# Patient Record
Sex: FEMALE | Race: WHITE | Employment: OTHER | URBAN - METROPOLITAN AREA
[De-identification: names, ages, dates, MRNs, and addresses within clinical notes are randomized per-mention and may not be internally consistent; named-entity substitution may affect disease eponyms.]

---

## 2017-01-03 RX ORDER — MELOXICAM 7.5 MG/1
7.5 TABLET ORAL DAILY
Qty: 30 TABLET | Refills: 1 | Status: SHIPPED | OUTPATIENT
Start: 2017-01-03 | End: 2017-03-07

## 2017-01-03 NOTE — TELEPHONE ENCOUNTER
To Dr. Dexter Bell - see below - could not find that we ordered this in Epic - pt states she is leaving town and would like refill. Pt uses for muscle spasm in back - only takes when she really needs it.   Using acupuncture, massage therapy and Voltaren gel as

## 2017-01-03 NOTE — TELEPHONE ENCOUNTER
Pt needs a refill Moloxicam 7.5 mg, send to [facility].    Tasked to rx

## 2017-01-09 RX ORDER — FLUTICASONE PROPIONATE 50 MCG
SPRAY, SUSPENSION (ML) NASAL
Qty: 1 INHALER | Refills: 11 | Status: SHIPPED | OUTPATIENT
Start: 2017-01-09 | End: 2017-03-07

## 2017-01-13 ENCOUNTER — TELEPHONE (OUTPATIENT)
Dept: INTERNAL MEDICINE CLINIC | Facility: CLINIC | Age: 62
End: 2017-01-13

## 2017-01-13 RX ORDER — ESTRADIOL 0.1 MG/G
0.5 CREAM VAGINAL
Qty: 1 TUBE | Refills: 0 | Status: SHIPPED | OUTPATIENT
Start: 2017-01-13 | End: 2017-02-09

## 2017-01-13 NOTE — TELEPHONE ENCOUNTER
PT needs a refill Estradiol Vaginal Cream, pt will be leaving town tomorrow, please send to [facility].    Tasked to rx low

## 2017-01-13 NOTE — TELEPHONE ENCOUNTER
Dr. Rangel File - please advise for Dr. Camelia Arreaga - Rx has not been filled since October 2015 as pt only uses it PRN. Pt has been seen by Dr. Camelia Arreaga for routine physical in April 2016.

## 2017-02-09 RX ORDER — ESTRADIOL 0.1 MG/G
0.5 CREAM VAGINAL
Qty: 1 TUBE | Refills: 6 | Status: SHIPPED | OUTPATIENT
Start: 2017-02-10 | End: 2019-02-04

## 2017-02-09 NOTE — TELEPHONE ENCOUNTER
Please call pt, she rec'd her refill for 1 tube but was requesting that addt'l refills be available as with last years prescription which included 6 refills  Pt may not need all but would like it to be available rather than calling us each time  Please umer

## 2017-02-14 ENCOUNTER — TELEPHONE (OUTPATIENT)
Dept: GASTROENTEROLOGY | Facility: CLINIC | Age: 62
End: 2017-02-14

## 2017-02-14 DIAGNOSIS — R10.13 EPIGASTRIC PAIN: Primary | ICD-10-CM

## 2017-02-14 NOTE — TELEPHONE ENCOUNTER
Dr. Anat Ashton- MD on call for Dr. Opal Casiano- please see below communication from pt as well as her 2 emails on 2/12/17 and advise.

## 2017-02-14 NOTE — TELEPHONE ENCOUNTER
The pt should continue on bland diet and current medications, agree with advice as per RN. Please forward communication to Dr. Opal Casiano to follow up on these issues when back in the office.

## 2017-02-14 NOTE — TELEPHONE ENCOUNTER
Pt is contacted and she states the following (in addition to recent email messages): she fasted yesterday and was doing well this morning so she ate a banana; burning in stomach came back after belching x3 and with each bite of banana; it has now resolved,

## 2017-02-15 RX ORDER — RANITIDINE 150 MG/1
150 TABLET ORAL 2 TIMES DAILY
Qty: 60 TABLET | Refills: 11 | Status: SHIPPED | OUTPATIENT
Start: 2017-02-15 | End: 2019-07-17

## 2017-02-15 NOTE — TELEPHONE ENCOUNTER
Dr. Alexandru Louis- please see below communication from pt re: not taking Carafate and whether belching is damaging her esophagus; please advise; thanks!

## 2017-02-15 NOTE — TELEPHONE ENCOUNTER
Pt is contacted and made aware of below recommendations from Sugey Magaña and Flora Saucedo; she states the following: she ate light lunch yesterday and same for dinner and did well; at HS (2130) she had some rice cereal with almond milk and developed stomach pain/bu

## 2017-02-15 NOTE — TELEPHONE ENCOUNTER
Please also make sure that the patient is taking pantoprazole 40mg q am BEFORE BREAKFAST as prescribed. I have sent Erx for ranititdine 150mg bid as needed for breakthrough symptoms.

## 2017-02-16 NOTE — TELEPHONE ENCOUNTER
Pt is contacted re: below communication from Dr. Danielle Rudolph and she verbalized understanding of this/agreement with plan; pt is asking if Dr. Danielle Rudolph responded to her email from this morning re: lab orders; this will be sent to her as message also; pt states she ha

## 2017-02-16 NOTE — TELEPHONE ENCOUNTER
Belching will not damage her esophagus. Please instruct her to try otc simethicone as per lable instructions. Thanks.

## 2017-02-23 ENCOUNTER — PATIENT MESSAGE (OUTPATIENT)
Dept: GASTROENTEROLOGY | Facility: CLINIC | Age: 62
End: 2017-02-23

## 2017-02-23 NOTE — TELEPHONE ENCOUNTER
From: Matt Lugoys Day  To: Reynaldo Null MD  Sent: 2/23/2017 11:29 AM CST  Subject: Non-Urgent Medical Question    Good morning. Just wondering if I could take that apple cider vinegar mixture that a lot of people take for different things.

## 2017-02-24 NOTE — TELEPHONE ENCOUNTER
Yes you can have some apple cider vinegar. Do not have too much as it is acidic and could cause worsening acid reflux.

## 2017-03-07 ENCOUNTER — OFFICE VISIT (OUTPATIENT)
Dept: INTERNAL MEDICINE CLINIC | Facility: CLINIC | Age: 62
End: 2017-03-07

## 2017-03-07 VITALS
HEIGHT: 68 IN | TEMPERATURE: 98 F | WEIGHT: 137 LBS | DIASTOLIC BLOOD PRESSURE: 72 MMHG | SYSTOLIC BLOOD PRESSURE: 118 MMHG | HEART RATE: 72 BPM | BODY MASS INDEX: 20.76 KG/M2

## 2017-03-07 DIAGNOSIS — M85.80 OSTEOPENIA, UNSPECIFIED LOCATION: ICD-10-CM

## 2017-03-07 DIAGNOSIS — I10 ESSENTIAL HYPERTENSION: ICD-10-CM

## 2017-03-07 DIAGNOSIS — Z80.0 FAMILY HISTORY OF COLON CANCER: ICD-10-CM

## 2017-03-07 DIAGNOSIS — R53.83 OTHER FATIGUE: ICD-10-CM

## 2017-03-07 DIAGNOSIS — M54.9 UPPER BACK PAIN ON LEFT SIDE: ICD-10-CM

## 2017-03-07 DIAGNOSIS — Z12.31 ENCOUNTER FOR SCREENING MAMMOGRAM FOR BREAST CANCER: ICD-10-CM

## 2017-03-07 DIAGNOSIS — Z00.00 ROUTINE HEALTH MAINTENANCE: Primary | ICD-10-CM

## 2017-03-07 DIAGNOSIS — K21.9 GASTROESOPHAGEAL REFLUX DISEASE WITHOUT ESOPHAGITIS: ICD-10-CM

## 2017-03-07 PROCEDURE — 99213 OFFICE O/P EST LOW 20 MIN: CPT | Performed by: INTERNAL MEDICINE

## 2017-03-07 PROCEDURE — 99396 PREV VISIT EST AGE 40-64: CPT | Performed by: INTERNAL MEDICINE

## 2017-03-07 RX ORDER — GABAPENTIN 300 MG/1
CAPSULE ORAL
Qty: 90 CAPSULE | Refills: 5 | Status: SHIPPED | OUTPATIENT
Start: 2017-03-07 | End: 2017-03-30

## 2017-03-07 NOTE — PROGRESS NOTES
Lauryn Stoner is a 57year old female. Patient presents with:  Physical: also severe back pain since december      HPI:   Lauryn Stoner is a 57year old female who presents for a complete physical exam.   Had thoracic MRI from Dr. Lenore Rosales on 12/2/16 due to mouth nightly. Disp: 30 tablet Rfl: 12   Pantoprazole Sodium 40 MG Oral Tab EC Take 1 tablet (40 mg total) by mouth every morning before breakfast. Disp: 90 tablet Rfl: 4   AmLODIPine Besylate 10 MG Oral Tab Take 1 tablet (10 mg total) by mouth daily.  Disp gallbladder disease[other] [OTHER] Sister    • Crohn's Disease Other    • Heart Disease Other       Social History:     Smoking Status: Former Smoker                   Packs/Day: 0.00  Years:           Quit date: 11/02/1970    Smokeless Status: Former User sx started in 12/16. Trial of gabapentin 300mg TID. Cont physical therapy. If sx persist, f/u with Dr. Clarisse Cota. May need repeat MRI c-spine (vs EMG/NCV).      7. Family history of colonic cancer (sister)  Last colonoscopy 2015 -- no polyps.  Next colono

## 2017-03-23 ENCOUNTER — PATIENT MESSAGE (OUTPATIENT)
Dept: INTERNAL MEDICINE CLINIC | Facility: CLINIC | Age: 62
End: 2017-03-23

## 2017-03-24 NOTE — TELEPHONE ENCOUNTER
From: Muufri Day  To: Alexandria Kinsey MD  Sent: 3/23/2017 3:32 PM CDT  Subject: Visit Follow-up Question    Hi dr Mckayla Salazar. Still not feeling any differ on the Gabapentin. Still feelings down my left arm.    Went to see someone about dry needling at

## 2017-03-28 ENCOUNTER — TELEPHONE (OUTPATIENT)
Dept: INTERNAL MEDICINE CLINIC | Facility: CLINIC | Age: 62
End: 2017-03-28

## 2017-03-28 NOTE — TELEPHONE ENCOUNTER
Pt is calling she would like to get the order for the ENG for nerve pain today, she is still experiencing the pain in left arm to her fingertips.   please call pt today [phone]    Tasked to nursing

## 2017-03-28 NOTE — TELEPHONE ENCOUNTER
Pt states Dr. Camilla Chirinos told her to follow up with Dr. Edman Lundborg but he is out of town this week also     Patient is asking for a emg - pt has a lot of nerve pain and is hoping to take care of sooner than waiting for dr. sterling to return     and she wants sometelman

## 2017-03-28 NOTE — TELEPHONE ENCOUNTER
To Dr. Aiden Jo - pt asking for EMG asap - please see pt 3/28/17 emails - last EMG was 11/2015 - Dr. Edman Lundborg out of town for week. Pt seeing Dr. Nakul Kay April 19.

## 2017-03-29 NOTE — TELEPHONE ENCOUNTER
I know Dr. Erika Rollins is out of town, but he would be the best to determine what steps would be best to take next. You can try to increase the gabapentin to 600mg in the morning, and 300mg in the afternoon and evening to see if that would help.

## 2017-03-29 NOTE — TELEPHONE ENCOUNTER
Spoke with patient and relayed Dr. Larsen New messages. She verbalized an understanding and will contact Dr. Jerzy Miller office on Monday. Patient will try increasing Gabapentin, or call office/go to ER if symptoms worsen in the meantime. Med Module updated.

## 2017-03-30 ENCOUNTER — OFFICE VISIT (OUTPATIENT)
Dept: NEUROLOGY | Facility: CLINIC | Age: 62
End: 2017-03-30

## 2017-03-30 VITALS
DIASTOLIC BLOOD PRESSURE: 68 MMHG | BODY MASS INDEX: 21.22 KG/M2 | SYSTOLIC BLOOD PRESSURE: 108 MMHG | HEART RATE: 73 BPM | WEIGHT: 140 LBS | RESPIRATION RATE: 15 BRPM | HEIGHT: 68 IN

## 2017-03-30 DIAGNOSIS — M54.2 NECK PAIN ON LEFT SIDE: ICD-10-CM

## 2017-03-30 DIAGNOSIS — M54.12 CERVICAL RADICULOPATHY: Primary | ICD-10-CM

## 2017-03-30 PROBLEM — Z98.890 HISTORY OF LUMBAR LAMINECTOMY: Status: ACTIVE | Noted: 2017-03-30

## 2017-03-30 PROCEDURE — 99204 OFFICE O/P NEW MOD 45 MIN: CPT | Performed by: PHYSICAL MEDICINE & REHABILITATION

## 2017-03-30 NOTE — PROGRESS NOTES
Cervical Pain H & P    Chief Complaint:  Patient presents with:  Back Pain: new patient here with 3 month hx of tightness, spasms and pain in the upper back area. symptoms are occasionally accompanied with pain, numbness and tingling in the left arm.  pt ha base of the skull. · The pain radiates to occiput area of the head, left scapula, left upper posterior arm and left posterior forearm. · The pain at its best is 2/10. The pain at its worst is 9/10. The pain is currently  3/10.   The pain is described as Cancer Maternal Grandfather    • Polyps Sister      colon polyps   • gallbladder disease[other] [OTHER] Sister    • Crohn's Disease Other    • Heart Disease Other        Social History     Social History   Marital Status:   Spouse Name: N/A    Years equal, round, and reactive to light. No redness or discharge bilaterally. Skin:  There are no rashes or lesions. Lymph Nodes: The patient has no palpable submandibular, supraclavicular, and cervical lymph nodes. .    Vitals:   03/30/17  1400   BP: 10 and Left plexus irritation   Lhermitte's sign     Assessment  1. left C7-8 radiculopathy    2. Neck pain on left side      Plan  She will get a MRI of the cervical spine due to her newer onset of the pain and weakness and numbness and tingling.     She will

## 2017-03-30 NOTE — PATIENT INSTRUCTIONS
As of October 6th 2014, the Drug Enforcement Agency [facility]) is reclassifying all hydrocodone combination medications from Schedule III to Schedule II. This includes medications such as Norco, Vicodin, Lortab, Zohydro, and Vicoprofen.     What this means for y chart.      Plan  She will get a MRI of the cervical spine due to her newer onset of the pain and weakness and numbness and tingling.     She will call me once she has had the imaging studies and I will review them and my office will get back in touch with

## 2017-03-31 ENCOUNTER — MED REC SCAN ONLY (OUTPATIENT)
Dept: NEUROLOGY | Facility: CLINIC | Age: 62
End: 2017-03-31

## 2017-03-31 ENCOUNTER — TELEPHONE (OUTPATIENT)
Dept: NEUROLOGY | Facility: CLINIC | Age: 62
End: 2017-03-31

## 2017-03-31 NOTE — TELEPHONE ENCOUNTER
Called [phone] for Authorization of Approval for MRI C-spine, Approval was given with Ref #[id number] valid until 04/29/2017, will call patient to inform the Approval, patient states that she is schedule for Sunday afternoon

## 2017-04-02 ENCOUNTER — HOSPITAL ENCOUNTER (OUTPATIENT)
Dept: MRI IMAGING | Age: 62
Discharge: HOME | End: 2017-04-02
Attending: PHYSICAL MEDICINE & REHABILITATION
Payer: COMMERCIAL

## 2017-04-02 DIAGNOSIS — M54.12 CERVICAL RADICULOPATHY: ICD-10-CM

## 2017-04-02 PROCEDURE — 72141 MRI NECK SPINE W/O DYE: CPT

## 2017-04-03 ENCOUNTER — LAB ENCOUNTER (OUTPATIENT)
Dept: LAB | Facility: HOSPITAL | Age: 62
End: 2017-04-03
Attending: INTERNAL MEDICINE
Payer: COMMERCIAL

## 2017-04-03 ENCOUNTER — TELEPHONE (OUTPATIENT)
Dept: NEUROLOGY | Facility: CLINIC | Age: 62
End: 2017-04-03

## 2017-04-03 DIAGNOSIS — I10 ESSENTIAL HYPERTENSION: ICD-10-CM

## 2017-04-03 DIAGNOSIS — M85.80 OSTEOPENIA, UNSPECIFIED LOCATION: ICD-10-CM

## 2017-04-03 DIAGNOSIS — M48.02 CERVICAL SPINAL STENOSIS: ICD-10-CM

## 2017-04-03 DIAGNOSIS — M48.02 FORAMINAL STENOSIS OF CERVICAL REGION: ICD-10-CM

## 2017-04-03 DIAGNOSIS — M50.90 CERVICAL DISC DISEASE: ICD-10-CM

## 2017-04-03 DIAGNOSIS — M54.12 CERVICAL RADICULOPATHY: Primary | ICD-10-CM

## 2017-04-03 DIAGNOSIS — R53.83 OTHER FATIGUE: ICD-10-CM

## 2017-04-03 DIAGNOSIS — M54.2 NECK PAIN ON LEFT SIDE: ICD-10-CM

## 2017-04-03 PROCEDURE — 80061 LIPID PANEL: CPT

## 2017-04-03 PROCEDURE — 80048 BASIC METABOLIC PNL TOTAL CA: CPT

## 2017-04-03 PROCEDURE — 84450 TRANSFERASE (AST) (SGOT): CPT

## 2017-04-03 PROCEDURE — 84443 ASSAY THYROID STIM HORMONE: CPT

## 2017-04-03 PROCEDURE — 85025 COMPLETE CBC W/AUTO DIFF WBC: CPT

## 2017-04-03 PROCEDURE — 82306 VITAMIN D 25 HYDROXY: CPT

## 2017-04-03 PROCEDURE — 84460 ALANINE AMINO (ALT) (SGPT): CPT

## 2017-04-03 PROCEDURE — 36415 COLL VENOUS BLD VENIPUNCTURE: CPT

## 2017-04-03 RX ORDER — AMLODIPINE BESYLATE 10 MG/1
TABLET ORAL
Qty: 90 TABLET | Refills: 1 | Status: SHIPPED | OUTPATIENT
Start: 2017-04-03 | End: 2017-10-12
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
## 2017-04-03 NOTE — TELEPHONE ENCOUNTER
Pt called told that Dr Carol Degroot will be reviewing and will be be getting back to her in near future with recommendations and interpretation.

## 2017-04-10 ENCOUNTER — PATIENT MESSAGE (OUTPATIENT)
Dept: INTERNAL MEDICINE CLINIC | Facility: CLINIC | Age: 62
End: 2017-04-10

## 2017-04-12 ENCOUNTER — TELEPHONE (OUTPATIENT)
Dept: NEUROLOGY | Facility: CLINIC | Age: 62
End: 2017-04-12

## 2017-04-12 PROBLEM — M50.90 CERVICAL DISC DISEASE: Status: ACTIVE | Noted: 2017-04-12

## 2017-04-12 PROBLEM — M48.02 FORAMINAL STENOSIS OF CERVICAL REGION: Status: ACTIVE | Noted: 2017-04-12

## 2017-04-12 PROBLEM — M48.02 CERVICAL SPINAL STENOSIS: Status: ACTIVE | Noted: 2017-04-12

## 2017-04-12 NOTE — TELEPHONE ENCOUNTER
Pt stopped into office. Pt very anxious about MRI results. Started therapy. Told will have Dr Isabel Becerril reviewed. Spent 15 min with pt.

## 2017-04-12 NOTE — TELEPHONE ENCOUNTER
Spoke to patient and informed her of the below results and recommendations per Dr. Juan Francisco Guzman. Patient expressed understanding and would like to proceed with the left C7 TFESI under MAC sedation since she passed out during a previous injection.     Patient has b

## 2017-04-12 NOTE — TELEPHONE ENCOUNTER
I have reviewed her MRI scan and she has multiple bulging discs with narrowing where the nerves exit the left side of her neck which is probably the reason for her symptoms.   I am recommending a left C7 TFESI if the pain is significant, but she will need t

## 2017-04-12 NOTE — TELEPHONE ENCOUNTER
Pt. informed insurance was verified and Physical therapy is a covered benefit and does not require authorization for initial evaluation. Can proceed with scheduling appt. States she had PT for one month with no improvement. Requested PT notes from [facility].   Sh

## 2017-04-12 NOTE — TELEPHONE ENCOUNTER
Spoke to patient and informed her again of Dr. Devendra Blackwell below. Patient mentioned she did 4 weeks of physical therapy and there was no change in symptoms so she feels it would not be beneficial. No further action required at this time.       Dr. Chen Gutierrez

## 2017-04-12 NOTE — TELEPHONE ENCOUNTER
Called [facility] for authorization of approval of left C7 TFESI cpt codes Y1111304. Talked to Edilson Gaytan. who states no authorization is required. Reference # [id number]. Pt. Is scheduled for procedure on 04/21/17.

## 2017-04-17 ENCOUNTER — PATIENT MESSAGE (OUTPATIENT)
Dept: GASTROENTEROLOGY | Facility: CLINIC | Age: 62
End: 2017-04-17

## 2017-04-17 ENCOUNTER — TELEPHONE (OUTPATIENT)
Dept: NEUROLOGY | Facility: CLINIC | Age: 62
End: 2017-04-17

## 2017-04-17 NOTE — TELEPHONE ENCOUNTER
Pt left message on cell phone that it is OK to take muscle relaxant and can use diclofenac topical up to 48hr before. Reviewed with pt not to take NSAID, ASA or fish oil 1 week before. Told to call if any further questions.

## 2017-04-19 NOTE — TELEPHONE ENCOUNTER
From: [facility] Expose Day  To: Magdi Bennett MD  Sent: 4/17/2017 4:37 PM CDT  Subject: Non-Urgent Medical Question    Hi dr Kami Dubon. Just asking. I know I'm Not suppose to have any alcohol, and I haven't since 2010 I believe it is, and no herbs.  But

## 2017-04-21 ENCOUNTER — TELEPHONE (OUTPATIENT)
Dept: NEUROLOGY | Facility: CLINIC | Age: 62
End: 2017-04-21

## 2017-04-21 ENCOUNTER — OFFICE VISIT (OUTPATIENT)
Dept: SURGERY | Facility: CLINIC | Age: 62
End: 2017-04-21

## 2017-04-21 DIAGNOSIS — M48.02 FORAMINAL STENOSIS OF CERVICAL REGION: ICD-10-CM

## 2017-04-21 DIAGNOSIS — M54.12 CERVICAL RADICULOPATHY: Primary | ICD-10-CM

## 2017-04-21 DIAGNOSIS — M19.049: ICD-10-CM

## 2017-04-21 DIAGNOSIS — S63.639A: ICD-10-CM

## 2017-04-21 DIAGNOSIS — M50.90 CERVICAL DISC DISEASE: ICD-10-CM

## 2017-04-21 PROCEDURE — 64479 NJX AA&/STRD TFRM EPI C/T 1: CPT | Performed by: PHYSICAL MEDICINE & REHABILITATION

## 2017-04-21 NOTE — TELEPHONE ENCOUNTER
Pt requested of Dr uYmiko Marshall refill on Volteren gel. Pt is [facility] this AM. Medication escript to [employer].

## 2017-04-21 NOTE — PROCEDURES
Armin Grimes.    CERVICAL TRANSFORAMINAL  NAME:  Negro Stoner    MR #:    [id number] :  10/5/1955     PHYSICIAN:  Cedric Cleaning            Operative Report    DATE OF PROCEDURE: 2017   PREOPERATIVE DIAGNOSES: 1. left C7-8 contrast was used to obtain a good epidurogram indicating correct needle placement. No blood, fluid, or air was aspirated. Then, the patient was injected with a 0.5 cc of 2% PF lidocaine without epinephrine. No adverse reaction was seen.   Then, aspirati

## 2017-04-24 ENCOUNTER — OFFICE VISIT (OUTPATIENT)
Dept: PHYSICAL THERAPY | Facility: HOSPITAL | Age: 62
End: 2017-04-24
Attending: PHYSICAL MEDICINE & REHABILITATION
Payer: COMMERCIAL

## 2017-04-24 ENCOUNTER — TELEPHONE (OUTPATIENT)
Dept: INTERNAL MEDICINE CLINIC | Facility: CLINIC | Age: 62
End: 2017-04-24

## 2017-04-24 DIAGNOSIS — M48.02 FORAMINAL STENOSIS OF CERVICAL REGION: ICD-10-CM

## 2017-04-24 DIAGNOSIS — M54.2 NECK PAIN ON LEFT SIDE: ICD-10-CM

## 2017-04-24 DIAGNOSIS — M48.02 CERVICAL SPINAL STENOSIS: ICD-10-CM

## 2017-04-24 DIAGNOSIS — M50.90 CERVICAL DISC DISEASE: ICD-10-CM

## 2017-04-24 DIAGNOSIS — M54.12 CERVICAL RADICULOPATHY: Primary | ICD-10-CM

## 2017-04-24 PROCEDURE — 97110 THERAPEUTIC EXERCISES: CPT

## 2017-04-24 PROCEDURE — 97162 PT EVAL MOD COMPLEX 30 MIN: CPT

## 2017-04-24 NOTE — TELEPHONE ENCOUNTER
Can wait till Dr. Jan Mccormick returns. Patient had been on Fosamax for her bone issues. Was at Dr. Gaby Arroyo today & they mentioned they were concerned about her bones being brittle if they manipulate her.     Dr. Sudeep Loja had taken her off Fosamax as she had ga

## 2017-04-25 ENCOUNTER — TELEPHONE (OUTPATIENT)
Dept: NEUROLOGY | Facility: CLINIC | Age: 62
End: 2017-04-25

## 2017-04-26 ENCOUNTER — OFFICE VISIT (OUTPATIENT)
Dept: PHYSICAL THERAPY | Facility: HOSPITAL | Age: 62
End: 2017-04-26
Attending: PHYSICAL MEDICINE & REHABILITATION
Payer: COMMERCIAL

## 2017-04-26 DIAGNOSIS — M54.12 CERVICAL RADICULOPATHY: Primary | ICD-10-CM

## 2017-04-26 DIAGNOSIS — M48.02 CERVICAL SPINAL STENOSIS: ICD-10-CM

## 2017-04-26 DIAGNOSIS — M54.2 NECK PAIN ON LEFT SIDE: ICD-10-CM

## 2017-04-26 DIAGNOSIS — M48.02 FORAMINAL STENOSIS OF CERVICAL REGION: ICD-10-CM

## 2017-04-26 DIAGNOSIS — M50.90 CERVICAL DISC DISEASE: ICD-10-CM

## 2017-04-26 PROCEDURE — 97140 MANUAL THERAPY 1/> REGIONS: CPT

## 2017-04-26 NOTE — PROGRESS NOTES
Goals     • Therapy Goals         Long Term Goals (Time Frame 12 visits) by 07/30/17   1. Pt to report reduction of L shoulder blade pain with looking down to read a book to no more than 3/10 pain for functional pain management   2.  Pt to demonstrate impro history includes L1-L2 fusion in 2008 and osteoporosis according to pt.  The patient would benefit from the skilled intervention of a physical therapist for the impairments and functional limitations noted above.     Rehab Potential: fair  Limiting factors: prone position to L shoulder blade region*. Educated patient on possible bruising and tenderness as a side effect to the technique, use of cryotherapy as needed, pt verbalized understanding and gave consent.  Pt reports feeling better after treatment with

## 2017-04-26 NOTE — TELEPHONE ENCOUNTER
Pt called stated that she had left C7 TFESI and although neck is improving has resolved muscle spam  \"knot\" per pt in \"left wing muscle\".  Stated that Dr Mary Jane Sanchez was aware and thought that she may get some relief from muscle spasm post injection but none

## 2017-05-02 ENCOUNTER — TELEPHONE (OUTPATIENT)
Dept: GASTROENTEROLOGY | Facility: CLINIC | Age: 62
End: 2017-05-02

## 2017-05-02 NOTE — TELEPHONE ENCOUNTER
Pt is contacted and made aware that last rx for Fosamax was written on 7/22/15; since her EGD was done on 6/9/16 and pt has 2 doses left of Fosamax; she most likely stopped taking it right after that procedure; pt verbalized understanding of this and will

## 2017-05-02 NOTE — TELEPHONE ENCOUNTER
Called patient and relayed message from Dr. Yinka Dubon. She verbalized understanding and scheduled appt for next week with Dr. Yinka Dubon.

## 2017-05-02 NOTE — TELEPHONE ENCOUNTER
Pt wants to know when did Dr Maria Dolores Conrad requested for the pt. To stop taking the Fosamax medication?

## 2017-05-02 NOTE — TELEPHONE ENCOUNTER
Looks like she had been on fosamax since 5/2014 -- not sure when she stopped. All of the meds in the fosamax category can be hard on her stomach. It would probably be helpful for her to make an appt to discuss other options for treatment.

## 2017-05-09 ENCOUNTER — OFFICE VISIT (OUTPATIENT)
Dept: PHYSICAL THERAPY | Facility: HOSPITAL | Age: 62
End: 2017-05-09
Attending: PHYSICAL MEDICINE & REHABILITATION
Payer: COMMERCIAL

## 2017-05-09 DIAGNOSIS — M48.02 FORAMINAL STENOSIS OF CERVICAL REGION: ICD-10-CM

## 2017-05-09 DIAGNOSIS — M50.90 CERVICAL DISC DISEASE: ICD-10-CM

## 2017-05-09 DIAGNOSIS — M54.2 NECK PAIN ON LEFT SIDE: ICD-10-CM

## 2017-05-09 DIAGNOSIS — M54.12 CERVICAL RADICULOPATHY: Primary | ICD-10-CM

## 2017-05-09 DIAGNOSIS — M48.02 CERVICAL SPINAL STENOSIS: ICD-10-CM

## 2017-05-09 PROCEDURE — 97140 MANUAL THERAPY 1/> REGIONS: CPT

## 2017-05-09 NOTE — PROGRESS NOTES
Goals     • Therapy Goals         Long Term Goals (Time Frame 12 visits) by 07/30/17   1. Pt to report reduction of L shoulder blade pain with looking down to read a book to no more than 3/10 pain for functional pain management   2.  Pt to demonstrate impro history includes L1-L2 fusion in 2008 and osteoporosis according to pt.  The patient would benefit from the skilled intervention of a physical therapist for the impairments and functional limitations noted above.     Rehab Potential: fair  Limiting factors: including effleurage and pétrissage, myofascial release, and connective tissue skin rolling in centripedal direction with aims to loosen adhesions, reduce pain, increase lymphatic flow, increase circulation and increase waste removal of inflammation.   Intr much better after last treatment session.   Focused session on addressing moderate to severe trigger points and hypertonicity throughout the cervical and thoracic spine extensors and GH retractors and external rotators as found on initial evaluation with so

## 2017-05-10 ENCOUNTER — OFFICE VISIT (OUTPATIENT)
Dept: INTERNAL MEDICINE CLINIC | Facility: CLINIC | Age: 62
End: 2017-05-10

## 2017-05-10 VITALS
SYSTOLIC BLOOD PRESSURE: 134 MMHG | HEART RATE: 76 BPM | BODY MASS INDEX: 21.37 KG/M2 | WEIGHT: 141 LBS | DIASTOLIC BLOOD PRESSURE: 82 MMHG | HEIGHT: 68 IN | TEMPERATURE: 98 F

## 2017-05-10 DIAGNOSIS — M85.80 OSTEOPENIA, UNSPECIFIED LOCATION: Primary | ICD-10-CM

## 2017-05-10 PROCEDURE — 99212 OFFICE O/P EST SF 10 MIN: CPT | Performed by: INTERNAL MEDICINE

## 2017-05-10 PROCEDURE — 99213 OFFICE O/P EST LOW 20 MIN: CPT | Performed by: INTERNAL MEDICINE

## 2017-05-10 RX ORDER — METHOCARBAMOL 750 MG/1
1 TABLET, FILM COATED ORAL AS NEEDED
Refills: 1 | COMMUNITY
Start: 2017-04-25 | End: 2017-08-08 | Stop reason: ALTCHOICE

## 2017-05-10 RX ORDER — CYCLOBENZAPRINE HCL 10 MG
1 TABLET ORAL 2 TIMES DAILY
Refills: 0 | COMMUNITY
Start: 2017-05-04 | End: 2017-07-17

## 2017-05-10 NOTE — PROGRESS NOTES
Lauryn Stoner is a 57year old female. Patient presents with:  Medication Problem: Patient is here today to discuss Fosamax. She was advised by Dr. Nader Dalal to stop this medication d/t issues with gastritis (patient thinks in June 2016).  Patient has been doing Rfl:    Fluticasone Propionate (FLONASE) 50 MCG/ACT Nasal Suspension by Nasal route daily as needed. spray 2 spray by intranasal route  every day in each nostril  Disp:  Rfl:    Multiple Vitamins Oral Tab Take 1 tablet by mouth daily.  Multiple Vitamins tab miacalcin, Evista. Pt opts for Reclast 5mg IV q12 months. Will ask Zhang Thompson to arrange. Repeat in DEXA in 2 years. Cont exercise, calcium, vitamin D. The patient indicates understanding of these issues and agrees to the plan.

## 2017-05-11 ENCOUNTER — OFFICE VISIT (OUTPATIENT)
Dept: PHYSICAL THERAPY | Facility: HOSPITAL | Age: 62
End: 2017-05-11
Attending: PHYSICAL MEDICINE & REHABILITATION
Payer: COMMERCIAL

## 2017-05-11 ENCOUNTER — TELEPHONE (OUTPATIENT)
Dept: INTERNAL MEDICINE CLINIC | Facility: CLINIC | Age: 62
End: 2017-05-11

## 2017-05-11 ENCOUNTER — HOSPITAL ENCOUNTER (OUTPATIENT)
Dept: MAMMOGRAPHY | Age: 62
Discharge: HOME | End: 2017-05-11
Attending: INTERNAL MEDICINE
Payer: COMMERCIAL

## 2017-05-11 DIAGNOSIS — M48.02 FORAMINAL STENOSIS OF CERVICAL REGION: ICD-10-CM

## 2017-05-11 DIAGNOSIS — R92.2 DENSE BREASTS: Primary | ICD-10-CM

## 2017-05-11 DIAGNOSIS — Z12.31 ENCOUNTER FOR SCREENING MAMMOGRAM FOR BREAST CANCER: ICD-10-CM

## 2017-05-11 DIAGNOSIS — M50.90 CERVICAL DISC DISEASE: ICD-10-CM

## 2017-05-11 DIAGNOSIS — M54.2 NECK PAIN ON LEFT SIDE: ICD-10-CM

## 2017-05-11 DIAGNOSIS — M54.12 CERVICAL RADICULOPATHY: Primary | ICD-10-CM

## 2017-05-11 DIAGNOSIS — M48.02 CERVICAL SPINAL STENOSIS: ICD-10-CM

## 2017-05-11 PROCEDURE — 77067 SCR MAMMO BI INCL CAD: CPT | Performed by: INTERNAL MEDICINE

## 2017-05-11 PROCEDURE — 97140 MANUAL THERAPY 1/> REGIONS: CPT

## 2017-05-11 NOTE — PROGRESS NOTES
Goals     • Therapy Goals         Long Term Goals (Time Frame 12 visits) by 07/30/17   1. Pt to report reduction of L shoulder blade pain with looking down to read a book to no more than 3/10 pain for functional pain management   2.  Pt to demonstrate impro history includes L1-L2 fusion in 2008 and osteoporosis according to pt.  The patient would benefit from the skilled intervention of a physical therapist for the impairments and functional limitations noted above.     Rehab Potential: fair  Limiting factors: session on addressing moderate to severe trigger points and hypertonicity throughout the cervical and thoracic spine extensors and GH retractors and external rotators as found on initial evaluation with soft tissue mobilization including effleurage and pét and plan to introduce postural strengthening as able.     Therapist: Lord Rodrigues, PT, DPT, OCS  5/3/2017 4:04 PM    5/9/2017: Pt returns to clinic with 5-6/10 L shoulder blade pain currently and reports the symptoms are much better after last treatment increase tissue flexibility to add to HEP. Pt reports feeling better after treatment with now 2-3/10 pain. Issued cryotherapy to apply to L rhomboid to reduce inflammatory response post-tx.   Plan to continue addressing trigger points with soft tissue mob

## 2017-05-11 NOTE — TELEPHONE ENCOUNTER
Please tell Mary Reynolds that it would be okay for her to have free nonalcoholic wine. Just for her information if she puts alcohol into a meal for cooking or boiling,, most of it is boiled off by heat.

## 2017-05-12 ENCOUNTER — TELEPHONE (OUTPATIENT)
Dept: NEUROLOGY | Facility: CLINIC | Age: 62
End: 2017-05-12

## 2017-05-12 NOTE — TELEPHONE ENCOUNTER
Patient called back and was relayed 's message. Patient states she will think about the whole breast US and let us know if she is interested.

## 2017-05-12 NOTE — TELEPHONE ENCOUNTER
Please let pt know that her mammo was normal.  Because her breasts are so dense, she is a candidate for whole breast u/s which would just give additonal information. If she is interested, let me know and I can order.

## 2017-05-12 NOTE — TELEPHONE ENCOUNTER
Spoke to patient who states Aliya Mitchell (physical therapist) feels patient may benefit from acupuncture or cupping therapy at [facility]. Patient is requesting an order from Dr. Tommy Guillaume for this treatment and would like to go to outside facility for such treatment.

## 2017-05-15 ENCOUNTER — OFFICE VISIT (OUTPATIENT)
Dept: PHYSICAL THERAPY | Facility: HOSPITAL | Age: 62
End: 2017-05-15
Attending: PHYSICAL MEDICINE & REHABILITATION
Payer: COMMERCIAL

## 2017-05-15 ENCOUNTER — TELEPHONE (OUTPATIENT)
Dept: INTERNAL MEDICINE CLINIC | Facility: CLINIC | Age: 62
End: 2017-05-15

## 2017-05-15 DIAGNOSIS — M48.02 FORAMINAL STENOSIS OF CERVICAL REGION: ICD-10-CM

## 2017-05-15 DIAGNOSIS — M50.90 CERVICAL DISC DISEASE: ICD-10-CM

## 2017-05-15 DIAGNOSIS — M48.02 CERVICAL SPINAL STENOSIS: ICD-10-CM

## 2017-05-15 DIAGNOSIS — M54.12 CERVICAL RADICULOPATHY: Primary | ICD-10-CM

## 2017-05-15 DIAGNOSIS — M54.2 NECK PAIN ON LEFT SIDE: ICD-10-CM

## 2017-05-15 PROCEDURE — 97140 MANUAL THERAPY 1/> REGIONS: CPT

## 2017-05-15 NOTE — TELEPHONE ENCOUNTER
See telephone encounter 5/11/17.  Theodore Barriga faxed Reclast application to reimbursement dept    To Theodore Barriga, please advise

## 2017-05-15 NOTE — TELEPHONE ENCOUNTER
Pt still waiting on answer for bone density shot was this approved by her insurance she stated  told her she will have her nurse look in to this and call her back.

## 2017-05-15 NOTE — TELEPHONE ENCOUNTER
Yonny Paulson for her to do this. She does not need a prescription since insurance does not usually cover this. If her insurance is one of the very few that does, then I will give her a prescription.

## 2017-05-15 NOTE — PROGRESS NOTES
Goals     • Therapy Goals         Long Term Goals (Time Frame 12 visits) by 07/30/17   1. Pt to report reduction of L shoulder blade pain with looking down to read a book to no more than 3/10 pain for functional pain management   2.  Pt to demonstrate impro history includes L1-L2 fusion in 2008 and osteoporosis according to pt.  The patient would benefit from the skilled intervention of a physical therapist for the impairments and functional limitations noted above.     Rehab Potential: fair  Limiting factors: have helped slightly although feels very sore. No major changes since starting HEP yet.  Focused session on addressing moderate to severe trigger points and hypertonicity throughout the cervical and thoracic spine extensors and GH retractors and external r 2-3/10 pain. Plan to continue addressing trigger points with soft tissue mobilization techniques and plan to introduce postural strengthening as able.     Therapist: Maria Esther Upton PT, DPT, OCS  5/3/2017 4:04 PM    5/9/2017: Pt returns to clinic with 5-6/ [address] stabilization activities for extension and abduction to improve circulation, strength and increase tissue flexibility to add to HEP. Pt reports feeling better after treatment with now 2-3/10 pain.   Issued cryotherapy to apply to L rhomboid to reduce in

## 2017-05-15 NOTE — TELEPHONE ENCOUNTER
Has seen chiropractor for treatment and acupuncture that was billed to insurance. Will check to see if needs referral or will cover cupping.

## 2017-05-16 ENCOUNTER — TELEPHONE (OUTPATIENT)
Dept: INTERNAL MEDICINE CLINIC | Facility: CLINIC | Age: 62
End: 2017-05-16

## 2017-05-16 NOTE — TELEPHONE ENCOUNTER
Message relayed to patient who verbalized understanding. Pt would like to know if we would be able to tell her what her out-of-pocket cost will be. Informed her that this will be forwarded to managed care. Message forwarded to [facility] for review.

## 2017-05-16 NOTE — TELEPHONE ENCOUNTER
Patient received call regarding Mammogram results. Needs order for Ultrasound of Breasts. Please call when order is entered, may leave message.

## 2017-05-16 NOTE — TELEPHONE ENCOUNTER
Done. Pls see 5/11/17 telephone encounter. Pt would like to know what her out-of-pocket cost will be for the ultrasound. Message routed to [place] for review.

## 2017-05-16 NOTE — TELEPHONE ENCOUNTER
Patient called to check on status of reclast authorization. Explained that application has been sent, takes at least 7 business days. If she hasn't heard from us or insurance by May 30th to contact us, we will follow up.

## 2017-05-17 ENCOUNTER — APPOINTMENT (OUTPATIENT)
Dept: PHYSICAL THERAPY | Facility: HOSPITAL | Age: 62
End: 2017-05-17
Attending: PHYSICAL MEDICINE & REHABILITATION
Payer: COMMERCIAL

## 2017-05-22 ENCOUNTER — OFFICE VISIT (OUTPATIENT)
Dept: PHYSICAL THERAPY | Facility: HOSPITAL | Age: 62
End: 2017-05-22
Attending: PHYSICAL MEDICINE & REHABILITATION
Payer: COMMERCIAL

## 2017-05-22 DIAGNOSIS — M54.2 NECK PAIN ON LEFT SIDE: ICD-10-CM

## 2017-05-22 DIAGNOSIS — M48.02 CERVICAL SPINAL STENOSIS: ICD-10-CM

## 2017-05-22 DIAGNOSIS — M48.02 FORAMINAL STENOSIS OF CERVICAL REGION: ICD-10-CM

## 2017-05-22 DIAGNOSIS — M54.12 CERVICAL RADICULOPATHY: Primary | ICD-10-CM

## 2017-05-22 DIAGNOSIS — M50.90 CERVICAL DISC DISEASE: ICD-10-CM

## 2017-05-22 PROCEDURE — 97140 MANUAL THERAPY 1/> REGIONS: CPT

## 2017-05-22 PROCEDURE — 97110 THERAPEUTIC EXERCISES: CPT

## 2017-05-22 NOTE — PROGRESS NOTES
Goals     • Therapy Goals         Long Term Goals (Time Frame 12 visits) by 07/30/17   1. Pt to report reduction of L shoulder blade pain with looking down to read a book to no more than 3/10 pain for functional pain management   2.  Pt to demonstrate impro history includes L1-L2 fusion in 2008 and osteoporosis according to pt.  The patient would benefit from the skilled intervention of a physical therapist for the impairments and functional limitations noted above.     Rehab Potential: fair  Limiting factors: release  4.  [facility]   Internal          Neuro-Re-Education          Self-Care          Other            4/26/2017: Pt returns to clinic with 7/10 L shoulder blade pain currently and reports the massage she received yesterday seems to have helped slightly alt reduce pain, increase lymphatic flow, increase circulation and increase waste removal of inflammation. Followed with doorway stretch to increase tissue flexibility to add to HEP. Pt reports feeling better after treatment with now 2-3/10 pain.  Plan to con release, IASTM and connective tissue skin rolling in centripedal direction with aims to loosen adhesions, reduce pain, increase lymphatic flow, increase circulation and increase waste removal of inflammation.   Followed with isometric GH stabilization activ the thoracic spine has gotten so painful again.    Focused session on addressing moderate to severe trigger points and hypertonicity throughout the cervical and thoracic spine extensors and GH retractors and external rotators as found on initial evaluation

## 2017-05-22 NOTE — TELEPHONE ENCOUNTER
There has been several encounters regarding the Reclast shot & patient's out of pocket. Patient now has decided it doesn't matter the cost, she wants an order for the shot. Initially she wanted to know her cost just to be able to budget for it.     Please

## 2017-05-23 ENCOUNTER — OFFICE VISIT (OUTPATIENT)
Dept: NEUROLOGY | Facility: CLINIC | Age: 62
End: 2017-05-23

## 2017-05-23 VITALS
BODY MASS INDEX: 21.22 KG/M2 | WEIGHT: 140 LBS | OXYGEN SATURATION: 96 % | SYSTOLIC BLOOD PRESSURE: 132 MMHG | HEART RATE: 72 BPM | HEIGHT: 68 IN | RESPIRATION RATE: 16 BRPM | DIASTOLIC BLOOD PRESSURE: 78 MMHG

## 2017-05-23 DIAGNOSIS — M79.18 MYOFASCIAL PAIN: Primary | ICD-10-CM

## 2017-05-23 DIAGNOSIS — M54.2 NECK PAIN ON LEFT SIDE: ICD-10-CM

## 2017-05-23 PROCEDURE — 20552 NJX 1/MLT TRIGGER POINT 1/2: CPT | Performed by: PHYSICAL MEDICINE & REHABILITATION

## 2017-05-23 RX ORDER — LIDOCAINE HYDROCHLORIDE 10 MG/ML
10 INJECTION, SOLUTION INFILTRATION; PERINEURAL ONCE
Status: COMPLETED | OUTPATIENT
Start: 2017-05-23 | End: 2017-05-23

## 2017-05-23 NOTE — PROGRESS NOTES
Pt became vagal during procedure. Monitored. Juice given. Slightly diaphoretic, pale 110/70-60hr. Rebounded after 15 min of observation 132/76- 68hr. Pt dry no dizziness after 1/2hr discharged ambulatory.

## 2017-05-23 NOTE — PROCEDURES
I did left rhomboid major and minor trigger point injections in the office today. The points of maximal tenderness were identified and a marks were placed on the patient's skin. The skin was cleaned with alcohol swabs x 3.   A 27 gauge needle was inserted

## 2017-05-23 NOTE — PATIENT INSTRUCTIONS
Refill policies:    • Allow 2 business days for refills; controlled substances may take longer.   • Contact your pharmacy at least 5 days prior to running out of medication and have them send an electronic request or submit request through the “request re your physician has recommended that you have a procedure or additional testing performed. [facility] will contact your insurance carrier to obtain pre-certification or prior authorization.     Unfortunately, LUCAS has seen an increas

## 2017-05-24 ENCOUNTER — TELEPHONE (OUTPATIENT)
Dept: NEUROLOGY | Facility: CLINIC | Age: 62
End: 2017-05-24

## 2017-05-24 ENCOUNTER — OFFICE VISIT (OUTPATIENT)
Dept: PHYSICAL THERAPY | Facility: HOSPITAL | Age: 62
End: 2017-05-24
Attending: PHYSICAL MEDICINE & REHABILITATION
Payer: COMMERCIAL

## 2017-05-24 DIAGNOSIS — M50.90 CERVICAL DISC DISEASE: ICD-10-CM

## 2017-05-24 DIAGNOSIS — M54.12 CERVICAL RADICULOPATHY: Primary | ICD-10-CM

## 2017-05-24 DIAGNOSIS — M48.02 CERVICAL SPINAL STENOSIS: ICD-10-CM

## 2017-05-24 DIAGNOSIS — M48.02 FORAMINAL STENOSIS OF CERVICAL REGION: ICD-10-CM

## 2017-05-24 DIAGNOSIS — M54.2 NECK PAIN ON LEFT SIDE: ICD-10-CM

## 2017-05-24 PROCEDURE — 97140 MANUAL THERAPY 1/> REGIONS: CPT

## 2017-05-24 NOTE — TELEPHONE ENCOUNTER
Patient states she has mid back pain when taking deep breaths which started about two week ago.  Patient admits she had these symptoms in December 2016 and she went to the ER and was notified they were spasms and says it subsided after completing methylpred

## 2017-05-24 NOTE — TELEPHONE ENCOUNTER
Patient forgot to ask a question yesterday, when she takes a deep breath, she has pain around her \"bra line\". She wants to know if he has any idea what could cause this?  She will also ask her therapist.

## 2017-05-24 NOTE — PROGRESS NOTES
Goals     • Therapy Goals         Long Term Goals (Time Frame 12 visits) by 07/30/17   1. Pt to report reduction of L shoulder blade pain with looking down to read a book to no more than 3/10 pain for functional pain management   2.  Pt to demonstrate impro history includes L1-L2 fusion in 2008 and osteoporosis according to pt.  The patient would benefit from the skilled intervention of a physical therapist for the impairments and functional limitations noted above.     Rehab Potential: fair  Limiting factors: evaluation with soft tissue mobilization including effleurage and pétrissage, myofascial release, and connective tissue skin rolling in centripedal direction with aims to loosen adhesions, reduce pain, increase lymphatic flow, increase circulation and incr currently and reports the symptoms are much better after last treatment session.   Focused session on addressing moderate to severe trigger points and hypertonicity throughout the cervical and thoracic spine extensors and GH retractors and external rotators post-tx. Plan to continue addressing trigger points with soft tissue mobilization techniques and plan to introduce postural strengthening as able.     Therapist: Naun Dooley, PT, DPT, OCS  5/11/2017 3:14 PM    5/15/2017: Pt returns to clinic with 2-3/1 increase waste removal of inflammation. Followed with pain free active range of motion [address] stabilization activities for extension and abduction to improve circulation, strength and increase tissue flexibility to add to HEP.   Pt reports feeling better after

## 2017-05-24 NOTE — TELEPHONE ENCOUNTER
[phone]  Pt wants order for bone density injection called Reclast? Pt wants to do injection asap regardless of cost. Please call pt when order is in system so she can go to infusion center and have the injection  To clinical

## 2017-05-25 ENCOUNTER — OFFICE VISIT (OUTPATIENT)
Dept: NEUROLOGY | Facility: CLINIC | Age: 62
End: 2017-05-25

## 2017-05-25 VITALS
WEIGHT: 140 LBS | RESPIRATION RATE: 22 BRPM | HEART RATE: 90 BPM | HEIGHT: 68 IN | SYSTOLIC BLOOD PRESSURE: 110 MMHG | DIASTOLIC BLOOD PRESSURE: 64 MMHG | OXYGEN SATURATION: 97 % | BODY MASS INDEX: 21.22 KG/M2

## 2017-05-25 DIAGNOSIS — M48.02 FORAMINAL STENOSIS OF CERVICAL REGION: ICD-10-CM

## 2017-05-25 DIAGNOSIS — M48.02 CERVICAL SPINAL STENOSIS: ICD-10-CM

## 2017-05-25 DIAGNOSIS — M54.12 CERVICAL RADICULOPATHY: Primary | ICD-10-CM

## 2017-05-25 DIAGNOSIS — M79.18 MYOFASCIAL PAIN: ICD-10-CM

## 2017-05-25 DIAGNOSIS — M54.2 NECK PAIN ON LEFT SIDE: ICD-10-CM

## 2017-05-25 DIAGNOSIS — M54.6 ACUTE BILATERAL THORACIC BACK PAIN: ICD-10-CM

## 2017-05-25 DIAGNOSIS — M41.25 OTHER IDIOPATHIC SCOLIOSIS, THORACOLUMBAR REGION: ICD-10-CM

## 2017-05-25 DIAGNOSIS — M50.90 CERVICAL DISC DISEASE: ICD-10-CM

## 2017-05-25 PROBLEM — M41.9 SCOLIOSIS: Status: ACTIVE | Noted: 2017-05-25

## 2017-05-25 PROCEDURE — 99214 OFFICE O/P EST MOD 30 MIN: CPT | Performed by: PHYSICAL MEDICINE & REHABILITATION

## 2017-05-25 NOTE — TELEPHONE ENCOUNTER
Spoke to patient and informed her of the below. Patient is unavailable tomorrow and must leave on time because she has a graduation at 7:00p today.  Patient scheduled at 4:00p today

## 2017-05-25 NOTE — TELEPHONE ENCOUNTER
Pt called to follow up on order for bone density injection   - re iterates she is no longer worried about the cost   Would like call back today [phone] re:order

## 2017-05-25 NOTE — PROGRESS NOTES
Cervical Pain H & P    Chief Complaint:  Patient presents with:  Pain: pt c/o pain when taking deep breathe through her back along her bra line area, states when she is stationary there is no pain only when she moves, says she started a new exercise last w Surgical History       Past Surgical History          Comment x 3    BACK SURGERY      Comment lumbar fusion L1-2    OTHER SURGICAL HISTORY Right     Comment rotator cuff repair    OTHER SURGICAL HISTORY Left     Comment ORIF wrist frac nodes..    Vitals:   05/25/17  1554   BP: 110/64   Pulse: 90   Resp: 22       Cervical Spine:    Posture: normal chin forward superiorly rotated protracted shoulder posture.    Shoulders: Level   Head: In neutral   Spinous Processes Palpations: Non-tender f Isabel Becerril MD  5/25/2017

## 2017-05-26 ENCOUNTER — HOSPITAL ENCOUNTER (OUTPATIENT)
Dept: ULTRASOUND IMAGING | Facility: HOSPITAL | Age: 62
Discharge: HOME | End: 2017-05-26
Attending: INTERNAL MEDICINE
Payer: COMMERCIAL

## 2017-05-26 DIAGNOSIS — R92.2 DENSE BREASTS: ICD-10-CM

## 2017-05-26 PROCEDURE — 76641 ULTRASOUND BREAST COMPLETE: CPT | Performed by: INTERNAL MEDICINE

## 2017-05-26 RX ORDER — ZOLEDRONIC ACID 5 MG/100ML
5 INJECTION, SOLUTION INTRAVENOUS ONCE
Qty: 100 ML | Refills: 0 | Status: SHIPPED
Start: 2017-05-26 | End: 2017-05-26

## 2017-05-26 NOTE — TELEPHONE ENCOUNTER
Spoke to pt and faxed orders to [facility] infusion

## 2017-05-30 PROBLEM — L40.3 SAPHO SYNDROME: Status: ACTIVE | Noted: 2017-05-30

## 2017-05-30 PROBLEM — L70.9 SAPHO SYNDROME  (HCC): Status: ACTIVE | Noted: 2017-05-30

## 2017-05-30 PROBLEM — L70.9 SAPHO SYNDROME (HCC): Status: ACTIVE | Noted: 2017-05-30

## 2017-05-30 PROBLEM — M65.9 SAPHO SYNDROME: Status: ACTIVE | Noted: 2017-05-30

## 2017-05-30 PROBLEM — M86.9 SAPHO SYNDROME: Status: ACTIVE | Noted: 2017-05-30

## 2017-05-30 PROBLEM — M85.80 SAPHO SYNDROME (HCC): Status: ACTIVE | Noted: 2017-05-30

## 2017-05-30 PROBLEM — M85.80 SAPHO SYNDROME  (HCC): Status: ACTIVE | Noted: 2017-05-30

## 2017-05-30 PROBLEM — L70.9 SAPHO SYNDROME: Status: ACTIVE | Noted: 2017-05-30

## 2017-05-30 PROBLEM — M85.80 SAPHO SYNDROME: Status: ACTIVE | Noted: 2017-05-30

## 2017-05-30 PROBLEM — M86.9 SAPHO SYNDROME (HCC): Status: ACTIVE | Noted: 2017-05-30

## 2017-05-30 PROBLEM — L40.3 SAPHO SYNDROME  (HCC): Status: ACTIVE | Noted: 2017-05-30

## 2017-05-30 PROBLEM — M86.9 SAPHO SYNDROME  (HCC): Status: ACTIVE | Noted: 2017-05-30

## 2017-05-30 PROBLEM — M65.9 SAPHO SYNDROME  (HCC): Status: ACTIVE | Noted: 2017-05-30

## 2017-05-30 PROBLEM — L40.3 SAPHO SYNDROME (HCC): Status: ACTIVE | Noted: 2017-05-30

## 2017-05-30 PROBLEM — M65.9 SAPHO SYNDROME (HCC): Status: ACTIVE | Noted: 2017-05-30

## 2017-05-30 PROBLEM — M65.90 SAPHO SYNDROME (HCC): Status: ACTIVE | Noted: 2017-05-30

## 2017-05-31 ENCOUNTER — OFFICE VISIT (OUTPATIENT)
Dept: PHYSICAL THERAPY | Facility: HOSPITAL | Age: 62
End: 2017-05-31
Attending: PHYSICAL MEDICINE & REHABILITATION
Payer: COMMERCIAL

## 2017-05-31 PROCEDURE — 97110 THERAPEUTIC EXERCISES: CPT

## 2017-05-31 PROCEDURE — 97140 MANUAL THERAPY 1/> REGIONS: CPT

## 2017-05-31 NOTE — PROGRESS NOTES
Goals     • Therapy Goals         Long Term Goals (Time Frame 12 visits) by 07/30/17   1. Pt to report reduction of L shoulder blade pain with looking down to read a book to no more than 3/10 pain for functional pain management   2.  Pt to demonstrate impro history includes L1-L2 fusion in 2008 and osteoporosis according to pt.  The patient would benefit from the skilled intervention of a physical therapist for the impairments and functional limitations noted above.     Rehab Potential: fair  Limiting factors: release, and connective tissue skin rolling in centripedal direction with aims to loosen adhesions, reduce pain, increase lymphatic flow, increase circulation and increase waste removal of inflammation.   Introduced instrument assisted soft tissue mobilizat session on addressing moderate to severe trigger points and hypertonicity throughout the cervical and thoracic spine extensors and GH retractors and external rotators as found on initial evaluation with soft tissue mobilization including effleurage and pét and plan to introduce postural strengthening as able.     Therapist: Rohini Ashton, PT, DPT, OCS  5/11/2017 3:14 PM    5/15/2017: Pt returns to clinic with 2-3/10 L shoulder blade pain currently and reports improving symptoms most days with regular perfor stabilization activities for extension and abduction to improve circulation, strength and increase tissue flexibility to add to HEP. Pt reports feeling better after treatment with now 1-2/10 pain.   Plan to continue addressing trigger points with soft tiss discussed was most likely muscle soreness from new HEP activities which should improve with time, which it did.   Focused on addressing soft tissue restrictions that are improving but continue to be present followed by progression of isometrics to concentri

## 2017-06-02 ENCOUNTER — APPOINTMENT (OUTPATIENT)
Dept: PHYSICAL THERAPY | Facility: HOSPITAL | Age: 62
End: 2017-06-02
Attending: PHYSICAL MEDICINE & REHABILITATION
Payer: COMMERCIAL

## 2017-06-06 ENCOUNTER — APPOINTMENT (OUTPATIENT)
Dept: PHYSICAL THERAPY | Facility: HOSPITAL | Age: 62
End: 2017-06-06
Attending: PHYSICAL MEDICINE & REHABILITATION
Payer: COMMERCIAL

## 2017-06-08 ENCOUNTER — OFFICE VISIT (OUTPATIENT)
Dept: PHYSICAL THERAPY | Facility: HOSPITAL | Age: 62
End: 2017-06-08
Attending: PHYSICAL MEDICINE & REHABILITATION
Payer: COMMERCIAL

## 2017-06-08 ENCOUNTER — OFFICE VISIT (OUTPATIENT)
Dept: HEMATOLOGY/ONCOLOGY | Facility: HOSPITAL | Age: 62
End: 2017-06-08
Attending: INTERNAL MEDICINE
Payer: COMMERCIAL

## 2017-06-08 VITALS
SYSTOLIC BLOOD PRESSURE: 128 MMHG | RESPIRATION RATE: 16 BRPM | DIASTOLIC BLOOD PRESSURE: 78 MMHG | HEART RATE: 89 BPM | TEMPERATURE: 98 F

## 2017-06-08 DIAGNOSIS — M86.9 SAPHO SYNDROME (HCC): Primary | ICD-10-CM

## 2017-06-08 DIAGNOSIS — L70.9 SAPHO SYNDROME (HCC): Primary | ICD-10-CM

## 2017-06-08 DIAGNOSIS — M85.80 SAPHO SYNDROME (HCC): Primary | ICD-10-CM

## 2017-06-08 DIAGNOSIS — M85.80 OSTEOPENIA, UNSPECIFIED LOCATION: ICD-10-CM

## 2017-06-08 DIAGNOSIS — M65.9 SAPHO SYNDROME (HCC): Primary | ICD-10-CM

## 2017-06-08 DIAGNOSIS — L40.3 SAPHO SYNDROME (HCC): Primary | ICD-10-CM

## 2017-06-08 PROCEDURE — 96365 THER/PROPH/DIAG IV INF INIT: CPT

## 2017-06-08 PROCEDURE — 97110 THERAPEUTIC EXERCISES: CPT

## 2017-06-08 PROCEDURE — 97140 MANUAL THERAPY 1/> REGIONS: CPT

## 2017-06-08 RX ORDER — SODIUM CHLORIDE 9 MG/ML
INJECTION, SOLUTION INTRAVENOUS
Status: DISCONTINUED
Start: 2017-06-08 | End: 2017-06-08

## 2017-06-08 RX ORDER — ZOLEDRONIC ACID 5 MG/100ML
5 INJECTION, SOLUTION INTRAVENOUS ONCE
Status: COMPLETED | OUTPATIENT
Start: 2017-06-08 | End: 2017-06-08

## 2017-06-08 RX ORDER — ZOLEDRONIC ACID 5 MG/100ML
INJECTION, SOLUTION INTRAVENOUS
Status: COMPLETED
Start: 2017-06-08 | End: 2017-06-08

## 2017-06-08 RX ADMIN — ZOLEDRONIC ACID 5 MG: 5 INJECTION, SOLUTION INTRAVENOUS at 13:35:00

## 2017-06-08 NOTE — PROGRESS NOTES
Patient here for reclast infusion for  tx of osteioporosis. pt is little anxious stated she is allergic to so many things and passed out in the past with injection.  Pt educated about reclast infusion ,s/s of side effects,hydration, taking calcium and vit d

## 2017-06-09 ENCOUNTER — TELEPHONE (OUTPATIENT)
Dept: INTERNAL MEDICINE CLINIC | Facility: CLINIC | Age: 62
End: 2017-06-09

## 2017-06-09 NOTE — TELEPHONE ENCOUNTER
Spoke to pt - insert is bringing attention to pts that are on steroids >20 days with possible immunosuppression;    That does not apply here so pt is ok for injection

## 2017-06-09 NOTE — TELEPHONE ENCOUNTER
Pt has Zoledronic Acid infusion (Reclast)  yesterday    Pt was reading details about infusion/list mentioned items to discuss with provider  Pt is scheduled for steroid injection on Monday, 6/12/17  Is it ok for pt to receive following the infusion?   Yumiko

## 2017-06-09 NOTE — TELEPHONE ENCOUNTER
[employer], maybe you could help with this, I see no reasons for problem with the steroid injection a few days after reclast but I do not order this medication very much, he normally lived to the specialist. Maybe she should call the doctor is ordering these,

## 2017-06-13 ENCOUNTER — APPOINTMENT (OUTPATIENT)
Dept: PHYSICAL THERAPY | Facility: HOSPITAL | Age: 62
End: 2017-06-13
Attending: PHYSICAL MEDICINE & REHABILITATION
Payer: COMMERCIAL

## 2017-06-15 ENCOUNTER — OFFICE VISIT (OUTPATIENT)
Dept: PHYSICAL THERAPY | Facility: HOSPITAL | Age: 62
End: 2017-06-15
Attending: PHYSICAL MEDICINE & REHABILITATION
Payer: COMMERCIAL

## 2017-06-15 DIAGNOSIS — R51.9 ACUTE INTRACTABLE HEADACHE, UNSPECIFIED HEADACHE TYPE: Primary | ICD-10-CM

## 2017-06-15 PROCEDURE — 97110 THERAPEUTIC EXERCISES: CPT

## 2017-06-15 PROCEDURE — 97140 MANUAL THERAPY 1/> REGIONS: CPT

## 2017-06-15 NOTE — TELEPHONE ENCOUNTER
Pending Prescriptions Disp Refills    AMITRIPTYLINE HCL 10 MG Oral Tab [Pharmacy Med Name: AMITRIPTYLINE 10MG TABLETS] 30 tablet 0     Sig: TAKE 1 TABLET(10 MG) BY MOUTH EVERY EVENING         Last seen colonoscopy 4/10/14  LR 6/10/16    em

## 2017-06-15 NOTE — PROGRESS NOTES
Goals     • Therapy Goals         Long Term Goals (Time Frame 12 visits) by 07/30/17   1. Pt to report reduction of L shoulder blade pain with looking down to read a book to no more than 3/10 pain for functional pain management   2.  Pt to demonstrate impro history includes L1-L2 fusion in 2008 and osteoporosis according to pt.  The patient would benefit from the skilled intervention of a physical therapist for the impairments and functional limitations noted above.     Rehab Potential: fair  Limiting factors: spinae x35 min   1. MFR  2. STM  3. CT release  4. [facility]   Internal          Neuro-Re-Education          Self-Care   1. x5 min     1.  Education on regular performance of exercise for compliance and general health but to avoid over-use   Other            4/ including effleurage and pétrissage, myofascial release, IASTM and connective tissue skin rolling in centripedal direction with aims to loosen adhesions, reduce pain, increase lymphatic flow, increase circulation and increase waste removal of inflammation. extensors and GH retractors and external rotators as found on initial evaluation with soft tissue mobilization including effleurage and pétrissage, myofascial release, IASTM and connective tissue skin rolling in centripedal direction with aims to loosen ad Therapist: Swetha Zhu, PT, DPT, OCS  5/15/2017 3:02 PM      5/22/2017: Pt returns to clinic with 6/10 L shoulder blade pain currently and reports being unsure why the thoracic spine has gotten so painful again.    Focused session on addressing modera extension stretches to maintain tissue extensibility. Issued for HEP update. Instructed pt to attend gym once before next visit to perform 1 exercise class of choice for improved cardio-vascular health and stress management.    Plan to continue addressing while the body is still recovering from the severe muscle weakness and spasms she has experienced in the past she needs to really avoid lifting >5-10# over waist height until the muscles are stronger with PT program.  Also educated pt on \"good\" pain that techniques and plan to progress postural strengthening as able.       Therapist: Kemar Duarte PT, ANA CRISTINAT, [facility]  6/15/2017 11:29 AM

## 2017-06-15 NOTE — TELEPHONE ENCOUNTER
Pt requesting 90 days refills for Protonix. Pls call. Thank you.       Current Outpatient Prescriptions:  Pantoprazole Sodium 40 MG Oral Tab EC Take 1 tablet (40 mg total) by mouth every morning before breakfast. Disp: 90 tablet Rfl: 4

## 2017-06-15 NOTE — TELEPHONE ENCOUNTER
Last refilled 6/10/2016 with 90 day 1 year supply, last seen for EGD 6/9/2016.  Please advise on refill above

## 2017-06-16 ENCOUNTER — TELEPHONE (OUTPATIENT)
Dept: GASTROENTEROLOGY | Facility: CLINIC | Age: 62
End: 2017-06-16

## 2017-06-16 NOTE — TELEPHONE ENCOUNTER
I called and left a voicemail message for pt reminding her that she has labs that are still pending  I mailed out an overdue labs letter to pt     em

## 2017-06-18 RX ORDER — AMITRIPTYLINE HYDROCHLORIDE 10 MG/1
TABLET, FILM COATED ORAL
Qty: 30 TABLET | Refills: 0 | OUTPATIENT
Start: 2017-06-18

## 2017-06-18 RX ORDER — PANTOPRAZOLE SODIUM 40 MG/1
40 TABLET, DELAYED RELEASE ORAL
Qty: 90 TABLET | Refills: 4 | Status: SHIPPED | OUTPATIENT
Start: 2017-06-18 | End: 2018-07-12

## 2017-06-21 NOTE — TELEPHONE ENCOUNTER
Pt returned my call regarding the overdue labs. Pt stated she didn't know or had forgotten about the labs. Pt wanted to if it was a lab in case she had a flare up. I told it was not, it's a lipase.  Pt then stated she will have it done    em

## 2017-06-22 ENCOUNTER — APPOINTMENT (OUTPATIENT)
Dept: LAB | Facility: HOSPITAL | Age: 62
End: 2017-06-22
Attending: INTERNAL MEDICINE
Payer: COMMERCIAL

## 2017-06-22 ENCOUNTER — OFFICE VISIT (OUTPATIENT)
Dept: PHYSICAL THERAPY | Facility: HOSPITAL | Age: 62
End: 2017-06-22
Attending: PHYSICAL MEDICINE & REHABILITATION
Payer: COMMERCIAL

## 2017-06-22 DIAGNOSIS — R10.13 EPIGASTRIC PAIN: ICD-10-CM

## 2017-06-22 PROCEDURE — 83690 ASSAY OF LIPASE: CPT

## 2017-06-22 PROCEDURE — 97110 THERAPEUTIC EXERCISES: CPT

## 2017-06-22 PROCEDURE — 97140 MANUAL THERAPY 1/> REGIONS: CPT

## 2017-06-22 PROCEDURE — 36415 COLL VENOUS BLD VENIPUNCTURE: CPT

## 2017-06-22 NOTE — PROGRESS NOTES
Goals     • Therapy Goals         Long Term Goals (Time Frame 12 visits) by 07/30/17   1. Pt to report reduction of L shoulder blade pain with looking down to read a book to no more than 3/10 pain for functional pain management   2.  Pt to demonstrate impro history includes L1-L2 fusion in 2008 and osteoporosis according to pt.  The patient would benefit from the skilled intervention of a physical therapist for the impairments and functional limitations noted above.     Rehab Potential: fair  Limiting factors: thoracic erector spinae x45 min 1-4. L>R upper trap, levator scap, rhomboids, thoracic erector spinae x35 min 1-4. L>R upper trap, levator scap, rhomboids, thoracic erector spinae x35 min  1. MFR  2. STM  3. CT release  4.  IASTM   Internal          Neuro-R and hypertonicity throughout the cervical and thoracic spine extensors and GH retractors and external rotators as found on initial evaluation with soft tissue mobilization including effleurage and pétrissage, myofascial release, IASTM and connective tissue trying to wear new glasses for distance.   Focused session on addressing moderate to severe trigger points and hypertonicity throughout the cervical and thoracic spine extensors and GH retractors and external rotators as found on initial evaluation with sof now 1-2/10 pain. Plan to continue addressing trigger points with soft tissue mobilization techniques and plan to introduce postural strengthening as able.     Therapist: Orquidea Shanks PT, DPT, OCS  5/15/2017 3:02 PM      5/22/2017: Pt returns to clinic adhesions, reduce pain, increase lymphatic flow, increase circulation and increase waste removal of inflammation. Followed with thoracic flexion and extension stretches to maintain tissue extensibility. Issued for HEP update.  Instructed pt to attend gym and pain returned yesterday without known cause.   Time spent educating pt on over-doing-it and how the day to day routine may \"seem like nothing\" but while the body is still recovering from the severe muscle weakness and spasms she has experienced in the or pain. Tolerated well and issued new exercises for HEP progression. Plan to continue addressing trigger points with soft tissue mobilization techniques and plan to progress postural strengthening as able.       Therapist: Maria Esther Upton, PT, DPT, OCS  6

## 2017-06-26 ENCOUNTER — TELEPHONE (OUTPATIENT)
Dept: GASTROENTEROLOGY | Facility: CLINIC | Age: 62
End: 2017-06-26

## 2017-06-26 NOTE — TELEPHONE ENCOUNTER
Message   Received: 3 days ago   Message Contents   Ivon Rebollar MD  [facility]             Please send letter. Results letter mailed to patient per [facility].

## 2017-06-27 RX ORDER — AMITRIPTYLINE HYDROCHLORIDE 10 MG/1
TABLET, FILM COATED ORAL
Qty: 90 TABLET | Refills: 0 | OUTPATIENT
Start: 2017-06-27

## 2017-06-28 NOTE — TELEPHONE ENCOUNTER
Dr. Halina Latif - Pt states that she actually does not need refills at this time. She stated you had filled in the past (last refill 6.10.16) for GI reasoning. In the future, she is requesting you continue to refill. Again, no need for refill at this point.  Pt w

## 2017-06-29 ENCOUNTER — APPOINTMENT (OUTPATIENT)
Dept: PHYSICAL THERAPY | Facility: HOSPITAL | Age: 62
End: 2017-06-29
Attending: PHYSICAL MEDICINE & REHABILITATION
Payer: COMMERCIAL

## 2017-06-30 NOTE — TELEPHONE ENCOUNTER
Pt called back today stating now she does indeed need refills on amitriptyline. Copied original message where pt was started on this medication. Please advise on refills or pt is to continue on this drug. Last refilled 6/10/16 x 12 refills.     Kash Robles

## 2017-07-05 RX ORDER — AMITRIPTYLINE HYDROCHLORIDE 10 MG/1
10 TABLET, FILM COATED ORAL NIGHTLY
Qty: 30 TABLET | Refills: 12 | Status: SHIPPED | OUTPATIENT
Start: 2017-07-05 | End: 2019-03-13

## 2017-07-06 ENCOUNTER — OFFICE VISIT (OUTPATIENT)
Dept: PHYSICAL THERAPY | Facility: HOSPITAL | Age: 62
End: 2017-07-06
Attending: PHYSICAL MEDICINE & REHABILITATION
Payer: COMMERCIAL

## 2017-07-06 ENCOUNTER — HOSPITAL ENCOUNTER (OUTPATIENT)
Dept: GENERAL RADIOLOGY | Facility: HOSPITAL | Age: 62
Discharge: HOME | End: 2017-07-06
Attending: SPECIALIST
Payer: COMMERCIAL

## 2017-07-06 DIAGNOSIS — R13.10 DYSPHAGIA: ICD-10-CM

## 2017-07-06 PROCEDURE — 92611 MOTION FLUOROSCOPY/SWALLOW: CPT

## 2017-07-06 PROCEDURE — 74230 X-RAY XM SWLNG FUNCJ C+: CPT | Performed by: SPECIALIST

## 2017-07-06 PROCEDURE — 97140 MANUAL THERAPY 1/> REGIONS: CPT

## 2017-07-06 PROCEDURE — 97110 THERAPEUTIC EXERCISES: CPT

## 2017-07-06 NOTE — PROGRESS NOTES
Goals     • Therapy Goals               Long Term Goals (Time Frame 12 visits) by 07/30/17   1. Pt to report reduction of L shoulder blade pain with looking down to read a book to no more than 3/10 pain for functional pain management   2.  Pt to demonstrate history includes L1-L2 fusion in 2008 and osteoporosis according to pt.  The patient would benefit from the skilled intervention of a physical therapist for the impairments and functional limitations noted above.     Rehab Potential: fair  Limiting factors: x40 min 1-3. L>R upper trap, levator scap, rhomboids, thoracic erector spinae x30 min 1-4. L>R upper trap, levator scap, rhomboids, thoracic erector spinae x45 min 1-4. L>R upper trap, levator scap, rhomboids, thoracic erector spinae x35 min 1-4.  L>R upper PM      5/3/2017: Pt returns to clinic with 3-4/10 L shoulder blade pain currently and reports the symptoms are much better after last treatment session.   Focused session on addressing moderate to severe trigger points and hypertonicity throughout the cerv strengthening as able.     Therapist: Sola Karimi, PT, DPT, OCS  5/9/2017 3:59 PM    5/11/2017: Pt returns to clinic with 4-5/10 L shoulder blade/neck pain currently and reports having a headache yesterday and today while trying to wear new glasses for of inflammation. Followed with isometric GH stabilization activities for extension and abduction to improve circulation, strength and increase tissue flexibility to add to HEP. Pt reports feeling better after treatment with now 1-2/10 pain.   Plan to cont and external rotators as found on initial evaluation with soft tissue mobilization including effleurage and pétrissage, myofascial release, IASTM and connective tissue skin rolling in centripedal direction with aims to loosen adhesions, reduce pain, increa movements. Pt reports feeling great after the last appointment without any pain noted and had no pain over the weekend and was able to play with grandchildren and lift the 3year old without difficulty.   Increased muscle spasms and pain returned yesterday direction with aims to loosen adhesions, reduce pain, increase lymphatic flow, increase circulation and increase waste removal of inflammation. Followed with progression of thong-scapular strengthening routine without severe spasms or pain.   Tolerated well contributed to the muscles over-doing-it without enough recovery time.   Objective tests continue to demonstrate the source of pain is muscular spasm of the L cervico-thoracic paraspinals which continue to be minimally to moderately hypertonic with 2 trigge

## 2017-07-06 NOTE — PROGRESS NOTES
ADULT VIDEOFLUOROSCOPIC SWALLOWING STUDY    Referring Physician: Dr. Radha Pate    1.  Dysphagia R13.10 XR VIDEO SWALLOW (CPT=74230)     XR VIDEO SWALLOW (AML=[id number])      Date of Service: 7/6/2017   Radiologist: Dr. Gary Palmer    Dear Dr. Lora Young,    This phase of swallow with no anterior spillage noted. Adequate bolus formation, manipulation and control noted. Adequate oral transit time also noted with consisencies tested. Piecemeal deglutition was observed with pureed and cracker.      Pharyngeal phase:  P time    Further Follow-up:  No further follow-up warranted at this time. EDUCATION/INSTRUCTION  Reviewed results and recommendations with patient/family/caregiver.   Agreement/Understanding verbalized and all questions answered to their apparent satis

## 2017-07-17 ENCOUNTER — OFFICE VISIT (OUTPATIENT)
Dept: NEUROLOGY | Facility: CLINIC | Age: 62
End: 2017-07-17

## 2017-07-17 VITALS
DIASTOLIC BLOOD PRESSURE: 74 MMHG | HEIGHT: 68 IN | RESPIRATION RATE: 16 BRPM | SYSTOLIC BLOOD PRESSURE: 106 MMHG | BODY MASS INDEX: 21.22 KG/M2 | WEIGHT: 140 LBS | HEART RATE: 76 BPM

## 2017-07-17 DIAGNOSIS — M54.12 CERVICAL RADICULOPATHY: ICD-10-CM

## 2017-07-17 DIAGNOSIS — M54.2 NECK PAIN ON LEFT SIDE: ICD-10-CM

## 2017-07-17 DIAGNOSIS — M48.02 FORAMINAL STENOSIS OF CERVICAL REGION: ICD-10-CM

## 2017-07-17 DIAGNOSIS — M50.90 CERVICAL DISC DISEASE: Primary | ICD-10-CM

## 2017-07-17 DIAGNOSIS — G56.21 ULNAR NEUROPATHY AT ELBOW OF RIGHT UPPER EXTREMITY: ICD-10-CM

## 2017-07-17 DIAGNOSIS — M48.02 CERVICAL SPINAL STENOSIS: ICD-10-CM

## 2017-07-17 PROCEDURE — 99214 OFFICE O/P EST MOD 30 MIN: CPT | Performed by: PHYSICAL MEDICINE & REHABILITATION

## 2017-07-17 NOTE — PATIENT INSTRUCTIONS
As of October 6th 2014, the Drug Enforcement Agency [facility]) is reclassifying all hydrocodone combination medications from Schedule III to Schedule II. This includes medications such as Norco, Vicodin, Lortab, Zohydro, and Vicoprofen.     What this means for y chart.      Plan  She will await the recommendations of the 2 spine surgeons and will decide about surgery after that. She will think about having the left C7 TFESI again.     The patient will continue with her home exercise program.    She will get a ri

## 2017-07-17 NOTE — PROGRESS NOTES
Cervical Pain H & P    Chief Complaint:  Patient presents with:  Neck Pain: Patient last seen on 5/25/17. F/U on left sided neck and upper left side pain.  Patient states that she continues to have on left side of neck and spasms in left shoulder blade area Vertigo        Past Surgical History   Past Surgical History:  No date: BACK SURGERY      Comment: lumbar fusion L1-2  No date:       Comment: x 3  No date: CATARACT  No date: CHOLECYSTECTOMY  : COLONOSCOPY  No date: LASIK  : OTHER SURG palpable submandibular, supraclavicular, and cervical lymph nodes. .    Vitals:   07/17/17  0829   BP: 106/74   Pulse: 76   Resp: 16       Cervical Spine:    Posture: mild chin forward superiorly rotated protracted shoulder posture.    Shoulders: Level   Hea

## 2017-07-20 ENCOUNTER — APPOINTMENT (OUTPATIENT)
Dept: PHYSICAL THERAPY | Facility: HOSPITAL | Age: 62
End: 2017-07-20
Attending: PHYSICAL MEDICINE & REHABILITATION
Payer: COMMERCIAL

## 2017-08-03 ENCOUNTER — HOSPITAL ENCOUNTER (OUTPATIENT)
Dept: ELECTROPHYSIOLOGY | Facility: HOSPITAL | Age: 62
Discharge: HOME | End: 2017-08-03
Attending: ORTHOPAEDIC SURGERY
Payer: COMMERCIAL

## 2017-08-03 PROCEDURE — 95909 NRV CNDJ TST 5-6 STUDIES: CPT

## 2017-08-08 ENCOUNTER — OFFICE VISIT (OUTPATIENT)
Dept: INTERNAL MEDICINE CLINIC | Facility: CLINIC | Age: 62
End: 2017-08-08

## 2017-08-08 VITALS
TEMPERATURE: 99 F | DIASTOLIC BLOOD PRESSURE: 84 MMHG | SYSTOLIC BLOOD PRESSURE: 148 MMHG | WEIGHT: 138 LBS | HEART RATE: 78 BPM | OXYGEN SATURATION: 98 % | BODY MASS INDEX: 21 KG/M2

## 2017-08-08 DIAGNOSIS — B02.9 HERPES ZOSTER WITHOUT COMPLICATION: Primary | ICD-10-CM

## 2017-08-08 PROCEDURE — 99212 OFFICE O/P EST SF 10 MIN: CPT | Performed by: INTERNAL MEDICINE

## 2017-08-08 PROCEDURE — 99213 OFFICE O/P EST LOW 20 MIN: CPT | Performed by: INTERNAL MEDICINE

## 2017-08-08 RX ORDER — VALACYCLOVIR HYDROCHLORIDE 1 G/1
1 TABLET, FILM COATED ORAL 3 TIMES DAILY
Qty: 21 TABLET | Refills: 0 | Status: SHIPPED | OUTPATIENT
Start: 2017-08-08 | End: 2017-08-15

## 2017-08-08 NOTE — PROGRESS NOTES
Armin Stoner is a 57year old female. Patient presents with:  Checkup: rash and itching chest and shoulders     HPI:     Was in the [place] until today. 3 days ago, she developed mild itching of R chest and shoulder.   Since then she developed a ra Esophageal reflux    • Gastritis    • Idiopathic acute pancreatitis    • Insomnia    • Intestinal disorder    • Kidney stone 2008   • Left wrist fracture 2011 and 2013   • Pancreatitis 2002   • Renal disease    • Rotator cuff tear, right    • Trauma

## 2017-08-10 ENCOUNTER — TELEPHONE (OUTPATIENT)
Dept: INTERNAL MEDICINE CLINIC | Facility: CLINIC | Age: 62
End: 2017-08-10

## 2017-08-10 NOTE — TELEPHONE ENCOUNTER
Spoke with patient. She reports \"screaming\" HA to front of head yesterday and today. States she was up at 1 am this morning sitting in the recliner with an ice pack or cold rag on her head.  Shingles outbreak and itching to back, neck, shoulder, collarbon

## 2017-08-10 NOTE — TELEPHONE ENCOUNTER
Pt. is calling to inform Dr. Laura Mancini that she has been having headaches, she wants to know if the headache is a side effect of the medicine or if it's from having shingles ph.  # [phone]

## 2017-08-10 NOTE — TELEPHONE ENCOUNTER
Spoke with patient and relayed Dr. Brenna Wyatt' message. Patient notified in message (per Dr. Brenna Wyatt verbally) that Valtrex should not cause a severe HA. Patient states that pain does get better with cool rags; otherwise she will try Tylenol.  If pain worse

## 2017-08-11 NOTE — TELEPHONE ENCOUNTER
Patient called back - has a twinge of headache and she has the shingles and is wondering if that come from shingles please advise - to DR. RICO ( patient sees DR. RANGEL)

## 2017-08-15 ENCOUNTER — TELEPHONE (OUTPATIENT)
Dept: NEUROLOGY | Facility: CLINIC | Age: 62
End: 2017-08-15

## 2017-08-15 DIAGNOSIS — G56.21 ULNAR NEUROPATHY AT ELBOW OF RIGHT UPPER EXTREMITY: Primary | ICD-10-CM

## 2017-08-17 NOTE — TELEPHONE ENCOUNTER
Pt called cause she is still having right elbow pain unresolved from visit on 07/17/17 and not helped by physical therapy. Pt planning neck surgery with DR Eli Montalvo ans Dr Mary Miller. Pt would like to diagnose issue with right elbow before surgery.   Updated Dr [facility]

## 2017-08-31 ENCOUNTER — TELEPHONE (OUTPATIENT)
Dept: NEUROLOGY | Facility: CLINIC | Age: 62
End: 2017-08-31

## 2017-08-31 NOTE — TELEPHONE ENCOUNTER
Spoke to patient who is requesting sooner appointment. Patient informed per the last telephone encounter on 8/15/17 she was suppose to schedule right upper extremity emg.  Patient states she was seen by Dr. August Khan and he gave her a medrol dosepak and the rig

## 2017-09-05 ENCOUNTER — OFFICE VISIT (OUTPATIENT)
Dept: NEUROLOGY | Facility: CLINIC | Age: 62
End: 2017-09-05

## 2017-09-05 VITALS
HEIGHT: 68 IN | BODY MASS INDEX: 21.22 KG/M2 | HEART RATE: 78 BPM | RESPIRATION RATE: 16 BRPM | SYSTOLIC BLOOD PRESSURE: 124 MMHG | OXYGEN SATURATION: 96 % | DIASTOLIC BLOOD PRESSURE: 62 MMHG | WEIGHT: 140 LBS

## 2017-09-05 DIAGNOSIS — Z98.1 HISTORY OF LUMBAR FUSION: ICD-10-CM

## 2017-09-05 DIAGNOSIS — M54.16 LUMBAR RADICULOPATHY: ICD-10-CM

## 2017-09-05 DIAGNOSIS — Z98.890 HISTORY OF LUMBAR LAMINECTOMY: ICD-10-CM

## 2017-09-05 DIAGNOSIS — M41.25 OTHER IDIOPATHIC SCOLIOSIS, THORACOLUMBAR REGION: ICD-10-CM

## 2017-09-05 DIAGNOSIS — M54.41 ACUTE BILATERAL LOW BACK PAIN WITH BILATERAL SCIATICA: Primary | ICD-10-CM

## 2017-09-05 DIAGNOSIS — M54.42 ACUTE BILATERAL LOW BACK PAIN WITH BILATERAL SCIATICA: Primary | ICD-10-CM

## 2017-09-05 PROCEDURE — 99214 OFFICE O/P EST MOD 30 MIN: CPT | Performed by: PHYSICAL MEDICINE & REHABILITATION

## 2017-09-05 NOTE — PROGRESS NOTES
Low Back Pain H & P    Chief Complaint: Patient presents with:  Low Back Pain: LOV 07/17/17. Pt last seen for cervical pain. Pt was given steroid ariana for right elbow which helped with neck pain. Pain in neck is almost resolved.  Pt today is having  left patrice Pancreatitis    • Renal disease    • Rotator cuff tear, right    • Traumatic arthritis    • Vertigo        Past Surgical History   Past Surgical History:  No date: BACK SURGERY      Comment: lumbar fusion L1-2  No date:       Comment: x 3  No discharge bilaterally. Skin:  There are no rashes or lesions. Surgical scars are healed.     Vitals:   09/05/17  1648   BP: 124/62   Pulse: 78   Resp: 16       Gait:    Gait: Normal gait   Sit to Stand: mild difficulty    RIGHT Walking on Toes: no diffic 4. Other idiopathic scoliosis, thoracolumbar region    5. History of lumbar fusion: L1-2      Plan  She will get a new MRI of the lumbar spine. She will get lumbar spine flexion and extension x-rays.     She will call me once she has had the imaging st

## 2017-09-05 NOTE — PATIENT INSTRUCTIONS
Plan  She will get a new MRI of the lumbar spine. She will get lumbar spine flexion and extension x-rays.     She will call me once She has had the imaging studies and I will review them and my office will get back in touch with the patient with any ch

## 2017-09-06 ENCOUNTER — TELEPHONE (OUTPATIENT)
Dept: NEUROLOGY | Facility: CLINIC | Age: 62
End: 2017-09-06

## 2017-09-06 ENCOUNTER — APPOINTMENT (OUTPATIENT)
Dept: GENERAL RADIOLOGY | Age: 62
End: 2017-09-06
Attending: FAMILY MEDICINE
Payer: COMMERCIAL

## 2017-09-06 ENCOUNTER — HOSPITAL ENCOUNTER (OUTPATIENT)
Age: 62
Discharge: HOME | End: 2017-09-06
Attending: FAMILY MEDICINE
Payer: COMMERCIAL

## 2017-09-06 ENCOUNTER — TELEPHONE (OUTPATIENT)
Dept: ORTHOPEDICS CLINIC | Facility: CLINIC | Age: 62
End: 2017-09-06

## 2017-09-06 VITALS
SYSTOLIC BLOOD PRESSURE: 140 MMHG | HEIGHT: 67 IN | TEMPERATURE: 98 F | OXYGEN SATURATION: 100 % | RESPIRATION RATE: 19 BRPM | DIASTOLIC BLOOD PRESSURE: 82 MMHG | WEIGHT: 140 LBS | BODY MASS INDEX: 21.97 KG/M2 | HEART RATE: 80 BPM

## 2017-09-06 DIAGNOSIS — S63.652A SPRAIN OF METACARPOPHALANGEAL (MCP) JOINT OF RIGHT MIDDLE FINGER, INITIAL ENCOUNTER: Primary | ICD-10-CM

## 2017-09-06 PROCEDURE — 99203 OFFICE O/P NEW LOW 30 MIN: CPT

## 2017-09-06 PROCEDURE — 99213 OFFICE O/P EST LOW 20 MIN: CPT

## 2017-09-06 PROCEDURE — 29130 APPL FINGER SPLINT STATIC: CPT

## 2017-09-06 PROCEDURE — 73130 X-RAY EXAM OF HAND: CPT | Performed by: FAMILY MEDICINE

## 2017-09-06 NOTE — ED INITIAL ASSESSMENT (HPI)
Pt here to IC with c/o right hand injury s/p using cutters to cut weeds, cutters \"buckled\" and felt a pop in her right hand. Right 3rd finger/knuckle area is swollen with pain. Resp easy and regular. CMS intact.

## 2017-09-06 NOTE — TELEPHONE ENCOUNTER
Patient  went to [address] [place] [address] today and was diagnosed with a torn ligament on her right hand. Has appt for 09/26  Already but states was told to follow up in a couple days. States if possible can even be seen at different location. Please advise.  Than

## 2017-09-06 NOTE — TELEPHONE ENCOUNTER
S/w pt and she states she was clipping weeds from the ground today and the clippers bucked and she felt a pop in RMF. Pt had pain 7/10, swelling, and purple/blue discoloration at PIP joint, but she could fully move the finger.  She went to [address] [place] [address] and

## 2017-09-06 NOTE — TELEPHONE ENCOUNTER
[employer] for authorization of approval for MRI L-spine w/wo. Approval was given with Authorization # [id number] effective 09/06/17 to 10/05/17. Will call Pt. to inform. Pt. Informed of approval. States she scheduled MRI on 09/09/12/17.

## 2017-09-06 NOTE — ED PROVIDER NOTES
Patient Seen in: [address]    History   No chief complaint on file.     Stated Complaint: right hand swelling    HPI    Per patient earlier today, patient was pruning small sharper with a clipper when she tried to clip b 0.00         Quit date: 11/2/1970  Smokeless tobacco: Never Used                      Alcohol use: No                Review of Systems    Positive for stated complaint: right hand swelling  Other systems are as noted in HPI.   Constitutional and vital signs

## 2017-09-09 ENCOUNTER — HOSPITAL ENCOUNTER (OUTPATIENT)
Dept: GENERAL RADIOLOGY | Facility: HOSPITAL | Age: 62
Discharge: HOME | End: 2017-09-09
Attending: ORTHOPAEDIC SURGERY
Payer: COMMERCIAL

## 2017-09-09 ENCOUNTER — LAB ENCOUNTER (OUTPATIENT)
Dept: LAB | Facility: HOSPITAL | Age: 62
End: 2017-09-09
Attending: ORTHOPAEDIC SURGERY
Payer: COMMERCIAL

## 2017-09-09 ENCOUNTER — HOSPITAL ENCOUNTER (OUTPATIENT)
Dept: GENERAL RADIOLOGY | Facility: HOSPITAL | Age: 62
Discharge: HOME | End: 2017-09-09
Attending: PHYSICAL MEDICINE & REHABILITATION
Payer: COMMERCIAL

## 2017-09-09 DIAGNOSIS — M54.12 BRACHIAL NEURITIS: ICD-10-CM

## 2017-09-09 DIAGNOSIS — M54.2 CERVICALGIA: ICD-10-CM

## 2017-09-09 DIAGNOSIS — M54.42 ACUTE BILATERAL LOW BACK PAIN WITH BILATERAL SCIATICA: ICD-10-CM

## 2017-09-09 DIAGNOSIS — M54.2 CERVICALGIA: Primary | ICD-10-CM

## 2017-09-09 DIAGNOSIS — M54.41 ACUTE BILATERAL LOW BACK PAIN WITH BILATERAL SCIATICA: ICD-10-CM

## 2017-09-09 LAB
ALBUMIN SERPL BCP-MCNC: 4.3 G/DL (ref 3.5–4.8)
ALBUMIN/GLOB SERPL: 1.5 {RATIO} (ref 1–2)
ALP SERPL-CCNC: 48 U/L (ref 32–100)
ALT SERPL-CCNC: 33 U/L (ref 14–54)
ANION GAP SERPL CALC-SCNC: 7 MMOL/L (ref 0–18)
ANTIBODY SCREEN: NEGATIVE
APTT PPP: 32.7 SECONDS (ref 23.2–35.3)
AST SERPL-CCNC: 27 U/L (ref 15–41)
BACTERIA UR QL AUTO: NEGATIVE /HPF
BASOPHILS # BLD: 0.1 K/UL (ref 0–0.2)
BASOPHILS NFR BLD: 1 %
BILIRUB SERPL-MCNC: 0.8 MG/DL (ref 0.3–1.2)
BILIRUB UR QL: NEGATIVE
BUN SERPL-MCNC: 11 MG/DL (ref 8–20)
BUN/CREAT SERPL: 16.2 (ref 10–20)
CALCIUM SERPL-MCNC: 9.4 MG/DL (ref 8.5–10.5)
CHLORIDE SERPL-SCNC: 105 MMOL/L (ref 95–110)
CLARITY UR: CLEAR
CO2 SERPL-SCNC: 28 MMOL/L (ref 22–32)
COLOR UR: YELLOW
CREAT SERPL-MCNC: 0.68 MG/DL (ref 0.5–1.5)
EOSINOPHIL # BLD: 0.2 K/UL (ref 0–0.7)
EOSINOPHIL NFR BLD: 4 %
ERYTHROCYTE [DISTWIDTH] IN BLOOD BY AUTOMATED COUNT: 13.9 % (ref 11–15)
GLOBULIN PLAS-MCNC: 2.9 G/DL (ref 2.5–3.7)
GLUCOSE SERPL-MCNC: 91 MG/DL (ref 70–99)
GLUCOSE UR-MCNC: NEGATIVE MG/DL
HCT VFR BLD AUTO: 44.8 % (ref 35–48)
HGB BLD-MCNC: 14.9 G/DL (ref 12–16)
INR BLD: 1 (ref 0.9–1.2)
KETONES UR-MCNC: NEGATIVE MG/DL
LEUKOCYTE ESTERASE UR QL STRIP.AUTO: NEGATIVE
LYMPHOCYTES # BLD: 1.1 K/UL (ref 1–4)
LYMPHOCYTES NFR BLD: 24 %
MCH RBC QN AUTO: 32 PG (ref 27–32)
MCHC RBC AUTO-ENTMCNC: 33.2 G/DL (ref 32–37)
MCV RBC AUTO: 96.4 FL (ref 80–100)
MONOCYTES # BLD: 0.5 K/UL (ref 0–1)
MONOCYTES NFR BLD: 11 %
NEUTROPHILS # BLD AUTO: 2.8 K/UL (ref 1.8–7.7)
NEUTROPHILS NFR BLD: 60 %
NITRITE UR QL STRIP.AUTO: NEGATIVE
OSMOLALITY UR CALC.SUM OF ELEC: 289 MOSM/KG (ref 275–295)
PH UR: 7 [PH] (ref 5–8)
PLATELET # BLD AUTO: 195 K/UL (ref 140–400)
PMV BLD AUTO: 9.5 FL (ref 7.4–10.3)
POTASSIUM SERPL-SCNC: 3.7 MMOL/L (ref 3.3–5.1)
PROT SERPL-MCNC: 7.2 G/DL (ref 5.9–8.4)
PROT UR-MCNC: NEGATIVE MG/DL
PROTHROMBIN TIME: 13.2 SECONDS (ref 11.8–14.5)
RBC # BLD AUTO: 4.65 M/UL (ref 3.7–5.4)
RBC #/AREA URNS AUTO: 0 /HPF
RH BLOOD TYPE: POSITIVE
SODIUM SERPL-SCNC: 140 MMOL/L (ref 136–144)
SP GR UR STRIP: 1 (ref 1–1.03)
UROBILINOGEN UR STRIP-ACNC: <2
VIT C UR-MCNC: NEGATIVE MG/DL
WBC # BLD AUTO: 4.6 K/UL (ref 4–11)
WBC #/AREA URNS AUTO: <1 /HPF

## 2017-09-09 PROCEDURE — 93010 ELECTROCARDIOGRAM REPORT: CPT | Performed by: ORTHOPAEDIC SURGERY

## 2017-09-09 PROCEDURE — 86850 RBC ANTIBODY SCREEN: CPT

## 2017-09-09 PROCEDURE — 81001 URINALYSIS AUTO W/SCOPE: CPT

## 2017-09-09 PROCEDURE — 71020 XR CHEST PA + LAT CHEST (CPT=71020): CPT | Performed by: ORTHOPAEDIC SURGERY

## 2017-09-09 PROCEDURE — 86900 BLOOD TYPING SEROLOGIC ABO: CPT

## 2017-09-09 PROCEDURE — 36415 COLL VENOUS BLD VENIPUNCTURE: CPT

## 2017-09-09 PROCEDURE — 87081 CULTURE SCREEN ONLY: CPT

## 2017-09-09 PROCEDURE — 72120 X-RAY BEND ONLY L-S SPINE: CPT | Performed by: PHYSICAL MEDICINE & REHABILITATION

## 2017-09-09 PROCEDURE — 80053 COMPREHEN METABOLIC PANEL: CPT

## 2017-09-09 PROCEDURE — 93005 ELECTROCARDIOGRAM TRACING: CPT

## 2017-09-09 PROCEDURE — 85730 THROMBOPLASTIN TIME PARTIAL: CPT

## 2017-09-09 PROCEDURE — 86901 BLOOD TYPING SEROLOGIC RH(D): CPT

## 2017-09-09 PROCEDURE — 85025 COMPLETE CBC W/AUTO DIFF WBC: CPT

## 2017-09-09 PROCEDURE — 85610 PROTHROMBIN TIME: CPT

## 2017-09-10 ENCOUNTER — HOSPITAL ENCOUNTER (OUTPATIENT)
Dept: MRI IMAGING | Age: 62
Discharge: HOME | End: 2017-09-10
Attending: PHYSICAL MEDICINE & REHABILITATION
Payer: COMMERCIAL

## 2017-09-10 DIAGNOSIS — M54.16 LUMBAR RADICULOPATHY: ICD-10-CM

## 2017-09-10 PROCEDURE — 72158 MRI LUMBAR SPINE W/O & W/DYE: CPT | Performed by: PHYSICAL MEDICINE & REHABILITATION

## 2017-09-10 PROCEDURE — A9575 INJ GADOTERATE MEGLUMI 0.1ML: HCPCS | Performed by: PHYSICAL MEDICINE & REHABILITATION

## 2017-09-11 ENCOUNTER — TELEPHONE (OUTPATIENT)
Dept: GASTROENTEROLOGY | Facility: CLINIC | Age: 62
End: 2017-09-11

## 2017-09-11 RX ORDER — SUCRALFATE 1 G/1
1 TABLET ORAL
Qty: 120 TABLET | Refills: 0 | Status: SHIPPED | OUTPATIENT
Start: 2017-09-11 | End: 2018-03-15

## 2017-09-11 NOTE — TELEPHONE ENCOUNTER
Sucralfate prescribed. Please inform her.  Sucralfate is a good choice for this situation, let her know that it can be constipating, and she may need otc miralax post surgery, especially if she needs narcotic pain meds

## 2017-09-11 NOTE — TELEPHONE ENCOUNTER
Pt states that she is having neck fusion surgery in 2 wks. Pt can't take most pain medications. Is there something that she can take to coat her stomach so that she can handle pain medication? Please call.

## 2017-09-11 NOTE — TELEPHONE ENCOUNTER
Pt called again. She states that she previously took sucralfate. Would this medication help? Please call.

## 2017-09-11 NOTE — TELEPHONE ENCOUNTER
Dr Kailee Latif,    I spoke to the pt. She has been on Sucralfate in the past and wondering if you can prescribe this medication for post surgery. She has been told she will be in a lot of pain and is not so sure her stomach will tolerate the pain medication.  Sh

## 2017-09-12 ENCOUNTER — TELEPHONE (OUTPATIENT)
Dept: ORTHOPEDICS CLINIC | Facility: CLINIC | Age: 62
End: 2017-09-12

## 2017-09-12 ENCOUNTER — OFFICE VISIT (OUTPATIENT)
Dept: ORTHOPEDICS CLINIC | Facility: CLINIC | Age: 62
End: 2017-09-12

## 2017-09-12 ENCOUNTER — OFFICE VISIT (OUTPATIENT)
Dept: OCCUPATIONAL MEDICINE | Facility: HOSPITAL | Age: 62
End: 2017-09-12
Attending: ORTHOPAEDIC SURGERY
Payer: COMMERCIAL

## 2017-09-12 DIAGNOSIS — S63.659A SAGITTAL BAND RUPTURE AT METACARPOPHALANGEAL JOINT, INITIAL ENCOUNTER: Primary | ICD-10-CM

## 2017-09-12 DIAGNOSIS — S63.659A SAGITTAL BAND RUPTURE AT METACARPOPHALANGEAL JOINT, INITIAL ENCOUNTER: ICD-10-CM

## 2017-09-12 PROCEDURE — 99212 OFFICE O/P EST SF 10 MIN: CPT | Performed by: ORTHOPAEDIC SURGERY

## 2017-09-12 PROCEDURE — 99213 OFFICE O/P EST LOW 20 MIN: CPT | Performed by: ORTHOPAEDIC SURGERY

## 2017-09-12 PROCEDURE — 97760 ORTHOTIC MGMT&TRAING 1ST ENC: CPT | Performed by: OCCUPATIONAL THERAPIST

## 2017-09-12 RX ORDER — SUCRALFATE 1 G/1
TABLET ORAL
Qty: 360 TABLET | Refills: 0 | OUTPATIENT
Start: 2017-09-12

## 2017-09-12 NOTE — PROGRESS NOTES
9/12/2017  Drena Darin Day  85/1955  57year old   female  Nimco Peña MD    HPI:   Patient presents with:  Hand Injury: Injury to right hand. Last Wed. Went to [facility] urgent care. no noted fractures. . Partial tear. Was given a brace to wear.  Bran breakfast. Disp: 90 tablet Rfl: 4   AMLODIPINE BESYLATE 10 MG Oral Tab TAKE 1 TABLET(10 MG) BY MOUTH DAILY Disp: 90 tablet Rfl: 1   RaNITidine HCl 150 MG Oral Tab Take 1 tablet (150 mg total) by mouth 2 (two) times daily.  As needed for breakthrough symptom Adenoidectomy  No date: UPPER GI ENDOSCOPY,EXAM      Comment: EGD 2007   Family History   Problem Relation Age of Onset   • Pulmonary Disease Father    • Heart Disease Mother    • Colon Cancer Maternal Grandfather    • Polyps Sister      colon polyps   • g mallet fracture.       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

## 2017-09-12 NOTE — TELEPHONE ENCOUNTER
Spoke to pt and she states she is scheduled for surgery with Dr. Shirley Garner on 09/26/17.   -- she has appt with OT for splint on 09/25/17 at 6pm.   -- she will be out of town from 09/19/17 - 09/21/17  -- OT told her that she will need to be seen by Elsy Mcgovern for f/

## 2017-09-12 NOTE — PROGRESS NOTES
SPLINT EVALUATION:   Referring Physician: Dr. Ancelmo Leach  Date of Onset:09/06/17 Date of Service: 9/12/2017   Diagnosis: sagittal band rupture at MCP joint,initial encounter  PATIENT SUMMARY   Dariana Stoner is a 57year old y/o female who presents to therap extension) splint and adjustments as needed    Education or treatment limitation: None  Rehab Potential:good    Patient  was advised of these findings, precautions, and treatment options and has agreed to actively participate in planning and for this cours

## 2017-09-12 NOTE — TELEPHONE ENCOUNTER
Spoke to pt and informed her of JEL message. Instructed pt to call [facility] to schedule. Pt verbalized understanding.

## 2017-09-12 NOTE — TELEPHONE ENCOUNTER
Rx request for Sucralfate 1 g, filled on 9/11/17 #120 with 0 refills by Dr. Marquis Harvey. No further action required at this time.

## 2017-09-14 ENCOUNTER — OFFICE VISIT (OUTPATIENT)
Dept: INTERNAL MEDICINE CLINIC | Facility: CLINIC | Age: 62
End: 2017-09-14

## 2017-09-14 VITALS
DIASTOLIC BLOOD PRESSURE: 84 MMHG | TEMPERATURE: 98 F | HEIGHT: 68 IN | OXYGEN SATURATION: 98 % | WEIGHT: 137 LBS | HEART RATE: 90 BPM | RESPIRATION RATE: 16 BRPM | BODY MASS INDEX: 20.76 KG/M2 | SYSTOLIC BLOOD PRESSURE: 132 MMHG

## 2017-09-14 DIAGNOSIS — Z01.818 PREOP EXAMINATION: Primary | ICD-10-CM

## 2017-09-14 PROCEDURE — 99244 OFF/OP CNSLTJ NEW/EST MOD 40: CPT | Performed by: INTERNAL MEDICINE

## 2017-09-14 PROCEDURE — 99212 OFFICE O/P EST SF 10 MIN: CPT | Performed by: INTERNAL MEDICINE

## 2017-09-14 RX ORDER — CYCLOBENZAPRINE HCL 10 MG
10 TABLET ORAL 3 TIMES DAILY PRN
Qty: 60 TABLET | Refills: 1 | Status: SHIPPED | OUTPATIENT
Start: 2017-09-14 | End: 2017-09-27

## 2017-09-14 NOTE — PROGRESS NOTES
Merlin Southward Day is a 57year old female. Patient presents with:  Pre-Op Exam: 9/26 C4-C5, C5-C6 possible C6-C7 anterior cervical discectomy, decompression and fusion, instrumentation, allograft, autograft  Hand Pain: sagittal band rupture at the metacarpophal (ESTRACE) 0.1 MG/GM Vaginal Cream Place 0.5 g vaginally 3 (three) times a week. PRN (Patient taking differently: Place 0.5 g vaginally twice a week.  PRN ) Disp: 1 Tube Rfl: 6   Meclizine HCl 25 MG Oral Tab Take 1 tablet (25 mg total) by mouth 3 (three) mojgan kg)    Body mass index is 20.83 kg/m².       EXAM:   /84 (BP Location: Left arm, Patient Position: Sitting, Cuff Size: adult)   Pulse 90   Temp 98.2 °F (36.8 °C) (Oral)   Resp 16   Ht 5' 8\" (1.727 m)   Wt 137 lb (62.1 kg)   SpO2 98%   BMI 20.83 kg/m²

## 2017-09-15 ENCOUNTER — PATIENT MESSAGE (OUTPATIENT)
Dept: NEUROLOGY | Facility: CLINIC | Age: 62
End: 2017-09-15

## 2017-09-15 NOTE — TELEPHONE ENCOUNTER
From: Himanshu Stoner  To: Katt Miles MD  Sent: 9/15/2017 4:04 PM CDT  Subject: Test Results Question    Hi Dr Luigi Bell  I did have the lumbar MRI you wanted me to have and I was wondering if you had looked at it yet and what your thoughts regarding a pain

## 2017-09-18 PROBLEM — M43.16 SPONDYLOLISTHESIS OF LUMBAR REGION: Status: ACTIVE | Noted: 2017-09-18

## 2017-09-18 PROBLEM — M43.10 RETROLISTHESIS: Status: ACTIVE | Noted: 2017-09-18

## 2017-09-18 PROBLEM — M41.25 OTHER IDIOPATHIC SCOLIOSIS, THORACOLUMBAR REGION: Status: ACTIVE | Noted: 2017-05-25

## 2017-09-18 PROBLEM — M51.9 LUMBAR DISC DISEASE: Status: ACTIVE | Noted: 2017-09-18

## 2017-09-18 PROBLEM — M48.061 LUMBAR FORAMINAL STENOSIS: Status: ACTIVE | Noted: 2017-09-18

## 2017-09-22 ENCOUNTER — OFFICE VISIT (OUTPATIENT)
Dept: PAIN CLINIC | Facility: HOSPITAL | Age: 62
End: 2017-09-22
Attending: ANESTHESIOLOGY
Payer: COMMERCIAL

## 2017-09-22 VITALS — WEIGHT: 137 LBS | HEIGHT: 68 IN | BODY MASS INDEX: 20.76 KG/M2

## 2017-09-22 DIAGNOSIS — M43.10 RETROLISTHESIS: ICD-10-CM

## 2017-09-22 DIAGNOSIS — M43.16 SPONDYLOLISTHESIS OF LUMBAR REGION: ICD-10-CM

## 2017-09-22 DIAGNOSIS — M54.50 ACUTE BILATERAL LOW BACK PAIN WITHOUT SCIATICA: Primary | ICD-10-CM

## 2017-09-22 DIAGNOSIS — M48.061 LUMBAR FORAMINAL STENOSIS: ICD-10-CM

## 2017-09-22 DIAGNOSIS — M51.9 LUMBAR DISC DISEASE: ICD-10-CM

## 2017-09-22 PROCEDURE — 99201 HC OUTPT EVAL AND MGNT NEW PT LEVEL 1: CPT

## 2017-09-22 RX ORDER — TIZANIDINE 4 MG/1
4 TABLET ORAL NIGHTLY
Qty: 30 TABLET | Refills: 2 | Status: SHIPPED | OUTPATIENT
Start: 2017-09-22 | End: 2017-10-03

## 2017-09-22 RX ORDER — BUPIVACAINE HYDROCHLORIDE 2.5 MG/ML
INJECTION, SOLUTION EPIDURAL; INFILTRATION; INTRACAUDAL ONCE
Status: CANCELLED | OUTPATIENT
Start: 2017-09-22 | End: 2017-09-22

## 2017-09-22 RX ORDER — METHYLPREDNISOLONE ACETATE 40 MG/ML
10 INJECTION, SUSPENSION INTRA-ARTICULAR; INTRALESIONAL; INTRAMUSCULAR; SOFT TISSUE ONCE
Status: CANCELLED | OUTPATIENT
Start: 2017-09-22 | End: 2017-09-22

## 2017-09-22 NOTE — CHRONIC PAIN
[facility]   New Consult       Patient name: Colton Stoner 57year old female  : 10/5/1955  MRN: [id number]  Referring MD: No ref.  provider found    COMPLAINT:  Patient presents with:  New Patient: back pain  Complaining of TONSILLECTOMY      Comment: with Adenoidectomy  No date: UPPER GI ENDOSCOPY,EXAM      Comment: EGD 2007  FAMILY HISTORY:  Family History   Problem Relation Age of Onset   • Pulmonary Disease Father    • Heart Disease Mother    • Colon Cancer Maternal [place] a week. PRN (Patient taking differently: Place 0.5 g vaginally twice a week. PRN ), Disp: 1 Tube, Rfl: 6  •  Meclizine HCl 25 MG Oral Tab, Take 1 tablet (25 mg total) by mouth 3 (three) times daily as needed. , Disp: 30 tablet, Rfl: 1  •  Calcium Carbonate- spine as detailed. Notable levels as follows:  3. L4-L5: Combination of disc and facet degeneration resulting in severe left neural foraminal/mild right neural foraminal and mild to moderate left lateral recess stenosis.   4. L3-L4: Moderate to severe left

## 2017-09-22 NOTE — PROGRESS NOTES
09/22/17  PRESENTS AMBULATORY TO CPM;  PT STATES SHE WAS TX HERE SEVERAL YRS AGO;  NEW CONSULT C/O LBP INTO THE BILATERAL BUTTOCKS;  PT OF DR. Mauro Orozco GAVE A MATT IN April;  PT WILL SEE DR. Sanaz Farfan FOR CERVICAL NECK SURGERY TU 9/26/17;  PT STATES HER L

## 2017-09-22 NOTE — H&P
[facility]    PATIENT'S NAME: Debby Angeles    ATTENDING PHYSICIAN: Dianna Brennan MD   PATIENT ACCOUNT#:   [id number]    LOCATION:  [facility]  MEDICAL RECORD #:   [id number]       YOB: 1955  ADMISSION DATE:       09/26/2017 radiculopathy family history of colon cancer in her sister (the patient's last colonoscopy was in 2015, at which time she did not have any polyps), history of cholecystectomy, history of tonsillectomy and adenoidectomy, history of lumbar fusion of L1 and L cardiopulmonary standpoint. Her EKG was normal.  Her preoperative labs were normal.  The patient is considered low risk for surgery. Discussed pain management with the patient.   She may do best with oxycodone, as that seems to have the least number of si

## 2017-09-25 ENCOUNTER — APPOINTMENT (OUTPATIENT)
Dept: OCCUPATIONAL MEDICINE | Facility: HOSPITAL | Age: 62
End: 2017-09-25
Attending: ORTHOPAEDIC SURGERY
Payer: COMMERCIAL

## 2017-09-25 PROBLEM — M79.641 RIGHT HAND PAIN: Status: ACTIVE | Noted: 2017-09-25

## 2017-09-26 ENCOUNTER — ANESTHESIA EVENT (OUTPATIENT)
Dept: SURGERY | Facility: HOSPITAL | Age: 62
DRG: 473 | End: 2017-09-26
Payer: COMMERCIAL

## 2017-09-26 ENCOUNTER — APPOINTMENT (OUTPATIENT)
Dept: GENERAL RADIOLOGY | Facility: HOSPITAL | Age: 62
DRG: 473 | End: 2017-09-26
Attending: ORTHOPAEDIC SURGERY
Payer: COMMERCIAL

## 2017-09-26 ENCOUNTER — ANESTHESIA (OUTPATIENT)
Dept: SURGERY | Facility: HOSPITAL | Age: 62
DRG: 473 | End: 2017-09-26
Payer: COMMERCIAL

## 2017-09-26 ENCOUNTER — SURGERY (OUTPATIENT)
Age: 62
End: 2017-09-26

## 2017-09-26 ENCOUNTER — HOSPITAL ENCOUNTER (OUTPATIENT)
Facility: HOSPITAL | Age: 62
Setting detail: OBSERVATION
Discharge: HOME | DRG: 473 | End: 2017-09-27
Attending: ORTHOPAEDIC SURGERY | Admitting: ORTHOPAEDIC SURGERY
Payer: COMMERCIAL

## 2017-09-26 PROBLEM — M54.12 CERVICAL RADICULITIS: Status: ACTIVE | Noted: 2017-09-26

## 2017-09-26 PROCEDURE — 4A11X4G MONITORING OF PERIPHERAL NERVOUS ELECTRICAL ACTIVITY, INTRAOPERATIVE, EXTERNAL APPROACH: ICD-10-PCS | Performed by: ORTHOPAEDIC SURGERY

## 2017-09-26 PROCEDURE — 0RG20A0 FUSION OF 2 OR MORE CERVICAL VERTEBRAL JOINTS WITH INTERBODY FUSION DEVICE, ANTERIOR APPROACH, ANTERIOR COLUMN, OPEN APPROACH: ICD-10-PCS | Performed by: ORTHOPAEDIC SURGERY

## 2017-09-26 PROCEDURE — 72040 X-RAY EXAM NECK SPINE 2-3 VW: CPT | Performed by: ORTHOPAEDIC SURGERY

## 2017-09-26 PROCEDURE — 76001 XR C-ARM FLUORO >1 HOUR  (CPT=76001): CPT | Performed by: ORTHOPAEDIC SURGERY

## 2017-09-26 PROCEDURE — 0RT30ZZ RESECTION OF CERVICAL VERTEBRAL DISC, OPEN APPROACH: ICD-10-PCS | Performed by: NEUROLOGICAL SURGERY

## 2017-09-26 RX ORDER — SENNOSIDES 8.6 MG
17.2 TABLET ORAL NIGHTLY
Status: DISCONTINUED | OUTPATIENT
Start: 2017-09-26 | End: 2017-09-27

## 2017-09-26 RX ORDER — NALBUPHINE HCL 10 MG/ML
2.5 AMPUL (ML) INJECTION EVERY 4 HOURS PRN
Status: DISCONTINUED | OUTPATIENT
Start: 2017-09-26 | End: 2017-09-27

## 2017-09-26 RX ORDER — DIPHENHYDRAMINE HCL 25 MG
25 CAPSULE ORAL EVERY 4 HOURS PRN
Status: DISCONTINUED | OUTPATIENT
Start: 2017-09-26 | End: 2017-09-27

## 2017-09-26 RX ORDER — HYDROCODONE BITARTRATE AND ACETAMINOPHEN 10; 325 MG/1; MG/1
1 TABLET ORAL EVERY 4 HOURS PRN
Status: DISCONTINUED | OUTPATIENT
Start: 2017-09-26 | End: 2017-09-27

## 2017-09-26 RX ORDER — ZOLPIDEM TARTRATE 5 MG/1
5 TABLET ORAL NIGHTLY PRN
Status: DISCONTINUED | OUTPATIENT
Start: 2017-09-26 | End: 2017-09-27

## 2017-09-26 RX ORDER — BISACODYL 10 MG
10 SUPPOSITORY, RECTAL RECTAL
Status: DISCONTINUED | OUTPATIENT
Start: 2017-09-26 | End: 2017-09-27

## 2017-09-26 RX ORDER — ACETAMINOPHEN 325 MG/1
650 TABLET ORAL ONCE
Status: DISCONTINUED | OUTPATIENT
Start: 2017-09-26 | End: 2017-09-26 | Stop reason: HOSPADM

## 2017-09-26 RX ORDER — 0.9 % SODIUM CHLORIDE 0.9 %
VIAL (ML) INJECTION
Status: COMPLETED
Start: 2017-09-26 | End: 2017-09-26

## 2017-09-26 RX ORDER — ACETAMINOPHEN 500 MG
1000 TABLET ORAL ONCE
Status: COMPLETED | OUTPATIENT
Start: 2017-09-26 | End: 2017-09-26

## 2017-09-26 RX ORDER — HYDROMORPHONE HYDROCHLORIDE 1 MG/ML
0.8 INJECTION, SOLUTION INTRAMUSCULAR; INTRAVENOUS; SUBCUTANEOUS EVERY 2 HOUR PRN
Status: DISCONTINUED | OUTPATIENT
Start: 2017-09-26 | End: 2017-09-27

## 2017-09-26 RX ORDER — DOCUSATE SODIUM 100 MG/1
100 CAPSULE, LIQUID FILLED ORAL 2 TIMES DAILY
Status: DISCONTINUED | OUTPATIENT
Start: 2017-09-26 | End: 2017-09-27

## 2017-09-26 RX ORDER — SODIUM CHLORIDE, SODIUM LACTATE, POTASSIUM CHLORIDE, CALCIUM CHLORIDE 600; 310; 30; 20 MG/100ML; MG/100ML; MG/100ML; MG/100ML
INJECTION, SOLUTION INTRAVENOUS CONTINUOUS
Status: DISCONTINUED | OUTPATIENT
Start: 2017-09-26 | End: 2017-09-27

## 2017-09-26 RX ORDER — ACETAMINOPHEN 325 MG/1
650 TABLET ORAL EVERY 4 HOURS PRN
Status: DISCONTINUED | OUTPATIENT
Start: 2017-09-26 | End: 2017-09-27

## 2017-09-26 RX ORDER — ONDANSETRON 2 MG/ML
4 INJECTION INTRAMUSCULAR; INTRAVENOUS EVERY 4 HOURS PRN
Status: DISCONTINUED | OUTPATIENT
Start: 2017-09-26 | End: 2017-09-27

## 2017-09-26 RX ORDER — MORPHINE SULFATE 4 MG/ML
4 INJECTION, SOLUTION INTRAMUSCULAR; INTRAVENOUS EVERY 10 MIN PRN
Status: DISCONTINUED | OUTPATIENT
Start: 2017-09-26 | End: 2017-09-26 | Stop reason: HOSPADM

## 2017-09-26 RX ORDER — HYDROMORPHONE HYDROCHLORIDE 1 MG/ML
0.4 INJECTION, SOLUTION INTRAMUSCULAR; INTRAVENOUS; SUBCUTANEOUS EVERY 30 MIN PRN
Status: DISCONTINUED | OUTPATIENT
Start: 2017-09-26 | End: 2017-09-27

## 2017-09-26 RX ORDER — HYDROMORPHONE HYDROCHLORIDE 1 MG/ML
0.6 INJECTION, SOLUTION INTRAMUSCULAR; INTRAVENOUS; SUBCUTANEOUS EVERY 5 MIN PRN
Status: DISCONTINUED | OUTPATIENT
Start: 2017-09-26 | End: 2017-09-26 | Stop reason: HOSPADM

## 2017-09-26 RX ORDER — DIPHENHYDRAMINE HYDROCHLORIDE 50 MG/ML
12.5 INJECTION INTRAMUSCULAR; INTRAVENOUS EVERY 4 HOURS PRN
Status: DISCONTINUED | OUTPATIENT
Start: 2017-09-26 | End: 2017-09-27

## 2017-09-26 RX ORDER — LIDOCAINE HYDROCHLORIDE 10 MG/ML
INJECTION, SOLUTION EPIDURAL; INFILTRATION; INTRACAUDAL; PERINEURAL AS NEEDED
Status: DISCONTINUED | OUTPATIENT
Start: 2017-09-26 | End: 2017-09-26 | Stop reason: SURG

## 2017-09-26 RX ORDER — DEXAMETHASONE SODIUM PHOSPHATE 4 MG/ML
VIAL (ML) INJECTION AS NEEDED
Status: DISCONTINUED | OUTPATIENT
Start: 2017-09-26 | End: 2017-09-26 | Stop reason: SURG

## 2017-09-26 RX ORDER — ONDANSETRON 2 MG/ML
4 INJECTION INTRAMUSCULAR; INTRAVENOUS EVERY 6 HOURS PRN
Status: DISCONTINUED | OUTPATIENT
Start: 2017-09-26 | End: 2017-09-27

## 2017-09-26 RX ORDER — HYDROMORPHONE HYDROCHLORIDE 1 MG/ML
0.4 INJECTION, SOLUTION INTRAMUSCULAR; INTRAVENOUS; SUBCUTANEOUS EVERY 2 HOUR PRN
Status: DISCONTINUED | OUTPATIENT
Start: 2017-09-26 | End: 2017-09-27

## 2017-09-26 RX ORDER — HYDROCODONE BITARTRATE AND ACETAMINOPHEN 5; 325 MG/1; MG/1
2 TABLET ORAL AS NEEDED
Status: DISCONTINUED | OUTPATIENT
Start: 2017-09-26 | End: 2017-09-26 | Stop reason: HOSPADM

## 2017-09-26 RX ORDER — MORPHINE SULFATE 10 MG/ML
6 INJECTION, SOLUTION INTRAMUSCULAR; INTRAVENOUS EVERY 10 MIN PRN
Status: DISCONTINUED | OUTPATIENT
Start: 2017-09-26 | End: 2017-09-26 | Stop reason: HOSPADM

## 2017-09-26 RX ORDER — ONDANSETRON 2 MG/ML
4 INJECTION INTRAMUSCULAR; INTRAVENOUS ONCE AS NEEDED
Status: COMPLETED | OUTPATIENT
Start: 2017-09-26 | End: 2017-09-26

## 2017-09-26 RX ORDER — FAMOTIDINE 20 MG/1
20 TABLET ORAL ONCE
Status: DISCONTINUED | OUTPATIENT
Start: 2017-09-26 | End: 2017-09-26 | Stop reason: HOSPADM

## 2017-09-26 RX ORDER — METOCLOPRAMIDE HYDROCHLORIDE 5 MG/ML
INJECTION INTRAMUSCULAR; INTRAVENOUS AS NEEDED
Status: DISCONTINUED | OUTPATIENT
Start: 2017-09-26 | End: 2017-09-26 | Stop reason: SURG

## 2017-09-26 RX ORDER — SODIUM CHLORIDE 9 MG/ML
INJECTION, SOLUTION INTRAVENOUS CONTINUOUS PRN
Status: DISCONTINUED | OUTPATIENT
Start: 2017-09-26 | End: 2017-09-26 | Stop reason: SURG

## 2017-09-26 RX ORDER — SODIUM PHOSPHATE, DIBASIC AND SODIUM PHOSPHATE, MONOBASIC 7; 19 G/133ML; G/133ML
1 ENEMA RECTAL ONCE AS NEEDED
Status: DISCONTINUED | OUTPATIENT
Start: 2017-09-26 | End: 2017-09-27

## 2017-09-26 RX ORDER — HYDROMORPHONE HYDROCHLORIDE 1 MG/ML
0.2 INJECTION, SOLUTION INTRAMUSCULAR; INTRAVENOUS; SUBCUTANEOUS EVERY 2 HOUR PRN
Status: DISCONTINUED | OUTPATIENT
Start: 2017-09-26 | End: 2017-09-27

## 2017-09-26 RX ORDER — CYCLOBENZAPRINE HCL 10 MG
10 TABLET ORAL EVERY 8 HOURS PRN
Status: DISCONTINUED | OUTPATIENT
Start: 2017-09-26 | End: 2017-09-27

## 2017-09-26 RX ORDER — KETAMINE HCL IN 0.9 % NACL 100MG/10ML
SYRINGE (ML) INTRAVENOUS AS NEEDED
Status: DISCONTINUED | OUTPATIENT
Start: 2017-09-26 | End: 2017-09-26 | Stop reason: SURG

## 2017-09-26 RX ORDER — HYDROMORPHONE HYDROCHLORIDE 1 MG/ML
0.4 INJECTION, SOLUTION INTRAMUSCULAR; INTRAVENOUS; SUBCUTANEOUS EVERY 5 MIN PRN
Status: DISCONTINUED | OUTPATIENT
Start: 2017-09-26 | End: 2017-09-26 | Stop reason: HOSPADM

## 2017-09-26 RX ORDER — HYDROCODONE BITARTRATE AND ACETAMINOPHEN 5; 325 MG/1; MG/1
1 TABLET ORAL AS NEEDED
Status: DISCONTINUED | OUTPATIENT
Start: 2017-09-26 | End: 2017-09-26 | Stop reason: HOSPADM

## 2017-09-26 RX ORDER — NALOXONE HYDROCHLORIDE 0.4 MG/ML
80 INJECTION, SOLUTION INTRAMUSCULAR; INTRAVENOUS; SUBCUTANEOUS AS NEEDED
Status: DISCONTINUED | OUTPATIENT
Start: 2017-09-26 | End: 2017-09-26 | Stop reason: HOSPADM

## 2017-09-26 RX ORDER — METOCLOPRAMIDE HYDROCHLORIDE 5 MG/ML
10 INJECTION INTRAMUSCULAR; INTRAVENOUS EVERY 6 HOURS PRN
Status: DISCONTINUED | OUTPATIENT
Start: 2017-09-26 | End: 2017-09-27

## 2017-09-26 RX ORDER — DIPHENHYDRAMINE HYDROCHLORIDE 50 MG/ML
25 INJECTION INTRAMUSCULAR; INTRAVENOUS EVERY 4 HOURS PRN
Status: DISCONTINUED | OUTPATIENT
Start: 2017-09-26 | End: 2017-09-27

## 2017-09-26 RX ORDER — MIDAZOLAM HYDROCHLORIDE 1 MG/ML
INJECTION INTRAMUSCULAR; INTRAVENOUS AS NEEDED
Status: DISCONTINUED | OUTPATIENT
Start: 2017-09-26 | End: 2017-09-26 | Stop reason: SURG

## 2017-09-26 RX ORDER — GLYCOPYRROLATE 0.2 MG/ML
INJECTION INTRAMUSCULAR; INTRAVENOUS AS NEEDED
Status: DISCONTINUED | OUTPATIENT
Start: 2017-09-26 | End: 2017-09-26 | Stop reason: SURG

## 2017-09-26 RX ORDER — HYDROCODONE BITARTRATE AND ACETAMINOPHEN 10; 325 MG/1; MG/1
2 TABLET ORAL EVERY 4 HOURS PRN
Status: DISCONTINUED | OUTPATIENT
Start: 2017-09-26 | End: 2017-09-27

## 2017-09-26 RX ORDER — NALOXONE HYDROCHLORIDE 0.4 MG/ML
0.08 INJECTION, SOLUTION INTRAMUSCULAR; INTRAVENOUS; SUBCUTANEOUS
Status: DISCONTINUED | OUTPATIENT
Start: 2017-09-26 | End: 2017-09-27

## 2017-09-26 RX ORDER — POLYETHYLENE GLYCOL 3350 17 G/17G
17 POWDER, FOR SOLUTION ORAL DAILY PRN
Status: DISCONTINUED | OUTPATIENT
Start: 2017-09-26 | End: 2017-09-27

## 2017-09-26 RX ORDER — MORPHINE SULFATE 2 MG/ML
2 INJECTION, SOLUTION INTRAMUSCULAR; INTRAVENOUS EVERY 10 MIN PRN
Status: DISCONTINUED | OUTPATIENT
Start: 2017-09-26 | End: 2017-09-26 | Stop reason: HOSPADM

## 2017-09-26 RX ORDER — PHENYLEPHRINE HCL 10 MG/ML
VIAL (ML) INJECTION AS NEEDED
Status: DISCONTINUED | OUTPATIENT
Start: 2017-09-26 | End: 2017-09-26 | Stop reason: SURG

## 2017-09-26 RX ORDER — HYDROMORPHONE HYDROCHLORIDE 1 MG/ML
0.2 INJECTION, SOLUTION INTRAMUSCULAR; INTRAVENOUS; SUBCUTANEOUS EVERY 5 MIN PRN
Status: DISCONTINUED | OUTPATIENT
Start: 2017-09-26 | End: 2017-09-26 | Stop reason: HOSPADM

## 2017-09-26 RX ORDER — HYDROCODONE BITARTRATE AND ACETAMINOPHEN 5; 325 MG/1; MG/1
2 TABLET ORAL EVERY 4 HOURS PRN
Status: DISCONTINUED | OUTPATIENT
Start: 2017-09-26 | End: 2017-09-27

## 2017-09-26 RX ORDER — METOCLOPRAMIDE 10 MG/1
10 TABLET ORAL ONCE
Status: DISCONTINUED | OUTPATIENT
Start: 2017-09-26 | End: 2017-09-26 | Stop reason: HOSPADM

## 2017-09-26 RX ORDER — HYDROCODONE BITARTRATE AND ACETAMINOPHEN 5; 325 MG/1; MG/1
1 TABLET ORAL EVERY 4 HOURS PRN
Status: DISCONTINUED | OUTPATIENT
Start: 2017-09-26 | End: 2017-09-27

## 2017-09-26 RX ORDER — LIDOCAINE HYDROCHLORIDE 40 MG/ML
SOLUTION TOPICAL AS NEEDED
Status: DISCONTINUED | OUTPATIENT
Start: 2017-09-26 | End: 2017-09-26 | Stop reason: SURG

## 2017-09-26 RX ADMIN — LIDOCAINE HYDROCHLORIDE 50 MG: 10 INJECTION, SOLUTION EPIDURAL; INFILTRATION; INTRACAUDAL; PERINEURAL at 07:28:00

## 2017-09-26 RX ADMIN — DEXAMETHASONE SODIUM PHOSPHATE 4 MG: 4 MG/ML VIAL (ML) INJECTION at 07:18:00

## 2017-09-26 RX ADMIN — PHENYLEPHRINE HCL 50 MCG: 10 MG/ML VIAL (ML) INJECTION at 09:40:00

## 2017-09-26 RX ADMIN — METOCLOPRAMIDE HYDROCHLORIDE 10 MG: 5 INJECTION INTRAMUSCULAR; INTRAVENOUS at 07:18:00

## 2017-09-26 RX ADMIN — SODIUM CHLORIDE, SODIUM LACTATE, POTASSIUM CHLORIDE, CALCIUM CHLORIDE: 600; 310; 30; 20 INJECTION, SOLUTION INTRAVENOUS at 10:21:00

## 2017-09-26 RX ADMIN — GLYCOPYRROLATE 0.2 MG: 0.2 INJECTION INTRAMUSCULAR; INTRAVENOUS at 09:12:00

## 2017-09-26 RX ADMIN — PHENYLEPHRINE HCL 50 MCG: 10 MG/ML VIAL (ML) INJECTION at 09:30:00

## 2017-09-26 RX ADMIN — SODIUM CHLORIDE: 9 INJECTION, SOLUTION INTRAVENOUS at 10:21:00

## 2017-09-26 RX ADMIN — SODIUM CHLORIDE: 9 INJECTION, SOLUTION INTRAVENOUS at 07:17:00

## 2017-09-26 RX ADMIN — LIDOCAINE HYDROCHLORIDE 4 ML: 40 SOLUTION TOPICAL at 07:28:00

## 2017-09-26 RX ADMIN — MIDAZOLAM HYDROCHLORIDE 5 MG: 1 INJECTION INTRAMUSCULAR; INTRAVENOUS at 07:18:00

## 2017-09-26 RX ADMIN — KETAMINE HCL IN 0.9 % NACL 20 MG: 100MG/10ML SYRINGE (ML) INTRAVENOUS at 08:10:00

## 2017-09-26 RX ADMIN — SODIUM CHLORIDE, SODIUM LACTATE, POTASSIUM CHLORIDE, CALCIUM CHLORIDE: 600; 310; 30; 20 INJECTION, SOLUTION INTRAVENOUS at 07:10:00

## 2017-09-26 NOTE — BRIEF OP NOTE
Pre-Operative Diagnosis: cervical spine stenosis, cervical disc disease, cervical radiculitis, cervical spine instability     Post-Operative Diagnosis: CERVICAL SPIN STENOSIS,CERVICAL DISC DISESAE     Procedure Performed:   Procedure(s):  Cervical 4 - C

## 2017-09-26 NOTE — ANESTHESIA PREPROCEDURE EVALUATION
Anesthesia PreOp Note    HPI:     Negro Stoner is a 57year old female who presents for preoperative consultation requested by: Angela Matos MD    Date of Surgery: 9/26/2017    Procedure(s):  ANTERIOR CERVICAL FUSION BG & INST 1 LEVEL  Indication: cerv Date Noted: 04/12/2017      C4-5 mild-mod, C5-6 mild-mod, C6-7 mild central stenosis         Date Noted: 04/12/2017      History of lumbar laminectomy: L1-2         Date Noted: 03/30/2017      left C7-8 radiculopathy         Date Noted: 03/30/2017      Nec TONSILLECTOMY      Comment: with Adenoidectomy  No date: UPPER GI ENDOSCOPY,EXAM      Comment: EGD 2007      Prescriptions Prior to Admission:  Cyclobenzaprine HCl 10 MG Oral Tab Take 1 tablet (10 mg total) by mouth 3 (three) times daily as needed for Musc at 0700   TiZANidine HCl 4 MG Oral Tab Take 1 tablet (4 mg total) by mouth nightly.  Disp: 30 tablet Rfl: 2 Taking       Current Facility-Administered Medications Ordered in Epic:  lactated ringers infusion  Intravenous Continuous Myles Cavazos MD Last Ra Available pre-op labs reviewed.     Lab Results  Component Value Date   WBC 4.6 09/09/2017   RBC 4.65 09/09/2017   HGB 14.9 09/09/2017   HCT 44.8 09/09/2017   MCV 96.4 09/09/2017   MCH 32.0 09/09/2017   MCHC 33.2 09/09/2017   RDW 13.9 09/09/2017   PLT planned.   Sarah Goldman  9/26/2017 7:10 AM

## 2017-09-26 NOTE — ANESTHESIA POSTPROCEDURE EVALUATION
Patient: Jaqueline Wasserman Day    Procedure Summary     Date:  09/26/17 Room / Location:  [facility] OR 09 / [facility] OR    Anesthesia Start:  8127 Anesthesia Stop:      Procedure:  ANTERIOR CERVICAL FUSION BG & INST 1 LEVEL (N/A Neck) Diagnosis:  (CERVICAL SPIN MICAH

## 2017-09-26 NOTE — INTERVAL H&P NOTE
Pre-op Diagnosis: cervical spine stenosis, cervical disc disease, cervical radiculitis, cervical spine instability    The above referenced H&P was reviewed by Celso Mercado MD on 9/26/2017, the patient was examined and no significant changes have occurred

## 2017-09-27 VITALS
RESPIRATION RATE: 17 BRPM | TEMPERATURE: 98 F | HEART RATE: 99 BPM | BODY MASS INDEX: 20.46 KG/M2 | DIASTOLIC BLOOD PRESSURE: 100 MMHG | HEIGHT: 68 IN | SYSTOLIC BLOOD PRESSURE: 153 MMHG | WEIGHT: 135 LBS | OXYGEN SATURATION: 100 %

## 2017-09-27 LAB
ANION GAP SERPL CALC-SCNC: 9 MMOL/L (ref 0–18)
BUN SERPL-MCNC: 6 MG/DL (ref 8–20)
BUN/CREAT SERPL: 10 (ref 10–20)
CALCIUM SERPL-MCNC: 8.7 MG/DL (ref 8.5–10.5)
CHLORIDE SERPL-SCNC: 101 MMOL/L (ref 95–110)
CO2 SERPL-SCNC: 26 MMOL/L (ref 22–32)
CREAT SERPL-MCNC: 0.6 MG/DL (ref 0.5–1.5)
ERYTHROCYTE [DISTWIDTH] IN BLOOD BY AUTOMATED COUNT: 13.4 % (ref 11–15)
GLUCOSE SERPL-MCNC: 97 MG/DL (ref 70–99)
HCT VFR BLD AUTO: 40.6 % (ref 35–48)
HGB BLD-MCNC: 13.9 G/DL (ref 12–16)
MCH RBC QN AUTO: 32.3 PG (ref 27–32)
MCHC RBC AUTO-ENTMCNC: 34.1 G/DL (ref 32–37)
MCV RBC AUTO: 94.7 FL (ref 80–100)
OSMOLALITY UR CALC.SUM OF ELEC: 280 MOSM/KG (ref 275–295)
PLATELET # BLD AUTO: 203 K/UL (ref 140–400)
PMV BLD AUTO: 8.7 FL (ref 7.4–10.3)
POTASSIUM SERPL-SCNC: 3.4 MMOL/L (ref 3.3–5.1)
RBC # BLD AUTO: 4.29 M/UL (ref 3.7–5.4)
SODIUM SERPL-SCNC: 136 MMOL/L (ref 136–144)
WBC # BLD AUTO: 7.9 K/UL (ref 4–11)

## 2017-09-27 PROCEDURE — 99232 SBSQ HOSP IP/OBS MODERATE 35: CPT | Performed by: INTERNAL MEDICINE

## 2017-09-27 RX ORDER — OXYCODONE AND ACETAMINOPHEN 10; 325 MG/1; MG/1
1 TABLET ORAL EVERY 4 HOURS PRN
Status: DISCONTINUED | OUTPATIENT
Start: 2017-09-27 | End: 2017-09-27

## 2017-09-27 RX ORDER — FAMOTIDINE 20 MG/1
20 TABLET ORAL 2 TIMES DAILY PRN
Status: DISCONTINUED | OUTPATIENT
Start: 2017-09-27 | End: 2017-09-27

## 2017-09-27 RX ORDER — ONDANSETRON 4 MG/1
4 TABLET, FILM COATED ORAL 4 TIMES DAILY PRN
Status: DISCONTINUED | OUTPATIENT
Start: 2017-09-27 | End: 2017-09-27

## 2017-09-27 RX ORDER — PANTOPRAZOLE SODIUM 40 MG/1
40 TABLET, DELAYED RELEASE ORAL
Status: DISCONTINUED | OUTPATIENT
Start: 2017-09-27 | End: 2017-09-27

## 2017-09-27 RX ORDER — ARIPIPRAZOLE 15 MG/1
40 TABLET ORAL EVERY 4 HOURS
Status: DISCONTINUED | OUTPATIENT
Start: 2017-09-27 | End: 2017-09-27

## 2017-09-27 RX ORDER — OXYCODONE HYDROCHLORIDE AND ACETAMINOPHEN 5; 325 MG/1; MG/1
1 TABLET ORAL EVERY 4 HOURS PRN
Status: DISCONTINUED | OUTPATIENT
Start: 2017-09-27 | End: 2017-09-27

## 2017-09-27 RX ORDER — ONDANSETRON 4 MG/1
4 TABLET, FILM COATED ORAL 4 TIMES DAILY PRN
Qty: 60 TABLET | Refills: 0 | Status: SHIPPED | OUTPATIENT
Start: 2017-09-27 | End: 2017-10-03

## 2017-09-27 RX ORDER — SUCRALFATE 1 G/1
1 TABLET ORAL
Status: DISCONTINUED | OUTPATIENT
Start: 2017-09-27 | End: 2017-09-27

## 2017-09-27 RX ORDER — OXYCODONE AND ACETAMINOPHEN 10; 325 MG/1; MG/1
1 TABLET ORAL EVERY 4 HOURS PRN
Qty: 60 TABLET | Refills: 0 | Status: SHIPPED | OUTPATIENT
Start: 2017-09-27 | End: 2017-09-28

## 2017-09-27 RX ORDER — FLUTICASONE PROPIONATE 50 MCG
2 SPRAY, SUSPENSION (ML) NASAL DAILY PRN
Status: DISCONTINUED | OUTPATIENT
Start: 2017-09-27 | End: 2017-09-27

## 2017-09-27 RX ORDER — CYCLOBENZAPRINE HCL 10 MG
10 TABLET ORAL 3 TIMES DAILY PRN
Status: DISCONTINUED | OUTPATIENT
Start: 2017-09-27 | End: 2017-09-27

## 2017-09-27 RX ORDER — CYCLOBENZAPRINE HCL 10 MG
10 TABLET ORAL EVERY 8 HOURS PRN
Qty: 60 TABLET | Refills: 0 | Status: SHIPPED | OUTPATIENT
Start: 2017-09-27 | End: 2020-10-02

## 2017-09-27 RX ORDER — AMLODIPINE BESYLATE 10 MG/1
10 TABLET ORAL DAILY
Status: DISCONTINUED | OUTPATIENT
Start: 2017-09-27 | End: 2017-09-27

## 2017-09-27 RX ORDER — ACETAMINOPHEN 325 MG/1
650 TABLET ORAL EVERY 6 HOURS PRN
Status: DISCONTINUED | OUTPATIENT
Start: 2017-09-27 | End: 2017-09-27

## 2017-09-27 RX ORDER — AMITRIPTYLINE HYDROCHLORIDE 10 MG/1
10 TABLET, FILM COATED ORAL NIGHTLY
Status: DISCONTINUED | OUTPATIENT
Start: 2017-09-27 | End: 2017-09-27

## 2017-09-27 NOTE — OPERATIVE REPORT
NEUROSURGERY OPERATIVE NOTE    Surgery Date: 9/26/2017  Hospital: [facility]  Patient Name: Dolores Crowder  Patient MR#: [id number]  Surgeon: Dr. Yusuf Toledo  Co-Surgeon: Dr. Sheyla Michaud  Assistant: MISSY Tellez    Anesthesia:  GETA  Length: taken to pad all pressure points including shoulders, elbows, wrists, and hips. Pillows were placed behind the knees and heel of the feet. The head was placed in a neutral position on a foam ring.   General endotracheal anesthesia was performed by the an palpated and care was taken to continue the deep dissection medial to the carotid sheath and lateral to the trachea and esophagus.   Handheld Cloward-type retractors were used to hold the soft-tissue out of the surgical field and the pre-vertebral space was curettes and rongeours were used to resect this ligament and expose the underlying dura. Bilateral foramenotomies were performed until a small nerve hook could be freely passed out of each foramen.   The nerve hook was also used to gently feel cranial and endplates of the C5 and C6 were arthrodesed using the drill and curettes until the cartilagenous endplate was removed and bleeding bone was encountered. Next, trial sizers were used to size an intervertebral implant at C56.   The appropriate sized cage

## 2017-09-27 NOTE — PROGRESS NOTES
[facility]    Progress Note    Kathi Hummingbird Day Patient Status:  Inpatient    10/5/1955 MRN [id number]   Location [facility] 2W/SW Attending Jaleesa Davies MD   Hosp Day # 1 PCP Dinah Cunningham MD       SUBJECTIVE:  Pt c/o head Medications:  potassium chloride (K-SOL) 40 meq/30 ml (10%) oral solution 40 mEq 40 mEq Oral Q4H   Amitriptyline HCl (ELAVIL) tab 10 mg 10 mg Oral Nightly   AmLODIPine Besylate (NORVASC) tab 10 mg 10 mg Oral Daily   Fluticasone Propionate (FLONASE) 50 MCG/ HYDROmorphone HCl PF (DILAUDID) 1 MG/ML injection 0.4 mg 0.4 mg Intravenous Q30 Min PRN   HYDROmorphone 0.2mg/mL in NS 30 mL (DILAUDID) PCA syringe  Intravenous Continuous   ondansetron HCl (ZOFRAN) injection 4 mg 4 mg Intravenous Q6H PRN   Naloxone HCl

## 2017-09-27 NOTE — PHYSICAL THERAPY NOTE
PHYSICAL THERAPY EVALUATION - INPATIENT     Room Number: 202/202-A  Evaluation Date: 9/27/2017  Type of Evaluation: Initial  Physician Order: PT Eval and Treat    Presenting Problem: C4-C6 Ant cervical diskectomy, decompression  Reason for Therapy:  Mob diskectomy, decompression and fusion, instrumentation, allograft, autograft  : cervical spine stenosis, cervical disc disease, cervical radiculitis, cervical spine instability      Problem List  Active Problems:    Cervical radiculitis      Past Medical Hi fall risk    WEIGHT BEARING RESTRICTION  Weight Bearing Restriction: None     PAIN ASSESSMENT  Rating: 3  Location: headache       COGNITION  · Overall Cognitive Status:  WFL - within functional limits    RANGE OF MOTION AND STRENGTH ASSESSMENT  Upper extr self-stated goal is: home   Goal #1 Patient is able to demonstrate supine - sit EOB @ level: modified independent     Goal #1   Current Status    Goal #2 Patient is able to demonstrate transfers Sit to/from Stand at assistance level: modified independent w

## 2017-09-27 NOTE — PLAN OF CARE
Pt is alert & or x 3, pain issues, see mar and meds given, dr Espinosa Ace changed to percocet, tylenol prn, zofran, flexeril. Given, enc pt to chew food well. Aspen collar in place, up to bathrm with assist. Saline lock iv site.  Unable to tolerate dilaudid pc

## 2017-09-27 NOTE — OPERATIVE REPORT
[facility]    PATIENT'S NAME: Latrell Boyle TIKA   ATTENDING PHYSICIAN: Isak Henry.  Aly Leblanc MD   OPERATING PHYSICIAN: Orion Ramsay MD   PATIENT ACCOUNT#:   [id number]    LOCATION:  [address] [place] [address]. RECORD #:   [id number]       DATE OF BIRTH: discussed included bleeding, infection, scarring, injury to nerves and blood vessels, CSF leakage, pseudoarthrosis, failure of instrumentation, continued and/or worsening pain in the cervical spine and/or extremities, continued and/or worsening numbness/we then placed in the wound. The cuff was deflated and reinflated. The superficial temporal pulse was palpable. It should be noted that Dr. Carrillo Gauthier and I acted as co-surgeons throughout the entirety of decompression and stabilization with instrumentation.   Puneet Dias impacted in the C4-5 interspace. There were no SSEP or EMG changes noted during impaction of cage. The second cage was then gently impacted in the C5-6 interspace. There were no SSEP or EMG changes noted during impaction of cage.   The distraction from t subcutaneous and then a subcuticular layer. Benzoin, Steri-Strips, and 4x4s were then applied to the wounds. The drapes were then removed, and the patient was then placed in a hard cervical Aspen orthosis.   She was then transferred to the cart, extubated

## 2017-09-27 NOTE — PROGRESS NOTES
Pharmacy note: Duplicate Proton Pump Inhibitor with Histamine 2 blocker. Merlin Southward Day is a 57year old female who has been prescribed both Famotidine and Protonix.   Pepcid was discontinued per P&T approved protocol for duplicate therapy in adult patien

## 2017-09-27 NOTE — PROGRESS NOTES
Verline Flattery A Day    Wt Readings from Last 6 Encounters:  09/26/17 : 135 lb (61.2 kg)  09/22/17 : 137 lb (62.1 kg)  09/14/17 : 137 lb (62.1 kg)  09/06/17 : 140 lb (63.5 kg)  09/05/17 : 140 lb (63.5 kg)  08/08/17 : 138 lb (62.6 kg)    57year old  Surgical/Proc HCl 10 MG Oral Tab Take 1 tablet (10 mg total) by mouth nightly.  Disp: 30 tablet Rfl: 12   Pantoprazole Sodium 40 MG Oral Tab EC Take 1 tablet (40 mg total) by mouth every morning before breakfast. Disp: 90 tablet Rfl: 4   AMLODIPINE BESYLATE 10 MG Oral Ta No    Comment: Coffee 2 cups daily    Exercise No     Social History Narrative    The patient does not use an assistive device. .      The patient does live in a home with stairs.                PHYSICAL EXAM     09/27/17  0500 09/27/17  0700 09/27/17  0800 Spine (2 Views) (cpt=72040)    Result Date: 9/26/2017  CONCLUSION:  1. Status post anterior cervical fusion C4-C6         Xr C-arm Fluoro >1 Hour  (cpt=76001)    Result Date: 9/26/2017  CONCLUSION:  1.  Intraoperative fluoroscopic guidance during anterior c

## 2017-09-28 ENCOUNTER — PATIENT MESSAGE (OUTPATIENT)
Dept: INTERNAL MEDICINE CLINIC | Facility: CLINIC | Age: 62
End: 2017-09-28

## 2017-09-28 ENCOUNTER — TELEPHONE (OUTPATIENT)
Dept: INTERNAL MEDICINE CLINIC | Facility: CLINIC | Age: 62
End: 2017-09-28

## 2017-09-28 NOTE — TELEPHONE ENCOUNTER
Pt at home following surgery, saw Dr Kahlil Blank at hospital yesterday  Feels that she is having allergic reaction to Oxycodone that was prescribed  Can pt take Benadryl along with Oxycodone, with each dose? Should something else be prescribed?   Please call

## 2017-09-28 NOTE — PAYOR COMM NOTE
--------------  ADMISSION REVIEW     Payor: JOSEFA PPJUDI  Subscriber #:  [id number]  Authorization Number: [id number]    Admit date: 9/26/17  Admit time: 46       Admitting Physician: Raquel Quintanilla MD  Attending Physician:  No att. providers found  [facility] preparation for the pain medication.   The patient denies any chest pain or pressure, shortness of breath, or dizziness.     PAST MEDICAL HISTORY:  Gastroesophageal reflux disease and gastritis for which she takes Protonix and for which she is followed by D auscultation bilaterally. HEART:  Regular rate and rhythm. Normal S1, S2. No gallops or murmurs. ABDOMEN:  Soft, nontender, nondistended with normal bowel sounds. No hepatosplenomegaly.   EXTREMITIES:  Without edema.     LABORATORY DATA:  Lytes, BUN, c lb (62.1 kg)        EXAM:  GENERAL: well developed, well nourished, in no apparent distress  LUNGS: clear to auscultation  CARDIO: RRR, normal S1S2, without murmur or gallop  GI: soft, NT, ND, NABS, no HSM  EXTREMITIES: no cyanosis, clubbing or edema     D (AMBIEN) tab 5 mg 5 mg Oral Nightly PRN   Cyclobenzaprine HCl (cyclobenzaprine) tab 10 mg 10 mg Oral Q8H PRN   Senna (SENOKOT) tab TABS 17.2 mg 17.2 mg Oral Nightly   docusate sodium (COLACE) cap 100 mg 100 mg Oral BID   PEG 3350 (MIRALAX) powder packet 17 hydrocodone due to severe nausea/vomiting in the past.  Will change to oxycodone (percocet). Further recs per Dr. Joanne Thomas. 2.       Hypertension --BP elevated this am; Norvasc restarted.   3.       Gastroesophageal reflux disease.  Continue sucralfate thong

## 2017-09-28 NOTE — TELEPHONE ENCOUNTER
Pt could try the benadryl/percocet combo, but if not helping or if the benadryl is too sedating, recommend Fentanyl patch 12mcg. She would need a written Rx for that however.   If Dr. Barbara Francois is going to be managing her pain, it might be helpful for him to

## 2017-09-28 NOTE — TELEPHONE ENCOUNTER
Relayed message to patient. She verbalized understanding. States she did try the combo and it seems to be helping. States she has only had very minor itching. She will continue to monitor and will call with additional questions/needs.

## 2017-09-28 NOTE — OCCUPATIONAL THERAPY NOTE
OCCUPATIONAL THERAPY EVALUATION - INPATIENT      Room Number: 202/202-A  Evaluation Date: 9/28/2017  Type of Evaluation: Initial  Presenting Problem:  (C4-6 ACDF)    Physician Order: IP Consult to Occupational Therapy  Reason for Therapy: ADL/IADL Dysfunct disorder    • Rotator cuff tear, right    • Traumatic arthritis    • Vertigo        Past Surgical History  Past Surgical History:  2012: ARTHROSCOPY OF JOINT UNLISTED Right      Comment: rotator cuff tear  No date:       Comment: x 3  No date: clothing?: A Lot  -   Taking care of personal grooming such as brushing teeth?: None  -   Eating meals?: None    AM-PAC Score:  Score: 22  Approx Degree of Impairment: 25.8%  Standardized Score (AM-PAC Scale): 47.1  CMS Modifier (G-Code): CJ    FUNCTIONAL

## 2017-09-28 NOTE — TELEPHONE ENCOUNTER
Spoke to pt - reports all over itching after second dose of Percocet at home;  Currently she feels decreased itching b/c she has not taken Percocet since yesterday;   She is requesting Benadryl instructions (given 50 mg Q6HPRN) and is questioning if she can

## 2017-10-03 ENCOUNTER — OFFICE VISIT (OUTPATIENT)
Dept: INTERNAL MEDICINE CLINIC | Facility: CLINIC | Age: 62
End: 2017-10-03

## 2017-10-03 VITALS
DIASTOLIC BLOOD PRESSURE: 80 MMHG | WEIGHT: 134 LBS | TEMPERATURE: 99 F | BODY MASS INDEX: 20 KG/M2 | HEART RATE: 68 BPM | SYSTOLIC BLOOD PRESSURE: 120 MMHG

## 2017-10-03 DIAGNOSIS — Z98.890 S/P CERVICAL DISCECTOMY: Primary | ICD-10-CM

## 2017-10-03 PROCEDURE — 99214 OFFICE O/P EST MOD 30 MIN: CPT | Performed by: INTERNAL MEDICINE

## 2017-10-03 NOTE — PROGRESS NOTES
HPI:    Lauryn Stoner is a 57year old female here today for hospital follow up.    She was discharged from Inpatient hospital, [facility]  to [address] [place] [address] Date: 9/26/17  Discharge Date: 9/27/17  Hospital Discharge Diagnosis:    S/p cervical diskec on File Prior to Visit:  Cyclobenzaprine HCl 10 MG Oral Tab Take 1 tablet (10 mg total) by mouth every 8 (eight) hours as needed for Muscle spasms. sucralfate 1 g Oral Tab Take 1 tablet (1 g total) by mouth 4 (four) times daily before meals and nightly. tonsillectomy; cataract; LASIK; upper gi endoscopy,exam; spine surgery procedure unlisted (2008); colonoscopy (2009); arthroscopy of joint unlisted (Right, 06/2012); Fracture surgery (Left, 2010); and Fracture surgery (Left, 2013).     She family history in discectomy  Doing great post-op. Pain controlled off narcotics. F/u with Dr. Jamie Mcdonnell next week. Cont with neck brace    No orders of the defined types were placed in this encounter.       Meds & Refills for this Visit:  No prescriptions requested or orde

## 2017-10-10 ENCOUNTER — OFFICE VISIT (OUTPATIENT)
Dept: ORTHOPEDICS CLINIC | Facility: CLINIC | Age: 62
End: 2017-10-10

## 2017-10-10 DIAGNOSIS — S63.659D SAGITTAL BAND RUPTURE AT METACARPOPHALANGEAL JOINT, SUBSEQUENT ENCOUNTER: Primary | ICD-10-CM

## 2017-10-10 PROCEDURE — 99212 OFFICE O/P EST SF 10 MIN: CPT | Performed by: ORTHOPAEDIC SURGERY

## 2017-10-10 PROCEDURE — 99213 OFFICE O/P EST LOW 20 MIN: CPT | Performed by: ORTHOPAEDIC SURGERY

## 2017-10-10 NOTE — PROGRESS NOTES
10/10/2017  Sherron Muñoz Day  85/1955  60year old   female  Jareth Nam MD    HPI:   Patient presents with: Follow - Up: Right hand injury      The patient complains of pain located right hand middle finger. The pain is decreased.   The patient denie

## 2017-10-11 ENCOUNTER — TELEPHONE (OUTPATIENT)
Dept: NEUROLOGY | Facility: CLINIC | Age: 62
End: 2017-10-11

## 2017-10-11 NOTE — TELEPHONE ENCOUNTER
Pt cancelled office appointment stated that she will see Dr Aramis Simpson first and get clearance for lumbar injection and once she get clearance will notify Dr Moody Ko so an order can be written for lumbar injection. Appointment cancelled.

## 2017-10-11 NOTE — TELEPHONE ENCOUNTER
Call to discuss whether she has to be seen tomorrow, or, if the MRI she had taken and discussion about low back pain would be enough to be able to schedule an injection instead of coming in.    She recently had cervical surgery and due to wearing a brace on

## 2017-10-12 RX ORDER — AMLODIPINE BESYLATE 10 MG/1
TABLET ORAL
Qty: 90 TABLET | Refills: 3 | Status: SHIPPED | OUTPATIENT
Start: 2017-10-12 | End: 2018-10-27

## 2017-10-13 ENCOUNTER — PATIENT MESSAGE (OUTPATIENT)
Dept: GASTROENTEROLOGY | Facility: CLINIC | Age: 62
End: 2017-10-13

## 2017-10-13 NOTE — TELEPHONE ENCOUNTER
From: Tio Constance Day  To: Adam Fuentes MD  Sent: 10/13/2017 9:24 AM CDT  Subject: Non-Urgent Medical Question    Hi dr Casandra Montgomery. Quick question. I’m having a BIG problem regarding not sleeping. I’m exhausted.  [facility] asked Dr Chayo Chopra for a “low”

## 2017-10-16 NOTE — TELEPHONE ENCOUNTER
Melatonin works for a lot of people and I agree that you should try first. If this doesn't work, then you might  ask her if diphenhydramine 25mg ( otc \"benadryl\") would be a good choice

## 2017-10-17 NOTE — TELEPHONE ENCOUNTER
Dr Aggie De La Cruz,    Please advise is melatonin will have any effect on the pt's pancreas. This is her main concern.

## 2017-10-24 ENCOUNTER — OFFICE VISIT (OUTPATIENT)
Dept: ORTHOPEDICS CLINIC | Facility: CLINIC | Age: 62
End: 2017-10-24

## 2017-10-24 DIAGNOSIS — Z47.89 AFTERCARE FOLLOWING SURGERY OF THE MUSCULOSKELETAL SYSTEM: Primary | ICD-10-CM

## 2017-10-24 PROCEDURE — 99213 OFFICE O/P EST LOW 20 MIN: CPT | Performed by: ORTHOPAEDIC SURGERY

## 2017-10-24 PROCEDURE — 99212 OFFICE O/P EST SF 10 MIN: CPT | Performed by: ORTHOPAEDIC SURGERY

## 2017-10-24 NOTE — PAYOR COMM NOTE
--------------  CONTINUED STAY REVIEW    Payor: JOSEFA PPO  Subscriber #:  [id number]  Authorization Number: [id number]    Admit date: 9/26/17  Admit time: 46    Admitting Physician: Julianna Sultana MD  Attending Physician:  No att. providers found  Prim Cage  2. C5-C6 Intervertebral Cage  3.  C4-C5-C6 Anterior Cervical Plate     Incidents:          None     Disposition:      Recovery Room     Co-Morbidities:         DESCRIPTION OF OPERATION     Preoperative preparation:            The patient was brought i electrocautery. Toothed forceps were used to elevate first the cranial and then caudal edges of the platysma which were undermined again with Metzenbaums. The retractor was repositioned to hold back the edges of the platysma.    The underlying medial edge microscope was then brought in for illumination and magnification. A Midas Dalton drill with a matchstick jolie was used to removed the remainder of the deeper disc material and expand the discectomy space laterally to the uncinate joints.   Periodically the S posterior longitudinal ligament. A sharp micronerve hook was used to ramirez the posterior longitudinal ligament and then various curettes and rongeours were used to resect this ligament and expose the underlying dura.   Bilateral foramenotomies were perfor dressing was applied.

## 2017-10-24 NOTE — PROGRESS NOTES
10/24/2017  Africa Gails Day  85/1955  60year old   female  Randy Kyes MD    HPI:   Patient presents with: Follow - Up: Right Hand       The patient complains of pain located right middle finger MCP. The pain is mild.   The patient denies numbness a

## 2017-10-25 ENCOUNTER — TELEPHONE (OUTPATIENT)
Dept: NEUROLOGY | Facility: CLINIC | Age: 62
End: 2017-10-25

## 2017-10-25 NOTE — TELEPHONE ENCOUNTER
Pt had surgery and was cleared for injection, would like to know if she needs appt with [facility] or can she schedule an injection.  Please advise

## 2017-10-25 NOTE — TELEPHONE ENCOUNTER
Spoke to patient and admised to make NOV with Dr Moris Butt. Per Dr Moris Butt note 9/18/17 to discuss MRI results at [facility].   9/22/17 seen by pain center for low back pain. Patient prefers to follow with Dr Moris Butt and not pain center.    9/26/17 cervical spine surgery

## 2017-11-08 ENCOUNTER — OFFICE VISIT (OUTPATIENT)
Dept: NEUROLOGY | Facility: CLINIC | Age: 62
End: 2017-11-08

## 2017-11-08 ENCOUNTER — TELEPHONE (OUTPATIENT)
Dept: NEUROLOGY | Facility: CLINIC | Age: 62
End: 2017-11-08

## 2017-11-08 VITALS
RESPIRATION RATE: 16 BRPM | WEIGHT: 140 LBS | HEART RATE: 78 BPM | HEIGHT: 68 IN | BODY MASS INDEX: 21.22 KG/M2 | OXYGEN SATURATION: 98 %

## 2017-11-08 DIAGNOSIS — M48.061 LUMBAR FORAMINAL STENOSIS: ICD-10-CM

## 2017-11-08 DIAGNOSIS — M54.16 LUMBAR RADICULOPATHY: Primary | ICD-10-CM

## 2017-11-08 DIAGNOSIS — M41.25 OTHER IDIOPATHIC SCOLIOSIS, THORACOLUMBAR REGION: ICD-10-CM

## 2017-11-08 DIAGNOSIS — M43.10 RETROLISTHESIS: ICD-10-CM

## 2017-11-08 DIAGNOSIS — M54.41 ACUTE BILATERAL LOW BACK PAIN WITH BILATERAL SCIATICA: ICD-10-CM

## 2017-11-08 DIAGNOSIS — M54.42 ACUTE BILATERAL LOW BACK PAIN WITH BILATERAL SCIATICA: ICD-10-CM

## 2017-11-08 DIAGNOSIS — M43.16 SPONDYLOLISTHESIS OF LUMBAR REGION: ICD-10-CM

## 2017-11-08 DIAGNOSIS — Z98.1 HISTORY OF LUMBAR FUSION: ICD-10-CM

## 2017-11-08 DIAGNOSIS — M51.9 LUMBAR DISC DISEASE: ICD-10-CM

## 2017-11-08 DIAGNOSIS — Z98.890 HISTORY OF LUMBAR LAMINECTOMY: ICD-10-CM

## 2017-11-08 PROCEDURE — 99214 OFFICE O/P EST MOD 30 MIN: CPT | Performed by: PHYSICAL MEDICINE & REHABILITATION

## 2017-11-08 NOTE — TELEPHONE ENCOUNTER
Called [facility] [phone] for Authorization of Approval for Bilateral L5-S1 with CPT code 49185-04 / 79355 t/t Cat Quintana, stated No Auth needed with Ref #[id number]  Please call patient to inform of the Approval and to schedule at [facility]

## 2017-11-08 NOTE — PATIENT INSTRUCTIONS
Refill policies:    • Allow 2 business days for refills; controlled substances may take longer.   • Contact your pharmacy at least 5 days prior to running out of medication and have them send an electronic request or submit request through the “request re your physician has recommended that you have a procedure or additional testing performed. [facility]) will contact your insurance carrier to obtain pre-certification or prior authorization.     Unfortunately, LUCAS has seen an increas

## 2017-11-08 NOTE — PROGRESS NOTES
Low Back Pain H & P    Chief Complaint: Patient presents with:  Low Back Pain: LOV 09/05/17. Pain across back. Pt pain intermtt. describes as soreness. Pt is currently doing physical therapy for back and cervical issues.  Pt using tylenol, ice and volteren Left      Comment: WRIST FRACTURE ORIF  2013: FRACTURE SURGERY Left      Comment: ORIF wrist fracture revision with plates and                screws- Ortho Dr Rommel Parra  No date: LASIK  2008: SPINE SURGERY PROCEDURE UNLISTED      Comment: L1-L2 FUSION  No d Lumbar Spine Palpation:    Spinous Processes: Non-tender for all Spinous Processes   Z-joints: Non-tender for all Z-joints   SIJ: Non-tender for bilateral SIJ   Piriformis Muscle: Non-tender bilateral Piriformis muscles   Greater Trochanteric Bursa: No

## 2017-11-09 NOTE — TELEPHONE ENCOUNTER
Patient has been scheduled for a bilateral L5 TFESIs under MAC  on 12/5/17 at [facility]. Medications and allergies reviewed. Patient informed to hold aspirins, nsaids, blood thinners, vitamins and fish oils 3-7 days prior to procedure.  Patient informed cary conroy

## 2017-11-30 NOTE — TELEPHONE ENCOUNTER
Spoke to patient who wanted to review injection instructions again. Instructions reviewed with patient. Patient without further questions.

## 2017-11-30 NOTE — TELEPHONE ENCOUNTER
Patient is calling regarding the medication that she is not to take before her injection procedure next week, please c/b to advise

## 2017-12-05 ENCOUNTER — OFFICE VISIT (OUTPATIENT)
Dept: SURGERY | Facility: CLINIC | Age: 62
End: 2017-12-05

## 2017-12-05 DIAGNOSIS — M51.9 LUMBAR DISC DISEASE: ICD-10-CM

## 2017-12-05 DIAGNOSIS — M54.16 LUMBAR RADICULOPATHY: Primary | ICD-10-CM

## 2017-12-05 PROCEDURE — 64483 NJX AA&/STRD TFRM EPI L/S 1: CPT | Performed by: PHYSICAL MEDICINE & REHABILITATION

## 2017-12-05 NOTE — PROCEDURES
[facility]    BILATERAL LUMBAR TRANSFORAMINAL   NAME:  Kylie Stoner    MR #:    [id number] :  10/5/1955     PHYSICIAN:  Jasbir Cleaning        Operative Report    DATE OF PROCEDURE: 2017   PREOPERATIVE DIAGNOSES: 1. left > r with a 3 cc solution of 1.5 cc of 40 mg/cc of Kenalog and 1.5 cc of 1% PF lidocaine without epinephrine. After this, the needle was removed. Then  fluoroscopy was right anterior obliqued opening up the left L5-S1 intervertebral foramen.   At this point in

## 2018-01-10 ENCOUNTER — OFFICE VISIT (OUTPATIENT)
Dept: INTERNAL MEDICINE CLINIC | Facility: CLINIC | Age: 63
End: 2018-01-10

## 2018-01-10 VITALS
SYSTOLIC BLOOD PRESSURE: 128 MMHG | DIASTOLIC BLOOD PRESSURE: 86 MMHG | HEIGHT: 68 IN | OXYGEN SATURATION: 98 % | TEMPERATURE: 99 F | WEIGHT: 133 LBS | HEART RATE: 111 BPM | BODY MASS INDEX: 20.16 KG/M2

## 2018-01-10 DIAGNOSIS — J06.9 UPPER RESPIRATORY TRACT INFECTION, UNSPECIFIED TYPE: Primary | ICD-10-CM

## 2018-01-10 PROCEDURE — 99213 OFFICE O/P EST LOW 20 MIN: CPT | Performed by: INTERNAL MEDICINE

## 2018-01-10 PROCEDURE — 99212 OFFICE O/P EST SF 10 MIN: CPT | Performed by: INTERNAL MEDICINE

## 2018-01-10 NOTE — PROGRESS NOTES
Dariana Stoner is a 60year old female. Patient presents with:  Headache: Complaints of Chest congestion, stuffy sinuses, headache, dizziness with head movement, dry cough. Patient saw ENT MD and gave her Z-pack - not benefiting.      HPI:     Pt watched her Carbonate-Vitamin D (CALCIUM 500/D) 500-200 MG-UNIT Oral Tab Take 1 tablet by mouth 2 (two) times daily. Disp:  Rfl:    Fluticasone Propionate (FLONASE) 50 MCG/ACT Nasal Suspension by Nasal route daily as needed.  spray 2 spray by intranasal route  every auscultation  CARDIO: RRR, normal S1S2, without murmur or gallop      ASSESSMENT AND PLAN:     1. Upper respiratory tract infection, unspecified type  No response to Zpak. Has Doxy Rx at pharmacy from ENT -- pt will fill.   May still be viral.  Cont flonas

## 2018-01-26 ENCOUNTER — PATIENT MESSAGE (OUTPATIENT)
Dept: GASTROENTEROLOGY | Facility: CLINIC | Age: 63
End: 2018-01-26

## 2018-01-26 DIAGNOSIS — R19.7 ACUTE DIARRHEA: Primary | ICD-10-CM

## 2018-01-30 NOTE — TELEPHONE ENCOUNTER
From: Armin Gayla Day  To: Gracia Winn MD  Sent: 1/26/2018 8:56 AM CST  Subject: Non-Urgent Medical Question    Good morning Dr Rosalind Norton  Not sure if I should be addressing You or Dr Brownlee Shall. Went on vacation Jan. 12-24, I was fine.  Tuesday, t

## 2018-01-30 NOTE — TELEPHONE ENCOUNTER
Please call patient, she should be on a BRAT diet,. If she re-develops severe diarrhea then I have ordered a stool for GI panel to rule out infectious cause. She should go to ER if severe dehydration, severe abdominal pain other alarm symptoms develop.

## 2018-01-31 NOTE — TELEPHONE ENCOUNTER
Spoke to pt. Relayed Dr. Luisa Watkins message as shown below. Pt states is feeling much better and no longer has any diarrhea. Pt verbalized understanding of whole message and had no further questions or requests at this time.

## 2018-02-07 ENCOUNTER — TELEPHONE (OUTPATIENT)
Dept: GASTROENTEROLOGY | Facility: CLINIC | Age: 63
End: 2018-02-07

## 2018-02-07 ENCOUNTER — PATIENT MESSAGE (OUTPATIENT)
Dept: GASTROENTEROLOGY | Facility: CLINIC | Age: 63
End: 2018-02-07

## 2018-02-07 DIAGNOSIS — R10.13 EPIGASTRIC PAIN: Primary | ICD-10-CM

## 2018-02-07 NOTE — TELEPHONE ENCOUNTER
I have ordered amylase and lipase. Please remind patient that she needs to avoid nSAIDs, alcohol and caffeine.  Her most likely diagnosis is Non- ulcer dyspepsia

## 2018-02-07 NOTE — TELEPHONE ENCOUNTER
Conversation: Non-Urgent Medical Question           2/7/18 7:39 AM   Dr Nader Dalal   I haven’t asked for this in awhile, but would you please ok an Amylase, Lypase blood test for my pancreas please. I keep having on and off again stomach pains.  It could just b

## 2018-02-07 NOTE — TELEPHONE ENCOUNTER
Dr Marissa Khan,     See below \"my chart\" message    Please advise if you can order these labs for the pt. I do not see an OV for this pt. I spoke to Emelina Hicks over the phone. I made an appt for her.

## 2018-02-07 NOTE — TELEPHONE ENCOUNTER
Pt notified the lab tests were ordered and she was given Dr Mario Nunes recommendations.  She verbalizes understanding

## 2018-02-08 NOTE — TELEPHONE ENCOUNTER
From: Kaylen White Day  To: Jessica Garland MD  Sent: 2/7/2018 7:39 AM CST  Subject: Non-Urgent Medical Question    Dr Prince Kumari asked for this in awhile, but would you please ok an Amylase, Lypase blood test for my pancreas please.    I keep

## 2018-02-11 ENCOUNTER — APPOINTMENT (OUTPATIENT)
Dept: LAB | Facility: HOSPITAL | Age: 63
End: 2018-02-11
Attending: INTERNAL MEDICINE
Payer: COMMERCIAL

## 2018-02-11 DIAGNOSIS — R10.13 EPIGASTRIC PAIN: ICD-10-CM

## 2018-02-11 LAB
AMYLASE SERPL-CCNC: 58 U/L (ref 24–108)
LIPASE SERPL-CCNC: 24 U/L (ref 22–51)

## 2018-02-11 PROCEDURE — 36415 COLL VENOUS BLD VENIPUNCTURE: CPT

## 2018-02-11 PROCEDURE — 82150 ASSAY OF AMYLASE: CPT

## 2018-02-11 PROCEDURE — 83690 ASSAY OF LIPASE: CPT

## 2018-02-15 ENCOUNTER — TELEPHONE (OUTPATIENT)
Dept: GASTROENTEROLOGY | Facility: CLINIC | Age: 63
End: 2018-02-15

## 2018-02-26 ENCOUNTER — APPOINTMENT (OUTPATIENT)
Dept: CT IMAGING | Facility: HOSPITAL | Age: 63
End: 2018-02-26
Attending: EMERGENCY MEDICINE
Payer: COMMERCIAL

## 2018-02-26 ENCOUNTER — TELEPHONE (OUTPATIENT)
Dept: INTERNAL MEDICINE CLINIC | Facility: CLINIC | Age: 63
End: 2018-02-26

## 2018-02-26 ENCOUNTER — HOSPITAL ENCOUNTER (OUTPATIENT)
Facility: HOSPITAL | Age: 63
Setting detail: OBSERVATION
Discharge: HOME | End: 2018-02-28
Attending: EMERGENCY MEDICINE | Admitting: INTERNAL MEDICINE
Payer: COMMERCIAL

## 2018-02-26 DIAGNOSIS — N20.1 URETEROLITHIASIS: Primary | ICD-10-CM

## 2018-02-26 DIAGNOSIS — M54.9 INTRACTABLE BACK PAIN: ICD-10-CM

## 2018-02-26 LAB
ANION GAP SERPL CALC-SCNC: 12 MMOL/L (ref 0–18)
BACTERIA UR QL AUTO: NEGATIVE /HPF
BASOPHILS # BLD: 0.1 K/UL (ref 0–0.2)
BASOPHILS NFR BLD: 1 %
BILIRUB UR QL: NEGATIVE
BUN SERPL-MCNC: 15 MG/DL (ref 8–20)
BUN/CREAT SERPL: 18.1 (ref 10–20)
CALCIUM SERPL-MCNC: 9.5 MG/DL (ref 8.5–10.5)
CHLORIDE SERPL-SCNC: 105 MMOL/L (ref 95–110)
CLARITY UR: CLEAR
CO2 SERPL-SCNC: 24 MMOL/L (ref 22–32)
COLOR UR: YELLOW
CREAT SERPL-MCNC: 0.83 MG/DL (ref 0.5–1.5)
EOSINOPHIL # BLD: 0.1 K/UL (ref 0–0.7)
EOSINOPHIL NFR BLD: 2 %
ERYTHROCYTE [DISTWIDTH] IN BLOOD BY AUTOMATED COUNT: 13.5 % (ref 11–15)
GLUCOSE SERPL-MCNC: 95 MG/DL (ref 70–99)
GLUCOSE UR-MCNC: NEGATIVE MG/DL
HCT VFR BLD AUTO: 42.8 % (ref 35–48)
HGB BLD-MCNC: 14.3 G/DL (ref 12–16)
HGB UR QL STRIP.AUTO: NEGATIVE
KETONES UR-MCNC: NEGATIVE MG/DL
LEUKOCYTE ESTERASE UR QL STRIP.AUTO: NEGATIVE
LYMPHOCYTES # BLD: 2.2 K/UL (ref 1–4)
LYMPHOCYTES NFR BLD: 34 %
MCH RBC QN AUTO: 31.8 PG (ref 27–32)
MCHC RBC AUTO-ENTMCNC: 33.5 G/DL (ref 32–37)
MCV RBC AUTO: 95.1 FL (ref 80–100)
MONOCYTES # BLD: 0.6 K/UL (ref 0–1)
MONOCYTES NFR BLD: 9 %
NEUTROPHILS # BLD AUTO: 3.6 K/UL (ref 1.8–7.7)
NEUTROPHILS NFR BLD: 55 %
NITRITE UR QL STRIP.AUTO: NEGATIVE
OSMOLALITY UR CALC.SUM OF ELEC: 293 MOSM/KG (ref 275–295)
PH UR: 8 [PH] (ref 5–8)
PLATELET # BLD AUTO: 245 K/UL (ref 140–400)
PMV BLD AUTO: 8.1 FL (ref 7.4–10.3)
POTASSIUM SERPL-SCNC: 3.4 MMOL/L (ref 3.3–5.1)
PROT UR-MCNC: NEGATIVE MG/DL
RBC # BLD AUTO: 4.5 M/UL (ref 3.7–5.4)
RBC #/AREA URNS AUTO: 1 /HPF
SODIUM SERPL-SCNC: 141 MMOL/L (ref 136–144)
SP GR UR STRIP: 1.01 (ref 1–1.03)
UROBILINOGEN UR STRIP-ACNC: <2
VIT C UR-MCNC: NEGATIVE MG/DL
WBC # BLD AUTO: 6.5 K/UL (ref 4–11)
WBC #/AREA URNS AUTO: 1 /HPF

## 2018-02-26 PROCEDURE — 74176 CT ABD & PELVIS W/O CONTRAST: CPT | Performed by: EMERGENCY MEDICINE

## 2018-02-26 RX ORDER — PANTOPRAZOLE SODIUM 40 MG/1
40 TABLET, DELAYED RELEASE ORAL
Status: DISCONTINUED | OUTPATIENT
Start: 2018-02-27 | End: 2018-02-26

## 2018-02-26 RX ORDER — FLUTICASONE PROPIONATE 50 MCG
2 SPRAY, SUSPENSION (ML) NASAL DAILY PRN
Status: DISCONTINUED | OUTPATIENT
Start: 2018-02-26 | End: 2018-02-28

## 2018-02-26 RX ORDER — KETOROLAC TROMETHAMINE 30 MG/ML
INJECTION, SOLUTION INTRAMUSCULAR; INTRAVENOUS
Status: COMPLETED
Start: 2018-02-26 | End: 2018-02-26

## 2018-02-26 RX ORDER — KETOROLAC TROMETHAMINE 15 MG/ML
15 INJECTION, SOLUTION INTRAMUSCULAR; INTRAVENOUS EVERY 6 HOURS PRN
Status: DISCONTINUED | OUTPATIENT
Start: 2018-02-26 | End: 2018-02-28

## 2018-02-26 RX ORDER — MAGNESIUM HYDROXIDE/ALUMINUM HYDROXICE/SIMETHICONE 120; 1200; 1200 MG/30ML; MG/30ML; MG/30ML
30 SUSPENSION ORAL ONCE
Status: COMPLETED | OUTPATIENT
Start: 2018-02-26 | End: 2018-02-26

## 2018-02-26 RX ORDER — CYCLOBENZAPRINE HCL 10 MG
10 TABLET ORAL EVERY 8 HOURS PRN
Status: DISCONTINUED | OUTPATIENT
Start: 2018-02-26 | End: 2018-02-28

## 2018-02-26 RX ORDER — MORPHINE SULFATE 4 MG/ML
4 INJECTION, SOLUTION INTRAMUSCULAR; INTRAVENOUS ONCE
Status: COMPLETED | OUTPATIENT
Start: 2018-02-26 | End: 2018-02-26

## 2018-02-26 RX ORDER — KETOROLAC TROMETHAMINE 30 MG/ML
30 INJECTION, SOLUTION INTRAMUSCULAR; INTRAVENOUS ONCE
Status: COMPLETED | OUTPATIENT
Start: 2018-02-26 | End: 2018-02-26

## 2018-02-26 RX ORDER — DOXEPIN HYDROCHLORIDE 50 MG/1
1 CAPSULE ORAL DAILY
Status: DISCONTINUED | OUTPATIENT
Start: 2018-02-26 | End: 2018-02-28

## 2018-02-26 RX ORDER — SODIUM CHLORIDE 9 MG/ML
INJECTION, SOLUTION INTRAVENOUS CONTINUOUS
Status: DISCONTINUED | OUTPATIENT
Start: 2018-02-26 | End: 2018-02-28

## 2018-02-26 RX ORDER — MORPHINE SULFATE 2 MG/ML
2 INJECTION, SOLUTION INTRAMUSCULAR; INTRAVENOUS
Status: DISCONTINUED | OUTPATIENT
Start: 2018-02-26 | End: 2018-02-28

## 2018-02-26 RX ORDER — ONDANSETRON 2 MG/ML
4 INJECTION INTRAMUSCULAR; INTRAVENOUS EVERY 6 HOURS PRN
Status: DISCONTINUED | OUTPATIENT
Start: 2018-02-26 | End: 2018-02-28

## 2018-02-26 RX ORDER — AMITRIPTYLINE HYDROCHLORIDE 10 MG/1
10 TABLET, FILM COATED ORAL NIGHTLY
Status: DISCONTINUED | OUTPATIENT
Start: 2018-02-26 | End: 2018-02-28

## 2018-02-26 RX ORDER — SUCRALFATE 1 G/1
1 TABLET ORAL
Status: DISCONTINUED | OUTPATIENT
Start: 2018-02-26 | End: 2018-02-28

## 2018-02-26 RX ORDER — AMLODIPINE BESYLATE 10 MG/1
10 TABLET ORAL DAILY
Status: DISCONTINUED | OUTPATIENT
Start: 2018-02-27 | End: 2018-02-28

## 2018-02-26 NOTE — TELEPHONE ENCOUNTER
Pt thinks she might be getting UTI. She is having frequency and burning when she urinates. She noticed it today. She has a dentist appt at 2pm today.  She will not be available between 2 pm and 3 pm.   Tasked high to Nursing

## 2018-02-26 NOTE — TELEPHONE ENCOUNTER
Pt believes she has UTI, would like to have order to go to [facility] this afternoon after being seen at dentist  Please call pt when order in place, hoping to go around 3:00  Pt can be reached  at [phone]  Tasked to nursing

## 2018-02-26 NOTE — TELEPHONE ENCOUNTER
To nursing, advise she be seen. If no appointment time here that she can make, then ask her to please go to immediate care. Thanks.

## 2018-02-26 NOTE — ED INITIAL ASSESSMENT (HPI)
Reports left flank pain and difficulty urinating xthis afternoon, denies n/v. Reports hx of kidney stones.

## 2018-02-26 NOTE — TELEPHONE ENCOUNTER
Called patient and relayed DR. SARMIENTO message - transferred to [facility] to schedule patient with  at 4pm today 2/26

## 2018-02-26 NOTE — ED PROVIDER NOTES
Patient Seen in: [facility] AND [facility] Emergency Department    History   Patient presents with:  Abdomen/Flank Pain (GI/)    Stated Complaint:     HPI    65-year-old female with history of anxiety, nephrolithiasis, GERD, hypertension, pancreatitis Flank pa Erika Rollins  2008: SPINE SURGERY PROCEDURE UNLISTED      Comment: L1-L2 FUSION  No date: TONSILLECTOMY      Comment: with Adenoidectomy  No date: UPPER GI ENDOSCOPY,EXAM      Comment: EGD 2007        Smoking status: Never Smoker murmur heard. 2+ radial and DP pulses b/l, no leg swelling   Pulmonary/Chest: Effort normal and breath sounds normal. No stridor. No respiratory distress. She has no wheezes. She has no rales. Abdominal: Soft. She exhibits no distension.  There is no ten 0 - 18 mmol/L   Calculated Osmolality 293 275 - 295 mOsm/kg   GFR, Non-African American >60 >=60   GFR, -American >60 >=60   -CBC W/ DIFFERENTIAL   Collection Time: 02/26/18  3:46 PM   Result Value Ref Range   WBC 6.5 4.0 - 11.0 K/UL   RBC 4.50 3.70 interbody fusion with decompressive laminectomy at L1-2. 6. Scoliosis and degenerative changes in the spine. EMERGENCY DEPARTMENT COURSE AND TREATMENT:  Patient's condition was stable during Emergency Department evaluation.      62yoF with L fla mild-mod, C6-7 mild central stenosis; Myofascial pain of left scapula; Bilateral thoracic back pain at T7-8; Other idiopathic scoliosis, thoracolumbar region; SAPHO syndrome ([facility] [address]  Ulnar neuropathy at elbow of right upper extremity; left > right L5 radicul

## 2018-02-26 NOTE — TELEPHONE ENCOUNTER
To DR LAU for DR RANGEL  Pt started this am with burning / frequency  No temp no hematuria   Would like to do a urine test this afternoon at [facility] if poss- has dentist appt and would like to give specimen after that

## 2018-02-26 NOTE — TELEPHONE ENCOUNTER
Pt notified that order for ua cs not entered as yet     Pt states she is having severe flank pain going to ER

## 2018-02-27 ENCOUNTER — APPOINTMENT (OUTPATIENT)
Dept: GENERAL RADIOLOGY | Facility: HOSPITAL | Age: 63
End: 2018-02-27
Attending: UROLOGY
Payer: COMMERCIAL

## 2018-02-27 ENCOUNTER — TELEPHONE (OUTPATIENT)
Dept: GASTROENTEROLOGY | Facility: CLINIC | Age: 63
End: 2018-02-27

## 2018-02-27 LAB
ANION GAP SERPL CALC-SCNC: 7 MMOL/L (ref 0–18)
BASOPHILS # BLD: 0 K/UL (ref 0–0.2)
BASOPHILS NFR BLD: 1 %
BUN SERPL-MCNC: 8 MG/DL (ref 8–20)
BUN/CREAT SERPL: 12.1 (ref 10–20)
CALCIUM SERPL-MCNC: 8.4 MG/DL (ref 8.5–10.5)
CHLORIDE SERPL-SCNC: 108 MMOL/L (ref 95–110)
CO2 SERPL-SCNC: 24 MMOL/L (ref 22–32)
CREAT SERPL-MCNC: 0.66 MG/DL (ref 0.5–1.5)
EOSINOPHIL # BLD: 0.1 K/UL (ref 0–0.7)
EOSINOPHIL NFR BLD: 2 %
ERYTHROCYTE [DISTWIDTH] IN BLOOD BY AUTOMATED COUNT: 13.9 % (ref 11–15)
GLUCOSE SERPL-MCNC: 80 MG/DL (ref 70–99)
HCT VFR BLD AUTO: 39.8 % (ref 35–48)
HGB BLD-MCNC: 13.5 G/DL (ref 12–16)
LYMPHOCYTES # BLD: 1.3 K/UL (ref 1–4)
LYMPHOCYTES NFR BLD: 25 %
MCH RBC QN AUTO: 32.2 PG (ref 27–32)
MCHC RBC AUTO-ENTMCNC: 33.8 G/DL (ref 32–37)
MCV RBC AUTO: 95.2 FL (ref 80–100)
MONOCYTES # BLD: 0.6 K/UL (ref 0–1)
MONOCYTES NFR BLD: 11 %
NEUTROPHILS # BLD AUTO: 3.2 K/UL (ref 1.8–7.7)
NEUTROPHILS NFR BLD: 61 %
OSMOLALITY UR CALC.SUM OF ELEC: 285 MOSM/KG (ref 275–295)
PLATELET # BLD AUTO: 212 K/UL (ref 140–400)
PMV BLD AUTO: 8.4 FL (ref 7.4–10.3)
POTASSIUM SERPL-SCNC: 4.4 MMOL/L (ref 3.3–5.1)
RBC # BLD AUTO: 4.18 M/UL (ref 3.7–5.4)
SODIUM SERPL-SCNC: 139 MMOL/L (ref 136–144)
WBC # BLD AUTO: 5.2 K/UL (ref 4–11)

## 2018-02-27 PROCEDURE — 99203 OFFICE O/P NEW LOW 30 MIN: CPT | Performed by: UROLOGY

## 2018-02-27 PROCEDURE — 99219 INITIAL OBSERVATION CARE,LEVL II: CPT | Performed by: INTERNAL MEDICINE

## 2018-02-27 PROCEDURE — 74018 RADEX ABDOMEN 1 VIEW: CPT | Performed by: UROLOGY

## 2018-02-27 RX ORDER — DOCUSATE SODIUM 100 MG/1
100 CAPSULE, LIQUID FILLED ORAL 2 TIMES DAILY
Status: DISCONTINUED | OUTPATIENT
Start: 2018-02-27 | End: 2018-02-28

## 2018-02-27 RX ORDER — ALFUZOSIN HYDROCHLORIDE 10 MG/1
10 TABLET, EXTENDED RELEASE ORAL
Status: DISCONTINUED | OUTPATIENT
Start: 2018-02-27 | End: 2018-02-28

## 2018-02-27 RX ORDER — KETOROLAC TROMETHAMINE 30 MG/ML
30 INJECTION, SOLUTION INTRAMUSCULAR; INTRAVENOUS EVERY 6 HOURS PRN
Status: DISCONTINUED | OUTPATIENT
Start: 2018-02-27 | End: 2018-02-28

## 2018-02-27 RX ORDER — FAMOTIDINE 20 MG/1
20 TABLET ORAL 2 TIMES DAILY PRN
Status: DISCONTINUED | OUTPATIENT
Start: 2018-02-27 | End: 2018-02-28

## 2018-02-27 RX ORDER — ACETAMINOPHEN 325 MG/1
650 TABLET ORAL EVERY 6 HOURS PRN
Status: DISCONTINUED | OUTPATIENT
Start: 2018-02-27 | End: 2018-02-28

## 2018-02-27 RX ORDER — 0.9 % SODIUM CHLORIDE 0.9 %
VIAL (ML) INJECTION
Status: COMPLETED
Start: 2018-02-27 | End: 2018-02-27

## 2018-02-27 NOTE — TELEPHONE ENCOUNTER
I spoke to the pt and rescheduled her appt.       3/13/2018 11:45 AM [facility]

## 2018-02-27 NOTE — CONSULTS
[facility] AND [facility]  Female Inpatient Consult    Merlin Southward Day Patient Status:  Observation    10/5/1955 MRN [id number]   Location [address] [facility] Attending Thien Chen.  [address] PCP Fabricio Santana MD     DATE OF ADMISSION: 2018    UROL C-sections, no miscarriages or abortions. Her uterus, tubes and ovaries are all intact and not subjected to surgery. She denies bladder neck suspension, bladder pull-up procedures, cystocele or rectocele repair.   No history of interstitial cystitis, endo medically controlled. 10. No history of endocrine disorders in the form of diabetes or thyroid disease. 11. No history of cancer of any type, bleeding disorders, HIV exposure or AIDS.     MEDICATIONS:      Current Facility-Administered Medications:  Braydon Suspension by Nasal route daily as needed. spray 2 spray by intranasal route  every day in each nostril    Multiple Vitamins Oral Tab Take 1 tablet by mouth daily.  Multiple Vitamins tablet    sucralfate 1 g Oral Tab Take 1 tablet (1 g total) by mouth 4 (fo tenderness. Anterior aspect of the vagina including base of bladder, urethra are normal without masses or tenderness. Stripping the anterior aspect of the vagina causes no urethral discharge.  Urethra meatus itself is pink and moist and normal with no bree Presently asymptomatic    PLAN:  I did have long discussion with patient first and foremost looking at her labs she is forming kidney stones insufficient fluid intake she should be on a high fluid low calcium diet she is on calcium supplementation this may

## 2018-02-27 NOTE — PLAN OF CARE
Problem: Patient/Family Goals  Goal: Patient/Family Long Term Goal  Patient's Long Term Goal: return home     Interventions:  - follow doctors recommendations     - See additional Care Plan goals for specific interventions    Outcome: Progressing    Goal:

## 2018-02-27 NOTE — H&P
[facility]    PATIENT'S NAME: Edilia Waters TIKA   ATTENDING PHYSICIAN: Harry Barrios.  Dexter Bell MD   PATIENT ACCOUNT#:   [id number]    LOCATION:  [address]I [address] RECORD #:   [id number]       YOB: 1955  ADMISSION DATE:       02/26/2018 and gastritis. The patient takes Protonix and is followed by Dr. Opal Casiano from Gastroenterology. Hypertension. Family history of colon cancer. Patient's last colonoscopy was in 2015. She did not have any polyps at that time. Next colonoscopy due in 2020. Electrolytes:  BUN, creatinine, and glucose were normal.  Urinalysis was negative for white cells or blood. There was only 1 RBC. Negative nitrite, negative leukocytes. IMPRESSION AND PLAN:    1.    Left ureteral stone with renal colic, mild hydrourete

## 2018-02-27 NOTE — TELEPHONE ENCOUNTER
Pt states she had to cancel todays appointment with dr Nicky Sands because she is a inpatient at [address] [place] [address] in room 516 due to a kidney stone. States she needs to see dr Nicky Sands but due to dr lincoln schedule it has always been hard for her.  Please call cell phone number [phone]

## 2018-02-28 ENCOUNTER — TELEPHONE (OUTPATIENT)
Dept: INTERNAL MEDICINE CLINIC | Facility: CLINIC | Age: 63
End: 2018-02-28

## 2018-02-28 VITALS
HEART RATE: 82 BPM | RESPIRATION RATE: 18 BRPM | SYSTOLIC BLOOD PRESSURE: 138 MMHG | OXYGEN SATURATION: 99 % | WEIGHT: 140 LBS | HEIGHT: 68 IN | TEMPERATURE: 98 F | BODY MASS INDEX: 21.22 KG/M2 | DIASTOLIC BLOOD PRESSURE: 87 MMHG

## 2018-02-28 PROCEDURE — 99232 SBSQ HOSP IP/OBS MODERATE 35: CPT | Performed by: UROLOGY

## 2018-02-28 PROCEDURE — 99217 OBSERVATION CARE DISCHARGE: CPT | Performed by: INTERNAL MEDICINE

## 2018-02-28 RX ORDER — FLUCONAZOLE 150 MG/1
150 TABLET ORAL ONCE
Qty: 1 TABLET | Refills: 0 | Status: SHIPPED | OUTPATIENT
Start: 2018-02-28 | End: 2018-02-28

## 2018-02-28 NOTE — TELEPHONE ENCOUNTER
Pt. Forgot to tell you that she was on Doxycycline awhile ago. She has a yeast infection. She ran to pharmacy and bought a monostat. Is it ok to use that until she see's you next Tuesday? She can be reached at [phone].

## 2018-02-28 NOTE — DISCHARGE SUMMARY
[facility]    ADMISSION DATE:       02/26/2018  DISCHARGE DATE:       02/28/2018    DISCHARGE DIAGNOSIS: Ureterolithiasis, renal colic     HISTORY OF PRESENT ILLNESS:  The patient is a 18-year-old female with a past medical history of nep cancer. Patient's last colonoscopy was in . She did not have any polyps at that time. Next colonoscopy due in . History of tonsillectomy and adenoidectomy. History of  x3. History of cholecystectomy.      History of acute pancreatitis Negative nitrite, negative leukocytes.         OBJECTIVE:  Vital signs in last 24 hours:  /87 (BP Location: Right arm)   Pulse 82   Temp 97.9 °F (36.6 °C) (Oral)   Resp 18   Ht 5' 8\" (1.727 m)   Wt 140 lb (63.5 kg)   SpO2 99%   BMI 21.29 kg/m²     In (ELAVIL) tab 10 mg 10 mg Oral Nightly   AmLODIPine Besylate (NORVASC) tab 10 mg 10 mg Oral Daily   Cyclobenzaprine HCl (cyclobenzaprine) tab 10 mg 10 mg Oral Q8H PRN   Fluticasone Propionate (FLONASE) 50 MCG/ACT nasal spray 2 spray 2 spray Nasal Daily PRN

## 2018-02-28 NOTE — TELEPHONE ENCOUNTER
Pt recently d/c from [facility] for ureterolithiasis, renal colic. She states she finished doxycycline about week ago for a URI. Vaginal +itching, white patches, irritated. Pt asking if she should use Monostat or can she get Rx?     To Dr. Andi Coughlin, please advise

## 2018-02-28 NOTE — PLAN OF CARE
Problem: Patient/Family Goals  Goal: Patient/Family Long Term Goal  Patient's Long Term Goal: return home     Interventions:  - follow doctors recommendations     - See additional Care Plan goals for specific interventions    Outcome: Progressing  Patient

## 2018-02-28 NOTE — PROGRESS NOTES
Scooter Powell 122 A Day Patient Status:  Observation    10/5/1955 MRN [id number]   Location [facility] Attending Jessica Johnson.  [facility] Day # 0 PCP MD Sherron Wilson Day is a 60year old female patient.     HPI: It ap PRN       ALLERGIES:    Augmentin, [Amoxici*    Other (See Comments)    Comment:Abdominal pain  Bactrim [Sulfametho*    Pain  Nsaids                  Nausea and vomiting, Pain    Comment:GASTRITIS  Tramadol Hcl            Itching    VITALS:  Blood pressure pain.  Left flank pain rating to the left lower quadrant associated with nausea no vomiting although urinalysis and CBC and basic metabolic all normal CAT scan showed a 5 x 2 mm left UVJ stone patient has had admission with minimal symptoms while inpatient

## 2018-03-01 ENCOUNTER — TELEPHONE (OUTPATIENT)
Dept: SURGERY | Facility: CLINIC | Age: 63
End: 2018-03-01

## 2018-03-01 ENCOUNTER — TELEPHONE (OUTPATIENT)
Dept: INTERNAL MEDICINE UNIT | Facility: HOSPITAL | Age: 63
End: 2018-03-01

## 2018-03-01 ENCOUNTER — PATIENT OUTREACH (OUTPATIENT)
Dept: CASE MANAGEMENT | Age: 63
End: 2018-03-01

## 2018-03-01 RX ORDER — HYDROCODONE BITARTRATE AND ACETAMINOPHEN 5; 325 MG/1; MG/1
1 TABLET ORAL EVERY 6 HOURS PRN
Qty: 30 TABLET | Refills: 0 | Status: SHIPPED | OUTPATIENT
Start: 2018-03-01 | End: 2018-03-06

## 2018-03-01 RX ORDER — TAMSULOSIN HYDROCHLORIDE 0.4 MG/1
0.4 CAPSULE ORAL DAILY
Qty: 30 CAPSULE | Status: SHIPPED | OUTPATIENT
Start: 2018-03-01 | End: 2018-03-31

## 2018-03-01 NOTE — TELEPHONE ENCOUNTER
Please contact patient that I think she should continue to take the Flomax for about a week and if symptoms subside she can stop that the Norco I would recommend that she probably just take extra strength Tylenol and only take Norco if she has recurrence o

## 2018-03-01 NOTE — TELEPHONE ENCOUNTER
Noted. Verbally spoke with , to confirm message below. He states he did an overide on the allergies and feels she will be fine with the meds as ordered. Phoned pt and spoke with her.  Informed her of this and that tamsulosin sent to her pharmacy, and

## 2018-03-01 NOTE — TELEPHONE ENCOUNTER
Pt called stating pt was admitted to [facility] 2-26-18. Released 2-28-18. Seen by dr Darlean Dance. Pt advised to set up appt  within 2 weeks. No appts available. Can pt be fit in.   Pt also stated pt talked to the nurse this morning but her question regarding the

## 2018-03-01 NOTE — TELEPHONE ENCOUNTER
Thank you, . Please see other t.e., whereby pt is asking for a explanation of what is causing the pain. Is it from stones present, or irritation from passing stone.  She states she will not accept an assumption from nurse, she wants explanation from

## 2018-03-01 NOTE — TELEPHONE ENCOUNTER
Pt has ques re meds she was given - should she take it if they think she already passed the stone - or should she wait until she has sx again - pls advise - she still has pain on lower L side - does it mean she will be passing another stone soon

## 2018-03-01 NOTE — TELEPHONE ENCOUNTER
Thank you . Phoned pt back and spoke with her. Read to her 's replies as outlined below. She verbalized understanding, agrees to plan, and is thankful.

## 2018-03-01 NOTE — TELEPHONE ENCOUNTER
Please contact patient and patient may be having some lingering pain after passage of stone however it is not 100% sure that what we clot was actually a stone we will have to wait on stone analysis again even if it is stone has not passed it is small and s

## 2018-03-01 NOTE — TELEPHONE ENCOUNTER
Pt. States that she saw Dr Simona White at the [facility] and was told that he was going to send 2 Rx's to her [facility]. One med is to help Relax the Donald Cordova so that she can pass the stone, and the other med is for the pain.  Pt. States that she was released yest

## 2018-03-01 NOTE — PROGRESS NOTES
TCM OUTREACH    My chart message sent to patient requesting call back to review discharge instructions and medications.

## 2018-03-01 NOTE — TELEPHONE ENCOUNTER
Please contact patient and we should give patient prescription for Norco 5/325 mg 1 p.o. every 6 hours as needed pain #30 no refills patient also should have a prescription for Flomax 0.4 mg 1 p.o. 30 minutes after same meal each day #30 as needed refills.

## 2018-03-01 NOTE — TELEPHONE ENCOUNTER
Returned pt's call and spoke with her. She states  had seen her in the hospital yesterday for kidney stone. States she was told he was going to prescribe meds, one for pain, and one to help \"pass the stone. \" states she left the hospital without a

## 2018-03-02 RX ORDER — FLUTICASONE PROPIONATE 50 MCG
SPRAY, SUSPENSION (ML) NASAL
Refills: 0 | OUTPATIENT
Start: 2018-03-02

## 2018-03-03 LAB
CALCULI MASS: 3 MG
CALCULI NUMBER: 1

## 2018-03-05 ENCOUNTER — TELEPHONE (OUTPATIENT)
Dept: SURGERY | Facility: CLINIC | Age: 63
End: 2018-03-05

## 2018-03-05 NOTE — TELEPHONE ENCOUNTER
Patient seen [facility] in the hospital. States she believes she passed a kidney stone there and it was sent to the lab for pathology. Would like to have results. Please advise. Thank you.

## 2018-03-06 ENCOUNTER — OFFICE VISIT (OUTPATIENT)
Dept: INTERNAL MEDICINE CLINIC | Facility: CLINIC | Age: 63
End: 2018-03-06

## 2018-03-06 VITALS
WEIGHT: 136.63 LBS | TEMPERATURE: 98 F | DIASTOLIC BLOOD PRESSURE: 78 MMHG | OXYGEN SATURATION: 98 % | HEART RATE: 93 BPM | BODY MASS INDEX: 20.71 KG/M2 | HEIGHT: 68 IN | SYSTOLIC BLOOD PRESSURE: 128 MMHG

## 2018-03-06 DIAGNOSIS — N20.1 URETEROLITHIASIS: ICD-10-CM

## 2018-03-06 DIAGNOSIS — Z11.59 NEED FOR HEPATITIS C SCREENING TEST: ICD-10-CM

## 2018-03-06 DIAGNOSIS — Z12.4 SCREENING FOR CERVICAL CANCER: ICD-10-CM

## 2018-03-06 DIAGNOSIS — Z80.0 FAMILY HISTORY OF COLON CANCER: ICD-10-CM

## 2018-03-06 DIAGNOSIS — M85.80 OSTEOPENIA, UNSPECIFIED LOCATION: ICD-10-CM

## 2018-03-06 DIAGNOSIS — Z00.00 ROUTINE HEALTH MAINTENANCE: Primary | ICD-10-CM

## 2018-03-06 DIAGNOSIS — Z12.31 ENCOUNTER FOR SCREENING MAMMOGRAM FOR BREAST CANCER: ICD-10-CM

## 2018-03-06 DIAGNOSIS — I10 ESSENTIAL HYPERTENSION: ICD-10-CM

## 2018-03-06 DIAGNOSIS — R92.2 DENSE BREASTS: ICD-10-CM

## 2018-03-06 PROCEDURE — 99396 PREV VISIT EST AGE 40-64: CPT | Performed by: INTERNAL MEDICINE

## 2018-03-07 NOTE — PROGRESS NOTES
Several attempts made to reach patient with no return phone call. Patient completed HFU on 3/6/18. Closing encounter.

## 2018-03-07 NOTE — TELEPHONE ENCOUNTER
Spoke to patient. Patient states she saw her PCP yesterday, was given results to her kidney stone pathology and is asking if she should f/u as planned. Advised patient to f/u as planned. Patient verbalized understanding. All questions answered.

## 2018-03-07 NOTE — PROGRESS NOTES
Jerzy All Day is a 60year old female. Patient presents with:  TCM (Transition Of Care Management)  Physical      HPI:   Jerzy All Georgiana is a 60year old female who presents for a complete physical exam.   Feels well.   Was in [address] [place] [address] last week for kidney stone, w HCl 25 MG Oral Tab Take 1 tablet (25 mg total) by mouth 3 (three) times daily as needed. Disp: 30 tablet Rfl: 1   Fluticasone Propionate (FLONASE) 50 MCG/ACT Nasal Suspension by Nasal route daily as needed.  spray 2 spray by intranasal route  every day in e Disease Other       Social History:   Smoking status: Never Smoker                                                              Smokeless tobacco: Never Used                      Alcohol use:  No                   REVIEW OF SYSTEMS:   GENERAL: feels well ot appt     3. Hypertension  Stable on norvasc 10mg/day.      4. Osteopenia  DEXA 5/2016. Pt intolerant to Fosamax (gastritis). Started on Reclast IV in 5/2017 --next infusion in 5/2018. Pt will call us prior to scheduling with infusion center.   Cont exerc

## 2018-03-08 ENCOUNTER — TELEPHONE (OUTPATIENT)
Dept: NEUROLOGY | Facility: CLINIC | Age: 63
End: 2018-03-08

## 2018-03-08 ENCOUNTER — OFFICE VISIT (OUTPATIENT)
Dept: NEUROLOGY | Facility: CLINIC | Age: 63
End: 2018-03-08

## 2018-03-08 ENCOUNTER — PATIENT MESSAGE (OUTPATIENT)
Dept: INTERNAL MEDICINE CLINIC | Facility: CLINIC | Age: 63
End: 2018-03-08

## 2018-03-08 VITALS
BODY MASS INDEX: 21.22 KG/M2 | WEIGHT: 140 LBS | HEART RATE: 80 BPM | DIASTOLIC BLOOD PRESSURE: 80 MMHG | SYSTOLIC BLOOD PRESSURE: 118 MMHG | RESPIRATION RATE: 16 BRPM | HEIGHT: 68 IN

## 2018-03-08 DIAGNOSIS — M51.9 LUMBAR DISC DISEASE: Primary | ICD-10-CM

## 2018-03-08 DIAGNOSIS — Z98.1 HISTORY OF LUMBAR FUSION: ICD-10-CM

## 2018-03-08 DIAGNOSIS — M43.10 RETROLISTHESIS: ICD-10-CM

## 2018-03-08 DIAGNOSIS — M54.16 LUMBAR RADICULOPATHY: ICD-10-CM

## 2018-03-08 DIAGNOSIS — M43.16 SPONDYLOLISTHESIS OF LUMBAR REGION: ICD-10-CM

## 2018-03-08 DIAGNOSIS — M48.061 LUMBAR FORAMINAL STENOSIS: ICD-10-CM

## 2018-03-08 PROCEDURE — 99214 OFFICE O/P EST MOD 30 MIN: CPT | Performed by: PHYSICAL MEDICINE & REHABILITATION

## 2018-03-08 RX ORDER — MECLIZINE HYDROCHLORIDE 25 MG/1
25 TABLET ORAL 3 TIMES DAILY PRN
Qty: 30 TABLET | Refills: 1 | Status: CANCELLED | OUTPATIENT
Start: 2018-03-08

## 2018-03-08 RX ORDER — MECLIZINE HYDROCHLORIDE 25 MG/1
25 TABLET ORAL 3 TIMES DAILY PRN
Qty: 30 TABLET | Refills: 1 | Status: CANCELLED
Start: 2018-03-08

## 2018-03-08 NOTE — TELEPHONE ENCOUNTER
Patient has been scheduled for a bilateral L5 TFESIs under MAC  on 3/20/18 at the [facility]. Medications and allergies reviewed. Patient informed to hold aspirins, nsaids, blood thinners, vitamins and fish oils 3-7 days prior to procedure.  Patient informed cary conroy

## 2018-03-08 NOTE — TELEPHONE ENCOUNTER
From: Darrick Lunch Day  To: Vince Rockwell MD  Sent: 3/8/2018 11:21 AM CST  Subject: Prescription Question    Hi  Had a vertigo attack last night. Luckily I didn’t vomit.    I think i should have a new script for Meclizine just to have with me  When I’m out

## 2018-03-08 NOTE — PATIENT INSTRUCTIONS
As of October 6th 2014, the Drug Enforcement Agency [facility]) is reclassifying all hydrocodone combination medications from Schedule III to Schedule II. This includes medications such as Norco, Vicodin, Lortab, Zohydro, and Vicoprofen.     What this means for y will perform bilateral L5 TFESI(s) under MAC. The patient will continue with her home exercise program.    The patient does not need any pain medications at this time.     The patient will follow up in 3 months, but the patient will call me 2 weeks after

## 2018-03-08 NOTE — PROGRESS NOTES
Low Back Pain H & P    Chief Complaint: Patient presents with:  Low Back Pain: Patient presents for follow up on lumbar TSFI injection on 12/5/18.  States the injection took away lots of the pain, but recently noticed low back pain radiating to buttox and t • Calculus of kidney 2008   • Depression    • Esophageal reflux    • Essential hypertension    • Gastritis    • High blood pressure    • Idiopathic acute pancreatitis 2002, 2010   • Insomnia    • Intestinal disorder    • Left wrist fracture 2011 and 2013 patient does appear in her stated age in no distress. The patient is well groomed. Psychiatric:  The patient is alert and oriented x 3. The patient has a normal affect and mood. Respiratory:  No acute respiratory distress.  Patient does not have a radiculopathies    6. History of lumbar fusion: L1-2         Plan  I will perform bilateral L5 TFESI(s) under MAC. The patient will continue with her home exercise program.    The patient does not need any pain medications at this time.     The patient w

## 2018-03-08 NOTE — TELEPHONE ENCOUNTER
Called [facility] [phone] for Authorization of Approval for injection procedure Bilateral L5-S1 TFESI CPT code 92934-19 / 62390, t/t  Abbie JOSE stated No Auth needed with Ref # [id number]  Please call patient to inform of the Approval and to schedule @ [phone]

## 2018-03-09 LAB — HPV I/H RISK 1 DNA SPEC QL NAA+PROBE: NEGATIVE

## 2018-03-09 RX ORDER — MECLIZINE HYDROCHLORIDE 25 MG/1
25 TABLET ORAL 3 TIMES DAILY PRN
Qty: 30 TABLET | Refills: 0 | Status: SHIPPED | OUTPATIENT
Start: 2018-03-09 | End: 2018-03-15

## 2018-03-09 NOTE — TELEPHONE ENCOUNTER
From: Clarisse Cornell Day  Sent: 3/8/2018 11:18 AM CST  Subject: Medication Renewal Request    Clarisse Cornell.  Day would like a refill of the following medications:     Meclizine HCl 25 MG Oral Tab Miles Baumann MD]    Preferred pharmacy: [id number] [facility]

## 2018-03-13 ENCOUNTER — OFFICE VISIT (OUTPATIENT)
Dept: GASTROENTEROLOGY | Facility: CLINIC | Age: 63
End: 2018-03-13

## 2018-03-13 VITALS
SYSTOLIC BLOOD PRESSURE: 132 MMHG | HEIGHT: 68 IN | HEART RATE: 85 BPM | WEIGHT: 137.38 LBS | DIASTOLIC BLOOD PRESSURE: 86 MMHG | BODY MASS INDEX: 20.82 KG/M2

## 2018-03-13 DIAGNOSIS — Z87.19 H/O ACUTE PANCREATITIS: ICD-10-CM

## 2018-03-13 DIAGNOSIS — R10.13 DYSPEPSIA: Primary | ICD-10-CM

## 2018-03-13 DIAGNOSIS — Z12.11 ENCOUNTER FOR COLORECTAL CANCER SCREENING: ICD-10-CM

## 2018-03-13 DIAGNOSIS — Z87.19 HX OF GASTROESOPHAGEAL REFLUX (GERD): ICD-10-CM

## 2018-03-13 DIAGNOSIS — Z12.12 ENCOUNTER FOR COLORECTAL CANCER SCREENING: ICD-10-CM

## 2018-03-13 PROCEDURE — 99243 OFF/OP CNSLTJ NEW/EST LOW 30: CPT | Performed by: INTERNAL MEDICINE

## 2018-03-13 NOTE — PATIENT INSTRUCTIONS
1. Continue pantoprazole and ranitidine as needed    2. Continue carafate as needed for breakthrough symptoms    3. Call in 1 year to schedule colonoscopy and EGD    4.   Avoid all alcohol, caffeine, and oral nsaids

## 2018-03-13 NOTE — PROGRESS NOTES
HPI:    Patient ID: Antoinette Stoner is a 60year old female. HPI    Review of Systems   Constitutional: Negative for activity change, appetite change, chills, diaphoresis, fatigue, fever and unexpected weight change.    HENT: Negative for congestion, ear p spasms. Disp: 60 tablet Rfl: 0   sucralfate 1 g Oral Tab Take 1 tablet (1 g total) by mouth 4 (four) times daily before meals and nightly. Disp: 120 tablet Rfl: 0   Diclofenac Sodium 1 % Transdermal Gel Apply 4 g topically 4 (four) times daily.  Disp: 1 Tub Neck supple. No JVD present. No tracheal deviation present. No thyromegaly present. Cardiovascular: Normal rate, regular rhythm and normal heart sounds. No murmur heard. Pulmonary/Chest: Effort normal and breath sounds normal. No stridor.  No respirat

## 2018-03-13 NOTE — H&P
History of present illness: This is a 67-year-old female patient of Dr. Brenna Wyatt who is well-known to me over many years. I last saw her in 2014. The patient has a prior history of acute pancreatitis.   She has had intermittent abdominal pain, usually n esophagus, esophagitis, H. pylori or other pathology. . I have therefore recommended the followin. Continue pantoprazole and ranitidine as needed    2. Continue carafate as needed for breakthrough symptoms    3.  Call in 1 year to schedule colonoscopy

## 2018-03-14 ENCOUNTER — OFFICE VISIT (OUTPATIENT)
Dept: SURGERY | Facility: CLINIC | Age: 63
End: 2018-03-14

## 2018-03-14 ENCOUNTER — TELEPHONE (OUTPATIENT)
Dept: NEUROLOGY | Facility: CLINIC | Age: 63
End: 2018-03-14

## 2018-03-14 VITALS
SYSTOLIC BLOOD PRESSURE: 118 MMHG | HEIGHT: 68 IN | TEMPERATURE: 98 F | BODY MASS INDEX: 20.76 KG/M2 | HEART RATE: 80 BPM | WEIGHT: 137 LBS | DIASTOLIC BLOOD PRESSURE: 76 MMHG

## 2018-03-14 DIAGNOSIS — N20.0 KIDNEY STONES: Primary | ICD-10-CM

## 2018-03-14 PROCEDURE — 99214 OFFICE O/P EST MOD 30 MIN: CPT | Performed by: UROLOGY

## 2018-03-14 PROCEDURE — 99212 OFFICE O/P EST SF 10 MIN: CPT | Performed by: UROLOGY

## 2018-03-14 NOTE — TELEPHONE ENCOUNTER
spoke to patient who is calling to reschedule her bilateral L5 TFESIs under MAC from 3/20/18 to 3/23/18.   Injection re-scheduled, form re-faxed to [facility]  Patient is aware she will need to hold aspirins, fish oil, multivitamins, vitamin e and nsaid 5-7 days

## 2018-03-14 NOTE — PROGRESS NOTES
Scooter Powell  A Day Patient Status:  Outpatient    10/5/1955 MRN [id number]   Location [address] [place] [address] Attending Thien Chen.   [address] Day # 0 PCP Adaline All, MD Merlin Southward Day is a 60 year TAKE 1 TABLET(10 MG) BY MOUTH DAILY Disp: 90 tablet Rfl: 3   Cyclobenzaprine HCl 10 MG Oral Tab Take 1 tablet (10 mg total) by mouth every 8 (eight) hours as needed for Muscle spasms.  Disp: 60 tablet Rfl: 0   sucralfate 1 g Oral Tab Take 1 tablet (1 g tota times daily before meals and nightly. Disp: 120 tablet Rfl: 0   Diclofenac Sodium 1 % Transdermal Gel Apply 4 g topically 4 (four) times daily. Disp: 1 Tube Rfl: 3   Amitriptyline HCl 10 MG Oral Tab Take 1 tablet (10 mg total) by mouth nightly.  Disp: 30 ta normal showing reason for kidney stones insufficient fluid intake and she is up to approximately 8 -8 ounce glasses of water daily      ASSESSMENT AND PLAN:  Case summary: Patient is a 58-year-old white female previous history kidney stones one episode taqueria

## 2018-03-15 ENCOUNTER — TELEPHONE (OUTPATIENT)
Dept: SURGERY | Facility: CLINIC | Age: 63
End: 2018-03-15

## 2018-03-15 DIAGNOSIS — R31.29 MICROSCOPIC HEMATURIA: Primary | ICD-10-CM

## 2018-03-15 RX ORDER — SUCRALFATE 1 G/1
1 TABLET ORAL
Qty: 120 TABLET | Refills: 2 | Status: SHIPPED | OUTPATIENT
Start: 2018-03-15 | End: 2018-06-01

## 2018-03-15 RX ORDER — MECLIZINE HYDROCHLORIDE 25 MG/1
25 TABLET ORAL 3 TIMES DAILY PRN
Qty: 30 TABLET | Refills: 1 | Status: SHIPPED | OUTPATIENT
Start: 2018-03-15 | End: 2020-02-11

## 2018-03-15 NOTE — TELEPHONE ENCOUNTER
, please see pt's note below, as well as portion of your lov below. Please order IVP. Thank you.    spinal stenosis and lumbar and cervical spinal fusions.   Nonetheless IVP ordered we will check results for any other remaining stones visible that c

## 2018-03-15 NOTE — TELEPHONE ENCOUNTER
Pt calling for IVP order to be put into computer for her to schedule. Pt called central scheduling and was told nothing has been ordered. Please advise. Thank you.

## 2018-03-16 ENCOUNTER — TELEPHONE (OUTPATIENT)
Dept: SURGERY | Facility: CLINIC | Age: 63
End: 2018-03-16

## 2018-03-16 NOTE — TELEPHONE ENCOUNTER
Spoke to patient. Patient states she scheduled IVP in a few weeks. Asking about IVP test. Patient denies allergies to contrast dye, shellfish. All questions answered.

## 2018-03-16 NOTE — TELEPHONE ENCOUNTER
pt called. Central scheduling has the IVP orders and pt made the appt for 4/4/18. She would like to speak with a RN today she states she has questions. She states she doesn't want to worry about this over the weekend. Thank you.

## 2018-03-23 ENCOUNTER — OFFICE VISIT (OUTPATIENT)
Dept: SURGERY | Facility: CLINIC | Age: 63
End: 2018-03-23

## 2018-03-23 DIAGNOSIS — M51.9 LUMBAR DISC DISEASE: ICD-10-CM

## 2018-03-23 DIAGNOSIS — M54.16 LUMBAR RADICULOPATHY: Primary | ICD-10-CM

## 2018-03-23 PROCEDURE — 64483 NJX AA&/STRD TFRM EPI L/S 1: CPT | Performed by: PHYSICAL MEDICINE & REHABILITATION

## 2018-03-23 NOTE — PROCEDURES
[facility] [facility]    BILATERAL LUMBAR TRANSFORAMINAL   NAME:  Samantha Stoner    MR #:    [id number] :  10/5/1955     PHYSICIAN:  Durwin Collet A. Couri        Operative Report    DATE OF PROCEDURE: 3/23/2018   PREOPERATIVE DIAGNOSES: 1. left > r with a 3 cc solution of 1.5 cc of 40 mg/cc of Kenalog and 1.5 cc of 1% PF lidocaine without epinephrine. After this, the needle was removed. Then  fluoroscopy was right anterior obliqued opening up the left L5-S1 intervertebral foramen.   At this point in

## 2018-03-28 ENCOUNTER — HOSPITAL ENCOUNTER (OUTPATIENT)
Dept: GENERAL RADIOLOGY | Facility: HOSPITAL | Age: 63
Discharge: HOME | End: 2018-03-28
Attending: UROLOGY
Payer: COMMERCIAL

## 2018-03-28 DIAGNOSIS — R31.29 MICROSCOPIC HEMATURIA: ICD-10-CM

## 2018-03-28 PROCEDURE — 82565 ASSAY OF CREATININE: CPT

## 2018-03-28 PROCEDURE — 74415 UROGRAPHY NFS DRIP&/BLS W/NF: CPT | Performed by: UROLOGY

## 2018-03-29 ENCOUNTER — TELEPHONE (OUTPATIENT)
Dept: SURGERY | Facility: CLINIC | Age: 63
End: 2018-03-29

## 2018-03-29 ENCOUNTER — TELEPHONE (OUTPATIENT)
Dept: INTERNAL MEDICINE CLINIC | Facility: CLINIC | Age: 63
End: 2018-03-29

## 2018-03-29 NOTE — TELEPHONE ENCOUNTER
Would recommend discussing this with Dr. Villarreal Board she does have an appt next week. It is small, and at this time would not worry, but does need to be discussed with Dr. Trip Feliciano in terms of further plan.

## 2018-03-29 NOTE — TELEPHONE ENCOUNTER
Patient contacted. Patient is aware of her test results and verbalized understanding. Offered patient f/u appointment on Tuesday 4/3/18 @ 8:30 am. Patient agreed, appointment made.

## 2018-03-29 NOTE — TELEPHONE ENCOUNTER
----- Message from Adam Munoz MD sent at 3/29/2018  7:48 AM CDT -----  Please contact patient with IVP result that shows a kidney stone has passed however however possible bladder mass is seen.   If patient has upcoming cystoscopy this would be appropri

## 2018-03-29 NOTE — TELEPHONE ENCOUNTER
Called patient and relayed message from Dr. Elizabeth Ojeda. Patient verbalized understanding and expressed gratitude for the call. To Dr. La Nena Grace.

## 2018-03-29 NOTE — TELEPHONE ENCOUNTER
Pt. Would like Dr. Patti Denise to review her IVT results she was told she had a mass in her bladder she is concerned and doesn't want to worry the whole weekend she has an appt.  With Dr. Curly Arredondo 04/03   Please advise  Ph. # [phone]    Routed to clinical

## 2018-03-30 ENCOUNTER — TELEPHONE (OUTPATIENT)
Dept: SURGERY | Facility: CLINIC | Age: 63
End: 2018-03-30

## 2018-03-30 NOTE — TELEPHONE ENCOUNTER
Please see yesterday's telephone call; Results. Patient contacted. Patient asking if she could be seen Monday for discussion of her IVP results, instead of Tuesday?  Informed patient that there are no available appointments on Monday, but will notify her la

## 2018-03-30 NOTE — TELEPHONE ENCOUNTER
Patient states she has been experiencing left side pain. Thinks she has another kidney stone. Was unable to sleep yesterday due to the pain. Has appt on 04/03 but would like to see if she can be seen instead on 04/02. Please call. Thank you.

## 2018-04-02 ENCOUNTER — TELEPHONE (OUTPATIENT)
Dept: SURGERY | Facility: CLINIC | Age: 63
End: 2018-04-02

## 2018-04-02 ENCOUNTER — OFFICE VISIT (OUTPATIENT)
Dept: SURGERY | Facility: CLINIC | Age: 63
End: 2018-04-02

## 2018-04-02 VITALS
BODY MASS INDEX: 20.76 KG/M2 | SYSTOLIC BLOOD PRESSURE: 138 MMHG | WEIGHT: 137 LBS | DIASTOLIC BLOOD PRESSURE: 72 MMHG | HEIGHT: 68 IN

## 2018-04-02 DIAGNOSIS — Z01.818 PREOP EXAMINATION: ICD-10-CM

## 2018-04-02 DIAGNOSIS — Z51.81 MONITORING FOR ANTICOAGULANT USE: ICD-10-CM

## 2018-04-02 DIAGNOSIS — Z79.01 MONITORING FOR ANTICOAGULANT USE: ICD-10-CM

## 2018-04-02 DIAGNOSIS — N32.89 BLADDER MASS: Primary | ICD-10-CM

## 2018-04-02 PROCEDURE — 99214 OFFICE O/P EST MOD 30 MIN: CPT | Performed by: UROLOGY

## 2018-04-02 PROCEDURE — 99212 OFFICE O/P EST SF 10 MIN: CPT | Performed by: UROLOGY

## 2018-04-02 NOTE — TELEPHONE ENCOUNTER
Patient seen in office, scheduled for CYSTOSCOPY, [facility], POSSIBLE BLADDER BIOPSY WITH FULGURATION, Friday 04/20/18 @ 11:15, at [facility] outpatient, went over labs/pre-op with patient verbalized understanding.

## 2018-04-02 NOTE — TELEPHONE ENCOUNTER
Spoke with Yary the LIZ and she had pt on the line asking for an appt today instead of tomorrow. I placed her on hold and called [facility] at his desk and he gave auth to offer her one of the open o/v's for today.

## 2018-04-02 NOTE — PROGRESS NOTES
Scooter Powell 122 A Day Patient Status:  Outpatient    10/5/1955 MRN [id number]   Location [address] [facility] Attending Skip Caballero.   [address] Day # 0 PCP Kirsten Alexander MD       L[place] Day is a 60 year (10 mg total) by mouth every 8 (eight) hours as needed for Muscle spasms. Disp: 60 tablet Rfl: 0   Diclofenac Sodium 1 % Transdermal Gel Apply 4 g topically 4 (four) times daily.  Disp: 1 Tube Rfl: 3   Amitriptyline HCl 10 MG Oral Tab Take 1 tablet (10 mg t collecting system and then there is a questionable 1.2 x 2.1 cm filling defect at the bladder base cannot exclude mucosal based tumor and cystoscopy is recommended by radiological report.       ASSESSMENT AND PLAN:  Case summary: Patient is a 28-year-old wh

## 2018-04-04 PROBLEM — M25.832 IMPINGEMENT SYNDROME OF LEFT WRIST: Status: ACTIVE | Noted: 2018-04-04

## 2018-04-09 ENCOUNTER — TELEPHONE (OUTPATIENT)
Dept: INTERNAL MEDICINE CLINIC | Facility: CLINIC | Age: 63
End: 2018-04-09

## 2018-04-09 NOTE — TELEPHONE ENCOUNTER
Per pt: Flexeril taken 1 hour ago for neck and back issues, ordered q. 8 hour prn. Pt is OK after Epley but feeling some dizziness - pt is laying down,staying still. Per Mitzi Crum, do not advise flexeril and meclizine together.   Pt states she doesn't always

## 2018-04-09 NOTE — TELEPHONE ENCOUNTER
This morning she did Epley maneuver for crystals in ears. Patient is asking if she can take her Meclizine with the Flexural for the dizziness.    Jessica Mckenzie [phone]  Routed high to clinical

## 2018-04-13 ENCOUNTER — HOSPITAL ENCOUNTER (OUTPATIENT)
Dept: GENERAL RADIOLOGY | Facility: HOSPITAL | Age: 63
Discharge: HOME | End: 2018-04-13
Attending: UROLOGY
Payer: COMMERCIAL

## 2018-04-13 ENCOUNTER — LAB ENCOUNTER (OUTPATIENT)
Dept: LAB | Facility: HOSPITAL | Age: 63
End: 2018-04-13
Attending: INTERNAL MEDICINE
Payer: COMMERCIAL

## 2018-04-13 DIAGNOSIS — Z01.818 PREOP EXAMINATION: ICD-10-CM

## 2018-04-13 DIAGNOSIS — Z79.01 MONITORING FOR ANTICOAGULANT USE: ICD-10-CM

## 2018-04-13 DIAGNOSIS — Z11.59 NEED FOR HEPATITIS C SCREENING TEST: ICD-10-CM

## 2018-04-13 DIAGNOSIS — M85.80 OSTEOPENIA, UNSPECIFIED LOCATION: ICD-10-CM

## 2018-04-13 DIAGNOSIS — Z51.81 MONITORING FOR ANTICOAGULANT USE: ICD-10-CM

## 2018-04-13 PROCEDURE — 93010 ELECTROCARDIOGRAM REPORT: CPT | Performed by: UROLOGY

## 2018-04-13 PROCEDURE — 93005 ELECTROCARDIOGRAM TRACING: CPT

## 2018-04-13 PROCEDURE — 85610 PROTHROMBIN TIME: CPT

## 2018-04-13 PROCEDURE — 82306 VITAMIN D 25 HYDROXY: CPT

## 2018-04-13 PROCEDURE — 85025 COMPLETE CBC W/AUTO DIFF WBC: CPT

## 2018-04-13 PROCEDURE — 86803 HEPATITIS C AB TEST: CPT

## 2018-04-13 PROCEDURE — 71046 X-RAY EXAM CHEST 2 VIEWS: CPT | Performed by: UROLOGY

## 2018-04-13 PROCEDURE — 84443 ASSAY THYROID STIM HORMONE: CPT | Performed by: INTERNAL MEDICINE

## 2018-04-13 PROCEDURE — 80048 BASIC METABOLIC PNL TOTAL CA: CPT

## 2018-04-13 PROCEDURE — 36415 COLL VENOUS BLD VENIPUNCTURE: CPT

## 2018-04-13 PROCEDURE — 85730 THROMBOPLASTIN TIME PARTIAL: CPT

## 2018-04-18 ENCOUNTER — TELEPHONE (OUTPATIENT)
Dept: SURGERY | Facility: CLINIC | Age: 63
End: 2018-04-18

## 2018-04-19 ENCOUNTER — TELEPHONE (OUTPATIENT)
Dept: SURGERY | Facility: CLINIC | Age: 63
End: 2018-04-19

## 2018-04-19 NOTE — H&P
[facility]    PATIENT'S NAME: Jessica VELEZ   ATTENDING PHYSICIAN: Brody Zaidi MD   PATIENT ACCOUNT#:   [id number]    [facility]  MEDICAL RECORD #:   [id number]       YOB: 1955  ADMISSION DATE:       04/20/2018    H Normal without adenopathy or thyromegaly. LUNGS:  Clear to auscultation and percussion. HEART:  Regular rate and rhythm. PERIPHERAL VASCULAR:  Pulses 2+ bilaterally, symmetrical without bruits. FLANK:  Without masses or tenderness. ABDOMEN:  Benign.

## 2018-04-19 NOTE — TELEPHONE ENCOUNTER
Patient called requesting results of both xray and EKG. Informed patient that results were reviewed by [facility] and that both are within normal limits. But informed patient that we are not physicians and that we cannot officially interpret those results.  Pa

## 2018-04-20 ENCOUNTER — ANESTHESIA (OUTPATIENT)
Dept: SURGERY | Facility: HOSPITAL | Age: 63
End: 2018-04-20
Payer: COMMERCIAL

## 2018-04-20 ENCOUNTER — HOSPITAL ENCOUNTER (OUTPATIENT)
Facility: HOSPITAL | Age: 63
Setting detail: HOSPITAL OUTPATIENT SURGERY
Discharge: HOME | End: 2018-04-20
Attending: UROLOGY | Admitting: UROLOGY
Payer: COMMERCIAL

## 2018-04-20 ENCOUNTER — ANESTHESIA EVENT (OUTPATIENT)
Dept: SURGERY | Facility: HOSPITAL | Age: 63
End: 2018-04-20
Payer: COMMERCIAL

## 2018-04-20 ENCOUNTER — SURGERY (OUTPATIENT)
Age: 63
End: 2018-04-20

## 2018-04-20 VITALS
DIASTOLIC BLOOD PRESSURE: 86 MMHG | HEART RATE: 71 BPM | HEIGHT: 68 IN | SYSTOLIC BLOOD PRESSURE: 130 MMHG | OXYGEN SATURATION: 100 % | RESPIRATION RATE: 20 BRPM | BODY MASS INDEX: 20.53 KG/M2 | TEMPERATURE: 97 F | WEIGHT: 135.44 LBS

## 2018-04-20 DIAGNOSIS — N32.89 BLADDER MASS: ICD-10-CM

## 2018-04-20 PROCEDURE — 0TJB8ZZ INSPECTION OF BLADDER, VIA NATURAL OR ARTIFICIAL OPENING ENDOSCOPIC: ICD-10-PCS | Performed by: UROLOGY

## 2018-04-20 PROCEDURE — 52000 CYSTOURETHROSCOPY: CPT | Performed by: UROLOGY

## 2018-04-20 RX ORDER — ACETAMINOPHEN 500 MG
1000 TABLET ORAL ONCE
Status: DISCONTINUED | OUTPATIENT
Start: 2018-04-20 | End: 2018-04-20 | Stop reason: HOSPADM

## 2018-04-20 RX ORDER — HYDROCODONE BITARTRATE AND ACETAMINOPHEN 5; 325 MG/1; MG/1
1 TABLET ORAL AS NEEDED
Status: DISCONTINUED | OUTPATIENT
Start: 2018-04-20 | End: 2018-04-20

## 2018-04-20 RX ORDER — HYDROMORPHONE HYDROCHLORIDE 1 MG/ML
0.4 INJECTION, SOLUTION INTRAMUSCULAR; INTRAVENOUS; SUBCUTANEOUS EVERY 5 MIN PRN
Status: DISCONTINUED | OUTPATIENT
Start: 2018-04-20 | End: 2018-04-20

## 2018-04-20 RX ORDER — HYDROMORPHONE HYDROCHLORIDE 1 MG/ML
0.2 INJECTION, SOLUTION INTRAMUSCULAR; INTRAVENOUS; SUBCUTANEOUS EVERY 5 MIN PRN
Status: DISCONTINUED | OUTPATIENT
Start: 2018-04-20 | End: 2018-04-20

## 2018-04-20 RX ORDER — FAMOTIDINE 20 MG/1
20 TABLET ORAL ONCE
Status: DISCONTINUED | OUTPATIENT
Start: 2018-04-20 | End: 2018-04-20 | Stop reason: HOSPADM

## 2018-04-20 RX ORDER — HYDROMORPHONE HYDROCHLORIDE 1 MG/ML
0.6 INJECTION, SOLUTION INTRAMUSCULAR; INTRAVENOUS; SUBCUTANEOUS EVERY 5 MIN PRN
Status: DISCONTINUED | OUTPATIENT
Start: 2018-04-20 | End: 2018-04-20

## 2018-04-20 RX ORDER — HYDROCODONE BITARTRATE AND ACETAMINOPHEN 5; 325 MG/1; MG/1
2 TABLET ORAL AS NEEDED
Status: DISCONTINUED | OUTPATIENT
Start: 2018-04-20 | End: 2018-04-20

## 2018-04-20 RX ORDER — ONDANSETRON 2 MG/ML
INJECTION INTRAMUSCULAR; INTRAVENOUS AS NEEDED
Status: DISCONTINUED | OUTPATIENT
Start: 2018-04-20 | End: 2018-04-20 | Stop reason: SURG

## 2018-04-20 RX ORDER — LIDOCAINE HYDROCHLORIDE 10 MG/ML
INJECTION, SOLUTION EPIDURAL; INFILTRATION; INTRACAUDAL; PERINEURAL AS NEEDED
Status: DISCONTINUED | OUTPATIENT
Start: 2018-04-20 | End: 2018-04-20 | Stop reason: SURG

## 2018-04-20 RX ORDER — HALOPERIDOL 5 MG/ML
0.25 INJECTION INTRAMUSCULAR ONCE AS NEEDED
Status: DISCONTINUED | OUTPATIENT
Start: 2018-04-20 | End: 2018-04-20

## 2018-04-20 RX ORDER — MIDAZOLAM HYDROCHLORIDE 1 MG/ML
INJECTION INTRAMUSCULAR; INTRAVENOUS AS NEEDED
Status: DISCONTINUED | OUTPATIENT
Start: 2018-04-20 | End: 2018-04-20 | Stop reason: SURG

## 2018-04-20 RX ORDER — SODIUM CHLORIDE, SODIUM LACTATE, POTASSIUM CHLORIDE, CALCIUM CHLORIDE 600; 310; 30; 20 MG/100ML; MG/100ML; MG/100ML; MG/100ML
INJECTION, SOLUTION INTRAVENOUS CONTINUOUS
Status: DISCONTINUED | OUTPATIENT
Start: 2018-04-20 | End: 2018-04-20

## 2018-04-20 RX ORDER — METOCLOPRAMIDE 10 MG/1
10 TABLET ORAL ONCE
Status: DISCONTINUED | OUTPATIENT
Start: 2018-04-20 | End: 2018-04-20 | Stop reason: HOSPADM

## 2018-04-20 RX ORDER — ONDANSETRON 2 MG/ML
4 INJECTION INTRAMUSCULAR; INTRAVENOUS ONCE AS NEEDED
Status: DISCONTINUED | OUTPATIENT
Start: 2018-04-20 | End: 2018-04-20

## 2018-04-20 RX ORDER — DIPHENHYDRAMINE HYDROCHLORIDE 50 MG/ML
25 INJECTION INTRAMUSCULAR; INTRAVENOUS ONCE
Status: DISCONTINUED | OUTPATIENT
Start: 2018-04-20 | End: 2018-04-20 | Stop reason: HOSPADM

## 2018-04-20 RX ORDER — LEVOFLOXACIN 5 MG/ML
500 INJECTION, SOLUTION INTRAVENOUS ONCE
Status: COMPLETED | OUTPATIENT
Start: 2018-04-20 | End: 2018-04-20

## 2018-04-20 RX ORDER — LIDOCAINE HYDROCHLORIDE 20 MG/ML
JELLY TOPICAL AS NEEDED
Status: DISCONTINUED | OUTPATIENT
Start: 2018-04-20 | End: 2018-04-20 | Stop reason: HOSPADM

## 2018-04-20 RX ORDER — NALOXONE HYDROCHLORIDE 0.4 MG/ML
80 INJECTION, SOLUTION INTRAMUSCULAR; INTRAVENOUS; SUBCUTANEOUS AS NEEDED
Status: DISCONTINUED | OUTPATIENT
Start: 2018-04-20 | End: 2018-04-20

## 2018-04-20 RX ORDER — DEXAMETHASONE SODIUM PHOSPHATE 4 MG/ML
4 VIAL (ML) INJECTION ONCE
Status: DISCONTINUED | OUTPATIENT
Start: 2018-04-20 | End: 2018-04-20 | Stop reason: HOSPADM

## 2018-04-20 RX ADMIN — ONDANSETRON 4 MG: 2 INJECTION INTRAMUSCULAR; INTRAVENOUS at 08:45:00

## 2018-04-20 RX ADMIN — SODIUM CHLORIDE, SODIUM LACTATE, POTASSIUM CHLORIDE, CALCIUM CHLORIDE: 600; 310; 30; 20 INJECTION, SOLUTION INTRAVENOUS at 08:29:00

## 2018-04-20 RX ADMIN — MIDAZOLAM HYDROCHLORIDE 2 MG: 1 INJECTION INTRAMUSCULAR; INTRAVENOUS at 08:31:00

## 2018-04-20 RX ADMIN — SODIUM CHLORIDE, SODIUM LACTATE, POTASSIUM CHLORIDE, CALCIUM CHLORIDE: 600; 310; 30; 20 INJECTION, SOLUTION INTRAVENOUS at 09:05:00

## 2018-04-20 RX ADMIN — LIDOCAINE HYDROCHLORIDE 50 MG: 10 INJECTION, SOLUTION EPIDURAL; INFILTRATION; INTRACAUDAL; PERINEURAL at 08:35:00

## 2018-04-20 NOTE — CONSULTS
Patient c/c of dizziness ,'' spots in vision during IV Levaquin ifusion,noted redness along veins . BP 65/49 ,HR 67  NC O2 given,infusion stopped   T nicola position,IV fluid  decadron IV 4 mg,benadryl 25 mg  BP up to 121/79 ,feeling better ,starting c/c of

## 2018-04-20 NOTE — INTERVAL H&P NOTE
Pre-op Diagnosis: Bladder mass [N32.89]    The above referenced H&P was reviewed by Gretchen Cabrera. Alessandro Chavarria MD on 4/20/2018, the patient was examined and no significant changes have occurred in the patient's condition since the H&P was performed.   I discussed with

## 2018-04-20 NOTE — ANESTHESIA POSTPROCEDURE EVALUATION
Patient: Rock Stoner    Procedure Summary     Date:  04/20/18 Room / Location:  [facility] OR  / [facility] OR    Anesthesia Start:  0807 Anesthesia Stop:  3946    Procedure:  CYSTOSCOPY (N/A ) Diagnosis:       Bladder mass      (Bladder mass [N32.89])

## 2018-04-20 NOTE — ANESTHESIA PREPROCEDURE EVALUATION
Anesthesia PreOp Note    HPI:     Jerzy All Day is a 60year old female who presents for preoperative consultation requested by: Kavin Silva MD    Date of Surgery: 4/20/2018    Procedure(s):  CYSTOSCOPY  Indication: Bladder mass [N32.89]      History R mild diffuse, C3-4 left mild-mod foraminal & mild diffuse, C4-5 mild-mod central & left rachael, C5-6 left mod rachael & mild-mod central, C6-7 right mild-mod/left mod rachael & right mild bulging disc         Date Noted: 04/12/2017      C3-4 left mild, C4-5 lef date:  CATARACT  No date: CHOLECYSTECTOMY  2009: COLONOSCOPY  2010: FRACTURE SURGERY Left      Comment: WRIST FRACTURE ORIF  2013: FRACTURE SURGERY Left      Comment: ORIF wrist fracture revision with plates and                screws- Ortho Dr Funes  No Past Month at Unknown time   Fluticasone Propionate (FLONASE) 50 MCG/ACT Nasal Suspension by Nasal route daily as needed.  spray 2 spray by intranasal route  every day in each nostril  Disp:  Rfl:  4/20/2018 at Unknown time   Multiple Vitamins Oral Tab Take Smokeless tobacco: Never Used    Alcohol use No    Comment: quit 4 - 5 yrs ago due to pancreatitis    Drug use: No    Sexual activity: Not on file     Other Topics Concern    Caffeine Concern No    Comment: Coffee 2 cups daily    Exercise No     Social GI/Hepatic/Renal    (+) GERD well controlled,   Chronic renal disease: kidney stones ,passed     Comments: Pancreatitis ,pain,ER,,meds only     Endo/Other    (-) diabetes mellitus, hypothyroidism  Abdominal     Abdomen: soft.   Bowel sounds: normal.

## 2018-04-20 NOTE — OPERATIVE REPORT
[facility] OPERATING ROOM  Operative Note     Fredia Even Day Location: OR   CSN [id number] MRN [id number]   Admission Date 4/20/2018 Operation Date 4/20/2018   Attending Physician Ray Heart MD Operating Physician Emi Rios.  MD Reanna Boyce Grate cystoscopy. Lower sequential compression device were placed patient was prepped and draped in the usual sterile fashion.   Surgery begun by using the 21 [place] sheath and 30° lens inspection of the urethra was normal into entirety known to pseudopolyposis

## 2018-05-08 ENCOUNTER — TELEPHONE (OUTPATIENT)
Dept: INTERNAL MEDICINE CLINIC | Facility: CLINIC | Age: 63
End: 2018-05-08

## 2018-05-08 NOTE — TELEPHONE ENCOUNTER
Asking if Dr. Laurance Goodpasture would order CXR, with out having to see her. Had ants in her house, put ant killing powder down over the weekend Saturday. Has been having to clear her throat since putting powder down. Thinks she may have inhaled some powder.   Flavio Alejandre

## 2018-05-08 NOTE — TELEPHONE ENCOUNTER
Called patient and relayed Dr Nathaniel Badillo message to her. She verbalized understanding. She states \"my  thinks I'm fine\"  Patient states she has already been taking the claritin and will continue to do so.   She will let us know if she does not impr

## 2018-05-08 NOTE — TELEPHONE ENCOUNTER
I'm not convinced she even needs a chest xray -- may just have an allergy to the ant killer. How about trying some claritin.   I can see her in the office if she wants

## 2018-05-08 NOTE — TELEPHONE ENCOUNTER
To Dr. Laurance Goodpasture - see below - pt feels mostly in throat, not deep in lungs. Pt states this could be unrelated to ant powder. Blowing her nose some but was doing that before the ant powder incident.  Pt asking if she could have chest xray first and then see

## 2018-05-17 ENCOUNTER — TELEPHONE (OUTPATIENT)
Dept: ORTHOPEDICS CLINIC | Facility: CLINIC | Age: 63
End: 2018-05-17

## 2018-05-17 ENCOUNTER — HOSPITAL ENCOUNTER (OUTPATIENT)
Dept: MAMMOGRAPHY | Age: 63
Discharge: HOME | End: 2018-05-17
Attending: INTERNAL MEDICINE
Payer: COMMERCIAL

## 2018-05-17 DIAGNOSIS — Z12.31 ENCOUNTER FOR SCREENING MAMMOGRAM FOR BREAST CANCER: ICD-10-CM

## 2018-05-17 DIAGNOSIS — R92.2 DENSE BREASTS: ICD-10-CM

## 2018-05-17 PROCEDURE — 77067 SCR MAMMO BI INCL CAD: CPT | Performed by: INTERNAL MEDICINE

## 2018-05-17 PROCEDURE — 77063 BREAST TOMOSYNTHESIS BI: CPT | Performed by: INTERNAL MEDICINE

## 2018-05-17 NOTE — TELEPHONE ENCOUNTER
Pt states when she had a finger injury she was given a plastic thing in office to keep her finger from bending, pt states she lost this, asking if she could stop by tomorrow for one, states it does not matter if she gets charged. Pls advise thank you.

## 2018-05-17 NOTE — TELEPHONE ENCOUNTER
s/w pt and she states JL gave her a plastic finger splint for her right index finger over 1 yr ago to help prevent her from bending her finger and she lost it. She states it really helped her. Pt wants to know if she can come in to get another?  She is not

## 2018-05-28 ENCOUNTER — OFFICE VISIT (OUTPATIENT)
Dept: FAMILY MEDICINE CLINIC | Facility: CLINIC | Age: 63
End: 2018-05-28

## 2018-05-28 VITALS
OXYGEN SATURATION: 98 % | WEIGHT: 140 LBS | TEMPERATURE: 98 F | SYSTOLIC BLOOD PRESSURE: 110 MMHG | HEIGHT: 68 IN | DIASTOLIC BLOOD PRESSURE: 70 MMHG | HEART RATE: 96 BPM | BODY MASS INDEX: 21.22 KG/M2 | RESPIRATION RATE: 20 BRPM

## 2018-05-28 DIAGNOSIS — J02.9 PHARYNGITIS, UNSPECIFIED ETIOLOGY: Primary | ICD-10-CM

## 2018-05-28 PROCEDURE — 87081 CULTURE SCREEN ONLY: CPT | Performed by: NURSE PRACTITIONER

## 2018-05-28 PROCEDURE — 87880 STREP A ASSAY W/OPTIC: CPT | Performed by: NURSE PRACTITIONER

## 2018-05-28 PROCEDURE — 99213 OFFICE O/P EST LOW 20 MIN: CPT | Performed by: NURSE PRACTITIONER

## 2018-05-28 NOTE — PROGRESS NOTES
CHIEF COMPLAINT:   Patient presents with:  Sore Throat        HPI:   Af[place] Stoner is a 60year old female presents to clinic with complaint of sore throat. Patient has had 2 days. Symptoms have been worsened since onset.   Patient reports following assoc Oral Tab Take 1 tablet by mouth daily.  Multiple Vitamins tablet  Disp:  Rfl:       Past Medical History:   Diagnosis Date   • Anemia    • Anxiety state, unspecified    • Back problem     CERVICAL RADICULOPATHY, CERVICAL SPINAL STENOSIS   • Broken foot 03-1 Breathing is non labored. CARDIO: RRR without murmur  GI: good BS's,no masses, hepatosplenomegaly, or tenderness on direct palpation  EXTREMITIES: no cyanosis, clubbing or edema  LYMPH: No anterior cervical. No submandibular lymphadenopathy.   No posterior

## 2018-05-30 ENCOUNTER — OFFICE VISIT (OUTPATIENT)
Dept: OTOLARYNGOLOGY | Facility: CLINIC | Age: 63
End: 2018-05-30

## 2018-05-30 VITALS
TEMPERATURE: 98 F | SYSTOLIC BLOOD PRESSURE: 138 MMHG | WEIGHT: 131 LBS | DIASTOLIC BLOOD PRESSURE: 91 MMHG | BODY MASS INDEX: 19.85 KG/M2 | HEIGHT: 68 IN

## 2018-05-30 DIAGNOSIS — J01.91 ACUTE RECURRENT SINUSITIS, UNSPECIFIED LOCATION: Primary | ICD-10-CM

## 2018-05-30 PROCEDURE — 99212 OFFICE O/P EST SF 10 MIN: CPT | Performed by: OTOLARYNGOLOGY

## 2018-05-30 PROCEDURE — 99214 OFFICE O/P EST MOD 30 MIN: CPT | Performed by: OTOLARYNGOLOGY

## 2018-05-30 RX ORDER — AMOXICILLIN 500 MG/1
500 TABLET, FILM COATED ORAL 3 TIMES DAILY
Qty: 21 TABLET | Refills: 0 | Status: SHIPPED | OUTPATIENT
Start: 2018-05-30 | End: 2018-06-06

## 2018-05-30 NOTE — PROGRESS NOTES
Remi Stoner is a 60year old female.   Patient presents with:  Sore Throat: pt reports woke friday with a soar throat, sterp test was negative,   Nose Problem: pt reports nasal drainage, sinus pressure, severe headache      HISTORY OF PRESENT ILLNESS  5/3 • Depression    • Esophageal reflux    • Essential hypertension    • Gastritis    • High blood pressure    • Idiopathic acute pancreatitis 2002, 2010   • Insomnia    • Intestinal disorder    • Left wrist fracture 2011 and 2013   • Renal disorder    • Rot Normal Orientation - Oriented to time, place, person & situation. Appropriate mood and affect. Neck Exam Normal Inspection - Normal. Palpation - Normal without lymphadenopathy.  Parotid gland - Normal. Thyroid gland - Normal.   Eyes Normal Conjunctiva - R tablet (40 mg total) by mouth every morning before breakfast., Disp: 90 tablet, Rfl: 4  •  RaNITidine HCl 150 MG Oral Tab, Take 1 tablet (150 mg total) by mouth 2 (two) times daily.  As needed for breakthrough symptoms, Disp: 60 tablet, Rfl: 11  •  Estradio

## 2018-05-31 ENCOUNTER — TELEPHONE (OUTPATIENT)
Dept: GASTROENTEROLOGY | Facility: CLINIC | Age: 63
End: 2018-05-31

## 2018-05-31 ENCOUNTER — HOSPITAL ENCOUNTER (OUTPATIENT)
Dept: GENERAL RADIOLOGY | Facility: HOSPITAL | Age: 63
Discharge: HOME | End: 2018-05-31
Attending: ORTHOPAEDIC SURGERY
Payer: COMMERCIAL

## 2018-05-31 ENCOUNTER — OFFICE VISIT (OUTPATIENT)
Dept: ORTHOPEDICS CLINIC | Facility: CLINIC | Age: 63
End: 2018-05-31

## 2018-05-31 DIAGNOSIS — M67.441 GANGLION OF FLEXOR TENDON SHEATH OF RIGHT INDEX FINGER: Primary | ICD-10-CM

## 2018-05-31 DIAGNOSIS — M67.441 GANGLION OF FLEXOR TENDON SHEATH OF RIGHT INDEX FINGER: ICD-10-CM

## 2018-05-31 PROCEDURE — 99214 OFFICE O/P EST MOD 30 MIN: CPT | Performed by: ORTHOPAEDIC SURGERY

## 2018-05-31 PROCEDURE — 99212 OFFICE O/P EST SF 10 MIN: CPT | Performed by: ORTHOPAEDIC SURGERY

## 2018-05-31 PROCEDURE — 73140 X-RAY EXAM OF FINGER(S): CPT | Performed by: ORTHOPAEDIC SURGERY

## 2018-05-31 RX ORDER — SUCRALFATE 1 G/1
1 TABLET ORAL
Qty: 360 TABLET | Refills: 3 | Status: SHIPPED | OUTPATIENT
Start: 2018-05-31 | End: 2019-03-19

## 2018-05-31 NOTE — TELEPHONE ENCOUNTER
Current Outpatient Prescriptions:        sucralfate 1 g Oral Tab Take 1 tablet (1 g total) by mouth 4 (four) times daily before meals and nightly.  Disp: 120 tablet Rfl: 2                                                                       Pt asking

## 2018-05-31 NOTE — TELEPHONE ENCOUNTER
Pt contacted and notified her of Dr. David Hebert message below, she will f/u with pharmacy for  time and cost.

## 2018-06-01 ENCOUNTER — TELEPHONE (OUTPATIENT)
Dept: INTERNAL MEDICINE CLINIC | Facility: CLINIC | Age: 63
End: 2018-06-01

## 2018-06-01 RX ORDER — ZOLEDRONIC ACID 5 MG/100ML
5 INJECTION, SOLUTION INTRAVENOUS ONCE
Qty: 1 EACH | Refills: 0 | Status: SHIPPED | OUTPATIENT
Start: 2018-06-01 | End: 2018-06-01

## 2018-06-01 RX ORDER — ZOLEDRONIC ACID 5 MG/100ML
5 INJECTION, SOLUTION INTRAVENOUS ONCE
Qty: 1 EACH | Refills: 0 | Status: SHIPPED
Start: 2018-06-01 | End: 2018-06-01

## 2018-06-01 NOTE — TELEPHONE ENCOUNTER
Pt is calling to order & then schedule Reclast injection   Per Dr Aly Leblanc she was to call in May to schedule this    Contact pt at [phone] with instructions     Pt will be in physical therapy today from 11am - 12 N velvte - ok to leave  if she is unab

## 2018-06-01 NOTE — TELEPHONE ENCOUNTER
Jay Jay Goldman from the Infusion center called. She needs the order for Reclast faxed to them. It seems like a prescription for the medication was sent to a pharmacy.   T: [phone]; F: [phone]    To nursing

## 2018-06-01 NOTE — PROGRESS NOTES
5/31/2018  Matt Heys Day  85/1955  60year old   female  Sol Garcia MD    HPI:   Patient presents with:  Cyst: R index finger - she had a brace on the finger but she lost it and she bought one which dose not work for her - has pain rated as 5/10 at hours as needed for Muscle spasms. Disp: 60 tablet Rfl: 0   Diclofenac Sodium 1 % Transdermal Gel Apply 4 g topically 4 (four) times daily. Disp: 1 Tube Rfl: 3   Amitriptyline HCl 10 MG Oral Tab Take 1 tablet (10 mg total) by mouth nightly.  Disp: 30 tablet Left      Comment: ORIF wrist fracture revision with plates and                screws- Ortho Dr James Huang  No date: LASIK  09/2017: OTHER SURGICAL HISTORY      Comment: Fussion L4-L5 - Dr. Stuart Bill  2008: SPINE SURGERY PROCEDURE UNLISTED      Comment: L1-L2 stable to radial and ulnar deviation. It is stable to hyperextension. IMAGING AND TESTING:  Xrays of the patient's right index finger with 3 views taken today and reviewed by me reveal normal alignment.       ASSESSMENT AND PLAN:   Ganglion of flexor

## 2018-06-01 NOTE — TELEPHONE ENCOUNTER
[facility] order for Tu Villanueva can call infusion center to schedule appointment.

## 2018-06-01 NOTE — TELEPHONE ENCOUNTER
Refill request is for a maintenance medication and has met the criteria specified in the Ambulatory Medication Refill Standing Order for eligibility, visits, laboratory, alerts and was sent to the requested pharmacy.     To Tomas Sue to call pt

## 2018-06-05 ENCOUNTER — TELEPHONE (OUTPATIENT)
Dept: ORTHOPEDICS CLINIC | Facility: CLINIC | Age: 63
End: 2018-06-05

## 2018-06-05 NOTE — TELEPHONE ENCOUNTER
Spoke to pt and she is asking if she is just given a local instead of twilight anesthesia for excision of cyst from right index finger, would this be done at the surgery center too for local anesthesia?  Pt states she does have a hx of passing out, hypotens

## 2018-06-05 NOTE — TELEPHONE ENCOUNTER
Call back to [facility]. Notified response from Dr. Shaina Ornelas. Verbalies understanding. Wants sedation.

## 2018-06-05 NOTE — TELEPHONE ENCOUNTER
Pt has a cyst on the finger. scheduled for surgery 6-22-18. Pt has questions on having it removed the other way.   Local.    Call before 11:30am or after 12:30pm.

## 2018-06-06 ENCOUNTER — TELEPHONE (OUTPATIENT)
Dept: HEMATOLOGY/ONCOLOGY | Facility: HOSPITAL | Age: 63
End: 2018-06-06

## 2018-06-08 ENCOUNTER — TELEPHONE (OUTPATIENT)
Dept: INTERNAL MEDICINE CLINIC | Facility: CLINIC | Age: 63
End: 2018-06-08

## 2018-06-08 DIAGNOSIS — D64.9 ANEMIA: Primary | ICD-10-CM

## 2018-06-08 NOTE — TELEPHONE ENCOUNTER
Mauri faxed a Third Party Reject form for Zoledronic Acid  5 mg/100ml Inj.         Please call [phone]. ID # is [id number]. Placed in red folder.

## 2018-06-08 NOTE — TELEPHONE ENCOUNTER
Justin Jones: is a PA needed? Per 6/1/18 encounter Patient will be receiving reclast at the infusion center.

## 2018-06-11 ENCOUNTER — OFFICE VISIT (OUTPATIENT)
Dept: HEMATOLOGY/ONCOLOGY | Facility: HOSPITAL | Age: 63
End: 2018-06-11
Attending: INTERNAL MEDICINE
Payer: COMMERCIAL

## 2018-06-11 VITALS
DIASTOLIC BLOOD PRESSURE: 86 MMHG | SYSTOLIC BLOOD PRESSURE: 120 MMHG | RESPIRATION RATE: 16 BRPM | HEART RATE: 77 BPM | TEMPERATURE: 98 F

## 2018-06-11 DIAGNOSIS — M85.80 OSTEOPENIA: Primary | ICD-10-CM

## 2018-06-11 PROCEDURE — 96365 THER/PROPH/DIAG IV INF INIT: CPT

## 2018-06-11 PROCEDURE — A4216 STERILE WATER/SALINE, 10 ML: HCPCS

## 2018-06-11 RX ORDER — SODIUM CHLORIDE 9 MG/ML
INJECTION, SOLUTION INTRAVENOUS
Status: DISCONTINUED
Start: 2018-06-11 | End: 2018-06-11

## 2018-06-11 RX ORDER — 0.9 % SODIUM CHLORIDE 0.9 %
VIAL (ML) INJECTION
Status: DISCONTINUED
Start: 2018-06-11 | End: 2018-06-11

## 2018-06-11 RX ORDER — ZOLEDRONIC ACID 5 MG/100ML
INJECTION, SOLUTION INTRAVENOUS
Status: COMPLETED
Start: 2018-06-11 | End: 2018-06-11

## 2018-06-11 RX ORDER — ZOLEDRONIC ACID 5 MG/100ML
5 INJECTION, SOLUTION INTRAVENOUS ONCE
Status: COMPLETED | OUTPATIENT
Start: 2018-06-11 | End: 2018-06-11

## 2018-06-11 RX ORDER — ZOLEDRONIC ACID 5 MG/100ML
5 INJECTION, SOLUTION INTRAVENOUS ONCE
Status: CANCELLED | OUTPATIENT
Start: 2018-06-11

## 2018-06-11 RX ADMIN — ZOLEDRONIC ACID 5 MG: 5 INJECTION, SOLUTION INTRAVENOUS at 15:06:00

## 2018-06-11 NOTE — PROGRESS NOTES
Patient here for reclast infusion for Osteoporosis. Ambulated from waiting room with steady gait,  Patient states she has had Reclast previously about 1 year ago and has not had any issues.     Pt given [employer] handout for Reclast and reviewed with patient

## 2018-06-16 ENCOUNTER — PATIENT MESSAGE (OUTPATIENT)
Dept: OTOLARYNGOLOGY | Facility: CLINIC | Age: 63
End: 2018-06-16

## 2018-06-18 NOTE — TELEPHONE ENCOUNTER
From: Dariana Stoner  To: Dionna Vazquez MD  Sent: 6/16/2018 12:18 PM CDT  Subject: Prescription Question    Hi Dr Josh Rosales  This is Mary Reynolds Day. I saw you about a week and half ago for a sinus infection and you also said my throat looked bad, stuff draining.

## 2018-06-22 ENCOUNTER — ANESTHESIA (OUTPATIENT)
Dept: SURGERY | Facility: HOSPITAL | Age: 63
End: 2018-06-22
Payer: COMMERCIAL

## 2018-06-22 ENCOUNTER — SURGERY (OUTPATIENT)
Age: 63
End: 2018-06-22

## 2018-06-22 ENCOUNTER — HOSPITAL ENCOUNTER (OUTPATIENT)
Facility: HOSPITAL | Age: 63
Setting detail: HOSPITAL OUTPATIENT SURGERY
Discharge: HOME | End: 2018-06-22
Attending: ORTHOPAEDIC SURGERY | Admitting: ORTHOPAEDIC SURGERY
Payer: COMMERCIAL

## 2018-06-22 ENCOUNTER — ANESTHESIA EVENT (OUTPATIENT)
Dept: SURGERY | Facility: HOSPITAL | Age: 63
End: 2018-06-22
Payer: COMMERCIAL

## 2018-06-22 VITALS
OXYGEN SATURATION: 99 % | RESPIRATION RATE: 16 BRPM | WEIGHT: 134.81 LBS | SYSTOLIC BLOOD PRESSURE: 114 MMHG | DIASTOLIC BLOOD PRESSURE: 83 MMHG | TEMPERATURE: 97 F | HEART RATE: 71 BPM | HEIGHT: 68 IN | BODY MASS INDEX: 20.43 KG/M2

## 2018-06-22 DIAGNOSIS — M67.441 GANGLION OF RIGHT HAND: ICD-10-CM

## 2018-06-22 PROCEDURE — 0LB70ZZ EXCISION OF RIGHT HAND TENDON, OPEN APPROACH: ICD-10-PCS | Performed by: ORTHOPAEDIC SURGERY

## 2018-06-22 RX ORDER — METOCLOPRAMIDE 10 MG/1
10 TABLET ORAL ONCE
Status: DISCONTINUED | OUTPATIENT
Start: 2018-06-22 | End: 2018-06-22 | Stop reason: HOSPADM

## 2018-06-22 RX ORDER — LIDOCAINE HYDROCHLORIDE 10 MG/ML
INJECTION, SOLUTION EPIDURAL; INFILTRATION; INTRACAUDAL; PERINEURAL AS NEEDED
Status: DISCONTINUED | OUTPATIENT
Start: 2018-06-22 | End: 2018-06-22 | Stop reason: SURG

## 2018-06-22 RX ORDER — ONDANSETRON 2 MG/ML
4 INJECTION INTRAMUSCULAR; INTRAVENOUS EVERY 6 HOURS PRN
Status: DISCONTINUED | OUTPATIENT
Start: 2018-06-22 | End: 2018-06-22

## 2018-06-22 RX ORDER — DIPHENHYDRAMINE HYDROCHLORIDE 50 MG/ML
25 INJECTION INTRAMUSCULAR; INTRAVENOUS ONCE
Status: COMPLETED | OUTPATIENT
Start: 2018-06-22 | End: 2018-06-22

## 2018-06-22 RX ORDER — ACETAMINOPHEN 500 MG
1000 TABLET ORAL ONCE
Status: DISCONTINUED | OUTPATIENT
Start: 2018-06-22 | End: 2018-06-22 | Stop reason: HOSPADM

## 2018-06-22 RX ORDER — MIDAZOLAM HYDROCHLORIDE 1 MG/ML
INJECTION INTRAMUSCULAR; INTRAVENOUS AS NEEDED
Status: DISCONTINUED | OUTPATIENT
Start: 2018-06-22 | End: 2018-06-22 | Stop reason: SURG

## 2018-06-22 RX ORDER — CLINDAMYCIN PHOSPHATE 900 MG/50ML
900 INJECTION INTRAVENOUS ONCE
Status: DISCONTINUED | OUTPATIENT
Start: 2018-06-22 | End: 2018-06-22 | Stop reason: HOSPADM

## 2018-06-22 RX ORDER — CLINDAMYCIN PHOSPHATE 150 MG/ML
INJECTION, SOLUTION INTRAVENOUS AS NEEDED
Status: DISCONTINUED | OUTPATIENT
Start: 2018-06-22 | End: 2018-06-22 | Stop reason: SURG

## 2018-06-22 RX ORDER — FAMOTIDINE 20 MG/1
20 TABLET ORAL ONCE
Status: DISCONTINUED | OUTPATIENT
Start: 2018-06-22 | End: 2018-06-22 | Stop reason: HOSPADM

## 2018-06-22 RX ORDER — SODIUM CHLORIDE, SODIUM LACTATE, POTASSIUM CHLORIDE, CALCIUM CHLORIDE 600; 310; 30; 20 MG/100ML; MG/100ML; MG/100ML; MG/100ML
INJECTION, SOLUTION INTRAVENOUS CONTINUOUS
Status: DISCONTINUED | OUTPATIENT
Start: 2018-06-22 | End: 2018-06-22

## 2018-06-22 RX ADMIN — MIDAZOLAM HYDROCHLORIDE 2 MG: 1 INJECTION INTRAMUSCULAR; INTRAVENOUS at 15:57:00

## 2018-06-22 RX ADMIN — SODIUM CHLORIDE, SODIUM LACTATE, POTASSIUM CHLORIDE, CALCIUM CHLORIDE: 600; 310; 30; 20 INJECTION, SOLUTION INTRAVENOUS at 15:58:00

## 2018-06-22 RX ADMIN — LIDOCAINE HYDROCHLORIDE 50 MG: 10 INJECTION, SOLUTION EPIDURAL; INFILTRATION; INTRACAUDAL; PERINEURAL at 16:00:00

## 2018-06-22 RX ADMIN — CLINDAMYCIN PHOSPHATE 900 MG: 150 INJECTION, SOLUTION INTRAVENOUS at 16:07:00

## 2018-06-22 RX ADMIN — SODIUM CHLORIDE, SODIUM LACTATE, POTASSIUM CHLORIDE, CALCIUM CHLORIDE: 600; 310; 30; 20 INJECTION, SOLUTION INTRAVENOUS at 16:23:00

## 2018-06-22 NOTE — BRIEF OP NOTE
Pre-Operative Diagnosis: Ganglion of right hand [M67.441]     Post-Operative Diagnosis: Ganglion of right hand [M67.441]      Procedure Performed:   Procedure(s):  RIGHT INDEX FINGER MUCOUS CYST EXCISION    Surgeon(s) and Role:     Uday Sandoval MD -

## 2018-06-22 NOTE — ANESTHESIA POSTPROCEDURE EVALUATION
Patient: Clarisse Cornell Day    Procedure Summary     Date:  06/22/18 Room / Location:  [address] [place] [address] MAIN OR 07 / [address] [facility] MAIN OR    Anesthesia Start:  5026 Anesthesia Stop:  9669    Procedure:  HAND GANGLION EXCISION (Right ) Diagnosis:       Ganglion of right hand      (Ga

## 2018-06-22 NOTE — H&P
Lambert Moder Day  85/1955  60year old   female  Luis Fernando Bhatia MD     HPI:   Patient presents with:  Cyst: R index finger - she had a brace on the finger but she lost it and she bought one which dose not work for her - has pain rated as 5/10 at all the t needed for Muscle spasms. Disp: 60 tablet Rfl: 0   Diclofenac Sodium 1 % Transdermal Gel Apply 4 g topically 4 (four) times daily. Disp: 1 Tube Rfl: 3   Amitriptyline HCl 10 MG Oral Tab Take 1 tablet (10 mg total) by mouth nightly.  Disp: 30 tablet Rfl: 12 FRACTURE SURGERY Left      Comment: ORIF wrist fracture revision with plates and                screws- Ortho Dr Joy Barriga  No date: LASIK  09/2017: OTHER SURGICAL HISTORY      Comment: Fussion L4-L5 - Dr. Galindo Wolfe  2008: Luisa Stone composite motion. The patient's DIP joint is stable to radial and ulnar deviation. It is stable to hyperextension.       IMAGING AND TESTING:  Xrays of the patient's right index finger with 3 views taken today and reviewed by me reveal normal alignment.

## 2018-06-22 NOTE — ANESTHESIA PREPROCEDURE EVALUATION
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 Disp: 90 tablet Rfl: 4 Taking   RaNITidine HCl 150 MG Oral Tab Take 1 tablet (150 mg total) by mouth 2 (two) times daily.  As needed for breakthrough symptoms Disp: 60 tablet Rfl: 11 Taking   E Other        Social History  Social History   Marital status:   Spouse name: N/A    Years of education: N/A  Number of children: N/A     Occupational History  None on file     Social History Main Topics   Smoking status: Never Smoker    Smokeless to exam  (+) hypertension,     Neuro/Psych - negative ROS     GI/Hepatic/Renal    (+) GERD,     Endo/Other    (+) arthritis  Abdominal  - normal exam             Anesthesia Plan:   ASA:  2  Plan:   MAC  Informed Consent Plan and Risks Discussed With:  Patient

## 2018-06-23 NOTE — OPERATIVE REPORT
[facility]    PATIENT'S NAME: Leeann Cricket VILLELA   ATTENDING PHYSICIAN: Yasmine Ch MD   OPERATING PHYSICIAN: Yasmine Ch MD   PATIENT ACCOUNT#:   [id number]    LOCATION:  [place] [address]  MEDICAL RECORD #:   [id number]       POLLY conditions, 1% lidocaine and 0.5% Marcaine in a 1:1 mixture was given at the planned site of incision and in the form of a digital nerve block to her right index finger. The patient received Ancef as antibiotic prophylaxis within 1 hour of incision time.

## 2018-07-03 ENCOUNTER — OFFICE VISIT (OUTPATIENT)
Dept: NEUROLOGY | Facility: CLINIC | Age: 63
End: 2018-07-03

## 2018-07-03 ENCOUNTER — TELEPHONE (OUTPATIENT)
Dept: NEUROLOGY | Facility: CLINIC | Age: 63
End: 2018-07-03

## 2018-07-03 VITALS — HEART RATE: 110 BPM | DIASTOLIC BLOOD PRESSURE: 62 MMHG | SYSTOLIC BLOOD PRESSURE: 110 MMHG | RESPIRATION RATE: 16 BRPM

## 2018-07-03 DIAGNOSIS — M54.42 ACUTE MIDLINE LOW BACK PAIN WITH LEFT-SIDED SCIATICA: ICD-10-CM

## 2018-07-03 DIAGNOSIS — M48.061 LUMBAR FORAMINAL STENOSIS: ICD-10-CM

## 2018-07-03 DIAGNOSIS — M54.16 LUMBAR RADICULOPATHY: ICD-10-CM

## 2018-07-03 DIAGNOSIS — M51.9 LUMBAR DISC DISEASE: Primary | ICD-10-CM

## 2018-07-03 DIAGNOSIS — M43.16 SPONDYLOLISTHESIS OF LUMBAR REGION: ICD-10-CM

## 2018-07-03 DIAGNOSIS — M43.10 RETROLISTHESIS: ICD-10-CM

## 2018-07-03 DIAGNOSIS — Z98.1 HISTORY OF LUMBAR FUSION: ICD-10-CM

## 2018-07-03 PROCEDURE — 99214 OFFICE O/P EST MOD 30 MIN: CPT | Performed by: PHYSICAL MEDICINE & REHABILITATION

## 2018-07-03 NOTE — PATIENT INSTRUCTIONS
As of October 6th 2014, the Drug Enforcement Agency [facility]) is reclassifying all hydrocodone combination medications from Schedule III to Schedule II. This includes medications such as Norco, Vicodin, Lortab, Zohydro, and Vicoprofen.     What this means for y will perform bilateral L5 TFESI(s) under MAC at the end of July of the middle of August.    She will start PT at [facility]. She will continue with the Flexeril at night. She will follow up in 3 months or sooner if needed.

## 2018-07-03 NOTE — TELEPHONE ENCOUNTER
Called [facility], for authorization of Lumbar TFESI . CPT code/s O341447, [id number]. T/t Bisi [facility]LEY who states no authorization is required. Reference # [id number]  Will inform nursing.

## 2018-07-03 NOTE — TELEPHONE ENCOUNTER
Contacted patient to schedule bilateral L5 TFESIs under MAC sedation. Patient is at a food fest and will call back when ready to schedule.

## 2018-07-03 NOTE — PROGRESS NOTES
Low Back Pain H & P    Chief Complaint: Patient presents with:  Low Back Pain: pt here for follow up after Bilateral L5 transforaminal epidural steroid injections 3/23/18 with 85% relief for 2 months.  pt c/o severe pain in the lower left side back that cristi Idiopathic acute pancreatitis 2002, 2010   • Insomnia    • Intestinal disorder    • Left wrist fracture 2011 and 2013   • Muscle weakness    • Renal disorder    • Rotator cuff tear, right    • Traumatic arthritis    • Vertigo        Past Surgical History Review of Systems      PE: The patient does appear in her stated age in no distress. The patient is well groomed. Psychiatric:  The patient is alert and oriented x 3. The patient has a normal affect and mood.       Respiratory:  No acute respira lateral, L2-3 right mild/left mod foraminal bulging discs    2. L4-5 left mod-severe, L3-4 left mod-severe/right mod, L2-3 right mod foraminal stenosis    3. L5-S1 grade 1 unstable, L4-5 grade 1 unstable spondylolisthesis    4.  L2-3 grade 1 stable retrolis

## 2018-07-10 ENCOUNTER — OFFICE VISIT (OUTPATIENT)
Dept: ORTHOPEDICS CLINIC | Facility: CLINIC | Age: 63
End: 2018-07-10

## 2018-07-10 DIAGNOSIS — Z47.89 AFTERCARE FOLLOWING SURGERY OF THE MUSCULOSKELETAL SYSTEM: Primary | ICD-10-CM

## 2018-07-10 PROCEDURE — 99212 OFFICE O/P EST SF 10 MIN: CPT | Performed by: ORTHOPAEDIC SURGERY

## 2018-07-10 PROCEDURE — 99024 POSTOP FOLLOW-UP VISIT: CPT | Performed by: ORTHOPAEDIC SURGERY

## 2018-07-10 NOTE — PROGRESS NOTES
7/10/2018  Lambert Moder Day  85/1955  60year old   female  Luis Fernando Bhatia MD    HPI:   Patient presents with:  Post-Op: s/p right index finger- pt states she was having some redness and swelling over the weekend.  She states since then it has improved and encounter.

## 2018-07-11 ENCOUNTER — OFFICE VISIT (OUTPATIENT)
Dept: PHYSICAL THERAPY | Facility: HOSPITAL | Age: 63
End: 2018-07-11
Attending: PHYSICAL MEDICINE & REHABILITATION
Payer: COMMERCIAL

## 2018-07-11 DIAGNOSIS — Z98.1 HISTORY OF LUMBAR FUSION: ICD-10-CM

## 2018-07-11 DIAGNOSIS — M51.9 LUMBAR DISC DISEASE: ICD-10-CM

## 2018-07-11 DIAGNOSIS — M54.42 ACUTE MIDLINE LOW BACK PAIN WITH LEFT-SIDED SCIATICA: ICD-10-CM

## 2018-07-11 DIAGNOSIS — M43.10 RETROLISTHESIS: ICD-10-CM

## 2018-07-11 DIAGNOSIS — M43.16 SPONDYLOLISTHESIS OF LUMBAR REGION: ICD-10-CM

## 2018-07-11 DIAGNOSIS — M48.061 LUMBAR FORAMINAL STENOSIS: ICD-10-CM

## 2018-07-11 DIAGNOSIS — M54.16 LUMBAR RADICULOPATHY: ICD-10-CM

## 2018-07-11 PROCEDURE — 97110 THERAPEUTIC EXERCISES: CPT

## 2018-07-11 PROCEDURE — 97162 PT EVAL MOD COMPLEX 30 MIN: CPT

## 2018-07-11 NOTE — PROGRESS NOTES
LUMBAR SPINE EVALUATION:   Referring Physician: Dr. Mary Jane Sanchez  Date of Onset: Dec 2017 Date of Service: 7/11/2018   Diagnosis: L5-S1 left mild-mod far lateral, L4-5 left mod-large/right mod far lateral, L3-4 left mod/right mild far lateral, L2-3 right mild/ to 30 minutes, standing limited to one hour  Rich Sweet describes prior level of function prior to early 2018 no limitations. Pt goals include decrease pain, increase tolerance for sitting and walking. Past medical history was reviewed with Rich Sweet.  Signific Observation/Posture: increased waist angle R, R PSIS inf, R ASIS inf, thoracic spine R convex; lumbar L convex  Gait: WNL  ROM:     Trunk         Pain (+/-)   Flexion signif restriction                     +   Extension Decreased segmental mobility    R activity tolerance                  Frequency / Duration: Patient will be seen for 2x/week or a total of 18 visits over a 90 day period.   Treatment will include: Manual Therapy, Neuromuscular Re-education, Self-Care Home Management, Therapeutic Activities,

## 2018-07-12 DIAGNOSIS — R51.9 ACUTE INTRACTABLE HEADACHE, UNSPECIFIED HEADACHE TYPE: ICD-10-CM

## 2018-07-12 NOTE — TELEPHONE ENCOUNTER
Dr Santiago Chou the pt did have refills available however they are now null and void because it's after the the written date of 6/18/17.    See below note

## 2018-07-12 NOTE — TELEPHONE ENCOUNTER
Pending Prescriptions Disp Refills    PANTOPRAZOLE SODIUM 40 MG Oral Tab EC [Pharmacy Med Name: PANTOPRAZOLE 40MG TABLETS] 90 tablet 0     Sig: TAKE 1 TABLET BY MOUTH EVERY MORNING BEFORE BREAKFAST         LOV 3/13/18  LR 6/18/17    em

## 2018-07-13 ENCOUNTER — TELEPHONE (OUTPATIENT)
Dept: NEUROLOGY | Facility: CLINIC | Age: 63
End: 2018-07-13

## 2018-07-13 RX ORDER — PANTOPRAZOLE SODIUM 40 MG/1
40 TABLET, DELAYED RELEASE ORAL
Qty: 90 TABLET | Refills: 3 | Status: SHIPPED | OUTPATIENT
Start: 2018-07-13 | End: 2019-04-22

## 2018-07-13 NOTE — TELEPHONE ENCOUNTER
Called patient to advise insurance was verified and PT is a covered benefit and does not require authorization for initial evaluation, patient states that she will call to schedule

## 2018-07-13 NOTE — TELEPHONE ENCOUNTER
Patient has been scheduled for a bilateral L5 TFESIs  on 8/10/18 at the [facility]. Medications and allergies reviewed. Patient informed to hold aspirins, nsaids, blood thinners, vitamins and fish oils 3-7 days prior to procedure.  Patient informed we will need v

## 2018-07-13 NOTE — TELEPHONE ENCOUNTER
Called patient to advise insurance was verified and PT is a covered benefit and does not require authorization for initial evaluation  Will call patient to inform of the Approval

## 2018-07-16 ENCOUNTER — MED REC SCAN ONLY (OUTPATIENT)
Dept: NEUROLOGY | Facility: CLINIC | Age: 63
End: 2018-07-16

## 2018-07-16 NOTE — TELEPHONE ENCOUNTER
Spoke to patient. Due to her ride, she requested to reschedule injection from 8/10/18 to 8/17/18. Rescheduled patient. She was very thankful for the return call. Scheduling form faxed to [facility].

## 2018-07-17 ENCOUNTER — OFFICE VISIT (OUTPATIENT)
Dept: PHYSICAL THERAPY | Facility: HOSPITAL | Age: 63
End: 2018-07-17
Attending: PHYSICAL MEDICINE & REHABILITATION
Payer: COMMERCIAL

## 2018-07-17 PROCEDURE — 97110 THERAPEUTIC EXERCISES: CPT

## 2018-07-17 PROCEDURE — 97140 MANUAL THERAPY 1/> REGIONS: CPT

## 2018-07-17 PROCEDURE — 97530 THERAPEUTIC ACTIVITIES: CPT

## 2018-07-17 NOTE — PROGRESS NOTES
Diagnosis:   L5-S1 left mild-mod far lateral, L4-5 left mod-large/right mod far lateral, L3-4 left mod/right mild far lateral, L2-3 right mild/left mod foraminal bulging discs  (primary encounter diagnosis)  L4-5 left mod-severe, L3-4 left mod-severe/right

## 2018-07-19 ENCOUNTER — OFFICE VISIT (OUTPATIENT)
Dept: PHYSICAL THERAPY | Facility: HOSPITAL | Age: 63
End: 2018-07-19
Attending: PHYSICAL MEDICINE & REHABILITATION
Payer: COMMERCIAL

## 2018-07-19 PROCEDURE — 97530 THERAPEUTIC ACTIVITIES: CPT

## 2018-07-19 PROCEDURE — 97110 THERAPEUTIC EXERCISES: CPT

## 2018-07-19 PROCEDURE — 97140 MANUAL THERAPY 1/> REGIONS: CPT

## 2018-07-19 NOTE — PROGRESS NOTES
Diagnosis:   L5-S1 left mild-mod far lateral, L4-5 left mod-large/right mod far lateral, L3-4 left mod/right mild far lateral, L2-3 right mild/left mod foraminal bulging discs  (primary encounter diagnosis)  L4-5 left mod-severe, L3-4 left mod-severe/right 90/90 with hand on knee, 4 point LE reach, GRAY/REIL as trial    Assessment: Still inconsistent results with GRAY. Pt unable to stabilize for 90/90 although now able to recruit TA comfortably. Plan: Continue to monitor response to extension ex's.  Prog

## 2018-07-23 ENCOUNTER — APPOINTMENT (OUTPATIENT)
Dept: PHYSICAL THERAPY | Facility: HOSPITAL | Age: 63
End: 2018-07-23
Attending: PHYSICAL MEDICINE & REHABILITATION
Payer: COMMERCIAL

## 2018-07-24 ENCOUNTER — OFFICE VISIT (OUTPATIENT)
Dept: PHYSICAL THERAPY | Facility: HOSPITAL | Age: 63
End: 2018-07-24
Attending: PHYSICAL MEDICINE & REHABILITATION
Payer: COMMERCIAL

## 2018-07-24 PROCEDURE — 97110 THERAPEUTIC EXERCISES: CPT

## 2018-07-24 PROCEDURE — 97140 MANUAL THERAPY 1/> REGIONS: CPT

## 2018-07-24 NOTE — PROGRESS NOTES
Diagnosis:   L5-S1 left mild-mod far lateral, L4-5 left mod-large/right mod far lateral, L3-4 left mod/right mild far lateral, L2-3 right mild/left mod foraminal bulging discs  (primary encounter diagnosis)  L4-5 left mod-severe, L3-4 left mod-severe/right STM/MFR (R) ITB  X    Neuro ReEducation       Therapeutic Activity Inst in back protection for sit to stand, sup to sit and sit to sup, siting, bending  review of golfers and quarterback lifts with modifications specific for her ADL's     Modalities   Inst

## 2018-07-26 ENCOUNTER — OFFICE VISIT (OUTPATIENT)
Dept: PHYSICAL THERAPY | Facility: HOSPITAL | Age: 63
End: 2018-07-26
Attending: PHYSICAL MEDICINE & REHABILITATION
Payer: COMMERCIAL

## 2018-07-26 PROCEDURE — 97110 THERAPEUTIC EXERCISES: CPT

## 2018-07-26 PROCEDURE — 97530 THERAPEUTIC ACTIVITIES: CPT

## 2018-07-26 PROCEDURE — 97140 MANUAL THERAPY 1/> REGIONS: CPT

## 2018-07-26 NOTE — PROGRESS NOTES
Diagnosis:   L5-S1 left mild-mod far lateral, L4-5 left mod-large/right mod far lateral, L3-4 left mod/right mild far lateral, L2-3 right mild/left mod foraminal bulging discs  (primary encounter diagnosis)  L4-5 left mod-severe, L3-4 left mod-severe/right STM/MFR (R) more than (L) lumbar paraspinals and QL STM/MFR (R) ITB  X  STM/MFR (R) LS   Neuro ReEducation       Therapeutic Activity Inst in back protection for sit to stand, sup to sit and sit to sup, siting, bending  review of golfers and quarterback li

## 2018-07-30 ENCOUNTER — OFFICE VISIT (OUTPATIENT)
Dept: PHYSICAL THERAPY | Facility: HOSPITAL | Age: 63
End: 2018-07-30
Attending: PHYSICAL MEDICINE & REHABILITATION
Payer: COMMERCIAL

## 2018-07-30 PROCEDURE — 97140 MANUAL THERAPY 1/> REGIONS: CPT

## 2018-07-30 NOTE — PROGRESS NOTES
Diagnosis:   L5-S1 left mild-mod far lateral, L4-5 left mod-large/right mod far lateral, L3-4 left mod/right mild far lateral, L2-3 right mild/left mod foraminal bulging discs  (primary encounter diagnosis)  L4-5 left mod-severe, L3-4 left mod-severe/right sidelying x 10  Review of [place] to pressups as trial for (R) lat thigh pain  Bridge x 3, bridge with heel lift x 4  bent knee fall out x 5  90/90 with hand hold x 5  REIL with exhalation    Manual  STM/MFR (R) more than (L) lumbar paraspinals and QL STM/MFR

## 2018-07-31 NOTE — TELEPHONE ENCOUNTER
Returned patient's call. Patient is in severe pain and would like to reschedule for a sooner appointment. Patient rescheduled to 8/10/2018    [facility] called and updated schedule form faxed to [facility].

## 2018-08-01 ENCOUNTER — TELEPHONE (OUTPATIENT)
Dept: GASTROENTEROLOGY | Facility: CLINIC | Age: 63
End: 2018-08-01

## 2018-08-01 ENCOUNTER — TELEPHONE (OUTPATIENT)
Dept: NEUROLOGY | Facility: CLINIC | Age: 63
End: 2018-08-01

## 2018-08-01 ENCOUNTER — OFFICE VISIT (OUTPATIENT)
Dept: PHYSICAL THERAPY | Facility: HOSPITAL | Age: 63
End: 2018-08-01
Attending: PHYSICAL MEDICINE & REHABILITATION
Payer: COMMERCIAL

## 2018-08-01 PROCEDURE — 97110 THERAPEUTIC EXERCISES: CPT

## 2018-08-01 RX ORDER — AMITRIPTYLINE HYDROCHLORIDE 10 MG/1
10 TABLET, FILM COATED ORAL NIGHTLY
Qty: 30 TABLET | Refills: 12 | Status: SHIPPED | OUTPATIENT
Start: 2018-08-01 | End: 2018-11-06

## 2018-08-01 NOTE — PROGRESS NOTES
Diagnosis:   L5-S1 left mild-mod far lateral, L4-5 left mod-large/right mod far lateral, L3-4 left mod/right mild far lateral, L2-3 right mild/left mod foraminal bulging discs  (primary encounter diagnosis)  L4-5 left mod-severe, L3-4 left mod-severe/right pain is present only sitting   Bent knee fall out and heel slide x 2  90/90 with hand hold x 10  Clam (R) x 10  abd (R) in sidelying x 10  Review of [place] to pressups as trial for (R) lat thigh pain  Bridge x 3, bridge with heel lift x 4  bent knee fall out

## 2018-08-01 NOTE — TELEPHONE ENCOUNTER
Pt called for refill (current RX had refills but it was ):     Current Outpatient Prescriptions:     •  Amitriptyline HCl 10 MG Oral Tab, Take 1 tablet (10 mg total) by mouth nightly.  (Patient taking differently: Take 10 mg by mouth nightly as neede

## 2018-08-01 NOTE — TELEPHONE ENCOUNTER
Patient states for the last few days she was experiencing right side low back spasms. Currently in Physical Therapy with Linton Koyanagi who massaged area and symptoms subsided.  Patient wondered if new MRI is warranted prior to Bilateral L5 TFESIs under MAC s

## 2018-08-02 ENCOUNTER — APPOINTMENT (OUTPATIENT)
Dept: PHYSICAL THERAPY | Facility: HOSPITAL | Age: 63
End: 2018-08-02
Attending: PHYSICAL MEDICINE & REHABILITATION
Payer: COMMERCIAL

## 2018-08-03 ENCOUNTER — TELEPHONE (OUTPATIENT)
Dept: INTERNAL MEDICINE CLINIC | Facility: CLINIC | Age: 63
End: 2018-08-03

## 2018-08-03 NOTE — TELEPHONE ENCOUNTER
Calling to request new RX for \"jumpy legs\"  Pt states her legs feel like she is running a race at night  - was given an RX from Dr Charli Valdes some time ago but she never tried it & doesn't remember name of med  - also asks if another Dr would  prescribe fo

## 2018-08-05 NOTE — TELEPHONE ENCOUNTER
That was in 7/2015. The medication was Requip. Unfortunately she is currently on amitriptyline and cyclobenzaprine which can interact with the requip, so I would not recommend it.

## 2018-08-06 ENCOUNTER — APPOINTMENT (OUTPATIENT)
Dept: PHYSICAL THERAPY | Facility: HOSPITAL | Age: 63
End: 2018-08-06
Attending: PHYSICAL MEDICINE & REHABILITATION
Payer: COMMERCIAL

## 2018-08-06 NOTE — TELEPHONE ENCOUNTER
To Dr. Diego Favors - your message relayed to pt who verbalized understanding. Is there anything else you can recommend for \"jumpy legs\"? Does not happen every night. Pt does take iron pill which seems to help sometimes.

## 2018-08-07 ENCOUNTER — OFFICE VISIT (OUTPATIENT)
Dept: PHYSICAL THERAPY | Facility: HOSPITAL | Age: 63
End: 2018-08-07
Attending: PHYSICAL MEDICINE & REHABILITATION
Payer: COMMERCIAL

## 2018-08-07 ENCOUNTER — LAB ENCOUNTER (OUTPATIENT)
Dept: LAB | Facility: HOSPITAL | Age: 63
End: 2018-08-07
Attending: ORTHOPAEDIC SURGERY
Payer: COMMERCIAL

## 2018-08-07 DIAGNOSIS — M54.50 LUMBAGO: Primary | ICD-10-CM

## 2018-08-07 DIAGNOSIS — Z98.1 ARTHRODESIS STATUS: ICD-10-CM

## 2018-08-07 LAB
ANION GAP SERPL CALC-SCNC: 7 MMOL/L (ref 0–18)
BUN SERPL-MCNC: 14 MG/DL (ref 8–20)
BUN/CREAT SERPL: 20.3 (ref 10–20)
CALCIUM SERPL-MCNC: 8.8 MG/DL (ref 8.5–10.5)
CHLORIDE SERPL-SCNC: 107 MMOL/L (ref 95–110)
CO2 SERPL-SCNC: 26 MMOL/L (ref 22–32)
CREAT SERPL-MCNC: 0.69 MG/DL (ref 0.5–1.5)
GLUCOSE SERPL-MCNC: 90 MG/DL (ref 70–99)
OSMOLALITY UR CALC.SUM OF ELEC: 290 MOSM/KG (ref 275–295)
POTASSIUM SERPL-SCNC: 3.6 MMOL/L (ref 3.3–5.1)
SODIUM SERPL-SCNC: 140 MMOL/L (ref 136–144)

## 2018-08-07 PROCEDURE — 97140 MANUAL THERAPY 1/> REGIONS: CPT

## 2018-08-07 PROCEDURE — 36415 COLL VENOUS BLD VENIPUNCTURE: CPT

## 2018-08-07 PROCEDURE — 80048 BASIC METABOLIC PNL TOTAL CA: CPT

## 2018-08-07 PROCEDURE — 97110 THERAPEUTIC EXERCISES: CPT

## 2018-08-07 PROCEDURE — 97530 THERAPEUTIC ACTIVITIES: CPT

## 2018-08-07 NOTE — TELEPHONE ENCOUNTER
Both meds that I use can interact with her current meds.   Recommend that she see neurology -- Dr. Jeremie Hooker

## 2018-08-07 NOTE — PROGRESS NOTES
Diagnosis:   L5-S1 left mild-mod far lateral, L4-5 left mod-large/right mod far lateral, L3-4 left mod/right mild far lateral, L2-3 right mild/left mod foraminal bulging discs  (primary encounter diagnosis)  L4-5 left mod-severe, L3-4 left mod-severe/right indicated  inst in seated lumbar ext if LE pain is present only sitting   Bent knee fall out and heel slide x 2  90/90 with hand hold x 10  Clam (R) x 10  abd (R) in sidelying x 10  Review of [place] to pressups as trial for (R) lat thigh pain  Bridge x 3, cortney Charges:   There Ex  1, Man There 1, There Activ 1 Total Timed Treatment: 45 min  Total Treatment Time: 45 min

## 2018-08-08 ENCOUNTER — OFFICE VISIT (OUTPATIENT)
Dept: PHYSICAL THERAPY | Facility: HOSPITAL | Age: 63
End: 2018-08-08
Attending: PHYSICAL MEDICINE & REHABILITATION
Payer: COMMERCIAL

## 2018-08-08 PROCEDURE — 97140 MANUAL THERAPY 1/> REGIONS: CPT

## 2018-08-08 PROCEDURE — 97110 THERAPEUTIC EXERCISES: CPT

## 2018-08-08 NOTE — PROGRESS NOTES
Diagnosis:   L5-S1 left mild-mod far lateral, L4-5 left mod-large/right mod far lateral, L3-4 left mod/right mild far lateral, L2-3 right mild/left mod foraminal bulging discs  (primary encounter diagnosis)  L4-5 left mod-severe, L3-4 left mod-severe/right but not ready due to R glut max weakness causing excessive L lumbar and lower thoracic recruitment with phase 2   Therapeutic Exercises  GRAY and [place] - no consistent changes  4 point LE reach x 1  TA x 3  90/90 with hand on knee - pt unable to stabilize  b glut max and TA remain weak. TTP L QL. Plan:  Progress abominal stab as able to piston. Planks as josé. Cont STM to (R) ITB and QL. Charges:   There Ex  2, Man There 1            Total Timed Treatment: 45 min  Total Treatment Time: 45 min

## 2018-08-09 ENCOUNTER — APPOINTMENT (OUTPATIENT)
Dept: PHYSICAL THERAPY | Facility: HOSPITAL | Age: 63
End: 2018-08-09
Attending: PHYSICAL MEDICINE & REHABILITATION
Payer: COMMERCIAL

## 2018-08-10 ENCOUNTER — OFFICE VISIT (OUTPATIENT)
Dept: SURGERY | Facility: CLINIC | Age: 63
End: 2018-08-10
Payer: COMMERCIAL

## 2018-08-10 DIAGNOSIS — M54.16 LUMBAR RADICULOPATHY: Primary | ICD-10-CM

## 2018-08-10 DIAGNOSIS — M51.9 LUMBAR DISC DISEASE: ICD-10-CM

## 2018-08-10 PROCEDURE — 64483 NJX AA&/STRD TFRM EPI L/S 1: CPT | Performed by: PHYSICAL MEDICINE & REHABILITATION

## 2018-08-10 NOTE — PROCEDURES
[facility]    BILATERAL LUMBAR TRANSFORAMINAL   NAME:  Delfino Stoner    MR #:    [id number] :  10/5/1955     PHYSICIAN:  Daisy Cleaning        Operative Report    DATE OF PROCEDURE: 8/10/2018   PREOPERATIVE DIAGNOSES: 1. left > r with a 3 cc solution of 1.5 cc of 40 mg/cc of Kenalog and 1.5 cc of 1% PF lidocaine without epinephrine. After this, the needle was removed. Then  fluoroscopy was right anterior obliqued opening up the left L5-S1 intervertebral foramen.   At this point in

## 2018-08-13 ENCOUNTER — OFFICE VISIT (OUTPATIENT)
Dept: PHYSICAL THERAPY | Facility: HOSPITAL | Age: 63
End: 2018-08-13
Attending: PHYSICAL MEDICINE & REHABILITATION
Payer: COMMERCIAL

## 2018-08-13 PROCEDURE — 97140 MANUAL THERAPY 1/> REGIONS: CPT

## 2018-08-13 PROCEDURE — 97530 THERAPEUTIC ACTIVITIES: CPT

## 2018-08-13 NOTE — PROGRESS NOTES
Diagnosis:   L5-S1 left mild-mod far lateral, L4-5 left mod-large/right mod far lateral, L3-4 left mod/right mild far lateral, L2-3 right mild/left mod foraminal bulging discs  (primary encounter diagnosis)  L4-5 left mod-severe, L3-4 left mod-severe/right between days   Therapeutic Exercises Bent knee fall out and heel slide x 2  90/90 with hand hold x 10  Clam (R) x 10  abd (R) in sidelying x 10  Review of [place] to pressups as trial for (R) lat thigh pain  Bridge x 3, bridge with heel lift x 4  bent knee fal

## 2018-08-14 ENCOUNTER — OFFICE VISIT (OUTPATIENT)
Dept: PHYSICAL THERAPY | Facility: HOSPITAL | Age: 63
End: 2018-08-14
Attending: PHYSICAL MEDICINE & REHABILITATION
Payer: COMMERCIAL

## 2018-08-14 ENCOUNTER — TELEPHONE (OUTPATIENT)
Dept: PHYSICAL THERAPY | Facility: HOSPITAL | Age: 63
End: 2018-08-14

## 2018-08-14 PROCEDURE — 97140 MANUAL THERAPY 1/> REGIONS: CPT

## 2018-08-14 PROCEDURE — 97110 THERAPEUTIC EXERCISES: CPT

## 2018-08-14 NOTE — PROGRESS NOTES
Diagnosis:   L5-S1 left mild-mod far lateral, L4-5 left mod-large/right mod far lateral, L3-4 left mod/right mild far lateral, L2-3 right mild/left mod foraminal bulging discs  (primary encounter diagnosis)  L4-5 left mod-severe, L3-4 left mod-severe/right so on hold   Manual  X and L QL and paraspinals   R QL  L QL and paraspinals  X + R ITB  L mid-thoracic to lumbar   Neuro ReEducation         Therapeutic Activity    Review of back protection techniques for pts fitness classes which she states she would li

## 2018-08-15 ENCOUNTER — HOSPITAL ENCOUNTER (OUTPATIENT)
Dept: MRI IMAGING | Age: 63
Discharge: HOME | End: 2018-08-15
Attending: ORTHOPAEDIC SURGERY
Payer: COMMERCIAL

## 2018-08-15 DIAGNOSIS — M54.16 LUMBAR RADICULOPATHY: ICD-10-CM

## 2018-08-15 PROCEDURE — 72158 MRI LUMBAR SPINE W/O & W/DYE: CPT | Performed by: ORTHOPAEDIC SURGERY

## 2018-08-15 PROCEDURE — A9576 INJ PROHANCE MULTIPACK: HCPCS | Performed by: ORTHOPAEDIC SURGERY

## 2018-08-16 ENCOUNTER — APPOINTMENT (OUTPATIENT)
Dept: PHYSICAL THERAPY | Facility: HOSPITAL | Age: 63
End: 2018-08-16
Attending: PHYSICAL MEDICINE & REHABILITATION
Payer: COMMERCIAL

## 2018-08-20 ENCOUNTER — TELEPHONE (OUTPATIENT)
Dept: GASTROENTEROLOGY | Facility: CLINIC | Age: 63
End: 2018-08-20

## 2018-08-20 NOTE — TELEPHONE ENCOUNTER
Pt indicates she is out of rx:Fucralfate and requesting refill asap, pt aware that Dr not in today, asking if another dr can refill in her absence, pls call at:[phone],thanks.   *Cam/22nd HALEY Mendoza      Current Outpatient Prescriptions:   l•

## 2018-08-20 NOTE — TELEPHONE ENCOUNTER
Pt. states to cancel her previous message for a refill, as she found her med. Pt. Does not need a refill.

## 2018-08-21 ENCOUNTER — OFFICE VISIT (OUTPATIENT)
Dept: PHYSICAL THERAPY | Facility: HOSPITAL | Age: 63
End: 2018-08-21
Attending: PHYSICAL MEDICINE & REHABILITATION
Payer: COMMERCIAL

## 2018-08-21 ENCOUNTER — TELEPHONE (OUTPATIENT)
Dept: NEUROLOGY | Facility: CLINIC | Age: 63
End: 2018-08-21

## 2018-08-21 PROCEDURE — 97110 THERAPEUTIC EXERCISES: CPT

## 2018-08-21 PROCEDURE — 97530 THERAPEUTIC ACTIVITIES: CPT

## 2018-08-21 PROCEDURE — 97140 MANUAL THERAPY 1/> REGIONS: CPT

## 2018-08-21 NOTE — PROGRESS NOTES
Diagnosis:   L5-S1 left mild-mod far lateral, L4-5 left mod-large/right mod far lateral, L3-4 left mod/right mild far lateral, L2-3 right mild/left mod foraminal bulging discs  (primary encounter diagnosis)  L4-5 left mod-severe, L3-4 left mod-severe/right progression in prone sequenced gluts, but not ready due to R glut max weakness causing excessive L lumbar and lower thoracic recruitment with phase 2  advised to resume HEP gradually with decreased reps and dividing program between days  rectus stretch sta

## 2018-08-21 NOTE — TELEPHONE ENCOUNTER
Patient calling with condition update after bilateral L5 TFESI 8/10/18 at [facility].  Patient states she has experienced 80%  Relief from most recent injection, but not 100% relief like she felt after injection in 12/17 which lasted longer than her injection in

## 2018-08-23 ENCOUNTER — OFFICE VISIT (OUTPATIENT)
Dept: PHYSICAL THERAPY | Facility: HOSPITAL | Age: 63
End: 2018-08-23
Attending: PHYSICAL MEDICINE & REHABILITATION
Payer: COMMERCIAL

## 2018-08-23 PROCEDURE — 97140 MANUAL THERAPY 1/> REGIONS: CPT

## 2018-08-23 PROCEDURE — 97110 THERAPEUTIC EXERCISES: CPT

## 2018-08-23 NOTE — PROGRESS NOTES
Diagnosis:   L5-S1 left mild-mod far lateral, L4-5 left mod-large/right mod far lateral, L3-4 left mod/right mild far lateral, L2-3 right mild/left mod foraminal bulging discs  (primary encounter diagnosis)  L4-5 left mod-severe, L3-4 left mod-severe/right lumbar and lower thoracic recruitment with phase 2  advised to resume HEP gradually with decreased reps and dividing program between days  rectus stretch standing  kindra Discussed gradual return to water walking and gentle swimming; recommended she not st

## 2018-08-24 ENCOUNTER — PATIENT MESSAGE (OUTPATIENT)
Dept: GASTROENTEROLOGY | Facility: CLINIC | Age: 63
End: 2018-08-24

## 2018-08-24 ENCOUNTER — TELEPHONE (OUTPATIENT)
Dept: GASTROENTEROLOGY | Facility: CLINIC | Age: 63
End: 2018-08-24

## 2018-08-24 DIAGNOSIS — R10.13 EPIGASTRIC PAIN: Primary | ICD-10-CM

## 2018-08-24 NOTE — TELEPHONE ENCOUNTER
Pt calling about pain she is having - from something she ate recently - going out of country soon -asking to have blood test ordered - see olamidet message from today

## 2018-08-28 ENCOUNTER — OFFICE VISIT (OUTPATIENT)
Dept: PHYSICAL THERAPY | Facility: HOSPITAL | Age: 63
End: 2018-08-28
Attending: PHYSICAL MEDICINE & REHABILITATION
Payer: COMMERCIAL

## 2018-08-28 PROCEDURE — 97110 THERAPEUTIC EXERCISES: CPT

## 2018-08-28 PROCEDURE — 97140 MANUAL THERAPY 1/> REGIONS: CPT

## 2018-08-28 NOTE — PROGRESS NOTES
Diagnosis:   L5-S1 left mild-mod far lateral, L4-5 left mod-large/right mod far lateral, L3-4 left mod/right mild far lateral, L2-3 right mild/left mod foraminal bulging discs  (primary encounter diagnosis)  L4-5 left mod-severe, L3-4 left mod-severe/right Discussed gradual return to water walking and gentle swimming; recommended she not start with a class. Educated re the potential benefits of regular general conditioning ex's for LBP  Reinforced more frequent REIL/GRAY to be done even when not in pain.   To

## 2018-08-30 ENCOUNTER — APPOINTMENT (OUTPATIENT)
Dept: LAB | Facility: HOSPITAL | Age: 63
End: 2018-08-30
Attending: INTERNAL MEDICINE
Payer: COMMERCIAL

## 2018-08-30 ENCOUNTER — OFFICE VISIT (OUTPATIENT)
Dept: PHYSICAL THERAPY | Facility: HOSPITAL | Age: 63
End: 2018-08-30
Attending: PHYSICAL MEDICINE & REHABILITATION
Payer: COMMERCIAL

## 2018-08-30 DIAGNOSIS — R10.13 EPIGASTRIC PAIN: ICD-10-CM

## 2018-08-30 LAB — LIPASE SERPL-CCNC: 36 U/L (ref 22–51)

## 2018-08-30 PROCEDURE — 97110 THERAPEUTIC EXERCISES: CPT

## 2018-08-30 PROCEDURE — 97140 MANUAL THERAPY 1/> REGIONS: CPT

## 2018-08-30 PROCEDURE — 36415 COLL VENOUS BLD VENIPUNCTURE: CPT

## 2018-08-30 PROCEDURE — 83690 ASSAY OF LIPASE: CPT

## 2018-08-30 NOTE — PROGRESS NOTES
Diagnosis:   L5-S1 left mild-mod far lateral, L4-5 left mod-large/right mod far lateral, L3-4 left mod/right mild far lateral, L2-3 right mild/left mod foraminal bulging discs  (primary encounter diagnosis)  L4-5 left mod-severe, L3-4 left mod-severe/right she not start with a class. Educated re the potential benefits of regular general conditioning ex's for LBP  Reinforced more frequent REIL/GRAY to be done even when not in pain.   To do only phase 1 prone sequenced gluts  Reinforced not stretching past star

## 2018-09-04 ENCOUNTER — OFFICE VISIT (OUTPATIENT)
Dept: PHYSICAL THERAPY | Facility: HOSPITAL | Age: 63
End: 2018-09-04
Attending: PHYSICAL MEDICINE & REHABILITATION
Payer: COMMERCIAL

## 2018-09-04 ENCOUNTER — TELEPHONE (OUTPATIENT)
Dept: GASTROENTEROLOGY | Facility: CLINIC | Age: 63
End: 2018-09-04

## 2018-09-04 PROCEDURE — 97140 MANUAL THERAPY 1/> REGIONS: CPT

## 2018-09-04 PROCEDURE — 97110 THERAPEUTIC EXERCISES: CPT

## 2018-09-04 RX ORDER — RANITIDINE 150 MG/1
150 TABLET ORAL DAILY PRN
Qty: 60 TABLET | Refills: 0 | Status: SHIPPED | OUTPATIENT
Start: 2018-09-04 | End: 2018-09-07

## 2018-09-04 NOTE — TELEPHONE ENCOUNTER
Last OV 03/13/18  Last filled 02/15/17    Routed to [address] since pt needs this medication ASAP due to going out of town

## 2018-09-04 NOTE — PROGRESS NOTES
Diagnosis:   L5-S1 left mild-mod far lateral, L4-5 left mod-large/right mod far lateral, L3-4 left mod/right mild far lateral, L2-3 right mild/left mod foraminal bulging discs  (primary encounter diagnosis)  L4-5 left mod-severe, L3-4 left mod-severe/right of abd sidelying    SB L for R QL stretch - gentle  Advised to try seated posture modifications and REIL when buttock pain is present and monitor for any change  review of prone sequenced gluts x 6  Review of L abd sidelying   review of piston and inst in

## 2018-09-04 NOTE — TELEPHONE ENCOUNTER
Pt called to request refill (10 refills if possible). Pt needs RX sent to [facility]. Pt is leaving on Sunday out of town so she is requesting RX as soon as possible.         Current Outpatient Prescriptions:     •  RaNITidine HCl 150 MG Oral Tab,

## 2018-09-04 NOTE — TELEPHONE ENCOUNTER
Take Ranitidine 150 mg each day PRN. I see she has a bi-directional planned for next year (2019) with Dr. Lida Hinton. When Dr. Lida Hinton returns, she can order for more refills if the patient requests and it is appropriate. I have ordered 60#.

## 2018-09-05 ENCOUNTER — APPOINTMENT (OUTPATIENT)
Dept: PHYSICAL THERAPY | Facility: HOSPITAL | Age: 63
End: 2018-09-05
Attending: PHYSICAL MEDICINE & REHABILITATION
Payer: COMMERCIAL

## 2018-09-05 ENCOUNTER — TELEPHONE (OUTPATIENT)
Dept: PHYSICAL THERAPY | Facility: HOSPITAL | Age: 63
End: 2018-09-05

## 2018-09-06 ENCOUNTER — APPOINTMENT (OUTPATIENT)
Dept: PHYSICAL THERAPY | Facility: HOSPITAL | Age: 63
End: 2018-09-06
Attending: PHYSICAL MEDICINE & REHABILITATION
Payer: COMMERCIAL

## 2018-09-07 ENCOUNTER — OFFICE VISIT (OUTPATIENT)
Dept: OTOLARYNGOLOGY | Facility: CLINIC | Age: 63
End: 2018-09-07
Payer: COMMERCIAL

## 2018-09-07 DIAGNOSIS — J06.9 VIRAL UPPER RESPIRATORY TRACT INFECTION: Primary | ICD-10-CM

## 2018-09-07 PROCEDURE — 99212 OFFICE O/P EST SF 10 MIN: CPT | Performed by: OTOLARYNGOLOGY

## 2018-09-07 PROCEDURE — 99213 OFFICE O/P EST LOW 20 MIN: CPT | Performed by: OTOLARYNGOLOGY

## 2018-09-07 NOTE — PROGRESS NOTES
Darrick Stoner is a 60year old female. No chief complaint on file. HPI:   She has been experiencing nasal congestion with facial pressure and hoarseness of her voice for the last 4-5 days.   In a few days she is traveling out of the country for the next by Nasal route daily as needed. spray 2 spray by intranasal route  every day in each nostril  Disp:  Rfl:    Multiple Vitamins Oral Tab Take 1 tablet by mouth daily.  Multiple Vitamins tablet  Disp:  Rfl:       Past Medical History:   Diagnosis Date   • Ane Normal Inspection - Normal. Palpation - Normal. Parotid gland - Normal. Thyroid gland - Normal.   Psychiatric Normal Orientation - Oriented to time, place, person & situation. Appropriate mood and affect. Lymph Detail Normal Submental. Submandibular.  Ant

## 2018-09-16 ENCOUNTER — TELEPHONE (OUTPATIENT)
Dept: GASTROENTEROLOGY | Facility: CLINIC | Age: 63
End: 2018-09-16

## 2018-09-24 ENCOUNTER — TELEPHONE (OUTPATIENT)
Dept: PHYSICAL THERAPY | Facility: HOSPITAL | Age: 63
End: 2018-09-24

## 2018-10-04 ENCOUNTER — TELEPHONE (OUTPATIENT)
Dept: GASTROENTEROLOGY | Facility: CLINIC | Age: 63
End: 2018-10-04

## 2018-10-04 DIAGNOSIS — R10.13 EPIGASTRIC PAIN: ICD-10-CM

## 2018-10-04 DIAGNOSIS — K30 FUNCTIONAL DYSPEPSIA: Primary | ICD-10-CM

## 2018-10-04 DIAGNOSIS — Z80.0 FAMILY HISTORY OF COLON CANCER: ICD-10-CM

## 2018-10-04 NOTE — TELEPHONE ENCOUNTER
Pt is due for CLN next year - her sister was just dx with colon cancer - asking if she should have her CLN sooner

## 2018-10-08 NOTE — TELEPHONE ENCOUNTER
Pt calling again. Pt inquiring at [address] [facility] on 3/13/18 did she inform Dr. Nanci Velazco that her sister was just diagnosed with colon cancer. Pt informed Dr. Nanci Velazco is out of the office.  please call thank you [phone]

## 2018-10-08 NOTE — TELEPHONE ENCOUNTER
Pt is due for a colonoscopy and EGD in April of 2019. Her sister was diagnosed with colon cancer 2 years ago. She is wondering if she should have her procedure sooner. Last Colonoscopy was 2014.  She has had an unexplained weight loss of 10 pounds recently

## 2018-10-11 NOTE — TELEPHONE ENCOUNTER
Chart, medications, and recent visits reviewed.   OK to get your colonoscopy/EGD within 1-2 months, diagnosis family history of colon cancer, dyspepsia, epigastric pain, split dose MiraLAX preparation, MAC at [facility] or IV sedation at [facility].

## 2018-10-16 ENCOUNTER — OFFICE VISIT (OUTPATIENT)
Dept: INTERNAL MEDICINE CLINIC | Facility: CLINIC | Age: 63
End: 2018-10-16
Payer: COMMERCIAL

## 2018-10-16 ENCOUNTER — LAB ENCOUNTER (OUTPATIENT)
Dept: LAB | Age: 63
End: 2018-10-16
Attending: INTERNAL MEDICINE
Payer: COMMERCIAL

## 2018-10-16 VITALS
WEIGHT: 134 LBS | DIASTOLIC BLOOD PRESSURE: 90 MMHG | OXYGEN SATURATION: 99 % | SYSTOLIC BLOOD PRESSURE: 126 MMHG | HEIGHT: 68 IN | BODY MASS INDEX: 20.31 KG/M2 | HEART RATE: 95 BPM | TEMPERATURE: 99 F

## 2018-10-16 DIAGNOSIS — J06.9 ACUTE URI: ICD-10-CM

## 2018-10-16 DIAGNOSIS — R63.4 WEIGHT LOSS: Primary | ICD-10-CM

## 2018-10-16 DIAGNOSIS — R63.4 WEIGHT LOSS: ICD-10-CM

## 2018-10-16 PROBLEM — M54.9 INTRACTABLE BACK PAIN: Status: RESOLVED | Noted: 2018-02-26 | Resolved: 2018-10-16

## 2018-10-16 PROBLEM — J02.9 ACUTE PHARYNGITIS: Status: RESOLVED | Noted: 2018-05-28 | Resolved: 2018-10-16

## 2018-10-16 PROBLEM — M25.832 IMPINGEMENT SYNDROME OF LEFT WRIST: Status: RESOLVED | Noted: 2018-04-04 | Resolved: 2018-10-16

## 2018-10-16 PROBLEM — M67.441 GANGLION OF FLEXOR TENDON SHEATH OF RIGHT INDEX FINGER: Status: RESOLVED | Noted: 2018-05-31 | Resolved: 2018-10-16

## 2018-10-16 PROBLEM — N20.1 URETEROLITHIASIS: Status: RESOLVED | Noted: 2018-02-26 | Resolved: 2018-10-16

## 2018-10-16 PROCEDURE — 99214 OFFICE O/P EST MOD 30 MIN: CPT | Performed by: INTERNAL MEDICINE

## 2018-10-16 PROCEDURE — 80053 COMPREHEN METABOLIC PANEL: CPT

## 2018-10-16 PROCEDURE — 84443 ASSAY THYROID STIM HORMONE: CPT | Performed by: INTERNAL MEDICINE

## 2018-10-16 PROCEDURE — 36415 COLL VENOUS BLD VENIPUNCTURE: CPT | Performed by: INTERNAL MEDICINE

## 2018-10-16 PROCEDURE — 99212 OFFICE O/P EST SF 10 MIN: CPT | Performed by: INTERNAL MEDICINE

## 2018-10-16 PROCEDURE — 85025 COMPLETE CBC W/AUTO DIFF WBC: CPT

## 2018-10-16 RX ORDER — CEFUROXIME AXETIL 500 MG/1
500 TABLET ORAL 2 TIMES DAILY
Qty: 20 TABLET | Refills: 0 | Status: SHIPPED | OUTPATIENT
Start: 2018-10-16 | End: 2018-11-06 | Stop reason: ALTCHOICE

## 2018-10-16 NOTE — TELEPHONE ENCOUNTER
Scheduled for:  Colonoscopy [address]365 and EGD [address]  Provider Name: Dr. Elizabeth Owens  Date:  12/14/18  Location:  [facility]  Sedation:  IV  Time:  1300 (pt is aware to arrive at 1200)   Prep:  2 day Miralax/Gatorade, sent via VeriFone?:  Physician

## 2018-10-16 NOTE — PROGRESS NOTES
Lazaro Stoner is a 58year old female. Patient presents with:  Weight Loss: Weight is down to 130# at home, feels like she has no muscle on her legs. Family history of colon cancer, scheduled for scope next year.  She also has a productive cough, sinus conge mouth every 8 (eight) hours as needed for Muscle spasms. Disp: 60 tablet Rfl: 0   Diclofenac Sodium 1 % Transdermal Gel Apply 4 g topically 4 (four) times daily.  Disp: 1 Tube Rfl: 3   Amitriptyline HCl 10 MG Oral Tab Take 1 tablet (10 mg total) by mouth ni pancreatitis    Drug use: No       REVIEW OF SYSTEMS:   GENERAL HEALTH: feels well otherwise  RESPIRATORY: no SOB  CARDIOVASCULAR: no chest pain/pressure  GI: no nausea, vomiting, diarrhea    Wt Readings from Last 5 Encounters:  10/16/18 : 134 lb (60.8 kg)

## 2018-10-17 ENCOUNTER — OFFICE VISIT (OUTPATIENT)
Dept: PHYSICAL THERAPY | Facility: HOSPITAL | Age: 63
End: 2018-10-17
Attending: PHYSICAL MEDICINE & REHABILITATION
Payer: COMMERCIAL

## 2018-10-17 PROCEDURE — 97110 THERAPEUTIC EXERCISES: CPT

## 2018-10-17 NOTE — PROGRESS NOTES
Diagnosis:   L5-S1 left mild-mod far lateral, L4-5 left mod-large/right mod far lateral, L3-4 left mod/right mild far lateral, L2-3 right mild/left mod foraminal bulging discs  (primary encounter diagnosis)  L4-5 left mod-severe, L3-4 left mod-severe/right advised to resume HEP gradually with decreased reps and dividing program between days  rectus stretch standing  kindra Discussed gradual return to water walking and gentle swimming; recommended she not start with a class.  Educated re the potential benefit progressing to ex's to more specifically address curvature. Charges:    There Ex 3       Total Timed Treatment: 45 min  Total Treatment Time: 45 min

## 2018-10-18 ENCOUNTER — TELEPHONE (OUTPATIENT)
Dept: GASTROENTEROLOGY | Facility: CLINIC | Age: 63
End: 2018-10-18

## 2018-10-18 DIAGNOSIS — R10.13 EPIGASTRIC PAIN: ICD-10-CM

## 2018-10-18 DIAGNOSIS — Z80.0 FAMILY HISTORY OF COLON CANCER: Primary | ICD-10-CM

## 2018-10-18 DIAGNOSIS — K30 FUNCTIONAL DYSPEPSIA: ICD-10-CM

## 2018-10-19 NOTE — TELEPHONE ENCOUNTER
Scheduled for:  Colonoscopy [phone] and EGD [address]  Provider Name: Dr. Casandra Montgomery  Date:    From-12/14/18 To-12/4/18  [facility]  Sedation:  IV  Time:    From-1300  To-1230 (pt is aware to arrive at 1130)   Prep:  2 day Miralax/Gatorade, sent new

## 2018-10-22 ENCOUNTER — APPOINTMENT (OUTPATIENT)
Dept: PHYSICAL THERAPY | Facility: HOSPITAL | Age: 63
End: 2018-10-22
Attending: PHYSICAL MEDICINE & REHABILITATION
Payer: COMMERCIAL

## 2018-10-24 ENCOUNTER — APPOINTMENT (OUTPATIENT)
Dept: PHYSICAL THERAPY | Facility: HOSPITAL | Age: 63
End: 2018-10-24
Attending: PHYSICAL MEDICINE & REHABILITATION
Payer: COMMERCIAL

## 2018-10-25 ENCOUNTER — OFFICE VISIT (OUTPATIENT)
Dept: PHYSICAL THERAPY | Facility: HOSPITAL | Age: 63
End: 2018-10-25
Attending: PHYSICAL MEDICINE & REHABILITATION
Payer: COMMERCIAL

## 2018-10-25 PROCEDURE — 97140 MANUAL THERAPY 1/> REGIONS: CPT

## 2018-10-25 PROCEDURE — 97530 THERAPEUTIC ACTIVITIES: CPT

## 2018-10-25 NOTE — PROGRESS NOTES
Diagnosis:   L5-S1 left mild-mod far lateral, L4-5 left mod-large/right mod far lateral, L3-4 left mod/right mild far lateral, L2-3 right mild/left mod foraminal bulging discs  (primary encounter diagnosis)  L4-5 left mod-severe, L3-4 left mod-severe/right try seated posture modifications and REIL when buttock pain is present and monitor for any change  review of prone sequenced gluts x 6  Review of L abd sidelying   review of piston and inst in progression when josé    piston 10 x 2  Bridge with heel lift, i

## 2018-10-26 ENCOUNTER — APPOINTMENT (OUTPATIENT)
Dept: GENERAL RADIOLOGY | Age: 63
End: 2018-10-26
Attending: EMERGENCY MEDICINE
Payer: COMMERCIAL

## 2018-10-26 ENCOUNTER — HOSPITAL ENCOUNTER (OUTPATIENT)
Age: 63
Discharge: HOME | End: 2018-10-26
Attending: EMERGENCY MEDICINE
Payer: COMMERCIAL

## 2018-10-26 VITALS
SYSTOLIC BLOOD PRESSURE: 160 MMHG | RESPIRATION RATE: 18 BRPM | WEIGHT: 135 LBS | BODY MASS INDEX: 20.46 KG/M2 | HEIGHT: 68 IN | TEMPERATURE: 99 F | OXYGEN SATURATION: 100 % | DIASTOLIC BLOOD PRESSURE: 85 MMHG | HEART RATE: 103 BPM

## 2018-10-26 DIAGNOSIS — R05.3 PERSISTENT COUGH FOR 3 WEEKS OR LONGER: Primary | ICD-10-CM

## 2018-10-26 PROCEDURE — 99213 OFFICE O/P EST LOW 20 MIN: CPT

## 2018-10-26 PROCEDURE — 71046 X-RAY EXAM CHEST 2 VIEWS: CPT | Performed by: EMERGENCY MEDICINE

## 2018-10-26 PROCEDURE — 99214 OFFICE O/P EST MOD 30 MIN: CPT

## 2018-10-26 PROCEDURE — 87798 DETECT AGENT NOS DNA AMP: CPT | Performed by: EMERGENCY MEDICINE

## 2018-10-26 RX ORDER — BENZONATATE 100 MG/1
100 CAPSULE ORAL 3 TIMES DAILY PRN
Qty: 30 CAPSULE | Refills: 0 | Status: SHIPPED | OUTPATIENT
Start: 2018-10-26 | End: 2018-11-06 | Stop reason: ALTCHOICE

## 2018-10-26 RX ORDER — AZITHROMYCIN 250 MG/1
TABLET, FILM COATED ORAL
Qty: 1 PACKAGE | Refills: 0 | Status: SHIPPED | OUTPATIENT
Start: 2018-10-26 | End: 2018-11-06 | Stop reason: ALTCHOICE

## 2018-10-26 NOTE — ED INITIAL ASSESSMENT (HPI)
C/o cough for 2 weeks, she is currently taking cefuroxime prescribed by PCP. Denies fever, NVD.  No other symptoms

## 2018-10-26 NOTE — ED PROVIDER NOTES
Patient Seen in: [address]    History   Patient presents with:  Cough/URI    Stated Complaint: cough    HPI    Patient is a 67-year-old female with past history of hypertension, anemia who presents now with persistent c CHOLECYSTECTOMY     • COLONOSCOPY  2009   • CYSTOSCOPY N/A 4/20/2018    Performed by Lidia Rodarte MD at [address]   • FRACTURE SURGERY Left 2010    WRIST FRACTURE ORIF   • FRACTURE SURGERY Left 2013    ORIF wrist fracture revision with plates and scre The patient's motor strength is 5 out of 5 and symmetric in the upper and lower extremities bilaterally  Extremities: No focal swelling or tenderness  Skin: No pallor, no redness or warmth to the touch      ED Course     Labs Reviewed   MICHAEL ACOSTA

## 2018-10-27 ENCOUNTER — TELEPHONE (OUTPATIENT)
Dept: INTERNAL MEDICINE CLINIC | Facility: CLINIC | Age: 63
End: 2018-10-27

## 2018-10-28 RX ORDER — AMLODIPINE BESYLATE 10 MG/1
TABLET ORAL
Qty: 90 TABLET | Refills: 1 | Status: SHIPPED | OUTPATIENT
Start: 2018-10-28 | End: 2019-05-06

## 2018-10-30 ENCOUNTER — TELEPHONE (OUTPATIENT)
Dept: PHYSICAL THERAPY | Age: 63
End: 2018-10-30

## 2018-10-30 ENCOUNTER — TELEPHONE (OUTPATIENT)
Dept: NEUROLOGY | Facility: CLINIC | Age: 63
End: 2018-10-30

## 2018-10-30 NOTE — TELEPHONE ENCOUNTER
I received pt discharge from [facility] from [facility] and her suggestion was to have pt start with [facility] PT for her scoliosis.   I spoke to patient and she has a follow up appt with Dr Viann Meckel on 11/6/2018 @ which time if Dr Viann Meckel is in agreement referral can be

## 2018-10-30 NOTE — TELEPHONE ENCOUNTER
I called patient to make sure that she was aware this has been refilled. Patient says she just spoke with her pharmacy and was told the medication was ready.

## 2018-10-31 ENCOUNTER — APPOINTMENT (OUTPATIENT)
Dept: PHYSICAL THERAPY | Facility: HOSPITAL | Age: 63
End: 2018-10-31
Attending: PHYSICAL MEDICINE & REHABILITATION
Payer: COMMERCIAL

## 2018-11-01 ENCOUNTER — TELEPHONE (OUTPATIENT)
Dept: GASTROENTEROLOGY | Facility: CLINIC | Age: 63
End: 2018-11-01

## 2018-11-01 NOTE — TELEPHONE ENCOUNTER
Spoke to pt and educated to stop all suppplements, herbal meds, and iron pills for 5 days before exam. Pt verbalized understanding of whole message and had no further questions at this time.

## 2018-11-02 ENCOUNTER — APPOINTMENT (OUTPATIENT)
Dept: PHYSICAL THERAPY | Age: 63
End: 2018-11-02
Attending: PHYSICAL MEDICINE & REHABILITATION
Payer: COMMERCIAL

## 2018-11-06 ENCOUNTER — OFFICE VISIT (OUTPATIENT)
Dept: NEUROLOGY | Facility: CLINIC | Age: 63
End: 2018-11-06
Payer: COMMERCIAL

## 2018-11-06 ENCOUNTER — TELEPHONE (OUTPATIENT)
Dept: PHYSICAL THERAPY | Age: 63
End: 2018-11-06

## 2018-11-06 ENCOUNTER — APPOINTMENT (OUTPATIENT)
Dept: PHYSICAL THERAPY | Age: 63
End: 2018-11-06
Attending: PHYSICAL MEDICINE & REHABILITATION
Payer: COMMERCIAL

## 2018-11-06 ENCOUNTER — TELEPHONE (OUTPATIENT)
Dept: NEUROLOGY | Facility: CLINIC | Age: 63
End: 2018-11-06

## 2018-11-06 VITALS — SYSTOLIC BLOOD PRESSURE: 126 MMHG | DIASTOLIC BLOOD PRESSURE: 72 MMHG | HEART RATE: 70 BPM | RESPIRATION RATE: 17 BRPM

## 2018-11-06 DIAGNOSIS — M43.10 RETROLISTHESIS: ICD-10-CM

## 2018-11-06 DIAGNOSIS — M41.25 OTHER IDIOPATHIC SCOLIOSIS, THORACOLUMBAR REGION: ICD-10-CM

## 2018-11-06 DIAGNOSIS — M51.9 LUMBAR DISC DISEASE: ICD-10-CM

## 2018-11-06 DIAGNOSIS — M54.16 LUMBAR RADICULOPATHY: Primary | ICD-10-CM

## 2018-11-06 DIAGNOSIS — M43.16 SPONDYLOLISTHESIS OF LUMBAR REGION: ICD-10-CM

## 2018-11-06 DIAGNOSIS — M48.061 LUMBAR FORAMINAL STENOSIS: ICD-10-CM

## 2018-11-06 DIAGNOSIS — Z98.1 HISTORY OF LUMBAR FUSION: ICD-10-CM

## 2018-11-06 DIAGNOSIS — Z98.890 HISTORY OF LUMBAR LAMINECTOMY: ICD-10-CM

## 2018-11-06 PROCEDURE — 99214 OFFICE O/P EST MOD 30 MIN: CPT | Performed by: PHYSICAL MEDICINE & REHABILITATION

## 2018-11-06 RX ORDER — ACETAMINOPHEN 500 MG
500 TABLET ORAL EVERY 6 HOURS PRN
COMMUNITY
End: 2021-03-12

## 2018-11-06 NOTE — PROGRESS NOTES
Low Back Pain H & P    Chief Complaint: Patient presents with:  Low Back Pain: pt here for follow up after Bilateral L5 transforaminal epidural steroid injections 8/10/18 with 90% relief for 2 months.  pain is slowly returning in the low back when bending f Surgical History:   Procedure Laterality Date   • ANTERIOR CERVICAL FUSION BG & INST 1 LEVEL N/A 2017    Performed by Marisa Davies MD at [address]   • ARTHROSCOPY OF JOINT UNLISTED Right 2012    rotator cuff tear   •       x 3   • CAT Alcohol/week: 0.0 oz        Comment: quit 4 - 5 yrs ago due to pancreatitis      Drug use: No      Sexual activity: Not on file    Other Topics      Concerns:         Service: Not Asked        Blood Transfusions: Not Asked        Caffeine Concern 2+     Neurological Lower Extremity:    Light Touch Sensation: Intact in bilateral Lower Extremities   LE Muscle Strength:  All LE strength measurements 5/5 except:  Hamstring RIGHT:   4+/5  Hamstring LEFT:   4+/5  Extensor Hallucis Longus LEFT:   4+/5   RI

## 2018-11-06 NOTE — TELEPHONE ENCOUNTER
Called [facility] BS for authorization of approval of bilateral L5 TFESIs cpt codes [id number], [id number]. Talked to Bren Ramos who states no authorization is required. Reference # [id number]. Will  inform Nursing.

## 2018-11-06 NOTE — PATIENT INSTRUCTIONS
As of October 6th 2014, the Drug Enforcement Agency [facility]) is reclassifying all hydrocodone combination medications from Schedule III to Schedule II. This includes medications such as Norco, Vicodin, Lortab, Zohydro, and Vicoprofen.     What this means for y will perform bilateral L5 TFESI(s) under MAC if she is not getting better with the PT with [facility]. She will do a few weeks of the Pt with [facility] for her scoliosis. .    The patient will continue with her home exercise program.    The patient does not

## 2018-11-07 NOTE — TELEPHONE ENCOUNTER
Patent will do physical therapy first and if symtoms persist will call back to schedule bilateral L5 TFESIs under MAC sedation per Dr. Marylene Copes documentation from today's office visit    Left detailed message informing patient of the above and to call back

## 2018-11-08 ENCOUNTER — APPOINTMENT (OUTPATIENT)
Dept: PHYSICAL THERAPY | Age: 63
End: 2018-11-08
Attending: PHYSICAL MEDICINE & REHABILITATION
Payer: COMMERCIAL

## 2018-11-12 NOTE — TELEPHONE ENCOUNTER
Patient has been scheduled for a bilateral L5 TFESIs under MAC  on 12/11/18 at the [facility]. Medications and allergies reviewed. Patient informed to hold aspirins, nsaids, blood thinners, multivitamins, vitamin E and fish oils 3-7 days prior to procedure.  Monika

## 2018-11-13 ENCOUNTER — OFFICE VISIT (OUTPATIENT)
Dept: PHYSICAL THERAPY | Age: 63
End: 2018-11-13
Attending: PHYSICAL MEDICINE & REHABILITATION
Payer: COMMERCIAL

## 2018-11-13 ENCOUNTER — APPOINTMENT (OUTPATIENT)
Dept: PHYSICAL THERAPY | Facility: HOSPITAL | Age: 63
End: 2018-11-13
Attending: PHYSICAL MEDICINE & REHABILITATION
Payer: COMMERCIAL

## 2018-11-13 DIAGNOSIS — M48.02 FORAMINAL STENOSIS OF CERVICAL REGION: ICD-10-CM

## 2018-11-13 DIAGNOSIS — M54.12 CERVICAL RADICULOPATHY: ICD-10-CM

## 2018-11-13 DIAGNOSIS — M50.90 CERVICAL DISC DISEASE: ICD-10-CM

## 2018-11-13 DIAGNOSIS — M48.02 CERVICAL SPINAL STENOSIS: ICD-10-CM

## 2018-11-13 DIAGNOSIS — M54.2 NECK PAIN ON LEFT SIDE: ICD-10-CM

## 2018-11-13 PROCEDURE — 97162 PT EVAL MOD COMPLEX 30 MIN: CPT

## 2018-11-13 PROCEDURE — 97112 NEUROMUSCULAR REEDUCATION: CPT

## 2018-11-13 NOTE — PROGRESS NOTES
LUMBAR SPINE EVALUATION:   Referring Physician: Dr. Tommy Guillaume  Diagnosis:  Lumbar radiculopathy (M54.16)  Other idiopathic scoliosis, thoracolumbar region (M41.25)  Lumbar disc disease (M51.9)  Lumbar foraminal stenosis (M99.83)  Spondylolisthesis of lumbar issues, uses voltairin gel or tylenol tylenol    Current Functional limitations: Sitting for too long especially if slouching, prolonged standing    Prior level of function unlimited prior to 2018.      Pt goals include: be pain-free    Past medical history due to 3+ personal factors/comorbidities, 4+ body structures involved/activity limitations, and evolving symptoms including changing pain levels.     Delcidmallika Titus would benefit from skilled Physical Therapy to address the above impairments to achieve best possib verbalize and demo understanding of Schroth principles for achieving best possible 3D alignment as applies to pt's individual scoliotic curvature  3. Pt will demo core and pelvic stabilization strength at least 4/5 to minimize compensatory strategies  4.  P

## 2018-11-14 ENCOUNTER — TELEPHONE (OUTPATIENT)
Dept: PHYSICAL THERAPY | Age: 63
End: 2018-11-14

## 2018-11-14 ENCOUNTER — APPOINTMENT (OUTPATIENT)
Dept: PHYSICAL THERAPY | Facility: HOSPITAL | Age: 63
End: 2018-11-14
Attending: PHYSICAL MEDICINE & REHABILITATION
Payer: COMMERCIAL

## 2018-11-15 ENCOUNTER — TELEPHONE (OUTPATIENT)
Dept: PHYSICAL THERAPY | Age: 63
End: 2018-11-15

## 2018-11-15 ENCOUNTER — OFFICE VISIT (OUTPATIENT)
Dept: PHYSICAL THERAPY | Age: 63
End: 2018-11-15
Attending: PHYSICAL MEDICINE & REHABILITATION
Payer: COMMERCIAL

## 2018-11-15 ENCOUNTER — APPOINTMENT (OUTPATIENT)
Dept: GENERAL RADIOLOGY | Age: 63
End: 2018-11-15
Attending: FAMILY MEDICINE
Payer: COMMERCIAL

## 2018-11-15 ENCOUNTER — HOSPITAL ENCOUNTER (OUTPATIENT)
Age: 63
Discharge: HOME | End: 2018-11-15
Attending: FAMILY MEDICINE
Payer: COMMERCIAL

## 2018-11-15 VITALS
HEIGHT: 68 IN | BODY MASS INDEX: 20.46 KG/M2 | OXYGEN SATURATION: 100 % | TEMPERATURE: 98 F | RESPIRATION RATE: 20 BRPM | SYSTOLIC BLOOD PRESSURE: 147 MMHG | HEART RATE: 82 BPM | DIASTOLIC BLOOD PRESSURE: 89 MMHG | WEIGHT: 135 LBS

## 2018-11-15 DIAGNOSIS — M54.6 BACK PAIN OF THORACOLUMBAR REGION: Primary | ICD-10-CM

## 2018-11-15 DIAGNOSIS — M54.50 BACK PAIN OF THORACOLUMBAR REGION: Primary | ICD-10-CM

## 2018-11-15 DIAGNOSIS — R52 PAIN: ICD-10-CM

## 2018-11-15 PROCEDURE — 72110 X-RAY EXAM L-2 SPINE 4/>VWS: CPT | Performed by: FAMILY MEDICINE

## 2018-11-15 PROCEDURE — 97140 MANUAL THERAPY 1/> REGIONS: CPT

## 2018-11-15 PROCEDURE — 99213 OFFICE O/P EST LOW 20 MIN: CPT

## 2018-11-15 PROCEDURE — 72100 X-RAY EXAM L-S SPINE 2/3 VWS: CPT | Performed by: FAMILY MEDICINE

## 2018-11-15 PROCEDURE — 72072 X-RAY EXAM THORAC SPINE 3VWS: CPT | Performed by: FAMILY MEDICINE

## 2018-11-15 PROCEDURE — 97014 ELECTRIC STIMULATION THERAPY: CPT

## 2018-11-15 NOTE — ED INITIAL ASSESSMENT (HPI)
Pt was at PT appt 2 days ago and had a manipulation done during the appt. Pt reports waking up the next morning and feeling intense pain to mid-thoracic area, around her fusion site.  Pt here for an XRAY

## 2018-11-15 NOTE — ED PROVIDER NOTES
Patient Seen in: [address] [facility]    History   Patient presents with:  Back Pain (musculoskeletal)    Stated Complaint: BACK PAIN    HPI    Patient is here with mid thoracic back pain.   Per patient she went to her routine physi 6/22/2018    Performed by Jens Gatica MD at [address] [facility] OR   • LASIK     • OTHER SURGICAL HISTORY  09/2017    Fussion L4-L5 - Dr. Nkechi Peterson   [facility] [address] [facility]  [facility]    L1-L2 FUSION   • SPINE SURGERY PROCEDURE UNLISTED  12/2017    Neck fu refill takes less than 2 seconds. No rash noted. She is not diaphoretic. Psychiatric: She has a normal mood and affect. Her behavior is normal.   Nursing note and vitals reviewed.         ED Course   PROCEDURE:  XR LUMBAR SPINE (MIN 2 VIEWS) (CPT=72100) scoliosis. Mild to moderate scattered spondylosis. Cervical and posterior lumbar fusion hardware partially visualized.        Final radiology report discussed with patient    MDM     Disposition and Plan     Clinical Impression:  Back pain of thoracolumbar

## 2018-11-15 NOTE — PROGRESS NOTES
Dx: Lumbar radiculopathy (M54.16)  Other idiopathic scoliosis, thoracolumbar region (M41.25)  Lumbar disc disease (M51.9)  Lumbar foraminal stenosis (M99.83)  Spondylolisthesis of lumbar region (M43.16)  Retrolisthesis (M43.10)  History of lumbar laminecto compliance with HEP at least 75% of the time to allow for independent management of symptoms at discharge.   2. Pt will verbalize and demo understanding of Schroth principles for achieving best possible 3D alignment as applies to pt's individual scoliotic c

## 2018-11-19 ENCOUNTER — APPOINTMENT (OUTPATIENT)
Dept: PHYSICAL THERAPY | Facility: HOSPITAL | Age: 63
End: 2018-11-19
Attending: PHYSICAL MEDICINE & REHABILITATION
Payer: COMMERCIAL

## 2018-11-19 ENCOUNTER — OFFICE VISIT (OUTPATIENT)
Dept: PHYSICAL THERAPY | Age: 63
End: 2018-11-19
Attending: PHYSICAL MEDICINE & REHABILITATION
Payer: COMMERCIAL

## 2018-11-19 PROCEDURE — 97110 THERAPEUTIC EXERCISES: CPT

## 2018-11-19 PROCEDURE — 97140 MANUAL THERAPY 1/> REGIONS: CPT

## 2018-11-19 NOTE — PROGRESS NOTES
Dx: Lumbar radiculopathy (M54.16)  Other idiopathic scoliosis, thoracolumbar region (M41.25)  Lumbar disc disease (M51.9)  Lumbar foraminal stenosis (M99.83)  Spondylolisthesis of lumbar region (M43.16)  Retrolisthesis (M43.10)  History of lumbar laminecto scapular strengthening exercises as performed with  and from previous therapy. Corrected pt's posture to eliminate upper trap compensation and better facilitate scapular and core activation. IFC declined this visit.  Pt reported improvement

## 2018-11-21 ENCOUNTER — APPOINTMENT (OUTPATIENT)
Dept: PHYSICAL THERAPY | Age: 63
End: 2018-11-21
Attending: PHYSICAL MEDICINE & REHABILITATION
Payer: COMMERCIAL

## 2018-11-21 ENCOUNTER — APPOINTMENT (OUTPATIENT)
Dept: PHYSICAL THERAPY | Facility: HOSPITAL | Age: 63
End: 2018-11-21
Attending: PHYSICAL MEDICINE & REHABILITATION
Payer: COMMERCIAL

## 2018-11-27 ENCOUNTER — OFFICE VISIT (OUTPATIENT)
Dept: PHYSICAL THERAPY | Age: 63
End: 2018-11-27
Attending: PHYSICAL MEDICINE & REHABILITATION
Payer: COMMERCIAL

## 2018-11-27 PROCEDURE — 97112 NEUROMUSCULAR REEDUCATION: CPT

## 2018-11-27 PROCEDURE — 97140 MANUAL THERAPY 1/> REGIONS: CPT

## 2018-11-27 PROCEDURE — 97110 THERAPEUTIC EXERCISES: CPT

## 2018-11-27 NOTE — PROGRESS NOTES
Dx: Lumbar radiculopathy (M54.16)  Other idiopathic scoliosis, thoracolumbar region (M41.25)  Lumbar disc disease (M51.9)  Lumbar foraminal stenosis (M99.83)  Spondylolisthesis of lumbar region (M43.16)  Retrolisthesis (M43.10)  History of lumbar laminecto scapular strengthening Standing postural correction with mirror cue   - with maintenance and expansion   Modalities IFC to mid thoracic, prone with ice [id number]         Assessment: Pt with good thoracic paraspinal extensibility on R; min-mod restriction on

## 2018-11-28 RX ORDER — SODIUM CHLORIDE, SODIUM LACTATE, POTASSIUM CHLORIDE, CALCIUM CHLORIDE 600; 310; 30; 20 MG/100ML; MG/100ML; MG/100ML; MG/100ML
INJECTION, SOLUTION INTRAVENOUS CONTINUOUS
Status: CANCELLED | OUTPATIENT
Start: 2018-11-28

## 2018-11-29 ENCOUNTER — APPOINTMENT (OUTPATIENT)
Dept: PHYSICAL THERAPY | Age: 63
End: 2018-11-29
Attending: PHYSICAL MEDICINE & REHABILITATION
Payer: COMMERCIAL

## 2018-12-03 ENCOUNTER — OFFICE VISIT (OUTPATIENT)
Dept: PHYSICAL THERAPY | Age: 63
End: 2018-12-03
Attending: INTERNAL MEDICINE
Payer: COMMERCIAL

## 2018-12-03 PROCEDURE — 97140 MANUAL THERAPY 1/> REGIONS: CPT

## 2018-12-03 PROCEDURE — 97110 THERAPEUTIC EXERCISES: CPT

## 2018-12-03 NOTE — PROGRESS NOTES
Dx: Lumbar radiculopathy (M54.16)  Other idiopathic scoliosis, thoracolumbar region (M41.25)  Lumbar disc disease (M51.9)  Lumbar foraminal stenosis (M99.83)  Spondylolisthesis of lumbar region (M43.16)  Retrolisthesis (M43.10)  History of lumbar laminecto release on FR R Prone: low trap forearm lift 10x 5sec R/L    Sidely clams 10x R/L    Sidely glut med hip abd 10x R/L    Standing resisted glut med hip abd YTB 10x R/L   Neuro Re-ed  Low trap re-ed    Standing postural correction with scapular strengthening

## 2018-12-04 ENCOUNTER — ANESTHESIA EVENT (OUTPATIENT)
Dept: ENDOSCOPY | Age: 63
End: 2018-12-04
Payer: COMMERCIAL

## 2018-12-04 ENCOUNTER — ANESTHESIA (OUTPATIENT)
Dept: ENDOSCOPY | Age: 63
End: 2018-12-04
Payer: COMMERCIAL

## 2018-12-04 ENCOUNTER — HOSPITAL ENCOUNTER (OUTPATIENT)
Age: 63
Setting detail: HOSPITAL OUTPATIENT SURGERY
Discharge: HOME | End: 2018-12-04
Attending: INTERNAL MEDICINE | Admitting: INTERNAL MEDICINE
Payer: COMMERCIAL

## 2018-12-04 DIAGNOSIS — R10.13 EPIGASTRIC PAIN: ICD-10-CM

## 2018-12-04 DIAGNOSIS — Z80.0 FAMILY HISTORY OF COLON CANCER: ICD-10-CM

## 2018-12-04 DIAGNOSIS — K30 FUNCTIONAL DYSPEPSIA: ICD-10-CM

## 2018-12-04 PROBLEM — K31.7 GASTRIC POLYPS: Status: ACTIVE | Noted: 2018-12-04

## 2018-12-04 PROBLEM — K64.8 INTERNAL HEMORRHOIDS WITHOUT COMPLICATION: Status: ACTIVE | Noted: 2018-12-04

## 2018-12-04 PROBLEM — K29.70 GASTRITIS: Status: ACTIVE | Noted: 2018-12-04

## 2018-12-04 PROCEDURE — 99070 SPECIAL SUPPLIES PHYS/QHP: CPT | Performed by: INTERNAL MEDICINE

## 2018-12-04 PROCEDURE — 88305 TISSUE EXAM BY PATHOLOGIST: CPT | Performed by: INTERNAL MEDICINE

## 2018-12-04 PROCEDURE — 43239 EGD BIOPSY SINGLE/MULTIPLE: CPT | Performed by: INTERNAL MEDICINE

## 2018-12-04 PROCEDURE — 88312 SPECIAL STAINS GROUP 1: CPT | Performed by: INTERNAL MEDICINE

## 2018-12-04 PROCEDURE — 45378 DIAGNOSTIC COLONOSCOPY: CPT | Performed by: INTERNAL MEDICINE

## 2018-12-04 RX ORDER — SODIUM CHLORIDE, SODIUM LACTATE, POTASSIUM CHLORIDE, CALCIUM CHLORIDE 600; 310; 30; 20 MG/100ML; MG/100ML; MG/100ML; MG/100ML
INJECTION, SOLUTION INTRAVENOUS CONTINUOUS
Status: CANCELLED | OUTPATIENT
Start: 2018-12-04

## 2018-12-04 RX ORDER — LIDOCAINE HYDROCHLORIDE 10 MG/ML
INJECTION, SOLUTION EPIDURAL; INFILTRATION; INTRACAUDAL; PERINEURAL AS NEEDED
Status: DISCONTINUED | OUTPATIENT
Start: 2018-12-04 | End: 2018-12-04 | Stop reason: SURG

## 2018-12-04 RX ORDER — NALOXONE HYDROCHLORIDE 0.4 MG/ML
80 INJECTION, SOLUTION INTRAMUSCULAR; INTRAVENOUS; SUBCUTANEOUS AS NEEDED
Status: CANCELLED | OUTPATIENT
Start: 2018-12-04 | End: 2018-12-04

## 2018-12-04 RX ADMIN — LIDOCAINE HYDROCHLORIDE 50 MG: 10 INJECTION, SOLUTION EPIDURAL; INFILTRATION; INTRACAUDAL; PERINEURAL at 12:35:00

## 2018-12-04 NOTE — OPERATIVE REPORT
[facility]    PATIENT'S NAME: Natalya Gay TIKA   ATTENDING PHYSICIAN: Maria Eugenia Carter MD   OPERATING PHYSICIAN: Maria Eugenia Carter MD   PATIENT ACCOUNT#:   [id number]    LOCATION:  [address],[facility] 1 ENDO POOL ROOMS 1 [facility]  MEDICAL  Huan Lucero MD  d: 12/04/2018 12:59:28  t: 12/04/2018 14:20:22  [facility] [id number]  [facility]/

## 2018-12-04 NOTE — H&P
History & Physical Examination    Patient Name: Pelon Mata  MRN: [id number]  CSN: [id number]  YOB: 1955    Diagnosis: History of colon cancer, epigastric pain, dyspepsia      Medications Prior to Admission:  acetaminophen 500 MG Oral Tab T Fluticasone Propionate (FLONASE) 50 MCG/ACT Nasal Suspension by Nasal route daily as needed. spray 2 spray by intranasal route  every day in each nostril  Disp:  Rfl:  Taking   Multiple Vitamins Oral Tab Take 1 tablet by mouth daily.  Multiple Vitamins ta and screws- Ortho Dr Ibeth Castro   • HAND GANGLION EXCISION Right 6/22/2018    Performed by Roly Rios MD at [facility] OR   • LASIK     • OTHER SURGICAL HISTORY  09/2017    Fussion L4-L5 - Dr. Lenore Rosales   • [address] [facility]  [address]    L1-L2

## 2018-12-04 NOTE — OPERATIVE REPORT
[facility]    PATIENT'S NAME: Princess Pacheco TIKA   ATTENDING PHYSICIAN: Harold Pallas, MD   OPERATING PHYSICIAN: Harold Pallas, MD   PATIENT ACCOUNT#:   [id number]    LOCATION:  [facility] ENDO POOL ROOMS 1 [facility] multiple subcentimeter fundic glandular soft-appearing polyps which were photographs, and representative biopsies were taken of these polyps. No hard masses or large masses were seen. Retroflexion of the endoscope showed a normal GE junction and cardia.

## 2018-12-04 NOTE — BRIEF OP NOTE
Pre-Operative Diagnosis: Family history of colon cancer,dyspepsia, Epigastric pain     Post-Operative Diagnosis: Normal colonoscopy, internal hemorrhoids;  gastritis, gastric fundic glandular polyps  Procedure Performed:   Procedure(s):  COLONOSCOPY and ES

## 2018-12-04 NOTE — ANESTHESIA POSTPROCEDURE EVALUATION
Patient: Liz Jacksonodore Day    Procedure Summary     Date:  12/04/18 Room / Location:  [facility] ENDOSCOPY 01 / [facility] ENDO    Anesthesia Start:  3233 Anesthesia Stop:      Procedures:       COLONOSCOPY (N/A )      ESOPHAGOGASTRODUODENOSCOPY (EGD) (N/A ) Diagnosis:

## 2018-12-04 NOTE — ANESTHESIA PREPROCEDURE EVALUATION
Anesthesia PreOp Note    HPI:     Kylie Stoner is a 58year old female who presents for preoperative consultation requested by: Lon Rodriguez MD    Date of Surgery: 12/4/2018    Procedure(s):  COLONOSCOPY  ESOPHAGOGASTRODUODENOSCOPY (EGD)  I scapula         Date Noted: 05/23/2017      C2-3 mild diffuse, C3-4 left mild-mod foraminal & mild diffuse, C4-5 mild-mod central & left rachael, C5-6 left mod rachael & mild-mod central, C6-7 right mild-mod/left mod rachael & right mild bulging disc         Simeon ANTERIOR CERVICAL FUSION BG & INST 1 LEVEL N/A 2017    Performed by Jordan Espana MD at [address] [place] [address] Dr   • ARTHROSCOPY OF JOINT UNLISTED Right 2012    rotator cuff tear   •       x 3   • CATARACT     • CHOLECYSTECTOMY     • COLONOSCOPY  200 topically 4 (four) times daily. Disp: 1 Tube Rfl: 3 Taking   Amitriptyline HCl 10 MG Oral Tab Take 1 tablet (10 mg total) by mouth nightly.  (Patient taking differently: Take 10 mg by mouth nightly as needed.  ) Disp: 30 tablet Rfl: 12 Taking   RaNITidine H children: Not on file      Years of education: Not on file      Highest education level: Not on file    Social Needs      Financial resource strain: Not on file      Food insecurity - worry: Not on file      Food insecurity - inability: Not on file      Tr 129/84 and her pulse is 90. Her respiration is 20 and oxygen saturation is 98%.     12/04/18  1149   BP: 129/84   BP Location: Left arm   Pulse: 90   Resp: 20   SpO2: 98%   Weight: 63.5 kg (140 lb)   Height: 1.727 m (5' 8\")        Anesthesia ROS/Med Hx and

## 2018-12-05 ENCOUNTER — APPOINTMENT (OUTPATIENT)
Dept: PHYSICAL THERAPY | Age: 63
End: 2018-12-05
Attending: INTERNAL MEDICINE
Payer: COMMERCIAL

## 2018-12-05 VITALS
DIASTOLIC BLOOD PRESSURE: 72 MMHG | BODY MASS INDEX: 21.22 KG/M2 | HEIGHT: 68 IN | WEIGHT: 140 LBS | SYSTOLIC BLOOD PRESSURE: 108 MMHG | RESPIRATION RATE: 19 BRPM | HEART RATE: 74 BPM | OXYGEN SATURATION: 95 %

## 2018-12-06 ENCOUNTER — OFFICE VISIT (OUTPATIENT)
Dept: PHYSICAL THERAPY | Age: 63
End: 2018-12-06
Attending: INTERNAL MEDICINE
Payer: COMMERCIAL

## 2018-12-06 ENCOUNTER — TELEPHONE (OUTPATIENT)
Dept: GASTROENTEROLOGY | Facility: CLINIC | Age: 63
End: 2018-12-06

## 2018-12-06 PROCEDURE — 97140 MANUAL THERAPY 1/> REGIONS: CPT

## 2018-12-06 PROCEDURE — 97110 THERAPEUTIC EXERCISES: CPT

## 2018-12-06 NOTE — TELEPHONE ENCOUNTER
Dr Nader Dalal, pt requesting path results please. Thanks. I left message on voicemail to call back. Informed that some gas and gurgling not unusual, but she should not be having pain--call to update our office.

## 2018-12-06 NOTE — PROGRESS NOTES
Patient Name: Clarisse Stoner, : 10/5/1955, MRN: [id number]   Date:  2018  Referring Physician:  Dr. Yumiko Marshall    Diagnosis: Lumbar radiculopathy (M54.16)  Other idiopathic scoliosis, thoracolumbar region (M41.25)  Lumbar disc disease (M51.9)  Lumbar continue per your recommendation.     Objective:   Observation/Posture: pelvis deviated R, R ililac crest elevated vs L;  she stands with pelvis anteriorly shifted in PPT position although able to self correct   Gait: ambulation with pelvis in anteriorly sh injection. May continue 1-2x/wk for 4-6 weeks per your recommendation. Charges:  TherEx x2, Man x1  Timed Treatment: 40mins  Total Treatment Time: 40mins    Patient was advised of these findings, precautions, and treatment options and has agreed to acti

## 2018-12-06 NOTE — TELEPHONE ENCOUNTER
Patient feels fine now, no abdominal pain, n/v or f/c. Discussed  gastric biopsy results, normal. Colonoscopy negative. GI RN, please recall colonoscopy for 5  Years , then close encounter.

## 2018-12-06 NOTE — TELEPHONE ENCOUNTER
Patient states she is not any pain currently but was having some gas and \"stomach gurgling\". I notified her that it is not unusual if she starts to have any pain to give us a call at the office.      Dr. Flora Saucedo - patient is also asking about results from CL

## 2018-12-06 NOTE — TELEPHONE ENCOUNTER
Pt called for colonoscopy results. She also states that she is having some abdominal issues/gurgling since the procedure and would like to know if this is normal.    Please call.

## 2018-12-07 ENCOUNTER — APPOINTMENT (OUTPATIENT)
Dept: PHYSICAL THERAPY | Age: 63
End: 2018-12-07
Attending: INTERNAL MEDICINE
Payer: COMMERCIAL

## 2018-12-11 ENCOUNTER — OFFICE VISIT (OUTPATIENT)
Dept: SURGERY | Facility: CLINIC | Age: 63
End: 2018-12-11
Payer: COMMERCIAL

## 2018-12-11 DIAGNOSIS — M54.16 LUMBAR RADICULOPATHY: Primary | ICD-10-CM

## 2018-12-11 DIAGNOSIS — M51.9 LUMBAR DISC DISEASE: ICD-10-CM

## 2018-12-11 PROCEDURE — 64483 NJX AA&/STRD TFRM EPI L/S 1: CPT | Performed by: PHYSICAL MEDICINE & REHABILITATION

## 2018-12-11 NOTE — PROCEDURES
[facility]    BILATERAL LUMBAR TRANSFORAMINAL   NAME:  Darrick Lunch Day    MR #:    [id number] :  10/5/1955     PHYSICIAN:  Sarah Cleaning        Operative Report    DATE OF PROCEDURE: 2018   PREOPERATIVE DIAGNOSES: 1. left > with a 3 cc solution of 1.5 cc of 40 mg/cc of Kenalog and 1.5 cc of 1% PF lidocaine without epinephrine. After this, the needle was removed. Then  fluoroscopy was right anterior obliqued opening up the left L5-S1 intervertebral foramen.   At this point in

## 2018-12-14 ENCOUNTER — APPOINTMENT (OUTPATIENT)
Dept: GENERAL RADIOLOGY | Age: 63
End: 2018-12-14
Attending: FAMILY MEDICINE
Payer: COMMERCIAL

## 2018-12-14 ENCOUNTER — HOSPITAL ENCOUNTER (OUTPATIENT)
Age: 63
Discharge: HOME | End: 2018-12-14
Attending: FAMILY MEDICINE
Payer: COMMERCIAL

## 2018-12-14 VITALS
OXYGEN SATURATION: 100 % | TEMPERATURE: 98 F | DIASTOLIC BLOOD PRESSURE: 90 MMHG | RESPIRATION RATE: 18 BRPM | WEIGHT: 140 LBS | HEART RATE: 90 BPM | SYSTOLIC BLOOD PRESSURE: 147 MMHG | BODY MASS INDEX: 21.22 KG/M2 | HEIGHT: 68 IN

## 2018-12-14 DIAGNOSIS — S39.012A STRAIN OF LUMBAR REGION, INITIAL ENCOUNTER: Primary | ICD-10-CM

## 2018-12-14 PROCEDURE — 99213 OFFICE O/P EST LOW 20 MIN: CPT

## 2018-12-14 PROCEDURE — 72110 X-RAY EXAM L-2 SPINE 4/>VWS: CPT | Performed by: FAMILY MEDICINE

## 2018-12-14 PROCEDURE — 99214 OFFICE O/P EST MOD 30 MIN: CPT

## 2018-12-14 RX ORDER — CYCLOBENZAPRINE HCL 10 MG
10 TABLET ORAL 3 TIMES DAILY PRN
Qty: 6 TABLET | Refills: 0 | Status: SHIPPED | OUTPATIENT
Start: 2018-12-14 | End: 2018-12-16

## 2018-12-14 RX ORDER — METHYLPREDNISOLONE 4 MG/1
TABLET ORAL
Qty: 1 PACKAGE | Refills: 0 | Status: SHIPPED | OUTPATIENT
Start: 2018-12-14 | End: 2018-12-19

## 2018-12-14 NOTE — ED PROVIDER NOTES
Pt seen in [address]      Stated Complaint: Patient presents with:  Fall (musculoskeletal, neurologic)      --------------   RN assessment:     Benji Rush yesterday, pain to back, hip  --------------    CC: back pain    HPI:  D CHOLECYSTECTOMY     • COLONOSCOPY  2009   • COLONOSCOPY N/A 12/4/2018    Performed by Reynaldo Null MD at [facility]   • CYSTOSCOPY N/A 4/20/2018    Performed by Matty Espino MD at [address] [place] [address] OR   • ESOPHAGOGASTRODUODENOSCOPY (EGD) N/A 12/4/2 Not on file    Tobacco Use      Smoking status: Never Smoker      Smokeless tobacco: Never Used    Substance and Sexual Activity      Alcohol use: No        Alcohol/week: 0.0 oz        Comment: quit 4 - 5 yrs ago due to pancreatitis      Drug use:  No ear canals, both ears   Nose:   Nares normal, septum midline, mucosa normal, no drainage    or sinus tenderness   Throat:    throat clear, no exudate  Lips, mucosa, and tongue normal; teeth and gums normal   Neck:   Supple, symmetrical, trachea midline, ly [place] [address]  [phone]    Go to   As needed, If symptoms worsen        Medications Prescribed:    Discharge Medication List as of 12/14/2018  9:47 AM    START taking these medications    !!  Cyclobenzaprine HCl 10 MG Oral Tab  Take 1 tablet (10 mg total) by

## 2018-12-14 NOTE — ED NOTES
Patient states her pain is in her right lower back, right above her buttocks. She denies pain in her hip area. She states she feels when she fell, she landed mainly on this area. She is able to move and walk without difficulty.  She states when she was w

## 2018-12-24 ENCOUNTER — APPOINTMENT (OUTPATIENT)
Dept: PHYSICAL THERAPY | Age: 63
End: 2018-12-24
Attending: INTERNAL MEDICINE
Payer: COMMERCIAL

## 2018-12-31 ENCOUNTER — OFFICE VISIT (OUTPATIENT)
Dept: PHYSICAL THERAPY | Age: 63
End: 2018-12-31
Attending: INTERNAL MEDICINE
Payer: COMMERCIAL

## 2018-12-31 PROCEDURE — 97140 MANUAL THERAPY 1/> REGIONS: CPT

## 2018-12-31 PROCEDURE — 97110 THERAPEUTIC EXERCISES: CPT

## 2018-12-31 NOTE — PROGRESS NOTES
Patient Name: Jqaueline Stoner, : 10/5/1955, MRN: [id number]   Date:  2018  Referring Physician:  Dr. Farooq Barone    Diagnosis: Lumbar radiculopathy (M54.16)  Other idiopathic scoliosis, thoracolumbar region (M41.25)  Lumbar disc disease (M51.9)  Lumbar Test: (+) for L lumbar scoliosis     Palpation: mild soft tissue restrictions at L thoracic paraspinals, L upper trap and levator, dec mobility at L 1st and 2nd ribs    Today's Treatment:  Re-assessment testing  Supine: STM c/s paraspinals, upper trap, lev

## 2019-01-05 ENCOUNTER — TELEPHONE (OUTPATIENT)
Dept: GASTROENTEROLOGY | Facility: CLINIC | Age: 64
End: 2019-01-05

## 2019-01-05 NOTE — TELEPHONE ENCOUNTER
The patient contact me as the on-call physician this morning. She has a long-standing history of stomach issues. She has been taking pantoprazole and sucralfate as needed.   Dr. Victoria Smith had prescribed amitriptyline (10 mg) in the past.  The jj

## 2019-01-07 ENCOUNTER — TELEPHONE (OUTPATIENT)
Dept: OTOLARYNGOLOGY | Facility: CLINIC | Age: 64
End: 2019-01-07

## 2019-01-07 NOTE — TELEPHONE ENCOUNTER
Dr Jenna Garvin patient stated that she is having sinus pressure,congestion,clear secretions,pressure headache,no fever ,patient is concern since she is flying today,please advise.

## 2019-01-07 NOTE — TELEPHONE ENCOUNTER
Pt thinks she has a sinus inf - asking for antibiotic to be called in this morning - she is leaving for airport in 2 hours

## 2019-01-08 ENCOUNTER — TELEPHONE (OUTPATIENT)
Dept: INTERNAL MEDICINE CLINIC | Facility: CLINIC | Age: 64
End: 2019-01-08

## 2019-01-08 RX ORDER — AMOXICILLIN 875 MG/1
875 TABLET, COATED ORAL 2 TIMES DAILY
Qty: 20 TABLET | Refills: 0 | Status: SHIPPED | OUTPATIENT
Start: 2019-01-08 | End: 2019-01-21 | Stop reason: ALTCHOICE

## 2019-01-08 NOTE — TELEPHONE ENCOUNTER
Pt called stating pt is in [place]. pt left a message yesterday and pt is not happy that pt has not heard back from the office. Pt wants a antibiotic rx. Called into the pharm.    Call pt

## 2019-01-08 NOTE — TELEPHONE ENCOUNTER
Pt calling she is in [facility] on Sunday 1/6 pt started having symptoms of a sinus infection  Pt head is congested, pressure over eyes, cough is dry, yellow phlegm, nose clear, one side of nose clogged  Pt would like to get a rx for Amoxicillin, [employer]

## 2019-01-08 NOTE — TELEPHONE ENCOUNTER
Informed patient per  advised patient to go to urgent care to be seen by a doctor. patient verbalized understanding.

## 2019-01-08 NOTE — TELEPHONE ENCOUNTER
Okay for amoxicillin 875mg BID x 10 days (she can tolerate it; no allergy; had abdominal pain with augmentin).   Order pended

## 2019-01-08 NOTE — TELEPHONE ENCOUNTER
Called [facility]. She states she has taken amoxicillin multiple times in the past and was OK. States she felt nauseous once with it in the past which is why it is on her allergy list. Advised patient to take medication with food.  She verbalized her understand

## 2019-01-08 NOTE — TELEPHONE ENCOUNTER
To Dr. Lit Harrison - see below - congestion, blowing nose a lot, headache over eyebrows. Did cough up phlegm x1 which was yellow. Pt would like abx as emergency backup. Denies fever/chills. Pt does have zpak Rx - would amoxicillin be better?   Pt declines

## 2019-01-21 ENCOUNTER — OFFICE VISIT (OUTPATIENT)
Dept: INTERNAL MEDICINE CLINIC | Facility: CLINIC | Age: 64
End: 2019-01-21
Payer: COMMERCIAL

## 2019-01-21 ENCOUNTER — HOSPITAL ENCOUNTER (OUTPATIENT)
Dept: CT IMAGING | Facility: HOSPITAL | Age: 64
Discharge: HOME | End: 2019-01-21
Attending: INTERNAL MEDICINE
Payer: COMMERCIAL

## 2019-01-21 VITALS
OXYGEN SATURATION: 97 % | SYSTOLIC BLOOD PRESSURE: 126 MMHG | DIASTOLIC BLOOD PRESSURE: 86 MMHG | BODY MASS INDEX: 20.46 KG/M2 | TEMPERATURE: 98 F | HEIGHT: 68 IN | WEIGHT: 135 LBS | HEART RATE: 90 BPM

## 2019-01-21 DIAGNOSIS — S09.90XA INJURY OF HEAD, INITIAL ENCOUNTER: ICD-10-CM

## 2019-01-21 DIAGNOSIS — S09.90XA INJURY OF HEAD, INITIAL ENCOUNTER: Primary | ICD-10-CM

## 2019-01-21 PROBLEM — J01.10 ACUTE NON-RECURRENT FRONTAL SINUSITIS: Status: ACTIVE | Noted: 2019-01-21

## 2019-01-21 PROCEDURE — 70450 CT HEAD/BRAIN W/O DYE: CPT | Performed by: INTERNAL MEDICINE

## 2019-01-21 PROCEDURE — 99214 OFFICE O/P EST MOD 30 MIN: CPT | Performed by: INTERNAL MEDICINE

## 2019-01-21 PROCEDURE — 99212 OFFICE O/P EST SF 10 MIN: CPT | Performed by: INTERNAL MEDICINE

## 2019-01-21 RX ORDER — AZITHROMYCIN 250 MG/1
TABLET, FILM COATED ORAL
Qty: 6 TABLET | Refills: 0 | Status: SHIPPED | OUTPATIENT
Start: 2019-01-21 | End: 2019-01-31 | Stop reason: ALTCHOICE

## 2019-01-21 NOTE — PROGRESS NOTES
Maria Esther Stoner is a 58year old female. HPI:   She has had sinusitis for the 10 days and was given amoxicillin Dr Andi Coughlin and completed last Thurs.  At about this time (1/16) she was playing tennis in [facility] with her  and somehow she swung her racquet around a topically 4 (four) times daily. Disp: 1 Tube Rfl: 3   Amitriptyline HCl 10 MG Oral Tab Take 1 tablet (10 mg total) by mouth nightly.  (Patient taking differently: Take 10 mg by mouth nightly as needed.  ) Disp: 30 tablet Rfl: 12   RaNITidine HCl 150 MG Oral SYSTEMS:   GENERAL HEALTH: feels well otherwise  SKIN: denies any unusual skin lesions or rashes  RESPIRATORY: denies shortness of breath with exertion  CARDIOVASCULAR: denies chest pain on exertion  GI: denies abdominal pain and denies heartburn  NEURO: d

## 2019-01-28 ENCOUNTER — TELEPHONE (OUTPATIENT)
Dept: NEUROLOGY | Facility: CLINIC | Age: 64
End: 2019-01-28

## 2019-01-28 NOTE — TELEPHONE ENCOUNTER
Pt was called stated that she is having serve neck pain resulting in dizziness and nausea. She does not feel it is related to injury to head. Asking for sooner appt so she can discuss with DR Black love and need for updated MRI. Appt 01/31/19.

## 2019-01-28 NOTE — TELEPHONE ENCOUNTER
Pt states that for a bout a week now she has had dizziness, tingling in her left foot, and neck pain with headaches and nausea.  She also took her BP is was high according to her, please advise

## 2019-01-31 ENCOUNTER — OFFICE VISIT (OUTPATIENT)
Dept: NEUROLOGY | Facility: CLINIC | Age: 64
End: 2019-01-31
Payer: COMMERCIAL

## 2019-01-31 VITALS — DIASTOLIC BLOOD PRESSURE: 80 MMHG | HEART RATE: 78 BPM | RESPIRATION RATE: 16 BRPM | SYSTOLIC BLOOD PRESSURE: 122 MMHG

## 2019-01-31 DIAGNOSIS — M48.02 FORAMINAL STENOSIS OF CERVICAL REGION: ICD-10-CM

## 2019-01-31 DIAGNOSIS — J01.10 ACUTE NON-RECURRENT FRONTAL SINUSITIS: ICD-10-CM

## 2019-01-31 DIAGNOSIS — M43.16 SPONDYLOLISTHESIS OF LUMBAR REGION: ICD-10-CM

## 2019-01-31 DIAGNOSIS — M41.25 OTHER IDIOPATHIC SCOLIOSIS, THORACOLUMBAR REGION: ICD-10-CM

## 2019-01-31 DIAGNOSIS — G56.21 ULNAR NEUROPATHY AT ELBOW OF RIGHT UPPER EXTREMITY: ICD-10-CM

## 2019-01-31 DIAGNOSIS — M43.10 RETROLISTHESIS: ICD-10-CM

## 2019-01-31 DIAGNOSIS — M54.16 LUMBAR RADICULOPATHY: Primary | ICD-10-CM

## 2019-01-31 DIAGNOSIS — M51.9 LUMBAR DISC DISEASE: ICD-10-CM

## 2019-01-31 DIAGNOSIS — M48.02 CERVICAL SPINAL STENOSIS: ICD-10-CM

## 2019-01-31 DIAGNOSIS — M54.2 NECK PAIN ON LEFT SIDE: ICD-10-CM

## 2019-01-31 DIAGNOSIS — M54.12 CERVICAL RADICULOPATHY: ICD-10-CM

## 2019-01-31 DIAGNOSIS — M50.90 CERVICAL DISC DISEASE: ICD-10-CM

## 2019-01-31 DIAGNOSIS — M48.061 LUMBAR FORAMINAL STENOSIS: ICD-10-CM

## 2019-01-31 DIAGNOSIS — Z98.890 HISTORY OF LUMBAR LAMINECTOMY: ICD-10-CM

## 2019-01-31 DIAGNOSIS — Z98.1 HISTORY OF LUMBAR FUSION: ICD-10-CM

## 2019-01-31 PROCEDURE — 99214 OFFICE O/P EST MOD 30 MIN: CPT | Performed by: PHYSICAL MEDICINE & REHABILITATION

## 2019-01-31 RX ORDER — CEFUROXIME AXETIL 500 MG/1
TABLET ORAL 2 TIMES DAILY
COMMUNITY
End: 2019-02-28 | Stop reason: ALTCHOICE

## 2019-01-31 NOTE — PATIENT INSTRUCTIONS
As of October 6th 2014, the Drug Enforcement Agency [facility]) is reclassifying all hydrocodone combination medications from Schedule III to Schedule II. This includes medications such as Norco, Vicodin, Lortab, Zohydro, and Vicoprofen.     What this means for y will continue with her home exercise program and massage therapy. I reassured her that she did not have a concussion. She will continue with the testing and the treatment for her sinus infection.     The patient does not need any pain medications at t

## 2019-01-31 NOTE — PROGRESS NOTES
Cervical Pain H & P    Chief Complaint:  Patient presents with:  Low Back Pain: pt here for follow up after Bilateral L5 transforaminal epidural steroid injections 12/11/18 with 90-95% relief.  pt was hit with tennis racket in the head 1/16/19 and has been reports no tingling. · There is not weakness in bilateral hands and arms. · The pain is worse in the morning. She will get dizziness with bilateral head rotation. · The pain is better with Tylenol and massages.          Past Medical History   Past Medi Stephan   • SPINE SURGERY PROCEDURE UNLISTED  2008    L1-L2 FUSION   • SPINE SURGERY PROCEDURE UNLISTED  12/2017    Neck fusion   • TONSILLECTOMY      with Adenoidectomy   • UPPER GI ENDOSCOPY,EXAM      EGD 2007       Family History   Family History   Prob The patient does live in a home with stairs. Review of Systems      PE: The patient does appear in her stated age in no distress. The patient is well groomed. Psychiatric:  The patient is alert and oriented x 3.   The patient has a normal affect reflexes: downward response   LEFT plantar reflexes: downward response   Reflexes: 2+ in bilateral lower extremities     Cranial Nerves: 2-12 intact    Assessment  1. left > right L5 radiculopathies    2. left C7-8 radiculopathy    3.  Ulnar neuropathy at e

## 2019-02-04 NOTE — TELEPHONE ENCOUNTER
Patient is asking for a refill for:        Estrace 0.1mg    Patient has been delinquent in using this on a regular basis & wants to get back on track.           Send to [facility] in [place]

## 2019-02-05 ENCOUNTER — HOSPITAL ENCOUNTER (OUTPATIENT)
Dept: CT IMAGING | Facility: HOSPITAL | Age: 64
Discharge: HOME | End: 2019-02-05
Attending: SPECIALIST
Payer: COMMERCIAL

## 2019-02-05 DIAGNOSIS — J34.89 NASAL MASS: ICD-10-CM

## 2019-02-05 LAB — CREAT BLD-MCNC: 0.8 MG/DL (ref 0.5–1.5)

## 2019-02-05 PROCEDURE — 70488 CT MAXILLOFACIAL W/O & W/DYE: CPT | Performed by: SPECIALIST

## 2019-02-05 PROCEDURE — 82565 ASSAY OF CREATININE: CPT

## 2019-02-05 RX ORDER — ESTRADIOL 0.1 MG/G
0.5 CREAM VAGINAL
Qty: 1 TUBE | Refills: 6 | Status: SHIPPED | OUTPATIENT
Start: 2019-02-07 | End: 2019-10-25

## 2019-02-27 NOTE — H&P
[facility]    PATIENT'S NAME: Gerda Octjuancarlos   ATTENDING PHYSICIAN: Miah Lee MD   PATIENT ACCOUNT#:   [de-identified]    LOCATION:    MEDICAL RECORD #:   [id number]       YOB: 1955  ADMISSION DATE:       03/06/2019    HISTORY AND P rhythm. Normal S1, S2, without S3, S4, without murmurs. IMPRESSION:  Left nasal mass. PLAN:  Excision of left nasal mass.     Dictated By Jeevan Richardson MD  d: 02/27/2019 12:40:04  t: 02/27/2019 14:17:30  Psychiatric [id number]  OQM/

## 2019-03-06 ENCOUNTER — ANESTHESIA EVENT (OUTPATIENT)
Dept: SURGERY | Facility: HOSPITAL | Age: 64
End: 2019-03-06

## 2019-03-06 ENCOUNTER — HOSPITAL ENCOUNTER (OUTPATIENT)
Facility: HOSPITAL | Age: 64
Setting detail: HOSPITAL OUTPATIENT SURGERY
Discharge: HOME | End: 2019-03-06
Attending: SPECIALIST | Admitting: SPECIALIST
Payer: COMMERCIAL

## 2019-03-06 ENCOUNTER — ANESTHESIA (OUTPATIENT)
Dept: SURGERY | Facility: HOSPITAL | Age: 64
End: 2019-03-06

## 2019-03-06 VITALS
HEIGHT: 68 IN | DIASTOLIC BLOOD PRESSURE: 76 MMHG | TEMPERATURE: 98 F | WEIGHT: 134.69 LBS | RESPIRATION RATE: 16 BRPM | HEART RATE: 87 BPM | BODY MASS INDEX: 20.41 KG/M2 | OXYGEN SATURATION: 99 % | SYSTOLIC BLOOD PRESSURE: 124 MMHG

## 2019-03-06 PROBLEM — R07.9 CHEST PAIN: Status: ACTIVE | Noted: 2017-05-25

## 2019-03-06 PROCEDURE — 88313 SPECIAL STAINS GROUP 2: CPT | Performed by: SPECIALIST

## 2019-03-06 PROCEDURE — 09BKXZZ EXCISION OF NASAL MUCOSA AND SOFT TISSUE, EXTERNAL APPROACH: ICD-10-PCS | Performed by: SPECIALIST

## 2019-03-06 PROCEDURE — 88342 IMHCHEM/IMCYTCHM 1ST ANTB: CPT | Performed by: SPECIALIST

## 2019-03-06 PROCEDURE — 88300 SURGICAL PATH GROSS: CPT | Performed by: SPECIALIST

## 2019-03-06 PROCEDURE — 88305 TISSUE EXAM BY PATHOLOGIST: CPT | Performed by: SPECIALIST

## 2019-03-06 PROCEDURE — 88341 IMHCHEM/IMCYTCHM EA ADD ANTB: CPT | Performed by: SPECIALIST

## 2019-03-06 RX ORDER — FAMOTIDINE 20 MG/1
20 TABLET ORAL ONCE
Status: DISCONTINUED | OUTPATIENT
Start: 2019-03-06 | End: 2019-03-06 | Stop reason: HOSPADM

## 2019-03-06 RX ORDER — EPHEDRINE SULFATE 50 MG/ML
INJECTION, SOLUTION INTRAVENOUS AS NEEDED
Status: DISCONTINUED | OUTPATIENT
Start: 2019-03-06 | End: 2019-03-06 | Stop reason: SURG

## 2019-03-06 RX ORDER — MORPHINE SULFATE 10 MG/ML
6 INJECTION, SOLUTION INTRAMUSCULAR; INTRAVENOUS EVERY 10 MIN PRN
Status: DISCONTINUED | OUTPATIENT
Start: 2019-03-06 | End: 2019-03-06

## 2019-03-06 RX ORDER — ACETAMINOPHEN 500 MG
1000 TABLET ORAL ONCE
Status: COMPLETED | OUTPATIENT
Start: 2019-03-06 | End: 2019-03-06

## 2019-03-06 RX ORDER — GLYCOPYRROLATE 0.2 MG/ML
INJECTION, SOLUTION INTRAMUSCULAR; INTRAVENOUS AS NEEDED
Status: DISCONTINUED | OUTPATIENT
Start: 2019-03-06 | End: 2019-03-06 | Stop reason: SURG

## 2019-03-06 RX ORDER — SODIUM CHLORIDE, SODIUM LACTATE, POTASSIUM CHLORIDE, CALCIUM CHLORIDE 600; 310; 30; 20 MG/100ML; MG/100ML; MG/100ML; MG/100ML
INJECTION, SOLUTION INTRAVENOUS CONTINUOUS
Status: DISCONTINUED | OUTPATIENT
Start: 2019-03-06 | End: 2019-03-06

## 2019-03-06 RX ORDER — MORPHINE SULFATE 4 MG/ML
4 INJECTION, SOLUTION INTRAMUSCULAR; INTRAVENOUS EVERY 10 MIN PRN
Status: DISCONTINUED | OUTPATIENT
Start: 2019-03-06 | End: 2019-03-06

## 2019-03-06 RX ORDER — ACETAMINOPHEN AND CODEINE PHOSPHATE 300; 30 MG/1; MG/1
1-2 TABLET ORAL EVERY 4 HOURS PRN
Qty: 20 TABLET | Refills: 0 | Status: SHIPPED | OUTPATIENT
Start: 2019-03-06 | End: 2019-03-13 | Stop reason: ALTCHOICE

## 2019-03-06 RX ORDER — ONDANSETRON 2 MG/ML
4 INJECTION INTRAMUSCULAR; INTRAVENOUS ONCE AS NEEDED
Status: DISCONTINUED | OUTPATIENT
Start: 2019-03-06 | End: 2019-03-06

## 2019-03-06 RX ORDER — METOCLOPRAMIDE 10 MG/1
10 TABLET ORAL ONCE
Status: DISCONTINUED | OUTPATIENT
Start: 2019-03-06 | End: 2019-03-06 | Stop reason: HOSPADM

## 2019-03-06 RX ORDER — MIDAZOLAM HYDROCHLORIDE 1 MG/ML
INJECTION INTRAMUSCULAR; INTRAVENOUS AS NEEDED
Status: DISCONTINUED | OUTPATIENT
Start: 2019-03-06 | End: 2019-03-06 | Stop reason: SURG

## 2019-03-06 RX ORDER — NALOXONE HYDROCHLORIDE 0.4 MG/ML
80 INJECTION, SOLUTION INTRAMUSCULAR; INTRAVENOUS; SUBCUTANEOUS AS NEEDED
Status: DISCONTINUED | OUTPATIENT
Start: 2019-03-06 | End: 2019-03-06

## 2019-03-06 RX ORDER — HYDROCODONE BITARTRATE AND ACETAMINOPHEN 5; 325 MG/1; MG/1
1 TABLET ORAL AS NEEDED
Status: DISCONTINUED | OUTPATIENT
Start: 2019-03-06 | End: 2019-03-06

## 2019-03-06 RX ORDER — CEPHALEXIN 500 MG/1
500 CAPSULE ORAL 2 TIMES DAILY
Qty: 6 CAPSULE | Refills: 0 | Status: SHIPPED | OUTPATIENT
Start: 2019-03-06 | End: 2019-03-09

## 2019-03-06 RX ORDER — LIDOCAINE HYDROCHLORIDE 10 MG/ML
INJECTION, SOLUTION EPIDURAL; INFILTRATION; INTRACAUDAL; PERINEURAL AS NEEDED
Status: DISCONTINUED | OUTPATIENT
Start: 2019-03-06 | End: 2019-03-06 | Stop reason: SURG

## 2019-03-06 RX ORDER — DEXAMETHASONE SODIUM PHOSPHATE 4 MG/ML
VIAL (ML) INJECTION AS NEEDED
Status: DISCONTINUED | OUTPATIENT
Start: 2019-03-06 | End: 2019-03-06 | Stop reason: SURG

## 2019-03-06 RX ORDER — HYDROCODONE BITARTRATE AND ACETAMINOPHEN 5; 325 MG/1; MG/1
2 TABLET ORAL AS NEEDED
Status: DISCONTINUED | OUTPATIENT
Start: 2019-03-06 | End: 2019-03-06

## 2019-03-06 RX ORDER — ONDANSETRON 2 MG/ML
INJECTION INTRAMUSCULAR; INTRAVENOUS AS NEEDED
Status: DISCONTINUED | OUTPATIENT
Start: 2019-03-06 | End: 2019-03-06 | Stop reason: SURG

## 2019-03-06 RX ORDER — MORPHINE SULFATE 2 MG/ML
2 INJECTION, SOLUTION INTRAMUSCULAR; INTRAVENOUS EVERY 10 MIN PRN
Status: DISCONTINUED | OUTPATIENT
Start: 2019-03-06 | End: 2019-03-06

## 2019-03-06 RX ORDER — LIDOCAINE HYDROCHLORIDE AND EPINEPHRINE 10; 10 MG/ML; UG/ML
INJECTION, SOLUTION INFILTRATION; PERINEURAL AS NEEDED
Status: DISCONTINUED | OUTPATIENT
Start: 2019-03-06 | End: 2019-03-06 | Stop reason: HOSPADM

## 2019-03-06 RX ADMIN — GLYCOPYRROLATE 0.2 MG: 0.2 INJECTION, SOLUTION INTRAMUSCULAR; INTRAVENOUS at 07:49:00

## 2019-03-06 RX ADMIN — SODIUM CHLORIDE, SODIUM LACTATE, POTASSIUM CHLORIDE, CALCIUM CHLORIDE: 600; 310; 30; 20 INJECTION, SOLUTION INTRAVENOUS at 07:27:00

## 2019-03-06 RX ADMIN — ONDANSETRON 4 MG: 2 INJECTION INTRAMUSCULAR; INTRAVENOUS at 07:40:00

## 2019-03-06 RX ADMIN — EPHEDRINE SULFATE 10 MG: 50 INJECTION, SOLUTION INTRAVENOUS at 07:58:00

## 2019-03-06 RX ADMIN — DEXAMETHASONE SODIUM PHOSPHATE 4 MG: 4 MG/ML VIAL (ML) INJECTION at 07:32:00

## 2019-03-06 RX ADMIN — SODIUM CHLORIDE, SODIUM LACTATE, POTASSIUM CHLORIDE, CALCIUM CHLORIDE: 600; 310; 30; 20 INJECTION, SOLUTION INTRAVENOUS at 08:05:00

## 2019-03-06 RX ADMIN — EPHEDRINE SULFATE 10 MG: 50 INJECTION, SOLUTION INTRAVENOUS at 07:53:00

## 2019-03-06 RX ADMIN — MIDAZOLAM HYDROCHLORIDE 2 MG: 1 INJECTION INTRAMUSCULAR; INTRAVENOUS at 07:27:00

## 2019-03-06 RX ADMIN — SODIUM CHLORIDE, SODIUM LACTATE, POTASSIUM CHLORIDE, CALCIUM CHLORIDE: 600; 310; 30; 20 INJECTION, SOLUTION INTRAVENOUS at 07:45:00

## 2019-03-06 RX ADMIN — LIDOCAINE HYDROCHLORIDE 50 MG: 10 INJECTION, SOLUTION EPIDURAL; INFILTRATION; INTRACAUDAL; PERINEURAL at 07:32:00

## 2019-03-06 NOTE — ANESTHESIA POSTPROCEDURE EVALUATION
Patient: Elisa Chirinos Day    Procedure Summary     Date:  03/06/19 Room / Location:  [address] [place] [address] MAIN OR 02 / [address] [place] [address] MAIN OR    Anesthesia Start:  8716 Anesthesia Stop:      Procedure:  NASAL REMOVAL FOREIGN BODY (Left Nose) Diagnosis:  (left nasal mass)    Surgeon:  Torrie Lara

## 2019-03-06 NOTE — INTERVAL H&P NOTE
Pre-op Diagnosis: left nasal mass    The above referenced H&P was reviewed by Caro Yi MD on 3/6/2019, the patient was examined and no significant changes have occurred in the patient's condition since the H&P was performed.   I discussed with the pa

## 2019-03-06 NOTE — BRIEF OP NOTE
Pre-Operative Diagnosis: left nasal mass     Post-Operative Diagnosis: left nasal mass      Procedure Performed:   Procedure(s):  excision of left intranasal mass    Surgeon(s) and Role:     * Milinda Gaucher, MD - Primary    Assistant(s):        Surgical F

## 2019-03-06 NOTE — ANESTHESIA PREPROCEDURE EVALUATION
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 Disp: 90 tablet Rfl: 3 3/6/2019 at 545   sucralfate 1 g Oral Tab Take 1 tablet (1 g total) by mouth 4 (four) times daily before meals and nightly.  (Patient taking differently: Metoclopramide HCl (REGLAN) tab 10 mg 10 mg Oral Once Marisa Ron MD      No current [facility]-ordered outpatient medications on file.       Levaquin [Levofloxa*    RASH, DIZZINESS  Codeine                 NAUSEA AND VOMITING  Hydrocodone             NAUS organization: Not on file        Attends meetings of clubs or organizations: Not on file        Relationship status: Not on file      Intimate partner violence:        Fear of current or ex partner: Not on file        Emotionally abused: Not on file Endo/Other - negative ROS   Abdominal              Anesthesia Plan:   ASA:  2  Plan:   General  Airway:  LMA  Post-op Pain Management: IV analgesics  Informed Consent Plan and Risks Discussed With:  Patient and spouse      I have informed Radha A Day and

## 2019-03-06 NOTE — OPERATIVE REPORT
[facility]    PATIENT'S NAME: Sarah Armendariz   ATTENDING PHYSICIAN: Isaías Banks MD   OPERATING PHYSICIAN: Isaías Banks MD   PATIENT ACCOUNT#:   [de-identified]    LOCATION:  [facility] [address] PACU [address] [place] [address]  MEDICAL RECORD #:   [id number]       DATE OF B with a 4-0 Vicryl suture; about 7 to 8 sutures were placed. This was followed by bacitracin ointment. The patient tolerated the procedure well, was extubated in the operating room suite, and taken to Recovery in good condition. Dictated By Tobias Knight.  GEORGE

## 2019-03-12 ENCOUNTER — TELEPHONE (OUTPATIENT)
Dept: GASTROENTEROLOGY | Facility: CLINIC | Age: 64
End: 2019-03-12

## 2019-03-12 NOTE — TELEPHONE ENCOUNTER
Pt was doing some sit ups and she thinks she pulled a muscle.  She has an appt with her PCP tomorrow    Future Appointments   Date Time Provider Caroline Crystal   3/13/2019  2:30 PM MD MAURICIO Cristina   3/20/2019 10:00 AM Shelby Larson

## 2019-03-13 ENCOUNTER — OFFICE VISIT (OUTPATIENT)
Dept: INTERNAL MEDICINE CLINIC | Facility: CLINIC | Age: 64
End: 2019-03-13
Payer: COMMERCIAL

## 2019-03-13 VITALS
BODY MASS INDEX: 20.52 KG/M2 | HEART RATE: 93 BPM | TEMPERATURE: 98 F | SYSTOLIC BLOOD PRESSURE: 116 MMHG | DIASTOLIC BLOOD PRESSURE: 78 MMHG | HEIGHT: 68 IN | WEIGHT: 135.38 LBS | RESPIRATION RATE: 16 BRPM | OXYGEN SATURATION: 98 %

## 2019-03-13 DIAGNOSIS — S39.011A ABDOMINAL WALL STRAIN, INITIAL ENCOUNTER: Primary | ICD-10-CM

## 2019-03-13 PROBLEM — M79.641 RIGHT HAND PAIN: Status: RESOLVED | Noted: 2017-09-25 | Resolved: 2019-03-13

## 2019-03-13 PROBLEM — S09.90XA HEAD INJURY: Status: RESOLVED | Noted: 2019-01-21 | Resolved: 2019-03-13

## 2019-03-13 PROBLEM — J01.10 ACUTE NON-RECURRENT FRONTAL SINUSITIS: Status: RESOLVED | Noted: 2019-01-21 | Resolved: 2019-03-13

## 2019-03-13 PROCEDURE — 99213 OFFICE O/P EST LOW 20 MIN: CPT | Performed by: INTERNAL MEDICINE

## 2019-03-13 PROCEDURE — 99212 OFFICE O/P EST SF 10 MIN: CPT | Performed by: INTERNAL MEDICINE

## 2019-03-13 RX ORDER — MELOXICAM 7.5 MG/1
7.5 TABLET ORAL DAILY
Qty: 30 TABLET | Refills: 0 | Status: SHIPPED | OUTPATIENT
Start: 2019-03-13 | End: 2019-07-17

## 2019-03-13 NOTE — PROGRESS NOTES
Iva Stoner is a 58year old female. Patient presents with:  Pain: 2.5 weeks ago started doing abdominal exercises. Developed sharp pain in left lateral abdomen. Hurts constantly. Radiates to left upper back. Pain 5/10.     HPI:     Pt decided to start doi needed for breakthrough symptoms (Patient taking differently: Take 150 mg by mouth as needed. As needed for breakthrough symptoms ) Disp: 60 tablet Rfl: 11   Fluticasone Propionate (FLONASE) 50 MCG/ACT Nasal Suspension by Nasal route daily as needed.  spray /78 (BP Location: Right arm, Patient Position: Sitting, Cuff Size: adult)   Pulse 93   Temp 98.2 °F (36.8 °C) (Oral)   Resp 16   Ht 5' 8\" (1.727 m)   Wt 135 lb 6.4 oz (61.4 kg)   SpO2 98%   BMI 20.59 kg/m²   GENERAL: well developed, well nourished

## 2019-03-14 RX ORDER — MELOXICAM 7.5 MG/1
TABLET ORAL
Qty: 90 TABLET | Refills: 0 | OUTPATIENT
Start: 2019-03-14

## 2019-03-15 NOTE — TELEPHONE ENCOUNTER
Current refill request refused due to refill is either a duplicate request or has active refills at the pharmacy. Check previous templates.     Requested Prescriptions     Refused Prescriptions Disp Refills   • MELOXICAM 7.5 MG Oral Tab [Pharmacy Med Name:

## 2019-03-19 ENCOUNTER — TELEPHONE (OUTPATIENT)
Dept: GASTROENTEROLOGY | Facility: CLINIC | Age: 64
End: 2019-03-19

## 2019-03-19 ENCOUNTER — TELEPHONE (OUTPATIENT)
Dept: NEUROLOGY | Facility: CLINIC | Age: 64
End: 2019-03-19

## 2019-03-19 DIAGNOSIS — M54.42 ACUTE MIDLINE LOW BACK PAIN WITH LEFT-SIDED SCIATICA: ICD-10-CM

## 2019-03-19 DIAGNOSIS — M43.10 RETROLISTHESIS: ICD-10-CM

## 2019-03-19 DIAGNOSIS — G25.81 RESTLESS LEGS SYNDROME (RLS): Primary | ICD-10-CM

## 2019-03-19 DIAGNOSIS — M51.9 LUMBAR DISC DISEASE: ICD-10-CM

## 2019-03-19 DIAGNOSIS — M48.061 LUMBAR FORAMINAL STENOSIS: ICD-10-CM

## 2019-03-19 DIAGNOSIS — M43.16 SPONDYLOLISTHESIS OF LUMBAR REGION: ICD-10-CM

## 2019-03-19 DIAGNOSIS — M54.16 LUMBAR RADICULOPATHY: ICD-10-CM

## 2019-03-19 DIAGNOSIS — Z98.890 HISTORY OF LUMBAR LAMINECTOMY: ICD-10-CM

## 2019-03-19 DIAGNOSIS — M41.25 OTHER IDIOPATHIC SCOLIOSIS, THORACOLUMBAR REGION: ICD-10-CM

## 2019-03-19 DIAGNOSIS — Z98.1 HISTORY OF LUMBAR FUSION: ICD-10-CM

## 2019-03-19 RX ORDER — SUCRALFATE 1 G/1
1 TABLET ORAL 4 TIMES DAILY PRN
Qty: 360 TABLET | Refills: 0 | Status: SHIPPED | OUTPATIENT
Start: 2019-03-19 | End: 2019-11-06

## 2019-03-19 NOTE — TELEPHONE ENCOUNTER
Patient calling requesting physical therapy order for continued Lower back pain. Pain is unchanged and has been increasing. Had Trigger point injections with steroids by the quadratus lumborum muscles at Dr. Nohelia Henderson office this morning.  Has not been complia

## 2019-03-19 NOTE — TELEPHONE ENCOUNTER
Pt called to request refill:       Current Outpatient Medications:   •  sucralfate 1 g Oral Tab, Take 1 tablet (1 g total) by mouth 4 (four) times daily before meals and nightly.  (Patient taking differently: Take 1 g by mouth as needed.  ), Disp: 360 table

## 2019-03-19 NOTE — TELEPHONE ENCOUNTER
Last OV 03/13/19  Last filled 07/13/18    * sent to office on call MD for Dr Marquis Harvey out of the office

## 2019-03-19 NOTE — TELEPHONE ENCOUNTER
Prescription sent to the pharmacy in Dr. eBtte sweet. This was filled to be taken 4 times daily as needed. No refills.

## 2019-03-19 NOTE — TELEPHONE ENCOUNTER
Reviewed with Dr. Judson Lovell who approved Physical therapy request.    Left detailed message informing patient of the above and to call back with questions.  [facility] phone number provided on voicemail ([phone])-verified in fyi

## 2019-03-20 ENCOUNTER — OFFICE VISIT (OUTPATIENT)
Dept: INTERNAL MEDICINE CLINIC | Facility: CLINIC | Age: 64
End: 2019-03-20
Payer: COMMERCIAL

## 2019-03-20 VITALS
WEIGHT: 134 LBS | HEIGHT: 68 IN | RESPIRATION RATE: 16 BRPM | TEMPERATURE: 98 F | HEART RATE: 87 BPM | DIASTOLIC BLOOD PRESSURE: 76 MMHG | BODY MASS INDEX: 20.31 KG/M2 | OXYGEN SATURATION: 98 % | SYSTOLIC BLOOD PRESSURE: 126 MMHG

## 2019-03-20 DIAGNOSIS — Z87.19 H/O ACUTE PANCREATITIS: ICD-10-CM

## 2019-03-20 DIAGNOSIS — M85.89 OSTEOPENIA OF MULTIPLE SITES: Primary | ICD-10-CM

## 2019-03-20 DIAGNOSIS — Z00.00 ROUTINE HEALTH MAINTENANCE: ICD-10-CM

## 2019-03-20 DIAGNOSIS — Z12.31 ENCOUNTER FOR SCREENING MAMMOGRAM FOR BREAST CANCER: ICD-10-CM

## 2019-03-20 DIAGNOSIS — Z79.899 CURRENT USE OF PROTON PUMP INHIBITOR: ICD-10-CM

## 2019-03-20 DIAGNOSIS — Z78.0 POSTMENOPAUSE: ICD-10-CM

## 2019-03-20 DIAGNOSIS — K21.9 GASTROESOPHAGEAL REFLUX DISEASE WITHOUT ESOPHAGITIS: ICD-10-CM

## 2019-03-20 DIAGNOSIS — I10 ESSENTIAL HYPERTENSION: ICD-10-CM

## 2019-03-20 DIAGNOSIS — Z80.0 FAMILY HISTORY OF COLON CANCER: ICD-10-CM

## 2019-03-20 DIAGNOSIS — Z83.71 FAMILY HISTORY OF COLONIC POLYPS: ICD-10-CM

## 2019-03-20 DIAGNOSIS — R92.2 DENSE BREASTS: ICD-10-CM

## 2019-03-20 PROCEDURE — 99396 PREV VISIT EST AGE 40-64: CPT | Performed by: INTERNAL MEDICINE

## 2019-03-20 RX ORDER — ZOLEDRONIC ACID 5 MG/100ML
5 INJECTION, SOLUTION INTRAVENOUS ONCE
Qty: 1 EACH | Refills: 0 | Status: SHIPPED
Start: 2019-06-12 | End: 2019-05-06

## 2019-03-20 RX ORDER — DICYCLOMINE HYDROCHLORIDE 10 MG/1
10 CAPSULE ORAL 4 TIMES DAILY PRN
Qty: 60 CAPSULE | Refills: 3 | Status: SHIPPED | OUTPATIENT
Start: 2019-03-20 | End: 2019-03-30

## 2019-03-20 NOTE — PROGRESS NOTES
Liz Stoner is a 58year old female. Patient presents with:  Physical: Annual Px      HPI:   Liz Stoner is a 58year old female who presents for a complete physical exam.  Still wakes up every day with a \"tummy ache. \"  Takes her sucralfate, protonix. needed.  ) Disp: 1 Tube Rfl: 3   RaNITidine HCl 150 MG Oral Tab Take 1 tablet (150 mg total) by mouth 2 (two) times daily. As needed for breakthrough symptoms (Patient taking differently: Take 150 mg by mouth as needed.  As needed for breakthrough symptoms FRACTURE ORIF   • FRACTURE SURGERY Left 2013    ORIF wrist fracture revision with plates and screws- Ortho Dr Gabi Yeboah   • HAND GANGLION EXCISION Right 6/22/2018    Performed by Ajay Griffith MD at [facility] OR   • LASIK     • NASAL REMOVAL FOREIGN BODY are pink  NECK: supple, no cervical or supraclavicular LAD, no carotid bruits  BREAST: no dominant or suspicious mass, no axillary LAD  LUNGS: clear to auscultation  CARDIO: RRR, normal S1S2, no gallops or murmurs  GI: soft, mild epigastric tenderness, ND,

## 2019-03-25 ENCOUNTER — LAB ENCOUNTER (OUTPATIENT)
Dept: LAB | Facility: HOSPITAL | Age: 64
End: 2019-03-25
Attending: INTERNAL MEDICINE
Payer: COMMERCIAL

## 2019-03-25 ENCOUNTER — TELEPHONE (OUTPATIENT)
Dept: OTOLARYNGOLOGY | Facility: CLINIC | Age: 64
End: 2019-03-25

## 2019-03-25 DIAGNOSIS — Z79.899 CURRENT USE OF PROTON PUMP INHIBITOR: ICD-10-CM

## 2019-03-25 DIAGNOSIS — Z87.19 H/O ACUTE PANCREATITIS: ICD-10-CM

## 2019-03-25 DIAGNOSIS — I10 ESSENTIAL HYPERTENSION: ICD-10-CM

## 2019-03-25 LAB
ALBUMIN SERPL-MCNC: 3.8 G/DL (ref 3.4–5)
ALBUMIN/GLOB SERPL: 1.2 {RATIO} (ref 1–2)
ALP LIVER SERPL-CCNC: 62 U/L (ref 50–130)
ALT SERPL-CCNC: 29 U/L (ref 13–56)
AMYLASE SERPL-CCNC: 53 U/L (ref 25–115)
ANION GAP SERPL CALC-SCNC: 5 MMOL/L (ref 0–18)
AST SERPL-CCNC: 14 U/L (ref 15–37)
BASOPHILS # BLD AUTO: 0.04 X10(3) UL (ref 0–0.2)
BASOPHILS NFR BLD AUTO: 0.6 %
BILIRUB SERPL-MCNC: 0.5 MG/DL (ref 0.1–2)
BUN BLD-MCNC: 15 MG/DL (ref 7–18)
BUN/CREAT SERPL: 21.7 (ref 10–20)
CALCIUM BLD-MCNC: 9.1 MG/DL (ref 8.5–10.1)
CHLORIDE SERPL-SCNC: 109 MMOL/L (ref 98–107)
CHOLEST SMN-MCNC: 196 MG/DL (ref ?–200)
CO2 SERPL-SCNC: 28 MMOL/L (ref 21–32)
CREAT BLD-MCNC: 0.69 MG/DL (ref 0.55–1.02)
DEPRECATED RDW RBC AUTO: 45.4 FL (ref 35.1–46.3)
EOSINOPHIL # BLD AUTO: 0.15 X10(3) UL (ref 0–0.7)
EOSINOPHIL NFR BLD AUTO: 2.3 %
ERYTHROCYTE [DISTWIDTH] IN BLOOD BY AUTOMATED COUNT: 12.9 % (ref 11–15)
GLOBULIN PLAS-MCNC: 3.3 G/DL (ref 2.8–4.4)
GLUCOSE BLD-MCNC: 84 MG/DL (ref 70–99)
HCT VFR BLD AUTO: 44.9 % (ref 35–48)
HDLC SERPL-MCNC: 86 MG/DL (ref 40–59)
HGB BLD-MCNC: 14.3 G/DL (ref 12–16)
IMM GRANULOCYTES # BLD AUTO: 0.01 X10(3) UL (ref 0–1)
IMM GRANULOCYTES NFR BLD: 0.2 %
LDLC SERPL CALC-MCNC: 99 MG/DL (ref ?–100)
LIPASE SERPL-CCNC: 147 U/L (ref 73–393)
LYMPHOCYTES # BLD AUTO: 1.99 X10(3) UL (ref 1–4)
LYMPHOCYTES NFR BLD AUTO: 30.6 %
M PROTEIN MFR SERPL ELPH: 7.1 G/DL (ref 6.4–8.2)
MCH RBC QN AUTO: 31.2 PG (ref 26–34)
MCHC RBC AUTO-ENTMCNC: 31.8 G/DL (ref 31–37)
MCV RBC AUTO: 97.8 FL (ref 80–100)
MONOCYTES # BLD AUTO: 0.63 X10(3) UL (ref 0.1–1)
MONOCYTES NFR BLD AUTO: 9.7 %
NEUTROPHILS # BLD AUTO: 3.68 X10 (3) UL (ref 1.5–7.7)
NEUTROPHILS # BLD AUTO: 3.68 X10(3) UL (ref 1.5–7.7)
NEUTROPHILS NFR BLD AUTO: 56.6 %
NONHDLC SERPL-MCNC: 110 MG/DL (ref ?–130)
OSMOLALITY SERPL CALC.SUM OF ELEC: 294 MOSM/KG (ref 275–295)
PLATELET # BLD AUTO: 271 10(3)UL (ref 150–450)
POTASSIUM SERPL-SCNC: 4 MMOL/L (ref 3.5–5.1)
RBC # BLD AUTO: 4.59 X10(6)UL (ref 3.8–5.3)
SODIUM SERPL-SCNC: 142 MMOL/L (ref 136–145)
TRIGL SERPL-MCNC: 55 MG/DL (ref 30–149)
TSI SER-ACNC: 1.26 MIU/ML (ref 0.36–3.74)
VIT B12 SERPL-MCNC: 563 PG/ML (ref 193–986)
VLDLC SERPL CALC-MCNC: 11 MG/DL (ref 0–30)
WBC # BLD AUTO: 6.5 X10(3) UL (ref 4–11)

## 2019-03-25 PROCEDURE — 80053 COMPREHEN METABOLIC PANEL: CPT

## 2019-03-25 PROCEDURE — 82607 VITAMIN B-12: CPT

## 2019-03-25 PROCEDURE — 85025 COMPLETE CBC W/AUTO DIFF WBC: CPT

## 2019-03-25 PROCEDURE — 84443 ASSAY THYROID STIM HORMONE: CPT | Performed by: INTERNAL MEDICINE

## 2019-03-25 PROCEDURE — 80061 LIPID PANEL: CPT

## 2019-03-25 PROCEDURE — 36415 COLL VENOUS BLD VENIPUNCTURE: CPT

## 2019-03-25 PROCEDURE — 82150 ASSAY OF AMYLASE: CPT

## 2019-03-25 PROCEDURE — 83690 ASSAY OF LIPASE: CPT

## 2019-03-25 RX ORDER — FLUTICASONE PROPIONATE 50 MCG
2 SPRAY, SUSPENSION (ML) NASAL DAILY
Qty: 3 BOTTLE | Refills: 4 | Status: SHIPPED | OUTPATIENT
Start: 2019-03-25 | End: 2019-04-24

## 2019-03-25 NOTE — TELEPHONE ENCOUNTER
Dr Jenna Garvin please see note below and advise. The last  Order  For flonase was on 1/4/17. patient last visit on 9/7/18.

## 2019-03-25 NOTE — TELEPHONE ENCOUNTER
Pt requesting refill for Flonase. [facility] pharmacy would not send electronic request, because prescription was .  Pls advise, thank you    [facility] [address] [facility], [address] [facility] [phone], [phone]

## 2019-03-26 ENCOUNTER — TELEPHONE (OUTPATIENT)
Dept: GASTROENTEROLOGY | Facility: CLINIC | Age: 64
End: 2019-03-26

## 2019-03-26 NOTE — TELEPHONE ENCOUNTER
Pt states that she has been having stomach issues w/ bloating and stomach spasms and is requesting an appt sooner than her scheduled 5/22/19 appt with Dr. Luís Sexton.   Pt states that she does has history of pancreatitis and gastritis (not sure if OK to schedule

## 2019-03-26 NOTE — TELEPHONE ENCOUNTER
Pt saw her PCP yesterday for an anck. Pt states she wakes up with a tummy ache in the mornings. She takes her Sucralfate and protonix    BM's are normal    PCP gave her a trial of bentyl for her stomach pains.     Pt still wanted to f/u w/gastro    ap

## 2019-03-28 ENCOUNTER — TELEPHONE (OUTPATIENT)
Dept: INTERNAL MEDICINE CLINIC | Facility: CLINIC | Age: 64
End: 2019-03-28

## 2019-03-28 DIAGNOSIS — R10.12 ABDOMINAL WALL PAIN IN LEFT UPPER QUADRANT: ICD-10-CM

## 2019-03-28 DIAGNOSIS — R10.12 LUQ PAIN: Primary | ICD-10-CM

## 2019-03-28 NOTE — TELEPHONE ENCOUNTER
Routed to Dr. Brenna Wyatt-- per med module patient is not able to have Reclast injection until 6/11/19-- can you please confirm that this is correct?   I spoke with Alex Mckeon at Dr. Mendez Gardner office and she states that the patient is scheduled to have a root canal

## 2019-03-28 NOTE — TELEPHONE ENCOUNTER
Pt has two issues she needs to discuss with Dr Deandre Elizabeth continues to have stomach pain, see mychart message sent to Dr Thania Roper also needs direction regarding a Reclast injection.   This was recommended by Dr Alistair Valencia   If injection done too close

## 2019-03-28 NOTE — TELEPHONE ENCOUNTER
Re the Reclast, that is correct -- her last injection was in 6/2018 and it is a once-a-year injection. Re the abdominal pain, I have ordered a CT abdomen.   Please let pt know it will need to go thru insurance approval.  She can schedule it but needs to

## 2019-03-29 ENCOUNTER — HOSPITAL ENCOUNTER (EMERGENCY)
Facility: HOSPITAL | Age: 64
Discharge: HOME | End: 2019-03-29
Attending: EMERGENCY MEDICINE
Payer: COMMERCIAL

## 2019-03-29 ENCOUNTER — APPOINTMENT (OUTPATIENT)
Dept: GENERAL RADIOLOGY | Facility: HOSPITAL | Age: 64
End: 2019-03-29
Attending: EMERGENCY MEDICINE
Payer: COMMERCIAL

## 2019-03-29 VITALS
TEMPERATURE: 98 F | HEART RATE: 90 BPM | OXYGEN SATURATION: 97 % | SYSTOLIC BLOOD PRESSURE: 122 MMHG | RESPIRATION RATE: 16 BRPM | DIASTOLIC BLOOD PRESSURE: 96 MMHG

## 2019-03-29 DIAGNOSIS — S29.011A MUSCLE STRAIN OF CHEST WALL, INITIAL ENCOUNTER: Primary | ICD-10-CM

## 2019-03-29 PROCEDURE — 96374 THER/PROPH/DIAG INJ IV PUSH: CPT

## 2019-03-29 PROCEDURE — 85025 COMPLETE CBC W/AUTO DIFF WBC: CPT | Performed by: EMERGENCY MEDICINE

## 2019-03-29 PROCEDURE — 80048 BASIC METABOLIC PNL TOTAL CA: CPT | Performed by: EMERGENCY MEDICINE

## 2019-03-29 PROCEDURE — 71101 X-RAY EXAM UNILAT RIBS/CHEST: CPT | Performed by: EMERGENCY MEDICINE

## 2019-03-29 PROCEDURE — 81003 URINALYSIS AUTO W/O SCOPE: CPT | Performed by: EMERGENCY MEDICINE

## 2019-03-29 PROCEDURE — 96375 TX/PRO/DX INJ NEW DRUG ADDON: CPT

## 2019-03-29 PROCEDURE — 99284 EMERGENCY DEPT VISIT MOD MDM: CPT

## 2019-03-29 RX ORDER — LIDOCAINE 50 MG/G
1 PATCH TOPICAL ONCE
Status: DISCONTINUED | OUTPATIENT
Start: 2019-03-29 | End: 2019-03-29

## 2019-03-29 RX ORDER — BUPIVACAINE HYDROCHLORIDE 2.5 MG/ML
20 INJECTION, SOLUTION EPIDURAL; INFILTRATION; INTRACAUDAL ONCE
Status: DISCONTINUED | OUTPATIENT
Start: 2019-03-29 | End: 2019-03-29

## 2019-03-29 RX ORDER — KETOROLAC TROMETHAMINE 15 MG/ML
15 INJECTION, SOLUTION INTRAMUSCULAR; INTRAVENOUS ONCE
Status: COMPLETED | OUTPATIENT
Start: 2019-03-29 | End: 2019-03-29

## 2019-03-29 RX ORDER — METHYLPREDNISOLONE 4 MG/1
TABLET ORAL
Qty: 1 PACKAGE | Refills: 0 | Status: SHIPPED | OUTPATIENT
Start: 2019-03-29 | End: 2019-04-03

## 2019-03-29 RX ORDER — DEXAMETHASONE SODIUM PHOSPHATE 10 MG/ML
10 INJECTION, SOLUTION INTRAMUSCULAR; INTRAVENOUS ONCE
Status: COMPLETED | OUTPATIENT
Start: 2019-03-29 | End: 2019-03-29

## 2019-03-29 NOTE — ED PROVIDER NOTES
Patient Seen in: [facility]    History   Patient presents with:  Back Pain (musculoskeletal)    Stated Complaint: left lower back pain    HPI    69-year-old female with history of scoliosis presents for complaint of left lower Performed by Luc Gomez MD at [facility] ENDO   • CYSTOSCOPY N/A 4/20/2018    Performed by Graham Green MD at [facility] OR   • ESOPHAGOGASTRODUODENOSCOPY (EGD) N/A 12/4/2018    Performed by Luc Gomez MD at [address] [place] [address] Current:BP (!) 122/96   Pulse 90   Temp 98.4 °F (36.9 °C) (Oral)   Resp 16   SpO2 97%         Physical Exam   Constitutional: She is oriented to person, place, and time. She appears well-developed and well-nourished.    HENT:   Head: Normocephalic and atrau MDM    CBC BMP and UA unremarkable. Patient is neurovascularly intact. Abdominal exam is benign. Imaging is negative. Patient likely has muscular skeletal pain. She is given a dose of Decadron and Toradol and is feeling better.   Lidocaine patch is giv For follow up and re-evaluation        Medications Prescribed:  Discharge Medication List as of 3/29/2019 11:36 AM    START taking these medications    methylPREDNISolone 4 MG Oral Tablet Therapy Pack  Dosepack: use as directed on packaging, Normal, Disp-1

## 2019-03-29 NOTE — TELEPHONE ENCOUNTER
Please advise - patient called back - she is having a lot of issues. Infusion center told her not to get reclast close to dental work - patient needs root canal done.  She states she I snot sure what to do first and was advised that CLAIRE MONTGOMERY needs to contact

## 2019-04-01 NOTE — TELEPHONE ENCOUNTER
To DR. RANGEL - patient called , [facility] .  He thinks it has more to do with tooth extraction then root canal , Patient has DEXA scan in May , will have root canal started this Thursday , cancel reclast and wait

## 2019-04-02 NOTE — TELEPHONE ENCOUNTER
The Reclast is not urgent -- get root canal, have DEXA in May, and we'll decide what to do after that.

## 2019-04-03 ENCOUNTER — TELEPHONE (OUTPATIENT)
Dept: INTERNAL MEDICINE CLINIC | Facility: CLINIC | Age: 64
End: 2019-04-03

## 2019-04-03 NOTE — TELEPHONE ENCOUNTER
Pt is calling back and was told her that she DOES need a PA to get the CT done. Pt has an appt. for Friday afternoon.

## 2019-04-03 NOTE — TELEPHONE ENCOUNTER
Please call pt regarding CT scan w/contrast of abdomen pt has scheduled for Friday  Pt was told by Central Scheduling that she needs authorization from insurance company?   Pt had CT of nose earlier this year and that did not require authorization from insu

## 2019-04-03 NOTE — TELEPHONE ENCOUNTER
Called patient who was just speaking with [employer] and needed CPT code . She states she can have as many CT as she needs without prior authorization. Code given to patient

## 2019-04-03 NOTE — TELEPHONE ENCOUNTER
Called patient and instructed to cancel taqueria.  For Friday since she needs PA for CT - to managed care for PA - please inform patient thanks

## 2019-04-04 ENCOUNTER — APPOINTMENT (OUTPATIENT)
Dept: CT IMAGING | Facility: HOSPITAL | Age: 64
End: 2019-04-04
Attending: EMERGENCY MEDICINE
Payer: COMMERCIAL

## 2019-04-04 ENCOUNTER — HOSPITAL ENCOUNTER (EMERGENCY)
Facility: HOSPITAL | Age: 64
Discharge: HOME | End: 2019-04-04
Attending: EMERGENCY MEDICINE
Payer: COMMERCIAL

## 2019-04-04 VITALS
RESPIRATION RATE: 17 BRPM | TEMPERATURE: 98 F | BODY MASS INDEX: 20.46 KG/M2 | DIASTOLIC BLOOD PRESSURE: 92 MMHG | WEIGHT: 135 LBS | SYSTOLIC BLOOD PRESSURE: 138 MMHG | OXYGEN SATURATION: 100 % | HEART RATE: 83 BPM | HEIGHT: 68 IN

## 2019-04-04 DIAGNOSIS — R10.13 ABDOMINAL PAIN, EPIGASTRIC: Primary | ICD-10-CM

## 2019-04-04 PROCEDURE — 93010 ELECTROCARDIOGRAM REPORT: CPT | Performed by: EMERGENCY MEDICINE

## 2019-04-04 PROCEDURE — 93005 ELECTROCARDIOGRAM TRACING: CPT

## 2019-04-04 PROCEDURE — 96374 THER/PROPH/DIAG INJ IV PUSH: CPT

## 2019-04-04 PROCEDURE — 74177 CT ABD & PELVIS W/CONTRAST: CPT | Performed by: EMERGENCY MEDICINE

## 2019-04-04 PROCEDURE — 80048 BASIC METABOLIC PNL TOTAL CA: CPT | Performed by: EMERGENCY MEDICINE

## 2019-04-04 PROCEDURE — 96361 HYDRATE IV INFUSION ADD-ON: CPT

## 2019-04-04 PROCEDURE — 83690 ASSAY OF LIPASE: CPT | Performed by: EMERGENCY MEDICINE

## 2019-04-04 PROCEDURE — 99285 EMERGENCY DEPT VISIT HI MDM: CPT

## 2019-04-04 PROCEDURE — 85025 COMPLETE CBC W/AUTO DIFF WBC: CPT | Performed by: EMERGENCY MEDICINE

## 2019-04-04 PROCEDURE — 80076 HEPATIC FUNCTION PANEL: CPT | Performed by: EMERGENCY MEDICINE

## 2019-04-04 RX ORDER — KETOROLAC TROMETHAMINE 15 MG/ML
15 INJECTION, SOLUTION INTRAMUSCULAR; INTRAVENOUS ONCE
Status: COMPLETED | OUTPATIENT
Start: 2019-04-04 | End: 2019-04-04

## 2019-04-04 NOTE — ED NOTES
Pt state she has been experiencing mid to left abd pain and tenderness for about a month. Pain onset when pt was doing sit ups, states she felt a \"pop\" but kept going with the activity.  Pt also recently seen here for L side pain, states dx with a \"pulle

## 2019-04-04 NOTE — ED PROVIDER NOTES
Patient Seen in: [facility] Emergency Department    History   Patient presents with:  Abdomen/Flank Pain (GI/)    Stated Complaint: abd pain    HPI    69-year-old female with past medical history significant for anemia, anxiety, depression, GERD Past Surgical History:   Procedure Laterality Date   • ANTERIOR CERVICAL FUSION BG & INST 1 LEVEL N/A 2017    Performed by Cynthia Esparza MD at [address]   • ARTHROSCOPY OF JOINT UNLISTED Right 2012    rotator cuff tear   •  None (Room air)       Current:BP (!) 138/92   Pulse 83   Temp 98.2 °F (36.8 °C) (Oral)   Resp 17   Ht 172.7 cm (5' 8\")   Wt 61.2 kg   SpO2 100%   BMI 20.53 kg/m²         Physical Exam    Physical Exam   Constitutional: AAOx3, well nourished, anxious  Head LIGHT GREEN   RAINBOW DRAW GOLD     EKG    Rate, intervals and axes as noted on EKG Report.   Rate: 101 bpm  Rhythm: Sinus Rhythm  Reading: Sinus tachycardia, no acute ST changes, normal axis and intervals, no ectopy              Ct Abdomen Pelvis Iv Contra

## 2019-04-05 ENCOUNTER — APPOINTMENT (OUTPATIENT)
Dept: HEMATOLOGY/ONCOLOGY | Facility: HOSPITAL | Age: 64
End: 2019-04-05
Attending: INTERNAL MEDICINE
Payer: COMMERCIAL

## 2019-04-12 ENCOUNTER — OFFICE VISIT (OUTPATIENT)
Dept: PHYSICAL THERAPY | Age: 64
End: 2019-04-12
Attending: PHYSICAL MEDICINE & REHABILITATION
Payer: COMMERCIAL

## 2019-04-12 DIAGNOSIS — M51.9 LUMBAR DISC DISEASE: ICD-10-CM

## 2019-04-12 DIAGNOSIS — M54.42 ACUTE MIDLINE LOW BACK PAIN WITH LEFT-SIDED SCIATICA: ICD-10-CM

## 2019-04-12 DIAGNOSIS — M41.25 OTHER IDIOPATHIC SCOLIOSIS, THORACOLUMBAR REGION: ICD-10-CM

## 2019-04-12 DIAGNOSIS — M43.10 RETROLISTHESIS: ICD-10-CM

## 2019-04-12 DIAGNOSIS — M54.16 LUMBAR RADICULOPATHY: ICD-10-CM

## 2019-04-12 DIAGNOSIS — M48.061 LUMBAR FORAMINAL STENOSIS: ICD-10-CM

## 2019-04-12 DIAGNOSIS — M43.16 SPONDYLOLISTHESIS OF LUMBAR REGION: ICD-10-CM

## 2019-04-12 DIAGNOSIS — Z98.1 HISTORY OF LUMBAR FUSION: ICD-10-CM

## 2019-04-12 DIAGNOSIS — G25.81 RESTLESS LEGS SYNDROME (RLS): ICD-10-CM

## 2019-04-12 DIAGNOSIS — Z98.890 HISTORY OF LUMBAR LAMINECTOMY: ICD-10-CM

## 2019-04-12 PROCEDURE — 97110 THERAPEUTIC EXERCISES: CPT

## 2019-04-12 PROCEDURE — 97162 PT EVAL MOD COMPLEX 30 MIN: CPT

## 2019-04-12 NOTE — PROGRESS NOTES
LUMBAR SPINE EVALUATION:   Referring Physician: Dr. Carol Degroot  Diagnosis: Restless legs syndrome (RLS) (G25.81)  History of lumbar laminectomy (E67.859)  Lumbar radiculopathy (M54.16)  History of lumbar fusion (Z98.1)  Acute midline low back pain with left- way and not to sleep so much on the same side, so adjusted her sleeping. Then she found some stretches online which she tried: stretch laying over a tennis ball then rolling an Ivorian/towel and laying over that to stretch QL.  The next day was in so much pa ASSESSMENT:   Bryan Stoner is a 58year old y/o female who presents to physical therapy with c/o L QL pain and some abdominal discomfort. She presents with good spinal mobility, within limitations of fusion.  She does stand with pelvis posteri Shoulder IR 5/5 5/5          Hip Flexion (L2) 4-/5      4/5    Knee Extension (L3) 5/5 5/5    Knee Flexion 5/5 5/5    Ankle DF (L4) 5/5 5/5    EHL (L5) 5/5 5/5    Ankle PF (S1) 5/5 5/5    Hip Abduction 3+/5 4/5 QL compensation L   Hip Extension 3/5 3+/5 improved stability with functional mobility  4.  Pt will demo proper mechanics for bending/lifting to decrease risk for re-injury    Frequency / Duration: Patient will be seen for 1-2x/week, decreasing to biweekly and 1x/month as indicated for a total of 6-

## 2019-04-16 ENCOUNTER — TELEPHONE (OUTPATIENT)
Dept: NEUROLOGY | Facility: CLINIC | Age: 64
End: 2019-04-16

## 2019-04-16 DIAGNOSIS — M51.9 LUMBAR DISC DISEASE: ICD-10-CM

## 2019-04-16 DIAGNOSIS — Z98.1 HISTORY OF LUMBAR FUSION: ICD-10-CM

## 2019-04-16 DIAGNOSIS — M43.16 SPONDYLOLISTHESIS OF LUMBAR REGION: ICD-10-CM

## 2019-04-16 DIAGNOSIS — M54.16 LUMBAR RADICULOPATHY: Primary | ICD-10-CM

## 2019-04-16 DIAGNOSIS — M48.061 LUMBAR FORAMINAL STENOSIS: ICD-10-CM

## 2019-04-16 DIAGNOSIS — M43.10 RETROLISTHESIS: ICD-10-CM

## 2019-04-16 DIAGNOSIS — Z98.890 HISTORY OF LUMBAR LAMINECTOMY: ICD-10-CM

## 2019-04-16 NOTE — TELEPHONE ENCOUNTER
Pt calling back stating that she was sitting on a stool and stood up and pain ran down rt leg. Painful while sitting too long. States pain is an 8 of 10.

## 2019-04-16 NOTE — TELEPHONE ENCOUNTER
Talked with patient regarding her lower back. She said it is a constant pain -4/10. Bending, sitting too long, she has flare ups, random movement will bring up to 7/10. She ices area. Left foot numb/tingling. Taking Meloxicam, this morning, felt dizzy.  Has

## 2019-04-17 ENCOUNTER — OFFICE VISIT (OUTPATIENT)
Dept: PHYSICAL THERAPY | Age: 64
End: 2019-04-17
Attending: PHYSICAL MEDICINE & REHABILITATION
Payer: COMMERCIAL

## 2019-04-17 PROCEDURE — 97140 MANUAL THERAPY 1/> REGIONS: CPT

## 2019-04-17 PROCEDURE — 97110 THERAPEUTIC EXERCISES: CPT

## 2019-04-17 NOTE — PROGRESS NOTES
Dx: Restless legs syndrome (RLS) (G25.81)  History of lumbar laminectomy (L64.033)  Lumbar radiculopathy (M54.16)  History of lumbar fusion (Z98.1)  Acute midline low back pain with left-sided sciatica (M54.42)  Other idiopathic scoliosis, thoracolumbar re compensation with low trap forearm lift; forceful performance of this exercise may have contributed to aggravation of mid thoracic/scapular symptoms.  On assessment of sidely glut med, pt performing without adequate positioning to isolate glut med, which ma

## 2019-04-19 ENCOUNTER — APPOINTMENT (OUTPATIENT)
Dept: BONE DENSITY | Age: 64
End: 2019-04-19
Attending: INTERNAL MEDICINE
Payer: COMMERCIAL

## 2019-04-19 ENCOUNTER — HOSPITAL ENCOUNTER (OUTPATIENT)
Dept: MAMMOGRAPHY | Age: 64
Discharge: HOME | End: 2019-04-19
Attending: INTERNAL MEDICINE
Payer: COMMERCIAL

## 2019-04-19 ENCOUNTER — OFFICE VISIT (OUTPATIENT)
Dept: PHYSICAL THERAPY | Age: 64
End: 2019-04-19
Attending: PHYSICAL MEDICINE & REHABILITATION
Payer: COMMERCIAL

## 2019-04-19 DIAGNOSIS — Z12.31 ENCOUNTER FOR SCREENING MAMMOGRAM FOR BREAST CANCER: ICD-10-CM

## 2019-04-19 DIAGNOSIS — R92.2 DENSE BREASTS: ICD-10-CM

## 2019-04-19 PROCEDURE — 97110 THERAPEUTIC EXERCISES: CPT

## 2019-04-19 PROCEDURE — 97140 MANUAL THERAPY 1/> REGIONS: CPT

## 2019-04-19 PROCEDURE — 77067 SCR MAMMO BI INCL CAD: CPT | Performed by: INTERNAL MEDICINE

## 2019-04-19 PROCEDURE — 77063 BREAST TOMOSYNTHESIS BI: CPT | Performed by: INTERNAL MEDICINE

## 2019-04-19 NOTE — TELEPHONE ENCOUNTER
Please verify where the patient is having her pain in the legs (ie. Front, back, inside, or outside). Will do injections if PT is not helping. Level will based on how she answers the above.

## 2019-04-19 NOTE — PROGRESS NOTES
Dx: Restless legs syndrome (RLS) (G25.81)  History of lumbar laminectomy (B68.655)  Lumbar radiculopathy (M54.16)  History of lumbar fusion (Z98.1)  Acute midline low back pain with left-sided sciatica (M54.42)  Other idiopathic scoliosis, thoracolumbar re mobilizations x5mins    Prone: hip PA    Supine: hip mobilizations - PA, lateral, LAD    Lsidely: STM R iliacus, glut origin,        Assessment: Re-assessed pt's lumbar and pelvic alignment and mobility. All testing and alignment WNL for pelvis.  Some restr

## 2019-04-19 NOTE — TELEPHONE ENCOUNTER
Pt reports her worst pain is in Bilateral lower back when she sits down. Notes intermittent pain on paraphimoses that alternates between bilateral buttocks.  Notes some issues w/ IT band and QL on Left side, Stuart (PT) states this is because pt's gluteus

## 2019-04-22 ENCOUNTER — TELEPHONE (OUTPATIENT)
Dept: PHYSICAL THERAPY | Age: 64
End: 2019-04-22

## 2019-04-23 ENCOUNTER — HOSPITAL ENCOUNTER (OUTPATIENT)
Dept: BONE DENSITY | Age: 64
Discharge: HOME | End: 2019-04-23
Attending: INTERNAL MEDICINE
Payer: COMMERCIAL

## 2019-04-23 DIAGNOSIS — Z78.0 POSTMENOPAUSE: ICD-10-CM

## 2019-04-23 DIAGNOSIS — M85.89 OSTEOPENIA OF MULTIPLE SITES: ICD-10-CM

## 2019-04-23 PROCEDURE — 77080 DXA BONE DENSITY AXIAL: CPT | Performed by: INTERNAL MEDICINE

## 2019-04-24 ENCOUNTER — TELEPHONE (OUTPATIENT)
Dept: INTERNAL MEDICINE CLINIC | Facility: CLINIC | Age: 64
End: 2019-04-24

## 2019-04-24 ENCOUNTER — TELEPHONE (OUTPATIENT)
Dept: NEUROLOGY | Facility: CLINIC | Age: 64
End: 2019-04-24

## 2019-04-24 NOTE — TELEPHONE ENCOUNTER
Patient has been scheduled for a Bilateral L5 TFESI under MAC on 4/30/19 at the [facility]. Medications and allergies reviewed. Patient informed to hold aspirins, nsaids, blood thinners, vitamins and fish oils 3-7 days prior to procedure.  Patient informed we cristi

## 2019-04-24 NOTE — TELEPHONE ENCOUNTER
Called [facility] for authorization of approval for  LUMBAR TRANSFORAMINAL EPIDURAL INJECTION cpt code 60233. Spoke to Ki[place]ha [place] and stated no prior authorization was required. Reference #[id number].

## 2019-04-24 NOTE — TELEPHONE ENCOUNTER
Her mammogram was normal.  Her breasts are dense. There is an additional type of ultrasound (Automated Whole Breast Ultrasound) that can be done to further evaluate the breast tissue. It is optional -- if she is interested, I can order it.   I am pretty n

## 2019-04-24 NOTE — TELEPHONE ENCOUNTER
I spoke with patient and relayed Dr. Montgomery Began message. She verbalized understanding. She does not want to do the US for now. Invited her to call back with any questions or concerns.

## 2019-04-24 NOTE — TELEPHONE ENCOUNTER
Requests call back from Dr Franc Walker re: mammogram results which pt has viewed via [employer]    Has questions/concerns   Does she need an US?      Please call pt today to advise [phone]

## 2019-04-29 ENCOUNTER — TELEPHONE (OUTPATIENT)
Dept: INTERNAL MEDICINE CLINIC | Facility: CLINIC | Age: 64
End: 2019-04-29

## 2019-04-29 NOTE — TELEPHONE ENCOUNTER
Pt. Called regarding her Dexa Scan results. She would like to speak with Dr. Jacoby Freeman. About them. She can be reached at [phone].

## 2019-04-30 ENCOUNTER — OFFICE VISIT (OUTPATIENT)
Dept: SURGERY | Facility: CLINIC | Age: 64
End: 2019-04-30
Payer: COMMERCIAL

## 2019-04-30 DIAGNOSIS — M54.16 LUMBAR RADICULOPATHY: Primary | ICD-10-CM

## 2019-04-30 DIAGNOSIS — M51.9 LUMBAR DISC DISEASE: ICD-10-CM

## 2019-04-30 PROCEDURE — 64483 NJX AA&/STRD TFRM EPI L/S 1: CPT | Performed by: PHYSICAL MEDICINE & REHABILITATION

## 2019-04-30 NOTE — PROCEDURES
[facility].    BILATERAL LUMBAR TRANSFORAMINAL   NAME:  Antoinette Expose Day    MR #:    [id number] :  10/5/1955     PHYSICIAN:  John Cleaning        Operative Report    DATE OF PROCEDURE: 2019   PREOPERATIVE DIAGNOSES: 1. left > r with a 3 cc solution of 1.5 cc of 40 mg/cc of Kenalog and 1.5 cc of 1% PF lidocaine without epinephrine. After this, the needle was removed. Then  fluoroscopy was right anterior obliqued opening up the left L5-S1 intervertebral foramen.   At this point in

## 2019-05-01 ENCOUNTER — TELEPHONE (OUTPATIENT)
Dept: INTERNAL MEDICINE CLINIC | Facility: CLINIC | Age: 64
End: 2019-05-01

## 2019-05-01 ENCOUNTER — OFFICE VISIT (OUTPATIENT)
Dept: SURGERY | Facility: CLINIC | Age: 64
End: 2019-05-01
Payer: COMMERCIAL

## 2019-05-01 ENCOUNTER — TELEPHONE (OUTPATIENT)
Dept: HEMATOLOGY/ONCOLOGY | Facility: HOSPITAL | Age: 64
End: 2019-05-01

## 2019-05-01 ENCOUNTER — TELEPHONE (OUTPATIENT)
Dept: SURGERY | Facility: CLINIC | Age: 64
End: 2019-05-01

## 2019-05-01 VITALS — DIASTOLIC BLOOD PRESSURE: 83 MMHG | HEART RATE: 89 BPM | SYSTOLIC BLOOD PRESSURE: 136 MMHG | TEMPERATURE: 98 F

## 2019-05-01 DIAGNOSIS — N20.0 KIDNEY STONES: Primary | ICD-10-CM

## 2019-05-01 PROCEDURE — 99212 OFFICE O/P EST SF 10 MIN: CPT | Performed by: NURSE PRACTITIONER

## 2019-05-01 PROCEDURE — 99213 OFFICE O/P EST LOW 20 MIN: CPT | Performed by: NURSE PRACTITIONER

## 2019-05-01 NOTE — TELEPHONE ENCOUNTER
Pt called to give update on reclast. She stts she had a root canal on 4/23. She will have her dentist fax a dental clearance and will be getting new labs drawn as labs for reclast are only good for 30 days.  She stated it'll be about a month before she can

## 2019-05-01 NOTE — TELEPHONE ENCOUNTER
Fredo Stoner is going to be seeing you re evaluation for osteoporosis that that has progressed despite bisphosphonate therapy. She was started on Fosamax in 5/2014 but stopped in 5/2016 due to GI upset.   Her DEXA in 5/2016 showed progression

## 2019-05-01 NOTE — PROGRESS NOTES
HPI:    Patient ID: Samantha Stoner is a 58year old female. HPI     Patient is a 58year old female who presents to the clinic for a follow up regarding kidney stones.   Past medical history of chronic back pain, idiopathic pancreatitis, anemia, hypertens Rfl: 0   sucralfate 1 g Oral Tab Take 1 tablet (1 g total) by mouth 4 (four) times daily as needed. Disp: 360 tablet Rfl: 0   Meloxicam 7.5 MG Oral Tab Take 1 tablet (7.5 mg total) by mouth daily.  (Patient taking differently: Take 7.5 mg by mouth daily as VOMITING  Nsaids                  NAUSEA AND VOMITING, PAIN    Comment:GASTRITIS  Augmentin, [Amoxici*    OTHER (SEE COMMENTS)    Comment:Abdominal pain  Bactrim [Sulfametho*    PAIN  Tramadol Hcl            ITCHING    HISTORY:  Past Medical History:   Stacia OTHER SURGICAL HISTORY  09/2017    Fussion L4-L5 - Dr. Joanne Thomas   • SHOULDER SURG [address] UNLISTED Right 2012   • SPINE SURGERY PROCEDURE UNLISTED  2008    L1-L2 FUSION   • SPINE SURGERY PROCEDURE UNLISTED  12/2017    Neck fusion   • TONSILLECTOMY      with Ad briefly discussed possible surgical management. She feels that this problem is stable at this time. No gross hematuria. She is concerned regarding management of her osteoporosis.   She states she was told to stop all calcium supplementation, however he

## 2019-05-01 NOTE — TELEPHONE ENCOUNTER
Pt is calling she had a Kidney Stone a year ago in February  She saw a new Urologist PA today and was told she should take Calcium but her old Urologist said do not take calcium  She was told to check with Dr Jessica Dumont if she should take either Calcium Citrate or

## 2019-05-01 NOTE — TELEPHONE ENCOUNTER
Spoke with pt re DEXA results -- progression of bone loss, despite Reclast for the past 2 years.   Serum calcium, renal function, vit D, and alk phos normal.  She has not been taking calcium supplements due to hx of renal stones, although today she saw

## 2019-05-02 NOTE — TELEPHONE ENCOUNTER
Dr Nikhil Thomas - ok to book Osteo consult in 15 min slot on Monday 5/6 @ 10:45am?    Please advise

## 2019-05-02 NOTE — TELEPHONE ENCOUNTER
[id number] Baylee Bender, thanks for your note. I'll have my staff contact her for an appointment and do some more evaluation. Thank you, Joslyn Talamantes staff - please call to schedule consult.

## 2019-05-06 ENCOUNTER — OFFICE VISIT (OUTPATIENT)
Dept: ENDOCRINOLOGY CLINIC | Facility: CLINIC | Age: 64
End: 2019-05-06
Payer: COMMERCIAL

## 2019-05-06 ENCOUNTER — TELEPHONE (OUTPATIENT)
Dept: PHYSICAL THERAPY | Age: 64
End: 2019-05-06

## 2019-05-06 ENCOUNTER — TELEPHONE (OUTPATIENT)
Dept: ENDOCRINOLOGY CLINIC | Facility: CLINIC | Age: 64
End: 2019-05-06

## 2019-05-06 ENCOUNTER — APPOINTMENT (OUTPATIENT)
Dept: LAB | Facility: HOSPITAL | Age: 64
End: 2019-05-06
Attending: INTERNAL MEDICINE
Payer: COMMERCIAL

## 2019-05-06 VITALS
BODY MASS INDEX: 21 KG/M2 | HEART RATE: 79 BPM | SYSTOLIC BLOOD PRESSURE: 134 MMHG | DIASTOLIC BLOOD PRESSURE: 81 MMHG | WEIGHT: 138.81 LBS

## 2019-05-06 DIAGNOSIS — M81.8 OTHER OSTEOPOROSIS WITHOUT CURRENT PATHOLOGICAL FRACTURE: Primary | ICD-10-CM

## 2019-05-06 DIAGNOSIS — E55.9 VITAMIN D DEFICIENCY: ICD-10-CM

## 2019-05-06 DIAGNOSIS — M81.8 OTHER OSTEOPOROSIS WITHOUT CURRENT PATHOLOGICAL FRACTURE: ICD-10-CM

## 2019-05-06 PROCEDURE — 84080 ASSAY ALKALINE PHOSPHATASES: CPT

## 2019-05-06 PROCEDURE — 82306 VITAMIN D 25 HYDROXY: CPT

## 2019-05-06 PROCEDURE — 36415 COLL VENOUS BLD VENIPUNCTURE: CPT

## 2019-05-06 PROCEDURE — 99243 OFF/OP CNSLTJ NEW/EST LOW 30: CPT | Performed by: INTERNAL MEDICINE

## 2019-05-06 PROCEDURE — 83970 ASSAY OF PARATHORMONE: CPT

## 2019-05-06 NOTE — TELEPHONE ENCOUNTER
RN advised pt would be okay, but if she does not drink 8-10 glasses it is okay. Pt verbalized understanding.

## 2019-05-06 NOTE — TELEPHONE ENCOUNTER
Pt seen in office today and has questions prior to 24hr urine collection. Pt was told by her urologist to drink 8-10 glasses of water per day, pt just wants to make sure this will not interfere with the 24 hr collection, pls call at:[phone],thanks.

## 2019-05-06 NOTE — PROGRESS NOTES
Name: Jaqueline Stoner  Date: 5/6/2019    Referring Physician: No ref. provider found    No chief complaint on file. HISTORY OF PRESENT ILLNESS   Jaqueline Stoner is a 58year old female who presents for osteoporosis.      She was initially diagnosed with dayna Intravenous Solution, Inject 100 mL (5 mg total) into the vein once for 1 dose. After 6/11/19 (last injection was 6/11/18), Disp: 1 each, Rfl: 0  •  sucralfate 1 g Oral Tab, Take 1 tablet (1 g total) by mouth 4 (four) times daily as needed. , Disp: 360 tabl NAUSEA AND VOMITING  Levaquin [Levofloxa*    RASH, DIZZINESS  Codeine                 NAUSEA AND VOMITING  Hydrocodone             NAUSEA AND VOMITING  Morphine                NAUSEA AND VOMITING  Nsaids                  NAUSEA AND VOMITING, PAIN    Commen Procedure Laterality Date   • ANTERIOR CERVICAL FUSION BG & INST 1 LEVEL N/A 2017    Performed by Marisa Davies MD at [address]   • ARTHROSCOPY OF JOINT UNLISTED Right 2012    rotator cuff tear   •       x 3   • CATARACT     • [address] intact  Psychiatric:  oriented to time, self, and place  Nutritional:  no abnormal weight gain or loss    ASSESSMENT/PLAN:    1.  Osteoporosis  - Discussed diagnosis with patient  - Discussed that she may have idiopathic hypercalciuria which is contributing

## 2019-05-06 NOTE — PATIENT INSTRUCTIONS
Womens One a Day in the morning after breakfast     2 tablets of Calcium Citrate with dinner     Consider Tymlos    Prolia (optional)     Evenity (monthly injection)    24 hour urine: first urine in the morning flush, collect all the urine for the rest of

## 2019-05-07 RX ORDER — AMLODIPINE BESYLATE 10 MG/1
TABLET ORAL
Qty: 90 TABLET | Refills: 3 | Status: SHIPPED | OUTPATIENT
Start: 2019-05-07 | End: 2020-05-13

## 2019-05-08 ENCOUNTER — APPOINTMENT (OUTPATIENT)
Dept: PHYSICAL THERAPY | Age: 64
End: 2019-05-08
Attending: PHYSICAL MEDICINE & REHABILITATION
Payer: COMMERCIAL

## 2019-05-09 ENCOUNTER — PATIENT MESSAGE (OUTPATIENT)
Dept: NEUROLOGY | Facility: CLINIC | Age: 64
End: 2019-05-09

## 2019-05-10 ENCOUNTER — TELEPHONE (OUTPATIENT)
Dept: ENDOCRINOLOGY CLINIC | Facility: CLINIC | Age: 64
End: 2019-05-10

## 2019-05-10 NOTE — TELEPHONE ENCOUNTER
----- Message from Shaun Pruitt MD sent at 5/6/2019  5:28 PM CDT -----  Please start prolia ppw. Thanks.

## 2019-05-10 NOTE — TELEPHONE ENCOUNTER
From: Liz Dyess Day  To: Brendan Mercer MD  Sent: 5/9/2019 9:02 PM CDT  Subject: Visit Follow-up Question    Dr. Steff Tariq  I had left a question on Monday with nurses for you.  I’m asking if you would be willing to order an MRI for my QL, Quadratus Lumborum m

## 2019-05-12 ENCOUNTER — APPOINTMENT (OUTPATIENT)
Dept: LAB | Facility: HOSPITAL | Age: 64
End: 2019-05-12
Attending: INTERNAL MEDICINE
Payer: COMMERCIAL

## 2019-05-12 DIAGNOSIS — N20.0 KIDNEY STONES: ICD-10-CM

## 2019-05-12 DIAGNOSIS — M81.8 OTHER OSTEOPOROSIS WITHOUT CURRENT PATHOLOGICAL FRACTURE: ICD-10-CM

## 2019-05-12 PROCEDURE — 82507 ASSAY OF CITRATE: CPT

## 2019-05-12 PROCEDURE — 81003 URINALYSIS AUTO W/O SCOPE: CPT

## 2019-05-12 PROCEDURE — 83945 ASSAY OF OXALATE: CPT

## 2019-05-12 PROCEDURE — 84300 ASSAY OF URINE SODIUM: CPT

## 2019-05-12 PROCEDURE — 82570 ASSAY OF URINE CREATININE: CPT

## 2019-05-12 PROCEDURE — 84105 ASSAY OF URINE PHOSPHORUS: CPT

## 2019-05-12 PROCEDURE — 84560 ASSAY OF URINE/URIC ACID: CPT

## 2019-05-12 PROCEDURE — 82436 ASSAY OF URINE CHLORIDE: CPT

## 2019-05-12 PROCEDURE — 82340 ASSAY OF CALCIUM IN URINE: CPT

## 2019-05-12 PROCEDURE — 84133 ASSAY OF URINE POTASSIUM: CPT

## 2019-05-13 ENCOUNTER — TELEPHONE (OUTPATIENT)
Dept: ENDOCRINOLOGY CLINIC | Facility: CLINIC | Age: 64
End: 2019-05-13

## 2019-05-13 ENCOUNTER — TELEPHONE (OUTPATIENT)
Dept: SURGERY | Facility: CLINIC | Age: 64
End: 2019-05-13

## 2019-05-13 DIAGNOSIS — R79.89 HIGH SERUM PARATHYROID HORMONE (PTH): Primary | ICD-10-CM

## 2019-05-13 NOTE — TELEPHONE ENCOUNTER
Also given her high PTH level and h/o recurrent kidney stones. I would like to image her parathyroid glands just to be sure there isn't an issue. Check nuclear med parathyroid scan. Thanks.

## 2019-05-13 NOTE — TELEPHONE ENCOUNTER
Spoke w/ Santi Cannon. She would like to call her plan and check coverage for NM parathyroid scan. RN provided pt with CPT code. Patient will let us know if scan requires prior authorization.

## 2019-05-13 NOTE — TELEPHONE ENCOUNTER
Patient called and left voice mail regarding questions. I called her back. She is following with Dr. Hernandez Navas who requested 24 hour urine be done now. No significant abnormality on study. Plan to repeat in 6 months after taking calcium supplementation.   Danyel Glynn

## 2019-05-13 NOTE — TELEPHONE ENCOUNTER
Contacted patient and offered appt tomorrow 5/14/19 but patient declined as she has another appt. Patient then offered/confirmed appt 5/28/19 at 4:40p but would like sooner appt if there is a cancellation.

## 2019-05-13 NOTE — TELEPHONE ENCOUNTER
Her 24 hour urine did demonstrate a normal calcium level. She will need to follow up with urology regarding the other levels. Given this normal level will proceed with medication as discussed at [address]   We are waiting to see if we can get approval for month

## 2019-05-13 NOTE — TELEPHONE ENCOUNTER
Spoke w/ Oscar Sings. She verbalizes understanding of lab results as written by provider in 2 notes below. She is agreeable to NM parathyroid scan. Ordered. She will follow up w/ Uro regarding other urine tests. Evenity IV is still pending.  She is aware we

## 2019-05-14 ENCOUNTER — TELEPHONE (OUTPATIENT)
Dept: ENDOCRINOLOGY CLINIC | Facility: CLINIC | Age: 64
End: 2019-05-14

## 2019-05-15 ENCOUNTER — OFFICE VISIT (OUTPATIENT)
Dept: PHYSICAL THERAPY | Age: 64
End: 2019-05-15
Attending: PHYSICAL MEDICINE & REHABILITATION
Payer: COMMERCIAL

## 2019-05-15 PROCEDURE — 97530 THERAPEUTIC ACTIVITIES: CPT

## 2019-05-15 PROCEDURE — 97140 MANUAL THERAPY 1/> REGIONS: CPT

## 2019-05-15 PROCEDURE — 97110 THERAPEUTIC EXERCISES: CPT

## 2019-05-15 NOTE — TELEPHONE ENCOUNTER
Returned call to [employer]. No answer, no voicemail. Likely he was calling with questions for insurance verification regarding Evenity. Due to insurance, this patient will probably be starting Prolia instead.

## 2019-05-15 NOTE — PROGRESS NOTES
Dx: Restless legs syndrome (RLS) (G25.81)  History of lumbar laminectomy (Z44.847)  Lumbar radiculopathy (M54.16)  History of lumbar fusion (Z98.1)  Acute midline low back pain with left-sided sciatica (M54.42)  Other idiopathic scoliosis, thoracolumbar re with SB [id number]    Seated lateral stretch with SB [id number] to L (stopped to R due to increased R side pain)    Supine TA + isometric hip abd with BlackTB for instruction for HEP Supine shotgun PT resist 5x5sec    Supine shotgun with blackTB and ball 5x5sec able to achieve best possible 3D alignment without cueing. - in progress  3. Pt will demo improved core, scapular and pelvic stability and strength to at least 4/5 for improved stability with functional mobility - in progress  4.  Pt will demo proper mechan

## 2019-05-16 NOTE — TELEPHONE ENCOUNTER
kevon from [facility] called back for diagnoses code to verify benefits attempt to transfer w no answer

## 2019-05-17 ENCOUNTER — OFFICE VISIT (OUTPATIENT)
Dept: PHYSICAL THERAPY | Age: 64
End: 2019-05-17
Attending: PHYSICAL MEDICINE & REHABILITATION
Payer: COMMERCIAL

## 2019-05-17 PROCEDURE — 97140 MANUAL THERAPY 1/> REGIONS: CPT

## 2019-05-17 PROCEDURE — 97110 THERAPEUTIC EXERCISES: CPT

## 2019-05-17 NOTE — TELEPHONE ENCOUNTER
Returned call to Cornel. No answer, no voicemail. Did see that per [employer] website, there is missing information on Radha's Evenity IV. Resubmitted IV.

## 2019-05-17 NOTE — PROGRESS NOTES
Dx: Restless legs syndrome (RLS) (G25.81)  History of lumbar laminectomy (K73.321)  Lumbar radiculopathy (M54.16)  History of lumbar fusion (Z98.1)  Acute midline low back pain with left-sided sciatica (M54.42)  Other idiopathic scoliosis, thoracolumbar re innominate    Supine: MET for R anteriorly rotated innominate    Standing: K-tape for SI stab   Therapeutic Activities   Discussion of lumbosacral and sacroiliac dysfunction with use of spine models    Discussion of past MRI results    Discussion of use of Treatment: 45 min  Total Treatment Time: 45 min

## 2019-05-20 ENCOUNTER — HOSPITAL ENCOUNTER (OUTPATIENT)
Dept: NUCLEAR MEDICINE | Facility: HOSPITAL | Age: 64
Discharge: HOME | End: 2019-05-20
Attending: INTERNAL MEDICINE
Payer: COMMERCIAL

## 2019-05-20 DIAGNOSIS — R79.89 HIGH SERUM PARATHYROID HORMONE (PTH): ICD-10-CM

## 2019-05-20 PROCEDURE — 78071 PARATHYRD PLANAR W/WO SUBTRJ: CPT | Performed by: INTERNAL MEDICINE

## 2019-05-22 ENCOUNTER — TELEPHONE (OUTPATIENT)
Dept: ENDOCRINOLOGY CLINIC | Facility: CLINIC | Age: 64
End: 2019-05-22

## 2019-05-22 ENCOUNTER — OFFICE VISIT (OUTPATIENT)
Dept: PHYSICAL THERAPY | Age: 64
End: 2019-05-22
Attending: PHYSICAL MEDICINE & REHABILITATION
Payer: COMMERCIAL

## 2019-05-22 DIAGNOSIS — E21.3 HYPERPARATHYROIDISM (HCC): Primary | ICD-10-CM

## 2019-05-22 PROCEDURE — 97110 THERAPEUTIC EXERCISES: CPT

## 2019-05-22 PROCEDURE — 97140 MANUAL THERAPY 1/> REGIONS: CPT

## 2019-05-22 NOTE — TELEPHONE ENCOUNTER
Good news, parathyroid scan was normal and no evidence of an adenoma. However since her PTH was high I would like to try gentle calcium/Vitamin D supplementation before proceeding with any further medication.   Please continue Calcium Citrate 600mg daily a

## 2019-05-22 NOTE — TELEPHONE ENCOUNTER
Ok, noted. Please add Vitamin D 1000 units daily in addition to her current dose. So she will be taking a total of 2500 units daily. Thanks.

## 2019-05-22 NOTE — TELEPHONE ENCOUNTER
RN spoke with patient about note below from provider. Patient verbalized understanding. Labs ordered per [facility] written.     Pt has question - she states Calcium citrate has 500 units of vit D and her MVI has 1000 units of Vit D, so she is asking if OTC Vit D 2

## 2019-05-22 NOTE — PROGRESS NOTES
Dx: Restless legs syndrome (RLS) (G25.81)  History of lumbar laminectomy (T71.446)  Lumbar radiculopathy (M54.16)  History of lumbar fusion (Z98.1)  Acute midline low back pain with left-sided sciatica (M54.42)  Other idiopathic scoliosis, thoracolumbar re of brace vs SI belt       Assessment: Pt continues to present with (-) fwd flexion and stork tests. Focused on strengthening this visit with light progressions.  Pt reported moderate fatigue, but able to perform all exercises with good mechanics and no pain

## 2019-05-23 NOTE — TELEPHONE ENCOUNTER
[employer] summary of benefits received. Per  note on 5/22, will tentatively plan to try Evenity therapy in October when pt transitions to Medicare.

## 2019-05-24 ENCOUNTER — OFFICE VISIT (OUTPATIENT)
Dept: PHYSICAL THERAPY | Age: 64
End: 2019-05-24
Attending: PHYSICAL MEDICINE & REHABILITATION
Payer: COMMERCIAL

## 2019-05-24 PROCEDURE — 97110 THERAPEUTIC EXERCISES: CPT

## 2019-05-24 PROCEDURE — 97140 MANUAL THERAPY 1/> REGIONS: CPT

## 2019-05-24 NOTE — PROGRESS NOTES
Patient Name: Hope De La Fuente, : 10/5/1955, MRN: [id number]   Date:  2019  Referring Physician:  Dr. Shereen Summers    Diagnosis: Restless legs syndrome (RLS) (G25.81)  History of lumbar laminectomy (H49.438)  Lumbar radiculopathy (M54.16)  History o lumbopelvic joints and contribute to remaining symptoms. Recommend continued skilled PT with added focus on core stability. Will continue to assess muscular restrictions.  If restrictions do not ease with increasing core strengthening, she may benefit from the time to allow for independent management of symptoms at discharge. - in progress  2. Pt will demo improved postural awareness in standing and with ambulation with pt able to achieve best possible 3D alignment without cueing. - in progress  3.  Pt will d

## 2019-05-28 ENCOUNTER — OFFICE VISIT (OUTPATIENT)
Dept: NEUROLOGY | Facility: CLINIC | Age: 64
End: 2019-05-28
Payer: COMMERCIAL

## 2019-05-28 VITALS — DIASTOLIC BLOOD PRESSURE: 64 MMHG | HEART RATE: 68 BPM | RESPIRATION RATE: 15 BRPM | SYSTOLIC BLOOD PRESSURE: 100 MMHG

## 2019-05-28 DIAGNOSIS — M43.16 SPONDYLOLISTHESIS OF LUMBAR REGION: ICD-10-CM

## 2019-05-28 DIAGNOSIS — M51.9 LUMBAR DISC DISEASE: ICD-10-CM

## 2019-05-28 DIAGNOSIS — Z98.1 HISTORY OF LUMBAR FUSION: ICD-10-CM

## 2019-05-28 DIAGNOSIS — Z98.890 HISTORY OF LUMBAR LAMINECTOMY: ICD-10-CM

## 2019-05-28 DIAGNOSIS — M54.16 LUMBAR RADICULOPATHY: Primary | ICD-10-CM

## 2019-05-28 DIAGNOSIS — M48.061 LUMBAR FORAMINAL STENOSIS: ICD-10-CM

## 2019-05-28 PROCEDURE — 99214 OFFICE O/P EST MOD 30 MIN: CPT | Performed by: PHYSICAL MEDICINE & REHABILITATION

## 2019-05-28 NOTE — PATIENT INSTRUCTIONS
As of October 6th 2014, the Drug Enforcement Agency [facility]) is reclassifying all hydrocodone combination medications from Schedule III to Schedule II. This includes medications such as Norco, Vicodin, Lortab, Zohydro, and Vicoprofen.     What this means for y will finish off the PT and will continue with her home exercise program.    I will perform bilateral L5 TFESI(s) repeat injections if she is not feeling any better once she has completed the PT and been doing her home exercise program on her own for one mo

## 2019-05-28 NOTE — PROGRESS NOTES
Low Back Pain H & P    Chief Complaint: Patient presents with:  Low Back Pain: pt here for follow up after Bilateral L5 transforaminal epidural steroid injections 4/30/19 with 80% relief in symptoms.  c/o low back pain that is localized increasing with driv 12/4/2018   • Intestinal disorder    • Left wrist fracture 2011 and 2013   • Muscle weakness    • Osteoporosis    • Renal disorder    • Rotator cuff tear, right    • Traumatic arthritis    • Vertigo        Past Surgical History   Past Surgical History:   P Socioeconomic History      Marital status:       Spouse name: Not on file      Number of children: Not on file      Years of education: Not on file      Highest education level: Not on file    Occupational History      Not on file    Social Needs assistive device. .        The patient does live in a home with stairs. Review of Systems      PE: The patient does appear in her stated age in no distress. The patient is well groomed. Psychiatric:  The patient is alert and oriented x 3.   The pat right L5 radiculopathies    2. L4-5 left mod-severe, L3-4 left mod-severe/right mod, L2-3 right mod foraminal stenosis    3. L5-S1 grade 1 unstable, L4-5 grade 1 unstable spondylolisthesis    4.  L5-S1 left mild-mod far lateral, L4-5 left mod-large/right mo

## 2019-05-29 ENCOUNTER — OFFICE VISIT (OUTPATIENT)
Dept: PHYSICAL THERAPY | Age: 64
End: 2019-05-29
Attending: PHYSICAL MEDICINE & REHABILITATION
Payer: COMMERCIAL

## 2019-05-29 PROCEDURE — 97110 THERAPEUTIC EXERCISES: CPT

## 2019-05-29 NOTE — PROGRESS NOTES
Dx: Restless legs syndrome (RLS) (G25.81)  History of lumbar laminectomy (H64.252)  Lumbar radiculopathy (M54.16)  History of lumbar fusion (Z98.1)  Acute midline low back pain with left-sided sciatica (M54.42)  Other idiopathic scoliosis, thoracolumbar re Prone: STM R piriformis/glut, lumbar paraspinals below fusion level    Therapeutic Activities          Assessment: Focused on core stabilization this visit.  Instructed pt on variations of exercises in standing to assist with her difficulty finding time dur

## 2019-05-29 NOTE — TELEPHONE ENCOUNTER
RN spoke with patient about note below from provider. Patient verbalized understanding and does not have further questions at this time.

## 2019-06-03 ENCOUNTER — OFFICE VISIT (OUTPATIENT)
Dept: PHYSICAL THERAPY | Age: 64
End: 2019-06-03
Attending: PHYSICAL MEDICINE & REHABILITATION
Payer: COMMERCIAL

## 2019-06-03 PROCEDURE — 97110 THERAPEUTIC EXERCISES: CPT

## 2019-06-03 NOTE — PROGRESS NOTES
Dx: Restless legs syndrome (RLS) (G25.81)  History of lumbar laminectomy (C56.595)  Lumbar radiculopathy (M54.16)  History of lumbar fusion (Z98.1)  Acute midline low back pain with left-sided sciatica (M54.42)  Other idiopathic scoliosis, thoracolumbar re in core activation with better stability with all exercises as noted by decreased lumbar and pelvic compensation. Patient Education: Reviewed current HEP. Progressed as indicated above. Handout declined. Discussed use of Serola SI belt for home.      Cu

## 2019-06-05 ENCOUNTER — OFFICE VISIT (OUTPATIENT)
Dept: PHYSICAL THERAPY | Age: 64
End: 2019-06-05
Attending: PHYSICAL MEDICINE & REHABILITATION
Payer: COMMERCIAL

## 2019-06-05 PROCEDURE — 97110 THERAPEUTIC EXERCISES: CPT

## 2019-06-05 NOTE — PROGRESS NOTES
Dx: Restless legs syndrome (RLS) (G25.81)  History of lumbar laminectomy (W82.033)  Lumbar radiculopathy (M54.16)  History of lumbar fusion (Z98.1)  Acute midline low back pain with left-sided sciatica (M54.42)  Other idiopathic scoliosis, thoracolumbar re med hip abd YTB 2x10    Sitbacks 2x10    Sitbacks bottom tap to chair no UE support 5x    Sitbacks bottom tap to chair with light UE support 5x Supine isometric core [id number] fwd and diagonal     Supine TA + SLR 20x R/L    Supine alternating BKFO with TA s TherEx x3       Total Timed Treatment: 38 min  Total Treatment Time: 38 min

## 2019-06-11 ENCOUNTER — TELEPHONE (OUTPATIENT)
Dept: NEUROLOGY | Facility: CLINIC | Age: 64
End: 2019-06-11

## 2019-06-11 ENCOUNTER — OFFICE VISIT (OUTPATIENT)
Dept: PHYSICAL THERAPY | Age: 64
End: 2019-06-11
Attending: PHYSICAL MEDICINE & REHABILITATION
Payer: COMMERCIAL

## 2019-06-11 DIAGNOSIS — M54.16 BILATERAL LUMBAR RADICULOPATHY: Primary | ICD-10-CM

## 2019-06-11 PROCEDURE — 97110 THERAPEUTIC EXERCISES: CPT

## 2019-06-11 NOTE — PROGRESS NOTES
Dx: Restless legs syndrome (RLS) (G25.81)  History of lumbar laminectomy (R08.907)  Lumbar radiculopathy (M54.16)  History of lumbar fusion (Z98.1)  Acute midline low back pain with left-sided sciatica (M54.42)  Other idiopathic scoliosis, thoracolumbar re R/L    Standing low row RTT 20x    Sitbacks 20x       Assessment: Progressed stabilization with addition of resistance for chopping and increased reps for sitback. Pt reported moderate fatigue by end of session, but no pain.     Patient Education: Reviewed

## 2019-06-11 NOTE — TELEPHONE ENCOUNTER
Called [employer] for authorization of approval for MRI SPINE LUMBAR WITH OUT CONTRAST CPT CODE 62451. Spoke to [address] [place] [address] who initiated the request under Case #[id number]. Fax clinicals to Fax [phone] when available.

## 2019-06-11 NOTE — TELEPHONE ENCOUNTER
Reviewed with Dr Rojas Peña. Per Dr Rojas Peña pt to have repeat lumbar MRI before further  Injections since last injection was not as  Effective or lasting. Order entered for Lumbar MRI. Pt notified MRI ordered and needs to be complete before further injections.  Cristiano Lomas

## 2019-06-11 NOTE — TELEPHONE ENCOUNTER
Pt was called asking for repeat injection. The pain is located  bilaterally  central low back. Pt continues to have pain with bending and sitting. She is icing and doing HEP. Pt stated although PT has helped she has one session to go, she is still in pain.

## 2019-06-13 ENCOUNTER — OFFICE VISIT (OUTPATIENT)
Dept: PHYSICAL THERAPY | Age: 64
End: 2019-06-13
Attending: PHYSICAL MEDICINE & REHABILITATION
Payer: COMMERCIAL

## 2019-06-13 PROCEDURE — 97530 THERAPEUTIC ACTIVITIES: CPT

## 2019-06-13 PROCEDURE — 97110 THERAPEUTIC EXERCISES: CPT

## 2019-06-13 NOTE — PROGRESS NOTES
Patient Name: Belen Ashby, : 10/5/1955, MRN: [id number]   Date:  2019  Referring Physician:  Dr. Kera Sexton    Diagnosis: Restless legs syndrome (RLS) (G25.81)  History of lumbar laminectomy (G49.502)  Lumbar radiculopathy (M54.16)  History o Flexion  no restrict    - pulling   Extension no restrict -   R Sidebend no restrict - pulling (moreso than L)   L Sidebend no restrict - pulling   R Rotation no restrict -   L Rotation no restrict -         Trunk   Pain (+/-)   Flexion  no restrict      4/5 for improved stability with functional mobility - in progress  4.  Pt will demo proper mechanics for bending/lifting to decrease risk for re-injury - in progress            FOTO: 57/100  Modified Oswestry: 22.3    Plan: Discharge to home program       C

## 2019-06-14 ENCOUNTER — NURSE ONLY (OUTPATIENT)
Dept: INTERNAL MEDICINE CLINIC | Facility: CLINIC | Age: 64
End: 2019-06-14
Payer: COMMERCIAL

## 2019-06-14 PROCEDURE — 90750 HZV VACC RECOMBINANT IM: CPT | Performed by: INTERNAL MEDICINE

## 2019-06-14 PROCEDURE — 90471 IMMUNIZATION ADMIN: CPT | Performed by: INTERNAL MEDICINE

## 2019-06-18 ENCOUNTER — HOSPITAL ENCOUNTER (OUTPATIENT)
Dept: MRI IMAGING | Facility: HOSPITAL | Age: 64
Discharge: HOME | End: 2019-06-18
Attending: PHYSICAL MEDICINE & REHABILITATION
Payer: COMMERCIAL

## 2019-06-18 ENCOUNTER — TELEPHONE (OUTPATIENT)
Dept: ENDOCRINOLOGY CLINIC | Facility: CLINIC | Age: 64
End: 2019-06-18

## 2019-06-18 DIAGNOSIS — M54.16 BILATERAL LUMBAR RADICULOPATHY: ICD-10-CM

## 2019-06-18 PROCEDURE — 72148 MRI LUMBAR SPINE W/O DYE: CPT | Performed by: PHYSICAL MEDICINE & REHABILITATION

## 2019-06-18 NOTE — TELEPHONE ENCOUNTER
[employer] for authorization of approval for MRI l-spine wo cpt code 2148. Approval was given with Authorization  # [id number] effective 06/18/19 to 07/17/19. Will call Pt. To inform. Pt. Advised of approval. Had MRI today.

## 2019-06-18 NOTE — TELEPHONE ENCOUNTER
Pt has questions about injection SH had suggested - pt wanted to see if insurance will cover. Pls call. Thank you.

## 2019-06-19 NOTE — TELEPHONE ENCOUNTER
TE Encounter 5/22/19. \"Will tentatively plan to try Evenity therapy in October when she transitions to medicare. \"    Called pt to inform. TISHTCMARYJANE.

## 2019-06-19 NOTE — TELEPHONE ENCOUNTER
Left detailed message for patient that based on SOB the plan will cover 100% of Evenity so long as the OOP max ($3000 met) is met. PA may be required to submit clinical information. Pt may owe $50 office visit fee for each month/visit.  Pt to call w/ luis carlos

## 2019-06-20 NOTE — TELEPHONE ENCOUNTER
Pt saw pain doctor and is getting steroid injection hence would like to start Evenity as soon as possible. RN reached out to [employer] to inquire why they told us to wait for Medicare if current insurance will cover (may need a PA).      Will wait response from

## 2019-06-24 NOTE — TELEPHONE ENCOUNTER
Ok, noted. I think the concern is that this information from insurance may not be accurate. Patient may still receive a large bill?

## 2019-06-25 ENCOUNTER — TELEPHONE (OUTPATIENT)
Dept: NEUROLOGY | Facility: CLINIC | Age: 64
End: 2019-06-25

## 2019-06-25 DIAGNOSIS — M43.16 SPONDYLOLISTHESIS OF LUMBAR REGION: ICD-10-CM

## 2019-06-25 DIAGNOSIS — M48.061 LUMBAR FORAMINAL STENOSIS: ICD-10-CM

## 2019-06-25 DIAGNOSIS — M43.10 RETROLISTHESIS: ICD-10-CM

## 2019-06-25 DIAGNOSIS — Z98.890 HISTORY OF LUMBAR LAMINECTOMY: ICD-10-CM

## 2019-06-25 DIAGNOSIS — Z98.1 HISTORY OF LUMBAR FUSION: ICD-10-CM

## 2019-06-25 DIAGNOSIS — M51.9 LUMBAR DISC DISEASE: ICD-10-CM

## 2019-06-25 DIAGNOSIS — M54.16 LUMBAR RADICULOPATHY: Primary | ICD-10-CM

## 2019-06-28 NOTE — TELEPHONE ENCOUNTER
RN advised pt since Evenity medication is so new, so far only Medicare is paying for medication. We were informed we can try to submit to [facility] but rep told our clinic \"no one has gotten this approved\" so not to get too hopeful.  Pt verbalized understandin

## 2019-07-09 ENCOUNTER — TELEPHONE (OUTPATIENT)
Dept: NEUROLOGY | Facility: CLINIC | Age: 64
End: 2019-07-09

## 2019-07-09 NOTE — TELEPHONE ENCOUNTER
There is no significant change from MRI compared to the results of her last MRI scan. I will try bilateral L4 TFESI”s under MAC.

## 2019-07-09 NOTE — TELEPHONE ENCOUNTER
Called [facility] for authorization of approval of BILATERAL L4 (L4-5) LUMBAR TRANSFORAMINAL EPIDURAL INJECTION CPT CODE 32702-07,27169  Per Laura [email or web address]@ [facility] No prior authorization is required. Reference#[id number]. Will inform Nursing.

## 2019-07-11 ENCOUNTER — OFFICE VISIT (OUTPATIENT)
Dept: ENDOCRINOLOGY CLINIC | Facility: CLINIC | Age: 64
End: 2019-07-11
Payer: COMMERCIAL

## 2019-07-11 VITALS
SYSTOLIC BLOOD PRESSURE: 125 MMHG | WEIGHT: 139.19 LBS | BODY MASS INDEX: 21 KG/M2 | DIASTOLIC BLOOD PRESSURE: 78 MMHG | HEART RATE: 76 BPM

## 2019-07-11 DIAGNOSIS — M81.8 OTHER OSTEOPOROSIS WITHOUT CURRENT PATHOLOGICAL FRACTURE: Primary | ICD-10-CM

## 2019-07-11 PROCEDURE — 99213 OFFICE O/P EST LOW 20 MIN: CPT | Performed by: INTERNAL MEDICINE

## 2019-07-11 NOTE — PROGRESS NOTES
Name: Colton Stoner  Date: 7/12/2019    Referring Physician: No ref. provider found    Patient presents with: Follow - Up: Osteoporosis. HISTORY OF PRESENT ILLNESS   Colton Stoner is a 58year old female who presents for osteoporosis.      She was ini depression  Skin: normal moisturized skin    Medications:     Current Outpatient Medications:   •  Calcium Citrate 1040 MG Oral Tab, Take by mouth daily. , Disp: , Rfl:   •  AMLODIPINE BESYLATE 10 MG Oral Tab, TAKE 1 TABLET(10 MG) BY MOUTH DAILY, Disp: 90 t , Disp: , Rfl:   •  Multiple Vitamins Oral Tab, Take 1 tablet by mouth daily.  Multiple Vitamins tablet , Disp: , Rfl:      Allergies:     Aleve                   NAUSEA AND VOMITING  Levaquin [Levofloxa*    RASH, DIZZINESS  Codeine                 NAUSEA A weakness    • Osteoporosis    • Renal disorder    • Rotator cuff tear, right    • Traumatic arthritis    • Vertigo        Surgical history:   Past Surgical History:   Procedure Laterality Date   • ANTERIOR CERVICAL FUSION BG & INST 1 LEVEL N/A 9/26/2017 normal pulses of carotids, pedals  Skin:  normal moisture and skin texture  Hair & Nails:  normal scalp hair     Neuro:  sensory grossly intact and motor grossly intact  Psychiatric:  oriented to time, self, and place  Nutritional:  no abnormal weight gain

## 2019-07-17 NOTE — TELEPHONE ENCOUNTER
Pt will be leaving for vacation on Friday 7/19, she is asking for this to be done today or tomorrow.    Tasked to rx low

## 2019-07-17 NOTE — TELEPHONE ENCOUNTER
To Dr. Jacoby Freeman-----    [id number] Baylee Bender for refill? Pended for review. Pt will be leaving Friday for vacation and requesting jean paul before then.

## 2019-07-18 RX ORDER — MELOXICAM 7.5 MG/1
7.5 TABLET ORAL DAILY
Qty: 30 TABLET | Refills: 3 | Status: SHIPPED | OUTPATIENT
Start: 2019-07-18 | End: 2019-08-28 | Stop reason: ALTCHOICE

## 2019-07-18 NOTE — TELEPHONE ENCOUNTER
Pt is calling back to see what is taking so long with medication refill. Pt is going out of town tomorrow morning at [employer] and needs the refill today. Pt states she is very bothered and annoyed that it is taking so long (it was just requested yesterday).

## 2019-07-18 NOTE — TELEPHONE ENCOUNTER
Dr. Bj Rosales,    Please advise if ok to refill, rx pended. Pt is leaving out of town tomorrow and requesting rx to be filled prior to leaving town. Pt is becoming to get upset per message below fyi. Thank you.

## 2019-07-19 RX ORDER — MELOXICAM 7.5 MG/1
TABLET ORAL
Qty: 30 TABLET | Refills: 0 | OUTPATIENT
Start: 2019-07-19

## 2019-07-19 NOTE — TELEPHONE ENCOUNTER
Just saw this now. For time-sensitive things like this, please put on my desk. There's no other way I can pick out all the time-sensitive messages amongst the results/phone calls/mychart folders.   Rx filled

## 2019-07-24 ENCOUNTER — HOSPITAL ENCOUNTER (OUTPATIENT)
Dept: MRI IMAGING | Age: 64
Discharge: HOME | End: 2019-07-24
Attending: NURSE PRACTITIONER
Payer: COMMERCIAL

## 2019-07-24 DIAGNOSIS — M54.12 CERVICAL RADICULOPATHY: ICD-10-CM

## 2019-07-24 DIAGNOSIS — Z98.1 STATUS POST CERVICAL SPINAL FUSION: ICD-10-CM

## 2019-07-25 ENCOUNTER — HOSPITAL ENCOUNTER (OUTPATIENT)
Dept: MRI IMAGING | Facility: HOSPITAL | Age: 64
Discharge: HOME | End: 2019-07-25
Attending: NURSE PRACTITIONER
Payer: COMMERCIAL

## 2019-07-25 ENCOUNTER — APPOINTMENT (OUTPATIENT)
Dept: LAB | Facility: HOSPITAL | Age: 64
End: 2019-07-25
Attending: NURSE PRACTITIONER
Payer: COMMERCIAL

## 2019-07-25 DIAGNOSIS — M81.8 OTHER OSTEOPOROSIS WITHOUT CURRENT PATHOLOGICAL FRACTURE: ICD-10-CM

## 2019-07-25 LAB — PTH-INTACT SERPL-MCNC: 75.9 PG/ML (ref 18.5–88)

## 2019-07-25 PROCEDURE — A9575 INJ GADOTERATE MEGLUMI 0.1ML: HCPCS | Performed by: ORTHOPAEDIC SURGERY

## 2019-07-25 PROCEDURE — 84080 ASSAY ALKALINE PHOSPHATASES: CPT

## 2019-07-25 PROCEDURE — 36415 COLL VENOUS BLD VENIPUNCTURE: CPT

## 2019-07-25 PROCEDURE — 72156 MRI NECK SPINE W/O & W/DYE: CPT | Performed by: NURSE PRACTITIONER

## 2019-07-25 PROCEDURE — 82306 VITAMIN D 25 HYDROXY: CPT

## 2019-07-25 PROCEDURE — 83970 ASSAY OF PARATHORMONE: CPT

## 2019-07-26 LAB
25(OH)D3 SERPL-MCNC: 37.4 NG/ML (ref 30–100)
BONE SPECIFIC ALKALINE PHOSPHATASE: 11.6 UG/L

## 2019-08-06 ENCOUNTER — OFFICE VISIT (OUTPATIENT)
Dept: SURGERY | Facility: CLINIC | Age: 64
End: 2019-08-06
Payer: COMMERCIAL

## 2019-08-06 DIAGNOSIS — M54.16 LUMBAR RADICULOPATHY: Primary | ICD-10-CM

## 2019-08-06 DIAGNOSIS — M51.9 LUMBAR DISC DISEASE: ICD-10-CM

## 2019-08-06 PROCEDURE — 64483 NJX AA&/STRD TFRM EPI L/S 1: CPT | Performed by: PHYSICAL MEDICINE & REHABILITATION

## 2019-08-06 NOTE — PROCEDURES
[facility].    BILATERAL LUMBAR TRANSFORAMINAL   NAME:  Jaqueline Stoner    MR #:    [id number] :  10/5/1955     PHYSICIAN:  Ashley Cleaning        Operative Report    DATE OF PROCEDURE: 2019   PREOPERATIVE DIAGNOSES: 1. left > ri a 3 cc solution of 1.5 cc of 40 mg/cc of Kenalog and 1.5 cc of 1% PF lidocaine without epinephrine. After this, the needle was removed. Then  fluoroscopy was right anterior obliqued opening up the left L4-5 intervertebral foramen.   At this point in time,

## 2019-08-09 ENCOUNTER — OFFICE VISIT (OUTPATIENT)
Dept: INTERNAL MEDICINE CLINIC | Facility: CLINIC | Age: 64
End: 2019-08-09
Payer: COMMERCIAL

## 2019-08-09 ENCOUNTER — LAB ENCOUNTER (OUTPATIENT)
Dept: LAB | Age: 64
End: 2019-08-09
Attending: INTERNAL MEDICINE
Payer: COMMERCIAL

## 2019-08-09 VITALS
TEMPERATURE: 98 F | SYSTOLIC BLOOD PRESSURE: 128 MMHG | BODY MASS INDEX: 21.16 KG/M2 | DIASTOLIC BLOOD PRESSURE: 82 MMHG | HEART RATE: 64 BPM | WEIGHT: 139.63 LBS | HEIGHT: 68 IN

## 2019-08-09 DIAGNOSIS — R23.8 EASY BRUISABILITY: ICD-10-CM

## 2019-08-09 DIAGNOSIS — R23.8 EASY BRUISABILITY: Primary | ICD-10-CM

## 2019-08-09 DIAGNOSIS — E21.3 HYPERPARATHYROIDISM (HCC): ICD-10-CM

## 2019-08-09 LAB
25(OH)D3 SERPL-MCNC: 37.2 NG/ML (ref 30–100)
ANION GAP SERPL CALC-SCNC: 9 MMOL/L (ref 0–18)
APTT PPP: 28.9 SECONDS (ref 23.2–35.3)
BASOPHILS # BLD AUTO: 0.03 X10(3) UL (ref 0–0.2)
BASOPHILS NFR BLD AUTO: 0.4 %
BUN BLD-MCNC: 15 MG/DL (ref 7–18)
BUN/CREAT SERPL: 19.7 (ref 10–20)
CALCIUM BLD-MCNC: 8.7 MG/DL (ref 8.5–10.1)
CHLORIDE SERPL-SCNC: 112 MMOL/L (ref 98–112)
CO2 SERPL-SCNC: 25 MMOL/L (ref 21–32)
CREAT BLD-MCNC: 0.76 MG/DL (ref 0.55–1.02)
DEPRECATED RDW RBC AUTO: 45.3 FL (ref 35.1–46.3)
EOSINOPHIL # BLD AUTO: 0.02 X10(3) UL (ref 0–0.7)
EOSINOPHIL NFR BLD AUTO: 0.3 %
ERYTHROCYTE [DISTWIDTH] IN BLOOD BY AUTOMATED COUNT: 12.6 % (ref 11–15)
GLUCOSE BLD-MCNC: 120 MG/DL (ref 70–99)
HCT VFR BLD AUTO: 40.9 % (ref 35–48)
HGB BLD-MCNC: 13.1 G/DL (ref 12–16)
IMM GRANULOCYTES # BLD AUTO: 0.02 X10(3) UL (ref 0–1)
IMM GRANULOCYTES NFR BLD: 0.3 %
INR BLD: 0.98 (ref 0.9–1.2)
LYMPHOCYTES # BLD AUTO: 1.27 X10(3) UL (ref 1–4)
LYMPHOCYTES NFR BLD AUTO: 18.9 %
MCH RBC QN AUTO: 31 PG (ref 26–34)
MCHC RBC AUTO-ENTMCNC: 32 G/DL (ref 31–37)
MCV RBC AUTO: 96.9 FL (ref 80–100)
MONOCYTES # BLD AUTO: 0.54 X10(3) UL (ref 0.1–1)
MONOCYTES NFR BLD AUTO: 8 %
NEUTROPHILS # BLD AUTO: 4.85 X10 (3) UL (ref 1.5–7.7)
NEUTROPHILS # BLD AUTO: 4.85 X10(3) UL (ref 1.5–7.7)
NEUTROPHILS NFR BLD AUTO: 72.1 %
OSMOLALITY SERPL CALC.SUM OF ELEC: 304 MOSM/KG (ref 275–295)
PATIENT FASTING: NO
PLATELET # BLD AUTO: 268 10(3)UL (ref 150–450)
POTASSIUM SERPL-SCNC: 3.7 MMOL/L (ref 3.5–5.1)
PROTHROMBIN TIME: 12.8 SECONDS (ref 11.8–14.5)
PTH-INTACT SERPL-MCNC: 66.3 PG/ML (ref 18.5–88)
RBC # BLD AUTO: 4.22 X10(6)UL (ref 3.8–5.3)
SODIUM SERPL-SCNC: 146 MMOL/L (ref 136–145)
WBC # BLD AUTO: 6.7 X10(3) UL (ref 4–11)

## 2019-08-09 PROCEDURE — 99213 OFFICE O/P EST LOW 20 MIN: CPT | Performed by: INTERNAL MEDICINE

## 2019-08-09 PROCEDURE — 85730 THROMBOPLASTIN TIME PARTIAL: CPT

## 2019-08-09 PROCEDURE — 85610 PROTHROMBIN TIME: CPT

## 2019-08-09 PROCEDURE — 36415 COLL VENOUS BLD VENIPUNCTURE: CPT

## 2019-08-09 PROCEDURE — 83970 ASSAY OF PARATHORMONE: CPT

## 2019-08-09 PROCEDURE — 82306 VITAMIN D 25 HYDROXY: CPT

## 2019-08-09 PROCEDURE — 80048 BASIC METABOLIC PNL TOTAL CA: CPT

## 2019-08-09 PROCEDURE — 85025 COMPLETE CBC W/AUTO DIFF WBC: CPT

## 2019-08-09 NOTE — PROGRESS NOTES
HPI:    Patient ID: Remi Stoner is a 58year old female. Patient presents with complaints of easy bruisability that has been occurring over the last 2 months.   Patient mainly notes bruising on lower extremities--these bruises usually resolve over vince tablet Rfl: 0   Diclofenac Sodium 1 % Transdermal Gel Apply 4 g topically 4 (four) times daily.  (Patient taking differently: Apply 4 g topically 4 (four) times daily as needed.  ) Disp: 1 Tube Rfl: 3   Fluticasone Propionate (FLONASE) 50 MCG/ACT Nasal Susp will check CBC to assess platelet count,  CMP done in April was unremarkable. We will also check PT, PTT. If above normal and easy bruisability worsens, may need further hematological evaluation. Patient understands and agrees with plan.     Orders Place

## 2019-08-20 ENCOUNTER — APPOINTMENT (OUTPATIENT)
Dept: PHYSICAL THERAPY | Age: 64
End: 2019-08-20
Attending: ORTHOPAEDIC SURGERY
Payer: COMMERCIAL

## 2019-08-22 ENCOUNTER — APPOINTMENT (OUTPATIENT)
Dept: PHYSICAL THERAPY | Age: 64
End: 2019-08-22
Attending: ORTHOPAEDIC SURGERY
Payer: COMMERCIAL

## 2019-08-22 ENCOUNTER — TELEPHONE (OUTPATIENT)
Dept: PHYSICAL THERAPY | Age: 64
End: 2019-08-22

## 2019-08-26 ENCOUNTER — APPOINTMENT (OUTPATIENT)
Dept: PHYSICAL THERAPY | Age: 64
End: 2019-08-26
Attending: ORTHOPAEDIC SURGERY
Payer: COMMERCIAL

## 2019-08-27 ENCOUNTER — TELEPHONE (OUTPATIENT)
Dept: INTERNAL MEDICINE CLINIC | Facility: CLINIC | Age: 64
End: 2019-08-27

## 2019-08-27 NOTE — TELEPHONE ENCOUNTER
Dr José Zacarias, your message was relayed to patient. Patient declined making an appointment to discuss fatigue further. Patient states she will start exercising more to see if that helps boost her energy level.  If that does not help she will definitely make a

## 2019-08-27 NOTE — TELEPHONE ENCOUNTER
Pt is calling she is very tired and would like to know what her latest Iron level was, please call pt [phone]

## 2019-08-27 NOTE — TELEPHONE ENCOUNTER
Hgb was normal 2 weeks ago. Did not do iron levels but her RBC indices are not c/w iron deficiency.   Rec that she schedule appt to discuss her fatigue

## 2019-08-27 NOTE — TELEPHONE ENCOUNTER
To Dr. Jan Mccormick - pt states she is \"ridiculously tired\" intermittently for over a year. Pt has been taking muscle relaxers to help her sleep. She is able to do ADL, could take naps but tries not to. Pt does not exercise but knows she needs to start.

## 2019-08-28 ENCOUNTER — APPOINTMENT (OUTPATIENT)
Dept: PHYSICAL THERAPY | Age: 64
End: 2019-08-28
Attending: ORTHOPAEDIC SURGERY
Payer: COMMERCIAL

## 2019-08-28 ENCOUNTER — OFFICE VISIT (OUTPATIENT)
Dept: INTERNAL MEDICINE CLINIC | Facility: CLINIC | Age: 64
End: 2019-08-28
Payer: COMMERCIAL

## 2019-08-28 VITALS
HEART RATE: 92 BPM | OXYGEN SATURATION: 96 % | BODY MASS INDEX: 21.01 KG/M2 | DIASTOLIC BLOOD PRESSURE: 92 MMHG | WEIGHT: 138.63 LBS | SYSTOLIC BLOOD PRESSURE: 142 MMHG | HEIGHT: 68 IN

## 2019-08-28 DIAGNOSIS — G47.9 SLEEP DISTURBANCE: ICD-10-CM

## 2019-08-28 DIAGNOSIS — R53.83 OTHER FATIGUE: Primary | ICD-10-CM

## 2019-08-28 PROCEDURE — 99213 OFFICE O/P EST LOW 20 MIN: CPT | Performed by: INTERNAL MEDICINE

## 2019-08-28 NOTE — PROGRESS NOTES
Sherron Stoner is a 58year old female. Patient presents with:  Fatigue: pt c/o of extreme fatigue for at least a year    HPI:     Pt notes significant fatigue every day for the past year.   Feels like she could fall asleep at any time -- watching TV, driving Transdermal Gel Apply 4 g topically 4 (four) times daily. (Patient taking differently: Apply 4 g topically 4 (four) times daily as needed.  ) Disp: 1 Tube Rfl: 3   Fluticasone Propionate (FLONASE) 50 MCG/ACT Nasal Suspension by Nasal route daily as needed. Pulse 92   Ht 5' 8\" (1.727 m)   Wt 138 lb 9.6 oz (62.9 kg)   SpO2 96%   BMI 21.07 kg/m²   GENERAL: well developed, well nourished, in no apparent distress  NECK: supple, no adenopathy, no bruits  LUNGS: clear to auscultation  CARDIO: RRR, normal S1S2, wit

## 2019-09-04 ENCOUNTER — APPOINTMENT (OUTPATIENT)
Dept: PHYSICAL THERAPY | Age: 64
End: 2019-09-04
Attending: ORTHOPAEDIC SURGERY
Payer: COMMERCIAL

## 2019-09-05 ENCOUNTER — APPOINTMENT (OUTPATIENT)
Dept: PHYSICAL THERAPY | Age: 64
End: 2019-09-05
Attending: ORTHOPAEDIC SURGERY
Payer: COMMERCIAL

## 2019-09-10 ENCOUNTER — HOSPITAL ENCOUNTER (OUTPATIENT)
Dept: MRI IMAGING | Age: 64
Discharge: HOME | End: 2019-09-10
Attending: INTERNAL MEDICINE
Payer: COMMERCIAL

## 2019-09-10 ENCOUNTER — APPOINTMENT (OUTPATIENT)
Dept: PHYSICAL THERAPY | Age: 64
End: 2019-09-10
Attending: ORTHOPAEDIC SURGERY
Payer: COMMERCIAL

## 2019-09-10 DIAGNOSIS — K83.8 DILATED CBD, ACQUIRED: ICD-10-CM

## 2019-09-10 DIAGNOSIS — R10.9 ABDOMINAL PAIN, UNSPECIFIED ABDOMINAL LOCATION: ICD-10-CM

## 2019-09-12 ENCOUNTER — HOSPITAL ENCOUNTER (OUTPATIENT)
Dept: MRI IMAGING | Facility: HOSPITAL | Age: 64
Discharge: HOME | End: 2019-09-12
Attending: INTERNAL MEDICINE
Payer: COMMERCIAL

## 2019-09-12 PROCEDURE — 76376 3D RENDER W/INTRP POSTPROCES: CPT | Performed by: INTERNAL MEDICINE

## 2019-09-12 PROCEDURE — A9575 INJ GADOTERATE MEGLUMI 0.1ML: HCPCS | Performed by: INTERNAL MEDICINE

## 2019-09-12 PROCEDURE — 74183 MRI ABD W/O CNTR FLWD CNTR: CPT | Performed by: INTERNAL MEDICINE

## 2019-09-17 ENCOUNTER — TELEPHONE (OUTPATIENT)
Dept: ENDOCRINOLOGY CLINIC | Facility: CLINIC | Age: 64
End: 2019-09-17

## 2019-09-17 DIAGNOSIS — M81.8 OTHER OSTEOPOROSIS, UNSPECIFIED PATHOLOGICAL FRACTURE PRESENCE: Primary | ICD-10-CM

## 2019-09-17 NOTE — TELEPHONE ENCOUNTER
Pt. States that she is due to get labs done in Sept. Please enter order. Pt. Has questions about starting injections for her calcium/bones.

## 2019-09-18 NOTE — TELEPHONE ENCOUNTER
Ordered per [facility] written.  Pt made aware via [facility]

## 2019-09-18 NOTE — TELEPHONE ENCOUNTER
Please advise labs before apt in Oct. Pt would like MyChart once in system. Pt asking about Evenity - RN advised still pending and did f/u w/ administration today.     LOV 7/11/19   F/U  10/18/19

## 2019-09-19 NOTE — PROGRESS NOTES
Reviewed MRI/MRCP - stable biliary dilation for many years since 2014, no concerning features and no choledocholithiasis. No hernia. Suspect that L sided abdominal pain is likely MSK due to +carnett's sign and worse with movement.  Recommend physical ther

## 2019-10-04 ENCOUNTER — HOSPITAL ENCOUNTER (EMERGENCY)
Facility: HOSPITAL | Age: 64
Discharge: HOME | End: 2019-10-04
Payer: MEDICARE

## 2019-10-04 ENCOUNTER — APPOINTMENT (OUTPATIENT)
Dept: GENERAL RADIOLOGY | Facility: HOSPITAL | Age: 64
End: 2019-10-04
Attending: NURSE PRACTITIONER
Payer: MEDICARE

## 2019-10-04 VITALS
TEMPERATURE: 98 F | OXYGEN SATURATION: 98 % | RESPIRATION RATE: 18 BRPM | SYSTOLIC BLOOD PRESSURE: 130 MMHG | DIASTOLIC BLOOD PRESSURE: 91 MMHG | HEART RATE: 82 BPM

## 2019-10-04 DIAGNOSIS — S20.211A RIB CONTUSION, RIGHT, INITIAL ENCOUNTER: Primary | ICD-10-CM

## 2019-10-04 PROCEDURE — 99283 EMERGENCY DEPT VISIT LOW MDM: CPT

## 2019-10-04 PROCEDURE — 71101 X-RAY EXAM UNILAT RIBS/CHEST: CPT | Performed by: NURSE PRACTITIONER

## 2019-10-04 NOTE — ED INITIAL ASSESSMENT (HPI)
Patient here with right lower sided pain in her ribs after slipping while cleaning the bath tub yesterday and hitting her rib cage. Now says she has severe pain when she moves or takes a deep breath.  Denies SOB

## 2019-10-04 NOTE — ED NOTES
Pt presents with R rib pain under breast d/t fall on tub midday yesterday. Pt states was kneeling at tub, went to reach for item and fell on edge of tub. Endorses pain on inspiration and radiating to back. Denies other injury, dizziness/lightheadedness.

## 2019-10-04 NOTE — ED PROVIDER NOTES
Patient Seen in: [facility] AND [facility] Emergency Department    History   Patient presents with:  Pain (neurologic)    Stated Complaint: Rib pain    HPI    58-year-old female to the emergency department with right sided rib pain that started after she fell hi N/A 12/4/2018    Performed by Lon Rodriguez MD at [address] [place] [address],[facility] ENDO   • FRACTURE SURGERY Left 2010    WRIST FRACTURE ORIF   • FRACTURE SURGERY Left 2013    ORIF wrist fracture revision with plates and screws- Ortho Dr Funes   • [address] [facility] MCG/ACT Nasal Suspension,  by Nasal route daily as needed. spray 2 spray by intranasal route  every day in each nostril    Multiple Vitamins Oral Tab,  Take 1 tablet by mouth daily.  Multiple Vitamins tablet        Family History   Problem Relation Age of O deformity, no crepitus  EXTREMITIES: from, 5/5 strength in all 4 ext, no edema  NEURO: alert and oiented x3, 2-12 intact, no focal deficit noted  SKIN: good skin turgor, no  rashes  PSYCH: calm, cooperative,    Differential includes: Fracture versus contus

## 2019-10-10 ENCOUNTER — TELEPHONE (OUTPATIENT)
Dept: INTERNAL MEDICINE CLINIC | Facility: CLINIC | Age: 64
End: 2019-10-10

## 2019-10-10 NOTE — TELEPHONE ENCOUNTER
Spoke with patient and relayed message from Dr. Monica Harding. Patient verbalized understanding. She is still very sore, but states Lidocaine patch seemed to help.  I encouraged patient to take the Tylenol 1000 mg TID scheduled, as directed by Dr. Akila Rebollar, to hopefull

## 2019-10-10 NOTE — TELEPHONE ENCOUNTER
To: [facility] CLINICAL STAFF      From: Adri James A Day      Created: 10/10/2019 6:10 AM        *-*-*This message has not been handled. *-*-*    Morning Dr Peterson Coleman   I had a series of xrays last Thursday on my ribs due to a freakish accident.  Please take a l

## 2019-10-10 NOTE — TELEPHONE ENCOUNTER
She probably bruised her ribs (no fracture on xray). She will be sore for awhile, sometimes several weeks. Recommend tylenol 1000mg TID scheduled, along with an OTC lidocaine patch.   Can also use ibuprofen 600mg TID prn

## 2019-10-11 RX ORDER — MELOXICAM 7.5 MG/1
7.5 TABLET ORAL AS NEEDED
Refills: 0 | OUTPATIENT
Start: 2019-10-11

## 2019-10-11 NOTE — TELEPHONE ENCOUNTER
Pt had RX without refills for Meloxicam 7.5 mg  (Dr Yinka Dubon prescribed for patient in March)    Doesn't take every day, however, she only has 1 tablet left & likes to have this med on hand     Asks if refill can authorized today for her to [facility]

## 2019-10-11 NOTE — TELEPHONE ENCOUNTER
Per med module  sent-in a refill for the meloxicam on 7/18/19 for #30 with 3 refills. I spoke to [employer] and patient did not pick-up any of the refills form the 7/18/19 Rx. Pharmacy will get it ready for the patient and contact her.

## 2019-10-15 ENCOUNTER — OFFICE VISIT (OUTPATIENT)
Dept: NEUROLOGY | Facility: CLINIC | Age: 64
End: 2019-10-15
Payer: MEDICARE

## 2019-10-15 ENCOUNTER — TELEPHONE (OUTPATIENT)
Dept: NEUROLOGY | Facility: CLINIC | Age: 64
End: 2019-10-15

## 2019-10-15 VITALS
SYSTOLIC BLOOD PRESSURE: 100 MMHG | HEART RATE: 92 BPM | WEIGHT: 136 LBS | RESPIRATION RATE: 17 BRPM | BODY MASS INDEX: 20.61 KG/M2 | DIASTOLIC BLOOD PRESSURE: 78 MMHG | HEIGHT: 68 IN

## 2019-10-15 DIAGNOSIS — M51.9 LUMBAR DISC DISEASE: ICD-10-CM

## 2019-10-15 DIAGNOSIS — M54.16 LUMBAR RADICULOPATHY: ICD-10-CM

## 2019-10-15 DIAGNOSIS — M50.90 CERVICAL DISC DISEASE: ICD-10-CM

## 2019-10-15 DIAGNOSIS — M48.02 CERVICAL SPINAL STENOSIS: ICD-10-CM

## 2019-10-15 DIAGNOSIS — M54.2 NECK PAIN: Primary | ICD-10-CM

## 2019-10-15 DIAGNOSIS — M54.12 CERVICAL RADICULOPATHY: ICD-10-CM

## 2019-10-15 DIAGNOSIS — M50.20 CERVICAL HERNIATED DISC: ICD-10-CM

## 2019-10-15 DIAGNOSIS — M43.16 SPONDYLOLISTHESIS OF LUMBAR REGION: ICD-10-CM

## 2019-10-15 DIAGNOSIS — M43.10 RETROLISTHESIS: ICD-10-CM

## 2019-10-15 DIAGNOSIS — M48.061 LUMBAR FORAMINAL STENOSIS: ICD-10-CM

## 2019-10-15 PROCEDURE — 99214 OFFICE O/P EST MOD 30 MIN: CPT | Performed by: PHYSICAL MEDICINE & REHABILITATION

## 2019-10-15 NOTE — PATIENT INSTRUCTIONS
Plan  I will do a left C4 TFESI under MAC. The risks of spinal cord injury and stroke were discussed with the patient. She will start PT on the cervical spine. She will continue with the Voltaren gel for the pain.     The patient will follow up in

## 2019-10-15 NOTE — PROGRESS NOTES
Cervical Pain H & P    Chief Complaint:  Patient presents with:  Low Back Pain: LOV: 5/28/19, pt had Bilateral L4 TFESI on 8/6/19 w/ 70% relief.  Pt notes mid low back will occasionally hurt after long drives or bending too long, Right side of thigh, pt not pressure    • Idiopathic acute pancreatitis 2002, 2010   • Insomnia    • Internal hemorrhoids without complication 45/7/5609   • Intestinal disorder    • Left wrist fracture 2011 and 2013   • Muscle weakness    • Osteoporosis    • Renal disorder    • Rotat Heart Disease Other    • Ovarian Cancer Maternal Aunt         71's 80's       Social History   Social History    Socioeconomic History      Marital status:       Spouse name: Not on file      Number of children: Not on file      Years of education: Not Asked        Seat Belt: Not Asked        Self-Exams: Not Asked    Social History Narrative      The patient does not use an assistive device. .        The patient does live in a home with stairs. Review of Systems      PE:   The patient does appear sign Right: Negative   Montero's sigh Left: Negative     Radiology Imaging:  I reviewed with the patient her MRI of the cervical spine from 7/25/19. Assessment  1. Neck pain    2. C2-3 mild-mod central, C6-7 right mild bulging discs    3.  C3-4 mod yamile

## 2019-10-16 NOTE — TELEPHONE ENCOUNTER
Patient has been scheduled for a left C4 TFESI under MAC  on 11/1/19 at the [facility]. Medications and allergies reviewed. Patient informed to hold aspirins, nsaids, blood thinners, multivitamins, vitamin E and fish oils 3-7 days prior to procedure.  Patient inf

## 2019-10-16 NOTE — TELEPHONE ENCOUNTER
Medicare Online for insurance coverage of Left C4 TFESIs cpt codes [id number], [id number]. Insurance was verified and procedure is a covered benefit and does not require authorization. Will inform Nursing.

## 2019-10-16 NOTE — TELEPHONE ENCOUNTER
LMTCB-to schedule left C4 TFESI under MAC.  Patient instructed to call back when ready to proceed with scheduling

## 2019-10-17 ENCOUNTER — APPOINTMENT (OUTPATIENT)
Dept: LAB | Facility: HOSPITAL | Age: 64
End: 2019-10-17
Attending: INTERNAL MEDICINE
Payer: MEDICARE

## 2019-10-17 DIAGNOSIS — M81.8 OTHER OSTEOPOROSIS, UNSPECIFIED PATHOLOGICAL FRACTURE PRESENCE: ICD-10-CM

## 2019-10-17 PROCEDURE — 82306 VITAMIN D 25 HYDROXY: CPT

## 2019-10-17 PROCEDURE — 83970 ASSAY OF PARATHORMONE: CPT

## 2019-10-17 PROCEDURE — 36415 COLL VENOUS BLD VENIPUNCTURE: CPT

## 2019-10-17 PROCEDURE — 84080 ASSAY ALKALINE PHOSPHATASES: CPT

## 2019-10-17 PROCEDURE — 80048 BASIC METABOLIC PNL TOTAL CA: CPT

## 2019-10-18 ENCOUNTER — TELEPHONE (OUTPATIENT)
Dept: INTERNAL MEDICINE CLINIC | Facility: CLINIC | Age: 64
End: 2019-10-18

## 2019-10-18 ENCOUNTER — TELEPHONE (OUTPATIENT)
Dept: NEUROLOGY | Facility: CLINIC | Age: 64
End: 2019-10-18

## 2019-10-18 RX ORDER — ESTRADIOL 0.1 MG/G
0.5 CREAM VAGINAL
Qty: 1 TUBE | Refills: 6 | Status: CANCELLED | OUTPATIENT
Start: 2019-10-21

## 2019-10-18 NOTE — TELEPHONE ENCOUNTER
To  - from OV 4/2019  Atrophic vaginitis, postmenopausal  Uses Estrace cream only occasionally     Pt requesting change from cream to tabs due to cost;   Not sure changing to oral estrogen appro.  At pt age

## 2019-10-18 NOTE — TELEPHONE ENCOUNTER
Pt called stated that she is having a lot of pain below fusion at level of C6.  Pt asking if that level also needs to be injected or is this muscular skeletal. Asking that Dr Mady Morgan review MRI and if anything can be injected below fusion can it be done with

## 2019-10-18 NOTE — TELEPHONE ENCOUNTER
Pt is requesting a refill on Estradiol (ESTRACE) 0.1 MG/GM Vaginal Cream. But we pt when to  medication the pharmacy informed the pt that it was going to be over one hundred dollars.  Pharmacy stated that there are other forms of the medication that

## 2019-10-19 ENCOUNTER — TELEPHONE (OUTPATIENT)
Dept: ENDOCRINOLOGY CLINIC | Facility: CLINIC | Age: 64
End: 2019-10-19

## 2019-10-19 NOTE — TELEPHONE ENCOUNTER
Evenity IV rec'd since pt has Medicare now - re-run under new insurance. No PA needed. Pt will owe $0 since deductible has been met. Clinic to call pt as soon as we have drug available.     Apt w/ [facility] cancelled by provider on 10/18/19 due to hospital/

## 2019-10-21 NOTE — TELEPHONE ENCOUNTER
Left detailed message informing patient of Dr. Jus Fink response. Patient instructed to call back with questions.  -verified in fyi

## 2019-10-21 NOTE — TELEPHONE ENCOUNTER
Her herniated disc at C3-4 is referring the pain to the lower part of her neck and therefore she should only need the one injection as ordered.

## 2019-10-22 ENCOUNTER — TELEPHONE (OUTPATIENT)
Dept: NEUROLOGY | Facility: CLINIC | Age: 64
End: 2019-10-22

## 2019-10-22 DIAGNOSIS — M54.16 LUMBAR RADICULOPATHY: Primary | ICD-10-CM

## 2019-10-22 NOTE — TELEPHONE ENCOUNTER
No, agree not appropriate. Please let pt know they are not equivalent due to increase systemic levels with the tablets.

## 2019-10-22 NOTE — TELEPHONE ENCOUNTER
Pt calling stating she knows will be need a Lumbar TFESI in December, prior to her f/u in January. Pt requests a held slot for December - slot held accordingly. Pt states she has always gotten bilateral L5-S1 TFESI and gets 3.5 months of relief.  Jann Byrnes

## 2019-10-24 NOTE — TELEPHONE ENCOUNTER
Lengthy discussion with pt reviewing multiple options;    End point of conversation:  She will check goodRx and insurance and call us back about where she wants script to go;  Pt acknowledges systemic estrogens are not optimal choice at this point

## 2019-10-25 ENCOUNTER — TELEPHONE (OUTPATIENT)
Dept: NEUROLOGY | Facility: CLINIC | Age: 64
End: 2019-10-25

## 2019-10-25 RX ORDER — ESTRADIOL 0.1 MG/G
0.5 CREAM VAGINAL
Qty: 1 TUBE | Refills: 6 | Status: SHIPPED | OUTPATIENT
Start: 2019-10-28 | End: 2021-04-14

## 2019-10-25 NOTE — TELEPHONE ENCOUNTER
Patient has been scheduled for a bilateral L5 TFESIs under MAC  on 12/17/19 at the [facility]. Medications and allergies reviewed. Patient informed to hold aspirins, nsaids, blood thinners, multivitamins, vitamin E and fish oils 3-7 days prior to procedure.  Monika

## 2019-10-25 NOTE — TELEPHONE ENCOUNTER
Medicare online for insurance coverage of Bilateral L5 (L5-S1) Lumbar Transforaminal Epidural Injection cpt code 61568-66,47852 . Insurance was verified and procedure is a cover benefit and does not require authorization. Will inform Nursing.

## 2019-10-25 NOTE — TELEPHONE ENCOUNTER
Pt called back and would like to talk with Fidelina Estrada about what she has found. Pt would not give anymore information than this at this time.      Best call back: [phone]

## 2019-10-25 NOTE — TELEPHONE ENCOUNTER
Pt called back and stated she wants the RX sent to [facility] in [facility]. Pt still looking for a call back from Gabby Nicholas.

## 2019-11-01 ENCOUNTER — OFFICE VISIT (OUTPATIENT)
Dept: SURGERY | Facility: CLINIC | Age: 64
End: 2019-11-01
Payer: MEDICARE

## 2019-11-01 DIAGNOSIS — M54.12 CERVICAL RADICULOPATHY: Primary | ICD-10-CM

## 2019-11-01 DIAGNOSIS — M50.20 CERVICAL HERNIATED DISC: ICD-10-CM

## 2019-11-01 DIAGNOSIS — M48.02 CERVICAL SPINAL STENOSIS: ICD-10-CM

## 2019-11-01 PROCEDURE — 64479 NJX AA&/STRD TFRM EPI C/T 1: CPT | Performed by: PHYSICAL MEDICINE & REHABILITATION

## 2019-11-01 NOTE — PROCEDURES
Armin [facility].    CERVICAL TRANSFORAMINAL  NAME:  Rey Stoner    MR #:    [id number] :  10/5/1955     PHYSICIAN:  Trevor Cleaning            Operative Report    DATE OF PROCEDURE: 2019   PREOPERATIVE DIAGNOSES: 1. right  preservative-free dexamethasone. Then, the needle was removed. The patient's skin was cleaned. A Band-Aid was applied. The patient was transferred to the cart and into [facility].   The patient was given discharge instructions and will follow up in the clinic

## 2019-11-04 ENCOUNTER — TELEPHONE (OUTPATIENT)
Dept: NEUROLOGY | Facility: CLINIC | Age: 64
End: 2019-11-04

## 2019-11-04 ENCOUNTER — TELEPHONE (OUTPATIENT)
Dept: ENDOCRINOLOGY CLINIC | Facility: CLINIC | Age: 64
End: 2019-11-04

## 2019-11-04 NOTE — TELEPHONE ENCOUNTER
Spoke to patient who states she continues to experience tightness and stiffness in the neck with slight headache post left C4 TFESI 11/1/19. Patient states headaches is mild and intermittent with no known trigger.     Patient notified to increase caffeine

## 2019-11-05 NOTE — TELEPHONE ENCOUNTER
RN left message for Kendal Canales in purchasing 11/4/19 to add med to [facility] for ordering. Still awaiting response. RN spoke with Jeff Bishop in [place] - she is going to look into ordering the medication and will get back with me.      Ana Maria

## 2019-11-12 ENCOUNTER — TELEPHONE (OUTPATIENT)
Dept: PHYSICAL THERAPY | Facility: HOSPITAL | Age: 64
End: 2019-11-12

## 2019-11-12 ENCOUNTER — APPOINTMENT (OUTPATIENT)
Dept: PHYSICAL THERAPY | Facility: HOSPITAL | Age: 64
End: 2019-11-12
Attending: PHYSICAL MEDICINE & REHABILITATION
Payer: MEDICARE

## 2019-11-15 ENCOUNTER — APPOINTMENT (OUTPATIENT)
Dept: PHYSICAL THERAPY | Facility: HOSPITAL | Age: 64
End: 2019-11-15
Attending: PHYSICAL MEDICINE & REHABILITATION
Payer: MEDICARE

## 2019-11-19 ENCOUNTER — OFFICE VISIT (OUTPATIENT)
Dept: PHYSICAL THERAPY | Facility: HOSPITAL | Age: 64
End: 2019-11-19
Attending: PHYSICAL MEDICINE & REHABILITATION
Payer: MEDICARE

## 2019-11-19 ENCOUNTER — TELEPHONE (OUTPATIENT)
Dept: NEUROLOGY | Facility: CLINIC | Age: 64
End: 2019-11-19

## 2019-11-19 DIAGNOSIS — M54.2 NECK PAIN: ICD-10-CM

## 2019-11-19 DIAGNOSIS — M51.9 LUMBAR DISC DISEASE: ICD-10-CM

## 2019-11-19 DIAGNOSIS — M54.12 CERVICAL RADICULOPATHY: ICD-10-CM

## 2019-11-19 DIAGNOSIS — M50.20 CERVICAL HERNIATED DISC: ICD-10-CM

## 2019-11-19 DIAGNOSIS — M48.02 CERVICAL SPINAL STENOSIS: ICD-10-CM

## 2019-11-19 DIAGNOSIS — M48.061 LUMBAR FORAMINAL STENOSIS: ICD-10-CM

## 2019-11-19 DIAGNOSIS — M54.16 LUMBAR RADICULOPATHY: ICD-10-CM

## 2019-11-19 DIAGNOSIS — M50.90 CERVICAL DISC DISEASE: ICD-10-CM

## 2019-11-19 DIAGNOSIS — M43.10 RETROLISTHESIS: ICD-10-CM

## 2019-11-19 DIAGNOSIS — M43.16 SPONDYLOLISTHESIS OF LUMBAR REGION: ICD-10-CM

## 2019-11-19 PROCEDURE — 97110 THERAPEUTIC EXERCISES: CPT

## 2019-11-19 PROCEDURE — 97163 PT EVAL HIGH COMPLEX 45 MIN: CPT

## 2019-11-19 NOTE — PROGRESS NOTES
SPINE EVALUATION:   Referring Physician: Dr. Jazmyn Hoyos  Diagnosis:  Neck pain (M54.2)  Cervical disc disease (M50.90)  Cervical herniated disc (M50.20)  Cervical spinal stenosis (M48.02)  Cervical radiculopathy (M54.12)  Lumbar disc disease (M51.9)  Spondylol disc, C3-4 mod central stenosis  Past medical history was reviewed with Bernardo Maxwell.  Significant findings include  has a past medical history of Anemia, Anxiety state, unspecified, Back problem, Broken foot (03-), Calculus of kidney (2008), Depression, 39  Extension: 20 *pain in B lower cerv and upper thoracic paraspinal region  Sidebending: R 28 *severe L lateral cervical and upper throracic; L 35 * HA and L supoccipital and R lateral cervical  Rotation: R 50 *pull on R; L 35* pull on L    Palpation: in will be able to resume general conditioning without provocation if desired      Frequency / Duration: Patient will be seen for 2 x/week or a total of 18 visits over a 90 day period.  Treatment will include: Manual Therapy, Neuromuscular Re-education, Self-C

## 2019-11-20 PROBLEM — M67.441 GANGLION OF FLEXOR TENDON SHEATH OF RIGHT INDEX FINGER: Status: ACTIVE | Noted: 2019-11-20

## 2019-11-20 PROBLEM — M19.022 PRIMARY OSTEOARTHRITIS OF LEFT ELBOW: Status: ACTIVE | Noted: 2019-11-20

## 2019-11-22 ENCOUNTER — OFFICE VISIT (OUTPATIENT)
Dept: PHYSICAL THERAPY | Facility: HOSPITAL | Age: 64
End: 2019-11-22
Attending: PHYSICAL MEDICINE & REHABILITATION
Payer: MEDICARE

## 2019-11-22 PROCEDURE — 97140 MANUAL THERAPY 1/> REGIONS: CPT

## 2019-11-22 NOTE — PROGRESS NOTES
Diagnosis:    Neck pain (M54.2)  Cervical disc disease (M50.90)  Cervical herniated disc (M50.20)  Cervical spinal stenosis (M48.02)  Cervical radiculopathy (M54.12)  Lumbar disc disease (M51.9)  Spondylolisthesis of lumbar region (M43.16)  Lumbar foramina

## 2019-11-22 NOTE — TELEPHONE ENCOUNTER
Spoke to patient who states she did not receive any relief from the left C4 TFESI on 11/1/19. Patient continues to experience neck pain that is tight and stiff radiating down the left arm and hand with numbness in the left thumb.  Currently seeing Cruzito Hunter

## 2019-11-26 ENCOUNTER — OFFICE VISIT (OUTPATIENT)
Dept: PHYSICAL THERAPY | Facility: HOSPITAL | Age: 64
End: 2019-11-26
Attending: PHYSICAL MEDICINE & REHABILITATION
Payer: MEDICARE

## 2019-11-26 PROCEDURE — 97140 MANUAL THERAPY 1/> REGIONS: CPT

## 2019-11-26 PROCEDURE — 97110 THERAPEUTIC EXERCISES: CPT

## 2019-11-26 NOTE — PROGRESS NOTES
Diagnosis:    Neck pain (M54.2)  Cervical disc disease (M50.90)  Cervical herniated disc (M50.20)  Cervical spinal stenosis (M48.02)  Cervical radiculopathy (M54.12)  Lumbar disc disease (M51.9)  Spondylolisthesis of lumbar region (M43.16)  Lumbar foramina change with GRAY, REIL. Plan: Cont STM; monitor response to GRAY. OA MET    ·  constant pain in dorsal and lateral cervical and upper thoracic region = 3-10/10; average = 7-8/10. Aggravates: lying with too thick of a pillow.  Relieves: massage, Tylenol

## 2019-11-29 ENCOUNTER — OFFICE VISIT (OUTPATIENT)
Dept: PHYSICAL THERAPY | Facility: HOSPITAL | Age: 64
End: 2019-11-29
Attending: PHYSICAL MEDICINE & REHABILITATION
Payer: MEDICARE

## 2019-11-29 PROCEDURE — 97140 MANUAL THERAPY 1/> REGIONS: CPT

## 2019-11-29 NOTE — PROGRESS NOTES
Diagnosis:    Neck pain (M54.2)  Cervical disc disease (M50.90)  Cervical herniated disc (M50.20)  Cervical spinal stenosis (M48.02)  Cervical radiculopathy (M54.12)  Lumbar disc disease (M51.9)  Spondylolisthesis of lumbar region (M43.16)  Lumbar foramina Aggravates: lying with too thick of a pillow. Relieves: massage, Tylenol slightly. Meds for this: Voltaren gel, biofreeze, Tylenol prn. · (L) arm altered sensation in dorsal elbow and proximal forearm and palm, which is constant x approx 2 wks.    · HA's r

## 2019-12-03 ENCOUNTER — APPOINTMENT (OUTPATIENT)
Dept: PHYSICAL THERAPY | Facility: HOSPITAL | Age: 64
End: 2019-12-03
Attending: PHYSICAL MEDICINE & REHABILITATION
Payer: MEDICARE

## 2019-12-04 ENCOUNTER — OFFICE VISIT (OUTPATIENT)
Dept: PHYSICAL THERAPY | Facility: HOSPITAL | Age: 64
End: 2019-12-04
Attending: INTERNAL MEDICINE
Payer: MEDICARE

## 2019-12-04 PROCEDURE — 97140 MANUAL THERAPY 1/> REGIONS: CPT

## 2019-12-04 NOTE — PROGRESS NOTES
Diagnosis:    Neck pain (M54.2)  Cervical disc disease (M50.90)  Cervical herniated disc (M50.20)  Cervical spinal stenosis (M48.02)  Cervical radiculopathy (M54.12)  Lumbar disc disease (M51.9)  Spondylolisthesis of lumbar region (M43.16)  Lumbar foramina cervical'  MET to increase mobility of OA in flexion    Neuro ReEducation       Therapeutic Activity       Modalities                HEP to date: GRAY, neuromob L radial n, stretching for wrist extensors, B shoulder AAROM    Education:   Assessment: Good r

## 2019-12-05 ENCOUNTER — PATIENT OUTREACH (OUTPATIENT)
Dept: CASE MANAGEMENT | Age: 64
End: 2019-12-05

## 2019-12-05 NOTE — PROGRESS NOTES
Pt returned my call, discussed CCM program with pt in detail. Pt is unsure at this point if needs the program but would like to think about it. Program overview sent via [employer] and will follow up with pt at a later date.  Pt is on vacation beginning of the

## 2019-12-06 ENCOUNTER — OFFICE VISIT (OUTPATIENT)
Dept: PHYSICAL THERAPY | Facility: HOSPITAL | Age: 64
End: 2019-12-06
Attending: PHYSICAL MEDICINE & REHABILITATION
Payer: MEDICARE

## 2019-12-06 PROCEDURE — 97140 MANUAL THERAPY 1/> REGIONS: CPT

## 2019-12-06 NOTE — PROGRESS NOTES
Diagnosis:    Neck pain (M54.2)  Cervical disc disease (M50.90)  Cervical herniated disc (M50.20)  Cervical spinal stenosis (M48.02)  Cervical radiculopathy (M54.12)  Lumbar disc disease (M51.9)  Spondylolisthesis of lumbar region (M43.16)  Lumbar foramina try CP to neck and head for HA's post rx if it recurs  Assessment: Pt reported a poor response to L OA MET despite good increase in mobility post-rx    Plan: Cont STM;    ·  constant pain in dorsal and lateral cervical and upper thoracic region = 3-10/10;

## 2019-12-06 NOTE — PROGRESS NOTES
Diagnosis:    Neck pain (M54.2)  Cervical disc disease (M50.90)  Cervical herniated disc (M50.20)  Cervical spinal stenosis (M48.02)  Cervical radiculopathy (M54.12)  Lumbar disc disease (M51.9)  Spondylolisthesis of lumbar region (M43.16)  Lumbar foramina Activity       Modalities                HEP to date: GRAY, neuromob L radial n, stretching for wrist extensors, B shoulder AAROM    Education:   Assessment: Good response to OA MET with increased range into L sideglide in flexion    Plan: Cont STM; OA MET

## 2019-12-10 ENCOUNTER — TELEPHONE (OUTPATIENT)
Dept: PHYSICAL THERAPY | Facility: HOSPITAL | Age: 64
End: 2019-12-10

## 2019-12-11 ENCOUNTER — APPOINTMENT (OUTPATIENT)
Dept: PHYSICAL THERAPY | Facility: HOSPITAL | Age: 64
End: 2019-12-11
Attending: PHYSICAL MEDICINE & REHABILITATION
Payer: MEDICARE

## 2019-12-12 NOTE — TELEPHONE ENCOUNTER
ok to start Evenity. Per Bekah Wilson (supv)     Booked pt Thursday 12/19/19 @ 1:45pm w/ [facility] to discuss s/e. OK to double book per [facility].     RN will need to order med monthly once injection has been started

## 2019-12-13 ENCOUNTER — APPOINTMENT (OUTPATIENT)
Dept: PHYSICAL THERAPY | Facility: HOSPITAL | Age: 64
End: 2019-12-13
Attending: PHYSICAL MEDICINE & REHABILITATION
Payer: MEDICARE

## 2019-12-16 ENCOUNTER — OFFICE VISIT (OUTPATIENT)
Dept: PHYSICAL THERAPY | Facility: HOSPITAL | Age: 64
End: 2019-12-16
Attending: PHYSICAL MEDICINE & REHABILITATION
Payer: MEDICARE

## 2019-12-16 PROCEDURE — 97140 MANUAL THERAPY 1/> REGIONS: CPT

## 2019-12-16 NOTE — PROGRESS NOTES
Diagnosis:    Neck pain (M54.2)  Cervical disc disease (M50.90)  Cervical herniated disc (M50.20)  Cervical spinal stenosis (M48.02)  Cervical radiculopathy (M54.12)  Lumbar disc disease (M51.9)  Spondylolisthesis of lumbar region (M43.16)  Lumbar foramina HEP to date: GRAY, neuromob L radial n, stretching for wrist extensors, B shoulder AAROM    Education: advised to try CP to neck and head for HA's post rx if it recurs  Assessment: Pt reports decreased TTP in upper traps and levator.  Very minimal t

## 2019-12-17 ENCOUNTER — OFFICE VISIT (OUTPATIENT)
Dept: SURGERY | Facility: CLINIC | Age: 64
End: 2019-12-17

## 2019-12-17 ENCOUNTER — TELEPHONE (OUTPATIENT)
Dept: NEUROLOGY | Facility: CLINIC | Age: 64
End: 2019-12-17

## 2019-12-17 ENCOUNTER — TELEPHONE (OUTPATIENT)
Dept: PHYSICAL THERAPY | Facility: HOSPITAL | Age: 64
End: 2019-12-17

## 2019-12-17 DIAGNOSIS — M54.16 LUMBAR RADICULOPATHY: Primary | ICD-10-CM

## 2019-12-17 DIAGNOSIS — M51.9 LUMBAR DISC DISEASE: ICD-10-CM

## 2019-12-17 PROCEDURE — 64483 NJX AA&/STRD TFRM EPI L/S 1: CPT | Performed by: PHYSICAL MEDICINE & REHABILITATION

## 2019-12-17 NOTE — TELEPHONE ENCOUNTER
Reviewed with Dr. Moris Butt who states patient will need to give the injections time to work. No MRI will be ordered at this time.     Patient notified of Dr. Billy Finn response verbalizing understanding

## 2019-12-17 NOTE — TELEPHONE ENCOUNTER
Patient received bilateral L5 TFESIs today and says she discussed getting Lumbar Spine MRI with Dr. Maria Ines Lundberg if symptoms persisted post injections.  Patient says since Dr. Maria Ines Lundberg will be going on vacation and she will take a vacation early January 2020, she wou

## 2019-12-17 NOTE — PROCEDURES
[facility].    BILATERAL LUMBAR TRANSFORAMINAL   NAME:  Darrick Lunch Day    MR #:    [id number] :  10/5/1955     PHYSICIAN:  Sarah Cleaning        Operative Report    DATE OF PROCEDURE: 2019   PREOPERATIVE DIAGNOSES: 1. left > with a 3 cc solution of 1.5 cc of 40 mg/cc of Kenalog and 1.5 cc of 1% PF lidocaine without epinephrine. After this, the needle was removed. Then  fluoroscopy was right anterior obliqued opening up the left L5-S1 intervertebral foramen.   At this point in

## 2019-12-18 ENCOUNTER — APPOINTMENT (OUTPATIENT)
Dept: PHYSICAL THERAPY | Facility: HOSPITAL | Age: 64
End: 2019-12-18
Attending: PHYSICAL MEDICINE & REHABILITATION
Payer: MEDICARE

## 2019-12-19 ENCOUNTER — OFFICE VISIT (OUTPATIENT)
Dept: ENDOCRINOLOGY CLINIC | Facility: CLINIC | Age: 64
End: 2019-12-19
Payer: MEDICARE

## 2019-12-19 VITALS
BODY MASS INDEX: 21 KG/M2 | HEART RATE: 80 BPM | WEIGHT: 140 LBS | SYSTOLIC BLOOD PRESSURE: 131 MMHG | DIASTOLIC BLOOD PRESSURE: 81 MMHG

## 2019-12-19 DIAGNOSIS — M81.8 OTHER OSTEOPOROSIS WITHOUT CURRENT PATHOLOGICAL FRACTURE: Primary | ICD-10-CM

## 2019-12-19 PROCEDURE — 99213 OFFICE O/P EST LOW 20 MIN: CPT | Performed by: INTERNAL MEDICINE

## 2019-12-19 PROCEDURE — 96372 THER/PROPH/DIAG INJ SC/IM: CPT | Performed by: INTERNAL MEDICINE

## 2019-12-19 NOTE — PROGRESS NOTES
Name: Michael Stoner  Date: 12/19/2019    Referring Physician: No ref. provider found    Patient presents with: Follow - Up: Osteoporosis. HISTORY OF PRESENT ILLNESS   Michael Stoner is a 71year old female who presents for osteoporosis.      She was i wheezing or VERDUZCO  Cardiovascular:  no chest pain or palpitations  Gastrointestinal:  no abdominal pain, bowel movement problems  Musculoskeletal: no muscle pain or arthralgia  /Gyne: no frequency or discomfort while urinating  Psychiatric:  no acute distr NAUSEA AND VOMITING  Hydrocodone             NAUSEA AND VOMITING  Morphine                NAUSEA AND VOMITING  Nsaids                  NAUSEA AND VOMITING, PAIN    Comment:GASTRITIS  Augmentin, [Amoxici*    OTHER (SEE COMMENTS)    Comment:Abdominal p LEVEL N/A 2017    Performed by Edy Ellis MD at [address] [facility] MAIN OR   • ARTHROSCOPY OF JOINT UNLISTED Right 2012    rotator cuff tear   •       x 3   • CATARACT     • CHOLECYSTECTOMY     • COLONOSCOPY     • COLONOSCOPY N/A 2018    Pe weight gain or loss    ASSESSMENT/PLAN:    1.  Osteoporosis  - Discussed diagnosis with patient  - 24 hour urine was normal  - PTH elevated likely secondary due to vitamin D deficiency, now improved   - Continue current calcium supplementation  - Continue V

## 2019-12-19 NOTE — PROGRESS NOTES
Tolerated EVENITY (romosozumab-aqqg) 210 mg SQ injection given in Left and Right upper arm. Patient verbalizes understanding that injection is given monthly x 12 months. Patient encouraged to make appt prior to leaving clinic.  Total of 10 minutes spent wit

## 2019-12-24 ENCOUNTER — OFFICE VISIT (OUTPATIENT)
Dept: PHYSICAL THERAPY | Facility: HOSPITAL | Age: 64
End: 2019-12-24
Attending: PHYSICAL MEDICINE & REHABILITATION
Payer: MEDICARE

## 2019-12-24 PROCEDURE — 97140 MANUAL THERAPY 1/> REGIONS: CPT

## 2019-12-24 NOTE — PROGRESS NOTES
Dominick Evans has attended 8 visits in Physical Therapy       Diagnosis:    Neck pain (M54.2)  Cervical disc disease (M50.90)  Cervical herniated disc (M50.20)  Cervical spinal stenosis (M48.02)  Cervical radiculopathy (M54.12)  Lumbar disc dise

## 2019-12-30 ENCOUNTER — APPOINTMENT (OUTPATIENT)
Dept: PHYSICAL THERAPY | Facility: HOSPITAL | Age: 64
End: 2019-12-30
Attending: PHYSICAL MEDICINE & REHABILITATION
Payer: MEDICARE

## 2019-12-30 ENCOUNTER — OFFICE VISIT (OUTPATIENT)
Dept: PHYSICAL THERAPY | Age: 64
End: 2019-12-30
Attending: PHYSICAL MEDICINE & REHABILITATION
Payer: MEDICARE

## 2019-12-30 DIAGNOSIS — M48.061 LUMBAR FORAMINAL STENOSIS: ICD-10-CM

## 2019-12-30 DIAGNOSIS — Z98.890 HISTORY OF LUMBAR LAMINECTOMY: ICD-10-CM

## 2019-12-30 DIAGNOSIS — M51.9 LUMBAR DISC DISEASE: ICD-10-CM

## 2019-12-30 DIAGNOSIS — M54.16 LUMBAR RADICULOPATHY: Primary | ICD-10-CM

## 2019-12-30 DIAGNOSIS — M41.25 OTHER IDIOPATHIC SCOLIOSIS, THORACOLUMBAR REGION: ICD-10-CM

## 2019-12-30 DIAGNOSIS — M43.16 SPONDYLOLISTHESIS OF LUMBAR REGION: ICD-10-CM

## 2019-12-30 DIAGNOSIS — Z98.1 HISTORY OF LUMBAR FUSION: ICD-10-CM

## 2019-12-30 DIAGNOSIS — M43.10 RETROLISTHESIS: ICD-10-CM

## 2019-12-30 DIAGNOSIS — M54.16 LUMBAR RADICULOPATHY: ICD-10-CM

## 2019-12-30 PROCEDURE — 97162 PT EVAL MOD COMPLEX 30 MIN: CPT

## 2019-12-30 PROCEDURE — 97140 MANUAL THERAPY 1/> REGIONS: CPT

## 2019-12-30 NOTE — PROGRESS NOTES
LUMBAR SPINE EVALUATION:   Referring Physician: Dr. Felicia Holm  Diagnosis: Lumbar radiculopathy (M54.16)  Retrolisthesis (M43.10)  Lumbar foraminal stenosis (M48.061)  Spondylolisthesis of lumbar region (M43.16)  Lumbar disc disease (M51.9)  Other idiopathic pain    Pt goals include: get her IT band stretched out so she doesn't have the pain anymore. Past medical history was reviewed with Lee Stokes.  Significant findings include   Past Medical History:   Diagnosis Date   • Anemia    • Anxiety state, unspecifi pain. Skilled Physical Therapy is medically necessary to address the above impairments and reach functional goals.   Precautions: L1/2 Fusion 2008     OBJECTIVE:   Observation/Posture: good posture    Gait: good gait mechanics    ROM:   Trunk         Pain ( structures involved/activity limitations, and evolving symptoms including changing pain levels. PLAN OF CARE:    Goals:    1.  Pt will verbalize and demo compliance with HEP at least 75% of the time to allow for independent management of symptoms a

## 2020-01-02 ENCOUNTER — TELEPHONE (OUTPATIENT)
Dept: NEUROLOGY | Facility: CLINIC | Age: 65
End: 2020-01-02

## 2020-01-02 ENCOUNTER — OFFICE VISIT (OUTPATIENT)
Dept: NEUROLOGY | Facility: CLINIC | Age: 65
End: 2020-01-02
Payer: MEDICARE

## 2020-01-02 VITALS
HEART RATE: 84 BPM | SYSTOLIC BLOOD PRESSURE: 128 MMHG | WEIGHT: 140 LBS | BODY MASS INDEX: 21.22 KG/M2 | HEIGHT: 68 IN | DIASTOLIC BLOOD PRESSURE: 80 MMHG | RESPIRATION RATE: 17 BRPM

## 2020-01-02 DIAGNOSIS — M43.10 RETROLISTHESIS: ICD-10-CM

## 2020-01-02 DIAGNOSIS — Z98.1 HISTORY OF LUMBAR FUSION: ICD-10-CM

## 2020-01-02 DIAGNOSIS — M48.061 LUMBAR FORAMINAL STENOSIS: ICD-10-CM

## 2020-01-02 DIAGNOSIS — M50.90 CERVICAL DISC DISEASE: ICD-10-CM

## 2020-01-02 DIAGNOSIS — M43.16 SPONDYLOLISTHESIS OF LUMBAR REGION: ICD-10-CM

## 2020-01-02 DIAGNOSIS — Z98.890 HISTORY OF LUMBAR LAMINECTOMY: ICD-10-CM

## 2020-01-02 DIAGNOSIS — M54.2 NECK PAIN: ICD-10-CM

## 2020-01-02 DIAGNOSIS — M50.20 CERVICAL HERNIATED DISC: ICD-10-CM

## 2020-01-02 DIAGNOSIS — M41.25 OTHER IDIOPATHIC SCOLIOSIS, THORACOLUMBAR REGION: ICD-10-CM

## 2020-01-02 DIAGNOSIS — M48.02 CERVICAL SPINAL STENOSIS: ICD-10-CM

## 2020-01-02 DIAGNOSIS — M51.9 LUMBAR DISC DISEASE: ICD-10-CM

## 2020-01-02 DIAGNOSIS — M54.16 LUMBAR RADICULOPATHY: Primary | ICD-10-CM

## 2020-01-02 PROCEDURE — 99214 OFFICE O/P EST MOD 30 MIN: CPT | Performed by: PHYSICAL MEDICINE & REHABILITATION

## 2020-01-02 RX ORDER — GABAPENTIN 100 MG/1
100 CAPSULE ORAL 3 TIMES DAILY
Qty: 90 CAPSULE | Refills: 5 | Status: SHIPPED | OUTPATIENT
Start: 2020-01-02 | End: 2020-01-21

## 2020-01-02 NOTE — PATIENT INSTRUCTIONS
Plan  She will get a new MRI of the lumbar spine due to her worsening symptoms and failure to respond to the the right L5 TFESI and the PT. She will call me once she has had the MRI scan.     She will try Neurontin 100 mg (one tablet) 3 times a day for

## 2020-01-02 NOTE — PROGRESS NOTES
Low Back Pain H & P    Chief Complaint: Patient presents with:  Low Back Pain: Pt f/u after Bilateral L5 TFESI on 12/17/19 w/ unknown relief - has pain down RLE that began prior to injection - suggested new MRI prior to injection (pt leaving for vacation f [place] - casting. Fracture 5th met. Cast removl/xray foot 4-20-12. Follow up fracture right foot 5-22-12.     • Calculus of kidney 2008   • Depression    • Esophageal reflux    • Essential hypertension    • Gastric polyps 12/4/2018   • Gastritis    • High bl Pulmonary Disease Father    • Heart Disease Mother    • Colon Cancer Maternal Grandfather    • Cancer Maternal Grandfather    • Polyps Sister         colon polyps   • Colon Cancer Sister    • Other (gallbladder disease) Sister    • Crohn's Disease Other Occupational Exposure: Not Asked        Hobby Hazards: Not Asked        Sleep Concern: Not Asked        Stress Concern: Not Asked        Weight Concern: Not Asked        Special Diet: Not Asked        Back Care: Not Asked        Exercise: No        Bike He Tibialis posterior pulse-LEFT 2+     Neurological Lower Extremity:    Light Touch Sensation: Intact in bilateral Lower Extremities   LE Muscle Strength:  All LE strength measurements 5/5 except:  Gluteus medius RIGHT:   4/5  Hamstring RIGHT:   4/5  Extens trigger point injections here in the office if needed so as to better monitor her steroid dose. As far as her neck is concerned, it would appear that the pathology at the C3-4 level is not the main cause of her pain.   Since her neck is tolerable at this

## 2020-01-03 ENCOUNTER — HOSPITAL ENCOUNTER (OUTPATIENT)
Dept: MRI IMAGING | Facility: HOSPITAL | Age: 65
Discharge: HOME | End: 2020-01-03
Attending: PHYSICAL MEDICINE & REHABILITATION
Payer: MEDICARE

## 2020-01-03 ENCOUNTER — HOSPITAL ENCOUNTER (OUTPATIENT)
Age: 65
Discharge: HOME | End: 2020-01-03
Attending: EMERGENCY MEDICINE
Payer: MEDICARE

## 2020-01-03 ENCOUNTER — TELEPHONE (OUTPATIENT)
Dept: INTERNAL MEDICINE CLINIC | Facility: CLINIC | Age: 65
End: 2020-01-03

## 2020-01-03 VITALS
SYSTOLIC BLOOD PRESSURE: 146 MMHG | HEIGHT: 68 IN | DIASTOLIC BLOOD PRESSURE: 98 MMHG | BODY MASS INDEX: 20.92 KG/M2 | WEIGHT: 138 LBS | HEART RATE: 98 BPM | OXYGEN SATURATION: 99 % | TEMPERATURE: 99 F | RESPIRATION RATE: 16 BRPM

## 2020-01-03 DIAGNOSIS — J06.9 UPPER RESPIRATORY TRACT INFECTION, UNSPECIFIED TYPE: Primary | ICD-10-CM

## 2020-01-03 DIAGNOSIS — M54.16 LUMBAR RADICULOPATHY: ICD-10-CM

## 2020-01-03 PROCEDURE — 72148 MRI LUMBAR SPINE W/O DYE: CPT | Performed by: PHYSICAL MEDICINE & REHABILITATION

## 2020-01-03 PROCEDURE — 99214 OFFICE O/P EST MOD 30 MIN: CPT

## 2020-01-03 PROCEDURE — 99213 OFFICE O/P EST LOW 20 MIN: CPT

## 2020-01-03 RX ORDER — AZITHROMYCIN 250 MG/1
TABLET, FILM COATED ORAL
Qty: 1 PACKAGE | Refills: 0 | Status: SHIPPED | OUTPATIENT
Start: 2020-01-03 | End: 2020-01-21 | Stop reason: ALTCHOICE

## 2020-01-03 RX ORDER — CEFDINIR 300 MG/1
300 CAPSULE ORAL 2 TIMES DAILY
Qty: 14 CAPSULE | Refills: 0 | Status: SHIPPED | OUTPATIENT
Start: 2020-01-03 | End: 2020-01-10

## 2020-01-03 NOTE — TELEPHONE ENCOUNTER
Pt have in MRI today at 530 so she will not have her phone. Hoping she can get a call back before 5 today.

## 2020-01-03 NOTE — TELEPHONE ENCOUNTER
Pt is leaving for [place] this weekend and she fees like she have a sinus infection she have a post nasale drip yellow also lots of coughing. Like to be seen or asking if antibiotic can be called in for her.

## 2020-01-03 NOTE — TELEPHONE ENCOUNTER
Spoke with patient; had several blood pressure readings in 140s/90s. Did take sudafed this morning. She is worried. Discussed that this could be related to sudafed. Stop sudafed. Check blood pressures over the next 2 weeks and call us with bp readings.

## 2020-01-03 NOTE — ED INITIAL ASSESSMENT (HPI)
Monday nasal congestion, sore throat, facial pressure   Pt concerned about infection as she is leaving on for [place] for vacation  OTC Mucinex and Sudafed taken without relief of symptoms

## 2020-01-03 NOTE — TELEPHONE ENCOUNTER
She has had sinus congestion, right side more congested, coughing at night especially with yellow sputum. She has been taking mucinex for 3-4 days. No fever. She tried a sudafed today.  She is concerned that her cough is in her chest. I explained that she s

## 2020-01-03 NOTE — TELEPHONE ENCOUNTER
Spoke to patient who had high BP in UC and was told to call PCP. Per Epic was 146/98. She went to Dr. Juan Francisco Guzman yesterday, BP recording in Epic 128/80. She did take Sudafed this morning, has been coughing. Is taking her amlodipine 10mg daily.  She took her BP a

## 2020-01-03 NOTE — ED PROVIDER NOTES
Patient Seen in: [address]    History   Patient presents with:  Nasal Congestion    Stated Complaint: congestion    HPI    Patient is here with complaint of cough congestion sore throat since Monday.   She is concerned b Tabatha Gr MD at [address] [place] [address],[facility] ENDO   • FRACTURE SURGERY Left 2010    WRIST FRACTURE ORIF   • FRACTURE SURGERY Left 2013    ORIF wrist fracture revision with plates and screws- Ortho Dr Nano Maxwell   • HAND GANGLION EXCISION Right 6/22/2018    Performed by Ramses Alicea spasms. Patient taking differently: Take 10 mg by mouth nightly. Fluticasone Propionate (FLONASE) 50 MCG/ACT Nasal Suspension,  by Nasal route daily as needed.  spray 2 spray by intranasal route  every day in each nostril    Multiple Vitamins Oral Tab, were to get worse    ED Course   Labs Reviewed - No data to display     MDM     99% Normal  Pulse oximetry       Disposition and Plan     We recommend that you schedule follow up care with a primary care provider within the next three months to obtain basi

## 2020-01-03 NOTE — TELEPHONE ENCOUNTER
Pt calling back   Went to UC as instructed & was advised to call back re: high blood pressure  Bottom # range  106 - 110

## 2020-01-05 ENCOUNTER — PATIENT MESSAGE (OUTPATIENT)
Dept: NEUROLOGY | Facility: CLINIC | Age: 65
End: 2020-01-05

## 2020-01-06 NOTE — TELEPHONE ENCOUNTER
From: Lazaro Stoner  To: Franco Morgan MD  Sent: 1/5/2020 10:39 AM CST  Subject: Test Results Question    Hi Dr Mady Morgan. I saw my MRI results and of course it meant nothing to me basically.  I did happen to see that it said nothing about a nerve issue, pi

## 2020-01-07 ENCOUNTER — PATIENT OUTREACH (OUTPATIENT)
Dept: CASE MANAGEMENT | Age: 65
End: 2020-01-07

## 2020-01-07 NOTE — TELEPHONE ENCOUNTER
Spoke to pt she is in [facility], it was a virus that needs to run it course she feels she is getting better

## 2020-01-20 ENCOUNTER — OFFICE VISIT (OUTPATIENT)
Dept: PHYSICAL THERAPY | Age: 65
End: 2020-01-20
Attending: PHYSICAL MEDICINE & REHABILITATION
Payer: MEDICARE

## 2020-01-20 PROCEDURE — 97110 THERAPEUTIC EXERCISES: CPT

## 2020-01-20 PROCEDURE — 97140 MANUAL THERAPY 1/> REGIONS: CPT

## 2020-01-20 NOTE — PROGRESS NOTES
Diagnosis: Lumbar radiculopathy (M54.16)  Retrolisthesis (M43.10)  Lumbar foraminal stenosis (M48.061)  Spondylolisthesis of lumbar region (M43.16)  Lumbar disc disease (M51.9)  Other idiopathic scoliosis, thoracolumbar region (M41.25)  History of lumbar f glut/conjoin tendon release     Neuromuscular Re-education        Current HEP:   12/30: piriformis stretches, standing glut sets, sidestepping, clamshells  1/20: self release of conjoin tendon with tennis ball, seated fwd flexion with neutral pelvis and fe

## 2020-01-21 ENCOUNTER — OFFICE VISIT (OUTPATIENT)
Dept: INTERNAL MEDICINE CLINIC | Facility: CLINIC | Age: 65
End: 2020-01-21
Payer: MEDICARE

## 2020-01-21 ENCOUNTER — NURSE ONLY (OUTPATIENT)
Dept: ENDOCRINOLOGY CLINIC | Facility: CLINIC | Age: 65
End: 2020-01-21
Payer: MEDICARE

## 2020-01-21 VITALS
RESPIRATION RATE: 16 BRPM | HEIGHT: 68 IN | BODY MASS INDEX: 20.76 KG/M2 | HEART RATE: 97 BPM | SYSTOLIC BLOOD PRESSURE: 110 MMHG | OXYGEN SATURATION: 98 % | DIASTOLIC BLOOD PRESSURE: 70 MMHG | TEMPERATURE: 99 F | WEIGHT: 137 LBS

## 2020-01-21 DIAGNOSIS — J40 BRONCHITIS: Primary | ICD-10-CM

## 2020-01-21 DIAGNOSIS — M81.8 OTHER OSTEOPOROSIS WITHOUT CURRENT PATHOLOGICAL FRACTURE: Primary | ICD-10-CM

## 2020-01-21 PROCEDURE — 99213 OFFICE O/P EST LOW 20 MIN: CPT | Performed by: INTERNAL MEDICINE

## 2020-01-21 PROCEDURE — G0463 HOSPITAL OUTPT CLINIC VISIT: HCPCS | Performed by: INTERNAL MEDICINE

## 2020-01-21 PROCEDURE — 96372 THER/PROPH/DIAG INJ SC/IM: CPT | Performed by: INTERNAL MEDICINE

## 2020-01-21 RX ORDER — BENZONATATE 100 MG/1
100 CAPSULE ORAL 3 TIMES DAILY PRN
Qty: 30 CAPSULE | Refills: 0 | Status: SHIPPED | OUTPATIENT
Start: 2020-01-21 | End: 2020-02-20

## 2020-01-21 RX ORDER — CEFDINIR 300 MG/1
CAPSULE ORAL
COMMUNITY
Start: 2020-01-12 | End: 2020-01-21 | Stop reason: ALTCHOICE

## 2020-01-21 NOTE — PROGRESS NOTES
Patient presents for Evinity Injection ,tolerated well on L and R upper arm. Patient understands to return in 1 month for next injection. This is patient 2nd /12 injection.

## 2020-01-21 NOTE — PROGRESS NOTES
Kali Stoner is a 71year old female. Patient presents with:  Urgent Care F/u: 1/3/20 for upper respiratory tract infection. Pt went on vacation for 2 weeks in [place] and took Cefdinir for 6 days instead of 7.  Pt continues with cough, sneezing, fatigue an by Nasal route daily as needed. spray 2 spray by intranasal route  every day in each nostril      • Multiple Vitamins Oral Tab Take 1 tablet by mouth daily.  Multiple Vitamins tablet         Past Medical History:   Diagnosis Date   • Anemia    • Anxiety sta Bronchitis  prob viral.  Not better with Cefdinir. Check CXR. Add tessalon perles. - XR CHEST PA + LAT CHEST (CPT=71046); Future        The patient indicates understanding of these issues and agrees to the plan.     Max Birmingham, 01/21/20, 12:53 PM

## 2020-01-22 ENCOUNTER — OFFICE VISIT (OUTPATIENT)
Dept: PHYSICAL THERAPY | Age: 65
End: 2020-01-22
Attending: PHYSICAL MEDICINE & REHABILITATION
Payer: MEDICARE

## 2020-01-22 PROCEDURE — 97140 MANUAL THERAPY 1/> REGIONS: CPT

## 2020-01-22 PROCEDURE — 97110 THERAPEUTIC EXERCISES: CPT

## 2020-01-22 NOTE — PROGRESS NOTES
Diagnosis: Lumbar radiculopathy (M54.16)  Retrolisthesis (M43.10)  Lumbar foraminal stenosis (M48.061)  Spondylolisthesis of lumbar region (M43.16)  Lumbar disc disease (M51.9)  Other idiopathic scoliosis, thoracolumbar region (M41.25)  History of lumbar f glut sets, sidestepping, clamshells  1/20: self release of conjoin tendon with tennis ball, seated fwd flexion with neutral pelvis and feet flat on floor - handout declined  1/22: modified HEP: pt to only perform standing TFL/ITB stretch on R, supine TrA +

## 2020-01-25 ENCOUNTER — HOSPITAL ENCOUNTER (OUTPATIENT)
Dept: GENERAL RADIOLOGY | Age: 65
Discharge: HOME | End: 2020-01-25
Attending: INTERNAL MEDICINE
Payer: MEDICARE

## 2020-01-25 DIAGNOSIS — J40 BRONCHITIS: ICD-10-CM

## 2020-01-25 PROCEDURE — 71046 X-RAY EXAM CHEST 2 VIEWS: CPT | Performed by: INTERNAL MEDICINE

## 2020-01-27 ENCOUNTER — OFFICE VISIT (OUTPATIENT)
Dept: PHYSICAL THERAPY | Age: 65
End: 2020-01-27
Attending: PHYSICAL MEDICINE & REHABILITATION
Payer: MEDICARE

## 2020-01-27 PROCEDURE — 97140 MANUAL THERAPY 1/> REGIONS: CPT

## 2020-01-27 PROCEDURE — 97110 THERAPEUTIC EXERCISES: CPT

## 2020-01-27 NOTE — PROGRESS NOTES
Diagnosis: Lumbar radiculopathy (M54.16)  Retrolisthesis (M43.10)  Lumbar foraminal stenosis (M48.061)  Spondylolisthesis of lumbar region (M43.16)  Lumbar disc disease (M51.9)  Other idiopathic scoliosis, thoracolumbar region (M41.25)  History of lumbar f focus R    Review of use of tennis ball for self release    Review of glut/pirifromis stretches to be performed B   Manual Therapy Prone: STM B lumbar paraspinals, B piriformis release, STM B conjoin tendons    Lsidely: glut/conjoin tendon release Prone: I

## 2020-01-29 ENCOUNTER — OFFICE VISIT (OUTPATIENT)
Dept: PHYSICAL THERAPY | Age: 65
End: 2020-01-29
Attending: PHYSICAL MEDICINE & REHABILITATION
Payer: MEDICARE

## 2020-01-29 ENCOUNTER — TELEPHONE (OUTPATIENT)
Dept: INTERNAL MEDICINE CLINIC | Facility: CLINIC | Age: 65
End: 2020-01-29

## 2020-01-29 PROCEDURE — 97140 MANUAL THERAPY 1/> REGIONS: CPT

## 2020-01-29 NOTE — TELEPHONE ENCOUNTER
Pt is calling to get the results of her xray that was completed on 1/25/2020. Pt requesting that if there is something more alarming that she needs to know sooner, than please call her @ [phone].    Otherwise pt is okay with the results being rele

## 2020-01-29 NOTE — PROGRESS NOTES
Diagnosis: Lumbar radiculopathy (M54.16)  Retrolisthesis (M43.10)  Lumbar foraminal stenosis (M48.061)  Spondylolisthesis of lumbar region (M43.16)  Lumbar disc disease (M51.9)  Other idiopathic scoliosis, thoracolumbar region (M41.25)  History of lumbar f conjoin tendons    Lsidely: glut/conjoin tendon release Prone: IASTM to B thoracolumbar paraspinals and at incision    Lsidely: IASTM to R glutes, QL, iliacus, around PSIS Prone: IASTM to B thoracolumbar paraspinals and at incision    Sidely: IASTM B glute

## 2020-01-31 ENCOUNTER — TELEPHONE (OUTPATIENT)
Dept: ENDOCRINOLOGY CLINIC | Facility: CLINIC | Age: 65
End: 2020-01-31

## 2020-02-05 ENCOUNTER — OFFICE VISIT (OUTPATIENT)
Dept: PHYSICAL THERAPY | Age: 65
End: 2020-02-05
Attending: PHYSICAL MEDICINE & REHABILITATION
Payer: MEDICARE

## 2020-02-05 PROCEDURE — 97140 MANUAL THERAPY 1/> REGIONS: CPT

## 2020-02-05 NOTE — PROGRESS NOTES
Diagnosis: Lumbar radiculopathy (M54.16)  Retrolisthesis (M43.10)  Lumbar foraminal stenosis (M48.061)  Spondylolisthesis of lumbar region (M43.16)  Lumbar disc disease (M51.9)  Other idiopathic scoliosis, thoracolumbar region (M41.25)  History of lumbar f incision    Sidely: IASTM B glutes, piriformis, TFL, ITB Prone: IASTM to B thoracolumbar paraspinals and at incision    Sidely: IASTM B glutes, piriformis, TFL, ITB with added focus on mid-distal ITB   Theract Instruction on use of k-tape; advised pt to re

## 2020-02-06 ENCOUNTER — PATIENT OUTREACH (OUTPATIENT)
Dept: CASE MANAGEMENT | Age: 65
End: 2020-02-06

## 2020-02-07 ENCOUNTER — PATIENT OUTREACH (OUTPATIENT)
Dept: CASE MANAGEMENT | Age: 65
End: 2020-02-07

## 2020-02-07 ENCOUNTER — TELEPHONE (OUTPATIENT)
Dept: ENDOCRINOLOGY CLINIC | Facility: CLINIC | Age: 65
End: 2020-02-07

## 2020-02-07 NOTE — PROGRESS NOTES
Pt called back with additional questions re: CCM program and benefits. Discussed with pt in detail as well as program overview sent via [employer] message. Pt will follow up next week.

## 2020-02-10 ENCOUNTER — APPOINTMENT (OUTPATIENT)
Dept: PHYSICAL THERAPY | Age: 65
End: 2020-02-10
Attending: PHYSICAL MEDICINE & REHABILITATION
Payer: MEDICARE

## 2020-02-10 NOTE — TELEPHONE ENCOUNTER
Pt. Is having vertigo symptoms she would like a Rx for Meclizine ph. # [phone]  [facility] ph. # [phone] pt.  Is aware Dr. Camelia Arreaga is off and would like on-call to assist routed to Rx

## 2020-02-11 RX ORDER — MECLIZINE HYDROCHLORIDE 25 MG/1
25 TABLET ORAL 3 TIMES DAILY PRN
Qty: 30 TABLET | Refills: 1 | Status: SHIPPED | OUTPATIENT
Start: 2020-02-11

## 2020-02-14 ENCOUNTER — PATIENT OUTREACH (OUTPATIENT)
Dept: CASE MANAGEMENT | Age: 65
End: 2020-02-14
Payer: MEDICARE

## 2020-02-14 DIAGNOSIS — M19.022 PRIMARY OSTEOARTHRITIS OF LEFT ELBOW: ICD-10-CM

## 2020-02-14 DIAGNOSIS — I10 ESSENTIAL HYPERTENSION: ICD-10-CM

## 2020-02-14 DIAGNOSIS — M85.80 OSTEOPENIA, UNSPECIFIED LOCATION: ICD-10-CM

## 2020-02-14 DIAGNOSIS — M54.42 ACUTE MIDLINE LOW BACK PAIN WITH LEFT-SIDED SCIATICA: ICD-10-CM

## 2020-02-14 DIAGNOSIS — N32.89 BLADDER MASS: ICD-10-CM

## 2020-02-14 DIAGNOSIS — H81.10 BENIGN PAROXYSMAL POSITIONAL VERTIGO, UNSPECIFIED LATERALITY: ICD-10-CM

## 2020-02-14 DIAGNOSIS — G47.00 INSOMNIA, UNSPECIFIED TYPE: ICD-10-CM

## 2020-02-14 DIAGNOSIS — I95.9 HYPOTENSION, UNSPECIFIED HYPOTENSION TYPE: ICD-10-CM

## 2020-02-14 DIAGNOSIS — M54.12 CERVICAL RADICULITIS: ICD-10-CM

## 2020-02-14 DIAGNOSIS — M48.02 CERVICAL SPINAL STENOSIS: ICD-10-CM

## 2020-02-14 NOTE — PROGRESS NOTES
I want to know a little about you. • What do you do for fun? Golf, walk, ride bike, travel, garden. • Any hobbies? What Hobbies? Decorate and garden  • What do you do for work?  Retired - worked in [employer] for 18 yrs as well as part time sub [employer] enough  • Any special diet you are put on?  (cardiac, Dm diet, etc) No  o Do you have any barriers to keeping with this diet? n/a  o Can you tell me why you think you were put on this diet? n/a  o Would you like more info n/a  o What are concerns or things We will continue to work on what will help you meet your needs. Care manager f/up:  Work on for next time: Eating habits, going to the gym, stress management.   Resources needed: No  Care gaps? :  None    Spoke to Thaddeus Quiroz at [employer] about CCM, current car [facility] [employer]  [address] [place] [address]  [phone] [facility] [address] Left, [facility]  [address] [facility]  [address] [address] [phone]    [facility]

## 2020-02-20 ENCOUNTER — OFFICE VISIT (OUTPATIENT)
Dept: INTERNAL MEDICINE CLINIC | Facility: CLINIC | Age: 65
End: 2020-02-20
Payer: MEDICARE

## 2020-02-20 VITALS
HEIGHT: 67.3 IN | WEIGHT: 139 LBS | OXYGEN SATURATION: 98 % | SYSTOLIC BLOOD PRESSURE: 132 MMHG | DIASTOLIC BLOOD PRESSURE: 90 MMHG | TEMPERATURE: 98 F | BODY MASS INDEX: 21.56 KG/M2 | HEART RATE: 87 BPM

## 2020-02-20 DIAGNOSIS — Z80.0 FAMILY HISTORY OF COLON CANCER: ICD-10-CM

## 2020-02-20 DIAGNOSIS — Z12.31 ENCOUNTER FOR SCREENING MAMMOGRAM FOR BREAST CANCER: ICD-10-CM

## 2020-02-20 DIAGNOSIS — K21.9 GASTROESOPHAGEAL REFLUX DISEASE WITHOUT ESOPHAGITIS: ICD-10-CM

## 2020-02-20 DIAGNOSIS — I10 ESSENTIAL HYPERTENSION: ICD-10-CM

## 2020-02-20 DIAGNOSIS — Z00.00 ROUTINE HEALTH MAINTENANCE: ICD-10-CM

## 2020-02-20 DIAGNOSIS — N90.7 LABIAL CYST: ICD-10-CM

## 2020-02-20 DIAGNOSIS — R92.2 DENSE BREASTS: ICD-10-CM

## 2020-02-20 DIAGNOSIS — M85.80 OSTEOPENIA, UNSPECIFIED LOCATION: Primary | ICD-10-CM

## 2020-02-20 PROCEDURE — G0009 ADMIN PNEUMOCOCCAL VACCINE: HCPCS | Performed by: INTERNAL MEDICINE

## 2020-02-20 PROCEDURE — 99214 OFFICE O/P EST MOD 30 MIN: CPT | Performed by: INTERNAL MEDICINE

## 2020-02-20 PROCEDURE — G0463 HOSPITAL OUTPT CLINIC VISIT: HCPCS | Performed by: INTERNAL MEDICINE

## 2020-02-20 PROCEDURE — 90670 PCV13 VACCINE IM: CPT | Performed by: INTERNAL MEDICINE

## 2020-02-20 NOTE — PROGRESS NOTES
Colton Stoner is a 71year old female. Patient presents with:  Physical: Last mammogram/Dexa April 2019, Pap March 2018. Patient reports a possible growth in groin area that has been there for months.    Fatigue: Patient states she is so tired she \"can't st hours as needed for Pain. • Cyclobenzaprine HCl 10 MG Oral Tab Take 1 tablet (10 mg total) by mouth every 8 (eight) hours as needed for Muscle spasms.  (Patient taking differently: Take 10 mg by mouth nightly.  ) 60 tablet 0   • Fluticasone Propionate ( revision with plates and screws- Ortho Dr Marisol Shaw   • [address] Right 6/22/2018    Performed by Angelique Rodríguez MD at [facility] OR   • LASIK     • NASAL REMOVAL FOREIGN BODY Left 3/6/2019    Performed by Nicole Campuzano MD at [facility] OR   • are pink  NECK: supple, no cervical or supraclavicular LAD, no carotid bruits  BREAST: no dominant or suspicious mass, no axillary LAD  LUNGS: clear to auscultation  CARDIO: RRR, normal S1S2, no gallops or murmurs  GI: soft, mild epigastric tenderness, ND,

## 2020-02-25 ENCOUNTER — NURSE ONLY (OUTPATIENT)
Dept: ENDOCRINOLOGY CLINIC | Facility: CLINIC | Age: 65
End: 2020-02-25
Payer: MEDICARE

## 2020-02-25 DIAGNOSIS — M81.8 OTHER OSTEOPOROSIS WITHOUT CURRENT PATHOLOGICAL FRACTURE: Primary | ICD-10-CM

## 2020-02-25 PROCEDURE — 96372 THER/PROPH/DIAG INJ SC/IM: CPT | Performed by: INTERNAL MEDICINE

## 2020-02-25 NOTE — PROGRESS NOTES
Patient here for Evinity injections. Patient tolerated Evinity injections well to Left and Right upper arms. Patient encouraged to schedule next dose for 31 days from today before leaving office today .

## 2020-02-27 ENCOUNTER — TELEPHONE (OUTPATIENT)
Dept: NEUROLOGY | Facility: CLINIC | Age: 65
End: 2020-02-27

## 2020-02-27 NOTE — TELEPHONE ENCOUNTER
Pt calling wanting clarification as to which PT orders she she do with her PT since she received PT orders from both Dr. Moody Ko and her back surgeon, please advise

## 2020-02-27 NOTE — TELEPHONE ENCOUNTER
S/w pt who states she talked to [facility] and together they decided they could combine the physical therapy orders from both physicians as most exercises overlap.  Pt states she will discuss the extension of PT w/ Dr. Shanice Garcia when she sees him in the office ne

## 2020-02-29 ENCOUNTER — APPOINTMENT (OUTPATIENT)
Dept: LAB | Facility: HOSPITAL | Age: 65
End: 2020-02-29
Attending: INTERNAL MEDICINE
Payer: MEDICARE

## 2020-02-29 DIAGNOSIS — I10 ESSENTIAL HYPERTENSION: ICD-10-CM

## 2020-02-29 LAB
ALBUMIN SERPL-MCNC: 3.7 G/DL (ref 3.4–5)
ALBUMIN/GLOB SERPL: 1.1 {RATIO} (ref 1–2)
ALP LIVER SERPL-CCNC: 64 U/L (ref 50–130)
ALT SERPL-CCNC: 35 U/L (ref 13–56)
ANION GAP SERPL CALC-SCNC: 4 MMOL/L (ref 0–18)
AST SERPL-CCNC: 18 U/L (ref 15–37)
BILIRUB SERPL-MCNC: 0.4 MG/DL (ref 0.1–2)
BUN BLD-MCNC: 17 MG/DL (ref 7–18)
BUN/CREAT SERPL: 23.3 (ref 10–20)
CALCIUM BLD-MCNC: 8.4 MG/DL (ref 8.5–10.1)
CHLORIDE SERPL-SCNC: 111 MMOL/L (ref 98–112)
CHOLEST SMN-MCNC: 199 MG/DL (ref ?–200)
CO2 SERPL-SCNC: 28 MMOL/L (ref 21–32)
CREAT BLD-MCNC: 0.73 MG/DL (ref 0.55–1.02)
GLOBULIN PLAS-MCNC: 3.4 G/DL (ref 2.8–4.4)
GLUCOSE BLD-MCNC: 79 MG/DL (ref 70–99)
HDLC SERPL-MCNC: 88 MG/DL (ref 40–59)
LDLC SERPL CALC-MCNC: 102 MG/DL (ref ?–100)
M PROTEIN MFR SERPL ELPH: 7.1 G/DL (ref 6.4–8.2)
NONHDLC SERPL-MCNC: 111 MG/DL (ref ?–130)
OSMOLALITY SERPL CALC.SUM OF ELEC: 296 MOSM/KG (ref 275–295)
PATIENT FASTING Y/N/NP: YES
PATIENT FASTING Y/N/NP: YES
POTASSIUM SERPL-SCNC: 4 MMOL/L (ref 3.5–5.1)
SODIUM SERPL-SCNC: 143 MMOL/L (ref 136–145)
TRIGL SERPL-MCNC: 46 MG/DL (ref 30–149)
TSI SER-ACNC: 2 MIU/ML (ref 0.36–3.74)
VLDLC SERPL CALC-MCNC: 9 MG/DL (ref 0–30)

## 2020-02-29 PROCEDURE — 84443 ASSAY THYROID STIM HORMONE: CPT | Performed by: INTERNAL MEDICINE

## 2020-02-29 PROCEDURE — 36415 COLL VENOUS BLD VENIPUNCTURE: CPT

## 2020-02-29 PROCEDURE — 80061 LIPID PANEL: CPT

## 2020-02-29 PROCEDURE — 80053 COMPREHEN METABOLIC PANEL: CPT

## 2020-02-29 PROCEDURE — 99490 CHRNC CARE MGMT STAFF 1ST 20: CPT

## 2020-02-29 PROCEDURE — G2058 CCM ADD 20MIN: HCPCS

## 2020-03-04 ENCOUNTER — OFFICE VISIT (OUTPATIENT)
Dept: PHYSICAL THERAPY | Age: 65
End: 2020-03-04
Attending: PHYSICAL MEDICINE & REHABILITATION
Payer: MEDICARE

## 2020-03-04 PROCEDURE — 97110 THERAPEUTIC EXERCISES: CPT

## 2020-03-04 PROCEDURE — 97035 APP MDLTY 1+ULTRASOUND EA 15: CPT

## 2020-03-04 PROCEDURE — 97530 THERAPEUTIC ACTIVITIES: CPT

## 2020-03-04 NOTE — PROGRESS NOTES
ProgressSummary  Pt has attended 7 visits in Physical Therapy.      Diagnosis: Lumbar radiculopathy (M54.16)  Retrolisthesis (M43.10)  Lumbar foraminal stenosis (M48.061)  Spondylolisthesis of lumbar region (M43.16)  Lumbar disc disease (M51.9)  Other idi sit<>stand and return to stand from forward flexion. Better with return to upright sitting from seated flexion. FRSL at L4 and L5 with notably decreased mobility.  Good sacral positioning and mobility. (+) SUZIE, FADDIR and hip scour on R suggest possible h return to stand - in progress  3. Pt will demo improved core and pelvic stability and strength to at least 4+/5 for improved stability with functional mobility and decreased overloading of hip abductors and ITB - in progress  4.  Pt will be able to tolerate added focus on mid-distal ITB   Theract Instruction on use of k-tape; advised pt to remove if skin irritation of increase in symptoms Review of current HEP    Discussion of heat vs ice modality for pain    Discussion of TENS unit - advised pt she may use b

## 2020-03-05 ENCOUNTER — OFFICE VISIT (OUTPATIENT)
Dept: NEUROLOGY | Facility: CLINIC | Age: 65
End: 2020-03-05
Payer: MEDICARE

## 2020-03-05 VITALS — DIASTOLIC BLOOD PRESSURE: 68 MMHG | HEART RATE: 90 BPM | RESPIRATION RATE: 16 BRPM | SYSTOLIC BLOOD PRESSURE: 130 MMHG

## 2020-03-05 DIAGNOSIS — M43.16 SPONDYLOLISTHESIS OF LUMBAR REGION: ICD-10-CM

## 2020-03-05 DIAGNOSIS — Z98.1 HISTORY OF LUMBAR FUSION: ICD-10-CM

## 2020-03-05 DIAGNOSIS — M43.10 RETROLISTHESIS: ICD-10-CM

## 2020-03-05 DIAGNOSIS — M41.25 OTHER IDIOPATHIC SCOLIOSIS, THORACOLUMBAR REGION: ICD-10-CM

## 2020-03-05 DIAGNOSIS — M48.061 LUMBAR FORAMINAL STENOSIS: ICD-10-CM

## 2020-03-05 DIAGNOSIS — M51.9 LUMBAR DISC DISEASE: ICD-10-CM

## 2020-03-05 DIAGNOSIS — M54.16 LUMBAR RADICULOPATHY: Primary | ICD-10-CM

## 2020-03-05 DIAGNOSIS — Z98.890 HISTORY OF LUMBAR LAMINECTOMY: ICD-10-CM

## 2020-03-05 PROCEDURE — 99214 OFFICE O/P EST MOD 30 MIN: CPT | Performed by: PHYSICAL MEDICINE & REHABILITATION

## 2020-03-05 NOTE — PROGRESS NOTES
Low Back Pain H & P    Chief Complaint: Patient presents with:  Low Back Pain: pt here for follow up to review L-Spine MRI done 1/2020. pt c/o severe right side low back pain radiating into the right buttocks down the outer aspect of the right thigh.  Abbie Dominguez CERVICAL SPINAL STENOSIS   • Broken foot 03-    RT - casting. Fracture 5th met. Cast removl/xray foot 4-20-12. Follow up fracture right foot 5-22-12.     • Calculus of kidney 2008   • Depression    • Esophageal reflux    • Essential hypertension    • History   Family History   Problem Relation Age of Onset   • Pulmonary Disease Father    • Heart Disease Mother    • Colon Cancer Maternal Grandfather    • Cancer Maternal Grandfather    • Polyps Sister         colon polyps   • Colon Cancer Sister    • Brandy Mane Caffeine Concern: No          Coffee 2 cups daily        Occupational Exposure: Not Asked        Hobby Hazards: Not Asked        Sleep Concern: Not Asked        Stress Concern: Not Asked        Weight Concern: Not Asked        Special Diet: Not Asked response   LEFT plantar reflexes: downward response   Reflexes: 2+ in bilateral lower extremities     Neural Tension Tests Lumbar Spine:  Sitting straight leg raise-RIGHT Negative for pain   Sitting straight leg raise-LEFT Negative for pain   Supine straig treatment. The patient understands and agrees with the stated plan. Carmenza Becerril MD  3/5/2020

## 2020-03-05 NOTE — PATIENT INSTRUCTIONS
Plan  She will get lumbar spine flexion and extension x-rays. She will continue with the PT for the lumbar spine. I told her that this is key to her recovery. The patient does not need any injections at this time.     The patient will continue with

## 2020-03-06 ENCOUNTER — HOSPITAL ENCOUNTER (OUTPATIENT)
Dept: GENERAL RADIOLOGY | Age: 65
Discharge: HOME | End: 2020-03-06
Attending: PHYSICAL MEDICINE & REHABILITATION
Payer: MEDICARE

## 2020-03-06 ENCOUNTER — OFFICE VISIT (OUTPATIENT)
Dept: PHYSICAL THERAPY | Age: 65
End: 2020-03-06
Attending: PHYSICAL MEDICINE & REHABILITATION
Payer: MEDICARE

## 2020-03-06 DIAGNOSIS — M54.16 LUMBAR RADICULOPATHY: ICD-10-CM

## 2020-03-06 DIAGNOSIS — M43.16 SPONDYLOLISTHESIS OF LUMBAR REGION: ICD-10-CM

## 2020-03-06 PROCEDURE — 97110 THERAPEUTIC EXERCISES: CPT

## 2020-03-06 PROCEDURE — 72110 X-RAY EXAM L-2 SPINE 4/>VWS: CPT | Performed by: PHYSICAL MEDICINE & REHABILITATION

## 2020-03-06 PROCEDURE — 97112 NEUROMUSCULAR REEDUCATION: CPT

## 2020-03-06 NOTE — PROGRESS NOTES
Diagnosis: Lumbar radiculopathy (M54.16)  Retrolisthesis (M43.10)  Lumbar foraminal stenosis (M48.061)  Spondylolisthesis of lumbar region (M43.16)  Lumbar disc disease (M51.9)  Other idiopathic scoliosis, thoracolumbar region (M41.25)  History of lumbar f improved stability with functional mobility and decreased overloading of hip abductors and ITB - in progress  4. Pt will be able to tolerate prolonged sitting >30mins - in progress             Date: 3/6/20  Tx #: 8 Date: Tx#:  Date: Tx#:  Date:    Tx#:

## 2020-03-09 ENCOUNTER — TELEPHONE (OUTPATIENT)
Dept: NEUROLOGY | Facility: CLINIC | Age: 65
End: 2020-03-09

## 2020-03-09 NOTE — TELEPHONE ENCOUNTER
Patient notified of the imaging results and plan as documented by Dr. Dorcas Cantu. Patient verbalized understanding without further questions.

## 2020-03-09 NOTE — TELEPHONE ENCOUNTER
----- Message from Cheryl Clifton MD sent at 3/7/2020  9:47 AM CST -----  She still has the extra motion at the L4-5 and L5-S1 levels. I spoke to her PT about working on stabilizing these levels.

## 2020-03-11 ENCOUNTER — OFFICE VISIT (OUTPATIENT)
Dept: PHYSICAL THERAPY | Age: 65
End: 2020-03-11
Attending: PHYSICAL MEDICINE & REHABILITATION
Payer: MEDICARE

## 2020-03-11 PROCEDURE — 97112 NEUROMUSCULAR REEDUCATION: CPT

## 2020-03-11 NOTE — PROGRESS NOTES
Diagnosis: Lumbar radiculopathy (M54.16)  Retrolisthesis (M43.10)  Lumbar foraminal stenosis (M48.061)  Spondylolisthesis of lumbar region (M43.16)  Lumbar disc disease (M51.9)  Other idiopathic scoliosis, thoracolumbar region (M41.25)  History of lumbar f 3/11/20  Tx#: 9 Date: Tx#:  Date:    Tx#:    Therapeutic exercises Supine TrA [id number]    Supine TrA + hip adduction with ball [id number]    Supine TrA + BKFO alternating 10x R/L    Supine TrA + heel slide 10x R/L       Neuro Re-ed TrA retraining with biofeedb

## 2020-03-13 ENCOUNTER — PATIENT OUTREACH (OUTPATIENT)
Dept: CASE MANAGEMENT | Age: 65
End: 2020-03-13
Payer: MEDICARE

## 2020-03-13 DIAGNOSIS — G47.00 INSOMNIA, UNSPECIFIED TYPE: ICD-10-CM

## 2020-03-13 DIAGNOSIS — M85.80 OSTEOPENIA, UNSPECIFIED LOCATION: ICD-10-CM

## 2020-03-13 DIAGNOSIS — N32.89 BLADDER MASS: ICD-10-CM

## 2020-03-13 DIAGNOSIS — M19.022 PRIMARY OSTEOARTHRITIS OF LEFT ELBOW: ICD-10-CM

## 2020-03-13 DIAGNOSIS — I10 ESSENTIAL HYPERTENSION: ICD-10-CM

## 2020-03-13 DIAGNOSIS — I95.9 HYPOTENSION, UNSPECIFIED HYPOTENSION TYPE: ICD-10-CM

## 2020-03-13 NOTE — PROGRESS NOTES
3/13/2020  Spoke to Esther Martell for CCM.       Updates to patient care team/ comments: None  Patient reported changes in medications: None  Med Adherence  Comment: Taking as directed     Health Maintenance: Reviewed  DEXA Scan due on 10/05/2019  Annual Physical and or barriers towards patients goals. Care managers interventions: Advised pt on foods to lower cholesterol. Encouraged activity as tolerated. Discussed staying in if not feeling well. Advised to call prn.    Time Spent This Encounter Total: 23 min me

## 2020-03-16 ENCOUNTER — APPOINTMENT (OUTPATIENT)
Dept: PHYSICAL THERAPY | Age: 65
End: 2020-03-16
Attending: PHYSICAL MEDICINE & REHABILITATION
Payer: MEDICARE

## 2020-03-19 ENCOUNTER — TELEPHONE (OUTPATIENT)
Dept: PHYSICAL THERAPY | Age: 65
End: 2020-03-19

## 2020-03-19 NOTE — TELEPHONE ENCOUNTER
Contacted pt to discuss department's initiative to protect all non-essential and high risk patients from COVID-19 exposure.  Discussed recommendations relative to pt's home program. Explained that the clinic is cancelling visits for the next two weeks with

## 2020-03-20 ENCOUNTER — TELEPHONE (OUTPATIENT)
Dept: INTERNAL MEDICINE CLINIC | Facility: CLINIC | Age: 65
End: 2020-03-20

## 2020-03-20 ENCOUNTER — APPOINTMENT (OUTPATIENT)
Dept: PHYSICAL THERAPY | Age: 65
End: 2020-03-20
Attending: PHYSICAL MEDICINE & REHABILITATION
Payer: MEDICARE

## 2020-03-20 NOTE — TELEPHONE ENCOUNTER
Spoke to patient who reports she has had a cough since January. She took a z-ariana and cefdinir in January with no improvement while she was in [place]. She notes she also has had 2 chest x-rays.  She feels \"overall OK\" but the cough is still really botheri

## 2020-03-20 NOTE — TELEPHONE ENCOUNTER
To nursing,   To decrease postnasal drip which can cause a cough, recommend   1) switching Flonase nasal spray to over-the-counter Nasacort nasal spray 2 sprays each nostril daily. 2) over-the-counter antihistamine such as Allegra 180 mg daily.   3) over-t

## 2020-03-20 NOTE — TELEPHONE ENCOUNTER
Patient has a cough that is pretty constant, mostly in the AM.  Been lingering since January & was put on medication while in [facility]. She has been using Mucinex. Feels drainage & sputum seems clear. No temperature, was 96.0 today.   Sometimes she feels there

## 2020-03-20 NOTE — TELEPHONE ENCOUNTER
Spoke to patient and relayed MD message and instructions, patient verbalizes understanding and agrees with plan.     To [employer]-- patient is concerned about taking Nasacort as she was told not to use any decongestants with her HTN and being on blood pressure

## 2020-03-23 ENCOUNTER — TELEPHONE (OUTPATIENT)
Dept: ENDOCRINOLOGY CLINIC | Facility: CLINIC | Age: 65
End: 2020-03-23

## 2020-03-23 NOTE — TELEPHONE ENCOUNTER
LMTCB    Per , please book pt April 2nd for f/u apt w/ SH and Evenity injection. OK to overbook.  (Pt has apt on Friday 3/27/20 for this which needs to be rescheduled per [facility]- please cancel and reschedule per [facility])

## 2020-03-24 ENCOUNTER — APPOINTMENT (OUTPATIENT)
Dept: PHYSICAL THERAPY | Age: 65
End: 2020-03-24
Attending: PHYSICAL MEDICINE & REHABILITATION
Payer: MEDICARE

## 2020-03-25 ENCOUNTER — TELEPHONE (OUTPATIENT)
Dept: PHYSICAL THERAPY | Age: 65
End: 2020-03-25

## 2020-03-25 NOTE — TELEPHONE ENCOUNTER
Contacted pt to discuss department's initiative to protect all non-essential and high risk patients from COVID-19 exposure.  Discussed recommendations relative to pt's home program. Advised pt she may email me with questions/concerns and will maintain commu

## 2020-03-26 ENCOUNTER — APPOINTMENT (OUTPATIENT)
Dept: PHYSICAL THERAPY | Age: 65
End: 2020-03-26
Attending: PHYSICAL MEDICINE & REHABILITATION
Payer: MEDICARE

## 2020-03-31 PROCEDURE — 99490 CHRNC CARE MGMT STAFF 1ST 20: CPT

## 2020-04-01 ENCOUNTER — APPOINTMENT (OUTPATIENT)
Dept: PHYSICAL THERAPY | Age: 65
End: 2020-04-01
Attending: PHYSICAL MEDICINE & REHABILITATION
Payer: MEDICARE

## 2020-04-02 ENCOUNTER — OFFICE VISIT (OUTPATIENT)
Dept: ENDOCRINOLOGY CLINIC | Facility: CLINIC | Age: 65
End: 2020-04-02
Payer: MEDICARE

## 2020-04-02 ENCOUNTER — APPOINTMENT (OUTPATIENT)
Dept: LAB | Facility: HOSPITAL | Age: 65
End: 2020-04-02
Attending: INTERNAL MEDICINE
Payer: MEDICARE

## 2020-04-02 VITALS
WEIGHT: 141.19 LBS | DIASTOLIC BLOOD PRESSURE: 83 MMHG | HEART RATE: 86 BPM | SYSTOLIC BLOOD PRESSURE: 124 MMHG | BODY MASS INDEX: 22 KG/M2

## 2020-04-02 DIAGNOSIS — M81.8 OTHER OSTEOPOROSIS WITHOUT CURRENT PATHOLOGICAL FRACTURE: Primary | ICD-10-CM

## 2020-04-02 DIAGNOSIS — M81.8 OTHER OSTEOPOROSIS WITHOUT CURRENT PATHOLOGICAL FRACTURE: ICD-10-CM

## 2020-04-02 PROCEDURE — 82306 VITAMIN D 25 HYDROXY: CPT

## 2020-04-02 PROCEDURE — 96372 THER/PROPH/DIAG INJ SC/IM: CPT | Performed by: INTERNAL MEDICINE

## 2020-04-02 PROCEDURE — 36415 COLL VENOUS BLD VENIPUNCTURE: CPT

## 2020-04-02 PROCEDURE — 82523 COLLAGEN CROSSLINKS: CPT

## 2020-04-02 PROCEDURE — 99213 OFFICE O/P EST LOW 20 MIN: CPT | Performed by: INTERNAL MEDICINE

## 2020-04-02 PROCEDURE — 80048 BASIC METABOLIC PNL TOTAL CA: CPT

## 2020-04-02 PROCEDURE — 83970 ASSAY OF PARATHORMONE: CPT

## 2020-04-02 PROCEDURE — 84080 ASSAY ALKALINE PHOSPHATASES: CPT

## 2020-04-02 NOTE — PROGRESS NOTES
Name: Maria Esther Stoner  Date: 4/2/2020    Referring Physician: No ref. provider found    Patient presents with: Follow - Up: Evenity      HISTORY OF PRESENT ILLNESS   Oscar Stoner is a 71year old female who presents for osteoporosis.      She was initially lower leg which is currently being evaluated by ortho. Her xrays demonstrated shifting of the vertebrae and discussing option of fusion. REVIEW OF SYSTEMS  Eyes: no change in vision  Neurologic: no headache, generalized or focal weakness or numbness. nostril , Disp: , Rfl:      Allergies:     Aleve                   NAUSEA AND VOMITING  Levaquin [Levofloxa*    RASH, DIZZINESS  Codeine                 NAUSEA AND VOMITING  Hydrocodone             NAUSEA AND VOMITING  Morphine                NAUSEA AND VO Traumatic arthritis    • Vertigo        Surgical history:   Past Surgical History:   Procedure Laterality Date   • ANTERIOR CERVICAL FUSION BG & INST 1 LEVEL N/A 9/26/2017    Performed by Tianna Escobar MD at [facility] OR   • ARTHROSCOPY OF JOINT UNLISTED Nails:  normal scalp hair     Neuro:  sensory grossly intact and motor grossly intact  Psychiatric:  oriented to time, self, and place  Nutritional:  no abnormal weight gain or loss    ASSESSMENT/PLAN:    1.  Osteoporosis  - Discussed diagnosis with patient

## 2020-04-02 NOTE — PROGRESS NOTES
Patient received and tolerated Evenity injection 210 mg to the right and left upper arm subcutaneously. Patient will return to clinic after 30 days for the next injection.

## 2020-04-03 ENCOUNTER — APPOINTMENT (OUTPATIENT)
Dept: PHYSICAL THERAPY | Age: 65
End: 2020-04-03
Attending: PHYSICAL MEDICINE & REHABILITATION
Payer: MEDICARE

## 2020-04-07 ENCOUNTER — APPOINTMENT (OUTPATIENT)
Dept: PHYSICAL THERAPY | Age: 65
End: 2020-04-07
Attending: PHYSICAL MEDICINE & REHABILITATION
Payer: MEDICARE

## 2020-04-08 ENCOUNTER — TELEPHONE (OUTPATIENT)
Dept: INTERNAL MEDICINE CLINIC | Facility: CLINIC | Age: 65
End: 2020-04-08

## 2020-04-08 DIAGNOSIS — R05.9 COUGH: ICD-10-CM

## 2020-04-08 DIAGNOSIS — J30.9 ALLERGIC RHINITIS, UNSPECIFIED SEASONALITY, UNSPECIFIED TRIGGER: Primary | ICD-10-CM

## 2020-04-08 PROCEDURE — 99442 PHONE E/M BY PHYS 11-20 MIN: CPT | Performed by: INTERNAL MEDICINE

## 2020-04-08 NOTE — TELEPHONE ENCOUNTER
Virtual/Telephone Check-In    Lee Lujan A Day verbally consents to a Virtual/Telephone Check-In service on 04/08/20. Patient understands and accepts financial responsibility for any deductible, co-insurance and/or co-pays associated with this service.     Du

## 2020-04-08 NOTE — TELEPHONE ENCOUNTER
Pt contacted  Pt consents to a telephone visit  Please call pt [phone]  Tasked to Dr Francesco Lares

## 2020-04-08 NOTE — TELEPHONE ENCOUNTER
Please call pt, cannot stop coughing, has had this cough since January every morning (for about an hour), so bad,  little bits during the day, little bit at night when she lies down  This morning pt took Mucinex for cough, can she take Allegra too?   Pt alw

## 2020-04-08 NOTE — TELEPHONE ENCOUNTER
Respiratory infection triage:    Fever:  [x]  No fever  []  Fever>100.4    Cough:  [] Tight cough  [] Cough with exertion  [x] Dry cough   [] Sputum production,   Breathing:  [x] Mild shortness of breath interfering with activity  [x] Wheezing \" gurgling\

## 2020-04-10 ENCOUNTER — APPOINTMENT (OUTPATIENT)
Dept: PHYSICAL THERAPY | Age: 65
End: 2020-04-10
Attending: PHYSICAL MEDICINE & REHABILITATION
Payer: MEDICARE

## 2020-04-14 ENCOUNTER — APPOINTMENT (OUTPATIENT)
Dept: PHYSICAL THERAPY | Age: 65
End: 2020-04-14
Attending: PHYSICAL MEDICINE & REHABILITATION
Payer: MEDICARE

## 2020-04-16 ENCOUNTER — APPOINTMENT (OUTPATIENT)
Dept: PHYSICAL THERAPY | Age: 65
End: 2020-04-16
Attending: PHYSICAL MEDICINE & REHABILITATION
Payer: MEDICARE

## 2020-04-21 ENCOUNTER — APPOINTMENT (OUTPATIENT)
Dept: PHYSICAL THERAPY | Age: 65
End: 2020-04-21
Attending: PHYSICAL MEDICINE & REHABILITATION
Payer: MEDICARE

## 2020-04-23 ENCOUNTER — VIRTUAL PHONE E/M (OUTPATIENT)
Dept: NEUROLOGY | Facility: CLINIC | Age: 65
End: 2020-04-23
Payer: MEDICARE

## 2020-04-23 DIAGNOSIS — G56.21 ULNAR NEUROPATHY AT ELBOW OF RIGHT UPPER EXTREMITY: ICD-10-CM

## 2020-04-23 DIAGNOSIS — M50.90 CERVICAL DISC DISEASE: ICD-10-CM

## 2020-04-23 DIAGNOSIS — M43.16 SPONDYLOLISTHESIS OF LUMBAR REGION: ICD-10-CM

## 2020-04-23 DIAGNOSIS — M51.9 LUMBAR DISC DISEASE: ICD-10-CM

## 2020-04-23 DIAGNOSIS — M50.20 CERVICAL HERNIATED DISC: ICD-10-CM

## 2020-04-23 DIAGNOSIS — Z98.1 HISTORY OF LUMBAR FUSION: ICD-10-CM

## 2020-04-23 DIAGNOSIS — M54.16 LUMBAR RADICULOPATHY: Primary | ICD-10-CM

## 2020-04-23 DIAGNOSIS — Z98.890 HISTORY OF LUMBAR LAMINECTOMY: ICD-10-CM

## 2020-04-23 DIAGNOSIS — M54.2 NECK PAIN: ICD-10-CM

## 2020-04-23 DIAGNOSIS — M54.12 CERVICAL RADICULITIS: ICD-10-CM

## 2020-04-23 DIAGNOSIS — M41.25 OTHER IDIOPATHIC SCOLIOSIS, THORACOLUMBAR REGION: ICD-10-CM

## 2020-04-23 DIAGNOSIS — M48.061 LUMBAR FORAMINAL STENOSIS: ICD-10-CM

## 2020-04-23 DIAGNOSIS — M48.02 CERVICAL SPINAL STENOSIS: ICD-10-CM

## 2020-04-23 DIAGNOSIS — M54.12 CERVICAL RADICULOPATHY: ICD-10-CM

## 2020-04-23 DIAGNOSIS — M43.10 RETROLISTHESIS: ICD-10-CM

## 2020-04-23 PROCEDURE — 99441 PHONE E/M BY PHYS 5-10 MIN: CPT | Performed by: PHYSICAL MEDICINE & REHABILITATION

## 2020-04-23 NOTE — PROGRESS NOTES
HPI:    Patient ID: Armin Stoner is a 71year old female.     Armin Stoner verbally consents to a Virtual/Telephone Check-In visit on 04/23/20    Patient understands and accepts financial responsibility for any deductible, co-insurance and/or co-pays asso mg by mouth every 6 (six) hours as needed for Pain. • Cyclobenzaprine HCl 10 MG Oral Tab Take 1 tablet (10 mg total) by mouth every 8 (eight) hours as needed for Muscle spasms.  (Patient taking differently: Take 10 mg by mouth nightly.  ) 60 tablet 0 Junior Mullins as scheduled on 5/8/2020 and I will see what recommendations he has for her for further injections. She will follow up with me after she has had this appointment so that I can re-evaluate her for further injections.     The patient will continue wit

## 2020-04-27 ENCOUNTER — PATIENT OUTREACH (OUTPATIENT)
Dept: CASE MANAGEMENT | Age: 65
End: 2020-04-27

## 2020-05-04 ENCOUNTER — NURSE ONLY (OUTPATIENT)
Dept: ENDOCRINOLOGY CLINIC | Facility: CLINIC | Age: 65
End: 2020-05-04
Payer: MEDICARE

## 2020-05-04 DIAGNOSIS — M81.8 OTHER OSTEOPOROSIS WITHOUT CURRENT PATHOLOGICAL FRACTURE: Primary | ICD-10-CM

## 2020-05-04 PROCEDURE — 96372 THER/PROPH/DIAG INJ SC/IM: CPT | Performed by: INTERNAL MEDICINE

## 2020-05-05 ENCOUNTER — TELEPHONE (OUTPATIENT)
Dept: NEUROLOGY | Facility: CLINIC | Age: 65
End: 2020-05-05

## 2020-05-06 NOTE — TELEPHONE ENCOUNTER
Spoke to patient. She had an appointment with Dr. Akash Ahmadi yesterday. He was not able to see which injections she has had. She requested that procedure notes be faxed to Dr. Akash Ahmadi. Faxed today from December 2019 to December 2018.      She states that Dr. Sandy Ball

## 2020-05-08 ENCOUNTER — HOSPITAL ENCOUNTER (OUTPATIENT)
Dept: MAMMOGRAPHY | Age: 65
Discharge: HOME | End: 2020-05-08
Attending: INTERNAL MEDICINE
Payer: MEDICARE

## 2020-05-08 ENCOUNTER — TELEPHONE (OUTPATIENT)
Dept: INTERNAL MEDICINE CLINIC | Facility: CLINIC | Age: 65
End: 2020-05-08

## 2020-05-08 ENCOUNTER — APPOINTMENT (OUTPATIENT)
Dept: LAB | Facility: HOSPITAL | Age: 65
End: 2020-05-08
Attending: INTERNAL MEDICINE
Payer: MEDICARE

## 2020-05-08 ENCOUNTER — APPOINTMENT (OUTPATIENT)
Dept: CT IMAGING | Facility: HOSPITAL | Age: 65
End: 2020-05-08
Attending: ORTHOPAEDIC SURGERY
Payer: MEDICARE

## 2020-05-08 DIAGNOSIS — R92.2 DENSE BREASTS: ICD-10-CM

## 2020-05-08 DIAGNOSIS — R30.0 DYSURIA: Primary | ICD-10-CM

## 2020-05-08 DIAGNOSIS — R30.0 DYSURIA: ICD-10-CM

## 2020-05-08 DIAGNOSIS — Z12.31 ENCOUNTER FOR SCREENING MAMMOGRAM FOR BREAST CANCER: ICD-10-CM

## 2020-05-08 PROCEDURE — 77067 SCR MAMMO BI INCL CAD: CPT | Performed by: INTERNAL MEDICINE

## 2020-05-08 PROCEDURE — 77063 BREAST TOMOSYNTHESIS BI: CPT | Performed by: INTERNAL MEDICINE

## 2020-05-08 PROCEDURE — 87086 URINE CULTURE/COLONY COUNT: CPT

## 2020-05-08 PROCEDURE — 81001 URINALYSIS AUTO W/SCOPE: CPT

## 2020-05-08 RX ORDER — CEPHALEXIN 500 MG/1
500 CAPSULE ORAL 2 TIMES DAILY
Qty: 14 CAPSULE | Refills: 0 | Status: SHIPPED | OUTPATIENT
Start: 2020-05-08 | End: 2020-07-14 | Stop reason: ALTCHOICE

## 2020-05-08 RX ORDER — CEPHALEXIN 500 MG/1
500 CAPSULE ORAL 2 TIMES DAILY
Qty: 14 CAPSULE | Refills: 0 | Status: SHIPPED | OUTPATIENT
Start: 2020-05-08 | End: 2020-05-08

## 2020-05-08 NOTE — TELEPHONE ENCOUNTER
Please notify patient that there are some white cells and bacteria in the urine, so let's treat for a urinary tract infection and see if her symptoms resolve. She should call in 48-72 hours if not feeling better.    I have sent in rx for keflex bid x 7 days

## 2020-05-08 NOTE — TELEPHONE ENCOUNTER
To Dr. Isac Khan----    Please advise in MD absence. Patient reports since last night she has been experiencing burning and itching in vaginal area. States she did start monistat insert and cream this AM. Reports she still has burning and itching.      Denies v

## 2020-05-08 NOTE — TELEPHONE ENCOUNTER
[facility] - verbalized understanding , [facility] - was sent to [employer] and cancelled WX

## 2020-05-08 NOTE — TELEPHONE ENCOUNTER
Let's have her drop of UA/culture today--if she can go today, I can call her later with results to determine next steps.    In the meantime would encourage hydration

## 2020-05-08 NOTE — TELEPHONE ENCOUNTER
Pt. Is calling she isn't sure if she has a bladder infection or a yeast infection she would like to be prescribed something before the weekend she thinks it's a yeast infection due to the burning and itching please advise ph.  # [phone]  [phone]

## 2020-05-11 ENCOUNTER — TELEPHONE (OUTPATIENT)
Dept: SURGERY | Facility: CLINIC | Age: 65
End: 2020-05-11

## 2020-05-11 DIAGNOSIS — R31.29 MICROHEMATURIA: Primary | ICD-10-CM

## 2020-05-11 DIAGNOSIS — Z87.442 HISTORY OF KIDNEY STONES: ICD-10-CM

## 2020-05-11 RX ORDER — FLUCONAZOLE 150 MG/1
150 TABLET ORAL ONCE
Qty: 1 TABLET | Refills: 0 | Status: SHIPPED | OUTPATIENT
Start: 2020-05-11 | End: 2020-05-11

## 2020-05-11 NOTE — TELEPHONE ENCOUNTER
Patient indicates she believed to have a yeast infection last week. Patient used medicated treatment and now has burning with sensation to urine. Patient put on antibiotics by her PCP, but still has same symptoms. Please call at:[phone],thanks.   veronica

## 2020-05-11 NOTE — TELEPHONE ENCOUNTER
Pt has been taking the medication and is still having burning/throbbing    Feels it right now    Also questions Mychart urine test results saying No Growth    Requests call back  [phone]

## 2020-05-11 NOTE — TELEPHONE ENCOUNTER
You can not add on a CT scan to a MRI unfortunately. These are two separate departments. Her last CT shows a 6 mm stone in the left kidney, no other stones.       Can we place patient on my schedule for either a phone visit or office visit, which ever she

## 2020-05-11 NOTE — TELEPHONE ENCOUNTER
Still feels like burning/throbbing sensation.  Keflex is not helping    Discussed that ucx was negative but UA did show some RBC--could be stone--she has a call out to Dr. Zehng Lopez  Will stop keflex  Will try one dose of diflucan      FYI to Dr. Luigi Reynoldsos

## 2020-05-11 NOTE — TELEPHONE ENCOUNTER
S/W pt and determined that she was seen by her PCP for burning in the urethral area and the urine culture came back negative but the UA was showing some RBC's and her PCP thought that she may be trying to pass a stone which she has a HX of.  She states that

## 2020-05-11 NOTE — TELEPHONE ENCOUNTER
Spoke to patient. Has been taking keflex as prescribed. She is still having burning and throbbing down by urethra. No pain to abdomen or back.   The burning and throbbing do not occur when she urinates, occurs at any time, comes out of nowhere, then pas

## 2020-05-12 ENCOUNTER — HOSPITAL ENCOUNTER (OUTPATIENT)
Dept: CT IMAGING | Facility: HOSPITAL | Age: 65
Discharge: HOME | End: 2020-05-12
Attending: NURSE PRACTITIONER
Payer: MEDICARE

## 2020-05-12 ENCOUNTER — HOSPITAL ENCOUNTER (OUTPATIENT)
Dept: CT IMAGING | Facility: HOSPITAL | Age: 65
Discharge: HOME | End: 2020-05-12
Attending: ORTHOPAEDIC SURGERY
Payer: MEDICARE

## 2020-05-12 DIAGNOSIS — M54.17 LUMBOSACRAL RADICULOPATHY AT L5: ICD-10-CM

## 2020-05-12 DIAGNOSIS — R31.29 MICROHEMATURIA: ICD-10-CM

## 2020-05-12 DIAGNOSIS — Z87.442 HISTORY OF KIDNEY STONES: ICD-10-CM

## 2020-05-12 PROCEDURE — 74176 CT ABD & PELVIS W/O CONTRAST: CPT | Performed by: NURSE PRACTITIONER

## 2020-05-12 PROCEDURE — 72131 CT LUMBAR SPINE W/O DYE: CPT | Performed by: ORTHOPAEDIC SURGERY

## 2020-05-12 NOTE — TELEPHONE ENCOUNTER
Tiny Minors, pt is still sending messages requesting  the CT scan. My mistake saying it was an MRI she is having at 1:30 pm she is having a lumbar spine CT. Please advise.        Conversation: Acute   (Newest Message First)   Day, Sherron Muñoz   to Jamel Cummings

## 2020-05-12 NOTE — TELEPHONE ENCOUNTER
I called the [facility] ct dept and s/w Mario and informed that ALEE added on a CT abd/pelvis non-contrast for this pt who is schd at 1:30 pm for a CT of the lumbar spine.  He made the adjustment in his schd and stated that there were no other instructions for the p

## 2020-05-12 NOTE — TELEPHONE ENCOUNTER
Ok, can add CT abd/pelvis Kidney stone protocol if able. I will place order. Patient should also schedule follow up with me.

## 2020-05-13 RX ORDER — AMLODIPINE BESYLATE 10 MG/1
TABLET ORAL
Qty: 90 TABLET | Refills: 3 | Status: SHIPPED | OUTPATIENT
Start: 2020-05-13 | End: 2021-05-05

## 2020-05-20 ENCOUNTER — TELEPHONE (OUTPATIENT)
Dept: ENDOCRINOLOGY CLINIC | Facility: CLINIC | Age: 65
End: 2020-05-20

## 2020-05-20 NOTE — TELEPHONE ENCOUNTER
I called the patient  She wanted to discuss her T score numbers for her laxt dexa scan  She will be seeing  A new orthopedic surgeon and wanted to give him the numbers  I provided her with the T score and also informed her it should be available on [employer]

## 2020-05-20 NOTE — TELEPHONE ENCOUNTER
Pt wants to know if her bone density numbers are better. .. also keeps getting a message it is time for her bone density scan and wants to know it that is right.  Please advise

## 2020-05-20 NOTE — TELEPHONE ENCOUNTER
Last dexa bone scan 4/23/19   There is no pending order and I do not see that we have contacted her to do one. Please advise if she will be due for next dexa 4/2021? Also have her bone scans been improving?

## 2020-05-20 NOTE — TELEPHONE ENCOUNTER
Her labs were at goal, I did send result to [address] Also she is not due for DEXA until 4/2021. Correct. Thanks.

## 2020-05-22 ENCOUNTER — TELEPHONE (OUTPATIENT)
Dept: ENDOCRINOLOGY CLINIC | Facility: CLINIC | Age: 65
End: 2020-05-22

## 2020-05-22 NOTE — TELEPHONE ENCOUNTER
Patient would like to discuss how many  Injections she has had and how soon does the bone start to curve back. Patient will be having back surgery and wants to ensure she will not have any issues, please call at:[phone],thanks.   *pt has eye appt at 1p

## 2020-05-22 NOTE — TELEPHONE ENCOUNTER
LOV 4/2/20 for osteoporosis  Per LOV note received third Evenity shot at that appt. Please advise on her questions below related to potential surgery.

## 2020-05-26 NOTE — TELEPHONE ENCOUNTER
The medication should be active already since she has received 3 injections and scheduled for the 4th. When is she scheduled for surgery? Thanks.

## 2020-05-27 ENCOUNTER — TELEPHONE (OUTPATIENT)
Dept: NEUROLOGY | Facility: CLINIC | Age: 65
End: 2020-05-27

## 2020-05-27 NOTE — TELEPHONE ENCOUNTER
Patient called back and states that she was going to have spinal fusion on 7/6/20 but when surgeon looked at previous dexa numbers he told her no way and ordered another dexa which she will have this Friday done.   Patient has had 5 Evinity injections per c

## 2020-05-27 NOTE — TELEPHONE ENCOUNTER
Called patient and gave her information on data, patient was impressed at how small the increase in bone density is. She will go have dexa scan done and wait to see what results are. Michael Mejia

## 2020-05-28 NOTE — TELEPHONE ENCOUNTER
Pt states she saw Dr. Michael Quintana, who explained that although the EMG shows this leg/buttock pain is not coming from the back does not mean it isn't. If she has surgery, she will have lumbar 1-5 fused.  (Notes from Dr. Michael Quintana in [place])    Pt scheduled for surgery on

## 2020-05-28 NOTE — TELEPHONE ENCOUNTER
I reviewed her EMG from 5/20/2020 which is consistent with a sensory axonal neuropathy but only one nerve was checked. There are also some irregularities in the motor studies. There is no evidence of a radiculopathy though.   Dr. Anu James is recommending a ma

## 2020-05-29 ENCOUNTER — HOSPITAL ENCOUNTER (OUTPATIENT)
Dept: BONE DENSITY | Age: 65
Discharge: HOME | End: 2020-05-29
Attending: ORTHOPAEDIC SURGERY
Payer: MEDICARE

## 2020-05-29 DIAGNOSIS — M81.8 OTHER OSTEOPOROSIS WITHOUT CURRENT PATHOLOGICAL FRACTURE: ICD-10-CM

## 2020-05-29 DIAGNOSIS — R29.890 LOSS OF HEIGHT: ICD-10-CM

## 2020-05-29 NOTE — TELEPHONE ENCOUNTER
S/w pt and explained Dr. Nehemias Ko response in previous note - transferred pt to front office to schedule virtual visit w/ Dr. Parker Giron.

## 2020-06-04 ENCOUNTER — TELEPHONE (OUTPATIENT)
Dept: NEUROLOGY | Facility: CLINIC | Age: 65
End: 2020-06-04

## 2020-06-04 ENCOUNTER — TELEMEDICINE (OUTPATIENT)
Dept: NEUROLOGY | Facility: CLINIC | Age: 65
End: 2020-06-04

## 2020-06-04 DIAGNOSIS — M43.16 SPONDYLOLISTHESIS OF LUMBAR REGION: ICD-10-CM

## 2020-06-04 DIAGNOSIS — M43.10 RETROLISTHESIS: ICD-10-CM

## 2020-06-04 DIAGNOSIS — Z98.890 HISTORY OF LUMBAR LAMINECTOMY: ICD-10-CM

## 2020-06-04 DIAGNOSIS — M48.061 LUMBAR FORAMINAL STENOSIS: ICD-10-CM

## 2020-06-04 DIAGNOSIS — M85.80 OSTEOPENIA, UNSPECIFIED LOCATION: ICD-10-CM

## 2020-06-04 DIAGNOSIS — Z98.1 HISTORY OF LUMBAR FUSION: ICD-10-CM

## 2020-06-04 DIAGNOSIS — M51.9 LUMBAR DISC DISEASE: ICD-10-CM

## 2020-06-04 DIAGNOSIS — M54.16 LUMBAR RADICULOPATHY: Primary | ICD-10-CM

## 2020-06-04 DIAGNOSIS — M41.25 OTHER IDIOPATHIC SCOLIOSIS, THORACOLUMBAR REGION: ICD-10-CM

## 2020-06-04 PROCEDURE — 99214 OFFICE O/P EST MOD 30 MIN: CPT | Performed by: PHYSICAL MEDICINE & REHABILITATION

## 2020-06-04 NOTE — PROGRESS NOTES
[address] [facility]    Telemedicine Visit - Evaluation    Elfida Expose Day verbally consents to a Telemedicine Visit on 06/04/20. This visit is conducted using Telemedicine with live, interactive audio and video.     Rigo Long CATARACT     • CHOLECYSTECTOMY     • COLONOSCOPY  2009   • COLONOSCOPY N/A 12/4/2018    Performed by Kathy Leon MD at [facility]   • CYSTOSCOPY N/A 4/20/2018    Performed by Malena Webber MD at [address] [place] [address] OR   • ESOPHAGOGASTRODUODENOSCOPY ( CITRATE OR Take 1,400 mg by mouth daily. • acetaminophen 500 MG Oral Tab Take 500 mg by mouth every 6 (six) hours as needed for Pain.      • Cyclobenzaprine HCl 10 MG Oral Tab Take 1 tablet (10 mg total) by mouth every 8 (eight) hours as needed for Mu 3, attentive, able to follow commands, comprehention intact, spontaneous speech intact  Psychiatric: Mood and affect appropriate    Musculoskeletal Exam:  Cervical Spine:    Posture: normal chin forward superiorly rotated protracted shoulder posture.    Nancy lumbar laminectomy: L1-2    9.  Osteopenia, unspecified location        PLAN:   I spoke to her about the results of the EMG/NCS and due to the absent sural nerve there is a question as to whether or not she might have a piriformis syndrome which is rare or for sufficient and adequate time to complete the visit. The patient was made aware of the limitations of the telehealth visit, including treatment limitations as no physical exam could be performed.   The patient was advised to call 911 or to go to the ER

## 2020-06-04 NOTE — PATIENT INSTRUCTIONS
PLAN:   I spoke to her about the results of the EMG/NCS and due to the absent sural nerve there is a question as to whether or not she might have a piriformis syndrome which is rare or a neuropathy.  Since no other sensory nerves where checked and there is

## 2020-06-05 ENCOUNTER — NURSE ONLY (OUTPATIENT)
Dept: ENDOCRINOLOGY CLINIC | Facility: CLINIC | Age: 65
End: 2020-06-05
Payer: MEDICARE

## 2020-06-05 ENCOUNTER — HOSPITAL ENCOUNTER (OUTPATIENT)
Dept: BONE DENSITY | Facility: HOSPITAL | Age: 65
Discharge: HOME | End: 2020-06-05
Attending: ORTHOPAEDIC SURGERY
Payer: MEDICARE

## 2020-06-05 DIAGNOSIS — M81.8 OTHER OSTEOPOROSIS WITHOUT CURRENT PATHOLOGICAL FRACTURE: Primary | ICD-10-CM

## 2020-06-05 PROCEDURE — 77080 DXA BONE DENSITY AXIAL: CPT | Performed by: ORTHOPAEDIC SURGERY

## 2020-06-05 PROCEDURE — 96372 THER/PROPH/DIAG INJ SC/IM: CPT | Performed by: INTERNAL MEDICINE

## 2020-06-05 NOTE — PROGRESS NOTES
Patient presents to the clinic for Evenity injection. Patient received and tolerated Evenity injection 210 MG SQ to the right and left upper arm. Advised to RTC in one month for the next Evenity injection.

## 2020-06-09 ENCOUNTER — PATIENT MESSAGE (OUTPATIENT)
Dept: SURGERY | Facility: CLINIC | Age: 65
End: 2020-06-09

## 2020-06-09 ENCOUNTER — TELEMEDICINE (OUTPATIENT)
Dept: SURGERY | Facility: CLINIC | Age: 65
End: 2020-06-09

## 2020-06-09 DIAGNOSIS — N32.9 LESION OF URINARY BLADDER: ICD-10-CM

## 2020-06-09 DIAGNOSIS — N20.0 NEPHROLITHIASIS: Primary | ICD-10-CM

## 2020-06-09 DIAGNOSIS — R31.21 ASYMPTOMATIC MICROSCOPIC HEMATURIA: ICD-10-CM

## 2020-06-09 PROCEDURE — 99214 OFFICE O/P EST MOD 30 MIN: CPT | Performed by: UROLOGY

## 2020-06-09 NOTE — PROGRESS NOTES
[facility], [facility]  Initial Office Consultation    HPI:   Sherron Stoner is a 71year old female here today for a telemedicine consultation at the request of, and a copy of this note will be sent to, Silvina Bill MD. This visit was conducted using of kidney 2008   • Depression    • Esophageal reflux    • Essential hypertension    • Gastric polyps 12/4/2018   • Gastritis    • High blood pressure    • Idiopathic acute pancreatitis 2002, 2010   • Insomnia    • Internal hemorrhoids without complication performed and is otherwise negative. EXAM:  There were no vitals taken for this visit. Physical Exam    Constitutional: She is oriented to person, place, and time. She appears well-developed and well-nourished. No distress.    HENT:   Head: Atraumatic for repeat analysis. 3.  CT urogram.    4.  We will schedule patient for office cystoscopy. 5.  Conservative management of asymptomatic, stable, nonobstructing 6 mm left kidney stone.     Kim Dixon MD  6/9/2020

## 2020-06-10 ENCOUNTER — TELEPHONE (OUTPATIENT)
Dept: ENDOCRINOLOGY CLINIC | Facility: CLINIC | Age: 65
End: 2020-06-10

## 2020-06-10 DIAGNOSIS — M81.8 OTHER OSTEOPOROSIS WITHOUT CURRENT PATHOLOGICAL FRACTURE: Primary | ICD-10-CM

## 2020-06-10 DIAGNOSIS — E55.9 VITAMIN D DEFICIENCY: ICD-10-CM

## 2020-06-11 NOTE — TELEPHONE ENCOUNTER
Patient called in to follow up on this message. She would like a call back as soon as possible.  Please advise

## 2020-06-11 NOTE — TELEPHONE ENCOUNTER
I would recommend repeat labs after evenity injection in July - is she scheduled for surgery? Prolia labs order.

## 2020-06-11 NOTE — TELEPHONE ENCOUNTER
Dr. Hernandez Navas please see request below. Last evenity injection 6/5/20 has follow up in July scheduled for next injection    Recently had labs 4/2020. Does she need repeat labs at this time?

## 2020-06-11 NOTE — TELEPHONE ENCOUNTER
Dr. Willian Quintana -- Keiko Comment    Patient was contacted and relayed below message as outlined. She states she is not scheduled for surgery yet.   She consulted a doctor at [facility] (Dr. Rita Taylor) who recommended to not rush her surgery and get more doses of evenity as

## 2020-06-17 ENCOUNTER — HOSPITAL ENCOUNTER (OUTPATIENT)
Dept: CT IMAGING | Facility: HOSPITAL | Age: 65
Discharge: HOME | End: 2020-06-17
Attending: UROLOGY
Payer: MEDICARE

## 2020-06-17 ENCOUNTER — APPOINTMENT (OUTPATIENT)
Dept: LAB | Facility: HOSPITAL | Age: 65
End: 2020-06-17
Attending: UROLOGY
Payer: MEDICARE

## 2020-06-17 DIAGNOSIS — E55.9 VITAMIN D DEFICIENCY: ICD-10-CM

## 2020-06-17 DIAGNOSIS — M81.8 OTHER OSTEOPOROSIS WITHOUT CURRENT PATHOLOGICAL FRACTURE: ICD-10-CM

## 2020-06-17 DIAGNOSIS — R31.21 ASYMPTOMATIC MICROSCOPIC HEMATURIA: ICD-10-CM

## 2020-06-17 PROCEDURE — 36415 COLL VENOUS BLD VENIPUNCTURE: CPT

## 2020-06-17 PROCEDURE — 82306 VITAMIN D 25 HYDROXY: CPT

## 2020-06-17 PROCEDURE — 88108 CYTOPATH CONCENTRATE TECH: CPT | Performed by: UROLOGY

## 2020-06-17 PROCEDURE — 83970 ASSAY OF PARATHORMONE: CPT

## 2020-06-17 PROCEDURE — 82565 ASSAY OF CREATININE: CPT

## 2020-06-17 PROCEDURE — 74178 CT ABD&PLV WO CNTR FLWD CNTR: CPT | Performed by: UROLOGY

## 2020-06-17 PROCEDURE — 84080 ASSAY ALKALINE PHOSPHATASES: CPT

## 2020-06-17 PROCEDURE — 81003 URINALYSIS AUTO W/O SCOPE: CPT | Performed by: UROLOGY

## 2020-06-17 PROCEDURE — 76376 3D RENDER W/INTRP POSTPROCES: CPT | Performed by: UROLOGY

## 2020-06-17 PROCEDURE — 80048 BASIC METABOLIC PNL TOTAL CA: CPT

## 2020-06-18 NOTE — TELEPHONE ENCOUNTER
RN replied to patient via Jatinderk Pinna. See below: \"This is Jovita Banerjee from Dr Canales [place] office. Just want to let you know that Dr Turner Garza has not reviewed your Cytology fluid result along with the CT Urogram you completed yesterday, 6/17.  In the meantime, we

## 2020-06-18 NOTE — TELEPHONE ENCOUNTER
From: Kaylen White Day  To: Raul Sorto MD  Sent: 6/9/2020 3:07 PM CDT  Subject: Visit Follow-up Question    Hi again. It was good taking with you. I wanted to ask and forgot. If there shows no blood in the next urine sample and the CT looks ok.    Wo

## 2020-06-19 ENCOUNTER — PATIENT MESSAGE (OUTPATIENT)
Dept: SURGERY | Facility: CLINIC | Age: 65
End: 2020-06-19

## 2020-06-22 ENCOUNTER — PATIENT MESSAGE (OUTPATIENT)
Dept: SURGERY | Facility: CLINIC | Age: 65
End: 2020-06-22

## 2020-06-25 ENCOUNTER — PATIENT OUTREACH (OUTPATIENT)
Dept: CASE MANAGEMENT | Age: 65
End: 2020-06-25

## 2020-06-29 ENCOUNTER — PATIENT MESSAGE (OUTPATIENT)
Dept: INTERNAL MEDICINE CLINIC | Facility: CLINIC | Age: 65
End: 2020-06-29

## 2020-06-29 ENCOUNTER — TELEPHONE (OUTPATIENT)
Dept: SURGERY | Facility: CLINIC | Age: 65
End: 2020-06-29

## 2020-06-29 NOTE — TELEPHONE ENCOUNTER
From: Jaqueline Lux Day  To: Myles Mcdonough MD  Sent: 6/29/2020 6:17 AM CDT  Subject: Prescription Question    Morning. I asked you this question before and you said you couldn’t because I believe you said there was a conflict with another drug.    Because

## 2020-06-29 NOTE — TELEPHONE ENCOUNTER
From: Rock Pattersoner Day  To: Martinez Reese MD  Sent: 6/22/2020 10:48 AM CDT  Subject: Test Results Question    Dr Nigel Gutierres:    [id number] [address] Oneill I saw my CT and blood results and some things sound ok and not sure what some of the other stuff says from a laymen’s perspect

## 2020-06-29 NOTE — TELEPHONE ENCOUNTER
RN called patient in response to her 6/19 [employer] message: \"I saw the last two results but I’m still a bit confused. I know Dr Eugene Villegas wanted me to have a CT and urinalysis to see if their was still blood in urine and it showed none.  I haven’t seen the CT

## 2020-07-01 NOTE — ADDENDUM NOTE
Problem: Labor (Cervical Ripen, Induct, Augment) (Adult,Obstetrics,Pediatric)  Goal: Signs and Symptoms of Listed Potential Problems Will be Absent, Minimized or Managed (Labor)  Flowsheets (Taken 7/1/2020 1802)  Problems Assessed (Labor): all  Problems Present (Labor): -- (gestational htn)      Addended by: Pauline Salinas on: 6/11/2019 11:52 AM     Modules accepted: Marcus

## 2020-07-02 ENCOUNTER — PATIENT OUTREACH (OUTPATIENT)
Dept: CASE MANAGEMENT | Age: 65
End: 2020-07-02

## 2020-07-02 DIAGNOSIS — M19.022 PRIMARY OSTEOARTHRITIS OF LEFT ELBOW: ICD-10-CM

## 2020-07-02 DIAGNOSIS — M41.25 OTHER IDIOPATHIC SCOLIOSIS, THORACOLUMBAR REGION: ICD-10-CM

## 2020-07-02 DIAGNOSIS — M85.80 OSTEOPENIA, UNSPECIFIED LOCATION: ICD-10-CM

## 2020-07-02 DIAGNOSIS — G25.81 RESTLESS LEGS SYNDROME (RLS): ICD-10-CM

## 2020-07-02 DIAGNOSIS — I10 ESSENTIAL HYPERTENSION: ICD-10-CM

## 2020-07-02 DIAGNOSIS — K85.00 IDIOPATHIC ACUTE PANCREATITIS, UNSPECIFIED COMPLICATION STATUS: ICD-10-CM

## 2020-07-02 DIAGNOSIS — G47.00 INSOMNIA, UNSPECIFIED TYPE: ICD-10-CM

## 2020-07-02 DIAGNOSIS — M51.9 LUMBAR DISC DISEASE: ICD-10-CM

## 2020-07-02 RX ORDER — TRAZODONE HYDROCHLORIDE 50 MG/1
50 TABLET ORAL NIGHTLY
Qty: 30 TABLET | Refills: 5 | Status: SHIPPED | OUTPATIENT
Start: 2020-07-02 | End: 2021-09-01

## 2020-07-02 NOTE — PROGRESS NOTES
7/2/2020  Spoke to Thaddeus Quiroz for CCM.       Updates to patient care team/ comments: Dr. Wilner Motta  Patient reported changes in medications: trazodone and evenity added  Med Adherence  Comment: taking as directed     Health Maintenance: Reviewed  Annual Physic veggies and protein as well as go to the gym for exercise and stress management    • Patient reported progress toward goal: Has added more protein including ensure shakes to boost calcium too.  Can't do much exercise due to her back      • Update to previou

## 2020-07-06 ENCOUNTER — TELEPHONE (OUTPATIENT)
Dept: ENDOCRINOLOGY CLINIC | Facility: CLINIC | Age: 65
End: 2020-07-06

## 2020-07-06 ENCOUNTER — NURSE ONLY (OUTPATIENT)
Dept: ENDOCRINOLOGY CLINIC | Facility: CLINIC | Age: 65
End: 2020-07-06
Payer: MEDICARE

## 2020-07-06 DIAGNOSIS — M81.8 OTHER OSTEOPOROSIS WITHOUT CURRENT PATHOLOGICAL FRACTURE: Primary | ICD-10-CM

## 2020-07-06 PROCEDURE — 96372 THER/PROPH/DIAG INJ SC/IM: CPT | Performed by: INTERNAL MEDICINE

## 2020-07-06 NOTE — TELEPHONE ENCOUNTER
Dr Smith Tate, patient had evenity inj today. Patient was asking about when to have blood work . You mentioned in a previous encounter you wanted labs after July inj but I noticed pt had her blood work 6/17/20  Can these be repeated?   Please advise    Jill

## 2020-07-07 ENCOUNTER — TELEPHONE (OUTPATIENT)
Dept: ENDOCRINOLOGY CLINIC | Facility: CLINIC | Age: 65
End: 2020-07-07

## 2020-07-07 ENCOUNTER — PATIENT MESSAGE (OUTPATIENT)
Dept: ENDOCRINOLOGY CLINIC | Facility: CLINIC | Age: 65
End: 2020-07-07

## 2020-07-07 ENCOUNTER — PROCEDURE VISIT (OUTPATIENT)
Dept: NEUROLOGY | Facility: CLINIC | Age: 65
End: 2020-07-07
Payer: MEDICARE

## 2020-07-07 DIAGNOSIS — G57.81 NEUROPATHY OF RIGHT SURAL NERVE: Primary | ICD-10-CM

## 2020-07-07 PROCEDURE — 95886 MUSC TEST DONE W/N TEST COMP: CPT | Performed by: PHYSICAL MEDICINE & REHABILITATION

## 2020-07-07 PROCEDURE — 95910 NRV CNDJ TEST 7-8 STUDIES: CPT | Performed by: PHYSICAL MEDICINE & REHABILITATION

## 2020-07-07 NOTE — TELEPHONE ENCOUNTER
Ok, noted. Joint aches are a known side effect that should improve in next 2 days - same with injection site reaction. Let us know if not improved in next 2 days. Thanks.

## 2020-07-07 NOTE — TELEPHONE ENCOUNTER
Patient called in stating that she had an injection yesterday. Patient wanted to advise that she had a severe headache yesterday and this never happens to her after an injection. Patient feels better today. Please note.

## 2020-07-07 NOTE — TELEPHONE ENCOUNTER
Patient was contacted and relayed Dr. Damon Current message as outlined. Per patient her headache subsided but the joint aches and injection site reaction are still lingering.  She voiced understanding to give us a call in the next 2 days if her symptoms have not

## 2020-07-07 NOTE — TELEPHONE ENCOUNTER
Spoke with pt, she states she experienced joint aches and headache yesterday. Slight joint aches and soreness at injection site today.

## 2020-07-07 NOTE — TELEPHONE ENCOUNTER
From: Tio Constance Day  To: Smith Moody MD  Sent: 7/7/2020 9:30 AM CDT  Subject: Visit Follow-up Question    Morning. Just wanted to mention that for the first time I had a headache after my injection. Still lingers a bit today but not as bad as yesterday.

## 2020-07-12 PROBLEM — G57.81 NEUROPATHY OF RIGHT SURAL NERVE: Status: ACTIVE | Noted: 2020-07-12

## 2020-07-13 ENCOUNTER — TELEPHONE (OUTPATIENT)
Dept: SURGERY | Facility: CLINIC | Age: 65
End: 2020-07-13

## 2020-07-13 NOTE — PROCEDURES
[address] [place] [address]  Phone: [phone]  Fax: [phone]    ELECTRODIAGNOSTIC REPORT          Patient: Georgiana Coley Hand Dominance:  right   Patient ID: [id number] Referring Dr:  Dr. Dorita Estrada 5. The abnormalities in the right vastus lateralis are non-diagnostic. Durwin Collet A. Tauna Aguas, M.D.   Diplomate, American Board of Physical Medicine and Rehabilitation           Motor NCS      Nerve / Sites Muscle Latency Amplitude Amp % Duration Segments Dist R. Vastus lateralis Femoral L2-L4 N None None None None Normal 5-7 N N N   R.  Adductor longus Obturator L2-L4 N None None None None Normal N N N N   R. L4 paraspinal Spinal L4- N None None None None Normal N N N N   R. L3 paraspinal Spinal L3- N None None

## 2020-07-13 NOTE — TELEPHONE ENCOUNTER
Jefry Watkins Distance will not be in the office next week, pt is scheduled for a cysto next Thursday and we need to diaz this procedure when pt calls back please transfer to [address] [place] [address] Sophia Park

## 2020-07-14 ENCOUNTER — OFFICE VISIT (OUTPATIENT)
Dept: PODIATRY CLINIC | Facility: CLINIC | Age: 65
End: 2020-07-14
Payer: MEDICARE

## 2020-07-14 DIAGNOSIS — M76.62 ACHILLES TENDINITIS OF LEFT LOWER EXTREMITY: Primary | ICD-10-CM

## 2020-07-14 PROCEDURE — 99203 OFFICE O/P NEW LOW 30 MIN: CPT | Performed by: PODIATRIST

## 2020-07-14 PROCEDURE — G0463 HOSPITAL OUTPT CLINIC VISIT: HCPCS | Performed by: PODIATRIST

## 2020-07-14 RX ORDER — MELOXICAM 15 MG/1
15 TABLET ORAL DAILY
Qty: 21 TABLET | Refills: 1 | Status: SHIPPED | OUTPATIENT
Start: 2020-07-14 | End: 2021-02-22

## 2020-07-14 NOTE — PROGRESS NOTES
HPI:    Patient ID: Dian Stoner is a 71year old female. 27-year-old female presents to the office today having not been seen in approximately 7 years. Patient has a new complaint and that is pain associated with the left foot and ankle.   Pain is been tablet (10 mg total) by mouth every 8 (eight) hours as needed for Muscle spasms. (Patient taking differently: Take 10 mg by mouth nightly.  ) 60 tablet 0   • Multiple Vitamins Oral Tab Take 1 tablet by mouth daily.  Multiple Vitamins tablet      • Hydrocort minutes. She is well-informed and indicates an understanding today I do not see reason for x-ray and will reevaluate in 2 weeks.            ASSESSMENT/PLAN:   Achilles tendinitis of left lower extremity  (primary encounter diagnosis)    No orders of the de

## 2020-07-15 ENCOUNTER — TELEPHONE (OUTPATIENT)
Dept: GASTROENTEROLOGY | Facility: CLINIC | Age: 65
End: 2020-07-15

## 2020-07-15 DIAGNOSIS — G57.01 NEUROPATHY OF RIGHT SCIATIC NERVE: Primary | ICD-10-CM

## 2020-07-15 PROBLEM — G57.00: Status: ACTIVE | Noted: 2020-07-15

## 2020-07-15 RX ORDER — MELOXICAM 15 MG/1
15 TABLET ORAL DAILY
Qty: 21 TABLET | Refills: 1 | Status: CANCELLED | OUTPATIENT
Start: 2020-07-15

## 2020-07-15 RX ORDER — SUCRALFATE 1 G/1
1 TABLET ORAL 3 TIMES DAILY PRN
Qty: 90 TABLET | Refills: 0 | Status: SHIPPED | OUTPATIENT
Start: 2020-07-15 | End: 2021-04-14

## 2020-07-15 NOTE — TELEPHONE ENCOUNTER
I would advise against taking meloxicam if it is bothering her stomach, as it can predispose to ulcers. Rec tylenol 1000mg TID prn instead.    Carafate refilled x 1 month -- needs to get subsequent refills from her new GI, Dr. Jake Hoang

## 2020-07-15 NOTE — TELEPHONE ENCOUNTER
Pt. Is calling to request a refill on Meloxicam 7.5mg she received Meloxicam 15 mg from Dr. Dilan Champagne and they are burning her stomach she would like this done today please advise ph.  # [phone]  Routed low to Rx  [phone]

## 2020-07-15 NOTE — TELEPHONE ENCOUNTER
Lalazahida Christianson-    Patient of Dr. Suraj Manuel, has previously taken sucralfate for antibiotics and meloxicam. When Dr. Suraj Manuel retired she preferred a female Dr. Mason  she switched to [facility]. Her [facility] MD recently moved out of [facility] and now has no established care. She was prescribed meloxicam by her orthopeadic and her stomach is hurting again. Hoping for a short term Rx for sucralfate to get her through her meloxicam until she can once again establish care with Dr. Sheila Chowdhury. Thank You.

## 2020-07-15 NOTE — TELEPHONE ENCOUNTER
Pt. Is calling back she is requesting a refill on Sucralfate 1gm she was originally prescribed this by Dr. Chris Gutierrez whom is retired she was seeing Dr. Marques Paris who has [place] she has a new G. I. Dr. Presley Willingham that she hasn't seen as of yet he denied her med

## 2020-07-16 ENCOUNTER — TELEPHONE (OUTPATIENT)
Dept: NEUROLOGY | Facility: CLINIC | Age: 65
End: 2020-07-16

## 2020-07-16 NOTE — TELEPHONE ENCOUNTER
You  Day, Rosalie Murguia A 1 minute ago (9:42 AM)      EMILY Zhang  You; [facility] Clinical Staff 52 minutes ago (8:52 AM)      I have openings Monday. Why doesn't she see me first and then can see Dr. Jg Cox next if she wants. We can figure out prescription then. Can take otc omeprazole until Monday.    [employer] comment

## 2020-07-16 NOTE — TELEPHONE ENCOUNTER
----- Message from Ardie Angelucci, MD sent at 7/15/2020 11:24 PM CDT -----  She will need to have a MRI of the pelvis to make sure that she does not have a sciatic neuropathy on either side.     EMG results  Conclusion:  This is an abnormal study. Amanda Sierra is

## 2020-07-16 NOTE — TELEPHONE ENCOUNTER
MRI PELVIS (CPT=72196)-APPROVED  Medicare Online for authorization of procedure   MRI PELVIS (CPT=72196). Procedure is a covered benefit and does not require authorization. Will inform patient.  Patient informed insurance was verified,request above is a cov

## 2020-07-28 ENCOUNTER — OFFICE VISIT (OUTPATIENT)
Dept: PODIATRY CLINIC | Facility: CLINIC | Age: 65
End: 2020-07-28
Payer: MEDICARE

## 2020-07-28 DIAGNOSIS — M76.62 ACHILLES TENDINITIS OF LEFT LOWER EXTREMITY: Primary | ICD-10-CM

## 2020-07-28 PROCEDURE — G0463 HOSPITAL OUTPT CLINIC VISIT: HCPCS | Performed by: PODIATRIST

## 2020-07-28 PROCEDURE — 99213 OFFICE O/P EST LOW 20 MIN: CPT | Performed by: PODIATRIST

## 2020-07-28 NOTE — PROGRESS NOTES
HPI:    Patient ID: Rey Stoner is a 71year old female. 42-year-old female presents for follow-up in reference to left heel pain. She has had improvement and has less discomfort but still knows that its there.   It was recommended by her primary care p needed for Hemorrhoids.  Use for 7 days at a time during a flare of hemorrhoids (Patient not taking: Reported on 7/14/2020 ) 1 Tube 0     Allergies:  Aleve                   NAUSEA AND VOMITING  Levaquin [Levofloxa*    RASH, DIZZINESS  Codeine

## 2020-07-31 ENCOUNTER — TELEPHONE (OUTPATIENT)
Dept: INTERNAL MEDICINE CLINIC | Facility: CLINIC | Age: 65
End: 2020-07-31

## 2020-07-31 PROCEDURE — 99490 CHRNC CARE MGMT STAFF 1ST 20: CPT

## 2020-07-31 NOTE — TELEPHONE ENCOUNTER
Patient is calling to request an order for Covid testing    She has a nagging cough, slight sore throat  No temp or shortness of breath    Could this be the start of a cold?      She hasn't been around anyone who has tested positive    Please call patient [phone]

## 2020-07-31 NOTE — TELEPHONE ENCOUNTER
Spoke to patient who reports she has a sore throat and cough. Her cough brings up clear mucus. She is unsure if this is just related to the PND she is having. Frequently feels like she has to clear her throat. She has headaches periodically. She gets them regularly due to some neck pain she has, she gets massages which can help. She is using a throat lozenge and just used Nasacort. She denies any fever, SOB, diarrhea, loss of taste or smell. She notes she did have a cough during the winter for months and nothing seemed to help, she wonders if she had COVID then. She thinks she is OK but she is going to visit her 3 y/o grandson this weekend in [facility]. She is unsure if she should get tested/go to an  that tests? To Dr. Natali Witt to advise, thanks!

## 2020-07-31 NOTE — TELEPHONE ENCOUNTER
RAMANDEEP Maoyrga ----Spoke to patient and relayed MD message. Patient verbalized understanding and will go to [address]

## 2020-08-04 ENCOUNTER — PATIENT OUTREACH (OUTPATIENT)
Dept: CASE MANAGEMENT | Age: 65
End: 2020-08-04

## 2020-08-04 DIAGNOSIS — G57.01 NEUROPATHY OF RIGHT SCIATIC NERVE: ICD-10-CM

## 2020-08-04 DIAGNOSIS — M19.022 PRIMARY OSTEOARTHRITIS OF LEFT ELBOW: ICD-10-CM

## 2020-08-04 DIAGNOSIS — K85.00 IDIOPATHIC ACUTE PANCREATITIS, UNSPECIFIED COMPLICATION STATUS: ICD-10-CM

## 2020-08-04 DIAGNOSIS — G25.81 RESTLESS LEGS SYNDROME (RLS): ICD-10-CM

## 2020-08-04 DIAGNOSIS — I10 ESSENTIAL HYPERTENSION: ICD-10-CM

## 2020-08-04 DIAGNOSIS — I95.9 HYPOTENSION, UNSPECIFIED HYPOTENSION TYPE: ICD-10-CM

## 2020-08-04 DIAGNOSIS — M85.80 OSTEOPENIA, UNSPECIFIED LOCATION: ICD-10-CM

## 2020-08-04 NOTE — PROGRESS NOTES
8/4/2020  Spoke to Thaddeus Quiroz for CCM.       Updates to patient care team/ comments: None  Patient reported changes in medications: was on meloxicam for 2 weeks  Med Adherence  Comment: Taking as directed     Health Maintenance: Reviewed  Annual Physical due o Walks outside weather permitting most days for exercise      • Update to previous barriers: n/a    • Patient Reported New Barriers And Concerns: None                   - Plan for overcoming all barriers: n/a            Patient agrees to goal action plan.

## 2020-08-07 ENCOUNTER — NURSE ONLY (OUTPATIENT)
Dept: ENDOCRINOLOGY CLINIC | Facility: CLINIC | Age: 65
End: 2020-08-07
Payer: MEDICARE

## 2020-08-07 DIAGNOSIS — M81.8 OTHER OSTEOPOROSIS WITHOUT CURRENT PATHOLOGICAL FRACTURE: Primary | ICD-10-CM

## 2020-08-07 PROCEDURE — 96372 THER/PROPH/DIAG INJ SC/IM: CPT | Performed by: INTERNAL MEDICINE

## 2020-08-07 NOTE — PROGRESS NOTES
RN reviewed pt lab work and imaging prior to appointment. Pt arrived to clinic for Evenity injection. RN administered injections to both left and right upper arm. Per LOV 4/20, RTC in 3 months.  Advised pt to schedule appt with MD in addition to appt for ne

## 2020-08-10 RX ORDER — SUCRALFATE 1 G/1
1 TABLET ORAL 3 TIMES DAILY PRN
Qty: 90 TABLET | Refills: 0 | OUTPATIENT
Start: 2020-08-10

## 2020-08-10 NOTE — TELEPHONE ENCOUNTER
Current refill request refused due to refill is either a duplicate request or has active refills at the pharmacy. Check previous templates.     Requested Prescriptions     Refused Prescriptions Disp Refills   • SUCRALFATE 1 g Oral Tab [Pharmacy Med Name: Josh Hooker

## 2020-08-11 ENCOUNTER — OFFICE VISIT (OUTPATIENT)
Dept: PODIATRY CLINIC | Facility: CLINIC | Age: 65
End: 2020-08-11
Payer: MEDICARE

## 2020-08-11 ENCOUNTER — TELEPHONE (OUTPATIENT)
Dept: SURGERY | Facility: CLINIC | Age: 65
End: 2020-08-11

## 2020-08-11 DIAGNOSIS — S90.121A CONTUSION OF LESSER TOE OF RIGHT FOOT WITHOUT DAMAGE TO NAIL, INITIAL ENCOUNTER: ICD-10-CM

## 2020-08-11 DIAGNOSIS — M72.2 PLANTAR FASCIITIS OF LEFT FOOT: ICD-10-CM

## 2020-08-11 DIAGNOSIS — M76.62 ACHILLES TENDINITIS OF LEFT LOWER EXTREMITY: Primary | ICD-10-CM

## 2020-08-11 PROCEDURE — 99213 OFFICE O/P EST LOW 20 MIN: CPT | Performed by: PODIATRIST

## 2020-08-11 PROCEDURE — G0463 HOSPITAL OUTPT CLINIC VISIT: HCPCS | Performed by: PODIATRIST

## 2020-08-11 NOTE — PROGRESS NOTES
HPI:    Patient ID: Kaylie Stoner is a 71year old female. 26-year-old female presents for follow-up primarily in reference to left heel pain. Patient states that she has finished the oral anti-inflammatories and presently is not aware of pain.   A second Take 10 mg by mouth nightly.  ) 60 tablet 0   • Multiple Vitamins Oral Tab Take 1 tablet by mouth daily.  Multiple Vitamins tablet      • hydrocortisone 2.5 % External Cream APPLY AA QD FOR 2 WEEKS       Allergies:  Aleve                   NAUSEA AND VOMITI defined types were placed in this encounter.       Meds This Visit:  Requested Prescriptions      No prescriptions requested or ordered in this encounter       Imaging & Referrals:  None       BA#[id number]

## 2020-08-12 ENCOUNTER — PROCEDURE (OUTPATIENT)
Dept: SURGERY | Facility: CLINIC | Age: 65
End: 2020-08-12
Payer: MEDICARE

## 2020-08-12 VITALS
HEART RATE: 80 BPM | WEIGHT: 140 LBS | BODY MASS INDEX: 21 KG/M2 | DIASTOLIC BLOOD PRESSURE: 70 MMHG | SYSTOLIC BLOOD PRESSURE: 134 MMHG

## 2020-08-12 DIAGNOSIS — R31.21 ASYMPTOMATIC MICROSCOPIC HEMATURIA: Primary | ICD-10-CM

## 2020-08-12 DIAGNOSIS — N20.0 BILATERAL KIDNEY STONES: ICD-10-CM

## 2020-08-12 PROCEDURE — 52000 CYSTOURETHROSCOPY: CPT | Performed by: UROLOGY

## 2020-08-12 PROCEDURE — 99212 OFFICE O/P EST SF 10 MIN: CPT | Performed by: UROLOGY

## 2020-08-12 PROCEDURE — G0463 HOSPITAL OUTPT CLINIC VISIT: HCPCS | Performed by: UROLOGY

## 2020-08-12 RX ORDER — SULFAMETHOXAZOLE AND TRIMETHOPRIM 800; 160 MG/1; MG/1
1 TABLET ORAL ONCE
Status: COMPLETED | OUTPATIENT
Start: 2020-08-12 | End: 2020-08-12

## 2020-08-12 RX ADMIN — SULFAMETHOXAZOLE AND TRIMETHOPRIM 1 TABLET: 800; 160 TABLET ORAL at 10:25:00

## 2020-08-12 NOTE — PROGRESS NOTES
[facility] Urology  Follow-Up Visit    HPI: Michael Stoner is a 71year old female presents for a follow up visit. Patient was last seen on 6/9/2020. INTERVAL HISTORY: Here for office cystoscopy to complete evaluation of microscopic hematuria.   Monika hematuria. - Urine cytology from 6/2020 was benign. Urinalysis was negative at that time as well.  CT urogram from June 2020 revealed a duplex morphology of the left kidney as well as 2 nonobstructing left kidney stones, up to 6 mm and a nonobstructing Blood Urine     Negative Negative   PH Urine     5.0 - 8.0 7.0   Protein Urine     Negative mg/dL Negative   Urobilinogen Urine     <2.0 <2.0   Nitrite Urine     Negative Negative   Leukocyte Esterase     Negative Negative   Microscopic     Microscopic n Recommend repeat urinalysis with microscopy annually (may be performed by PCP). If negative for 2 consecutive years, then no further UA for the purpose of evaluation of asymptomatic microhematuria is necessary.  Persistent microscopic hematuria would trigge

## 2020-08-18 ENCOUNTER — OFFICE VISIT (OUTPATIENT)
Dept: OTOLARYNGOLOGY | Facility: CLINIC | Age: 65
End: 2020-08-18
Payer: MEDICARE

## 2020-08-18 ENCOUNTER — TELEPHONE (OUTPATIENT)
Dept: INTERNAL MEDICINE CLINIC | Facility: CLINIC | Age: 65
End: 2020-08-18

## 2020-08-18 VITALS
BODY MASS INDEX: 21.22 KG/M2 | DIASTOLIC BLOOD PRESSURE: 77 MMHG | WEIGHT: 140 LBS | SYSTOLIC BLOOD PRESSURE: 113 MMHG | TEMPERATURE: 98 F | HEIGHT: 68 IN

## 2020-08-18 DIAGNOSIS — H92.01 RIGHT EAR PAIN: Primary | ICD-10-CM

## 2020-08-18 PROCEDURE — 99213 OFFICE O/P EST LOW 20 MIN: CPT | Performed by: OTOLARYNGOLOGY

## 2020-08-18 PROCEDURE — G0463 HOSPITAL OUTPT CLINIC VISIT: HCPCS | Performed by: OTOLARYNGOLOGY

## 2020-08-18 RX ORDER — CEPHALEXIN 500 MG/1
500 CAPSULE ORAL EVERY 8 HOURS
Qty: 30 CAPSULE | Refills: 0 | Status: SHIPPED | OUTPATIENT
Start: 2020-08-18 | End: 2020-08-28

## 2020-08-18 NOTE — PROGRESS NOTES
Africa Stoner is a 71year old female. Patient presents with:  Swollen Glands: c/o swollen/blocked salivary glands for 3 days    HPI:   She has been experiencing problems with pain in her right ear for the last 3 days. This is been coming and going.   In t Take 10 mg by mouth nightly.  ) 60 tablet 0   • Multiple Vitamins Oral Tab Take 1 tablet by mouth daily.  Multiple Vitamins tablet         Past Medical History:   Diagnosis Date   • Anemia    • Anxiety state, unspecified    • Back problem     CERVICAL RADIC through XII grossly intact. Neck Exam Normal Inspection - Normal. Palpation - Normal. Parotid gland - Normal. Thyroid gland - Normal.  No masses appreciated in the neck. Parotid appears to be normal clinically   .   Good salivary flow and no stones palpa

## 2020-08-18 NOTE — TELEPHONE ENCOUNTER
To Dr. 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 Pt has been applying warm compress and gentle massage to area. She has not been able to suck on sour candy or drop at this time. Pt has booked an 11AM appointment--she is asking if she should keep this appt or wait until Thursday to see Dr. Gabriella Peterson ENT. Pt will call Dr. Regina Shaikh office and try to be seen sooner-she will call our office back after 10AM to update us and possibly r/s. FYI Dr. Iris Mclean has a 5PM same day/sick appt today available.

## 2020-08-24 ENCOUNTER — OFFICE VISIT (OUTPATIENT)
Dept: GASTROENTEROLOGY | Facility: CLINIC | Age: 65
End: 2020-08-24
Payer: MEDICARE

## 2020-08-24 VITALS
HEIGHT: 68 IN | DIASTOLIC BLOOD PRESSURE: 80 MMHG | TEMPERATURE: 97 F | SYSTOLIC BLOOD PRESSURE: 116 MMHG | BODY MASS INDEX: 21.67 KG/M2 | WEIGHT: 143 LBS

## 2020-08-24 DIAGNOSIS — Z87.19 HX OF GASTROESOPHAGEAL REFLUX (GERD): ICD-10-CM

## 2020-08-24 DIAGNOSIS — R10.13 ABDOMINAL PAIN, EPIGASTRIC: ICD-10-CM

## 2020-08-24 DIAGNOSIS — K64.8 INTERNAL HEMORRHOIDS WITHOUT COMPLICATION: ICD-10-CM

## 2020-08-24 DIAGNOSIS — K21.9 GASTROESOPHAGEAL REFLUX DISEASE, ESOPHAGITIS PRESENCE NOT SPECIFIED: Primary | ICD-10-CM

## 2020-08-24 DIAGNOSIS — Z87.19 H/O ACUTE PANCREATITIS: ICD-10-CM

## 2020-08-24 DIAGNOSIS — K62.5 HEMORRHAGE OF RECTUM AND ANUS: ICD-10-CM

## 2020-08-24 DIAGNOSIS — R94.5 ABNORMAL RESULTS OF LIVER FUNCTION STUDIES: ICD-10-CM

## 2020-08-24 DIAGNOSIS — K29.00 ACUTE SUPERFICIAL GASTRITIS WITHOUT HEMORRHAGE: ICD-10-CM

## 2020-08-24 DIAGNOSIS — K85.00 IDIOPATHIC ACUTE PANCREATITIS, UNSPECIFIED COMPLICATION STATUS: ICD-10-CM

## 2020-08-24 DIAGNOSIS — Z80.0 FAMILY HISTORY OF COLON CANCER: ICD-10-CM

## 2020-08-24 DIAGNOSIS — Z83.71 FAMILY HISTORY OF COLONIC POLYPS: ICD-10-CM

## 2020-08-24 DIAGNOSIS — K31.7 GASTRIC POLYPS: ICD-10-CM

## 2020-08-24 PROCEDURE — G0463 HOSPITAL OUTPT CLINIC VISIT: HCPCS | Performed by: INTERNAL MEDICINE

## 2020-08-24 PROCEDURE — 99214 OFFICE O/P EST MOD 30 MIN: CPT | Performed by: INTERNAL MEDICINE

## 2020-08-24 NOTE — H&P
[facility], [facility] - Gastroenterology  Clinic History and Physical     Patient presents with:  Consult: new GI consult      HPI:   Michael Stoner is a 71year old female patient of Dr. Uriel Waller, with history of osteoporosis, reflux, gastritis and idiopathic 12/4/2018   • Intestinal disorder    • Left wrist fracture 2011 and 2013   • Muscle weakness    • Osteoporosis    • Renal disorder    • Rotator cuff tear, right    • Traumatic arthritis    • Vertigo       Past Surgical History:   Procedure Laterality Date Never Smoker      Smokeless tobacco: Never Used    Alcohol use: No      Alcohol/week: 0.0 standard drinks      Comment: quit 4 - 5 yrs ago due to pancreatitis    Drug use: No       Medications (Active prior to today's visit):  Current Outpatient Medication total) by mouth nightly.  (Patient not taking: Reported on 8/24/2020 ) 30 tablet 5       Allergies:    Aleve                   NAUSEA AND VOMITING  Levaquin [Levofloxa*    RASH, DIZZINESS  Codeine                 NAUSEA AND VOMITING  Hydrocodone Labs     03/29/19  0859 04/04/19  1101 08/09/19  0841   RBC 4.90 4.72 4.22   HGB 15.6 14.9 13.1   HCT 48.4* 45.8 40.9   MCV 98.8 97.0 96.9   MCH 31.8 31.6 31.0   MCHC 32.2 32.5 32.0   RDW 13.3 12.9 12.6   NEPRELIM 3.69 6.64 4.85   WBC 6.4 8.4 6.7    also avoid NSAID's, caffeine, peppermints, alcohol, tobacco and foods that incite symptoms   - Recommend continuing ASA if recommended by primary care.     - The patient was instructed to alert me if there are persistent symptoms, such as  dysphagia, weight

## 2020-08-24 NOTE — PATIENT INSTRUCTIONS
Carafate as needed  Continue to avoid alcohol and supplements as possible  The pathophysiology of acid reflux was discussed. Discussed patient symptoms and triggers.  Reviewed anti-reflux measures such as raising the head of the bed at night, avoiding tight

## 2020-08-31 PROCEDURE — 99490 CHRNC CARE MGMT STAFF 1ST 20: CPT

## 2020-09-07 NOTE — TELEPHONE ENCOUNTER
Patient has been scheduled for a Bilateral L4 TFESI under MAC on 8/6/19 at the [facility]. Medications and allergies reviewed. Patient informed to hold aspirins, nsaids, blood thinners, vitamins and fish oils 3-7 days prior to procedure.  Patient informed he/she cramping

## 2020-09-08 ENCOUNTER — NURSE ONLY (OUTPATIENT)
Dept: ENDOCRINOLOGY CLINIC | Facility: CLINIC | Age: 65
End: 2020-09-08
Payer: MEDICARE

## 2020-09-08 DIAGNOSIS — M81.8 OTHER OSTEOPOROSIS WITHOUT CURRENT PATHOLOGICAL FRACTURE: Primary | ICD-10-CM

## 2020-09-08 PROCEDURE — 96372 THER/PROPH/DIAG INJ SC/IM: CPT | Performed by: INTERNAL MEDICINE

## 2020-09-08 NOTE — PROGRESS NOTES
Patient presents for her Evenity injection. Patient tolerated and received a total of 210 mg of Evenity SQ to the right and left upper arm. RN advised patient to RTC in 31 days to get the next dose.   Patient has a follow up appointment with Dr. Willian Quintana as b

## 2020-09-09 ENCOUNTER — TELEPHONE (OUTPATIENT)
Dept: INTERNAL MEDICINE CLINIC | Facility: CLINIC | Age: 65
End: 2020-09-09

## 2020-09-09 NOTE — TELEPHONE ENCOUNTER
Please call pt  She said she is \"ridiculously stiff\" in joints and hands  Pt started recently on Evenity, prescribed by Dr Jaqui Olmos for osteoporosis, pt had the latest yesterday, one shot in each arm  Pt started these injections in 12/19 doesn't recall this stiffness after previous injections  Should pt be seen by Dr Karime Rosales for this or one of the specialists? Pt has tentative appt for cortisone injection tomorrow, ok for pt to proceed with this?   Tasked to nursing

## 2020-09-11 ENCOUNTER — HOSPITAL ENCOUNTER (OUTPATIENT)
Dept: MRI IMAGING | Facility: HOSPITAL | Age: 65
Discharge: HOME | End: 2020-09-11
Attending: PHYSICAL MEDICINE & REHABILITATION
Payer: MEDICARE

## 2020-09-11 DIAGNOSIS — G57.01 NEUROPATHY OF RIGHT SCIATIC NERVE: ICD-10-CM

## 2020-09-11 PROCEDURE — 72196 MRI PELVIS W/DYE: CPT | Performed by: PHYSICAL MEDICINE & REHABILITATION

## 2020-09-11 PROCEDURE — A9575 INJ GADOTERATE MEGLUMI 0.1ML: HCPCS | Performed by: PHYSICAL MEDICINE & REHABILITATION

## 2020-09-14 ENCOUNTER — HOSPITAL ENCOUNTER (OUTPATIENT)
Dept: GENERAL RADIOLOGY | Facility: HOSPITAL | Age: 65
Discharge: HOME | End: 2020-09-14
Attending: INTERNAL MEDICINE
Payer: MEDICARE

## 2020-09-14 ENCOUNTER — TELEPHONE (OUTPATIENT)
Dept: NEUROLOGY | Facility: CLINIC | Age: 65
End: 2020-09-14

## 2020-09-14 ENCOUNTER — OFFICE VISIT (OUTPATIENT)
Dept: ENDOCRINOLOGY CLINIC | Facility: CLINIC | Age: 65
End: 2020-09-14
Payer: MEDICARE

## 2020-09-14 VITALS
WEIGHT: 143 LBS | BODY MASS INDEX: 22 KG/M2 | SYSTOLIC BLOOD PRESSURE: 133 MMHG | HEART RATE: 67 BPM | DIASTOLIC BLOOD PRESSURE: 85 MMHG

## 2020-09-14 DIAGNOSIS — M79.641 PAIN IN BOTH HANDS: ICD-10-CM

## 2020-09-14 DIAGNOSIS — M81.8 OTHER OSTEOPOROSIS WITHOUT CURRENT PATHOLOGICAL FRACTURE: Primary | ICD-10-CM

## 2020-09-14 DIAGNOSIS — M79.642 PAIN IN BOTH HANDS: ICD-10-CM

## 2020-09-14 DIAGNOSIS — E55.9 VITAMIN D DEFICIENCY: ICD-10-CM

## 2020-09-14 PROCEDURE — 99214 OFFICE O/P EST MOD 30 MIN: CPT | Performed by: INTERNAL MEDICINE

## 2020-09-14 PROCEDURE — 73130 X-RAY EXAM OF HAND: CPT | Performed by: INTERNAL MEDICINE

## 2020-09-14 NOTE — TELEPHONE ENCOUNTER
Please notify patient that her x-ray of left hand showed some mild arthritis in her thumb. There is an old fracture of her distal radius with some hardware. Also some postop changes of a bone called ulna. Some signs of her thinning bones. On her right hand x-ray there was osteoarthritis noted but little change from September 2017. It does not appear that the radiologist thought that there was any new fractures or new changes. Please ask her where she is having pain in her hands. Okay to make an appointment with Dr. Vida Dunlap for follow-up if she wishes. ( Fredo Andrews Half. Patient requested that we tell her the results of her x-rays. SUDHAKAR Garcia )

## 2020-09-14 NOTE — TELEPHONE ENCOUNTER
Pt saw Dr Yady [address] today and rec'd orders for xrays.   Pt had xrays today  Pt asked if Dr Tyler Milner could please review and call her with results   Pt asked if the Dr on call could review and advise if pt needs to be seen by Dr Amelie Rosales has taken two days of Meloxicam   Tasked to nursing

## 2020-09-14 NOTE — TELEPHONE ENCOUNTER
----- Message from Matias Armstrong MD sent at 9/12/2020  7:30 PM CDT -----  There is no impingement on the sciatic nerve. She will need to follow up with me.

## 2020-09-14 NOTE — TELEPHONE ENCOUNTER
Message relayed to patient who verbalized understanding. The thumb joint where the thumb joins the hand on the right hand is the worst, but pain is primarily in the lowest finger joints. \"My hands feel very tight\" . . . \"Sometimes I can be doing nothing and feel a throbbing in the joints\". Appt scheduled 9/24 to follow up with Dr. Floyd Pressley' 9/24. As far as she is aware her options would be injections, PT, medication, and/or just deal with it. To Dr. Floyd Pressley as an Jaime Hudson.

## 2020-09-14 NOTE — PROGRESS NOTES
Name: Negro Alvarez Day  Date: 9/14/2020    Referring Physician: No ref. provider found    Patient presents with:  Osteoporosis: pt here for the avinity injections. Pt c/o of stiffness in her joints of the hands.        HISTORY OF PRESENT ILLNESS   Tula Krabbe A Da myalgias and no injection site reaction. She is having new pain down the lower leg which is currently being evaluated by ortho. Her xrays demonstrated shifting of the vertebrae and discussing option of fusion.       9/2020  She has now received 10 inj time during a flare of hemorrhoids, Disp: 1 Tube, Rfl: 0  •  Meclizine HCl 25 MG Oral Tab, Take 1 tablet (25 mg total) by mouth 3 (three) times daily as needed. , Disp: 30 tablet, Rfl: 1  •  Estradiol (ESTRACE) 0.1 MG/GM Vaginal Cream, Place 0.5 g vaginally Exercise: No    Social History Narrative      The patient does not use an assistive device. .        The patient does live in a home with stairs.       Medical History:   Past Medical History:   Diagnosis Date   • Anemia    • Anxiety state, unspecified    • Deondre L4-L5 - Dr. Jamie Mcdonnell   • SHOULDER SURG [address] UNLISTED Right 2012   • SPINE SURGERY PROCEDURE UNLISTED  2008    L1-L2 FUSION   • SPINE SURGERY PROCEDURE UNLISTED  12/2017    Neck fusion   • TONSILLECTOMY      with Adenoidectomy   • UPPER GI ENDOSCOPY,

## 2020-09-14 NOTE — TELEPHONE ENCOUNTER
Left detailed message informing patient of the imaging results. Patient instructed to call back with questions. -verified in fyi

## 2020-09-15 ENCOUNTER — PATIENT OUTREACH (OUTPATIENT)
Dept: CASE MANAGEMENT | Age: 65
End: 2020-09-15

## 2020-09-15 NOTE — PROGRESS NOTES
Called patient for monthly CCM outreach, pt stated she is out of town on vacation and asked for a call back next week.       Chart review - 4 min  Time with patient - 1 min  Total time - 5 min

## 2020-09-24 ENCOUNTER — OFFICE VISIT (OUTPATIENT)
Dept: INTERNAL MEDICINE CLINIC | Facility: CLINIC | Age: 65
End: 2020-09-24
Payer: MEDICARE

## 2020-09-24 VITALS
SYSTOLIC BLOOD PRESSURE: 132 MMHG | WEIGHT: 143 LBS | TEMPERATURE: 98 F | DIASTOLIC BLOOD PRESSURE: 80 MMHG | BODY MASS INDEX: 21.67 KG/M2 | HEIGHT: 68 IN | HEART RATE: 92 BPM

## 2020-09-24 DIAGNOSIS — M19.042 PRIMARY OSTEOARTHRITIS OF BOTH HANDS: Primary | ICD-10-CM

## 2020-09-24 DIAGNOSIS — M19.041 PRIMARY OSTEOARTHRITIS OF BOTH HANDS: Primary | ICD-10-CM

## 2020-09-24 PROCEDURE — 99213 OFFICE O/P EST LOW 20 MIN: CPT | Performed by: INTERNAL MEDICINE

## 2020-09-24 PROCEDURE — G0463 HOSPITAL OUTPT CLINIC VISIT: HCPCS | Performed by: INTERNAL MEDICINE

## 2020-09-24 NOTE — PROGRESS NOTES
Sherron Stoner is a 71year old female. Patient presents with:  Hand Pain: Patient is here today with bilateral hand pain for the past few months. Pain is getting progressively worse. Recent Xray showed arthritis.  Pain is mainly located in the thumb joints- Vaginal Cream Place 0.5 g vaginally twice a week. PRN 1 Tube 6   • Diclofenac Sodium 1 % Transdermal Gel Apply 4 g topically 4 (four) times daily as needed. 1 Tube 3   • CALCIUM CITRATE OR Take 1,400 mg by mouth daily.        • acetaminophen 500 MG Oral Tab HEALTH: feels well otherwise  RESPIRATORY: no SOB  CARDIOVASCULAR: no chest pain/pressure  GI: no nausea, vomiting, diarrhea    Wt Readings from Last 5 Encounters:  09/24/20 : 143 lb (64.9 kg)  09/14/20 : 143 lb (64.9 kg)  08/24/20 : 143 lb (64.9 kg)  08/1

## 2020-09-25 ENCOUNTER — PATIENT OUTREACH (OUTPATIENT)
Dept: CASE MANAGEMENT | Age: 65
End: 2020-09-25

## 2020-09-25 DIAGNOSIS — M48.061 LUMBAR FORAMINAL STENOSIS: ICD-10-CM

## 2020-09-25 DIAGNOSIS — M19.022 PRIMARY OSTEOARTHRITIS OF LEFT ELBOW: ICD-10-CM

## 2020-09-25 DIAGNOSIS — I95.9 HYPOTENSION, UNSPECIFIED HYPOTENSION TYPE: ICD-10-CM

## 2020-09-25 DIAGNOSIS — G47.00 INSOMNIA, UNSPECIFIED TYPE: ICD-10-CM

## 2020-09-25 DIAGNOSIS — G25.81 RESTLESS LEGS SYNDROME (RLS): ICD-10-CM

## 2020-09-25 DIAGNOSIS — K85.00 IDIOPATHIC ACUTE PANCREATITIS, UNSPECIFIED COMPLICATION STATUS: ICD-10-CM

## 2020-09-25 DIAGNOSIS — M85.80 OSTEOPENIA, UNSPECIFIED LOCATION: ICD-10-CM

## 2020-09-25 DIAGNOSIS — I10 ESSENTIAL HYPERTENSION: ICD-10-CM

## 2020-09-25 NOTE — PROGRESS NOTES
9/25/2020  Spoke to Michael Ojeda for CCM.       Updates to patient care team/ comments: Dr. Parisa Horner added  Patient reported changes in medications: None  Med Adherence  Comment: Taking as directed     Health Maintenance: Reviewed  Annual Physical due on 03/20/202 ensure shakes daily and eating more fruits. Has not gone to the gym, appointments are required and the timing of the classes don't always work for pt. Pt did do home exercises she streamed off her TV.       • Update to previous barriers: n/a    • Patient Re

## 2020-09-29 ENCOUNTER — TELEMEDICINE (OUTPATIENT)
Dept: NEUROLOGY | Facility: CLINIC | Age: 65
End: 2020-09-29

## 2020-09-29 DIAGNOSIS — M51.9 LUMBAR DISC DISEASE: ICD-10-CM

## 2020-09-29 DIAGNOSIS — M81.0 AGE-RELATED OSTEOPOROSIS WITHOUT CURRENT PATHOLOGICAL FRACTURE: ICD-10-CM

## 2020-09-29 DIAGNOSIS — M43.10 RETROLISTHESIS: ICD-10-CM

## 2020-09-29 DIAGNOSIS — M48.061 LUMBAR FORAMINAL STENOSIS: ICD-10-CM

## 2020-09-29 DIAGNOSIS — M43.16 SPONDYLOLISTHESIS OF LUMBAR REGION: ICD-10-CM

## 2020-09-29 DIAGNOSIS — G57.81 NEUROPATHY OF RIGHT SURAL NERVE: ICD-10-CM

## 2020-09-29 DIAGNOSIS — M54.16 LUMBAR RADICULOPATHY: Primary | ICD-10-CM

## 2020-09-29 PROCEDURE — 99213 OFFICE O/P EST LOW 20 MIN: CPT | Performed by: PHYSICAL MEDICINE & REHABILITATION

## 2020-09-29 NOTE — PROGRESS NOTES
[address] [facility]    Telemedicine Visit - Evaluation    Creed Kayce Stoner verbally consents to a Telemedicine Visit on 09/29/20. This visit is conducted using Telemedicine with live, interactive audio and video.     Chaya Tomlinson Tabatha Gr MD at [address] [place] [address],[facility] 1 ENDO   • CYSTOSCOPY N/A 4/20/2018    Performed by China Mckeon MD at [facility] OR   • ESOPHAGOGASTRODUODENOSCOPY (EGD) N/A 12/4/2018    Performed by Zana Sprague MD at [address] [place] [address],[facility] 1 ENDO   • FRACTURE SURGERY Left 2010    WRIST 9/24/2020 ) 30 tablet 1   • Estradiol (ESTRACE) 0.1 MG/GM Vaginal Cream Place 0.5 g vaginally twice a week. PRN 1 Tube 6   • Diclofenac Sodium 1 % Transdermal Gel Apply 4 g topically 4 (four) times daily as needed.  1 Tube 3   • CALCIUM CITRATE OR Take 1,40 intact  Ears/Nose/Throat:  External appearance identifies normal appearance without obvious deformity  Cardiovascular: No cyanosis, clubbing or edema  Respiratory: Non-labored respirations  Skin: No lesions noted   Neurological: alert & oriented x 3, atten mod far lateral, L3-4 left mod/right mild-mod far lateral & mild diffuse, L2-3 right mild/left mod foraminal bulging discs    4. L4-5 left mod, L3-4 right mild foraminal stenosis    5. L5-S1 grade 1 unstable, L4-5 grade 1 unstable spondylolisthesis    6.  L limitations as no physical exam could be performed. The patient was advised to call 911 or to go to the ER in case there was an emergency. The patient was also advised of the potential privacy & security concerns related to the telehealth platform.    The

## 2020-09-30 PROCEDURE — 99490 CHRNC CARE MGMT STAFF 1ST 20: CPT

## 2020-10-02 ENCOUNTER — PATIENT MESSAGE (OUTPATIENT)
Dept: NEUROLOGY | Facility: CLINIC | Age: 65
End: 2020-10-02

## 2020-10-02 NOTE — TELEPHONE ENCOUNTER
LOV tele visit 9/29/20    No follow up scheduled-mychart message sent to patient informing her to schedule follow up

## 2020-10-02 NOTE — TELEPHONE ENCOUNTER
From: Remi Roche Day  To: Sharmila Lundberg MD  Sent: 10/2/2020 8:45 AM CDT  Subject: Visit Follow-up Question    Morning Dr Maria Ines Lundberg. Of course I forgot to ask you something after our talk.    As long as there was no impediment of the sciatica nerves re my MRI

## 2020-10-04 RX ORDER — CYCLOBENZAPRINE HCL 10 MG
10 TABLET ORAL EVERY 8 HOURS PRN
Qty: 90 TABLET | Refills: 0 | Status: SHIPPED | OUTPATIENT
Start: 2020-10-04 | End: 2022-01-10

## 2020-10-09 ENCOUNTER — APPOINTMENT (OUTPATIENT)
Dept: LAB | Facility: HOSPITAL | Age: 65
End: 2020-10-09
Attending: INTERNAL MEDICINE
Payer: MEDICARE

## 2020-10-12 ENCOUNTER — NURSE ONLY (OUTPATIENT)
Dept: ENDOCRINOLOGY CLINIC | Facility: CLINIC | Age: 65
End: 2020-10-12
Payer: MEDICARE

## 2020-10-12 ENCOUNTER — TELEPHONE (OUTPATIENT)
Dept: ENDOCRINOLOGY CLINIC | Facility: CLINIC | Age: 65
End: 2020-10-12

## 2020-10-12 DIAGNOSIS — M81.8 OTHER OSTEOPOROSIS WITHOUT CURRENT PATHOLOGICAL FRACTURE: Primary | ICD-10-CM

## 2020-10-12 PROCEDURE — 96372 THER/PROPH/DIAG INJ SC/IM: CPT | Performed by: INTERNAL MEDICINE

## 2020-10-12 NOTE — PROGRESS NOTES
Patient presents for Evinity Injection ,tolerated well on L and R upper arm. Patient understands to return in 1 month and a day for next injection.

## 2020-10-12 NOTE — TELEPHONE ENCOUNTER
Pt called to remind RN to take Evenity injection out of the refrigerator. She has appt at 10am this morning. Attempted to reach RN/no answer. Please call pt to confirm.

## 2020-10-20 ENCOUNTER — TELEPHONE (OUTPATIENT)
Dept: ENDOCRINOLOGY CLINIC | Facility: CLINIC | Age: 65
End: 2020-10-20

## 2020-10-20 ENCOUNTER — PATIENT MESSAGE (OUTPATIENT)
Dept: ENDOCRINOLOGY CLINIC | Facility: CLINIC | Age: 65
End: 2020-10-20

## 2020-10-20 NOTE — TELEPHONE ENCOUNTER
Patient called in wondering how long a evenity injection last in her system.  She wants to know how long she should wait after getting injection to take a blood test.  Please follow up thank you

## 2020-10-20 NOTE — TELEPHONE ENCOUNTER
Dr James Hart - Pt is asking when to have labs for surgeon? She is worried Evenity/Prolia will affect lab results and is asking when (I.e., 2 weeks after injection, etc.) she should have labs for him surgeon? ?    Pt states she is having back fusion surgery on

## 2020-10-21 ENCOUNTER — PATIENT OUTREACH (OUTPATIENT)
Dept: CASE MANAGEMENT | Age: 65
End: 2020-10-21

## 2020-10-21 NOTE — PROGRESS NOTES
Called patient for monthly CCM outreach, pt stated she is on another call and agreeable to a call tomorrow.      Chart review - 4 min  Time with patient - 1 min  Total time - 5 min

## 2020-10-22 ENCOUNTER — PATIENT OUTREACH (OUTPATIENT)
Dept: CASE MANAGEMENT | Age: 65
End: 2020-10-22

## 2020-10-22 ENCOUNTER — TELEPHONE (OUTPATIENT)
Dept: INTERNAL MEDICINE CLINIC | Facility: CLINIC | Age: 65
End: 2020-10-22

## 2020-10-22 NOTE — TELEPHONE ENCOUNTER
Please call pt, she had a flu vaccine on 10/19/20 at [facility]  Pt is really tired and a bit dizzy  Is this related?   Please call to discuss/advise  Tasked to nursing

## 2020-10-22 NOTE — TELEPHONE ENCOUNTER
I spoke with patient and relayed Dr. Rigoberto Cook message. She verbalized understanding. She says she did a maneuver at home that she learned from the physical therapist and this helped a little. She has meclizine at home from February and she will try this as well. Invited her to call back with any questions or concerns.

## 2020-10-22 NOTE — TELEPHONE ENCOUNTER
To Dr. Gonzalo Diana - see pt message below. Flu vaccine on Monday, 10/19. Main sx: dizziness and fatigue. Onset: the last several days. Feels \"a little ship-boardy\". Wondering is it is return of vertigo - she will try her vertigo exercises. Slight nausea. Respiratory infection triage:    Fever:  [x]  No fever  []  Fever>100.4    Cough:  [] Tight cough  [] Cough with exertion  [] Dry cough  [] Sputum production, Color:     Breathing:  [] Mild shortness of breath interfering with activity  [] Wheezing  [] Pain with deep breathing  [] Using inhaler    Other symptoms:  [] Sore throat  [] Difficulty swallowing  [] Nasal drainage/congestion  [] Sinus congestion/pressure  [] Ear pain  [] Body aches  [] Poor appetite  [] Loss of sense of smell   [] Loss of sense of taste  []Conjunctivitis? [] Any recent travel? [] Any sick contacts? [] Are you a healthcare worker? ADDITIONAL NOTES:            Notified patient that we will route this message to the doctor and see what their recommendations would be. In the meantime, if anything worsens, they were advised to call back or seek emergent evaluation.

## 2020-10-22 NOTE — PROGRESS NOTES
Called patient for monthly CCM outreach, left message to call back.       Chart review - 1 min  Time with patient - 0 min  Total time - 6 min

## 2020-11-09 ENCOUNTER — PATIENT OUTREACH (OUTPATIENT)
Dept: CASE MANAGEMENT | Age: 65
End: 2020-11-09

## 2020-11-09 DIAGNOSIS — M81.0 AGE-RELATED OSTEOPOROSIS WITHOUT CURRENT PATHOLOGICAL FRACTURE: ICD-10-CM

## 2020-11-09 DIAGNOSIS — I10 ESSENTIAL HYPERTENSION: ICD-10-CM

## 2020-11-09 DIAGNOSIS — G47.00 INSOMNIA, UNSPECIFIED TYPE: ICD-10-CM

## 2020-11-09 DIAGNOSIS — K85.00 IDIOPATHIC ACUTE PANCREATITIS, UNSPECIFIED COMPLICATION STATUS: ICD-10-CM

## 2020-11-09 DIAGNOSIS — G57.81 NEUROPATHY OF RIGHT SURAL NERVE: ICD-10-CM

## 2020-11-09 DIAGNOSIS — K21.9 GASTROESOPHAGEAL REFLUX DISEASE, UNSPECIFIED WHETHER ESOPHAGITIS PRESENT: ICD-10-CM

## 2020-11-09 DIAGNOSIS — I95.9 HYPOTENSION, UNSPECIFIED HYPOTENSION TYPE: ICD-10-CM

## 2020-11-09 DIAGNOSIS — G25.81 RESTLESS LEGS SYNDROME (RLS): ICD-10-CM

## 2020-11-09 DIAGNOSIS — H81.10 BENIGN PAROXYSMAL POSITIONAL VERTIGO, UNSPECIFIED LATERALITY: ICD-10-CM

## 2020-11-09 DIAGNOSIS — M85.80 OSTEOPENIA, UNSPECIFIED LOCATION: ICD-10-CM

## 2020-11-09 DIAGNOSIS — M19.022 PRIMARY OSTEOARTHRITIS OF LEFT ELBOW: ICD-10-CM

## 2020-11-09 NOTE — PROGRESS NOTES
11/9/2020  Spoke to Thaddeus Quiroz for CCM.       Updates to patient care team/ comments: None  Patient reported changes in medications: None  Med Adherence  Comment: Taking as directed     Health Maintenance: Reviewed  Annual Physical due on 03/20/2020  Influenza And Concerns: None                   - Plan for overcoming all barriers: n/a            Patient agrees to goal action plan.   Self-Management Abilities (patient reported)             1= least confident in achieving goal, 5= very confident               - co

## 2020-11-11 NOTE — LETTER
[facility] SURGERY SCHEDULING FORM   [address] [facility]   [phone] (scheduling phone) [phone] (scheduling fax)     PATIENT INFORMATION   Last Name:      Day      First Name:    Emelina Hicks Allergies: Aleve; Levaquin [Levofloxacin]; Codeine; Hydrocodone; Morphine; Nsaids; Augmentin, [Amoxicillin-Pot Clavulanate]; Bactrim [Sulfamethoxazole W/Trimethoprim]; Tramadol Hcl         Completed by:     Smitha WESLEY      Date:    7/9/2019 normal (ped)...

## 2020-11-13 ENCOUNTER — NURSE ONLY (OUTPATIENT)
Dept: ENDOCRINOLOGY CLINIC | Facility: CLINIC | Age: 65
End: 2020-11-13
Payer: MEDICARE

## 2020-11-13 DIAGNOSIS — M81.8 OTHER OSTEOPOROSIS WITHOUT CURRENT PATHOLOGICAL FRACTURE: Primary | ICD-10-CM

## 2020-11-13 PROCEDURE — 96372 THER/PROPH/DIAG INJ SC/IM: CPT | Performed by: INTERNAL MEDICINE

## 2020-11-13 NOTE — PROGRESS NOTES
RN reviewed patient lab work and imaging prior to appointment to ensure safe administration of Evenity injection. RN administered Evenity injection to right and left upper arm. Patient with appointment scheduled on December 14th.

## 2020-11-20 ENCOUNTER — PATIENT MESSAGE (OUTPATIENT)
Dept: INTERNAL MEDICINE CLINIC | Facility: CLINIC | Age: 65
End: 2020-11-20

## 2020-11-20 ENCOUNTER — LAB ENCOUNTER (OUTPATIENT)
Dept: LAB | Facility: HOSPITAL | Age: 65
End: 2020-11-20
Attending: INTERNAL MEDICINE
Payer: MEDICARE

## 2020-11-20 DIAGNOSIS — E55.9 VITAMIN D DEFICIENCY: ICD-10-CM

## 2020-11-20 DIAGNOSIS — M81.8 OTHER OSTEOPOROSIS WITHOUT CURRENT PATHOLOGICAL FRACTURE: ICD-10-CM

## 2020-11-20 PROCEDURE — 84080 ASSAY ALKALINE PHOSPHATASES: CPT

## 2020-11-20 PROCEDURE — 82523 COLLAGEN CROSSLINKS: CPT

## 2020-11-20 PROCEDURE — 36415 COLL VENOUS BLD VENIPUNCTURE: CPT

## 2020-11-20 PROCEDURE — 83970 ASSAY OF PARATHORMONE: CPT

## 2020-11-20 PROCEDURE — 82306 VITAMIN D 25 HYDROXY: CPT

## 2020-11-20 PROCEDURE — 80048 BASIC METABOLIC PNL TOTAL CA: CPT

## 2020-11-20 NOTE — TELEPHONE ENCOUNTER
From: Armin Gayla Day  To: Sha Bardales MD  Sent: 11/20/2020 6:32 AM CST  Subject: Prescription Question    Morning. I know Dr Brownlee Shall is not in today, but maybe someone could look at her past notes and help me. I’m done with my restless leg stuff.

## 2020-11-23 ENCOUNTER — TELEPHONE (OUTPATIENT)
Dept: INTERNAL MEDICINE CLINIC | Facility: CLINIC | Age: 65
End: 2020-11-23

## 2020-11-23 DIAGNOSIS — Z20.822 EXPOSURE TO COVID-19 VIRUS: Primary | ICD-10-CM

## 2020-11-23 NOTE — TELEPHONE ENCOUNTER
Patient is calling with possible covid exposure on Friday. Patient went to a wake on Friday and friends tested positive on Sunday. Patient and friends were about three feet away from each other, and were wearing masks.  Patient walked around a little bit wi

## 2020-11-24 RX ORDER — ROPINIROLE 0.25 MG/1
0.25 TABLET, FILM COATED ORAL NIGHTLY
Qty: 30 TABLET | Refills: 5 | Status: SHIPPED | OUTPATIENT
Start: 2020-11-24 | End: 2021-07-14

## 2020-11-24 NOTE — TELEPHONE ENCOUNTER
Dr. Nida Martinez' message relayed to pt who verbalized understanding. Verified pharmacy with pt. Rx eRx'd.

## 2020-11-30 PROCEDURE — 99490 CHRNC CARE MGMT STAFF 1ST 20: CPT

## 2020-12-02 NOTE — TELEPHONE ENCOUNTER
Patient requests call back from nursing   She has questions re: her Covid test order  Tonight or tomorrow morning is fine  [phone]

## 2020-12-02 NOTE — TELEPHONE ENCOUNTER
Spoke to Thaddeus Quiroz. She states on this coming Friday it will be 14 days since she had a covid exposure while at a wake. She says all people at the wake were masked. She talked to them for about 10-15 minutes.  One person who did test positive came within

## 2020-12-04 ENCOUNTER — APPOINTMENT (OUTPATIENT)
Dept: LAB | Facility: HOSPITAL | Age: 65
End: 2020-12-04
Attending: INTERNAL MEDICINE
Payer: MEDICARE

## 2020-12-04 DIAGNOSIS — Z20.822 EXPOSURE TO COVID-19 VIRUS: ICD-10-CM

## 2020-12-07 NOTE — TELEPHONE ENCOUNTER
Pt has questions re: Covid results she received in [facility]    [phone]    Message 1 of 2 w/same last name  -  is still waiting for his results and they had test done at same time

## 2020-12-09 ENCOUNTER — PATIENT MESSAGE (OUTPATIENT)
Dept: PODIATRY CLINIC | Facility: CLINIC | Age: 65
End: 2020-12-09

## 2020-12-09 ENCOUNTER — PATIENT OUTREACH (OUTPATIENT)
Dept: CASE MANAGEMENT | Age: 65
End: 2020-12-09

## 2020-12-09 NOTE — PROGRESS NOTES
Called pt for monthly CCM outreach pt stated she no longer wants to be in the program does not feel it is benefiting her. CCM episode ended, removed myself from care team, wished pt well.

## 2020-12-14 ENCOUNTER — NURSE ONLY (OUTPATIENT)
Dept: ENDOCRINOLOGY CLINIC | Facility: CLINIC | Age: 65
End: 2020-12-14
Payer: MEDICARE

## 2020-12-14 DIAGNOSIS — M81.8 OTHER OSTEOPOROSIS WITHOUT CURRENT PATHOLOGICAL FRACTURE: Primary | ICD-10-CM

## 2020-12-14 PROCEDURE — 96372 THER/PROPH/DIAG INJ SC/IM: CPT | Performed by: INTERNAL MEDICINE

## 2020-12-14 NOTE — PROGRESS NOTES
RN reviewed patient lab work and imaging prior to appointment to ensure safe administration of medication. Patient to receive Prolia injection #1 today, has been on Evenity, although had injection #12 on 11/13/20.  Per Dr. Audie Evans, patient to start on Prolia

## 2020-12-15 ENCOUNTER — TELEPHONE (OUTPATIENT)
Dept: GASTROENTEROLOGY | Facility: CLINIC | Age: 65
End: 2020-12-15

## 2020-12-15 DIAGNOSIS — K85.00 IDIOPATHIC ACUTE PANCREATITIS, UNSPECIFIED COMPLICATION STATUS: Primary | ICD-10-CM

## 2020-12-15 NOTE — TELEPHONE ENCOUNTER
Pt notified she can go for the tests if she starts to experience severe abdominal pain.     She took some pepcid and will see if that controls her heartburn

## 2020-12-15 NOTE — TELEPHONE ENCOUNTER
Pt states she has horrible burning in her stomach high up and is a little dizzy, she would like to know if Dr can Order an Amylase and Lipase for her she has had bouts with pancreatitis. Please advise going to try to transfer. ..

## 2020-12-15 NOTE — TELEPHONE ENCOUNTER
I spoke to the pt. Last OV with Dr Bone Court was 08/24/2020    Former pt of Dr Kristal Garcia    Pt experiencing burning up underneath ribcage. Asking for an order for an amylase and lipase level just in case she is experiencing pancreatitis.  She does have a hx of

## 2020-12-16 RX ORDER — MELOXICAM 15 MG/1
15 TABLET ORAL DAILY
Qty: 21 TABLET | Refills: 1 | OUTPATIENT
Start: 2020-12-16

## 2020-12-22 ENCOUNTER — OFFICE VISIT (OUTPATIENT)
Dept: PODIATRY CLINIC | Facility: CLINIC | Age: 65
End: 2020-12-22
Payer: MEDICARE

## 2020-12-22 ENCOUNTER — TELEPHONE (OUTPATIENT)
Dept: PHYSICAL THERAPY | Facility: HOSPITAL | Age: 65
End: 2020-12-22

## 2020-12-22 VITALS — WEIGHT: 140 LBS | BODY MASS INDEX: 21.72 KG/M2 | HEIGHT: 67.5 IN

## 2020-12-22 DIAGNOSIS — M72.2 PLANTAR FASCIITIS OF RIGHT FOOT: Primary | ICD-10-CM

## 2020-12-22 DIAGNOSIS — M72.2 PLANTAR FASCIITIS OF LEFT FOOT: ICD-10-CM

## 2020-12-22 PROCEDURE — G0463 HOSPITAL OUTPT CLINIC VISIT: HCPCS | Performed by: PODIATRIST

## 2020-12-22 PROCEDURE — 99213 OFFICE O/P EST LOW 20 MIN: CPT | Performed by: PODIATRIST

## 2020-12-22 RX ORDER — CLOBETASOL PROPIONATE 0.5 MG/G
CREAM TOPICAL
COMMUNITY
Start: 2020-08-28 | End: 2020-12-22

## 2020-12-22 RX ORDER — CLINDAMYCIN PHOSPHATE 10 MG/G
GEL TOPICAL
COMMUNITY
Start: 2020-12-12 | End: 2021-08-25

## 2020-12-22 NOTE — PROGRESS NOTES
HPI:    Patient ID: Dian Stoner is a 76year old female. This 21-year-old female presents for follow-up in reference to heel pain. She has had some positive change but seems to revert back and is frustrated at present.   The pain is primarily with weigh CITRATE OR Take 1,400 mg by mouth daily. • acetaminophen 500 MG Oral Tab Take 500 mg by mouth every 6 (six) hours as needed for Pain. • Multiple Vitamins Oral Tab Take 1 tablet by mouth daily.  Multiple Vitamins tablet      • Clindamycin Phosphate this encounter       Imaging & Referrals:  PHYSICAL THERAPY - INTERNAL       IK#[id number]

## 2020-12-28 ENCOUNTER — TELEPHONE (OUTPATIENT)
Dept: PODIATRY CLINIC | Facility: CLINIC | Age: 65
End: 2020-12-28

## 2020-12-28 RX ORDER — MELOXICAM 15 MG/1
15 TABLET ORAL
Qty: 30 TABLET | Refills: 1 | Status: SHIPPED | OUTPATIENT
Start: 2020-12-28 | End: 2021-02-26

## 2020-12-28 NOTE — TELEPHONE ENCOUNTER
Pt called stating pt had an appointment on 12-22-20. [facility] has not received the rx. Meloxicam.  Please send.   Call pt to advise

## 2020-12-28 NOTE — TELEPHONE ENCOUNTER
I thought she had some left, Meloxicam 15 mg #30 1 daily with dinner. Generic ok 1 refill .  thnks scr

## 2021-01-07 PROBLEM — IMO0002 DISORDER OF ROTATOR CUFF SYNDROME OF LEFT SHOULDER AND ALLIED DISORDER: Status: ACTIVE | Noted: 2021-01-07

## 2021-01-08 ENCOUNTER — LAB ENCOUNTER (OUTPATIENT)
Dept: LAB | Facility: HOSPITAL | Age: 66
End: 2021-01-08
Attending: INTERNAL MEDICINE
Payer: MEDICARE

## 2021-01-08 DIAGNOSIS — K85.00 IDIOPATHIC ACUTE PANCREATITIS, UNSPECIFIED COMPLICATION STATUS: ICD-10-CM

## 2021-01-08 LAB
AMYLASE SERPL-CCNC: 59 U/L (ref 25–115)
LIPASE SERPL-CCNC: 156 U/L (ref 73–393)

## 2021-01-08 PROCEDURE — 36415 COLL VENOUS BLD VENIPUNCTURE: CPT | Performed by: INTERNAL MEDICINE

## 2021-01-08 PROCEDURE — 82150 ASSAY OF AMYLASE: CPT | Performed by: INTERNAL MEDICINE

## 2021-01-08 PROCEDURE — 83690 ASSAY OF LIPASE: CPT

## 2021-01-28 ENCOUNTER — TELEPHONE (OUTPATIENT)
Dept: PODIATRY CLINIC | Facility: CLINIC | Age: 66
End: 2021-01-28

## 2021-01-28 DIAGNOSIS — M72.2 PLANTAR FASCIITIS OF LEFT FOOT: ICD-10-CM

## 2021-01-28 DIAGNOSIS — M72.2 PLANTAR FASCIITIS OF RIGHT FOOT: Primary | ICD-10-CM

## 2021-01-28 NOTE — TELEPHONE ENCOUNTER
Pt called to request a new physical therapy order be sent to [facility] in [place].   Fax number is [phone]

## 2021-01-28 NOTE — TELEPHONE ENCOUNTER
PT was previously ordered for 3 weeks on 20. Visits have now . Dr. Fani Dalal pt would like new order for PT placed and faxed below. If you would like pt to continue PT please advise on # visits per week and length of therapy. Thanks.

## 2021-01-28 NOTE — TELEPHONE ENCOUNTER
Noted PT order placed as written below with same dx as previous order. Faxed to [facility] at number listed below. Called and notified the patient.

## 2021-01-29 ENCOUNTER — TELEPHONE (OUTPATIENT)
Dept: GASTROENTEROLOGY | Facility: CLINIC | Age: 66
End: 2021-01-29

## 2021-01-29 NOTE — TELEPHONE ENCOUNTER
Dr. Arnulfo Saldaña-    Rn spoke to pt. Previous pt of Dr. Елена García. Pt reports she's been experiencing excessive  burping and acid reflex which have worsened in the past few weeks (See TE from 12/15/2020 as symptoms have been ongoing).  Pt states she does have H

## 2021-01-29 NOTE — TELEPHONE ENCOUNTER
Dr. Kermit Goldberg-    RAMANDEEP    Pt accepted double book for this Monday, 02/01 @ 11 AM.     RN spoke to pt. Verified time, location and to arrive 15 minutes early. Pt verbalized understanding with no further questions or concerns at this time.      Future Appointments

## 2021-01-29 NOTE — TELEPHONE ENCOUNTER
Pt states she has been experiencing consistent burping for the past two weeks. Pt states she has trouble swallowing food and pills Pt states  \"one time I felt like something was stuck in my throat and I felt pain. \" Please call [phone], Pt states she

## 2021-02-01 ENCOUNTER — TELEPHONE (OUTPATIENT)
Dept: GASTROENTEROLOGY | Facility: CLINIC | Age: 66
End: 2021-02-01

## 2021-02-01 ENCOUNTER — OFFICE VISIT (OUTPATIENT)
Dept: GASTROENTEROLOGY | Facility: CLINIC | Age: 66
End: 2021-02-01
Payer: MEDICARE

## 2021-02-01 VITALS
SYSTOLIC BLOOD PRESSURE: 123 MMHG | DIASTOLIC BLOOD PRESSURE: 81 MMHG | HEART RATE: 79 BPM | HEIGHT: 67.5 IN | WEIGHT: 142.63 LBS | BODY MASS INDEX: 22.13 KG/M2

## 2021-02-01 DIAGNOSIS — R14.2 BELCHING SYMPTOM: Primary | ICD-10-CM

## 2021-02-01 DIAGNOSIS — R14.0 BLOATING: ICD-10-CM

## 2021-02-01 DIAGNOSIS — Z01.812 PRE-PROCEDURE LAB EXAM: ICD-10-CM

## 2021-02-01 DIAGNOSIS — R13.19 ESOPHAGEAL DYSPHAGIA: ICD-10-CM

## 2021-02-01 PROCEDURE — 99214 OFFICE O/P EST MOD 30 MIN: CPT | Performed by: INTERNAL MEDICINE

## 2021-02-01 NOTE — TELEPHONE ENCOUNTER
Scheduled for:  Cassidy Reese  Provider Name:  Deena Rothman  Date:  3/2/21  Location:  [facility]  Sedation:  Mac  Time:  1:00 Pm , (pt is aware [facility] will call the day before to confirm arrival time)  Prep:  Npo 3 hrs before prior to procedure  Meds/Allergies Reconc

## 2021-02-01 NOTE — PATIENT INSTRUCTIONS
Video swallow  EGD with MAC anesthesia at surgery center or hospital   Minimize NSAIDs  Low FODMAP diet  BOWEL CLEANSE INSTRUCTIONS:  1. Pick a day you will be at home, close to a toilet.   2. Have a some juice for breakfast.   3. Around 10AM start drinking

## 2021-02-01 NOTE — PROGRESS NOTES
[address] Gastroenterology  Clinic Follow-up Visit    Patient presents with:  Belching: food got stuck in the esophagus ,gagging        Subjective/HPI:   Merlin Southward Day is a 76year old year-old female, patient of Dr. Shelby Larson with history of osteop shortness of breath. Pertinent Surgical Hx:  - See additional surgical hx below  - No known issues with sedation.  - No known history of sleep apnea.     Pertinent Family Hx:  - Denies family hx of esophageal, gastric or colon cancer  - Denies family h • HAND GANGLION EXCISION Right 6/22/2018    Performed by Jonas Casanova MD at [facility] OR   • LASIK     • NASAL REMOVAL FOREIGN BODY Left 3/6/2019    Performed by Nila Lowe MD at [facility] OR   • OTHER SURGICAL HISTORY  09/2017    Fussion L4-L5 - tablet (15 mg total) by mouth daily.  Take with dinner 21 tablet 1   • AMLODIPINE BESYLATE 10 MG Oral Tab TAKE 1 TABLET(10 MG) BY MOUTH DAILY 90 tablet 3   • Hydrocortisone (ANUSOL-HC) 2.5 % External Cream Place 1 Application rectally 2 (two) times daily as for  rash  ALLERGIC/IMMUNOLOGIC:  negative for hay fever allergies  ENDOCRINE:  negative for cold intolerance and heat intolerance  MUSCULOSKELETAL:  negative for  joint stiffness and joint swelling  BEHAVIOR/PSYCH:  negative for depressed mood    PHYSICAL Resume regular meals the following day, along with laxative medications. 8. You should continue your regular medications during the washout day.        Orders This Visit:  Orders Placed This Encounter      Pre-Procedure Covid-19 Testing by PCR ()

## 2021-02-04 ENCOUNTER — TELEPHONE (OUTPATIENT)
Dept: GASTROENTEROLOGY | Facility: CLINIC | Age: 66
End: 2021-02-04

## 2021-02-05 ENCOUNTER — PATIENT MESSAGE (OUTPATIENT)
Dept: GASTROENTEROLOGY | Facility: CLINIC | Age: 66
End: 2021-02-05

## 2021-02-05 NOTE — TELEPHONE ENCOUNTER
From: Jerzy All Day  To: Corey Bates MD  Sent: 2/5/2021 4:50 PM CST  Subject: Visit Follow-up Question    Dr Lizandro Gallardo. That bowel cleanse was MISERABLE. I still wasn't completely cleaned out after 6 hours. But I stopped drinking.    I almost used the whole

## 2021-02-08 ENCOUNTER — TELEPHONE (OUTPATIENT)
Dept: GASTROENTEROLOGY | Facility: CLINIC | Age: 66
End: 2021-02-08

## 2021-02-08 ENCOUNTER — PATIENT MESSAGE (OUTPATIENT)
Dept: GASTROENTEROLOGY | Facility: CLINIC | Age: 66
End: 2021-02-08

## 2021-02-08 DIAGNOSIS — R14.0 BLOATING: ICD-10-CM

## 2021-02-08 DIAGNOSIS — R14.2 BELCHING: Primary | ICD-10-CM

## 2021-02-08 DIAGNOSIS — R13.10 DYSPHAGIA, UNSPECIFIED TYPE: ICD-10-CM

## 2021-02-08 NOTE — TELEPHONE ENCOUNTER
I called patient back to go over date and a sooner available appt for procedure if possible, patient states she is okay with 03/02/2021, she was mistaken and thought her procedure was for 03/22/2021. TE closed.

## 2021-02-08 NOTE — TELEPHONE ENCOUNTER
GI schedulers-    Pt inquiring if she could be offered an earlier date than current EGD scheduled date. Please contact pt if any earlier date can be offered,     Thank you!

## 2021-02-08 NOTE — TELEPHONE ENCOUNTER
Pt has EGD scheduled for 3/2/21 and inquiring if there are cancellations prior.  Pt also requesting to put on a wait list. Please call [phone]

## 2021-02-09 NOTE — TELEPHONE ENCOUNTER
Dr. Abel Caro-    Please see message below as well as MyChart messages from yesterday. Pt requesting your opinion on stopping certain medications d/t her recent GI symptoms. Please advise if applicable. Thank you!

## 2021-02-09 NOTE — TELEPHONE ENCOUNTER
From: Liz Gwynn Day  To: Adela Orlando MD  Sent: 2/8/2021 6:23 PM CST  Subject: Visit Follow-up Question    Hi Dr Jean Carlos Ash. . just thought of this. I'm going to throw this out there.  I've been taking Ropinirole and Cyclobenzapine together for quite sometime

## 2021-02-15 ENCOUNTER — TELEPHONE (OUTPATIENT)
Dept: SURGERY | Facility: CLINIC | Age: 66
End: 2021-02-15

## 2021-02-15 NOTE — TELEPHONE ENCOUNTER
Pt called stating pt left a message this morning but has not received a call back regarding the appointment on 2-17-21.   Please call

## 2021-02-15 NOTE — TELEPHONE ENCOUNTER
Patient has 6 month follow scheduled on 2/17/2021 asking what this appointment is for. Please call at:[phone],thanks.   *patient informed that their is an order for xray that was placed

## 2021-02-16 ENCOUNTER — HOSPITAL ENCOUNTER (OUTPATIENT)
Dept: GENERAL RADIOLOGY | Facility: HOSPITAL | Age: 66
Discharge: HOME | End: 2021-02-16
Attending: UROLOGY
Payer: MEDICARE

## 2021-02-16 DIAGNOSIS — N20.0 BILATERAL KIDNEY STONES: ICD-10-CM

## 2021-02-16 PROCEDURE — 74018 RADEX ABDOMEN 1 VIEW: CPT | Performed by: UROLOGY

## 2021-02-16 NOTE — TELEPHONE ENCOUNTER
Received incoming call from phone room  Patient calling to follow up regarding message below    Patient aware to obtain Xray prior to appt tomorrow  Patient states she will go today, aware no appt needed for xray

## 2021-02-17 ENCOUNTER — OFFICE VISIT (OUTPATIENT)
Dept: SURGERY | Facility: CLINIC | Age: 66
End: 2021-02-17
Payer: MEDICARE

## 2021-02-17 VITALS
DIASTOLIC BLOOD PRESSURE: 85 MMHG | BODY MASS INDEX: 21.97 KG/M2 | HEIGHT: 67 IN | WEIGHT: 140 LBS | HEART RATE: 75 BPM | SYSTOLIC BLOOD PRESSURE: 136 MMHG

## 2021-02-17 DIAGNOSIS — R31.21 ASYMPTOMATIC MICROSCOPIC HEMATURIA: ICD-10-CM

## 2021-02-17 DIAGNOSIS — N20.0 BILATERAL KIDNEY STONES: Primary | ICD-10-CM

## 2021-02-17 LAB
APPEARANCE: CLEAR
MULTISTIX LOT#: 1044 NUMERIC
PH, URINE: 6.5 (ref 4.5–8)
SPECIFIC GRAVITY: <=1.005 (ref 1–1.03)
URINE-COLOR: YELLOW
UROBILINOGEN,SEMI-QN: 0.2 MG/DL (ref 0–1.9)

## 2021-02-17 PROCEDURE — 81002 URINALYSIS NONAUTO W/O SCOPE: CPT | Performed by: UROLOGY

## 2021-02-17 PROCEDURE — 99213 OFFICE O/P EST LOW 20 MIN: CPT | Performed by: UROLOGY

## 2021-02-17 NOTE — PROGRESS NOTES
[facility] Urology  Follow-Up Visit    HPI: Guinean Northern Day is a 76year old female presents for a follow up visit. Patient was last seen on 8/12/2020.     INTERVAL HISTORY: Here for follow-up on history of microscopic hematuria and bilateral nephrolithi the procedure.      U/A 5/8/2020 with 14 RBC per HPF. No WBC's, leuks or nitrites and a few bacteria. Urine culture negative. She reports having UTI-like symptoms at the time which responded to oral diflucan.      She denies gross hematuria.      - Urine cy 5.0 - 8.0 7.0   Protein Urine     Negative mg/dL Negative   Urobilinogen Urine     <2.0 <2.0   Nitrite Urine     Negative Negative   Leukocyte Esterase     Negative Negative   Microscopic     Microscopic not indicated       IMAGING:    CT-Urogram (6/20 asymptomatic, non-obstructing kidney stones as well as history of asymptomatic microscopic hematuria. Work-up of AMH including urine cytology, CT urogram, and office cystoscopy in 2020 was essentially unrevealing.  CT urogram revealed a duplex morphology

## 2021-02-18 ENCOUNTER — HOSPITAL ENCOUNTER (OUTPATIENT)
Dept: MRI IMAGING | Facility: HOSPITAL | Age: 66
Discharge: HOME | End: 2021-02-18
Attending: ORTHOPAEDIC SURGERY
Payer: MEDICARE

## 2021-02-18 DIAGNOSIS — IMO0002 DISORDER OF ROTATOR CUFF SYNDROME OF LEFT SHOULDER AND ALLIED DISORDER: ICD-10-CM

## 2021-02-18 DIAGNOSIS — M25.512 LEFT SHOULDER PAIN, UNSPECIFIED CHRONICITY: ICD-10-CM

## 2021-02-18 PROCEDURE — 73221 MRI JOINT UPR EXTREM W/O DYE: CPT | Performed by: ORTHOPAEDIC SURGERY

## 2021-02-18 NOTE — TELEPHONE ENCOUNTER
Rescheduled for:  EGD - 14640  Provider Name:  Dr. Susi Lima  Date:  FROM - 3/2/21            TO - 2/26/21  Location:  FROM [facility]            TO [facility]  Sedation:  MAC  Time:  FROM - 1:00 pm              TO - 8:30 am (pt is aware to arrive at 7:30 am)  Pre

## 2021-02-19 ENCOUNTER — HOSPITAL ENCOUNTER (OUTPATIENT)
Dept: CT IMAGING | Facility: HOSPITAL | Age: 66
Discharge: HOME | End: 2021-02-19
Attending: UROLOGY
Payer: MEDICARE

## 2021-02-19 DIAGNOSIS — N20.0 BILATERAL KIDNEY STONES: ICD-10-CM

## 2021-02-19 PROCEDURE — 74176 CT ABD & PELVIS W/O CONTRAST: CPT | Performed by: UROLOGY

## 2021-02-22 ENCOUNTER — TELEPHONE (OUTPATIENT)
Dept: GASTROENTEROLOGY | Facility: CLINIC | Age: 66
End: 2021-02-22

## 2021-02-22 PROBLEM — M75.112 NONTRAUMATIC INCOMPLETE TEAR OF LEFT ROTATOR CUFF: Status: ACTIVE | Noted: 2021-02-22

## 2021-02-22 PROBLEM — M24.112 LABRAL TEAR OF SHOULDER, DEGENERATIVE, LEFT: Status: ACTIVE | Noted: 2021-02-22

## 2021-02-22 PROBLEM — M75.22 BICEPS TENDONITIS ON LEFT: Status: ACTIVE | Noted: 2021-02-22

## 2021-02-22 NOTE — TELEPHONE ENCOUNTER
I spoke to the pt and she was advised to hold her supplements for 7 days prior to her EGD this Friday 02/26/21. She is not taking any other medications that needed to be held.     She was advised to take her amlodipine with small sips of H2O the morning

## 2021-02-24 ENCOUNTER — TELEPHONE (OUTPATIENT)
Dept: GASTROENTEROLOGY | Facility: CLINIC | Age: 66
End: 2021-02-24

## 2021-02-24 ENCOUNTER — LAB ENCOUNTER (OUTPATIENT)
Dept: LAB | Facility: HOSPITAL | Age: 66
End: 2021-02-24
Attending: INTERNAL MEDICINE
Payer: MEDICARE

## 2021-02-24 DIAGNOSIS — Z01.818 PRE-OP TESTING: ICD-10-CM

## 2021-02-24 LAB — SARS-COV-2 RNA RESP QL NAA+PROBE: NOT DETECTED

## 2021-02-24 NOTE — TELEPHONE ENCOUNTER
Pt states she was prescribed anti- inflammatory medication by another physician. Pt inquiring if it is ok to take medication.  Pt has EGD scheduled for 2/26/21

## 2021-02-24 NOTE — TELEPHONE ENCOUNTER
Pt was concerned about taking meloxicam prior to her EGD for her shoulder pain.     I advised the pt she can take it    She has been holding all of her supplements as directed & will take her amlodipine with small sips of water the morning of her procedure

## 2021-02-26 ENCOUNTER — HOSPITAL ENCOUNTER (OUTPATIENT)
Age: 66
Setting detail: HOSPITAL OUTPATIENT SURGERY
Discharge: HOME | End: 2021-02-26
Attending: INTERNAL MEDICINE | Admitting: INTERNAL MEDICINE
Payer: MEDICARE

## 2021-02-26 ENCOUNTER — ANESTHESIA EVENT (OUTPATIENT)
Dept: ENDOSCOPY | Age: 66
End: 2021-02-26
Payer: MEDICARE

## 2021-02-26 ENCOUNTER — ANESTHESIA (OUTPATIENT)
Dept: ENDOSCOPY | Age: 66
End: 2021-02-26
Payer: MEDICARE

## 2021-02-26 ENCOUNTER — PATIENT MESSAGE (OUTPATIENT)
Dept: GASTROENTEROLOGY | Facility: CLINIC | Age: 66
End: 2021-02-26

## 2021-02-26 VITALS
HEIGHT: 68 IN | HEART RATE: 84 BPM | BODY MASS INDEX: 21.22 KG/M2 | DIASTOLIC BLOOD PRESSURE: 90 MMHG | RESPIRATION RATE: 14 BRPM | TEMPERATURE: 98 F | SYSTOLIC BLOOD PRESSURE: 119 MMHG | OXYGEN SATURATION: 97 % | WEIGHT: 140 LBS

## 2021-02-26 DIAGNOSIS — Z01.818 PRE-OP TESTING: Primary | ICD-10-CM

## 2021-02-26 DIAGNOSIS — R14.0 BLOATING: ICD-10-CM

## 2021-02-26 DIAGNOSIS — R13.10 DYSPHAGIA, UNSPECIFIED TYPE: ICD-10-CM

## 2021-02-26 DIAGNOSIS — R14.2 BELCHING: ICD-10-CM

## 2021-02-26 PROCEDURE — 99070 SPECIAL SUPPLIES PHYS/QHP: CPT | Performed by: INTERNAL MEDICINE

## 2021-02-26 PROCEDURE — 43239 EGD BIOPSY SINGLE/MULTIPLE: CPT | Performed by: INTERNAL MEDICINE

## 2021-02-26 RX ORDER — GLYCOPYRROLATE 0.2 MG/ML
INJECTION, SOLUTION INTRAMUSCULAR; INTRAVENOUS AS NEEDED
Status: DISCONTINUED | OUTPATIENT
Start: 2021-02-26 | End: 2021-02-26 | Stop reason: SURG

## 2021-02-26 RX ORDER — SODIUM CHLORIDE, SODIUM LACTATE, POTASSIUM CHLORIDE, CALCIUM CHLORIDE 600; 310; 30; 20 MG/100ML; MG/100ML; MG/100ML; MG/100ML
INJECTION, SOLUTION INTRAVENOUS CONTINUOUS
Status: DISCONTINUED | OUTPATIENT
Start: 2021-02-26 | End: 2021-02-26

## 2021-02-26 RX ORDER — LIDOCAINE HYDROCHLORIDE 20 MG/ML
INJECTION, SOLUTION EPIDURAL; INFILTRATION; INTRACAUDAL; PERINEURAL AS NEEDED
Status: DISCONTINUED | OUTPATIENT
Start: 2021-02-26 | End: 2021-02-26 | Stop reason: SURG

## 2021-02-26 RX ADMIN — LIDOCAINE HYDROCHLORIDE 100 MG: 20 INJECTION, SOLUTION EPIDURAL; INFILTRATION; INTRACAUDAL; PERINEURAL at 08:28:00

## 2021-02-26 RX ADMIN — GLYCOPYRROLATE 0.2 MG: 0.2 INJECTION, SOLUTION INTRAMUSCULAR; INTRAVENOUS at 08:28:00

## 2021-02-26 NOTE — ANESTHESIA PREPROCEDURE EVALUATION
Anesthesia PreOp Note    HPI:     Jerzy All Day is a 76year old female who presents for preoperative consultation requested by: Cale Ko MD    Date of Surgery: 2/26/2021    Procedure(s):  ESOPHAGOGASTRODUODENOSCOPY (EGD)  Indication: Belching, Bloati lateral, L4-5 left mod-large/right mod far lateral, L3-4 left mod/right mild-mod far lateral & mild diffuse, L2-3 right mild/left mod foraminal bulging discs         Date Noted: 09/18/2017      L4-5 left mod, L3-4 right mild foraminal stenosis         Date 04/25/2012      Family history of colon cancer         Date Noted: 04/25/2012      Family history of colonic polyps         Date Noted: 04/25/2012      Esophageal reflux         Date Noted: 01/31/2012      Hypotension         Date Noted: 09/18/2010      Bi (EGD) N/A 12/4/2018    Performed by Collin Garcia MD at [facility]   • FRACTURE SURGERY Left 2010    WRIST FRACTURE ORIF   • FRACTURE SURGERY Left 2013    ORIF wrist fracture revision with plates and screws- Ortho Dr Janeth Negrete   • [address] [facility] a week. PRN, Disp: 1 Tube, Rfl: 6    •  Diclofenac Sodium 1 % Transdermal Gel, Apply 4 g topically 4 (four) times daily as needed. , Disp: 1 Tube, Rfl: 3    •  CALCIUM CITRATE OR, Take 1,400 mg by mouth daily.   , Disp: , Rfl:     •  acetaminophen 500 MG Ora (gallbladder disease) Sister    • Crohn's Disease Other    • Heart Disease Other    • Ovarian Cancer Maternal Aunt         71's 80's     Social History    Socioeconomic History      Marital status:       Spouse name: Not on file      Number of child Exercise: No        Bike Helmet: Not Asked        Seat Belt: Not Asked        Self-Exams: Not Asked    Social History Narrative      The patient does not use an assistive device. .        The patient does live in a home with stairs.       Available pre-op l the patient's questions were answered to the best of my ability. The patient desires the anesthetic management as planned.   Melvina Rivas  2/26/2021 8:15 AM

## 2021-02-26 NOTE — H&P
Pre Procedure History & Physical Examination    Patient Name: Rico Lewis  MRN: [id number]  [facility]: [id number]  YOB: 1955    Diagnosis: dysphagia, reflux      •  Meloxicam (MOBIC) 15 MG Oral Tab, Take 1 tablet (15 mg total) by mouth daily.  Roma Ramirez needed. , Disp: , Rfl:     •  Meloxicam 15 MG Oral Tab, Take 1 tablet (15 mg total) by mouth daily with dinner., Disp: 30 tablet, Rfl: 1    •  Clindamycin Phosphate 1 % External Gel, , Disp: , Rfl:     •  hydrocortisone 2.5 % External Cream, APPLY AA QD FOR UNLISTED Right 2012    rotator cuff tear   •       x 3   • CATARACT     • CHOLECYSTECTOMY     • COLONOSCOPY     • COLONOSCOPY N/A 2018    Performed by Rogelio Solano MD at [facility]   • CYSTOSCOPY N/A 2018    Performe dysuria or gross hematuria  INTEGUMENT/BREAST:  SKIN:  negative for jaundice   ALLERGIC/IMMUNOLOGIC:  negative for hay fever  ENDOCRINE:  negative for cold intolerance and heat intolerance  MUSCULOSKELETAL:  negative for joint effusion/severe erythema  BEH

## 2021-02-26 NOTE — ANESTHESIA POSTPROCEDURE EVALUATION
Patient: Lauryn Brandon Day    Procedure Summary     Date: 02/26/21 Room / Location: [facility] ENDOSCOPY 01 / [facility] ENDO    Anesthesia Start: 0826 Anesthesia Stop: 6389    Procedure: ESOPHAGOGASTRODUODENOSCOPY (EGD) (N/A ) Diagnosis:       Belching      Bloating

## 2021-02-26 NOTE — OPERATIVE REPORT
ESOPHAGOGASTRODUODENOSCOPY (EGD) REPORT    Sherron Muñoz Georgiana     10/5/1955 Age 76year old   PCP Silvina Bill MD Endoscopist Gustavo Antonio MD       Date of procedure: 21    Procedure: EGD w/ biopsies    Pre-operative diagnosis: dysphagia and reflux seen    Recommend:  1. Swallow evaluation  2. PPI and/or carafate while on NSAIDs to protect the gastric lining  2.  Await pathology    >>>If tissue was sampled/removed and you have not received your pathology results either by phone or letter within 2 week

## 2021-03-01 NOTE — TELEPHONE ENCOUNTER
From: Bakari Genre Day  To: Jake Morrissey MD  Sent: 2/26/2021 8:11 PM CST  Subject: Test Results Question    Hi Dr Ashlee Castaneda   Question. Forgot to ask you. Do I have to be on antibiotics since you found ulcers and HPYLORI.    I read that and my sister asked me if

## 2021-03-01 NOTE — TELEPHONE ENCOUNTER
Dr. Destiney Friedman-    Please advise on patients message below. EGD done 02/26/2021:     Final Diagnosis:      A.  Stomach; biopsy:  · Gastric oxyntic type mucosa without significant histopathology  · Diff-quik stain (with reactive positive control) is negative

## 2021-03-03 ENCOUNTER — TELEPHONE (OUTPATIENT)
Dept: GASTROENTEROLOGY | Facility: CLINIC | Age: 66
End: 2021-03-03

## 2021-03-03 NOTE — TELEPHONE ENCOUNTER
If able to stop meloxicam that would be best especially with her history of ulcers in the past. If she is able to do a proton pump inhibitor then can protect the lining of the stomach and do NSAIDs like meloxicam on a minimum basis but given her concern fo

## 2021-03-03 NOTE — TELEPHONE ENCOUNTER
Dr. Roxy Nolasco-    Contacted patient regarding EGD results.      Patient wants to know the degree of the ulcers which were seen on EGD (02/26/2021) as Dr. Funes advised patient to stop meloxicam.     Patient has stopped the meloxicam as she is scared this is

## 2021-03-03 NOTE — TELEPHONE ENCOUNTER
Contacted patient. Reviewed with patient recommendations per below. Patient verbalized understanding with no further questions or concerns at this time.

## 2021-03-06 ENCOUNTER — LAB ENCOUNTER (OUTPATIENT)
Dept: LAB | Facility: HOSPITAL | Age: 66
End: 2021-03-06
Attending: INTERNAL MEDICINE
Payer: MEDICARE

## 2021-03-06 DIAGNOSIS — Z01.812 PRE-PROCEDURE LAB EXAM: ICD-10-CM

## 2021-03-06 LAB — SARS-COV-2 RNA RESP QL NAA+PROBE: NOT DETECTED

## 2021-03-09 ENCOUNTER — EKG ENCOUNTER (OUTPATIENT)
Dept: LAB | Facility: HOSPITAL | Age: 66
End: 2021-03-09
Attending: INTERNAL MEDICINE
Payer: MEDICARE

## 2021-03-09 ENCOUNTER — HOSPITAL ENCOUNTER (OUTPATIENT)
Dept: GENERAL RADIOLOGY | Facility: HOSPITAL | Age: 66
Discharge: HOME | End: 2021-03-09
Attending: INTERNAL MEDICINE
Payer: MEDICARE

## 2021-03-09 DIAGNOSIS — R14.2 BELCHING SYMPTOM: ICD-10-CM

## 2021-03-09 DIAGNOSIS — M75.22 BICIPITAL TENDINITIS OF LEFT SHOULDER: ICD-10-CM

## 2021-03-09 DIAGNOSIS — R14.0 BLOATING: ICD-10-CM

## 2021-03-09 DIAGNOSIS — M25.512 LEFT SHOULDER PAIN: Primary | ICD-10-CM

## 2021-03-09 DIAGNOSIS — M75.112 INCOMPLETE TEAR OF LEFT ROTATOR CUFF: ICD-10-CM

## 2021-03-09 DIAGNOSIS — M24.112 ARTICULAR CARTILAGE DISORDER OF SHOULDER REGION, LEFT: ICD-10-CM

## 2021-03-09 DIAGNOSIS — R13.19 ESOPHAGEAL DYSPHAGIA: ICD-10-CM

## 2021-03-09 PROCEDURE — 93010 ELECTROCARDIOGRAM REPORT: CPT | Performed by: ORTHOPAEDIC SURGERY

## 2021-03-09 PROCEDURE — 74230 X-RAY XM SWLNG FUNCJ C+: CPT | Performed by: INTERNAL MEDICINE

## 2021-03-09 PROCEDURE — 93005 ELECTROCARDIOGRAM TRACING: CPT

## 2021-03-09 PROCEDURE — 92611 MOTION FLUOROSCOPY/SWALLOW: CPT

## 2021-03-09 NOTE — PROGRESS NOTES
ADULT VIDEOFLUOROSCOPIC SWALLOWING STUDY       ADULT VIDEOFLUOROSCOPIC SWALLOWING STUDY:   Referring Physician: Iliana Wilkerson      Radiologist: Dr. Florian Jessica  Diagnosis: dysphagia    Date of Service: 3/9/2021     PATIENT SUMMARY   Chief Complaint: The patient ha resulted in piecemeal posterior propulsion of the bolus and partial spillage of liquids via straw prematurely into the pharynx x1. Pharyngeal phase:  Cervical hardware in place.   The pharyngeal response triggered at the valleculae for puree and solids a time.  If throat clearing persists or worsens, short term therapy may be beneficial.        EDUCATION/INSTRUCTION  Reviewed results and recommendations with patient. Written instructions were provided.   Agreement/Understanding verbalized and all questions

## 2021-03-10 ENCOUNTER — LAB ENCOUNTER (OUTPATIENT)
Dept: LAB | Facility: HOSPITAL | Age: 66
End: 2021-03-10
Attending: ORTHOPAEDIC SURGERY
Payer: MEDICARE

## 2021-03-10 DIAGNOSIS — M25.512 ACUTE PAIN OF LEFT SHOULDER: ICD-10-CM

## 2021-03-10 DIAGNOSIS — M75.112 NONTRAUMATIC INCOMPLETE TEAR OF LEFT ROTATOR CUFF: ICD-10-CM

## 2021-03-10 DIAGNOSIS — M24.112 ARTICULAR CARTILAGE DISORDER OF SHOULDER, LEFT: ICD-10-CM

## 2021-03-10 DIAGNOSIS — M25.512 LEFT SHOULDER PAIN: Primary | ICD-10-CM

## 2021-03-10 DIAGNOSIS — M75.22 BICIPITAL TENDINITIS OF LEFT SHOULDER: ICD-10-CM

## 2021-03-10 LAB
ANION GAP SERPL CALC-SCNC: 4 MMOL/L (ref 0–18)
BUN BLD-MCNC: 17 MG/DL (ref 7–18)
BUN/CREAT SERPL: 23.6 (ref 10–20)
CALCIUM BLD-MCNC: 9.4 MG/DL (ref 8.5–10.1)
CHLORIDE SERPL-SCNC: 108 MMOL/L (ref 98–112)
CO2 SERPL-SCNC: 31 MMOL/L (ref 21–32)
CREAT BLD-MCNC: 0.72 MG/DL
GLUCOSE BLD-MCNC: 74 MG/DL (ref 70–99)
OSMOLALITY SERPL CALC.SUM OF ELEC: 296 MOSM/KG (ref 275–295)
PATIENT FASTING Y/N/NP: YES
POTASSIUM SERPL-SCNC: 4.1 MMOL/L (ref 3.5–5.1)
SODIUM SERPL-SCNC: 143 MMOL/L (ref 136–145)

## 2021-03-10 PROCEDURE — 80048 BASIC METABOLIC PNL TOTAL CA: CPT

## 2021-03-10 PROCEDURE — 36415 COLL VENOUS BLD VENIPUNCTURE: CPT

## 2021-03-11 LAB — SARS-COV-2 RNA RESP QL NAA+PROBE: NOT DETECTED

## 2021-03-14 ENCOUNTER — HOSPITAL ENCOUNTER (EMERGENCY)
Facility: HOSPITAL | Age: 66
Discharge: HOME | End: 2021-03-14
Attending: EMERGENCY MEDICINE
Payer: MEDICARE

## 2021-03-14 VITALS
HEART RATE: 80 BPM | HEIGHT: 68 IN | DIASTOLIC BLOOD PRESSURE: 70 MMHG | SYSTOLIC BLOOD PRESSURE: 154 MMHG | TEMPERATURE: 97 F | RESPIRATION RATE: 18 BRPM | OXYGEN SATURATION: 98 % | WEIGHT: 140 LBS | BODY MASS INDEX: 21.22 KG/M2

## 2021-03-14 DIAGNOSIS — T50.905A ADVERSE EFFECT OF DRUG, INITIAL ENCOUNTER: Primary | ICD-10-CM

## 2021-03-14 PROCEDURE — 96375 TX/PRO/DX INJ NEW DRUG ADDON: CPT

## 2021-03-14 PROCEDURE — 99284 EMERGENCY DEPT VISIT MOD MDM: CPT

## 2021-03-14 PROCEDURE — S0028 INJECTION, FAMOTIDINE, 20 MG: HCPCS | Performed by: EMERGENCY MEDICINE

## 2021-03-14 PROCEDURE — 96374 THER/PROPH/DIAG INJ IV PUSH: CPT

## 2021-03-14 PROCEDURE — 96361 HYDRATE IV INFUSION ADD-ON: CPT

## 2021-03-14 RX ORDER — DIPHENHYDRAMINE HYDROCHLORIDE 50 MG/ML
25 INJECTION INTRAMUSCULAR; INTRAVENOUS ONCE
Status: COMPLETED | OUTPATIENT
Start: 2021-03-14 | End: 2021-03-14

## 2021-03-14 RX ORDER — ONDANSETRON 2 MG/ML
4 INJECTION INTRAMUSCULAR; INTRAVENOUS ONCE
Status: COMPLETED | OUTPATIENT
Start: 2021-03-14 | End: 2021-03-14

## 2021-03-14 RX ORDER — ONDANSETRON 4 MG/1
4 TABLET, ORALLY DISINTEGRATING ORAL EVERY 8 HOURS PRN
Qty: 15 TABLET | Refills: 0 | Status: SHIPPED | OUTPATIENT
Start: 2021-03-14 | End: 2021-03-19

## 2021-03-14 RX ORDER — FAMOTIDINE 20 MG/1
20 TABLET ORAL 2 TIMES DAILY
Qty: 28 TABLET | Refills: 0 | Status: SHIPPED | OUTPATIENT
Start: 2021-03-14 | End: 2021-03-28

## 2021-03-14 RX ORDER — DIPHENHYDRAMINE HCL 25 MG
25 CAPSULE ORAL EVERY 6 HOURS PRN
Qty: 20 CAPSULE | Refills: 0 | Status: SHIPPED | OUTPATIENT
Start: 2021-03-14 | End: 2021-03-19

## 2021-03-14 RX ORDER — FAMOTIDINE 10 MG/ML
20 INJECTION, SOLUTION INTRAVENOUS ONCE
Status: COMPLETED | OUTPATIENT
Start: 2021-03-14 | End: 2021-03-14

## 2021-03-14 RX ORDER — FAMOTIDINE 20 MG/1
20 TABLET ORAL 2 TIMES DAILY
Qty: 28 TABLET | Refills: 0 | Status: SHIPPED | OUTPATIENT
Start: 2021-03-14 | End: 2021-03-14

## 2021-03-14 NOTE — ED INITIAL ASSESSMENT (HPI)
Patient to Er from home with c/o vomiting and abdominal pain that woke her up patient states she had left shoulder surgery on Friday prescribed oxy and has had nausea and abdominal pain since taking medication

## 2021-03-14 NOTE — ED PROVIDER NOTES
Patient Seen in: [facility] AND [facility] Emergency Department    History   Patient presents with:   Allergic Rxn Allergies  Vomiting    Stated Complaint: vomit with itching since pt took oxicodone for Left shoulder surgery on friday      HPI    77 yo F with PMH a (EGD) N/A 2/26/2021    Performed by Ellis Manning MD at [facility] ENDO   • ESOPHAGOGASTRODUODENOSCOPY (EGD) N/A 12/4/2018    Performed by Irish Santana MD at [facility] ENDO   • FRACTURE SURGERY Left 2010    WRIST FRACTURE ORIF   • FRACTURE SURGERY Left (abdominal pain, reflux). Cholecalciferol (VITAMIN D-1000 MAX ST) 25 MCG (1000 UT) Oral Tab,  Take by mouth daily. traZODone HCl 50 MG Oral Tab,  Take 1 tablet (50 mg total) by mouth nightly.    AMLODIPINE BESYLATE 10 MG Oral Tab,  TAKE 1 TABLET(10 MG) All other systems reviewed and negative except as noted above. PSFH elements reviewed from today and agreed except as otherwise stated in HPI.     Physical Exam     ED Triage Vitals [03/14/21 0411]   BP (!) 167/94   Pulse 119   Resp 16   Temp 96.9 °F throughout encounter. See note and/or contact this provider for further PPE details.     We recommend that you schedule follow up care with a primary care provider within the next three months to obtain basic health screening including reassessment of your

## 2021-03-20 ENCOUNTER — PATIENT MESSAGE (OUTPATIENT)
Dept: INTERNAL MEDICINE CLINIC | Facility: CLINIC | Age: 66
End: 2021-03-20

## 2021-03-20 DIAGNOSIS — Z86.19 HISTORY OF VIRAL ILLNESS: Primary | ICD-10-CM

## 2021-03-22 PROBLEM — Z47.89 AFTERCARE FOLLOWING SURGERY OF THE MUSCULOSKELETAL SYSTEM: Status: ACTIVE | Noted: 2021-03-22

## 2021-03-22 NOTE — TELEPHONE ENCOUNTER
From: Dian Darin Day  To: Mendoza Mejia MD  Sent: 3/20/2021 8:01 AM CDT  Subject: Prescription Question    Just wondering. I have not had Covid19 or the vaccine as of yet.    I just read something that a person had covid bck in December with similar sym

## 2021-03-22 NOTE — TELEPHONE ENCOUNTER
covid antibody test order placed, ok per Dr Albert Barrios protocol. [employer] message sent back to patient notifying her order was placed.

## 2021-03-31 ENCOUNTER — LAB ENCOUNTER (OUTPATIENT)
Dept: LAB | Facility: HOSPITAL | Age: 66
End: 2021-03-31
Attending: INTERNAL MEDICINE
Payer: MEDICARE

## 2021-03-31 DIAGNOSIS — Z86.19 HISTORY OF VIRAL ILLNESS: ICD-10-CM

## 2021-03-31 PROCEDURE — 36415 COLL VENOUS BLD VENIPUNCTURE: CPT

## 2021-03-31 PROCEDURE — 86769 SARS-COV-2 COVID-19 ANTIBODY: CPT

## 2021-04-05 ENCOUNTER — TELEPHONE (OUTPATIENT)
Dept: ENDOCRINOLOGY CLINIC | Facility: CLINIC | Age: 66
End: 2021-04-05

## 2021-04-05 NOTE — TELEPHONE ENCOUNTER
rn called patient and wondering how do you feel about her taking a medrol dose pack. Since she has osteoporosis bad.    Patient unable to tolerate tramadol, or other narcotics  Patient is taking tylenol for pain also   Please advise

## 2021-04-14 ENCOUNTER — OFFICE VISIT (OUTPATIENT)
Dept: INTERNAL MEDICINE CLINIC | Facility: CLINIC | Age: 66
End: 2021-04-14
Payer: MEDICARE

## 2021-04-14 ENCOUNTER — PATIENT MESSAGE (OUTPATIENT)
Dept: NEUROLOGY | Facility: CLINIC | Age: 66
End: 2021-04-14

## 2021-04-14 VITALS
HEART RATE: 76 BPM | SYSTOLIC BLOOD PRESSURE: 122 MMHG | BODY MASS INDEX: 20.64 KG/M2 | TEMPERATURE: 99 F | WEIGHT: 136.19 LBS | OXYGEN SATURATION: 96 % | DIASTOLIC BLOOD PRESSURE: 80 MMHG | HEIGHT: 68 IN

## 2021-04-14 DIAGNOSIS — K21.9 GASTROESOPHAGEAL REFLUX DISEASE, UNSPECIFIED WHETHER ESOPHAGITIS PRESENT: ICD-10-CM

## 2021-04-14 DIAGNOSIS — Z83.71 FAMILY HISTORY OF COLONIC POLYPS: ICD-10-CM

## 2021-04-14 DIAGNOSIS — Z80.0 FAMILY HISTORY OF COLON CANCER: ICD-10-CM

## 2021-04-14 DIAGNOSIS — Z00.00 ROUTINE HEALTH MAINTENANCE: ICD-10-CM

## 2021-04-14 DIAGNOSIS — M81.0 AGE-RELATED OSTEOPOROSIS WITHOUT CURRENT PATHOLOGICAL FRACTURE: ICD-10-CM

## 2021-04-14 DIAGNOSIS — Z12.31 ENCOUNTER FOR SCREENING MAMMOGRAM FOR MALIGNANT NEOPLASM OF BREAST: Primary | ICD-10-CM

## 2021-04-14 DIAGNOSIS — I10 ESSENTIAL HYPERTENSION: ICD-10-CM

## 2021-04-14 PROBLEM — R07.9 CHEST PAIN: Status: RESOLVED | Noted: 2017-05-25 | Resolved: 2021-04-14

## 2021-04-14 PROBLEM — M54.2 NECK PAIN: Status: RESOLVED | Noted: 2019-10-15 | Resolved: 2021-04-14

## 2021-04-14 PROCEDURE — 90732 PPSV23 VACC 2 YRS+ SUBQ/IM: CPT | Performed by: INTERNAL MEDICINE

## 2021-04-14 PROCEDURE — G0009 ADMIN PNEUMOCOCCAL VACCINE: HCPCS | Performed by: INTERNAL MEDICINE

## 2021-04-14 PROCEDURE — G0438 PPPS, INITIAL VISIT: HCPCS | Performed by: INTERNAL MEDICINE

## 2021-04-14 RX ORDER — SUCRALFATE 1 G/1
1 TABLET ORAL 3 TIMES DAILY PRN
Qty: 90 TABLET | Refills: 1 | Status: SHIPPED | OUTPATIENT
Start: 2021-04-14 | End: 2021-10-26

## 2021-04-14 RX ORDER — ESTRADIOL 0.1 MG/G
0.5 CREAM VAGINAL
Qty: 1 TUBE | Refills: 11 | Status: SHIPPED | OUTPATIENT
Start: 2021-04-15

## 2021-04-14 NOTE — PROGRESS NOTES
Remi Stoner is a 76year old female. Patient presents with:  Physical: mac MARI, had surgery March 12th, has been unable to take pain medications,would like to discuss CBD      HPI:   Remi Stoner is a 76year old female who presents for an initial every 8 (eight) hours as needed for Muscle spasms. 90 tablet 0   • Cholecalciferol (VITAMIN D-1000 MAX ST) 25 MCG (1000 UT) Oral Tab Take by mouth daily. • traZODone HCl 50 MG Oral Tab Take 1 tablet (50 mg total) by mouth nightly.  30 tablet 5   • AMLOD for 7 days at a time during a flare of hemorrhoids (Patient not taking: Reported on 4/14/2021 ) 1 Tube 0   • Meclizine HCl 25 MG Oral Tab Take 1 tablet (25 mg total) by mouth 3 (three) times daily as needed.  (Patient not taking: Reported on 4/14/2021 ) 30 ESOPHAGOGASTRODUODENOSCOPY (EGD) N/A 12/4/2018    Performed by Collin Garcia MD at [facility] ENDO   • FRACTURE SURGERY Left 2010    WRIST FRACTURE ORIF   • FRACTURE SURGERY Left 2013    ORIF wrist fracture revision with plates and screws- Ortho Dr the LAST 2 WEEKS                   PHQ-9 Depression Screen     1. Little interest or pleasure in doing things: Several days  2.  Feeling down, depressed, or hopeless: Several days  3.    4.    5.    6.    7.    8.    9.                 Advance Directives data found.    Pap and Pelvic      Pap: Every 3 yrs age 21-65 or Pap+HPV every 5 yrs age 33-67, age 72 and older at high risk   Pap Smear,5 Years due on 03/06/2023 Update Health Maintenance if applicable    Chlamydia  Annually if high risk No results found No flowsheet data found. Creatinine  Annually Creatinine (mg/dL)   Date Value   03/10/2021 0.72    No flowsheet data found. Digoxin Serum Conc  Annually No results found for: DIGOXIN No flowsheet data found.     Diabetes      HgbA1C  Annually No resul Shingrix shingles vaccines (2019). Had Td 12/2014. Prevnar 13 done 2/20/20. PPSV23 today 4/14/21.       Esophageal reflux, gastritis  Stable on Protonix; takes carafate as well, along with prn zantac.   Has seen Dr. Vane Brush; has chronic upper abdominal pain

## 2021-04-15 NOTE — TELEPHONE ENCOUNTER
From: Connorminda Expose Day  To: John Quintero MD  Sent: 4/14/2021 5:48 PM CDT  Subject: Prescription Question    Hi Dr Dashawn Quintero it's been awhile.    It's Adri Kand Day  I had bicep surgery a month ago from Dr Waldo Montanez, and have unbelievable pain since a month ago jessenia

## 2021-04-15 NOTE — TELEPHONE ENCOUNTER
Patient left  regarding [employer] message that was sent yesterday. Patient stated she was hoping to either speak with Lj Young or get recommendations prior to the weekend. Patient wanted 's thoughts on Gabapentin.  Patient was prescribed Sabrina

## 2021-04-19 NOTE — TELEPHONE ENCOUNTER
LMTCB to make a follow up appt and regarding gabapentin.  A Dr. Patti Phillips prescribed the gabapentin last.

## 2021-04-21 ENCOUNTER — TELEMEDICINE (OUTPATIENT)
Dept: NEUROLOGY | Facility: CLINIC | Age: 66
End: 2021-04-21

## 2021-04-21 VITALS — WEIGHT: 140 LBS | HEIGHT: 68 IN | BODY MASS INDEX: 21.22 KG/M2

## 2021-04-21 DIAGNOSIS — M24.112 LABRAL TEAR OF SHOULDER, DEGENERATIVE, LEFT: ICD-10-CM

## 2021-04-21 DIAGNOSIS — M25.512 ACUTE PAIN OF LEFT SHOULDER: Primary | ICD-10-CM

## 2021-04-21 DIAGNOSIS — M54.16 LUMBAR RADICULOPATHY: ICD-10-CM

## 2021-04-21 DIAGNOSIS — M51.9 LUMBAR DISC DISEASE: ICD-10-CM

## 2021-04-21 DIAGNOSIS — M43.16 SPONDYLOLISTHESIS OF LUMBAR REGION: ICD-10-CM

## 2021-04-21 DIAGNOSIS — Z98.890 HISTORY OF LUMBAR LAMINECTOMY: ICD-10-CM

## 2021-04-21 DIAGNOSIS — M75.112 NONTRAUMATIC INCOMPLETE TEAR OF LEFT ROTATOR CUFF: ICD-10-CM

## 2021-04-21 DIAGNOSIS — M43.10 RETROLISTHESIS: ICD-10-CM

## 2021-04-21 DIAGNOSIS — M48.061 LUMBAR FORAMINAL STENOSIS: ICD-10-CM

## 2021-04-21 DIAGNOSIS — Z98.1 HISTORY OF LUMBAR FUSION: ICD-10-CM

## 2021-04-21 PROBLEM — M25.511 ACUTE PAIN OF RIGHT SHOULDER: Status: ACTIVE | Noted: 2021-04-21

## 2021-04-21 PROCEDURE — 99214 OFFICE O/P EST MOD 30 MIN: CPT | Performed by: PHYSICAL MEDICINE & REHABILITATION

## 2021-04-21 NOTE — PROGRESS NOTES
[address] [facility]    Telemedicine Visit - Evaluation    Kaylen Stoner verbally consents to a Telemedicine Visit on 04/21/21. This visit is conducted using Telemedicine with live, interactive audio and video.     Sonal Whitaker Medical History:   Diagnosis Date   • Anemia    • Anxiety state, unspecified    • Back problem     CERVICAL RADICULOPATHY, CERVICAL SPINAL STENOSIS   • Broken foot 03-    RT - casting. Fracture 5th met. Cast removl/xray foot 4-20-12.  Follow up fract Take 20 mg by mouth. Daily PRN      • cyclobenzaprine 10 MG Oral Tab Take 1 tablet (10 mg total) by mouth every 8 (eight) hours as needed for Muscle spasms.  90 tablet 0   • Cholecalciferol (VITAMIN D-1000 MAX ST) 25 MCG (1000 UT) Oral Tab Take by mouth karen taking: Reported on 4/14/2021 ) 1 Tube 0   • Meclizine HCl 25 MG Oral Tab Take 1 tablet (25 mg total) by mouth 3 (three) times daily as needed.  (Patient not taking: Reported on 4/14/2021 ) 30 tablet 1   • Diclofenac Sodium 1 % Transdermal Gel Apply 4 g top Neurological: alert & oriented x 3, attentive, able to follow commands, comprehention intact, spontaneous speech intact  Psychiatric: Mood and affect appropriate    Musculoskeletal Exam:  Cervical Spine:    Posture: normal chin forward superiorly rotated laminectomy: L1-2    8. History of lumbar fusion: L1-2    9. Nontraumatic incomplete tear of left rotator cuff    10.  Labral tear of shoulder, degenerative, left      PLAN:   She will discontinue the Neurontin since she is concerned about side effects and sufficient and adequate time to complete the visit. The patient was made aware of the limitations of the telehealth visit, including treatment limitations as no physical exam could be performed.   The patient was advised to call 911 or to go to the ER in c

## 2021-05-05 NOTE — TELEPHONE ENCOUNTER
Patient called and would like her    Amlodipine Besylate 10 mg refilled PAST MEDICAL HISTORY:  Pancytopenia

## 2021-05-06 RX ORDER — AMLODIPINE BESYLATE 10 MG/1
10 TABLET ORAL DAILY
Qty: 90 TABLET | Refills: 3 | Status: SHIPPED | OUTPATIENT
Start: 2021-05-06

## 2021-05-10 ENCOUNTER — TELEPHONE (OUTPATIENT)
Dept: INTERNAL MEDICINE CLINIC | Facility: CLINIC | Age: 66
End: 2021-05-10

## 2021-05-10 ENCOUNTER — TELEPHONE (OUTPATIENT)
Dept: NEUROLOGY | Facility: CLINIC | Age: 66
End: 2021-05-10

## 2021-05-10 NOTE — TELEPHONE ENCOUNTER
[facility] requesting refill for:  Amlodipine  Pt is out of medication  Tasked to [employer]

## 2021-05-10 NOTE — TELEPHONE ENCOUNTER
Called patient  who has refills at [address] [facility] she will call connor to let them know that she is using different pharmacy

## 2021-05-11 ENCOUNTER — PATIENT MESSAGE (OUTPATIENT)
Dept: INTERNAL MEDICINE CLINIC | Facility: CLINIC | Age: 66
End: 2021-05-11

## 2021-05-16 ENCOUNTER — LAB ENCOUNTER (OUTPATIENT)
Dept: LAB | Facility: HOSPITAL | Age: 66
End: 2021-05-16
Attending: INTERNAL MEDICINE
Payer: MEDICARE

## 2021-05-16 DIAGNOSIS — M81.0 AGE-RELATED OSTEOPOROSIS WITHOUT CURRENT PATHOLOGICAL FRACTURE: ICD-10-CM

## 2021-05-16 DIAGNOSIS — I10 ESSENTIAL HYPERTENSION: ICD-10-CM

## 2021-05-16 PROCEDURE — 80053 COMPREHEN METABOLIC PANEL: CPT

## 2021-05-16 PROCEDURE — 82306 VITAMIN D 25 HYDROXY: CPT

## 2021-05-16 PROCEDURE — 84443 ASSAY THYROID STIM HORMONE: CPT | Performed by: INTERNAL MEDICINE

## 2021-05-16 PROCEDURE — 80061 LIPID PANEL: CPT

## 2021-05-16 PROCEDURE — 36415 COLL VENOUS BLD VENIPUNCTURE: CPT

## 2021-05-16 PROCEDURE — 85025 COMPLETE CBC W/AUTO DIFF WBC: CPT

## 2021-05-18 ENCOUNTER — HOSPITAL ENCOUNTER (OUTPATIENT)
Dept: MAMMOGRAPHY | Age: 66
Discharge: HOME | End: 2021-05-18
Attending: INTERNAL MEDICINE
Payer: MEDICARE

## 2021-05-18 DIAGNOSIS — Z12.31 ENCOUNTER FOR SCREENING MAMMOGRAM FOR MALIGNANT NEOPLASM OF BREAST: ICD-10-CM

## 2021-05-18 PROCEDURE — 77067 SCR MAMMO BI INCL CAD: CPT | Performed by: INTERNAL MEDICINE

## 2021-05-18 PROCEDURE — 77063 BREAST TOMOSYNTHESIS BI: CPT | Performed by: INTERNAL MEDICINE

## 2021-05-19 ENCOUNTER — TELEPHONE (OUTPATIENT)
Dept: NEUROLOGY | Facility: CLINIC | Age: 66
End: 2021-05-19

## 2021-05-19 NOTE — TELEPHONE ENCOUNTER
Jan Adame Serve at [address] [place] [address] 4/21/21: \"She will discontinue the Neurontin since she is concerned about side effects and the pain is improving in the left shoulder.     I told her to use the CBD spray as much as possible.     I told her that in the future, she charlette

## 2021-05-20 ENCOUNTER — TELEPHONE (OUTPATIENT)
Dept: INTERNAL MEDICINE CLINIC | Facility: CLINIC | Age: 66
End: 2021-05-20

## 2021-05-20 ENCOUNTER — TELEPHONE (OUTPATIENT)
Dept: GASTROENTEROLOGY | Facility: CLINIC | Age: 66
End: 2021-05-20

## 2021-05-20 RX ORDER — BUPRENORPHINE 5 UG/H
5 PATCH TRANSDERMAL
Qty: 4 PATCH | Refills: 0 | Status: SHIPPED | OUTPATIENT
Start: 2021-05-20 | End: 2021-05-27

## 2021-05-20 NOTE — TELEPHONE ENCOUNTER
Dr. Garrison Essex    Patient had recent shoulder surgery and reports having a great deal of pain and swelling (so bad she can't sleep). She reports meloxicam and Aleve are the only medications that seem to help.   She is concerned about taking these due to erosion

## 2021-05-20 NOTE — TELEPHONE ENCOUNTER
Dr. Bárbara Saez    I reviewed your below message with patient. She does not take and does not want to take a PPI because states Dr. Dodson Peers took her off of PPIs due to bad Dexa scan numbers. She wants to know if she could take NSAID on just sucralfate.     Pl

## 2021-05-20 NOTE — TELEPHONE ENCOUNTER
Patient requesting to speak with nurse regarding her pain level, patient does not want to wait and requesting to be transferred now. Patient indicates she has mini ulcer starting in her stomach, Patient not able to read results on her chart.  Patient has me

## 2021-05-20 NOTE — TELEPHONE ENCOUNTER
I e-Rx'd the Butrans patch at the lowest dose for her to try. This is a narcotic and therefore she could have the nausea and vomiting with this.

## 2021-06-17 ENCOUNTER — OFFICE VISIT (OUTPATIENT)
Dept: ENDOCRINOLOGY CLINIC | Facility: CLINIC | Age: 66
End: 2021-06-17
Payer: MEDICARE

## 2021-06-17 VITALS
BODY MASS INDEX: 21 KG/M2 | HEART RATE: 76 BPM | DIASTOLIC BLOOD PRESSURE: 81 MMHG | WEIGHT: 138 LBS | SYSTOLIC BLOOD PRESSURE: 127 MMHG

## 2021-06-17 DIAGNOSIS — M81.8 OTHER OSTEOPOROSIS WITHOUT CURRENT PATHOLOGICAL FRACTURE: Primary | ICD-10-CM

## 2021-06-17 PROCEDURE — 96372 THER/PROPH/DIAG INJ SC/IM: CPT | Performed by: INTERNAL MEDICINE

## 2021-06-17 PROCEDURE — 99213 OFFICE O/P EST LOW 20 MIN: CPT | Performed by: INTERNAL MEDICINE

## 2021-06-17 NOTE — PROGRESS NOTES
Name: Kylie Stoner  Date: 6/17/2021    Referring Physician: No ref. provider found    No chief complaint on file. HISTORY OF PRESENT ILLNESS   Kylie Stoner is a 76year old female who presents for osteoporosis.      She was initially diagnosed with b currently being evaluated by ortho. Her xrays demonstrated shifting of the vertebrae and discussing option of fusion. 9/2020  She has now received 10 injections of Evenity - plan to finish treatment course with 2 more injections.   In the past month s Reported on 4/21/2021 ), Disp: 30 tablet, Rfl: 0  •  sennosides 8.6 MG Oral Tab, Take 2 tablets (17.2 mg total) by mouth daily. (Patient not taking: Reported on 4/14/2021 ), Disp: 30 tablet, Rfl: 0  •  Famotidine (PEPCID OR), Take 20 mg by mouth.  Daily PRN 1,400 mg by mouth daily. , Disp: , Rfl:   •  Multiple Vitamins Oral Tab, Take 1 tablet by mouth daily.  Multiple Vitamins tablet , Disp: , Rfl:      Allergies:     Aleve                   NAUSEA AND VOMITING  Levaquin [Levofloxa*    RASH, DIZZINESS  Yesika Lindsay and 2013   • Muscle weakness    • Osteoporosis    • Renal disorder    • Rotator cuff tear, right    • Traumatic arthritis    • Vertigo        Surgical history:   Past Surgical History:   Procedure Laterality Date   • ARTHROSCOPY OF JOINT UNLISTED Right 06/ Prolia   - She is tolerating the prolia therapy well   - labs are at goal   - Recheck bone turnover markers after prolia injection today   - Repeat DEXA 6/2022  - Further management based on above results      RTC 1 year     6/17/2021  Domenica Hodges MD

## 2021-07-09 ENCOUNTER — PATIENT MESSAGE (OUTPATIENT)
Dept: SURGERY | Facility: CLINIC | Age: 66
End: 2021-07-09

## 2021-07-12 ENCOUNTER — TELEPHONE (OUTPATIENT)
Dept: SURGERY | Facility: CLINIC | Age: 66
End: 2021-07-12

## 2021-07-12 NOTE — TELEPHONE ENCOUNTER
----- Message from Kylie Berkowitz. Day sent at 7/9/2021  8:11 PM CDT -----  Regarding: Prescription Question  Contact: [phone]  Hi Dr Sheryle Schlein been taking ropinirole/Dr Bal Rowell for sometime now for restless legs and it seems to help.   For whatever

## 2021-07-12 NOTE — TELEPHONE ENCOUNTER
From: Darrick Lunch Day  To: Zarina Go MD  Sent: 7/9/2021 8:11 PM CDT  Subject: Prescription Question    Hi Dr Veronica Krishnamurthy been taking ropinirole/Dr Suraj Arndt for sometime now for restless legs and it seems to help.  For whatever reason I looked up s

## 2021-07-13 ENCOUNTER — TELEPHONE (OUTPATIENT)
Dept: INTERNAL MEDICINE CLINIC | Facility: CLINIC | Age: 66
End: 2021-07-13

## 2021-07-13 ENCOUNTER — TELEPHONE (OUTPATIENT)
Dept: ENDOCRINOLOGY CLINIC | Facility: CLINIC | Age: 66
End: 2021-07-13

## 2021-07-13 NOTE — TELEPHONE ENCOUNTER
It is contraindicated to use in patients with severe renal failure. I do not see any contraindication in patients with a history of kidney stones. From our standpoint she can continue medication.

## 2021-07-13 NOTE — TELEPHONE ENCOUNTER
Regarding: RE: Prescription Question  ----- Message from Jerald Siemens sent at 7/13/2021  8:07 AM CDT -----       ----- Message sent from Demetri Parisi RN [facility] at 7/12/2021  3:10 PM -----   Rita Mark,    Your message has been receiv

## 2021-07-13 NOTE — TELEPHONE ENCOUNTER
Fwd to [facility] to advise on message.  Will also fwd to [facility] to see if she can advise since GREGORIA is in OR today

## 2021-07-13 NOTE — TELEPHONE ENCOUNTER
Sent Nilsa's recommendations to the patient via [employer] message. This encounter is closed.

## 2021-07-13 NOTE — TELEPHONE ENCOUNTER
Fwd to [facility] in previous TE but also FWD to [facility] to advise if able to since [facility] is in OR today

## 2021-07-14 RX ORDER — ROPINIROLE 0.25 MG/1
0.25 TABLET, FILM COATED ORAL NIGHTLY
Qty: 90 TABLET | Refills: 3 | Status: SHIPPED | OUTPATIENT
Start: 2021-07-14

## 2021-08-06 ENCOUNTER — LAB ENCOUNTER (OUTPATIENT)
Dept: LAB | Facility: HOSPITAL | Age: 66
End: 2021-08-06
Attending: INTERNAL MEDICINE
Payer: MEDICARE

## 2021-08-06 DIAGNOSIS — M81.8 OTHER OSTEOPOROSIS WITHOUT CURRENT PATHOLOGICAL FRACTURE: ICD-10-CM

## 2021-08-06 PROCEDURE — 84080 ASSAY ALKALINE PHOSPHATASES: CPT

## 2021-08-06 PROCEDURE — 82523 COLLAGEN CROSSLINKS: CPT

## 2021-08-06 PROCEDURE — 36415 COLL VENOUS BLD VENIPUNCTURE: CPT

## 2021-08-08 ENCOUNTER — PATIENT MESSAGE (OUTPATIENT)
Dept: INTERNAL MEDICINE CLINIC | Facility: CLINIC | Age: 66
End: 2021-08-08

## 2021-08-08 LAB — BONE SPECIFIC ALKALINE PHOSPHATASE: 6.1 UG/L

## 2021-08-09 LAB — C-TELOPEPTIDE, BETA-CROSS-LINK: 79 PG/ML

## 2021-08-09 NOTE — TELEPHONE ENCOUNTER
Chrishart to patient to schedule office visit to christos toussaint     FU 8/10 to see if pt received/scheduled.

## 2021-08-09 NOTE — TELEPHONE ENCOUNTER
From: Rey Ellisy Day  To: Wendi Vizcarra MD  Sent: 8/8/2021 7:26 AM CDT  Subject: Test Results Question    Morning. I had my alyce and it was clear, per the radiologist. But sometimes I feel something under my right armpit. If I remember correctly.  The

## 2021-08-13 ENCOUNTER — TELEPHONE (OUTPATIENT)
Dept: INTERNAL MEDICINE CLINIC | Facility: CLINIC | Age: 66
End: 2021-08-13

## 2021-08-13 NOTE — TELEPHONE ENCOUNTER
Patient is calling she has had a sore throat 2 - 3 days, she would like to get an order for a Covid test    Please call patient [phone]    Will the Doctor on call place the order, there is also a message in for spouse

## 2021-08-14 ENCOUNTER — HOSPITAL ENCOUNTER (OUTPATIENT)
Age: 66
Discharge: HOME | End: 2021-08-14
Payer: MEDICARE

## 2021-08-14 VITALS
TEMPERATURE: 98 F | SYSTOLIC BLOOD PRESSURE: 109 MMHG | RESPIRATION RATE: 18 BRPM | DIASTOLIC BLOOD PRESSURE: 71 MMHG | HEART RATE: 75 BPM | OXYGEN SATURATION: 97 %

## 2021-08-14 DIAGNOSIS — J06.9 VIRAL UPPER RESPIRATORY TRACT INFECTION: Primary | ICD-10-CM

## 2021-08-14 LAB
S PYO AG THROAT QL: NEGATIVE
SARS-COV-2 RNA RESP QL NAA+PROBE: NOT DETECTED

## 2021-08-14 PROCEDURE — 99213 OFFICE O/P EST LOW 20 MIN: CPT

## 2021-08-14 PROCEDURE — 87880 STREP A ASSAY W/OPTIC: CPT

## 2021-08-14 NOTE — ED INITIAL ASSESSMENT (HPI)
PATIENT ARRIVED AMBULATORY TO ROOM C/O SYMPTOMS THAT STARTED 3 DAYS AGO. +SORE THROAT +NASAL CONGESTION. SLIGHT COUGH. NO FEVERS. NO N/V/D.  PATIENT IS NOT COVID VACCINATED

## 2021-08-14 NOTE — ED PROVIDER NOTES
Patient Seen in: [facility] [place]      History   Patient presents with:  Sore Throat    Stated Complaint: sore throat    HPI/Subjective:   HPI    30-year-old female here today with complaints of a sore throat nasal congestion that started approxima TONSILLECTOMY      with Adenoidectomy   • UPPER GI ENDOSCOPY,EXAM      EGD 2007                Social History    Tobacco Use      Smoking status: Never Smoker      Smokeless tobacco: Never Used    Vaping Use      Vaping Use: Never used    Alcohol use:  No URI    ED Course     Labs Reviewed   POCT RAPID STREP - Normal   RAPID SARS-COV-2 BY PCR - Normal       I have personally  reviewed available prior medical records for any recent pertinent discharge summaries/testing.  Patient/family updated on results and

## 2021-08-16 NOTE — TELEPHONE ENCOUNTER
Patient did go to urgent care 8/14/21. She tested negative for strep throat and covid. Dx: Viral URI. No medications prescribed. Patient reports she still has a slight sire throat-a little better, she continues to gargle with salt water.  Intermittent dry

## 2021-08-17 ENCOUNTER — OFFICE VISIT (OUTPATIENT)
Dept: INTERNAL MEDICINE CLINIC | Facility: CLINIC | Age: 66
End: 2021-08-17
Payer: MEDICARE

## 2021-08-17 VITALS
SYSTOLIC BLOOD PRESSURE: 140 MMHG | BODY MASS INDEX: 21.1 KG/M2 | OXYGEN SATURATION: 98 % | HEART RATE: 87 BPM | WEIGHT: 139.19 LBS | DIASTOLIC BLOOD PRESSURE: 80 MMHG | TEMPERATURE: 98 F | HEIGHT: 68 IN

## 2021-08-17 DIAGNOSIS — R05.9 COUGH: Primary | ICD-10-CM

## 2021-08-17 PROCEDURE — 99214 OFFICE O/P EST MOD 30 MIN: CPT | Performed by: INTERNAL MEDICINE

## 2021-08-17 RX ORDER — AZITHROMYCIN 250 MG/1
TABLET, FILM COATED ORAL
Qty: 6 TABLET | Refills: 0 | Status: SHIPPED | OUTPATIENT
Start: 2021-08-17 | End: 2021-08-22

## 2021-08-17 NOTE — PROGRESS NOTES
Victoria Stoner is a 76year old female. HPI:   Patient presents with: Follow - Up: F/U from UC - strep and covid negative, still cough     Bernardo Maxwell started with a cough last Thursday. Feeling like she has some mucus in her throat. Postnasal drip.   Cough 5   • Hydrocortisone (ANUSOL-HC) 2.5 % External Cream Place 1 Application rectally 2 (two) times daily as needed for Hemorrhoids.  Use for 7 days at a time during a flare of hemorrhoids 1 Tube 0   • Diclofenac Sodium 1 % Transdermal Gel Apply 4 g topically acute pancreatitis 2002, 2010   • Insomnia    • Internal hemorrhoids without complication 23/1/1917   • Intestinal disorder    • Left wrist fracture 2011 and 2013   • Muscle weakness    • Osteoporosis    • Renal disorder    • Rotator cuff tear, right    • end she asked if I could send an antibiotic over the pharmacy and if she is not better in 24 to 48 hours she will get it filled. I asked her to let us know if that happens. Sure verbalized understanding to all this.   She knows she can call back anytime w

## 2021-08-25 ENCOUNTER — OFFICE VISIT (OUTPATIENT)
Dept: INTERNAL MEDICINE CLINIC | Facility: CLINIC | Age: 66
End: 2021-08-25
Payer: MEDICARE

## 2021-08-25 ENCOUNTER — TELEPHONE (OUTPATIENT)
Dept: NEUROLOGY | Facility: CLINIC | Age: 66
End: 2021-08-25

## 2021-08-25 VITALS
HEART RATE: 76 BPM | BODY MASS INDEX: 20.92 KG/M2 | SYSTOLIC BLOOD PRESSURE: 128 MMHG | HEIGHT: 68 IN | DIASTOLIC BLOOD PRESSURE: 72 MMHG | TEMPERATURE: 99 F | WEIGHT: 138 LBS

## 2021-08-25 DIAGNOSIS — M79.621 AXILLARY TENDERNESS, RIGHT: Primary | ICD-10-CM

## 2021-08-25 PROCEDURE — 99212 OFFICE O/P EST SF 10 MIN: CPT | Performed by: INTERNAL MEDICINE

## 2021-08-25 RX ORDER — BUPRENORPHINE 5 UG/H
5 PATCH TRANSDERMAL
Qty: 4 PATCH | Refills: 0 | Status: CANCELLED | OUTPATIENT
Start: 2021-08-25 | End: 2021-09-01

## 2021-08-25 NOTE — TELEPHONE ENCOUNTER
Spoke with patient who stated she was having pain yesterday that has subsided.      Patient stated she is going to be having back surgery in about 2.5 months at [facility] and was wondering if Radha Johnson would be able to provide a prescription for the [facility] patch

## 2021-08-25 NOTE — PROGRESS NOTES
Victoria Stoner is a 76year old female. Patient presents with:  Mass: Patient is here today with a possible mass in her right axilla area. She first noticed the area a few weeks ago. Is slighty tender to the touch- with pressure.  No notable swelling or lumps as needed. 30 tablet 1   • Diclofenac Sodium 1 % Transdermal Gel Apply 4 g topically 4 (four) times daily as needed. 1 Tube 3   • CALCIUM CITRATE OR Take 1,400 mg by mouth daily. • Multiple Vitamins Oral Tab Take 1 tablet by mouth daily.  Multiple Vit kg/m².       EXAM:   /72   Pulse 76   Temp 98.5 °F (36.9 °C) (Oral)   Ht 5' 8\" (1.727 m)   Wt 138 lb (62.6 kg)   BMI 20.98 kg/m²   GENERAL: well developed, well nourished, in no apparent distress  EXTREMITIES: no axillary LAD b/l.  +mild tenderness o

## 2021-08-25 NOTE — TELEPHONE ENCOUNTER
Patient left  stating she has an appointment this Thursday with Radha Johnson, but she would like to speak with someone prior to regarding pain relief options. Patient also mentioned she has surgery coming up.

## 2021-08-27 NOTE — TELEPHONE ENCOUNTER
I would like to speak with her before prescribing the Butrans patch since it should not be PRN but used more consistently.

## 2021-08-30 NOTE — TELEPHONE ENCOUNTER
Offered patient f/u appt. She would like to speak with her surgeon regarding her pain. She was not aware the butrans patch needs to be used consistently and is not sure that is what she is looking for.

## 2021-08-31 ENCOUNTER — TELEPHONE (OUTPATIENT)
Dept: INTERNAL MEDICINE CLINIC | Facility: CLINIC | Age: 66
End: 2021-08-31

## 2021-09-01 RX ORDER — TRAZODONE HYDROCHLORIDE 50 MG/1
TABLET ORAL
Qty: 90 TABLET | Refills: 1 | Status: SHIPPED | OUTPATIENT
Start: 2021-09-01

## 2021-09-01 NOTE — TELEPHONE ENCOUNTER
Pt has had 2 OVs since;  olamidet sent to make sure pt is tolerating    Refill request is for a maintenance medication and has met the criteria specified in the Ambulatory Medication Refill Standing Order for eligibility, visits, laboratory, alerts and was

## 2021-09-09 ENCOUNTER — LAB ENCOUNTER (OUTPATIENT)
Dept: LAB | Facility: HOSPITAL | Age: 66
End: 2021-09-09
Attending: UROLOGY
Payer: MEDICARE

## 2021-09-09 ENCOUNTER — TELEPHONE (OUTPATIENT)
Dept: SURGERY | Facility: CLINIC | Age: 66
End: 2021-09-09

## 2021-09-09 ENCOUNTER — TELEPHONE (OUTPATIENT)
Dept: GASTROENTEROLOGY | Facility: CLINIC | Age: 66
End: 2021-09-09

## 2021-09-09 DIAGNOSIS — R35.0 URINARY FREQUENCY: Primary | ICD-10-CM

## 2021-09-09 DIAGNOSIS — R35.0 URINARY FREQUENCY: ICD-10-CM

## 2021-09-09 LAB
BILIRUB UR QL: NEGATIVE
COLOR UR: YELLOW
GLUCOSE UR-MCNC: NEGATIVE MG/DL
HGB UR QL STRIP.AUTO: NEGATIVE
KETONES UR-MCNC: NEGATIVE MG/DL
LEUKOCYTE ESTERASE UR QL STRIP.AUTO: NEGATIVE
NITRITE UR QL STRIP.AUTO: NEGATIVE
PH UR: 6 [PH] (ref 5–8)
PROT UR-MCNC: NEGATIVE MG/DL
SP GR UR STRIP: 1.02 (ref 1–1.03)
UROBILINOGEN UR STRIP-ACNC: <2

## 2021-09-09 PROCEDURE — 81003 URINALYSIS AUTO W/O SCOPE: CPT

## 2021-09-09 PROCEDURE — 87086 URINE CULTURE/COLONY COUNT: CPT

## 2021-09-09 RX ORDER — NITROFURANTOIN 25; 75 MG/1; MG/1
100 CAPSULE ORAL EVERY 12 HOURS
Qty: 10 CAPSULE | Refills: 0 | Status: SHIPPED | OUTPATIENT
Start: 2021-09-09 | End: 2021-09-14

## 2021-09-09 NOTE — TELEPHONE ENCOUNTER
Patient told me to disregard this message. She told me that she does not need anything from Gi. She wants to talk to Dr. Zheng Lopez with urology. I provided the number to his office which is [phone].

## 2021-09-09 NOTE — TELEPHONE ENCOUNTER
Please call patient. Tell her that her urine analysis is not suggestive of a UTI. There is NO blood in the urine under the microscope either. Urine culture is pending.      If her pain worsens or she starts having fevers, nausea and vomiting, then she shoul

## 2021-09-09 NOTE — TELEPHONE ENCOUNTER
Patient asking if blood work can be put in just in case she has a reoccurrence of stomach ache. Please call at [phone],[phone].

## 2021-09-09 NOTE — TELEPHONE ENCOUNTER
-Routed to Dr. Nigel Gutierres:    Felicity GARCIA/RN started UTI  protocol this am.       * Pt called back to report starting to have lower back & left flank pain.       * 2/19/21 CT abd & pelvis=  \"Stable 0.6 cm nonobstructing interpolar left renal calculus.  Additio

## 2021-09-09 NOTE — TELEPHONE ENCOUNTER
Pt calling back to change a couple of her answers - she is having urgency and lower left side/back pain - asking if the urine test can detect kidney stones

## 2021-09-09 NOTE — TELEPHONE ENCOUNTER
This RN called patient to relay Dr Xiong Salvage message:     \"Please call patient. Tell her that her urine analysis is not suggestive of a UTI. There is NO blood in the urine under the microscope either. Urine culture is pending.  If her pain worsens or she

## 2021-09-28 ENCOUNTER — TELEPHONE (OUTPATIENT)
Dept: SURGERY | Facility: CLINIC | Age: 66
End: 2021-09-28

## 2021-09-28 DIAGNOSIS — R39.15 URINARY URGENCY: Primary | ICD-10-CM

## 2021-09-28 DIAGNOSIS — R35.0 URINARY FREQUENCY: ICD-10-CM

## 2021-09-28 NOTE — TELEPHONE ENCOUNTER
Per pt would like to know when antibiotic should start working, is still still experiencing urgency to urinate.  Please advise

## 2021-09-28 NOTE — TELEPHONE ENCOUNTER
S/W pt and told her that she should have had relief of symptoms a long time ago from the [address] that was prescribed on 9/9. I then reviewed the urine culture of that day and noted that it was negative and I told pt about ANTHONY's msg.  Pt then stated that sh

## 2021-09-29 ENCOUNTER — LAB ENCOUNTER (OUTPATIENT)
Dept: LAB | Facility: HOSPITAL | Age: 66
End: 2021-09-29
Attending: UROLOGY
Payer: MEDICARE

## 2021-09-29 ENCOUNTER — TELEPHONE (OUTPATIENT)
Dept: INTERNAL MEDICINE CLINIC | Facility: CLINIC | Age: 66
End: 2021-09-29

## 2021-09-29 DIAGNOSIS — R35.0 URINARY FREQUENCY: ICD-10-CM

## 2021-09-29 DIAGNOSIS — R39.15 URINARY URGENCY: ICD-10-CM

## 2021-09-29 PROCEDURE — 81003 URINALYSIS AUTO W/O SCOPE: CPT

## 2021-09-29 NOTE — TELEPHONE ENCOUNTER
Pt wanting to speak to [facility], pt forgot to ask a few questions, please advise

## 2021-09-29 NOTE — TELEPHONE ENCOUNTER
Patient is calling to request an order for Dry Needling she has had this done in the past  She needs this for both Shoulders, Neck & Traps  Patient was told it is like rocks in these areas and the dry needling will help    Will Dr Kahlil Blank write an order?

## 2021-09-29 NOTE — TELEPHONE ENCOUNTER
S/W pt who is calling to inform that she feels some itching in her vagina and thinks she may have a yeast infection and wanted to make sure it was OK to take the Nitrofurantoin with the OTC monistat and I looked Monistat ip in the clinical reference and it

## 2021-09-29 NOTE — TELEPHONE ENCOUNTER
S/W pt ad informed her of [facility]'s response msg as stated below ad informed her of some bladder irritant foods and drinks and she states she feels a little better today and has urinated 3 times since she got up this morning and did not feel as much pressure a

## 2021-09-30 NOTE — TELEPHONE ENCOUNTER
Patient calling back. Patient was informed of Dr Natasha Akbar' message below. Patient verbalized understanding and states she is going to call her surgeon who completed her shoulder surgery. No further action is needed.

## 2021-10-01 ENCOUNTER — TELEPHONE (OUTPATIENT)
Dept: SURGERY | Facility: CLINIC | Age: 66
End: 2021-10-01

## 2021-10-01 NOTE — TELEPHONE ENCOUNTER
Per pt she is constantly have to urinate, has appt on 11/1 but asking if she can be seen sooner. Please call thank you.

## 2021-10-01 NOTE — TELEPHONE ENCOUNTER
Called pt back in regards to possibly scheduling her appt earlier than 11/1/21. Let pt know that she has an appt scheduled on 10/25 at [facility] with [address].  Pt was wondering if she could be seen sooner than that because she is going to have back surgery

## 2021-10-02 ENCOUNTER — OFFICE VISIT (OUTPATIENT)
Dept: OTOLARYNGOLOGY | Facility: CLINIC | Age: 66
End: 2021-10-02
Payer: MEDICARE

## 2021-10-02 DIAGNOSIS — H92.01 RIGHT EAR PAIN: ICD-10-CM

## 2021-10-02 DIAGNOSIS — H60.543 ACUTE ECZEMATOID OTITIS EXTERNA, BILATERAL: Primary | ICD-10-CM

## 2021-10-02 PROCEDURE — 99213 OFFICE O/P EST LOW 20 MIN: CPT | Performed by: SPECIALIST

## 2021-10-02 NOTE — PROGRESS NOTES
Rey Stoner is a 76year old female. Patient presents with: Follow - Up: Pt would like to recheck right ear     HPI:   Feels like something is in the right ear.     Current Outpatient Medications   Medication Sig Dispense Refill   • TRAZODONE 50 MG Oral 2008   • Depression    • Esophageal reflux    • Essential hypertension    • Gastric polyps 12/4/2018   • Gastritis    • High blood pressure    • Idiopathic acute pancreatitis 2002, 2010   • Insomnia    • Internal hemorrhoids without complication 98/9/6026 Appropriate mood and affect. Neurological Memory - Normal. Cranial nerves - Cranial nerves II through XII grossly intact. ASSESSMENT AND PLAN:   1.  Acute eczematoid otitis externa, bilateral  Trial of betamethasone valerate cream.    2. Right ear dennis

## 2021-10-02 NOTE — PATIENT INSTRUCTIONS
No infection on the date of your visit in your right ear. You did have some dry skin. Trial of betamethasone valerate cream.  Follow-up if there are further problems.

## 2021-10-07 ENCOUNTER — OFFICE VISIT (OUTPATIENT)
Dept: SURGERY | Facility: CLINIC | Age: 66
End: 2021-10-07
Payer: MEDICARE

## 2021-10-07 VITALS
HEART RATE: 87 BPM | BODY MASS INDEX: 21.25 KG/M2 | HEIGHT: 67.5 IN | DIASTOLIC BLOOD PRESSURE: 85 MMHG | SYSTOLIC BLOOD PRESSURE: 136 MMHG | WEIGHT: 137 LBS | RESPIRATION RATE: 20 BRPM

## 2021-10-07 DIAGNOSIS — N32.81 OAB (OVERACTIVE BLADDER): Primary | ICD-10-CM

## 2021-10-07 DIAGNOSIS — N20.0 BILATERAL KIDNEY STONES: ICD-10-CM

## 2021-10-07 PROCEDURE — 99213 OFFICE O/P EST LOW 20 MIN: CPT | Performed by: UROLOGY

## 2021-10-07 PROCEDURE — 81002 URINALYSIS NONAUTO W/O SCOPE: CPT | Performed by: UROLOGY

## 2021-10-07 RX ORDER — SOLIFENACIN SUCCINATE 5 MG/1
5 TABLET, FILM COATED ORAL DAILY
Qty: 30 TABLET | Refills: 1 | Status: SHIPPED | OUTPATIENT
Start: 2021-10-07 | End: 2021-11-24

## 2021-10-07 NOTE — PROGRESS NOTES
[address] [facility]  Follow-Up Visit    HPI: Samantha Stoner is a 78year old female presents for a follow up visit. Patient was last seen on 2/17/2021. INTERVAL HISTORY: Here for follow-up on bilateral nephrolithiasis.  Patient denies gross hematuria, Microscopic Hematuria  3.  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       Per  time. She appears well-developed and well-nourished. No distress. HENT:   Head: Normocephalic. Cardiovascular: Normal rate. Pulmonary/Chest: Effort normal.   Abdominal: Soft. There is no abdominal tenderness.    Neurological: She is alert and oriente post cholecystectomy changes given the chronicity of this finding. 3.  21 mm diameter right ovarian cyst grossly unchanged since prior CT from 2/26/18. 4.  Prior L1-L2 posterior spine fusion.     XR KUB (2/16/2021): 1.  Stable 4 mm nonobstructing left tarun Discussed benefits, risks, common side effects. Patient agreeable. All questions answered. PLAN:  1. Conservative management of bilateral nonobstructing kidney stones, left greater than right.     2.  Trial of Vesicare 5 mg daily for overactive bl

## 2021-10-12 ENCOUNTER — MED REC SCAN ONLY (OUTPATIENT)
Dept: CARDIOLOGY CLINIC | Facility: CLINIC | Age: 66
End: 2021-10-12

## 2021-10-20 ENCOUNTER — PATIENT MESSAGE (OUTPATIENT)
Dept: NEUROLOGY | Facility: CLINIC | Age: 66
End: 2021-10-20

## 2021-10-20 NOTE — TELEPHONE ENCOUNTER
NOV: none scheduled  LOV: 4/21/21-televisit (last in office appointment 3/5/2020)    Patient mychart message forwarded to Lo William for advice.

## 2021-10-20 NOTE — TELEPHONE ENCOUNTER
From: Clarisse Jayons Day  To: Azalea Marshall MD  Sent: 10/20/2021 9:19 AM CDT  Subject: Injections     Hi Dr Yumiko Marshall   Was wondering if you do burn nerve injections for the occipital muscle, nerve area at the base of my skull and of the neck?  Occipital Neuralgia

## 2021-10-26 ENCOUNTER — TELEPHONE (OUTPATIENT)
Dept: INTERNAL MEDICINE CLINIC | Facility: CLINIC | Age: 66
End: 2021-10-26

## 2021-10-26 ENCOUNTER — TELEPHONE (OUTPATIENT)
Dept: GASTROENTEROLOGY | Facility: CLINIC | Age: 66
End: 2021-10-26

## 2021-10-26 RX ORDER — SUCRALFATE 1 G/1
1 TABLET ORAL 3 TIMES DAILY PRN
Qty: 90 TABLET | Refills: 1 | Status: SHIPPED | OUTPATIENT
Start: 2021-10-26 | End: 2021-10-26

## 2021-10-26 RX ORDER — SUCRALFATE ORAL 1 G/10ML
1 SUSPENSION ORAL
Qty: 900 ML | Refills: 1 | Status: SHIPPED | OUTPATIENT
Start: 2021-10-26 | End: 2021-11-25

## 2021-10-26 NOTE — TELEPHONE ENCOUNTER
Notified Radha liquid carafate ordered and sent e-scribed per provider. Instructed her to contact pharmacy directly in regards to out of pocket expenses and timing for . Expressed understanding and appreciative for update.      No further q

## 2021-10-26 NOTE — TELEPHONE ENCOUNTER
Serena Velasquez--    Spoke with Arzella Saint to relay orders placed. Having issues with tablets and is making her extremely nauseous. Would like a prescription for liquid form. Found a goodRX coupon to apply. Please advise on orders.      Thank you

## 2021-10-26 NOTE — TELEPHONE ENCOUNTER
Dr. Laine Alonzo off/January--    Patient requesting sucralfate 1 GM/ liquid suspension prescription. EGD done with Dr. Laine Alonzo 02/26/2021 and recommended carafate while utilizing NSAIDS to protect gastric lining.      Please provide orders for medication if ap

## 2021-10-26 NOTE — TELEPHONE ENCOUNTER
Yes, I do the procedure of which she is inquiring. She will need to seen if she does not have an order for this prior to scheduling it. It she needs to have an appointment before her surgery, please help her to get scheduled.

## 2021-10-26 NOTE — TELEPHONE ENCOUNTER
Patient is calling to ask if Dr Zena Rios will call in a prescription for Sucralfate Liquid Form    She currently have has an active refill for Sucralfate Tab but this this very difficult for patient to swallow    [facility] told patient there is a liquid form, wi

## 2021-10-26 NOTE — TELEPHONE ENCOUNTER
Pt called in wondering if she can have the medication in liquid form for sucralfate. Pt states she should be getting this medication from Dr. Kermit Goldberg.  Please follow up

## 2021-10-27 ENCOUNTER — TELEPHONE (OUTPATIENT)
Dept: NEUROLOGY | Facility: CLINIC | Age: 66
End: 2021-10-27

## 2021-10-27 ENCOUNTER — OFFICE VISIT (OUTPATIENT)
Dept: PHYSICAL MEDICINE AND REHAB | Facility: CLINIC | Age: 66
End: 2021-10-27
Payer: MEDICARE

## 2021-10-27 DIAGNOSIS — M54.2 NECK PAIN: ICD-10-CM

## 2021-10-27 DIAGNOSIS — M50.90 CERVICAL DISC DISEASE: Primary | ICD-10-CM

## 2021-10-27 DIAGNOSIS — G44.86 CERVICOGENIC HEADACHE: ICD-10-CM

## 2021-10-27 DIAGNOSIS — M48.02 CERVICAL SPINAL STENOSIS: ICD-10-CM

## 2021-10-27 DIAGNOSIS — M50.20 CERVICAL HERNIATED DISC: ICD-10-CM

## 2021-10-27 DIAGNOSIS — M54.81 BILATERAL OCCIPITAL NEURALGIA: ICD-10-CM

## 2021-10-27 DIAGNOSIS — Z98.1 S/P CERVICAL SPINAL FUSION: ICD-10-CM

## 2021-10-27 PROCEDURE — 99214 OFFICE O/P EST MOD 30 MIN: CPT | Performed by: PHYSICAL MEDICINE & REHABILITATION

## 2021-10-27 NOTE — TELEPHONE ENCOUNTER
Per Medicare guidelines authorization is not required for Bilateral greater occipital nerve blocks cpt code 64405 x 2. Will inform Nursing.

## 2021-10-27 NOTE — PROGRESS NOTES
[facility]    Chief Complaint:  Neck pain and headaches    Nursing note reviewed and verified. Patient was last seen on 4/21/2021. She is going to have lumbar spine surgery on 11/30/2021 with Dr. Merary Napier.   She developed neck pain and bilateral • Esophageal reflux    • Essential hypertension    • Gastric polyps 12/4/2018   • Gastritis    • High blood pressure    • Idiopathic acute pancreatitis 2002, 2010   • Insomnia    • Internal hemorrhoids without complication 52/6/5407   • Intestinal disord Tobacco Use      Smoking status: Never Smoker      Smokeless tobacco: Never Used    Vaping Use      Vaping Use: Never used    Substance and Sexual Activity      Alcohol use: No        Alcohol/week: 0.0 standard drinks        Comment: quit 4 - 5 yrs ago due Emotionally Abused: Not on file      Physically Abused: Not on file      Sexually Abused: Not on file  Housing Stability:       Unable to Pay for Housing in the Last Year: Not on file      Number of Places Lived in the Last Year: Not on file      Unstable rotators Right: 4+/5  Shoulder external rotators Left: 4+/5  Serratus anterior Right: 4+/5  Serratus anterior Left: 4+/5   Reflexes: 2+ In the bilateral upper extremities.    Montero's sign Right: Negative   Montero's sigh Left: Negative     [facility]

## 2021-10-27 NOTE — PATIENT INSTRUCTIONS
Plan  I will do bilateral greater occipital nerve blocks to give her temporary relief of the pain unitl after she has had the surgery if this is ok with Dr. Vashti Thomas. She will go to PT for the pain.     The patient will follow up in 2 months, but the tanmay

## 2021-10-29 NOTE — TELEPHONE ENCOUNTER
Can we open a slot and squeeze her in at 10am, States she's having surgery she was supposed to be seen that day. Msg and routed to [facility] to open slot.

## 2021-10-29 NOTE — TELEPHONE ENCOUNTER
Called Patient in regards to scheduling an appt with Dr. Jazmyn Hoyos. Patient states that she has an appt next Tuesday. I looked at [employer] and didn't see an appt for her until 1/2022.  She states that a nurse told her she will be squeezed in on Tuesday and said

## 2021-11-02 ENCOUNTER — OFFICE VISIT (OUTPATIENT)
Dept: PHYSICAL MEDICINE AND REHAB | Facility: CLINIC | Age: 66
End: 2021-11-02
Payer: MEDICARE

## 2021-11-02 ENCOUNTER — PATIENT MESSAGE (OUTPATIENT)
Dept: PHYSICAL MEDICINE AND REHAB | Facility: CLINIC | Age: 66
End: 2021-11-02

## 2021-11-02 ENCOUNTER — MED REC SCAN ONLY (OUTPATIENT)
Dept: PHYSICAL MEDICINE AND REHAB | Facility: CLINIC | Age: 66
End: 2021-11-02

## 2021-11-02 DIAGNOSIS — M54.81 BILATERAL OCCIPITAL NEURALGIA: Primary | ICD-10-CM

## 2021-11-02 PROCEDURE — 64405 NJX AA&/STRD GR OCPL NRV: CPT | Performed by: PHYSICAL MEDICINE & REHABILITATION

## 2021-11-02 RX ORDER — LIDOCAINE HYDROCHLORIDE 10 MG/ML
4 INJECTION, SOLUTION INFILTRATION; PERINEURAL ONCE
Status: COMPLETED | OUTPATIENT
Start: 2021-11-02 | End: 2021-11-02

## 2021-11-02 RX ORDER — TRIAMCINOLONE ACETONIDE 40 MG/ML
80 INJECTION, SUSPENSION INTRA-ARTICULAR; INTRAMUSCULAR ONCE
Status: COMPLETED | OUTPATIENT
Start: 2021-11-02 | End: 2021-11-02

## 2021-11-03 ENCOUNTER — TELEPHONE (OUTPATIENT)
Dept: INTERNAL MEDICINE CLINIC | Facility: CLINIC | Age: 66
End: 2021-11-03

## 2021-11-03 ENCOUNTER — HOSPITAL ENCOUNTER (OUTPATIENT)
Age: 66
Discharge: HOME | End: 2021-11-03
Payer: MEDICARE

## 2021-11-03 VITALS
HEART RATE: 94 BPM | DIASTOLIC BLOOD PRESSURE: 94 MMHG | OXYGEN SATURATION: 98 % | SYSTOLIC BLOOD PRESSURE: 150 MMHG | RESPIRATION RATE: 18 BRPM | TEMPERATURE: 99 F

## 2021-11-03 DIAGNOSIS — U07.1 COVID-19: Primary | ICD-10-CM

## 2021-11-03 PROCEDURE — 99213 OFFICE O/P EST LOW 20 MIN: CPT

## 2021-11-03 PROCEDURE — 99212 OFFICE O/P EST SF 10 MIN: CPT

## 2021-11-03 NOTE — ED PROVIDER NOTES
Patient presents with:  Cough/URI      HPI:     Michael Stoner is a 78year old female who presents for evaluation and management of a chief complaint of nasal congestion and a dry cough for the past 4 days.   She states she had a Covid test done at [facility] daily        Occupational Exposure: Not Asked        Hobby Hazards: Not Asked        Sleep Concern: Not Asked        Stress Concern: Not Asked        Weight Concern: Not Asked        Special Diet: Not Asked        Back Care: Not Asked        Exercise: No Findings:    BP (!) 150/94   Pulse 94   Temp 99.2 °F (37.3 °C) (Temporal)   Resp 18   SpO2 98%   GENERAL: well developed, well nourished, well hydrated, no distress  SKIN: good skin turgor, no obvious rashes  NECK: supple, no adenopathy.  No neck stiffn

## 2021-11-03 NOTE — TELEPHONE ENCOUNTER
Dr. Diego Favors, please advise on quarantine length. Patient tested positive today in [place] [facility]. Patient says her symptoms started on 10/31/21. A 10 days quarantine would be done on 11/10/21. Patient is scheduled to see you on 11/9/21.  Please adv

## 2021-11-03 NOTE — TELEPHONE ENCOUNTER
To , please assist with rescheduling patient's appointment per Dr. Abdelrahman Mark message below. Nursing follow up tomorrow with patient.

## 2021-11-03 NOTE — TELEPHONE ENCOUNTER
Please call pt  She tested positive for COVID at immediate care in [facility] pt keep her pre-surgical appt with Dr Merry Franklin for next week?     Tasked to nursing

## 2021-11-03 NOTE — ED INITIAL ASSESSMENT (HPI)
Pt c/o cough and runny nose x 3 days, [facility] Unvacc[facility].

## 2021-11-03 NOTE — TELEPHONE ENCOUNTER
COVID triage:     Start of symptoms: since Sunday, outside Friday for a game     Fever:  [x]  No fever  []  Temperature  []  Chills   []  Night sweats     Cough:  [] Productive cough  [] Cough with exertion  [x] Dry cough     Breathing: no   [] Wheezing  [

## 2021-11-03 NOTE — TELEPHONE ENCOUNTER
Please call pt  She would like to know her zinc levels from latest lab?   Pt is curious as she has a cold and was going to take zinc but would hold off if her levels were not ok  Tasked to nursing

## 2021-11-03 NOTE — TELEPHONE ENCOUNTER
Pt is calling and states she just did a rapid covid test and it came back positive.   Pt would like to take a regular covid test.     Can orders be put in for covid test.    Please call and advise

## 2021-11-04 ENCOUNTER — TELEPHONE (OUTPATIENT)
Dept: GASTROENTEROLOGY | Facility: CLINIC | Age: 66
End: 2021-11-04

## 2021-11-04 ENCOUNTER — PATIENT MESSAGE (OUTPATIENT)
Dept: ENDOCRINOLOGY CLINIC | Facility: CLINIC | Age: 66
End: 2021-11-04

## 2021-11-04 NOTE — TELEPHONE ENCOUNTER
Contacted Elsa Howell to review aprn recommendations below. She decided to cut tablets in half and swallow with a full cup of water which is working well for her.      No need to fill liquid form prescription given oral tablets working and too expensive to

## 2021-11-04 NOTE — TELEPHONE ENCOUNTER
Sucralfate is generic and appears what was sent. If not covered, would suggest pepcid, which is over the counter. Would follow-up with Dr. Carolyn Muniz for on-going symptoms.

## 2021-11-04 NOTE — TELEPHONE ENCOUNTER
Spoke to pt for condition update after UC visit and testing +covid on 11/3/21. Pt reports her current symptoms are nasal congestion, cough- denies SOB of difficulty breathing, and loss of sense of smell.  Encouraged patient to continue to monitor for sympto

## 2021-11-04 NOTE — TELEPHONE ENCOUNTER
----- Message from Morenita Garcia, [address] sent at 11/26/2018  7:58 AM EST -----  + Gardnerella. Flagyl 500 mg bid x 7 days. Current Outpatient Medications   Medication Sig Dispense Refill   • sucralfate 1 GM/10ML Oral Suspension Take 10 mL (1 g total) by mouth 3 (three) times daily before meals.  900 mL 1       Not covered- too expensive- pl send alternate

## 2021-11-04 NOTE — TELEPHONE ENCOUNTER
Avani Tomlinson--    Is there an alternative for carafate liquid suspension? Patient denied tablets since it was making her extremely nauseous. Reports liquid form is too expensive. Please advise.      Thank you

## 2021-11-04 NOTE — TELEPHONE ENCOUNTER
Pt returned call, rescheduled appt to 11/11/21    Nursing not available at time of pt call, please call to follow as per note below    Tasked to nursing

## 2021-11-05 NOTE — TELEPHONE ENCOUNTER
From: Bryan Underwood Day  To: Sathish Mercedes MD  Sent: 11/4/2021 7:59 PM CDT  Subject: Covid-19     I do have covid. They say to push Vit C and D but I wasn’t sure how much D I should take.    I know you had me an extra supplement for my bones, but can I take more

## 2021-11-05 NOTE — TELEPHONE ENCOUNTER
Dr. Ryley Perry, patient asking if she can increase Vitamin D supplementation due to COVID diagnosis. Per LOV: on 2500 units daily.

## 2021-11-08 ENCOUNTER — TELEPHONE (OUTPATIENT)
Dept: INTERNAL MEDICINE CLINIC | Facility: CLINIC | Age: 66
End: 2021-11-08

## 2021-11-08 DIAGNOSIS — J40 BRONCHITIS DUE TO COVID-19 VIRUS: ICD-10-CM

## 2021-11-08 DIAGNOSIS — Z01.818 PRE-OP EVALUATION: Primary | ICD-10-CM

## 2021-11-08 DIAGNOSIS — U07.1 BRONCHITIS DUE TO COVID-19 VIRUS: ICD-10-CM

## 2021-11-08 NOTE — TELEPHONE ENCOUNTER
S/w patient, for condition update after Bilateral greater occipital nerve blocks on 11/2. Pt. currently has covid infection so difficult to fully assess improvement after procedure. Reports about 60% relief from injection.  Has been having headaches but att

## 2021-11-08 NOTE — TELEPHONE ENCOUNTER
Seeing Dr Hughes Ask on [id number] for pre surgical ov    Asks if she can get order for chest xray to have done before Thursday to be sure her lungs are clear     Please call pt to advise [phone]  - knows Dr Hughes Ask is not in the office today

## 2021-11-08 NOTE — TELEPHONE ENCOUNTER
From: Bhupendra Des Day  Sent: 11/2/2021 12:57 PM CDT  To: Lesa Cleaning Nurse  Subject: Neck injections     Neck injections. I forgot to ask Dr Ellie Deng if he had a thought for muscle relaxer.  Cayden Mott been taking Flexerl and the only thing it dies is help me

## 2021-11-09 RX ORDER — METHOCARBAMOL 500 MG/1
500 TABLET, FILM COATED ORAL 3 TIMES DAILY
Qty: 30 TABLET | Refills: 2 | Status: SHIPPED | OUTPATIENT
Start: 2021-11-09 | End: 2022-01-04

## 2021-11-09 NOTE — PROCEDURES
I did bilateral greater occipital nerve blocks in the office today. The points of maximal tenderness over the bilateral posterior occipital regions were identified. Derryl Poles were placed on the patient's skin. The skin was cleaned with alcohol swaps x 3.   Katelyn Domínguez

## 2021-11-09 NOTE — TELEPHONE ENCOUNTER
Spoke to patient and notified her CXR order placed; she will go to have this done tomorrow. TO Dr. Navjot hBat-- patient also asking for x-ray order that will assess her ribs and her back (around bra strap area).  She has rib and back tenderness and she wo

## 2021-11-09 NOTE — TELEPHONE ENCOUNTER
Please tell her to take Vitamin D3 5,000 international units, Zinc 100 mg a day, and Vitamin C 2,000 mg a day, Quercetin 250 mg BID, and Melatonin 10 mg at bedtime as long as she is having the COVID symptoms.   She will need to take these as long as she is

## 2021-11-10 ENCOUNTER — APPOINTMENT (OUTPATIENT)
Dept: GENERAL RADIOLOGY | Age: 66
End: 2021-11-10
Attending: NURSE PRACTITIONER
Payer: MEDICARE

## 2021-11-10 ENCOUNTER — HOSPITAL ENCOUNTER (OUTPATIENT)
Age: 66
Discharge: HOME | End: 2021-11-10
Payer: MEDICARE

## 2021-11-10 VITALS
TEMPERATURE: 97 F | DIASTOLIC BLOOD PRESSURE: 82 MMHG | BODY MASS INDEX: 21.22 KG/M2 | SYSTOLIC BLOOD PRESSURE: 139 MMHG | OXYGEN SATURATION: 100 % | HEIGHT: 68 IN | WEIGHT: 140 LBS | HEART RATE: 87 BPM | RESPIRATION RATE: 16 BRPM

## 2021-11-10 DIAGNOSIS — S39.012A BACK STRAIN, INITIAL ENCOUNTER: Primary | ICD-10-CM

## 2021-11-10 PROCEDURE — 99214 OFFICE O/P EST MOD 30 MIN: CPT | Performed by: NURSE PRACTITIONER

## 2021-11-10 PROCEDURE — 93000 ELECTROCARDIOGRAM COMPLETE: CPT | Performed by: NURSE PRACTITIONER

## 2021-11-10 PROCEDURE — 71111 X-RAY EXAM RIBS/CHEST4/> VWS: CPT | Performed by: NURSE PRACTITIONER

## 2021-11-10 RX ORDER — SODIUM CHLORIDE 9 MG/ML
1000 INJECTION, SOLUTION INTRAVENOUS ONCE
Status: COMPLETED | OUTPATIENT
Start: 2021-11-10 | End: 2021-11-10

## 2021-11-10 NOTE — ED INITIAL ASSESSMENT (HPI)
Pt states dx'd with covid 11/3/21. States dry cough, fatigue, loss of smell and taste since 11/1, mid back pain x5 days.  has been massaging and pounding with her fists to back.  has appt with pmd for tomorrow.   [facility] has outpatient cxr orde

## 2021-11-10 NOTE — ED PROVIDER NOTES
Patient Seen in: [facility]    History   CC: back pain  HPI: Jessica Mckenzie A Day 78year old female  who presents c/o posterior upper back pain which has been present, constant in nature x5 days.  States she was dx w/ covid on 11/2 after cough began Surgeon: Neha Starkey MD;  Location: [facility] ENDO   • FRACTURE SURGERY Left 2010    WRIST FRACTURE ORIF   • FRACTURE SURGERY Left 2013    ORIF wrist fracture revision with plates and screws- Ortho Dr Gabi Yeboah   • LASIK     • OTHER SURGICAL HIST cyclobenzaprine 10 MG Oral Tab,  Take 1 tablet (10 mg total) by mouth every 8 (eight) hours as needed for Muscle spasms. Cholecalciferol 25 MCG (1000 UT) Oral Tab,  Take by mouth daily.    Meclizine HCl 25 MG Oral Tab,  Take 1 tablet (25 mg total) by mout no discharge noted, no periorbital edema  ENT - EAC bilaterally without discharge, TM pearly grey with COL visualized appropriately bilaterally. no nasal drainage noted in nares bilat, no cobblestoning to post. Pharynx.    Oropharynx clear, posterior phar  Scoliosis and degenerative changes in the spine.  Posterior interbody fusion L1-2 and ACDF in the mid to lower cervical spine.  Chronic   widening of left greater than right AC joint and degenerative changes in both shoulders.  Chronic right rotator cuff Black. This case was also discussed with Dr. Angelica Dutton who agrees with plan of care.       Disposition and Plan     Clinical Impression:  Back strain, initial encounter  (primary encounter diagnosis)    Disposition:  Discharge    Follow-up:  Damien Arambula

## 2021-11-11 ENCOUNTER — OFFICE VISIT (OUTPATIENT)
Dept: INTERNAL MEDICINE CLINIC | Facility: CLINIC | Age: 66
End: 2021-11-11
Payer: MEDICARE

## 2021-11-11 VITALS
WEIGHT: 136.63 LBS | HEIGHT: 68 IN | BODY MASS INDEX: 20.71 KG/M2 | TEMPERATURE: 99 F | SYSTOLIC BLOOD PRESSURE: 122 MMHG | DIASTOLIC BLOOD PRESSURE: 80 MMHG | HEART RATE: 98 BPM | OXYGEN SATURATION: 97 %

## 2021-11-11 DIAGNOSIS — U07.1 COVID-19 VIRUS INFECTION: Primary | ICD-10-CM

## 2021-11-11 PROCEDURE — 99214 OFFICE O/P EST MOD 30 MIN: CPT | Performed by: INTERNAL MEDICINE

## 2021-11-11 NOTE — TELEPHONE ENCOUNTER
Left message patient`s voice mail to return call to office. Seen in urgent care 11/10/21. Report received from JIMMIE Gamez. Pt arrived to floor from ED via stretcher. Pt AAOx4 in NAD, HR in 130's ST on the monitor, 100% on room air, SBP >180's. Dr. Santiago notified of pt's arrival and aware of recent VS. Orders reviewed and released. Bed locked, in lowest position. Call bell within reach. See corresponding flowsheets for full documentation. Will continue to monitor.

## 2021-11-11 NOTE — PROGRESS NOTES
Iva Stoner is a 78year old female. Patient presents with:  Pre-Op Exam: 11/30/21 laminectomy fusion  Covid: Discuss her Covid positive    HPI:     Originally scheduled to be here for pre-op evaluation for lumbar laminectomy/fusion on 11/30/21.   Supposed triamcinolone acetonide 0.1 % External Cream      • Econazole Nitrate 1 % External Cream Apply 1 Application topically as needed. • cyclobenzaprine 10 MG Oral Tab Take 1 tablet (10 mg total) by mouth every 8 (eight) hours as needed for Muscle spasms.  9 well otherwise  RESPIRATORY: no SOB  CARDIOVASCULAR: no chest pain/pressure  GI: no nausea, vomiting, diarrhea    Wt Readings from Last 5 Encounters:  11/11/21 : 136 lb 9.6 oz (62 kg)  11/10/21 : 140 lb (63.5 kg)  10/07/21 : 137 lb (62.1 kg)  08/25/21 : 13

## 2021-11-13 ENCOUNTER — PATIENT MESSAGE (OUTPATIENT)
Dept: INTERNAL MEDICINE CLINIC | Facility: CLINIC | Age: 66
End: 2021-11-13

## 2021-11-15 RX ORDER — BENZONATATE 100 MG/1
100 CAPSULE ORAL 3 TIMES DAILY PRN
Qty: 30 CAPSULE | Refills: 0 | Status: SHIPPED | OUTPATIENT
Start: 2021-11-15 | End: 2021-11-24

## 2021-11-15 NOTE — TELEPHONE ENCOUNTER
Patient is calling with some concerns of her lingering symptoms turning into pneumonia or bronchitis. Patient is questioning that Dr Franc Walker did not hear any pneumonia or bronchitis in her chest while here on 11/11/2021.  Patient states she did have an xra

## 2021-11-15 NOTE — TELEPHONE ENCOUNTER
Spoke to patient and relayed MD message and instructions, patient verbalizes understanding and agrees with plan. Patient is agreeable to trying benzonatate. She does not feel UC is needed at this time.  She spoke with a nurse today about her back surgery an

## 2021-11-15 NOTE — TELEPHONE ENCOUNTER
Message noted. We can let patient will followin. I was forwarded her message in the absence of Dr. Portia Maguire. 2.  For the cough I pended benzonatate which is a cough suppressant that she can take. It is helped a lot of people with her cough.   Júnior Caro

## 2021-11-15 NOTE — TELEPHONE ENCOUNTER
To Dr. Gómez Hood on call-- is there anything else that can be recommended for cough? Patient seen 11/11/21 in office. Also to Dr. Sony Davalos regarding questions of ivermectin, hydroxychloroquine, hydrogen peroxide nebulizer.

## 2021-11-18 ENCOUNTER — HOSPITAL ENCOUNTER (OUTPATIENT)
Age: 66
Discharge: HOME | End: 2021-11-18
Attending: EMERGENCY MEDICINE
Payer: MEDICARE

## 2021-11-18 ENCOUNTER — APPOINTMENT (OUTPATIENT)
Dept: CT IMAGING | Age: 66
End: 2021-11-18
Attending: EMERGENCY MEDICINE
Payer: MEDICARE

## 2021-11-18 ENCOUNTER — APPOINTMENT (OUTPATIENT)
Dept: GENERAL RADIOLOGY | Age: 66
End: 2021-11-18
Attending: EMERGENCY MEDICINE
Payer: MEDICARE

## 2021-11-18 VITALS
HEART RATE: 95 BPM | TEMPERATURE: 98 F | SYSTOLIC BLOOD PRESSURE: 134 MMHG | RESPIRATION RATE: 20 BRPM | DIASTOLIC BLOOD PRESSURE: 78 MMHG | OXYGEN SATURATION: 98 %

## 2021-11-18 DIAGNOSIS — E87.6 HYPOKALEMIA: ICD-10-CM

## 2021-11-18 DIAGNOSIS — J12.82 PNEUMONIA DUE TO COVID-19 VIRUS: Primary | ICD-10-CM

## 2021-11-18 DIAGNOSIS — U07.1 PNEUMONIA DUE TO COVID-19 VIRUS: Primary | ICD-10-CM

## 2021-11-18 PROCEDURE — 93010 ELECTROCARDIOGRAM REPORT: CPT

## 2021-11-18 PROCEDURE — 71046 X-RAY EXAM CHEST 2 VIEWS: CPT | Performed by: EMERGENCY MEDICINE

## 2021-11-18 PROCEDURE — 87880 STREP A ASSAY W/OPTIC: CPT

## 2021-11-18 PROCEDURE — 85378 FIBRIN DEGRADE SEMIQUANT: CPT | Performed by: EMERGENCY MEDICINE

## 2021-11-18 PROCEDURE — 99215 OFFICE O/P EST HI 40 MIN: CPT

## 2021-11-18 PROCEDURE — 84484 ASSAY OF TROPONIN QUANT: CPT

## 2021-11-18 PROCEDURE — 85025 COMPLETE CBC W/AUTO DIFF WBC: CPT | Performed by: EMERGENCY MEDICINE

## 2021-11-18 PROCEDURE — 36415 COLL VENOUS BLD VENIPUNCTURE: CPT

## 2021-11-18 PROCEDURE — 93010 ELECTROCARDIOGRAM REPORT: CPT | Performed by: EMERGENCY MEDICINE

## 2021-11-18 PROCEDURE — 99214 OFFICE O/P EST MOD 30 MIN: CPT

## 2021-11-18 PROCEDURE — 80047 BASIC METABLC PNL IONIZED CA: CPT

## 2021-11-18 PROCEDURE — 93005 ELECTROCARDIOGRAM TRACING: CPT

## 2021-11-18 PROCEDURE — 71260 CT THORAX DX C+: CPT | Performed by: EMERGENCY MEDICINE

## 2021-11-18 RX ORDER — DOXYCYCLINE HYCLATE 100 MG/1
100 CAPSULE ORAL 2 TIMES DAILY
Qty: 14 CAPSULE | Refills: 0 | Status: SHIPPED | OUTPATIENT
Start: 2021-11-18 | End: 2021-11-25

## 2021-11-18 RX ORDER — POTASSIUM CHLORIDE 20 MEQ/1
40 TABLET, EXTENDED RELEASE ORAL DAILY
Status: DISCONTINUED | OUTPATIENT
Start: 2021-11-18 | End: 2021-11-18

## 2021-11-18 RX ORDER — POTASSIUM CHLORIDE 20 MEQ/1
40 TABLET, EXTENDED RELEASE ORAL ONCE
Status: COMPLETED | OUTPATIENT
Start: 2021-11-18 | End: 2021-11-18

## 2021-11-18 NOTE — ED PROVIDER NOTES
Patient Seen in: [facility]      History   Patient presents with:  Cough    Stated Complaint: she wants somebody to listen to her chest    Subjective:   HPI    The patient is a 58-year-old female with past history of hypertension, pancreatiti COLONOSCOPY N/A 12/4/2018    Procedure: COLONOSCOPY;  Surgeon: Jessica Garland MD;  Location: [address] [place] [address] ENDO   • FRACTURE SURGERY Left 2010    WRIST FRACTURE ORIF   • FRACTURE SURGERY Left 2013    ORIF wrist fracture revision with plates and screws- following components:       Result Value    D-Dimer  (*)     All other components within normal limits   POCT ISTAT CHEM8 CARTRIDGE - Abnormal; Notable for the following components:    ISTAT Potassium 3.5 (*)     ISTAT Glucose 109 (*)     All other medications    doxycycline 100 MG Oral Cap  Take 1 capsule (100 mg total) by mouth 2 (two) times daily for 7 days.   Qty: 14 capsule Refills: 0

## 2021-11-22 ENCOUNTER — OFFICE VISIT (OUTPATIENT)
Dept: SURGERY | Facility: CLINIC | Age: 66
End: 2021-11-22
Payer: MEDICARE

## 2021-11-22 ENCOUNTER — TELEPHONE (OUTPATIENT)
Dept: SURGERY | Facility: CLINIC | Age: 66
End: 2021-11-22

## 2021-11-22 ENCOUNTER — TELEPHONE (OUTPATIENT)
Dept: ENDOCRINOLOGY CLINIC | Facility: CLINIC | Age: 66
End: 2021-11-22

## 2021-11-22 DIAGNOSIS — N39.3 STRESS INCONTINENCE: ICD-10-CM

## 2021-11-22 DIAGNOSIS — N32.81 OAB (OVERACTIVE BLADDER): ICD-10-CM

## 2021-11-22 DIAGNOSIS — R31.29 MICROHEMATURIA: ICD-10-CM

## 2021-11-22 DIAGNOSIS — N20.0 BILATERAL KIDNEY STONES: Primary | ICD-10-CM

## 2021-11-22 PROCEDURE — 99213 OFFICE O/P EST LOW 20 MIN: CPT | Performed by: NURSE PRACTITIONER

## 2021-11-22 NOTE — TELEPHONE ENCOUNTER
Patient has upcoming apt 12/20/21. Submitted IV to [facility] portal, waiting for SOB, 2-5 business days.

## 2021-11-22 NOTE — TELEPHONE ENCOUNTER
Pt called stating pt has an appointment today 11-22-21 at 1:30pm.  Positive with covid on 11-3-21. Pt has a cough. Can pt be seen or need to reschedule.   Please call

## 2021-11-22 NOTE — PROGRESS NOTES
[facility] Urology  Follow-Up Visit    HPI: Dariana Stoner is a 78year old female presents for a follow up visit. INTERVAL HISTORY: Here for follow-up. Previously seen by Dr. Prema Martinez on 10/7/21 with OAB symptoms.   She was started on Vesicare 5 m procedure.      U/A 5/8/2020 with 14 RBC per HPF. No WBC's, leuks or nitrites and a few bacteria. Urine culture negative. She reports having UTI-like symptoms at the time which responded to oral diflucan.      She denies gross hematuria.      - Urine cytolo Psychiatric: She has a normal mood and affect. PATHOLOGY:  Voided cytology (06/2020): Benign. Bladder wash cytology (04/2018): Benign.       LABS:    Component     Latest Ref Rng 6/17/2020   Color Urine     Yellow Yellow   CLARITY URINE     Clear over the right renal silhouette. It is uncertain if this finding represents a new nonobstructing right renal stone or is artifactual due to superimposed enteric contents.   Further assessment with a dedicated renal ultrasound could be considered as a renal

## 2021-11-22 NOTE — TELEPHONE ENCOUNTER
Asked pt if having any fevers, chills, or any other flu-like sx. Pt states no other symptoms besides cough. Pt is off isolation, has been over 3 weeks since diagnosed. Let pt know it is ok to come in office, just continue to follow social distancing.  Malachi Cunningham

## 2021-11-23 ENCOUNTER — APPOINTMENT (OUTPATIENT)
Dept: CT IMAGING | Age: 66
End: 2021-11-23
Attending: NEUROLOGICAL SURGERY
Payer: MEDICARE

## 2021-11-23 ENCOUNTER — TELEPHONE (OUTPATIENT)
Dept: ENDOCRINOLOGY CLINIC | Facility: CLINIC | Age: 66
End: 2021-11-23

## 2021-11-23 ENCOUNTER — HOSPITAL ENCOUNTER (OUTPATIENT)
Dept: MRI IMAGING | Age: 66
Discharge: HOME | End: 2021-11-23
Attending: NEUROLOGICAL SURGERY
Payer: MEDICARE

## 2021-11-23 DIAGNOSIS — M41.25 IDIOPATHIC SCOLIOSIS OF THORACOLUMBAR REGION: ICD-10-CM

## 2021-11-23 PROCEDURE — 72146 MRI CHEST SPINE W/O DYE: CPT | Performed by: NEUROLOGICAL SURGERY

## 2021-11-23 NOTE — TELEPHONE ENCOUNTER
Spoke to patient - she stated she is scheduled for back surgery 12/10/21 - surgery is dependent on patient being cleared for surgery d/t covid virus  RN advised patient that she can reschedule NV for Prolia so long as injection is given within a couple of

## 2021-11-24 ENCOUNTER — OFFICE VISIT (OUTPATIENT)
Dept: INTERNAL MEDICINE CLINIC | Facility: CLINIC | Age: 66
End: 2021-11-24
Payer: MEDICARE

## 2021-11-24 VITALS
HEART RATE: 94 BPM | HEIGHT: 68 IN | WEIGHT: 135 LBS | DIASTOLIC BLOOD PRESSURE: 70 MMHG | BODY MASS INDEX: 20.46 KG/M2 | SYSTOLIC BLOOD PRESSURE: 128 MMHG | OXYGEN SATURATION: 100 % | TEMPERATURE: 99 F

## 2021-11-24 DIAGNOSIS — U07.1 PNEUMONIA DUE TO COVID-19 VIRUS: Primary | ICD-10-CM

## 2021-11-24 DIAGNOSIS — Z01.818 PREOP EXAMINATION: ICD-10-CM

## 2021-11-24 DIAGNOSIS — J12.82 PNEUMONIA DUE TO COVID-19 VIRUS: Primary | ICD-10-CM

## 2021-11-24 PROBLEM — E46 PROTEIN-CALORIE MALNUTRITION, UNSPECIFIED SEVERITY (HCC): Status: ACTIVE | Noted: 2021-11-24

## 2021-11-24 PROBLEM — E21.3 HYPERPARATHYROIDISM (HCC): Status: ACTIVE | Noted: 2021-11-24

## 2021-11-24 PROBLEM — K86.1 OTHER CHRONIC PANCREATITIS (HCC): Status: ACTIVE | Noted: 2021-11-24

## 2021-11-24 PROCEDURE — 99214 OFFICE O/P EST MOD 30 MIN: CPT | Performed by: INTERNAL MEDICINE

## 2021-11-24 NOTE — PROGRESS NOTES
Kaylie Stoner is a 78year old female. Patient presents with:  Checkup    HPI:     Pre-op evaluation for lumbar lami/fusion.     Originally scheduled for lumbar laminectomy/fusion on 11/30/21 (Dr. Brody Crowder)  Supposed to have a temporary IVC filter on 1 Besylate 10 MG Oral Tab Take 1 tablet (10 mg total) by mouth daily. 90 tablet 3   • Estradiol (ESTRACE) 0.1 MG/GM Vaginal Cream Place 0.5 g vaginally twice a week. PRN 1 Tube 11   • Famotidine (PEPCID OR) Take 20 mg by mouth.  Daily PRN      • cyclobenzapri SOB  CARDIOVASCULAR: no chest pain/pressure  GI: no nausea, vomiting, diarrhea    Wt Readings from Last 5 Encounters:  11/24/21 : 135 lb (61.2 kg)  11/11/21 : 136 lb 9.6 oz (62 kg)  11/10/21 : 140 lb (63.5 kg)  10/07/21 : 137 lb (62.1 kg)  08/25/21 : 138 l

## 2021-11-24 NOTE — TELEPHONE ENCOUNTER
Checked [employer], per SOB, no PA required.  Patient is to owe 0% of cost.       Appt on 12/20/21 with RN

## 2021-12-02 ENCOUNTER — TELEPHONE (OUTPATIENT)
Dept: INTERNAL MEDICINE CLINIC | Facility: CLINIC | Age: 66
End: 2021-12-02

## 2021-12-02 NOTE — TELEPHONE ENCOUNTER
[facility]    Pt dx with covid on 11/3/21. Called [facility] to clarify if patient qualifies for infusion with recent COVID PNA---spoke to staff who asked attending MD- MD confirmed pt is out of the timeframe for MAB infusion at this time. To Dr. Renetta Barron to please advise as pt recently dx COVID PNA 11/3/21, per [facility] statement regarding infusion: \"Monoclonal Antibody can now be given to patients who have COVID or have had a known exposure to Covid-19 and are at high risk for severe symptoms or hospitalization if they get Covid-19. \"   Do you recommend patient get the MAB infusion now?

## 2021-12-02 NOTE — TELEPHONE ENCOUNTER
Pt is calling and would like to get monoclonal  Anti Bodies infusion. She want to know if she can just go get the infusion in the ER or does she need a prescription.     Please call and advise

## 2021-12-02 NOTE — TELEPHONE ENCOUNTER
Pt states she has lingering dry cough since COVID dx- denies SOB, O2 sat is 98% on room air, states she is feeling well in general- denies fever or chills. Pt states she was inquiring if it was too late for her to receive MAB infusion-advised pt we will await MD response and [facility] staff to call pt back with answer.

## 2021-12-07 ENCOUNTER — OFFICE VISIT (OUTPATIENT)
Dept: INTERNAL MEDICINE CLINIC | Facility: CLINIC | Age: 66
End: 2021-12-07
Payer: MEDICARE

## 2021-12-07 VITALS
SYSTOLIC BLOOD PRESSURE: 132 MMHG | HEIGHT: 68 IN | OXYGEN SATURATION: 98 % | HEART RATE: 100 BPM | TEMPERATURE: 99 F | WEIGHT: 135.63 LBS | DIASTOLIC BLOOD PRESSURE: 80 MMHG | BODY MASS INDEX: 20.56 KG/M2

## 2021-12-07 DIAGNOSIS — N90.7 LABIAL CYST: Primary | ICD-10-CM

## 2021-12-07 PROCEDURE — 99213 OFFICE O/P EST LOW 20 MIN: CPT | Performed by: INTERNAL MEDICINE

## 2021-12-07 PROCEDURE — 56405 I&D VULVA/PERINEAL ABSCESS: CPT | Performed by: INTERNAL MEDICINE

## 2021-12-07 NOTE — PROGRESS NOTES
Liz Stoner is a 78year old female. Patient presents with:  Lesion: Patient c/o pimple like bump next to vagina for past several month with pain that varies. Pt c/o itching to vagina and states she may have yeast infection.      HPI:     Pt notes at least CERVICAL RADICULOPATHY, CERVICAL SPINAL STENOSIS   • Broken foot 03-    RT - casting. Fracture 5th met. Cast removl/xray foot 4-20-12. Follow up fracture right foot 5-22-12.     • Calculus of kidney 2008   • Depression    • Esophageal reflux    • bandage    The patient indicates understanding of these issues and agrees to the plan.     Nikki Vazquez MD, 12/07/21, 4:07 PM

## 2021-12-09 ENCOUNTER — PATIENT MESSAGE (OUTPATIENT)
Dept: INTERNAL MEDICINE CLINIC | Facility: CLINIC | Age: 66
End: 2021-12-09

## 2021-12-10 ENCOUNTER — TELEPHONE (OUTPATIENT)
Dept: PHYSICAL MEDICINE AND REHAB | Facility: CLINIC | Age: 66
End: 2021-12-10

## 2021-12-10 DIAGNOSIS — M54.81 BILATERAL OCCIPITAL NEURALGIA: Primary | ICD-10-CM

## 2021-12-10 NOTE — TELEPHONE ENCOUNTER
From: Le Elodia Day  To: Wendi Vizcarra MD  Sent: 12/9/2021 4:33 PM CST  Subject: Coughing     Hi there   After not coughing in your office the other day, today that’s all I did, for whatever reason.    There is an X-ray for my lungs, I believe, in the sy

## 2021-12-10 NOTE — TELEPHONE ENCOUNTER
Spoke with patient and she states she was going to have Neck surgery today  but she contracted Covid and still has a cough and they had to push the surgery back to March 2022.  Patient is asking if Dr. Felicia Holm can do the Occipital nerve blocks again before do

## 2021-12-13 ENCOUNTER — HOSPITAL ENCOUNTER (OUTPATIENT)
Dept: GENERAL RADIOLOGY | Age: 66
Discharge: HOME | End: 2021-12-13
Attending: INTERNAL MEDICINE
Payer: MEDICARE

## 2021-12-13 DIAGNOSIS — U07.1 BRONCHITIS DUE TO COVID-19 VIRUS: ICD-10-CM

## 2021-12-13 DIAGNOSIS — Z01.818 PRE-OP EVALUATION: ICD-10-CM

## 2021-12-13 DIAGNOSIS — J40 BRONCHITIS DUE TO COVID-19 VIRUS: ICD-10-CM

## 2021-12-13 PROCEDURE — 71046 X-RAY EXAM CHEST 2 VIEWS: CPT | Performed by: INTERNAL MEDICINE

## 2021-12-14 NOTE — H&P
[facility] - Gastroenterology                                                                                                          Reason for consult: f/u basis. Chronic issue with slowed swallowing-vfss 3/2021 penetration no aspiration. she denies nausea and/or vomiting. she denies recent change in appetite and/or unintentional weight loss.     covid 11/2021-weight loss with infection  Still has dry cough FRACTURE SURGERY Left 2010    WRIST FRACTURE ORIF   • FRACTURE SURGERY Left 2013    ORIF wrist fracture revision with plates and screws- Ortho Dr Rommel Parra   • LASIK     • OTHER SURGICAL HISTORY  09/2017    Fussion L4-L5 - Dr. Leopold Cheney   • SHOULDER SURG [facility] every 8 (eight) hours as needed for Muscle spasms. 90 tablet 0   • Cholecalciferol 25 MCG (1000 UT) Oral Tab Take by mouth daily. • Meclizine HCl 25 MG Oral Tab Take 1 tablet (25 mg total) by mouth 3 (three) times daily as needed.  30 tablet 1   • Diclo non-tender, non-distended no rebound or guarding, no masses, no hepatomegaly  MSK: No redness, no warmth, no swelling of joints  SKIN: No jaundice, no erythema, no rashes  HEMATOLOGIC: No bleeding, no bruising  NEURO: Alert and interactive, normal gait w/u and no worsening of issues. No vomiting, melena. Did have covid 11/2021 and think could be contributing to complaints. Last  CT chest 11/2021 notable for small hepatic lesions and stable ductal dilatation in post-ccy state. LFTs 5/2021 unremarkable.

## 2021-12-14 NOTE — TELEPHONE ENCOUNTER
Patient calling to see if Dr. Bear Zuleta was able to address her concerns yet. I let the patient know that I will talk to Dr. Bear Zuleta today.     Dr. Lauryn Louise will be doing her surgery 03/15/2022 and he stated she is ok to have procedures as long as it is 3 w

## 2021-12-15 ENCOUNTER — TELEPHONE (OUTPATIENT)
Dept: NEUROLOGY | Facility: CLINIC | Age: 66
End: 2021-12-15

## 2021-12-15 ENCOUNTER — OFFICE VISIT (OUTPATIENT)
Dept: GASTROENTEROLOGY | Facility: CLINIC | Age: 66
End: 2021-12-15
Payer: MEDICARE

## 2021-12-15 ENCOUNTER — LAB ENCOUNTER (OUTPATIENT)
Dept: LAB | Facility: HOSPITAL | Age: 66
End: 2021-12-15
Attending: NURSE PRACTITIONER
Payer: MEDICARE

## 2021-12-15 VITALS
SYSTOLIC BLOOD PRESSURE: 135 MMHG | TEMPERATURE: 99 F | WEIGHT: 136 LBS | HEART RATE: 102 BPM | HEIGHT: 68 IN | DIASTOLIC BLOOD PRESSURE: 80 MMHG | BODY MASS INDEX: 20.61 KG/M2

## 2021-12-15 DIAGNOSIS — R14.2 BELCHING: ICD-10-CM

## 2021-12-15 DIAGNOSIS — K21.9 GASTROESOPHAGEAL REFLUX DISEASE, UNSPECIFIED WHETHER ESOPHAGITIS PRESENT: ICD-10-CM

## 2021-12-15 DIAGNOSIS — K64.9 HEMORRHOIDS, UNSPECIFIED HEMORRHOID TYPE: Primary | ICD-10-CM

## 2021-12-15 DIAGNOSIS — Z80.0 FAMILY HISTORY OF COLON CANCER: ICD-10-CM

## 2021-12-15 DIAGNOSIS — K64.9 HEMORRHOIDS, UNSPECIFIED HEMORRHOID TYPE: ICD-10-CM

## 2021-12-15 DIAGNOSIS — K59.00 CONSTIPATION, UNSPECIFIED CONSTIPATION TYPE: ICD-10-CM

## 2021-12-15 DIAGNOSIS — K83.8 INTRAHEPATIC BILE DUCT DILATION: ICD-10-CM

## 2021-12-15 DIAGNOSIS — K76.9 HEPATIC LESION: ICD-10-CM

## 2021-12-15 DIAGNOSIS — R10.13 EPIGASTRIC PAIN: ICD-10-CM

## 2021-12-15 PROCEDURE — 99215 OFFICE O/P EST HI 40 MIN: CPT | Performed by: NURSE PRACTITIONER

## 2021-12-15 PROCEDURE — 36415 COLL VENOUS BLD VENIPUNCTURE: CPT

## 2021-12-15 PROCEDURE — 85025 COMPLETE CBC W/AUTO DIFF WBC: CPT

## 2021-12-15 PROCEDURE — 80076 HEPATIC FUNCTION PANEL: CPT

## 2021-12-15 NOTE — TELEPHONE ENCOUNTER
Bilateral greater occipital nerve blocks CPT CODE 64688-[id number] is not required    Per Medicare Guidelines: Request is a covered benefit and pre-certification is not require. All Medicare coverage is based on Medical Necessity.     Notified nursi

## 2021-12-15 NOTE — TELEPHONE ENCOUNTER
S/w pt, doesn't remember if nerve blocks in November helped because she had symptomatic Covid infection right after. Headache relief was temporary for the first week or two but severe headaches returned shortly.   Per conversation w/Dr. Isabel Becerril, recommends re

## 2021-12-15 NOTE — PATIENT INSTRUCTIONS
-cln 2023 unless on-going issues  -fiber supplement daily (fibercon or citrucel)  -squatty potty  -labs  -sucralfate three times daily x 6-8 weeks and attempt to reduce dose to twice daily vs pepcid 40 mg twice daily  -reflux diet modification  -avoid nsai

## 2021-12-16 ENCOUNTER — HOSPITAL ENCOUNTER (OUTPATIENT)
Age: 66
Discharge: HOME | End: 2021-12-16
Attending: EMERGENCY MEDICINE
Payer: MEDICARE

## 2021-12-16 ENCOUNTER — APPOINTMENT (OUTPATIENT)
Dept: GENERAL RADIOLOGY | Age: 66
End: 2021-12-16
Attending: EMERGENCY MEDICINE
Payer: MEDICARE

## 2021-12-16 VITALS
DIASTOLIC BLOOD PRESSURE: 80 MMHG | HEART RATE: 102 BPM | SYSTOLIC BLOOD PRESSURE: 134 MMHG | OXYGEN SATURATION: 98 % | RESPIRATION RATE: 20 BRPM | TEMPERATURE: 99 F

## 2021-12-16 DIAGNOSIS — S29.012A STRAIN OF THORACIC SPINE: Primary | ICD-10-CM

## 2021-12-16 DIAGNOSIS — S50.02XA CONTUSION OF LEFT ELBOW, INITIAL ENCOUNTER: ICD-10-CM

## 2021-12-16 DIAGNOSIS — S80.00XA CONTUSION OF KNEE, UNSPECIFIED LATERALITY, INITIAL ENCOUNTER: ICD-10-CM

## 2021-12-16 PROCEDURE — 73562 X-RAY EXAM OF KNEE 3: CPT | Performed by: EMERGENCY MEDICINE

## 2021-12-16 PROCEDURE — 73080 X-RAY EXAM OF ELBOW: CPT | Performed by: EMERGENCY MEDICINE

## 2021-12-16 PROCEDURE — 72100 X-RAY EXAM L-S SPINE 2/3 VWS: CPT | Performed by: EMERGENCY MEDICINE

## 2021-12-16 PROCEDURE — 99214 OFFICE O/P EST MOD 30 MIN: CPT

## 2021-12-16 PROCEDURE — 72072 X-RAY EXAM THORAC SPINE 3VWS: CPT | Performed by: EMERGENCY MEDICINE

## 2021-12-16 NOTE — ED INITIAL ASSESSMENT (HPI)
Tripped over her laundry basket and fell forward, c/o bilateral knee pain with swelling, ,upper back pain, no loc

## 2021-12-16 NOTE — ED PROVIDER NOTES
Patient Seen in: [facility]      History   Patient presents with:  Contusion    Stated Complaint: both knee pain/swollen    Subjective:   HPI    The patient is a 58-year-old female with a past history of hypertension, chronic back and neck pr CHOLECYSTECTOMY     • COLONOSCOPY  2009   • COLONOSCOPY N/A 12/4/2018    Procedure: COLONOSCOPY;  Surgeon: Adilia Burrows MD;  Location: [facility]   • FRACTURE SURGERY Left 2010    WRIST FRACTURE ORIF   • FRACTURE SURGERY Left 2013    ORIF wri paraspinal tenderness, worse on the left than the right  Neurologic: Patient is awake, alert and oriented ×3. The patient's motor strength is 5 out of 5 and symmetric in the upper and lower extremities bilaterally  Extremities:  There is mild swelling and

## 2021-12-20 ENCOUNTER — NURSE ONLY (OUTPATIENT)
Dept: ENDOCRINOLOGY CLINIC | Facility: CLINIC | Age: 66
End: 2021-12-20
Payer: MEDICARE

## 2021-12-20 ENCOUNTER — TELEPHONE (OUTPATIENT)
Dept: INTERNAL MEDICINE CLINIC | Facility: CLINIC | Age: 66
End: 2021-12-20

## 2021-12-20 DIAGNOSIS — M81.8 OTHER OSTEOPOROSIS WITHOUT CURRENT PATHOLOGICAL FRACTURE: Primary | ICD-10-CM

## 2021-12-20 PROCEDURE — 96372 THER/PROPH/DIAG INJ SC/IM: CPT | Performed by: INTERNAL MEDICINE

## 2021-12-20 NOTE — TELEPHONE ENCOUNTER
Spoke to pt and relayed MD message. Pt verbalized understanding. She has decided to go to [facility] to be seen and have CT done there so that  [facility] can see the results tomorrow.

## 2021-12-20 NOTE — TELEPHONE ENCOUNTER
Pt called back   She was already evaluated in UC on Friday   Hoping the MRI can ordered so she can have it done in the morning before her appt with Dr Beth Wood    Requests call back to advise   [phone]

## 2021-12-20 NOTE — TELEPHONE ENCOUNTER
Message noted. We can tell patient will followin. That I received her message in the absence of Dr. Kim Quinones.     2.  With her having headaches daily over the left eye and forehead even though she did not hit her head she may want to consider getti

## 2021-12-20 NOTE — TELEPHONE ENCOUNTER
Day, Samantha Conway  to Nico Anderson MD          3:22 PM  Thanks. Another question. I took a really bad fall on Wednesday, a face plant if you will.  Tripped over a weighted basket in my laundry room that didn’t move, fell forward full bore on both my kn

## 2021-12-20 NOTE — TELEPHONE ENCOUNTER
Message noted. I do not think we would be able to get the MRI done prior to her 11 AM appoint with Dr. Bryan Malloy. She should be seen first and then the proper testing can be ordered and where the MRI should be done either thoracic or lumbar spine. Or both.

## 2021-12-20 NOTE — TELEPHONE ENCOUNTER
Spoke to patient and relayed MD message. Patient verbalized understanding.  Pt states she will call her  to discuss going to [address] [place] [address].

## 2021-12-20 NOTE — TELEPHONE ENCOUNTER
Spoke to patient regarding my chart below. She had a fall on Wednesday as described below. She went to  on 12/16 and was told all of her x-rays were negative.      She denies any head trauma during fall or any head pain after fall but since the incident s

## 2021-12-21 ENCOUNTER — APPOINTMENT (OUTPATIENT)
Dept: CT IMAGING | Age: 66
End: 2021-12-21
Attending: EMERGENCY MEDICINE
Payer: MEDICARE

## 2021-12-21 ENCOUNTER — HOSPITAL ENCOUNTER (OUTPATIENT)
Age: 66
Discharge: HOME | End: 2021-12-21
Attending: EMERGENCY MEDICINE
Payer: MEDICARE

## 2021-12-21 ENCOUNTER — OFFICE VISIT (OUTPATIENT)
Dept: INTERNAL MEDICINE CLINIC | Facility: CLINIC | Age: 66
End: 2021-12-21
Payer: MEDICARE

## 2021-12-21 VITALS
HEART RATE: 106 BPM | OXYGEN SATURATION: 98 % | RESPIRATION RATE: 18 BRPM | DIASTOLIC BLOOD PRESSURE: 96 MMHG | TEMPERATURE: 100 F | SYSTOLIC BLOOD PRESSURE: 160 MMHG

## 2021-12-21 VITALS
RESPIRATION RATE: 14 BRPM | WEIGHT: 135 LBS | HEART RATE: 87 BPM | SYSTOLIC BLOOD PRESSURE: 124 MMHG | DIASTOLIC BLOOD PRESSURE: 74 MMHG | BODY MASS INDEX: 21 KG/M2 | TEMPERATURE: 98 F | OXYGEN SATURATION: 98 %

## 2021-12-21 DIAGNOSIS — I10 ESSENTIAL HYPERTENSION: ICD-10-CM

## 2021-12-21 DIAGNOSIS — G44.309 INTERMITTENT POST-TRAUMATIC HEADACHE: ICD-10-CM

## 2021-12-21 DIAGNOSIS — S16.1XXA STRAIN OF NECK MUSCLE, INITIAL ENCOUNTER: Primary | ICD-10-CM

## 2021-12-21 DIAGNOSIS — M54.2 NECK PAIN: Primary | ICD-10-CM

## 2021-12-21 PROCEDURE — 70450 CT HEAD/BRAIN W/O DYE: CPT | Performed by: EMERGENCY MEDICINE

## 2021-12-21 PROCEDURE — 99213 OFFICE O/P EST LOW 20 MIN: CPT

## 2021-12-21 PROCEDURE — 99214 OFFICE O/P EST MOD 30 MIN: CPT

## 2021-12-21 PROCEDURE — 99214 OFFICE O/P EST MOD 30 MIN: CPT | Performed by: INTERNAL MEDICINE

## 2021-12-21 PROCEDURE — 72125 CT NECK SPINE W/O DYE: CPT | Performed by: EMERGENCY MEDICINE

## 2021-12-21 NOTE — TELEPHONE ENCOUNTER
Pt has a inj kaylynn for 01/04/22 but pt is in so much pain  . Pt is taking muscle relaxer  But is not helping wants to know if she can get her inj sooner .   Please give her a call back

## 2021-12-21 NOTE — ED PROVIDER NOTES
Patient Seen in: [facility]      History   Patient presents with:  Neck Pain  Headache    Stated Complaint: neck pain    Subjective:   HPI    Patient is a 25-year-old female with past history of cervical radiculopathy, hypertension who presen arthritis    • Vertigo               Past Surgical History:   Procedure Laterality Date   • ARTHROSCOPY OF JOINT UNLISTED Right 2012    rotator cuff tear   •       x 3   • CATARACT     • CHOLECYSTECTOMY     • COLONOSCOPY     • COLONOSCOPY N and flat bilaterally.   There is no posterior pharyngeal erythema  Chest: Clear to auscultation, no tenderness  Cardiovascular: Regular rate and rhythm without murmur  Abdomen: Soft, nontender and nondistended  Neurologic: Patient is awake, alert and orient

## 2021-12-21 NOTE — PROGRESS NOTES
Bakari Stoner is a 78year old female.   HPI:   She tripped over a laundry basket last Wednesday and struck her face and landed on her knees, no serious head injury - she went to  - CT brain and CT cervical spine both negative - she had C4-C6 fusion in 20 mouth daily. • Multiple Vitamins Oral Tab Take 1 tablet by mouth daily. Multiple Vitamins tablet      • methocarbamol 500 MG Oral Tab Take 1 tablet (500 mg total) by mouth 3 (three) times daily.  (Patient not taking: Reported on 12/21/2021) 30 tablet auscultation  CARDIO: RRR without murmur  GI: good BS's,no masses, HSM or tenderness  EXTREMITIES: no cyanosis, clubbing or edema    Musculoskeletal: She has almost full range of motion of the cervical spine but with pain to the extremes of right-sided rot

## 2021-12-21 NOTE — ED INITIAL ASSESSMENT (HPI)
Patient fell on Wednesday. Seen in IC on Friday and had multiple x rays done. Still with neck pain. Hx of headaches, noted a left sided headache the other day. Requesting head and neck ct.   Has appointment with pcp today at 6 but was directed here to

## 2021-12-22 NOTE — TELEPHONE ENCOUNTER
Spoke to patient who states she is doing well and only wanted to know if Dr. Constantine Kang would be doing injections this week or next. Patient notified Dr. Constantine Kang will be out of the office and first available is 1/4/22 at [facility] which she is already scheduled.   Pa

## 2021-12-23 ENCOUNTER — PATIENT MESSAGE (OUTPATIENT)
Dept: INTERNAL MEDICINE CLINIC | Facility: CLINIC | Age: 66
End: 2021-12-23

## 2021-12-23 ENCOUNTER — TELEPHONE (OUTPATIENT)
Dept: INTERNAL MEDICINE CLINIC | Facility: CLINIC | Age: 66
End: 2021-12-23

## 2021-12-23 NOTE — TELEPHONE ENCOUNTER
To Dr. Danyel Glynn to please advise-------  Called pt who was seen by Dr. Audrey Siu on 12/21/21- pt states that the 12/21/21 CT head shows she has a sinus infection and is calling to request an antibiotic.  Pt states she is referring to the part of the report that reads \" C

## 2021-12-23 NOTE — TELEPHONE ENCOUNTER
Please call pt  She saw Dr Albin Trejo requesting that antibiotic be called into pharmacy in case she develops a sinus infection over the weekend as identified in CT   Pt uses [facility]  Tasked to nursing

## 2021-12-23 NOTE — TELEPHONE ENCOUNTER
Regarding: Sinus  ----- Message from [facility] sent at 12/23/2021 10:54 AM CST -----       ----- Message from Negro Stoner to Stephen Sun MD sent at 12/23/2021  6:55 AM -----   Hi Dr Corie Agudelo  It was good to see you the other day.    When you read my CT

## 2021-12-24 ENCOUNTER — APPOINTMENT (OUTPATIENT)
Dept: URGENT CARE | Age: 66
End: 2021-12-24

## 2021-12-24 NOTE — TELEPHONE ENCOUNTER
From: Bakari Rudolph Day  Sent: 12/23/2021 6:56 PM CST  To: [facility] Clinical Staff  Subject: Sinus    Thanks Dr Merry Franklin. I couldn’t get in to see you so they got me in with Tomy after my CT.    Just wasn’t sure bout the sinuses after what he said, and I wa

## 2021-12-27 ENCOUNTER — OFFICE VISIT (OUTPATIENT)
Dept: OTOLARYNGOLOGY | Facility: CLINIC | Age: 66
End: 2021-12-27
Payer: MEDICARE

## 2021-12-27 VITALS — WEIGHT: 135 LBS | TEMPERATURE: 98 F | HEIGHT: 68 IN | BODY MASS INDEX: 20.46 KG/M2

## 2021-12-27 DIAGNOSIS — J01.90 ACUTE SINUSITIS, RECURRENCE NOT SPECIFIED, UNSPECIFIED LOCATION: Primary | ICD-10-CM

## 2021-12-27 PROCEDURE — 99213 OFFICE O/P EST LOW 20 MIN: CPT | Performed by: OTOLARYNGOLOGY

## 2021-12-27 RX ORDER — AMOXICILLIN 500 MG/1
500 CAPSULE ORAL 3 TIMES DAILY
Qty: 30 CAPSULE | Refills: 0 | Status: SHIPPED | OUTPATIENT
Start: 2021-12-27 | End: 2022-01-04 | Stop reason: SINTOL

## 2021-12-27 NOTE — PROGRESS NOTES
Jaqueline Stoner is a 78year old female.   Patient presents with:  Sinus Problem: sinus infection, sinus pressure above eyes, pt has been using saline nasal and flonase, OTC sudafed       HISTORY OF PRESENT ILLNESS  12/27/2021  Patient prevents for evaluation • Insomnia    • Internal hemorrhoids without complication 57/7/7116   • Intestinal disorder    • Left wrist fracture 2011 and 2013   • Muscle weakness    • Osteoporosis    • Renal disorder    • Rotator cuff tear, right    • Traumatic arthritis    • Verti Appropriate mood and affect. Neck Exam Normal Inspection - Normal. Palpation - Normal without lymphadenopathy.  Parotid gland - Normal. Thyroid gland - Normal.   Eyes Normal Conjunctiva - Right: Normal, Left: Normal. Pupil - Right: Normal, Left: Normal. F Meclizine HCl 25 MG Oral Tab, Take 1 tablet (25 mg total) by mouth 3 (three) times daily as needed. , Disp: 30 tablet, Rfl: 1  •  Diclofenac Sodium 1 % Transdermal Gel, Apply 4 g topically 4 (four) times daily as needed. , Disp: 1 Tube, Rfl: 3  •  CALCIUM CI

## 2021-12-30 ENCOUNTER — OFFICE VISIT (OUTPATIENT)
Dept: OTOLARYNGOLOGY | Facility: CLINIC | Age: 66
End: 2021-12-30
Payer: MEDICARE

## 2021-12-30 VITALS — HEIGHT: 68 IN | BODY MASS INDEX: 20.46 KG/M2 | WEIGHT: 135 LBS

## 2021-12-30 DIAGNOSIS — J34.2 NASAL SEPTAL DEVIATION: ICD-10-CM

## 2021-12-30 DIAGNOSIS — J01.90 ACUTE SINUSITIS, RECURRENCE NOT SPECIFIED, UNSPECIFIED LOCATION: Primary | ICD-10-CM

## 2021-12-30 PROCEDURE — 31231 NASAL ENDOSCOPY DX: CPT | Performed by: SPECIALIST

## 2021-12-30 PROCEDURE — 99213 OFFICE O/P EST LOW 20 MIN: CPT | Performed by: SPECIALIST

## 2021-12-30 RX ORDER — DOXYCYCLINE HYCLATE 100 MG/1
100 CAPSULE ORAL 2 TIMES DAILY
Qty: 28 CAPSULE | Refills: 0 | Status: SHIPPED | OUTPATIENT
Start: 2021-12-30

## 2021-12-31 ENCOUNTER — PATIENT MESSAGE (OUTPATIENT)
Dept: OTOLARYNGOLOGY | Facility: CLINIC | Age: 66
End: 2021-12-31

## 2021-12-31 ENCOUNTER — TELEPHONE (OUTPATIENT)
Dept: OTOLARYNGOLOGY | Facility: CLINIC | Age: 66
End: 2021-12-31

## 2021-12-31 NOTE — PATIENT INSTRUCTIONS
You were given a prescription for doxycycline for the frontal sinusitis. Follow-up if you continue to have problems.

## 2021-12-31 NOTE — PROGRESS NOTES
Colton Stoner is a 78year old female. Patient presents with:  Sinus Problem: pt c/o sinus infection, and sinus pressure. HPI:   Patient here for frontal sinus headaches. She tried amoxicillin, however this really upset her stomach.     Current Outpati removl/xray foot 4-20-12. Follow up fracture right foot 5-22-12.     • Calculus of kidney 2008   • Depression    • Esophageal reflux    • Essential hypertension    • Gastric polyps 12/4/2018   • Gastritis    • High blood pressure    • Idiopathic acute pancr All abnormalities are listed in the exam section. Right septal deviation. No polyps seen. Thick mucoid postnasal drainage.    Oral/Oropharynx Lips - Normal, Tonsils - Normal, Tongue - Normal    Neck Exam Inspection - Normal. Palpation - Normal. Parotid g

## 2021-12-31 NOTE — TELEPHONE ENCOUNTER
Regarding: Sinuses   ----- Message from Rebel Mejia RN sent at 12/31/2021 10:19 AM CST -----       ----- Message from Iva Stoner to Jasen Mercer MD sent at 12/31/2021  9:00 AM -----   Boy, I’m forgetting stuff.  Feeling a bit dizzy this am. Could

## 2021-12-31 NOTE — TELEPHONE ENCOUNTER
From: Balinda Aquas Day  To: Ahsan De Santiago MD  Sent: 12/31/2021 7:42 AM CST  Subject: Sinuses     Hi Dr Maria Dolores Dangelo feeling clogged in my left nostril. Feeling like I have to blow it, nothing comes out. You said you don’t see infection though or gunk.      Christie Watson

## 2022-01-01 NOTE — ANESTHESIA PROCEDURE NOTES
ANESTHESIA INTUBATION  Date/Time: 3/6/2019 7:34 AM  Urgency: elective      General Information and Staff    Patient location during procedure: OR  Anesthesiologist: Christina Small MD  Resident/CRNA: Thanh Haro CRNA  Performed: anesthesiologist a Patient seen in office

## 2022-01-04 ENCOUNTER — OFFICE VISIT (OUTPATIENT)
Dept: PHYSICAL MEDICINE AND REHAB | Facility: CLINIC | Age: 67
End: 2022-01-04
Payer: MEDICARE

## 2022-01-04 VITALS
BODY MASS INDEX: 20.94 KG/M2 | HEART RATE: 95 BPM | WEIGHT: 135 LBS | HEIGHT: 67.5 IN | DIASTOLIC BLOOD PRESSURE: 72 MMHG | SYSTOLIC BLOOD PRESSURE: 120 MMHG | OXYGEN SATURATION: 99 %

## 2022-01-04 DIAGNOSIS — M54.81 BILATERAL OCCIPITAL NEURALGIA: Primary | ICD-10-CM

## 2022-01-04 PROCEDURE — 64405 NJX AA&/STRD GR OCPL NRV: CPT | Performed by: PHYSICAL MEDICINE & REHABILITATION

## 2022-01-04 RX ORDER — TRIAMCINOLONE ACETONIDE 40 MG/ML
40 INJECTION, SUSPENSION INTRA-ARTICULAR; INTRAMUSCULAR ONCE
Status: COMPLETED | OUTPATIENT
Start: 2022-01-04 | End: 2022-01-04

## 2022-01-04 RX ORDER — LIDOCAINE HYDROCHLORIDE 10 MG/ML
2 INJECTION, SOLUTION INFILTRATION; PERINEURAL ONCE
Status: COMPLETED | OUTPATIENT
Start: 2022-01-04 | End: 2022-01-04

## 2022-01-05 ENCOUNTER — PATIENT MESSAGE (OUTPATIENT)
Dept: PHYSICAL MEDICINE AND REHAB | Facility: CLINIC | Age: 67
End: 2022-01-05

## 2022-01-05 NOTE — TELEPHONE ENCOUNTER
Patient had bilateral greater occipital nerve blocks on 1/4/22. Patient mychart message relayed to [address] for recommendations. Per : \"she will need tk use ice and it should improve in 1-2 days. She might get the steroid headache. \"

## 2022-01-05 NOTE — TELEPHONE ENCOUNTER
From: Nirmal Spence Day  To: Audie Mcgarry MD  Sent: 1/5/2022 6:04 AM CST  Subject: Injections     Morning again  I wrote you last night but can’t find that conversation. Still having pain at injection site. Very sore.  How long will that last? Will ice hel

## 2022-01-06 NOTE — PROCEDURES
I did bilateral greater occipital nerve blocks in the office today. The points of maximal tenderness over the bilateral posterior occipital regions were identified. Dene Sabal were placed on the patient's skin. The skin was cleaned with alcohol swaps x 3.   Savanah Beckman

## 2022-01-10 RX ORDER — CYCLOBENZAPRINE HCL 10 MG
10 TABLET ORAL EVERY 8 HOURS PRN
Qty: 90 TABLET | Refills: 0 | Status: SHIPPED | OUTPATIENT
Start: 2022-01-10

## 2022-01-10 NOTE — TELEPHONE ENCOUNTER
Spoke with patient who stated she saw Alexis Rodriges on 1/4/22 and he was going to refill her cyclobenzaprine. Patient stated her pharmacy still does not have this and she is now in [place]. [facility] [place] added.        Medication request: cyclobenzaprine 10

## 2022-01-13 NOTE — TELEPHONE ENCOUNTER
From: Colton Mercado Day  To:  Jevon Mazariegos DPM  Sent: 12/9/2020 6:28 PM CST  Subject: Visit Follow-up Question    Hi Dr Tresa Casanova    I saw you maybe month ago about my foot/heel and Margarette Rothl been wearing arch supports and been taking Meloxicam for about a wee Admitted

## 2022-01-14 ENCOUNTER — TELEPHONE (OUTPATIENT)
Dept: GASTROENTEROLOGY | Facility: CLINIC | Age: 67
End: 2022-01-14

## 2022-01-14 NOTE — TELEPHONE ENCOUNTER
1st reminder letter sent out via mail and [employer] for the following:   MRI MRCP (W+WO) (OKE=73041) (Order #[id number]) on 12/15/21

## 2022-01-25 ENCOUNTER — TELEPHONE (OUTPATIENT)
Dept: ENDOCRINOLOGY CLINIC | Facility: CLINIC | Age: 67
End: 2022-01-25

## 2022-01-25 NOTE — TELEPHONE ENCOUNTER
Last Prolia was 12/20/21, FU with Dr. Wally Leon scheduled 6/23/22. Spoke with patient and confirmed she will have Prolia at FU appt. She understands.

## 2022-02-08 ENCOUNTER — TELEPHONE (OUTPATIENT)
Dept: NEUROLOGY | Facility: CLINIC | Age: 67
End: 2022-02-08

## 2022-02-08 NOTE — TELEPHONE ENCOUNTER
MRI SPINE CERVICAL (W+WO) (CPT=72156)-APPROVED    Per Medicare Guidelines: Request is a covered benefit and pre-certification is not require. All Medicare coverage is based on Medical Necessity.    NOTIFIED PATIENT VIA [employer]

## 2022-02-14 ENCOUNTER — HOSPITAL ENCOUNTER (OUTPATIENT)
Dept: MRI IMAGING | Facility: HOSPITAL | Age: 67
Discharge: HOME | End: 2022-02-14
Attending: PHYSICAL MEDICINE & REHABILITATION
Payer: MEDICARE

## 2022-02-14 DIAGNOSIS — M54.12 CERVICAL RADICULOPATHY: ICD-10-CM

## 2022-02-14 PROCEDURE — 72156 MRI NECK SPINE W/O & W/DYE: CPT | Performed by: PHYSICAL MEDICINE & REHABILITATION

## 2022-02-14 PROCEDURE — A9575 INJ GADOTERATE MEGLUMI 0.1ML: HCPCS | Performed by: PHYSICAL MEDICINE & REHABILITATION

## 2022-02-21 ENCOUNTER — TELEPHONE (OUTPATIENT)
Dept: INTERNAL MEDICINE CLINIC | Facility: CLINIC | Age: 67
End: 2022-02-21

## 2022-02-21 NOTE — TELEPHONE ENCOUNTER
[facility] orders signed and faxed to [facility] at [phone] with confirmation received.   To scanning

## 2022-02-22 ENCOUNTER — TELEPHONE (OUTPATIENT)
Dept: GASTROENTEROLOGY | Facility: CLINIC | Age: 67
End: 2022-02-22

## 2022-02-22 NOTE — TELEPHONE ENCOUNTER
Seen in office for follow up with Margorie Bence 12/15/2021 2/2 eval chronic epigastric abdomen pain. Scheduled for mri/mrcp next Tuesday. Ordered due to ct chest showed hepatic lesion, intrahepatics duct dilatation and persist abdominal pain. Spoke with Manohar Waters to address concerns. Requesting reasoning for ordered mri/mrcp since she is unsure why she needs it. Provided above explanation, as illustrated within office clinical notes per aprn. Understood and planning to keep imaging appointment.

## 2022-02-23 ENCOUNTER — OFFICE VISIT (OUTPATIENT)
Dept: INTERNAL MEDICINE CLINIC | Facility: CLINIC | Age: 67
End: 2022-02-23
Payer: MEDICARE

## 2022-02-23 VITALS
HEIGHT: 67.5 IN | TEMPERATURE: 99 F | SYSTOLIC BLOOD PRESSURE: 128 MMHG | OXYGEN SATURATION: 99 % | BODY MASS INDEX: 21.16 KG/M2 | DIASTOLIC BLOOD PRESSURE: 76 MMHG | WEIGHT: 136.38 LBS | HEART RATE: 98 BPM

## 2022-02-23 DIAGNOSIS — M54.16 LUMBAR RADICULAR PAIN: ICD-10-CM

## 2022-02-23 DIAGNOSIS — Z01.818 PRE-OP EXAMINATION: Primary | ICD-10-CM

## 2022-02-23 PROCEDURE — 99214 OFFICE O/P EST MOD 30 MIN: CPT | Performed by: INTERNAL MEDICINE

## 2022-02-23 RX ORDER — SUCRALFATE 1 G/1
1 TABLET ORAL
COMMUNITY

## 2022-02-23 RX ORDER — MULTIVIT WITH MINERALS/LUTEIN
1000 TABLET ORAL DAILY
COMMUNITY

## 2022-02-23 RX ORDER — GARLIC EXTRACT 500 MG
1 CAPSULE ORAL DAILY
COMMUNITY

## 2022-02-25 ENCOUNTER — TELEPHONE (OUTPATIENT)
Dept: GASTROENTEROLOGY | Facility: CLINIC | Age: 67
End: 2022-02-25

## 2022-02-25 ENCOUNTER — HOSPITAL ENCOUNTER (OUTPATIENT)
Dept: MRI IMAGING | Facility: HOSPITAL | Age: 67
Discharge: HOME | End: 2022-02-25
Attending: NURSE PRACTITIONER
Payer: MEDICARE

## 2022-02-25 DIAGNOSIS — K76.9 HEPATIC LESION: ICD-10-CM

## 2022-02-25 DIAGNOSIS — K21.9 GASTROESOPHAGEAL REFLUX DISEASE, UNSPECIFIED WHETHER ESOPHAGITIS PRESENT: ICD-10-CM

## 2022-02-25 DIAGNOSIS — R10.13 EPIGASTRIC PAIN: ICD-10-CM

## 2022-02-25 DIAGNOSIS — K83.8 INTRAHEPATIC BILE DUCT DILATION: ICD-10-CM

## 2022-02-25 PROCEDURE — 74183 MRI ABD W/O CNTR FLWD CNTR: CPT | Performed by: NURSE PRACTITIONER

## 2022-02-25 PROCEDURE — A9575 INJ GADOTERATE MEGLUMI 0.1ML: HCPCS

## 2022-02-25 NOTE — TELEPHONE ENCOUNTER
Went over sulcrafate crush and take  Ok to take famotidine nightly while having surgery  Make sure if constipated has bowel regimen  Discussed MRI/MRCP result--- stable, no mass seen and no change in bile duct and normal appearing post seth

## 2022-02-25 NOTE — TELEPHONE ENCOUNTER
pt requesting to speak to Dr Charissa Stephens regarding order that was entered and what does it mean? Pt would like to get a call back today.

## 2022-02-26 ENCOUNTER — HOSPITAL ENCOUNTER (EMERGENCY)
Facility: HOSPITAL | Age: 67
Discharge: HOME | End: 2022-02-26
Attending: EMERGENCY MEDICINE
Payer: MEDICARE

## 2022-02-26 VITALS
DIASTOLIC BLOOD PRESSURE: 83 MMHG | RESPIRATION RATE: 18 BRPM | SYSTOLIC BLOOD PRESSURE: 120 MMHG | WEIGHT: 136 LBS | HEART RATE: 98 BPM | HEIGHT: 68 IN | TEMPERATURE: 97 F | BODY MASS INDEX: 20.61 KG/M2

## 2022-02-26 DIAGNOSIS — M25.562 ACUTE PAIN OF BOTH KNEES: Primary | ICD-10-CM

## 2022-02-26 DIAGNOSIS — M25.561 ACUTE PAIN OF BOTH KNEES: Primary | ICD-10-CM

## 2022-02-26 PROCEDURE — 99282 EMERGENCY DEPT VISIT SF MDM: CPT

## 2022-02-26 NOTE — ED QUICK NOTES
Pt here for knee pain since December after a fall, states she has had multiple xrays. States she is still having pain and wants MRI, has appt next week with orthopedic. Declines xray that was ordered this visit. Awaiting ERMD, comfort measures provided.

## 2022-02-26 NOTE — ED INITIAL ASSESSMENT (HPI)
States that she fell in December landing on her knees. States her Lt knee still is very painful. No gross deformity. Room ROM.  No C/O fever

## 2022-03-02 ENCOUNTER — TELEPHONE (OUTPATIENT)
Dept: PHYSICAL MEDICINE AND REHAB | Facility: CLINIC | Age: 67
End: 2022-03-02

## 2022-03-02 NOTE — TELEPHONE ENCOUNTER
Karrie message forwarded to  to advise on bulging disc levels. Awaiting feedback and will forward this on to patient.

## 2022-03-02 NOTE — TELEPHONE ENCOUNTER
C2-3 mild-moderate diffuse, C3-4 left moderate foraminal & mild-moderate diffuse, C6-7 right mild-moderate & left moderate foraminal, C7-T1 left mild foraminal bulging discs    Above information relayed to patient via mychart.

## 2022-03-02 NOTE — TELEPHONE ENCOUNTER
----- Message from Ana Powers MD sent at 2/28/2022 11:10 PM CST -----  She has bulging discs with some stenosis. Please see how she is doing now.

## 2022-03-10 ENCOUNTER — TELEPHONE (OUTPATIENT)
Dept: INTERNAL MEDICINE CLINIC | Facility: CLINIC | Age: 67
End: 2022-03-10

## 2022-03-10 NOTE — TELEPHONE ENCOUNTER
Nanci/ Dr Wylie Europe office called  Requests medical clearance and ekg if done here for pt's surgery on 3/15  Pt will be admitted on 3/14    Fax# [phone]

## 2022-03-11 ENCOUNTER — LAB ENCOUNTER (OUTPATIENT)
Dept: LAB | Facility: HOSPITAL | Age: 67
End: 2022-03-11
Attending: INTERNAL MEDICINE
Payer: MEDICARE

## 2022-03-11 ENCOUNTER — PATIENT MESSAGE (OUTPATIENT)
Dept: ENDOCRINOLOGY CLINIC | Facility: CLINIC | Age: 67
End: 2022-03-11

## 2022-03-11 DIAGNOSIS — M81.8 OTHER OSTEOPOROSIS WITHOUT CURRENT PATHOLOGICAL FRACTURE: ICD-10-CM

## 2022-03-11 LAB
ANION GAP SERPL CALC-SCNC: 5 MMOL/L (ref 0–18)
BUN BLD-MCNC: 14 MG/DL (ref 7–18)
BUN/CREAT SERPL: 21.2 (ref 10–20)
CALCIUM BLD-MCNC: 8.3 MG/DL (ref 8.5–10.1)
CHLORIDE SERPL-SCNC: 109 MMOL/L (ref 98–112)
CO2 SERPL-SCNC: 28 MMOL/L (ref 21–32)
CREAT BLD-MCNC: 0.66 MG/DL
GLUCOSE BLD-MCNC: 78 MG/DL (ref 70–99)
OSMOLALITY SERPL CALC.SUM OF ELEC: 293 MOSM/KG (ref 275–295)
POTASSIUM SERPL-SCNC: 3.9 MMOL/L (ref 3.5–5.1)
PTH-INTACT SERPL-MCNC: 188.9 PG/ML (ref 18.5–88)
SODIUM SERPL-SCNC: 142 MMOL/L (ref 136–145)
VIT D+METAB SERPL-MCNC: 40.5 NG/ML (ref 30–100)

## 2022-03-11 PROCEDURE — 82306 VITAMIN D 25 HYDROXY: CPT

## 2022-03-11 PROCEDURE — 80048 BASIC METABOLIC PNL TOTAL CA: CPT

## 2022-03-11 PROCEDURE — 83970 ASSAY OF PARATHORMONE: CPT

## 2022-03-11 PROCEDURE — 84080 ASSAY ALKALINE PHOSPHATASES: CPT

## 2022-03-11 PROCEDURE — 36415 COLL VENOUS BLD VENIPUNCTURE: CPT

## 2022-03-16 LAB — BONE SPECIFIC ALKALINE PHOSPHATASE: 6.1 UG/L

## 2022-04-08 ENCOUNTER — TELEPHONE (OUTPATIENT)
Dept: GASTROENTEROLOGY | Facility: CLINIC | Age: 67
End: 2022-04-08

## 2022-04-08 NOTE — TELEPHONE ENCOUNTER
Patient is calling to request a call specifically from Dr. Marcos Rojasate does not want to explain concerns to anyone else. Patient states that she had surgery 3 weeks ago and now is experienced a backed up bowel. Has severe bloating has tried different regimens to alleviate but only wants to discuss with doctor. Please call.

## 2022-04-08 NOTE — TELEPHONE ENCOUNTER
This pt wants to know does Dr actually call pt's on her day off and if Dr does not call pt's on her day off she wants another Dr to call her, she does not want to go thru the nurse the middle man.

## 2022-04-08 NOTE — TELEPHONE ENCOUNTER
Dr. Sosa Dominguez requesting call back directly from MD and no one else (per below). Last discussion in February reviewing  mri/mrcp findings. Recently underwent laminectomy fusion posterior lumber with Dr. Sowmya Maynardy at [facility]. Please follow up. Thank you!

## 2022-04-08 NOTE — TELEPHONE ENCOUNTER
Called patient. She conveyed recent orthopedic surgery at [facility] in mid March. Had GI issues she notes because of the narcotic pain medications and causing constipation. Hasn't been taking any at home though until recently because of pain started a bit of norco. Feels distended and bloated and hasn't had a bowel movement in 3 days. Took senna and mag citrate which didn't help. Wondering what to do. Recommended miralax and dulcolax suppositories.  Can milk of magnesia as well if not helping as above    Appreciative of call    Derrick Crane MD  [facility], [facility] - Gastroenterology  4/8/2022  5:15 PM

## 2022-04-09 ENCOUNTER — HOSPITAL ENCOUNTER (EMERGENCY)
Facility: HOSPITAL | Age: 67
Discharge: HOME | End: 2022-04-09
Attending: EMERGENCY MEDICINE
Payer: MEDICARE

## 2022-04-09 ENCOUNTER — APPOINTMENT (OUTPATIENT)
Dept: GENERAL RADIOLOGY | Facility: HOSPITAL | Age: 67
End: 2022-04-09
Attending: EMERGENCY MEDICINE
Payer: MEDICARE

## 2022-04-09 ENCOUNTER — TELEPHONE (OUTPATIENT)
Dept: GASTROENTEROLOGY | Facility: CLINIC | Age: 67
End: 2022-04-09

## 2022-04-09 VITALS
BODY MASS INDEX: 20.92 KG/M2 | RESPIRATION RATE: 17 BRPM | WEIGHT: 138 LBS | SYSTOLIC BLOOD PRESSURE: 121 MMHG | HEIGHT: 68 IN | HEART RATE: 106 BPM | DIASTOLIC BLOOD PRESSURE: 90 MMHG | OXYGEN SATURATION: 100 % | TEMPERATURE: 98 F

## 2022-04-09 DIAGNOSIS — K59.00 CONSTIPATION, UNSPECIFIED CONSTIPATION TYPE: Primary | ICD-10-CM

## 2022-04-09 LAB
ANION GAP SERPL CALC-SCNC: 3 MMOL/L (ref 0–18)
BASOPHILS # BLD AUTO: 0.02 X10(3) UL (ref 0–0.2)
BASOPHILS NFR BLD AUTO: 0.2 %
BUN BLD-MCNC: 14 MG/DL (ref 7–18)
BUN/CREAT SERPL: 21.9 (ref 10–20)
CALCIUM BLD-MCNC: 8.7 MG/DL (ref 8.5–10.1)
CHLORIDE SERPL-SCNC: 109 MMOL/L (ref 98–112)
CO2 SERPL-SCNC: 28 MMOL/L (ref 21–32)
CREAT BLD-MCNC: 0.64 MG/DL
DEPRECATED RDW RBC AUTO: 51.2 FL (ref 35.1–46.3)
EOSINOPHIL # BLD AUTO: 0.05 X10(3) UL (ref 0–0.7)
EOSINOPHIL NFR BLD AUTO: 0.6 %
ERYTHROCYTE [DISTWIDTH] IN BLOOD BY AUTOMATED COUNT: 14.1 % (ref 11–15)
GLUCOSE BLD-MCNC: 104 MG/DL (ref 70–99)
HCT VFR BLD AUTO: 36.7 %
HGB BLD-MCNC: 11.6 G/DL
IMM GRANULOCYTES # BLD AUTO: 0.05 X10(3) UL (ref 0–1)
IMM GRANULOCYTES NFR BLD: 0.6 %
LYMPHOCYTES # BLD AUTO: 1.07 X10(3) UL (ref 1–4)
LYMPHOCYTES NFR BLD AUTO: 12.2 %
MCH RBC QN AUTO: 31.2 PG (ref 26–34)
MCHC RBC AUTO-ENTMCNC: 31.6 G/DL (ref 31–37)
MCV RBC AUTO: 98.7 FL
MONOCYTES # BLD AUTO: 0.55 X10(3) UL (ref 0.1–1)
MONOCYTES NFR BLD AUTO: 6.3 %
NEUTROPHILS # BLD AUTO: 7.04 X10 (3) UL (ref 1.5–7.7)
NEUTROPHILS # BLD AUTO: 7.04 X10(3) UL (ref 1.5–7.7)
NEUTROPHILS NFR BLD AUTO: 80.1 %
OSMOLALITY SERPL CALC.SUM OF ELEC: 291 MOSM/KG (ref 275–295)
PLATELET # BLD AUTO: 331 10(3)UL (ref 150–450)
POTASSIUM SERPL-SCNC: 4.3 MMOL/L (ref 3.5–5.1)
RBC # BLD AUTO: 3.72 X10(6)UL
SODIUM SERPL-SCNC: 140 MMOL/L (ref 136–145)
WBC # BLD AUTO: 8.8 X10(3) UL (ref 4–11)

## 2022-04-09 PROCEDURE — 74019 RADEX ABDOMEN 2 VIEWS: CPT | Performed by: EMERGENCY MEDICINE

## 2022-04-09 PROCEDURE — 80048 BASIC METABOLIC PNL TOTAL CA: CPT | Performed by: EMERGENCY MEDICINE

## 2022-04-09 PROCEDURE — 85025 COMPLETE CBC W/AUTO DIFF WBC: CPT | Performed by: EMERGENCY MEDICINE

## 2022-04-09 PROCEDURE — 99284 EMERGENCY DEPT VISIT MOD MDM: CPT

## 2022-04-09 PROCEDURE — 96360 HYDRATION IV INFUSION INIT: CPT

## 2022-04-09 RX ORDER — SODIUM CHLORIDE, SODIUM LACTATE, POTASSIUM CHLORIDE, CALCIUM CHLORIDE 600; 310; 30; 20 MG/100ML; MG/100ML; MG/100ML; MG/100ML
INJECTION, SOLUTION INTRAVENOUS ONCE
Status: COMPLETED | OUTPATIENT
Start: 2022-04-09 | End: 2022-04-09

## 2022-04-09 RX ORDER — POLYETHYLENE GLYCOL 3350, SODIUM SULFATE ANHYDROUS, SODIUM BICARBONATE, SODIUM CHLORIDE, POTASSIUM CHLORIDE 236; 22.74; 6.74; 5.86; 2.97 G/4L; G/4L; G/4L; G/4L; G/4L
4000 POWDER, FOR SOLUTION ORAL ONCE
Qty: 4000 ML | Refills: 0 | Status: SHIPPED | OUTPATIENT
Start: 2022-04-09 | End: 2022-04-09

## 2022-04-09 NOTE — TELEPHONE ENCOUNTER
On call GI;  Contacted by patient , she has ongoing constipation after 8 hour back surgery at [facility] on March 15, has not had a bowel movement in 4 days despite, Miralax BID, Angely, MOM and enema. Has abdominal pain but no vomiting. Plan  - ER assessment with XRay for obstipation and BRENNAN to evaluate for fecal impaction  - Enema in ER /tapwater  - likely bowel prep split over 2 days then laxative daily + Miralax    ER contacted.

## 2022-04-09 NOTE — TELEPHONE ENCOUNTER
ER work up done, given tap water enema x 1 with good results  Advised- PEG 4 L   - drink 2 L tomorrow  - drink the other 2 L on Monday    Call office for update and further instructions.

## 2022-04-09 NOTE — ED QUICK NOTES
Discharge instructions given to pt. Pt verbalized understanding of home care, medication use, and to follow up with GI. Pt denied further questions or concerns. Pt ambulatory out of ED, discharged in stable condition.

## 2022-04-09 NOTE — ED QUICK NOTES
Tap water enema administered. Pt tolerated well.  Pt with return of large amount of liquid brown stool

## 2022-04-11 ENCOUNTER — PATIENT MESSAGE (OUTPATIENT)
Dept: GASTROENTEROLOGY | Facility: CLINIC | Age: 67
End: 2022-04-11

## 2022-04-11 NOTE — TELEPHONE ENCOUNTER
Patient requesting to speak with nurse to ask if their is a more humane options for the prep. Please call at [phone],PERIWI.

## 2022-04-11 NOTE — TELEPHONE ENCOUNTER
Spoke with Tequila Martínez. Started PEG solution just now for bowel cleanse. Had a light breakfast which I ensured that's ok but to follow cleanse instructions provided originally for rest of the day. Reinforced drinking water is important for hydration. No further questions/concerns at present.

## 2022-04-11 NOTE — TELEPHONE ENCOUNTER
Dr. Juan Pablo Steinberg--    Per message below, patient is not tolerating PEG for bowel cleanse. Requesting call back to discuss other options for completing. Please advise.      Thank you

## 2022-04-11 NOTE — TELEPHONE ENCOUNTER
Dr. Tio Iqbal--    Spoke with Marija Garduno for condition update s/p ED work up. She did not do bowel cleanse due to 4 bm's s/p tap water enema given in ED Saturday. Is feeling well & bowels are moving. Hasn't had bm yet today but \"feels her stomach gurgling\". Requesting Dr. Tio Iqbal input on returning to daily regimen; miralax twice daily since she's moving her bowels. Please advise & discuss directly with patient. Does not like going through \"middle-man\". Thank you.

## 2022-04-11 NOTE — TELEPHONE ENCOUNTER
Pt calling back to talk to Dr - states she keeps vomiting and it will take her until midnight to finish prep

## 2022-04-11 NOTE — TELEPHONE ENCOUNTER
From: Nikhil Jones Day  Sent: 4/11/2022 10:38 AM CDT  To: [facility] Clinical Staff  Subject: Bowel clean out    I did send you a previous message also but, I had One more quick question. What about taking prune juice for a clear out, if you will? Would that so it. Not that I want to drink that. But it was a thought.      Yinka Martinez

## 2022-04-11 NOTE — TELEPHONE ENCOUNTER
Patient is calling asking questions about when to start the prep that dr. Brett Mackenzie recommends pt just ate a regular breakfast. Does the doctor want her to do this is on an appointment stomach?

## 2022-04-15 RX ORDER — AMLODIPINE BESYLATE 10 MG/1
TABLET ORAL
Qty: 90 TABLET | Refills: 1 | Status: SHIPPED | OUTPATIENT
Start: 2022-04-15

## 2022-04-15 NOTE — TELEPHONE ENCOUNTER
Dr. Bryan Moore--    See message replies below 2/2 follow up on bowel cleanse. Still endorsing same symptoms previously provided. Please advise on further workup / testing & recommendations. Thank you!

## 2022-04-15 NOTE — TELEPHONE ENCOUNTER
Took sucrafate and that helped  Started after the prep and then stomach hurt  Today BM and pass gas  Wed and Tues better movements  Discussed more activity and minimize narcotics    Answered all questions

## 2022-04-15 NOTE — TELEPHONE ENCOUNTER
Called patient, left message    Told her to take tums or famotidine for stomach protection as can get ileus/reflux with constipation and post surgery. Told her to do enema and take a mag citrate instead of prep.      Let me know if she calls back and wants me to call

## 2022-04-15 NOTE — TELEPHONE ENCOUNTER
Dr. Salamanca Chamber requesting call back to discuss additional questions with provider. Home for the rest of today & may be reached at anytime.      P: [phone]    Thank you

## 2022-04-16 ENCOUNTER — PATIENT MESSAGE (OUTPATIENT)
Dept: GASTROENTEROLOGY | Facility: CLINIC | Age: 67
End: 2022-04-16

## 2022-04-20 RX ORDER — CYCLOBENZAPRINE HCL 10 MG
10 TABLET ORAL EVERY 8 HOURS PRN
Qty: 90 TABLET | Refills: 0 | Status: SHIPPED | OUTPATIENT
Start: 2022-04-20

## 2022-04-21 ENCOUNTER — NURSE ONLY (OUTPATIENT)
Dept: SURGERY | Facility: CLINIC | Age: 67
End: 2022-04-21
Payer: MEDICARE

## 2022-04-21 ENCOUNTER — TELEPHONE (OUTPATIENT)
Dept: SURGERY | Facility: CLINIC | Age: 67
End: 2022-04-21

## 2022-04-21 DIAGNOSIS — R30.0 DYSURIA: Primary | ICD-10-CM

## 2022-04-21 LAB
APPEARANCE: CLEAR
BILIRUB UR QL: NEGATIVE
BILIRUBIN: NEGATIVE
COLOR UR: YELLOW
GLUCOSE (URINE DIPSTICK): NEGATIVE MG/DL
GLUCOSE UR-MCNC: NEGATIVE MG/DL
HGB UR QL STRIP.AUTO: NEGATIVE
KETONES (URINE DIPSTICK): NEGATIVE MG/DL
KETONES UR-MCNC: NEGATIVE MG/DL
MULTISTIX LOT#: ABNORMAL NUMERIC
NITRITE UR QL STRIP.AUTO: NEGATIVE
NITRITE, URINE: NEGATIVE
PH UR: 6 [PH] (ref 5–8)
PH, URINE: 6 (ref 4.5–8)
PROT UR-MCNC: NEGATIVE MG/DL
PROTEIN (URINE DIPSTICK): NEGATIVE MG/DL
SP GR UR STRIP: 1.01 (ref 1–1.03)
SPECIFIC GRAVITY: 1 (ref 1–1.03)
URINE-COLOR: YELLOW
UROBILINOGEN UR STRIP-ACNC: <2
UROBILINOGEN,SEMI-QN: 0.2 MG/DL (ref 0–1.9)
VIT C UR-MCNC: NEGATIVE MG/DL

## 2022-04-21 PROCEDURE — 81002 URINALYSIS NONAUTO W/O SCOPE: CPT | Performed by: NURSE PRACTITIONER

## 2022-04-21 NOTE — PROGRESS NOTES
- Pt presents for PVR bladder scan. She was able to void. Clear/yellow urine noted. - Complains of dysuria, frequency, nocturia and bloating. However, pt is constipated. She took miralax this morning. States she feels like she empties her bladder but then has to urinate again in an hour.   - Bladder scan; 68 mL. I s/w Connie DIAZ. [facility] gave verbal orders for urine dipstick, UA, and urine culture. - S/w RA about urine dipstick results. No clear infection. [facility] recommended for pt to treat her constipation. We will wait for the UA and urine culture results. We should have preliminary results tomorrow. Her constipation can be contributing to her symptoms. I will call pt to inform her of [facility]'s recommendations. - Pt would like to keep appt for tomorrow. I will call her in the morning to evaluate her symptoms and she can decide tomorrow if she wants to keep or cancel the NV.

## 2022-04-21 NOTE — TELEPHONE ENCOUNTER
- Pt had laminectomy at [facility] 5 weeks ago (3/15/22). - Pt became distended in the hospital. Bladder scan; 1 L. Straight catheterized x2. She was not discharged with a catheter.   - Reports intermittent dysuria, feeling the need to urinate shortly after urinating, bladder distention that is not as severe as it was in the hospital. Reports issues with constipation since her surgery. Denies use of pain medication. She is taking miralax. - Urinated 3x last night and 2x this morning with a \"decent\" stream. I explained to her that she could be retaining urine based on her symptoms.   - I ordered UA and C/S per UTI protocol. Offered NV for bladder scan today. Pt denied. Secured 1:00 pm NV tomorrow 4/22 for bladder scan. Instructed pt to go to the ER if she does not urinate for 6 hours or if she becomes very distended. Pt verbalized understanding.   - Garcia Galvan 11/22/21 LOV Dr. Betina Tang 10/07/21  - Routed to Kiko Roth and Dr. Betina Tang. Please advise if you have any other recommendations. Thank you.

## 2022-04-22 ENCOUNTER — TELEPHONE (OUTPATIENT)
Dept: SURGERY | Facility: CLINIC | Age: 67
End: 2022-04-22

## 2022-04-22 RX ORDER — CEFADROXIL 500 MG/1
500 CAPSULE ORAL 2 TIMES DAILY
Qty: 14 CAPSULE | Refills: 0 | Status: SHIPPED | OUTPATIENT
Start: 2022-04-22 | End: 2022-04-29

## 2022-04-22 NOTE — TELEPHONE ENCOUNTER
- Clarified prescription with [facility]. Duricef 500 mg PO BID for 7 days is correct. - S/w pt. Informed her of the prescription sent to her pharmacy pending the final culture results. Pt verbalized understanding.

## 2022-04-25 ENCOUNTER — TELEPHONE (OUTPATIENT)
Dept: INTERNAL MEDICINE CLINIC | Facility: CLINIC | Age: 67
End: 2022-04-25

## 2022-04-25 NOTE — TELEPHONE ENCOUNTER
Pt is calling and states that she had major surgery on her back 6 weeks ago. Pt states that she was at her surgeons office for a follow up visit today. The patient told the surgeon that she passed out about 3 weeks ago. With that being said the surgeon told the patient to contact her primary care doctor to get lab orders placed. Pt states that she would like lab order put in to make sure all her numbers are good and to make sure she was not anemic.      Please call and advise

## 2022-04-26 ENCOUNTER — OFFICE VISIT (OUTPATIENT)
Dept: INTERNAL MEDICINE CLINIC | Facility: CLINIC | Age: 67
End: 2022-04-26
Payer: MEDICARE

## 2022-04-26 ENCOUNTER — LAB ENCOUNTER (OUTPATIENT)
Dept: LAB | Age: 67
End: 2022-04-26
Attending: INTERNAL MEDICINE
Payer: MEDICARE

## 2022-04-26 VITALS
OXYGEN SATURATION: 99 % | HEART RATE: 98 BPM | DIASTOLIC BLOOD PRESSURE: 76 MMHG | WEIGHT: 136.81 LBS | SYSTOLIC BLOOD PRESSURE: 118 MMHG | TEMPERATURE: 98 F | BODY MASS INDEX: 20.74 KG/M2 | HEIGHT: 68 IN

## 2022-04-26 DIAGNOSIS — R55 VASOVAGAL SYNCOPE: ICD-10-CM

## 2022-04-26 DIAGNOSIS — R55 VASOVAGAL SYNCOPE: Primary | ICD-10-CM

## 2022-04-26 DIAGNOSIS — K21.9 GASTROESOPHAGEAL REFLUX DISEASE, UNSPECIFIED WHETHER ESOPHAGITIS PRESENT: ICD-10-CM

## 2022-04-26 LAB
ALBUMIN SERPL-MCNC: 3.5 G/DL (ref 3.4–5)
ALBUMIN/GLOB SERPL: 1.1 {RATIO} (ref 1–2)
ALP LIVER SERPL-CCNC: 95 U/L
ALT SERPL-CCNC: 27 U/L
ANION GAP SERPL CALC-SCNC: 5 MMOL/L (ref 0–18)
AST SERPL-CCNC: 18 U/L (ref 15–37)
BASOPHILS # BLD AUTO: 0.05 X10(3) UL (ref 0–0.2)
BASOPHILS NFR BLD AUTO: 0.7 %
BILIRUB SERPL-MCNC: 0.1 MG/DL (ref 0.1–2)
BUN BLD-MCNC: 22 MG/DL (ref 7–18)
BUN/CREAT SERPL: 29.3 (ref 10–20)
CALCIUM BLD-MCNC: 8.9 MG/DL (ref 8.5–10.1)
CHLORIDE SERPL-SCNC: 106 MMOL/L (ref 98–112)
CO2 SERPL-SCNC: 26 MMOL/L (ref 21–32)
CREAT BLD-MCNC: 0.75 MG/DL
DEPRECATED RDW RBC AUTO: 50.5 FL (ref 35.1–46.3)
EOSINOPHIL # BLD AUTO: 0.2 X10(3) UL (ref 0–0.7)
EOSINOPHIL NFR BLD AUTO: 2.7 %
ERYTHROCYTE [DISTWIDTH] IN BLOOD BY AUTOMATED COUNT: 13.5 % (ref 11–15)
FASTING STATUS PATIENT QL REPORTED: NO
GLOBULIN PLAS-MCNC: 3.2 G/DL (ref 2.8–4.4)
GLUCOSE BLD-MCNC: 119 MG/DL (ref 70–99)
HCT VFR BLD AUTO: 38.9 %
HGB BLD-MCNC: 11.9 G/DL
IMM GRANULOCYTES # BLD AUTO: 0.02 X10(3) UL (ref 0–1)
IMM GRANULOCYTES NFR BLD: 0.3 %
LYMPHOCYTES # BLD AUTO: 1.02 X10(3) UL (ref 1–4)
LYMPHOCYTES NFR BLD AUTO: 13.8 %
MCH RBC QN AUTO: 30.7 PG (ref 26–34)
MCHC RBC AUTO-ENTMCNC: 30.6 G/DL (ref 31–37)
MCV RBC AUTO: 100.3 FL
MONOCYTES # BLD AUTO: 0.73 X10(3) UL (ref 0.1–1)
MONOCYTES NFR BLD AUTO: 9.9 %
NEUTROPHILS # BLD AUTO: 5.37 X10 (3) UL (ref 1.5–7.7)
NEUTROPHILS # BLD AUTO: 5.37 X10(3) UL (ref 1.5–7.7)
NEUTROPHILS NFR BLD AUTO: 72.6 %
OSMOLALITY SERPL CALC.SUM OF ELEC: 288 MOSM/KG (ref 275–295)
PLATELET # BLD AUTO: 441 10(3)UL (ref 150–450)
POTASSIUM SERPL-SCNC: 4.1 MMOL/L (ref 3.5–5.1)
PROT SERPL-MCNC: 6.7 G/DL (ref 6.4–8.2)
RBC # BLD AUTO: 3.88 X10(6)UL
SODIUM SERPL-SCNC: 137 MMOL/L (ref 136–145)
WBC # BLD AUTO: 7.4 X10(3) UL (ref 4–11)

## 2022-04-26 PROCEDURE — 99214 OFFICE O/P EST MOD 30 MIN: CPT | Performed by: INTERNAL MEDICINE

## 2022-04-26 PROCEDURE — 36415 COLL VENOUS BLD VENIPUNCTURE: CPT

## 2022-04-26 PROCEDURE — 80053 COMPREHEN METABOLIC PANEL: CPT

## 2022-04-26 PROCEDURE — 85025 COMPLETE CBC W/AUTO DIFF WBC: CPT

## 2022-04-29 ENCOUNTER — TELEPHONE (OUTPATIENT)
Dept: NEUROLOGY | Facility: CLINIC | Age: 67
End: 2022-04-29

## 2022-04-29 NOTE — TELEPHONE ENCOUNTER
Pt requesting on appointment ASAP as she has issues after recent surgery with Dr.Fontes parris Bhatti reffered, she now has issues with nerve pain all the way to the knee, she can be called today after 12:00, she will not schedule on apt with Valentino Deaner on July as she states that is to far and will like to speak to him ASAP.

## 2022-04-29 NOTE — TELEPHONE ENCOUNTER
Scheduled for 5/25/22. She had sx 6 weeks ago and everything had going fine up until last week she started having shooting nerve pain down her leg. She saw Dr Anitra Mccartney on 4/25/22 and was told by him to no worry about it and prescribed her a Medrol pack. Patient is still taking it and wanted to get on Dr Cleaning's Lona Kimberly in case the medication didn't work. She will call us if worsening symptoms.

## 2022-05-18 ENCOUNTER — OFFICE VISIT (OUTPATIENT)
Dept: PHYSICAL MEDICINE AND REHAB | Facility: CLINIC | Age: 67
End: 2022-05-18
Payer: MEDICARE

## 2022-05-18 VITALS
HEIGHT: 68 IN | HEART RATE: 85 BPM | OXYGEN SATURATION: 99 % | SYSTOLIC BLOOD PRESSURE: 120 MMHG | WEIGHT: 136 LBS | BODY MASS INDEX: 20.61 KG/M2 | DIASTOLIC BLOOD PRESSURE: 78 MMHG

## 2022-05-18 DIAGNOSIS — Z98.890 HISTORY OF LUMBAR LAMINECTOMY: ICD-10-CM

## 2022-05-18 DIAGNOSIS — Z98.1 HISTORY OF LUMBAR FUSION: Primary | ICD-10-CM

## 2022-05-18 DIAGNOSIS — Z98.1 S/P CERVICAL SPINAL FUSION: ICD-10-CM

## 2022-05-18 DIAGNOSIS — M54.16 LUMBAR RADICULOPATHY: ICD-10-CM

## 2022-05-18 PROBLEM — M43.16 SPONDYLOLISTHESIS OF LUMBAR REGION: Status: RESOLVED | Noted: 2017-09-18 | Resolved: 2022-05-18

## 2022-05-18 PROBLEM — M43.10 RETROLISTHESIS: Status: RESOLVED | Noted: 2017-09-18 | Resolved: 2022-05-18

## 2022-05-18 PROBLEM — M51.9 LUMBAR DISC DISEASE: Status: RESOLVED | Noted: 2017-09-18 | Resolved: 2022-05-18

## 2022-05-18 PROBLEM — M48.061 LUMBAR FORAMINAL STENOSIS: Status: RESOLVED | Noted: 2017-09-18 | Resolved: 2022-05-18

## 2022-05-18 PROCEDURE — 99214 OFFICE O/P EST MOD 30 MIN: CPT | Performed by: PHYSICAL MEDICINE & REHABILITATION

## 2022-05-18 NOTE — PATIENT INSTRUCTIONS
Plan  She will increase her Lyrica dose to 3 times a day. She will continue with the PT for now as per Dr. Jeremiah Mckenzie. She will follow up in 2-3 months. She will let me know in one week how she is doing the increased Lyrica.

## 2022-05-19 RX ORDER — PREGABALIN 75 MG/1
75 CAPSULE ORAL 3 TIMES DAILY
Qty: 90 CAPSULE | Refills: 0 | OUTPATIENT
Start: 2022-05-19

## 2022-05-19 NOTE — TELEPHONE ENCOUNTER
Pt forgot to ask a refill for Lyrica yesterday at the apt . Patient  is aware he is not the  dr who prescribe the medication but he increase the medication yesterday . Pt is out of the medication and needs a refill ASAP.   Please assist         If medication is approve please send med to [address] [facility]

## 2022-05-23 ENCOUNTER — ORDER TRANSCRIPTION (OUTPATIENT)
Dept: ADMINISTRATIVE | Facility: HOSPITAL | Age: 67
End: 2022-05-23

## 2022-05-23 DIAGNOSIS — Z12.31 ENCOUNTER FOR SCREENING MAMMOGRAM FOR MALIGNANT NEOPLASM OF BREAST: Primary | ICD-10-CM

## 2022-05-24 ENCOUNTER — TELEPHONE (OUTPATIENT)
Dept: INTERNAL MEDICINE CLINIC | Facility: CLINIC | Age: 67
End: 2022-05-24

## 2022-05-24 DIAGNOSIS — M81.0 AGE-RELATED OSTEOPOROSIS WITHOUT CURRENT PATHOLOGICAL FRACTURE: ICD-10-CM

## 2022-05-24 DIAGNOSIS — N95.1 SYMPTOMATIC MENOPAUSAL OR FEMALE CLIMACTERIC STATES: ICD-10-CM

## 2022-05-24 DIAGNOSIS — Z78.0 POSTMENOPAUSAL: ICD-10-CM

## 2022-05-24 DIAGNOSIS — M85.80 OSTEOPENIA, UNSPECIFIED LOCATION: Primary | ICD-10-CM

## 2022-05-24 NOTE — TELEPHONE ENCOUNTER
Katty Currie / [employer] calling to request an order for a Dexa Scan.     Patient is trying to schedule an appointment

## 2022-05-26 ENCOUNTER — TELEPHONE (OUTPATIENT)
Dept: PHYSICAL MEDICINE AND REHAB | Facility: CLINIC | Age: 67
End: 2022-05-26

## 2022-05-26 DIAGNOSIS — Z98.1 S/P CERVICAL SPINAL FUSION: ICD-10-CM

## 2022-05-26 DIAGNOSIS — M48.02 CERVICAL SPINAL STENOSIS: ICD-10-CM

## 2022-05-26 DIAGNOSIS — M50.20 CERVICAL HERNIATED DISC: ICD-10-CM

## 2022-05-26 DIAGNOSIS — M50.90 CERVICAL DISC DISEASE: ICD-10-CM

## 2022-05-26 DIAGNOSIS — M54.12 CERVICAL RADICULOPATHY: Primary | ICD-10-CM

## 2022-05-26 DIAGNOSIS — M54.2 NECK PAIN: ICD-10-CM

## 2022-05-26 NOTE — TELEPHONE ENCOUNTER
Received call from pt, requesting physical therapy order for dry needling at the request of her PT. Currently working with PT for neck under order written by Dr. Savilla Peabody. Requested order be faxed to [facility] in [facility] on [address]. Works with Ashleigh Rdz, physical therapist.       Pt returned call to add the following information:   - Working with PT for her lumbar spine as ordered by Dr. Savilla Peabody  - Physical therapist has been incorporating therapy for the neck into the spine but Dr. Savilla Peabody did not place orders for PT for neck  - pt requesting order from Dr. Kathleen Godoy for neck and dry needling      PT order pended for Dr. Kathleen Godoy to complete and sign.

## 2022-05-27 ENCOUNTER — HOSPITAL ENCOUNTER (OUTPATIENT)
Dept: MAMMOGRAPHY | Age: 67
Discharge: HOME | End: 2022-05-27
Attending: INTERNAL MEDICINE
Payer: MEDICARE

## 2022-05-27 ENCOUNTER — TELEPHONE (OUTPATIENT)
Dept: INTERNAL MEDICINE CLINIC | Facility: CLINIC | Age: 67
End: 2022-05-27

## 2022-05-27 ENCOUNTER — APPOINTMENT (OUTPATIENT)
Dept: BONE DENSITY | Age: 67
End: 2022-05-27
Attending: INTERNAL MEDICINE
Payer: MEDICARE

## 2022-05-27 ENCOUNTER — PATIENT MESSAGE (OUTPATIENT)
Dept: INTERNAL MEDICINE CLINIC | Facility: CLINIC | Age: 67
End: 2022-05-27

## 2022-05-27 DIAGNOSIS — Z12.31 ENCOUNTER FOR SCREENING MAMMOGRAM FOR MALIGNANT NEOPLASM OF BREAST: ICD-10-CM

## 2022-05-27 DIAGNOSIS — R92.8 ABNORMAL MAMMOGRAM OF LEFT BREAST: Primary | ICD-10-CM

## 2022-05-27 PROCEDURE — 77067 SCR MAMMO BI INCL CAD: CPT | Performed by: INTERNAL MEDICINE

## 2022-05-27 PROCEDURE — 77063 BREAST TOMOSYNTHESIS BI: CPT | Performed by: INTERNAL MEDICINE

## 2022-05-27 NOTE — TELEPHONE ENCOUNTER
PT order faxed to [facility] in [facility] # [phone]      Pt notified and was appreciative of call.

## 2022-06-01 ENCOUNTER — OFFICE VISIT (OUTPATIENT)
Dept: INTERNAL MEDICINE CLINIC | Facility: CLINIC | Age: 67
End: 2022-06-01
Payer: MEDICARE

## 2022-06-01 VITALS
HEART RATE: 100 BPM | SYSTOLIC BLOOD PRESSURE: 130 MMHG | TEMPERATURE: 99 F | BODY MASS INDEX: 20.76 KG/M2 | DIASTOLIC BLOOD PRESSURE: 76 MMHG | HEIGHT: 68 IN | OXYGEN SATURATION: 98 % | WEIGHT: 137 LBS

## 2022-06-01 DIAGNOSIS — M86.9 SAPHO SYNDROME (HCC): ICD-10-CM

## 2022-06-01 DIAGNOSIS — R73.9 HYPERGLYCEMIA: ICD-10-CM

## 2022-06-01 DIAGNOSIS — I10 ESSENTIAL HYPERTENSION: ICD-10-CM

## 2022-06-01 DIAGNOSIS — Z00.00 ROUTINE HEALTH MAINTENANCE: ICD-10-CM

## 2022-06-01 DIAGNOSIS — K86.1 OTHER CHRONIC PANCREATITIS (HCC): ICD-10-CM

## 2022-06-01 DIAGNOSIS — Z80.0 FAMILY HISTORY OF COLON CANCER: ICD-10-CM

## 2022-06-01 DIAGNOSIS — E21.3 HYPERPARATHYROIDISM (HCC): ICD-10-CM

## 2022-06-01 DIAGNOSIS — L70.9 SAPHO SYNDROME (HCC): ICD-10-CM

## 2022-06-01 DIAGNOSIS — M81.0 AGE-RELATED OSTEOPOROSIS WITHOUT CURRENT PATHOLOGICAL FRACTURE: Primary | ICD-10-CM

## 2022-06-01 DIAGNOSIS — Z79.899 CURRENT USE OF PROTON PUMP INHIBITOR: ICD-10-CM

## 2022-06-01 DIAGNOSIS — L40.3 SAPHO SYNDROME (HCC): ICD-10-CM

## 2022-06-01 DIAGNOSIS — E46 PROTEIN-CALORIE MALNUTRITION, UNSPECIFIED SEVERITY (HCC): ICD-10-CM

## 2022-06-01 DIAGNOSIS — M85.80 SAPHO SYNDROME (HCC): ICD-10-CM

## 2022-06-01 DIAGNOSIS — M65.9 SAPHO SYNDROME (HCC): ICD-10-CM

## 2022-06-01 PROBLEM — M54.42 ACUTE MIDLINE LOW BACK PAIN WITH LEFT-SIDED SCIATICA: Status: RESOLVED | Noted: 2017-09-05 | Resolved: 2022-06-01

## 2022-06-01 PROBLEM — K29.70 GASTRITIS: Status: RESOLVED | Noted: 2018-12-04 | Resolved: 2022-06-01

## 2022-06-01 PROBLEM — K64.8 INTERNAL HEMORRHOIDS WITHOUT COMPLICATION: Status: RESOLVED | Noted: 2018-12-04 | Resolved: 2022-06-01

## 2022-06-01 PROBLEM — S63.659A SAGITTAL BAND RUPTURE AT METACARPOPHALANGEAL JOINT: Status: RESOLVED | Noted: 2017-09-12 | Resolved: 2022-06-01

## 2022-06-01 PROBLEM — M19.022 PRIMARY OSTEOARTHRITIS OF LEFT ELBOW: Status: RESOLVED | Noted: 2019-11-20 | Resolved: 2022-06-01

## 2022-06-01 PROBLEM — M67.441 GANGLION OF FLEXOR TENDON SHEATH OF RIGHT INDEX FINGER: Status: RESOLVED | Noted: 2019-11-20 | Resolved: 2022-06-01

## 2022-06-01 PROBLEM — M25.512 ACUTE PAIN OF LEFT SHOULDER: Status: RESOLVED | Noted: 2021-04-21 | Resolved: 2022-06-01

## 2022-06-01 PROCEDURE — G0439 PPPS, SUBSEQ VISIT: HCPCS | Performed by: INTERNAL MEDICINE

## 2022-06-01 PROCEDURE — 99214 OFFICE O/P EST MOD 30 MIN: CPT | Performed by: INTERNAL MEDICINE

## 2022-06-01 RX ORDER — PANTOPRAZOLE SODIUM 40 MG/1
40 TABLET, DELAYED RELEASE ORAL
COMMUNITY

## 2022-06-02 ENCOUNTER — TELEPHONE (OUTPATIENT)
Dept: NEUROLOGY | Facility: CLINIC | Age: 67
End: 2022-06-02

## 2022-06-02 ENCOUNTER — OFFICE VISIT (OUTPATIENT)
Dept: PHYSICAL MEDICINE AND REHAB | Facility: CLINIC | Age: 67
End: 2022-06-02
Payer: MEDICARE

## 2022-06-02 ENCOUNTER — TELEPHONE (OUTPATIENT)
Dept: INTERNAL MEDICINE CLINIC | Facility: CLINIC | Age: 67
End: 2022-06-02

## 2022-06-02 VITALS
HEART RATE: 92 BPM | HEIGHT: 68 IN | BODY MASS INDEX: 20.76 KG/M2 | DIASTOLIC BLOOD PRESSURE: 80 MMHG | SYSTOLIC BLOOD PRESSURE: 126 MMHG | WEIGHT: 137 LBS | OXYGEN SATURATION: 97 %

## 2022-06-02 DIAGNOSIS — Z98.1 HISTORY OF LUMBAR FUSION: ICD-10-CM

## 2022-06-02 DIAGNOSIS — M54.16 LUMBAR RADICULOPATHY: ICD-10-CM

## 2022-06-02 DIAGNOSIS — M50.90 CERVICAL DISC DISEASE: ICD-10-CM

## 2022-06-02 DIAGNOSIS — M48.02 CERVICAL SPINAL STENOSIS: ICD-10-CM

## 2022-06-02 DIAGNOSIS — M54.81 BILATERAL OCCIPITAL NEURALGIA: ICD-10-CM

## 2022-06-02 DIAGNOSIS — M47.812 CERVICAL SPONDYLOSIS: Primary | ICD-10-CM

## 2022-06-02 DIAGNOSIS — M50.20 CERVICAL HERNIATED DISC: ICD-10-CM

## 2022-06-02 DIAGNOSIS — G56.21 ULNAR NEUROPATHY AT ELBOW OF RIGHT UPPER EXTREMITY: ICD-10-CM

## 2022-06-02 DIAGNOSIS — M54.12 CERVICAL RADICULOPATHY: ICD-10-CM

## 2022-06-02 DIAGNOSIS — Z98.1 S/P CERVICAL SPINAL FUSION: ICD-10-CM

## 2022-06-02 PROCEDURE — 99214 OFFICE O/P EST MOD 30 MIN: CPT | Performed by: PHYSICAL MEDICINE & REHABILITATION

## 2022-06-02 NOTE — TELEPHONE ENCOUNTER
I spoke with patient. She explained that her recent blood sugar was 114 and she was going to get fasting labs done tomorrow. She saw Dr. Gracie Arnold today for pain in her neck. He wants to do steroid injection in her neck tomorrow. She says it is \"steroids or anesthesia\" she cannot remember but was told it may raise her sugars. She says she had a shot with Dr. Sergio Hinkle in April and a shot with Dr. Gracie Arnold last December. She wonders if this is raising her sugars and she really does not have diabetes. She says she is not sure how long it would last in the body to elevate sugars. She is not sure she will have time to do her labs before the injections tomorrow and she asks if this is an issue for Dr. Janina Pineda. She is going to PT now until 12:00 p.m. She asks to be called back after 12:30 p.m. She does not want to play phone tag. She does not have the injections scheduled yet and says if they are in the afternoon she could do fasting blood work in the morning. She will call back to let Dr. Janina Pineda know when her injections are scheduled.

## 2022-06-02 NOTE — TELEPHONE ENCOUNTER
Per Medicare guidelines authorization is not required for Left C3-4 and C6-7 Z-joint injections  cpt codes 58612-EW, 69288-LS, 27317. Will inform Nursing.

## 2022-06-02 NOTE — PATIENT INSTRUCTIONS
Plan  I will do left C3-4 and C6-7 z-joint injections. She will continue with the PT for the cervical spine and she is cleared for dry needling. She will continue with the Lyrica for now. She will continue with the PT for the lumbar spine. The patient will continue with her home exercise program.    The patient will follow up in 2 months, but the patient will call me 2 weeks after having the injection to let me know how the injection worked.

## 2022-06-02 NOTE — TELEPHONE ENCOUNTER
Patient has been scheduled for Left C3-4 and C6-7 Z-joint injections on 6/3/22 at the [facility] with . -Anesthesia type: LOCAL. -If receiving MAC or IVC sedation patient will need to get COVID tested 3 days prior even if already vaccinated (order placed by [facility].)  -If scheduling [address] covid testing required for all procedures whether patient is vaccinated or not. -Patient informed not to eat or drink anything after midnight the night prior to the procedure, if being sedated. -Patient was advised that if he/she does receive the covid vaccine it needs to be at least 2 weeks before or after the injection. -Medications and allergies reviewed. -Patient reminded to hold NSAIDs (Ibuprofen, ASA 81, Aleve, Naproxen, Mobic etc.) for 3 days prior to [place]  if BMI is greater than 35. For Cervical injections only hold multivitamins, Vitamin E, Fish Oil, Phentermine (Lomaira) for 7 days prior to injection and NSAIDS.  mg to be held for 7 days prior to injections.  -If patient is receiving MAC/IVCS Phentermine Stoney Lorenzo) will need to be held for 7 days prior to injection.  -If on blood thinner clearance has been received to hold this medication by provider.   -Patient informed he/she will need a  to and from procedure. -[facility] is located in the [facility] 1st floor. Patient may park in the yellow parking. Patient verbalized understanding and agrees with plan.  -----> Scheduled in Epic: Yes  -----> Scheduled in [employer]:  Yes

## 2022-06-02 NOTE — TELEPHONE ENCOUNTER
Pt. Called stating she just saw Dr. Lea Curry.  She was told that her sugars were high. Pt. Just saw Dr. Brayden Simmons today. He wants to give her steroid injections in her neck tomorrow. Brittany Simmons about her sugars and the possibility of the sugars going up after steroids. Dr. Brayden Simmons wants to know if this is going to be an issue with Dr. Floyd Pressley? Or is it ok to continue with the injections? Pt. Can be reached at [phone].

## 2022-06-02 NOTE — TELEPHONE ENCOUNTER
Patient called and informed per Dr Ricardo Alberts she can get A1c done at anytime and get Dr Cleaning's injection with no issues. Patient verbalized understanding and will get A1c done this coming Monday.

## 2022-06-03 ENCOUNTER — TELEPHONE (OUTPATIENT)
Dept: PHYSICAL MEDICINE AND REHAB | Facility: CLINIC | Age: 67
End: 2022-06-03

## 2022-06-03 ENCOUNTER — OFFICE VISIT (OUTPATIENT)
Dept: SURGERY | Facility: CLINIC | Age: 67
End: 2022-06-03

## 2022-06-03 DIAGNOSIS — M47.812 CERVICAL SPONDYLOSIS: Primary | ICD-10-CM

## 2022-06-03 DIAGNOSIS — Z98.1 S/P CERVICAL SPINAL FUSION: ICD-10-CM

## 2022-06-03 NOTE — PROCEDURES
[facility]    CERVICAL Z-JOINT/FACET INJECTION  NAME:  Arturo Stoner    MR #:    [id number] :  10/5/1955     PHYSICIAN:  Cesar Lai MD        Operative Report    DATE OF PROCEDURE: 6/3/2022   PREOPERATIVE DIAGNOSES: 1. Cervical spondylosis    2. S/P cervical spinal fusion: C4-5 & C5-6 ACDF        POSTOPERATIVE DIAGNOSES:   1. Cervical spondylosis    2. S/P cervical spinal fusion: C4-5 & C5-6 ACDF        PROCEDURES: left C3-4 and C6-7 Z-Joint Injection done under fluoroscopic guidance with contrast enhancement. SURGEON: Cesar Cleaning MD   ANESTHESIA: Local   INDICATIONS:      OPERATIVE PROCEDURE:  Written consent was obtained from the patient. The patient was brought into the operating room and placed in the prone position on the fluoroscopy table with pillow underneath the chest and shoulders. The patient's skin was cleaned and draped in a normal sterile fashion. Using AP fluoroscopy, the left C3-4 and C6-7 z-joints were identified. Skin was anesthetized with 2% PF lidocaine without epinephrine overlying each joint. Then, a 3-1/2 inch, 22-gauge spinal needle was inserted and directed towards the left C3-4 and C6-7 z-joint(s). When they were engaged, Omnipaque-240 contrast was used to obtain a good arthrogram indicating correct needle placement. Then, aspiration was performed. No blood, fluid, or air was aspirated. Then, each joint was injected with a 1 cc solution of 0.5 cc of 2% lidocaine without epinephrine and 0.5 cc of 1 mg/cc of 40 mg of Kenalog/cc. Then, the needles were removed. The patient's skin was cleaned. Band-Aids were applied. The patient was transferred to the cart and into [facility]. The patient was given discharge instructions and will follow up in the clinic as scheduled. Throughout the whole procedure, the patient's pulse oximetry and vital signs were monitored and they remained completely stable.   Also, throughout the whole procedure, prior to injection of any medication, aspiration was performed. No blood, fluid, or air was aspirated at anytime.

## 2022-06-06 ENCOUNTER — TELEPHONE (OUTPATIENT)
Dept: NEUROLOGY | Facility: CLINIC | Age: 67
End: 2022-06-06

## 2022-06-06 ENCOUNTER — MED REC SCAN ONLY (OUTPATIENT)
Dept: NEUROLOGY | Facility: CLINIC | Age: 67
End: 2022-06-06

## 2022-06-06 NOTE — TELEPHONE ENCOUNTER
Prior Authorization notification received  for Pregablin 75 MG Capsule and information is needed in order to complete the request. Request placed in 's bin

## 2022-06-07 ENCOUNTER — HOSPITAL ENCOUNTER (OUTPATIENT)
Dept: BONE DENSITY | Age: 67
Discharge: HOME | End: 2022-06-07
Attending: INTERNAL MEDICINE
Payer: MEDICARE

## 2022-06-07 ENCOUNTER — LAB ENCOUNTER (OUTPATIENT)
Dept: LAB | Facility: HOSPITAL | Age: 67
End: 2022-06-07
Attending: INTERNAL MEDICINE
Payer: MEDICARE

## 2022-06-07 DIAGNOSIS — Z79.899 CURRENT USE OF PROTON PUMP INHIBITOR: ICD-10-CM

## 2022-06-07 DIAGNOSIS — Z78.0 POSTMENOPAUSAL: ICD-10-CM

## 2022-06-07 DIAGNOSIS — I10 ESSENTIAL HYPERTENSION: ICD-10-CM

## 2022-06-07 DIAGNOSIS — M81.0 AGE-RELATED OSTEOPOROSIS WITHOUT CURRENT PATHOLOGICAL FRACTURE: ICD-10-CM

## 2022-06-07 LAB
ANION GAP SERPL CALC-SCNC: 6 MMOL/L (ref 0–18)
BUN BLD-MCNC: 15 MG/DL (ref 7–18)
BUN/CREAT SERPL: 21.7 (ref 10–20)
CALCIUM BLD-MCNC: 9.2 MG/DL (ref 8.5–10.1)
CHLORIDE SERPL-SCNC: 109 MMOL/L (ref 98–112)
CHOLEST SERPL-MCNC: 185 MG/DL (ref ?–200)
CO2 SERPL-SCNC: 28 MMOL/L (ref 21–32)
CREAT BLD-MCNC: 0.69 MG/DL
EST. AVERAGE GLUCOSE BLD GHB EST-MCNC: 114 MG/DL (ref 68–126)
FASTING PATIENT LIPID ANSWER: YES
FASTING STATUS PATIENT QL REPORTED: YES
GLUCOSE BLD-MCNC: 81 MG/DL (ref 70–99)
HBA1C MFR BLD: 5.6 % (ref ?–5.7)
HDLC SERPL-MCNC: 76 MG/DL (ref 40–59)
LDLC SERPL CALC-MCNC: 98 MG/DL (ref ?–100)
NONHDLC SERPL-MCNC: 109 MG/DL (ref ?–130)
OSMOLALITY SERPL CALC.SUM OF ELEC: 296 MOSM/KG (ref 275–295)
POTASSIUM SERPL-SCNC: 4.5 MMOL/L (ref 3.5–5.1)
SODIUM SERPL-SCNC: 143 MMOL/L (ref 136–145)
TRIGL SERPL-MCNC: 59 MG/DL (ref 30–149)
TSI SER-ACNC: 1.85 MIU/ML (ref 0.36–3.74)
VIT B12 SERPL-MCNC: 589 PG/ML (ref 193–986)
VLDLC SERPL CALC-MCNC: 10 MG/DL (ref 0–30)

## 2022-06-07 PROCEDURE — 82607 VITAMIN B-12: CPT

## 2022-06-07 PROCEDURE — 83036 HEMOGLOBIN GLYCOSYLATED A1C: CPT | Performed by: INTERNAL MEDICINE

## 2022-06-07 PROCEDURE — 80048 BASIC METABOLIC PNL TOTAL CA: CPT

## 2022-06-07 PROCEDURE — 80061 LIPID PANEL: CPT

## 2022-06-07 PROCEDURE — 84443 ASSAY THYROID STIM HORMONE: CPT | Performed by: INTERNAL MEDICINE

## 2022-06-07 PROCEDURE — 77080 DXA BONE DENSITY AXIAL: CPT | Performed by: INTERNAL MEDICINE

## 2022-06-07 PROCEDURE — 36415 COLL VENOUS BLD VENIPUNCTURE: CPT | Performed by: INTERNAL MEDICINE

## 2022-06-10 ENCOUNTER — HOSPITAL ENCOUNTER (OUTPATIENT)
Dept: MAMMOGRAPHY | Facility: HOSPITAL | Age: 67
Discharge: HOME | End: 2022-06-10
Attending: INTERNAL MEDICINE
Payer: MEDICARE

## 2022-06-10 DIAGNOSIS — R92.8 ABNORMAL MAMMOGRAM OF LEFT BREAST: ICD-10-CM

## 2022-06-10 PROCEDURE — 77065 DX MAMMO INCL CAD UNI: CPT | Performed by: INTERNAL MEDICINE

## 2022-06-10 PROCEDURE — 77061 BREAST TOMOSYNTHESIS UNI: CPT | Performed by: INTERNAL MEDICINE

## 2022-06-15 ENCOUNTER — OFFICE VISIT (OUTPATIENT)
Dept: AUDIOLOGY | Facility: CLINIC | Age: 67
End: 2022-06-15
Payer: MEDICARE

## 2022-06-15 ENCOUNTER — OFFICE VISIT (OUTPATIENT)
Dept: OTOLARYNGOLOGY | Facility: CLINIC | Age: 67
End: 2022-06-15
Payer: MEDICARE

## 2022-06-15 VITALS — WEIGHT: 137 LBS | HEIGHT: 68 IN | BODY MASS INDEX: 20.76 KG/M2

## 2022-06-15 DIAGNOSIS — H90.41 SENSORINEURAL HEARING LOSS (SNHL) OF RIGHT EAR WITH UNRESTRICTED HEARING OF LEFT EAR: Primary | ICD-10-CM

## 2022-06-15 DIAGNOSIS — J34.3 NASAL TURBINATE HYPERTROPHY: ICD-10-CM

## 2022-06-15 DIAGNOSIS — H90.3 SENSORINEURAL HEARING LOSS, BILATERAL: Primary | ICD-10-CM

## 2022-06-15 PROCEDURE — 1126F AMNT PAIN NOTED NONE PRSNT: CPT | Performed by: SPECIALIST

## 2022-06-15 PROCEDURE — 99213 OFFICE O/P EST LOW 20 MIN: CPT | Performed by: SPECIALIST

## 2022-06-15 PROCEDURE — 92567 TYMPANOMETRY: CPT | Performed by: AUDIOLOGIST

## 2022-06-15 PROCEDURE — 92557 COMPREHENSIVE HEARING TEST: CPT | Performed by: AUDIOLOGIST

## 2022-06-15 NOTE — PATIENT INSTRUCTIONS
As you have had persistent abnormal sounds on the right ear, and fullness on this ear, and a hearing test was ordered. This is what it shows. Expected hearing loss in the mid to high frequencies. Possible eustachian tube dysfunction on the right ear.     Consider Flonase and/or Allegra  for your eustachian tube symptoms

## 2022-06-16 ENCOUNTER — PATIENT MESSAGE (OUTPATIENT)
Dept: PHYSICAL MEDICINE AND REHAB | Facility: CLINIC | Age: 67
End: 2022-06-16

## 2022-06-16 ENCOUNTER — TELEPHONE (OUTPATIENT)
Dept: PHYSICAL MEDICINE AND REHAB | Facility: CLINIC | Age: 67
End: 2022-06-16

## 2022-06-16 ENCOUNTER — TELEPHONE (OUTPATIENT)
Dept: OTOLARYNGOLOGY | Facility: CLINIC | Age: 67
End: 2022-06-16

## 2022-06-16 DIAGNOSIS — M47.812 CERVICAL SPONDYLOSIS: Primary | ICD-10-CM

## 2022-06-16 LAB — AMB EXT COVID-19 RESULT: DETECTED

## 2022-06-16 NOTE — TELEPHONE ENCOUNTER
Spoke with patient to clarify questions regarding recommendations by Esteban Polo. Patient understood process and would like to schedule the medial branch blocks. Patient states she would like sedation for this as she was very uncomfortable with the last injection. Reviewed with Esteban Polo who stated it was ok to have this with sedation and to see what patient has had in the past.    Per Epic chart review patient has had MAC sedation.

## 2022-06-16 NOTE — TELEPHONE ENCOUNTER
Per Medicare Guidelines -no authorization is required for MBBs     Status: Approved-Covered Benefit    Clinical staff can proceed with scheduling Left C3, C4, C6, and C7 medial branch blocks CPT [id number]     fyi-Per CMS Guidelines-One to two levels, either unilateral or bilateral, are allowed per session per spine region. The need for a three or four-level procedure bilaterally may be considered under unique circumstances and with sufficient documentation of medical necessity on appeal. A session is a time period, which includes all procedures (i.e., medial branch block (MBB), intraarticular injections (IA), facet cyst ruptures, and RFA ablations that are performed during the same day.

## 2022-06-16 NOTE — TELEPHONE ENCOUNTER
As patient indicated she had 80% improvement of pain with the last injection order for left C3, C4, C6, and C7 medial brnach blocks has been placed. Message sent to patient via [employer].

## 2022-06-16 NOTE — TELEPHONE ENCOUNTER
Per Vicky trejo to schedule the below procedure with MAC sedation. Will need to reach out to patient to get scheduled.

## 2022-06-16 NOTE — TELEPHONE ENCOUNTER
Per pt she is experiencing sinus pressure and headache, believes she has sinus infection.  Please advise

## 2022-06-16 NOTE — TELEPHONE ENCOUNTER
LOV: 6/2/22  NOV: 7/20/22    Last injection: 6/3/22-left C3-4 and C6-7 Z-Joint Injection  Cervical spine MRI done on 2/14/22    Plan per  at [address] [place] [address]: \"I will do left C3-4 and C6-7 z-joint injections. She will continue with the PT for the cervical spine and she is cleared for dry needling. She will continue with the Lyrica for now. She will continue with the PT for the lumbar spine. The patient will continue with her home exercise program.  The patient will follow up in 2 months, but the patient will call me 2 weeks after having the injection to let me know how the injection worked. \"    Per patient update the injection helped up until last night. States she woke up in the middle of the night with \"screaming pain and a headache. \"    Patient wondering what the next steps are. Will forward update on to [address] for recommendations. Update forwarded to [address]. Awaiting recommendations.

## 2022-06-16 NOTE — TELEPHONE ENCOUNTER
LMTCB. Patient to be scheduled for Left C3, C4, C6, and C7 medial branch blocks at the [facility] with . -Anesthesia type: MAC.

## 2022-06-16 NOTE — TELEPHONE ENCOUNTER
Per : \"If she had significant relief, then I will do left C3, C4, C6, and C7 medial brnach blocks to test for RFN's.\"    Message sent to patient via [employer]. Awaiting response.

## 2022-06-19 ENCOUNTER — HOSPITAL ENCOUNTER (OUTPATIENT)
Age: 67
Discharge: HOME | End: 2022-06-19
Payer: MEDICARE

## 2022-06-19 VITALS
TEMPERATURE: 99 F | DIASTOLIC BLOOD PRESSURE: 80 MMHG | OXYGEN SATURATION: 98 % | HEART RATE: 87 BPM | SYSTOLIC BLOOD PRESSURE: 121 MMHG | RESPIRATION RATE: 18 BRPM

## 2022-06-19 DIAGNOSIS — U07.1 COVID-19: Primary | ICD-10-CM

## 2022-06-19 RX ORDER — BEBTELOVIMAB 87.5 MG/ML
175 INJECTION, SOLUTION INTRAVENOUS ONCE
Status: COMPLETED | OUTPATIENT
Start: 2022-06-19 | End: 2022-06-19

## 2022-06-19 NOTE — ED QUICK NOTES
0773- pt medicated per order , iv patent wnl , pt aware of plan of care. 4667- PT STABLE BREATHING EASY , NO DISTRESS NOTED, PT DENIES ANY NEW COMPLAINTS.     1150- PT STABLE, NO DISTRESS NOTED     1019- IV D/C'D PRESSURE DRESSING APPLIED , PT BREATHING EASY , NO DISTRESS NOTED, PT DISCHARGED STABLE.

## 2022-06-20 ENCOUNTER — PATIENT MESSAGE (OUTPATIENT)
Dept: PHYSICAL MEDICINE AND REHAB | Facility: CLINIC | Age: 67
End: 2022-06-20

## 2022-06-20 ENCOUNTER — TELEPHONE (OUTPATIENT)
Dept: INTERNAL MEDICINE CLINIC | Facility: CLINIC | Age: 67
End: 2022-06-20

## 2022-06-20 ENCOUNTER — TELEPHONE (OUTPATIENT)
Dept: AUDIOLOGY | Facility: CLINIC | Age: 67
End: 2022-06-20

## 2022-06-20 ENCOUNTER — TELEPHONE (OUTPATIENT)
Dept: OTOLARYNGOLOGY | Facility: CLINIC | Age: 67
End: 2022-06-20

## 2022-06-20 NOTE — TELEPHONE ENCOUNTER
Please let patient know that I was forwarded her symptoms in the acceptance of Dr. Tyler Milner    1. With current COVID diagnosis yesterday and monoclonal antibodies I would NOT continue doxycycline. 2.  For physical therapy appointments I would ask her to notify physical therapy as she will probably need to cancel some physical therapy appointments. She can follow physical therapy's protocol. ( Eboni Oneal.  Ervin Costello )

## 2022-06-20 NOTE — TELEPHONE ENCOUNTER
Pt appt changed to telephone visit    Please call patient if anything addt'l is needed    Tasked to nursing

## 2022-06-20 NOTE — TELEPHONE ENCOUNTER
Pt calling to let office know she tested positive for covid on 6/16 after appt on 6/15 please advise

## 2022-06-20 NOTE — TELEPHONE ENCOUNTER
Patient will need to wait 6 weeks to schedule the injection as she tested positive for covid on 6/16/22. Message sent to patient via [employer].

## 2022-06-20 NOTE — TELEPHONE ENCOUNTER
Spoke with patient and relayed MDs message. She repeated back to stop abx. She contacted PT and was told that it's okay to keep Wednesday appt, if she's not coughing. As of right now, patient does have a cough.  She will see how she feels Wednesday am and f/u with Ray Arroyo

## 2022-06-20 NOTE — TELEPHONE ENCOUNTER
COVID Triage:    Fever: Yes[]  No[x]  [] Temperature:   [] Chills   [] Night sweats  [] Body aches    Cough: Yes[x]  No[]   [] Productive cough  [] Cough with exertion  [x] Dry cough-reports deep coughing. Respiratory Symptoms: Yes[] No[x]  [] Wheezing  [] Pain with deep breathing  [] SOB with exertion  [] SOB at rest  [] Heavy breathing  [] Chest discomfort with deep breathing or coughing    GI Symptoms: Yes [] No[x]  [] Diarrhea  [] Nausea  [] Vomiting  [] Abdominal pain  [] Lack of appetite    Other symptoms:  [x] Sore throat  [] Difficulty swallowing  [] Nasal drainage  [] Nasal congestion  [] PND  [x] Sinus pressure (\"a little bit\")  [] Chest congestion  [] Head congestion  [] Facial pain   [] Ear pain  [] Loss of sense of smell   [] Loss of sense of taste  [] Conjunctivitis  [x] Headache  [] Fatigue  [] Weakness    [x]OTC Medications: Delsym every 12 hours. MVI. Vit D. Vit C.      [] Any recent travel? [] Any sick contacts? [] Positive Covid exposure (<6 feet, >15 min, no mask worn)  If yes, date of exposure:   Date of last contact:  When did the person develop symptoms:   When did they test positive:     [x] Previous history of Covid  If yes, when: 11/2021    Vaccinated: Yes  []   No [x]  Booster:  Yes  []  No [x]     Symptom onset: 6/14/22. Symptoms started with a sore throat and nasal congestion. Seen at [facility] 6/19/22. S/p monoclonal antibody infusion. Feel fine after receiving infusion. Last night, she developed a h/a, felt \"woozy, not nauseous. States, \"my tummy was vandana hurting\". Felt \"foggy\". She does mention that she has a h/o headaches and stomach issues, so she's not sure if these are her usual symptoms, or r/t infusion. She denies change in symptoms from baseline. No dizziness, weakness, or SOB. States, she was reading online about the infusion, and read that she could develop this. Has a pulse oximeter. sp02 99%. Previously purchased an incentive spirometry.  Encourage to use to exercise her lungs.  did give her covid quarantine instructions. She was advised to quarantine for 10 days. Patient was advised to monitor her symptoms at home and call the office with any new or worsening symptoms. ER is recommended should she develop shortness of breath, chest pain, high fever that's non-responsive to fever reducing medicine, or spo2 (oxygen level) <90% (if pulse oximeter is accessible). 6/22 office visit changed to a phone visit. Patient states that appt time may have to be changed as she has a PT appt. advised that she notify PT of positive covid test.   If she needs to change telephone visit time with PCP, she will call back. May also schedule with an associate. To on-call Dr. Lianna Munoz: patient had an old Doxycycline script on hand, previously prescirbed by PCP. Patient started taking RX Wednesday. Should she continue RX or discontinue?

## 2022-06-20 NOTE — TELEPHONE ENCOUNTER
Pt called to let Dr Carrillo Hanna know she has Covid again - tested positive on Thurs or Friday  Sore throat/congestion started on Wednesday    She got the infusion yesterday at the [address] [place] [address], states she is not happy that she broke down and got the infusion while she was there     Not feeling great after having it done, she has a headache, maybe a little achy     [phone]    Pt has scheduled ov on Wednesday 6/22 with   Dr Carrillo Hanna via [address] [place] [address]

## 2022-06-20 NOTE — TELEPHONE ENCOUNTER
Noted. She has contacted PCP office.    Future Appointments   Date Time Provider Caroline Crystal   6/22/2022 10:30 AM MD MAURICIO Lizarraga   6/30/2022  2:30 PM Heather Cristina MD [facility]   7/20/2022 11:30 AM Alyssa Cleaning MD [facility] Jacques Trinidad [facility]

## 2022-06-20 NOTE — TELEPHONE ENCOUNTER
To  - patient tested positive on 6/16/22 for covid - this needs to be changed into phone visit please call patient thanks

## 2022-06-20 NOTE — TELEPHONE ENCOUNTER
Patient scheduled an appointment via [address] with Dr Allyn Caldwell on 6/22/2022 at 21 [phone]. Patient states reasoning to be \"covid. \" No other symptoms were shared. Patient was not called by . Okay to keep appointment? Spoke with pt who reports she contacted that number left on her answering machine and spoke with Lizeth at [facility] department ([phone] Lizeth) who stated that pt had orders for PT.  Pt states she has completed her PT and does not need PT at this time and wanted to know if PCP ordered the additional PT.  Pt informed that last order in her chart for PT was 2/20/17.  Pt verbalized understanding.   Pt asked if nurse could call Lizeth to \"clear things up\"   Nurse agreeable    Writer spoke with Lizeth who reports order populated in her basket today. After nurse and Lizeth reviewed pt's chart - Lizeth states she will remove pt from list.  No other concerns

## 2022-06-22 ENCOUNTER — VIRTUAL PHONE E/M (OUTPATIENT)
Dept: INTERNAL MEDICINE CLINIC | Facility: CLINIC | Age: 67
End: 2022-06-22
Payer: MEDICARE

## 2022-06-22 DIAGNOSIS — U07.1 COVID-19 VIRUS INFECTION: Primary | ICD-10-CM

## 2022-06-22 PROCEDURE — 99442 PHONE E/M BY PHYS 11-20 MIN: CPT | Performed by: INTERNAL MEDICINE

## 2022-06-22 NOTE — PROGRESS NOTES
Virtual Telephone Check-In    Kemar Sorensen A Day verbally consents to a Virtual/Telephone Check-In visit on 06/22/22. Patient has been referred to the [facility] website at [email or web address] to review the yearly Consent to Treat document. Patient understands and accepts financial responsibility for any deductible, co-insurance and/or co-pays associated with this service. Duration of the service: 13 minutes      Summary of topics discussed:     Last Tuesday 6/14/22, pt developed a sore throat then headache and nasal congestion. Pt had a leftover Rx for doxycycline and pt started it after seeing ENT Dr. Trino Hernandez on 6/16. On Friday 6/17, she did a covid test -- positive x 2 at home. Had significant dry coughing jags over the weekend. Went to [address] [place] [address] on 6/19/22 -- lungs were clear. Was offered monoclonal Ab -- pt \"caved\" and got it. She did not feel great after the MAB. Still having coughing jags -- dry, nonproductive. Sleeps in the recliner at night. Pulse ox is good 98-99%. Taking Delsym. Tessalon perles did not work in the past.  Still feels foggy, fatigued. Had previous covid infection in 11/2021. Cough lasted 2 months  Still unvaccinated. A/P:    Covid infection #2  -still unvaccinated  -pt received MAB this time (6/19/22)  -still coughing; tessalon perles did not help in the past; codeine causes N/V. Rec sticking with delsym. Push fluids.   Discussed with pt that cough may last several more weeks, polo given her last symptom profile with rosalind Gutierres MD

## 2022-06-23 ENCOUNTER — TELEPHONE (OUTPATIENT)
Dept: NEUROLOGY | Facility: CLINIC | Age: 67
End: 2022-06-23

## 2022-06-23 NOTE — TELEPHONE ENCOUNTER
Receive PT Initial Evaluation / Plan of Care to be signed by physician.  Placed in [address] [place] [address]

## 2022-06-30 ENCOUNTER — OFFICE VISIT (OUTPATIENT)
Dept: ENDOCRINOLOGY CLINIC | Facility: CLINIC | Age: 67
End: 2022-06-30
Payer: MEDICARE

## 2022-06-30 VITALS
WEIGHT: 134.19 LBS | HEART RATE: 78 BPM | SYSTOLIC BLOOD PRESSURE: 154 MMHG | BODY MASS INDEX: 20 KG/M2 | DIASTOLIC BLOOD PRESSURE: 94 MMHG

## 2022-06-30 DIAGNOSIS — R73.01 IMPAIRED FASTING GLUCOSE: ICD-10-CM

## 2022-06-30 DIAGNOSIS — M81.0 AGE-RELATED OSTEOPOROSIS WITHOUT CURRENT PATHOLOGICAL FRACTURE: Primary | ICD-10-CM

## 2022-06-30 DIAGNOSIS — E55.9 VITAMIN D DEFICIENCY: ICD-10-CM

## 2022-06-30 PROCEDURE — 99213 OFFICE O/P EST LOW 20 MIN: CPT | Performed by: INTERNAL MEDICINE

## 2022-06-30 PROCEDURE — 96372 THER/PROPH/DIAG INJ SC/IM: CPT | Performed by: INTERNAL MEDICINE

## 2022-06-30 PROCEDURE — 1126F AMNT PAIN NOTED NONE PRSNT: CPT | Performed by: INTERNAL MEDICINE

## 2022-07-01 ENCOUNTER — PATIENT MESSAGE (OUTPATIENT)
Dept: ENDOCRINOLOGY CLINIC | Facility: CLINIC | Age: 67
End: 2022-07-01

## 2022-07-05 NOTE — TELEPHONE ENCOUNTER
From: William Stoner  To: Connie Whittaker MD  Sent: 7/1/2022 2:57 PM CDT  Subject: Calcium citrate from [employer]     Here is the nutritional info from the calcium citrate with zinc and Vit d and magnesium from [employer]. Yes/no, too much of some stuff? It was good to see you yesterday. Glad to hear my numbers were good. Thank you for all you do for me in the area.      Betsy Stoner

## 2022-07-08 ENCOUNTER — MED REC SCAN ONLY (OUTPATIENT)
Dept: PHYSICAL MEDICINE AND REHAB | Facility: CLINIC | Age: 67
End: 2022-07-08

## 2022-07-15 NOTE — TELEPHONE ENCOUNTER
S/w pt regarding her [employer] messages. Pt c/o stiffness and soreness to neck, shoulders, and upper back. Not sure if it is muscular or nerve related. Not sure if she still wants to proceed with nerve blocks due to this. Notified pt she will need to be assessed at her [address] which is on 7/20/22. In an attempt to identify if her pain is nerve related, patient restarted taking Lyrica 75 mg BID on 7/11/22 (she took this in the past for nerve pain to the legs after surgery). Since resuming medication, patient has been able to sleep through the night without waking up with headaches except for Wednesday night but headache was mild to moderate.

## 2022-07-20 ENCOUNTER — OFFICE VISIT (OUTPATIENT)
Dept: PHYSICAL MEDICINE AND REHAB | Facility: CLINIC | Age: 67
End: 2022-07-20
Payer: MEDICARE

## 2022-07-20 ENCOUNTER — TELEPHONE (OUTPATIENT)
Dept: PHYSICAL MEDICINE AND REHAB | Facility: CLINIC | Age: 67
End: 2022-07-20

## 2022-07-20 VITALS
DIASTOLIC BLOOD PRESSURE: 86 MMHG | HEIGHT: 68 IN | WEIGHT: 133.69 LBS | SYSTOLIC BLOOD PRESSURE: 118 MMHG | BODY MASS INDEX: 20.26 KG/M2

## 2022-07-20 DIAGNOSIS — M50.90 CERVICAL DISC DISEASE: Primary | ICD-10-CM

## 2022-07-20 DIAGNOSIS — G44.86 CERVICOGENIC HEADACHE: ICD-10-CM

## 2022-07-20 DIAGNOSIS — M47.812 CERVICAL FACET SYNDROME: ICD-10-CM

## 2022-07-20 DIAGNOSIS — M54.2 NECK PAIN: ICD-10-CM

## 2022-07-20 DIAGNOSIS — Z98.1 HISTORY OF LUMBAR FUSION: ICD-10-CM

## 2022-07-20 DIAGNOSIS — M54.6 ACUTE MIDLINE THORACIC BACK PAIN: ICD-10-CM

## 2022-07-20 DIAGNOSIS — M50.20 CERVICAL HERNIATED DISC: ICD-10-CM

## 2022-07-20 DIAGNOSIS — M47.812 CERVICAL SPONDYLOSIS: ICD-10-CM

## 2022-07-20 DIAGNOSIS — M47.894 THORACIC FACET SYNDROME: ICD-10-CM

## 2022-07-20 DIAGNOSIS — Z98.1 S/P CERVICAL SPINAL FUSION: ICD-10-CM

## 2022-07-20 DIAGNOSIS — M48.02 CERVICAL SPINAL STENOSIS: ICD-10-CM

## 2022-07-20 PROCEDURE — 99214 OFFICE O/P EST MOD 30 MIN: CPT | Performed by: PHYSICAL MEDICINE & REHABILITATION

## 2022-07-20 NOTE — PATIENT INSTRUCTIONS
Plan  She will do the PT for the thoracic and cervical facet syndrome. If the pain continues to persist, then she would be a candidate for bilateral C3-4 and C6-7 z-joint injections and bilateral T9-10 z-joint injections. She will continue with the Tylenol for the pain. She will let me know how she is doing after she has had 4-6 sessions of the PT. She will follow up in 2-3 months or sooner if needed.

## 2022-07-20 NOTE — TELEPHONE ENCOUNTER
Patient is calling in stating she would like to speak to a nurse before her appointment today 7/20 at 6.

## 2022-07-20 NOTE — TELEPHONE ENCOUNTER
Spoke with patient and adressed her questions on her appt today.      Location, parking, if her f/u appt was too soon, etc.

## 2022-07-22 NOTE — TELEPHONE ENCOUNTER
Patient completed office visit on 7/20/2022 with Dr. Miguel Mckenzie. Plan of care is as follows per Dr. Miguel Mckenzie: Abigail Manuel will do the PT for the thoracic and cervical facet syndrome. If the pain continues to persist, then she would be a candidate for bilateral C3-4 and C6-7 z-joint injections and bilateral T9-10 z-joint injections. She will continue with the Tylenol for the pain. She will let me know how she is doing after she has had 4-6 sessions of the PT. She will follow up in 2-3 months or sooner if needed. \"         Patient has follow-up visit with Dr. Miguel Mckenzie on 9/27/22. Will done encounter for now until patient call with update.

## 2022-07-26 ENCOUNTER — MED REC SCAN ONLY (OUTPATIENT)
Dept: NEUROLOGY | Facility: CLINIC | Age: 67
End: 2022-07-26

## 2022-07-30 NOTE — TELEPHONE ENCOUNTER
Ok,  I did look back at the data. The first time there was data in the study was after 6 months of therapy. There was a 2% change in bone mineral density after 6 months of therapy and 5% change after one year of treatment.   So yes there is benefit after Initial (On Arrival)

## 2022-08-15 RX ORDER — TRAZODONE HYDROCHLORIDE 50 MG/1
50 TABLET ORAL NIGHTLY
Qty: 90 TABLET | Refills: 1 | Status: SHIPPED | OUTPATIENT
Start: 2022-08-15

## 2022-08-15 NOTE — TELEPHONE ENCOUNTER
Pt requesting refill for:  Trazadone   Previous prescription had no refills and patient is out medication   Tasked to [employer]

## 2022-08-17 ENCOUNTER — TELEPHONE (OUTPATIENT)
Dept: PHYSICAL MEDICINE AND REHAB | Facility: CLINIC | Age: 67
End: 2022-08-17

## 2022-08-17 DIAGNOSIS — M48.02 CERVICAL SPINAL STENOSIS: ICD-10-CM

## 2022-08-17 DIAGNOSIS — M50.30 BULGING OF CERVICAL INTERVERTEBRAL DISC: Primary | ICD-10-CM

## 2022-08-17 DIAGNOSIS — M47.894 THORACIC FACET SYNDROME: ICD-10-CM

## 2022-08-17 DIAGNOSIS — M54.2 NECK PAIN: ICD-10-CM

## 2022-08-17 DIAGNOSIS — Z98.1 S/P CERVICAL SPINAL FUSION: ICD-10-CM

## 2022-08-17 DIAGNOSIS — Z98.1 HISTORY OF LUMBAR SPINAL FUSION: ICD-10-CM

## 2022-08-17 DIAGNOSIS — G44.86 CERVICOGENIC HEADACHE: ICD-10-CM

## 2022-08-17 DIAGNOSIS — M50.20 CERVICAL HERNIATED DISC: ICD-10-CM

## 2022-08-17 DIAGNOSIS — M47.812 CERVICAL FACET SYNDROME: ICD-10-CM

## 2022-08-17 DIAGNOSIS — M54.6 ACUTE MIDLINE THORACIC BACK PAIN: ICD-10-CM

## 2022-08-17 DIAGNOSIS — M47.812 CERVICAL SPONDYLOSIS: ICD-10-CM

## 2022-08-17 NOTE — TELEPHONE ENCOUNTER
Pt called and stated she is having mid back pain again. Would like to get scheduled for the injection as discussed with Dr. Brittaney Head.

## 2022-08-18 NOTE — TELEPHONE ENCOUNTER
Per Medicare guidelines authorization is not required for Bilateral T9-10 Z-joint injections  cpt codes 67927-02, [id number], 60993. Will inform Nursing.

## 2022-08-18 NOTE — TELEPHONE ENCOUNTER
Per Harley trejo to place injection orders. Injection orders placed. Injections to be done 2 weeks apart. Message sent to patient via [employer].

## 2022-08-18 NOTE — TELEPHONE ENCOUNTER
Per Medicare guidelines authorization is not required for bilateral C3-4 and C6-7 Z-joint injections  cpt codes 12431-32, [id number], 56026. Will inform Nursing.

## 2022-08-18 NOTE — TELEPHONE ENCOUNTER
Patient calling in stating she is in PT from 10:45 -12:15, please call before or after to schedule injection

## 2022-08-19 NOTE — TELEPHONE ENCOUNTER
Patient will call to schedule when she is ready. States she does not want neck injections since her neck is feeling better. She would also like to hold off on T-spine injections since she has been in PT and has been feeling a bit better. She sees Dr. Devaughn Nielsen in 2 weeks and she will call if she decides she wants to proceed with injections.

## 2022-08-20 ENCOUNTER — LAB ENCOUNTER (OUTPATIENT)
Dept: LAB | Facility: HOSPITAL | Age: 67
End: 2022-08-20
Attending: INTERNAL MEDICINE
Payer: MEDICARE

## 2022-08-20 DIAGNOSIS — M81.0 AGE-RELATED OSTEOPOROSIS WITHOUT CURRENT PATHOLOGICAL FRACTURE: ICD-10-CM

## 2022-08-20 DIAGNOSIS — E55.9 VITAMIN D DEFICIENCY: ICD-10-CM

## 2022-08-20 DIAGNOSIS — R73.01 IMPAIRED FASTING GLUCOSE: ICD-10-CM

## 2022-08-20 LAB
EST. AVERAGE GLUCOSE BLD GHB EST-MCNC: 120 MG/DL (ref 68–126)
HBA1C MFR BLD: 5.8 % (ref ?–5.7)
PTH-INTACT SERPL-MCNC: 74 PG/ML (ref 18.5–88)
VIT D+METAB SERPL-MCNC: 57.6 NG/ML (ref 30–100)

## 2022-08-20 PROCEDURE — 83036 HEMOGLOBIN GLYCOSYLATED A1C: CPT

## 2022-08-20 PROCEDURE — 83970 ASSAY OF PARATHORMONE: CPT

## 2022-08-20 PROCEDURE — 84080 ASSAY ALKALINE PHOSPHATASES: CPT

## 2022-08-20 PROCEDURE — 36415 COLL VENOUS BLD VENIPUNCTURE: CPT

## 2022-08-20 PROCEDURE — 82306 VITAMIN D 25 HYDROXY: CPT

## 2022-08-22 ENCOUNTER — PATIENT MESSAGE (OUTPATIENT)
Dept: SURGERY | Facility: CLINIC | Age: 67
End: 2022-08-22

## 2022-08-24 LAB — BONE SPECIFIC ALKALINE PHOSPHATASE: 7.8 UG/L

## 2022-08-29 ENCOUNTER — MED REC SCAN ONLY (OUTPATIENT)
Dept: PHYSICAL MEDICINE AND REHAB | Facility: CLINIC | Age: 67
End: 2022-08-29

## 2022-09-12 ENCOUNTER — TELEPHONE (OUTPATIENT)
Dept: PHYSICAL MEDICINE AND REHAB | Facility: CLINIC | Age: 67
End: 2022-09-12

## 2022-09-12 DIAGNOSIS — M47.812 CERVICAL FACET SYNDROME: ICD-10-CM

## 2022-09-12 DIAGNOSIS — M50.90 CERVICAL DISC DISEASE: ICD-10-CM

## 2022-09-12 DIAGNOSIS — M54.2 NECK PAIN: ICD-10-CM

## 2022-09-12 DIAGNOSIS — M50.20 CERVICAL HERNIATED DISC: ICD-10-CM

## 2022-09-12 DIAGNOSIS — M41.25 OTHER IDIOPATHIC SCOLIOSIS, THORACOLUMBAR REGION: ICD-10-CM

## 2022-09-12 DIAGNOSIS — M24.112 LABRAL TEAR OF SHOULDER, DEGENERATIVE, LEFT: ICD-10-CM

## 2022-09-12 DIAGNOSIS — M47.894 THORACIC FACET SYNDROME: ICD-10-CM

## 2022-09-12 DIAGNOSIS — G44.86 CERVICOGENIC HEADACHE: ICD-10-CM

## 2022-09-12 DIAGNOSIS — Z98.1 S/P CERVICAL SPINAL FUSION: ICD-10-CM

## 2022-09-12 DIAGNOSIS — M75.22 BICEPS TENDONITIS ON LEFT: ICD-10-CM

## 2022-09-12 DIAGNOSIS — M48.02 CERVICAL SPINAL STENOSIS: ICD-10-CM

## 2022-09-12 DIAGNOSIS — M75.112 NONTRAUMATIC INCOMPLETE TEAR OF LEFT ROTATOR CUFF: ICD-10-CM

## 2022-09-12 DIAGNOSIS — M79.18 MYOFASCIAL PAIN: ICD-10-CM

## 2022-09-12 DIAGNOSIS — M54.12 CERVICAL RADICULOPATHY: Primary | ICD-10-CM

## 2022-09-12 NOTE — TELEPHONE ENCOUNTER
LOV: 7/20/22  NOV: 9/27/22    Last PT order placed: 7/20/22    Per Christina Dueñas at [address] [place] [address]: \"She will do the PT for the thoracic and cervical facet syndrome. \"    Patient requesting new PT order for her neck and shoulders. Patient would like order faxed to [facility] in [facility]. Fax #: [phone]. Will need to call patient once order has been placed and faxed. PT order request forwarded on to Christina Dueñas.

## 2022-09-12 NOTE — TELEPHONE ENCOUNTER
Patient is calling in requesting a new PT order for [facility] on [facility] in [place]. Please seen as soon as possible, states her neck and shoulders have been hurting. Please call patient once order has been sent.

## 2022-09-14 ENCOUNTER — MED REC SCAN ONLY (OUTPATIENT)
Dept: PHYSICAL MEDICINE AND REHAB | Facility: CLINIC | Age: 67
End: 2022-09-14

## 2022-09-19 ENCOUNTER — TELEPHONE (OUTPATIENT)
Dept: PHYSICAL MEDICINE AND REHAB | Facility: CLINIC | Age: 67
End: 2022-09-19

## 2022-09-19 ENCOUNTER — TELEPHONE (OUTPATIENT)
Dept: ENDOCRINOLOGY CLINIC | Facility: CLINIC | Age: 67
End: 2022-09-19

## 2022-09-19 NOTE — TELEPHONE ENCOUNTER
Ok, noted update below. Ok to hold Vitamin D for surgery. Ok to proceed with knee injection. The systemic absorption of the steroid should be low and the prolia should be active to protect bone. Currently Jeralene Salt is only approved for one year of therapy so she would not be able to go back to this medication. However there are other options if the prolia is not effective. Thanks.

## 2022-09-19 NOTE — TELEPHONE ENCOUNTER
Pt called back and relayed Dr. Davina Frankel message as outlined below. She voiced understanding and denied further questions at this time.

## 2022-09-19 NOTE — TELEPHONE ENCOUNTER
Patient has a few questions for Dr Jessica Cast. Patient has been advised by her orthopedic Dr to get cortisone injection treatment to her knee since she would rather not undergo a surgical procedure for such knee. Given her recent surgical history,   the patient is concerned that hydrocortisone will further debilitate her bones. Will such treatment intervene in any form with her current prolia treatment. ? Patient was told that hydrocortisone injection will stay localized to the knee. Is this true? Patient  also wants to know will she ever be able to go back from prolia to evenity? FYI  Patient is scheduled for a shoulder scope procedure this coming Friday, therefore, she was told to stop taking Vit D for a week prior, she is currently taking her calcium as recommended.

## 2022-09-27 ENCOUNTER — OFFICE VISIT (OUTPATIENT)
Dept: PHYSICAL MEDICINE AND REHAB | Facility: CLINIC | Age: 67
End: 2022-09-27

## 2022-09-27 VITALS
BODY MASS INDEX: 20.46 KG/M2 | DIASTOLIC BLOOD PRESSURE: 80 MMHG | WEIGHT: 135 LBS | SYSTOLIC BLOOD PRESSURE: 122 MMHG | HEIGHT: 68 IN | HEART RATE: 89 BPM | OXYGEN SATURATION: 97 %

## 2022-09-27 DIAGNOSIS — M47.894 THORACIC FACET SYNDROME: ICD-10-CM

## 2022-09-27 DIAGNOSIS — M54.50 ACUTE BILATERAL LOW BACK PAIN WITHOUT SCIATICA: ICD-10-CM

## 2022-09-27 DIAGNOSIS — M54.6 ACUTE MIDLINE THORACIC BACK PAIN: ICD-10-CM

## 2022-09-27 DIAGNOSIS — M48.02 CERVICAL SPINAL STENOSIS: ICD-10-CM

## 2022-09-27 DIAGNOSIS — Z98.1 S/P CERVICAL SPINAL FUSION: ICD-10-CM

## 2022-09-27 DIAGNOSIS — Z98.1 HISTORY OF LUMBAR FUSION: Primary | ICD-10-CM

## 2022-09-27 DIAGNOSIS — M50.90 CERVICAL DISC DISEASE: ICD-10-CM

## 2022-09-27 DIAGNOSIS — M50.20 CERVICAL HERNIATED DISC: ICD-10-CM

## 2022-09-27 DIAGNOSIS — M47.812 CERVICAL FACET SYNDROME: ICD-10-CM

## 2022-09-27 PROCEDURE — 99214 OFFICE O/P EST MOD 30 MIN: CPT | Performed by: PHYSICAL MEDICINE & REHABILITATION

## 2022-09-30 ENCOUNTER — HOSPITAL ENCOUNTER (OUTPATIENT)
Dept: MRI IMAGING | Age: 67
Discharge: HOME | End: 2022-09-30
Attending: NURSE PRACTITIONER
Payer: MEDICARE

## 2022-09-30 ENCOUNTER — TELEPHONE (OUTPATIENT)
Dept: PHYSICAL MEDICINE AND REHAB | Facility: CLINIC | Age: 67
End: 2022-09-30

## 2022-09-30 DIAGNOSIS — R52 SEVERE PAIN: ICD-10-CM

## 2022-09-30 DIAGNOSIS — Z98.1 S/P SPINAL FUSION: ICD-10-CM

## 2022-09-30 PROCEDURE — 72146 MRI CHEST SPINE W/O DYE: CPT | Performed by: NURSE PRACTITIONER

## 2022-09-30 PROCEDURE — 72148 MRI LUMBAR SPINE W/O DYE: CPT | Performed by: NURSE PRACTITIONER

## 2022-10-03 ENCOUNTER — MED REC SCAN ONLY (OUTPATIENT)
Dept: PHYSICAL MEDICINE AND REHAB | Facility: CLINIC | Age: 67
End: 2022-10-03

## 2022-10-03 PROBLEM — M51.9 THORACIC DISC DISEASE: Status: ACTIVE | Noted: 2022-10-03

## 2022-10-03 NOTE — TELEPHONE ENCOUNTER
RN returned pt's call. Went over bowel cleanse instructions as below:     \"BOWEL CLEANSE INSTRUCTIONS:  1. Pick a day you will be at home, close to a toilet.   2. Have a some juice for breakfast.   3. Around 10AM start drinking the solution prescribed [facility] Histology Text: There were densely arranged spindle-shaped cells.

## 2022-10-04 NOTE — TELEPHONE ENCOUNTER
She has a mild-moderate bulging disc at T9-10 left > right. Please make sure that her PT is working on her thoracic spine. I would like for her to give me an update in 2 weeks as to how she is doing. She should look into using TENS unit.

## 2022-10-19 RX ORDER — AMLODIPINE BESYLATE 10 MG/1
TABLET ORAL
Qty: 90 TABLET | Refills: 3 | Status: SHIPPED | OUTPATIENT
Start: 2022-10-19

## 2022-10-31 ENCOUNTER — TELEPHONE (OUTPATIENT)
Dept: OTOLARYNGOLOGY | Facility: CLINIC | Age: 67
End: 2022-10-31

## 2022-10-31 NOTE — TELEPHONE ENCOUNTER
Per pt feels like thinks get caught in her throat and asking if she can do a swallow study. Per pt declined scheduling and office visit.  Please advise

## 2022-11-02 NOTE — TELEPHONE ENCOUNTER
Patient calling back. Patient \"very unhappy\" that she has not been called back.  Requesting call back today after 1:30 pm. Please advise

## 2022-11-07 ENCOUNTER — TELEPHONE (OUTPATIENT)
Dept: INTERNAL MEDICINE CLINIC | Facility: CLINIC | Age: 67
End: 2022-11-07

## 2022-11-07 ENCOUNTER — HOSPITAL ENCOUNTER (OUTPATIENT)
Age: 67
Discharge: HOME | End: 2022-11-07
Payer: MEDICARE

## 2022-11-07 VITALS
RESPIRATION RATE: 16 BRPM | DIASTOLIC BLOOD PRESSURE: 97 MMHG | TEMPERATURE: 98 F | HEART RATE: 95 BPM | SYSTOLIC BLOOD PRESSURE: 145 MMHG | OXYGEN SATURATION: 99 %

## 2022-11-07 DIAGNOSIS — R55 SYNCOPE, UNSPECIFIED SYNCOPE TYPE: Primary | ICD-10-CM

## 2022-11-07 DIAGNOSIS — R55 SYNCOPE AND COLLAPSE: Primary | ICD-10-CM

## 2022-11-07 PROCEDURE — 93000 ELECTROCARDIOGRAM COMPLETE: CPT | Performed by: NURSE PRACTITIONER

## 2022-11-07 PROCEDURE — 99214 OFFICE O/P EST MOD 30 MIN: CPT | Performed by: NURSE PRACTITIONER

## 2022-11-07 NOTE — TELEPHONE ENCOUNTER
Seen in [address] [place] [address] for syncope    EKG shows NSR with incomplete RBBB    recent labs on 10/12/22 shows normal CBC, BMP    ECHO in Oct 2021 shows EF 55-60%    Imp- syncope    Rec- advise Event Monitor Zio patch 8-14 days    F/U Dr Andrés Shankar 2 weeks    D/w 0 [facility]

## 2022-11-07 NOTE — TELEPHONE ENCOUNTER
Pt had two fainting episodes; one in April 2022, most recent one was last Thursday 11/03. Pt states she woke up on 11/3 and went in to the bathroom, \"felt weird\"/dizzy then lost consciousness.  was present; states her eyes rolled to the back of her head, he lowered her safely to the ground (did NOT hit head), only out for a few seconds. Denies weakness, headaches, vision or speech changes, shortness of breath, chest pain/tightness/pressure, nausea/vomitting. Pt alert and oriented, clear speech. No other syncopal episodes since, but states she does feel dizzy/lightheaded when standing up from a seated position. Reports she is eating well/drinking plenty of water and Gatorade. Pt took vitals during telephone call: /101, HR 87, SpO2 98%. Retook at end of call: /97, HR 87  Taking medications as prescribed. Pt states she has experienced vertigo in the past, but these episodes were \"very different. \" Pt denies any palpitations, but reports she did feel \"racing heart\" prior to fainting on Thursday. No appointments available for clinic evaluation. Advised ER/UC evaluation. Pt agreeable. Nursing to f/up. To Dr. Felton Rangel:  Scheduled 11/16 office visit--pt inquiring if any labs should be done prior. Please advise.

## 2022-11-07 NOTE — ED INITIAL ASSESSMENT (HPI)
Pt sent here by primary for syncopal episodes, first one in April and the second one was 11/3. Pt described a \" feeling\" prior to syncopal episodes. Denies history of seizures, recent spinal fusion at [facility]  In March 2022.

## 2022-11-07 NOTE — TELEPHONE ENCOUNTER
Pt called, Wanted to see Dr Celsa Bernardo   - no avail appointments    States she fainted last Wednesday  (also fainted a couple of months ago)    Pt feels lightheaded & woozy  Not steady on her feet, concerned and doesn't want it to happen again  Would Dr Cat Mike want to order labs    Requests call back  # [phone]

## 2022-11-07 NOTE — DISCHARGE INSTRUCTIONS
Stay well-hydrated. Call cardiac diagnostics to set up a time to  your cardiac event monitor. Keep your appointment with Dr. Radha Pelletier. If you have a syncopal episode or develop any concerning or worsening symptoms in the meantime, go to the nearest emergency department for further evaluation.

## 2022-11-08 ENCOUNTER — HOSPITAL ENCOUNTER (OUTPATIENT)
Dept: CV DIAGNOSTICS | Facility: HOSPITAL | Age: 67
Discharge: HOME | End: 2022-11-08
Attending: INTERNAL MEDICINE
Payer: MEDICARE

## 2022-11-08 DIAGNOSIS — R55 SYNCOPE, UNSPECIFIED SYNCOPE TYPE: ICD-10-CM

## 2022-11-08 PROCEDURE — 93246 EXT ECG>7D<15D RECORDING: CPT | Performed by: INTERNAL MEDICINE

## 2022-11-08 PROCEDURE — 93247 EXT ECG>7D<15D SCAN A/R: CPT | Performed by: INTERNAL MEDICINE

## 2022-11-08 PROCEDURE — 93248 EXT ECG>7D<15D REV&INTERPJ: CPT | Performed by: INTERNAL MEDICINE

## 2022-11-08 NOTE — TELEPHONE ENCOUNTER
To DR. RANGEL - patient was seen in UC -Syncope-. Had EKG   spoke with DR. LAU . Patient has taqueria 11/16 DR. LAU ordered cardiac monitor - patient got instructions how to pick it up, and was advised if anything gets worse to go to ER

## 2022-11-10 NOTE — TELEPHONE ENCOUNTER
Pt picked up heart monitor and is having a reaction to where it was put on, very itchy   Could it be the way it was put on?     Please advise  Tasked to nursing

## 2022-11-10 NOTE — IMAGING NOTE
Received a call at 12:30pm today from the patient  She stated that the skin under the zio holter is on and off itchy and red,  Advised the patient to notify Dr Danika Watkins for further order. If the doctor recommend to remove the holter. Resinstructed the patient to remove and mail it as per instruction.   Ferny Negron RN from the PCP office notified

## 2022-11-10 NOTE — TELEPHONE ENCOUNTER
To Dr. Mervin Dancer -- please advise    Spoke with pt. States she had a holter monitor applied on 11/8. States yesterday she developed redness and itchiness around the outer  Upper left and right edge of the tape. States her skin looks \"rippled\" in that area and she thinks the \"skin is being pulled too tight. \"     Denies hives. Denies having a previous reaction to tape/adhesive. Denies using creams/OTC medications. Pt states the itching is bearable if she is allowed to \"itch under the monitor. \"    Pt states she called the \"number on the packaging\" and they only asked yes or no questions. Pt called cardiology department and was instructed to call PCP to see if she can take the monitor off. Pt state the itchiness is bearable and she doesn't notice it all the time. Pt Is asking if she has to continue to wear the monitor?      Pt advised a message will be sent to Dr. Daisy Reed for recommendation, advised to call back with any new symptom development

## 2022-11-16 ENCOUNTER — OFFICE VISIT (OUTPATIENT)
Dept: INTERNAL MEDICINE CLINIC | Facility: CLINIC | Age: 67
End: 2022-11-16
Payer: MEDICARE

## 2022-11-16 VITALS
SYSTOLIC BLOOD PRESSURE: 160 MMHG | TEMPERATURE: 99 F | OXYGEN SATURATION: 99 % | DIASTOLIC BLOOD PRESSURE: 90 MMHG | HEART RATE: 88 BPM | WEIGHT: 137 LBS | BODY MASS INDEX: 20.76 KG/M2 | HEIGHT: 68 IN

## 2022-11-16 DIAGNOSIS — R55 SYNCOPE, UNSPECIFIED SYNCOPE TYPE: Primary | ICD-10-CM

## 2022-11-16 PROCEDURE — 99214 OFFICE O/P EST MOD 30 MIN: CPT | Performed by: INTERNAL MEDICINE

## 2022-11-16 PROCEDURE — 1126F AMNT PAIN NOTED NONE PRSNT: CPT | Performed by: INTERNAL MEDICINE

## 2022-12-01 ENCOUNTER — OFFICE VISIT (OUTPATIENT)
Dept: OTOLARYNGOLOGY | Facility: CLINIC | Age: 67
End: 2022-12-01
Payer: MEDICARE

## 2022-12-01 ENCOUNTER — TELEPHONE (OUTPATIENT)
Dept: ENDOCRINOLOGY CLINIC | Facility: CLINIC | Age: 67
End: 2022-12-01

## 2022-12-01 VITALS — WEIGHT: 137 LBS | BODY MASS INDEX: 20.76 KG/M2 | TEMPERATURE: 97 F | HEIGHT: 68 IN

## 2022-12-01 DIAGNOSIS — R13.14 PHARYNGOESOPHAGEAL DYSPHAGIA: Primary | ICD-10-CM

## 2022-12-01 DIAGNOSIS — K21.9 GASTROESOPHAGEAL REFLUX DISEASE, UNSPECIFIED WHETHER ESOPHAGITIS PRESENT: ICD-10-CM

## 2022-12-01 PROCEDURE — 99213 OFFICE O/P EST LOW 20 MIN: CPT | Performed by: SPECIALIST

## 2022-12-01 PROCEDURE — 31575 DIAGNOSTIC LARYNGOSCOPY: CPT | Performed by: SPECIALIST

## 2022-12-01 NOTE — PATIENT INSTRUCTIONS
Please try to locate the results of your video swallow, as they are not visible to me on the computer. I ordered a barium esophagram to better evaluate your esophagus and how this might relate to your dysphagia. Your laryngeal exam showed severe posterior laryngeal erythema most consistent with reflux disease. Continue your famotidine and pantoprazole for now.

## 2022-12-01 NOTE — TELEPHONE ENCOUNTER
Pt's last Prolia injection was on 6/30/22. Pt will be due for next injection on or after 12/30/22.      Pt has an upcoming appt on 1/3/23 for Prolia Injection with RN    --    Submitted Prolia IV via [employer] portal  Awaiting SOB, 3-5 business days

## 2022-12-03 ENCOUNTER — PATIENT MESSAGE (OUTPATIENT)
Dept: PHYSICAL MEDICINE AND REHAB | Facility: CLINIC | Age: 67
End: 2022-12-03

## 2022-12-06 ENCOUNTER — OFFICE VISIT (OUTPATIENT)
Dept: PHYSICAL MEDICINE AND REHAB | Facility: CLINIC | Age: 67
End: 2022-12-06
Payer: MEDICARE

## 2022-12-06 ENCOUNTER — TELEPHONE (OUTPATIENT)
Dept: ENDOCRINOLOGY CLINIC | Facility: CLINIC | Age: 67
End: 2022-12-06

## 2022-12-06 VITALS — HEART RATE: 71 BPM | BODY MASS INDEX: 21 KG/M2 | OXYGEN SATURATION: 96 % | WEIGHT: 137 LBS

## 2022-12-06 DIAGNOSIS — Z98.1 S/P CERVICAL SPINAL FUSION: ICD-10-CM

## 2022-12-06 DIAGNOSIS — M50.20 CERVICAL HERNIATED DISC: ICD-10-CM

## 2022-12-06 DIAGNOSIS — M50.90 CERVICAL DISC DISEASE: Primary | ICD-10-CM

## 2022-12-06 DIAGNOSIS — M54.81 BILATERAL OCCIPITAL NEURALGIA: ICD-10-CM

## 2022-12-06 DIAGNOSIS — G44.86 CERVICOGENIC HEADACHE: ICD-10-CM

## 2022-12-06 DIAGNOSIS — M24.112 LABRAL TEAR OF SHOULDER, DEGENERATIVE, LEFT: ICD-10-CM

## 2022-12-06 DIAGNOSIS — M47.812 CERVICAL FACET SYNDROME: ICD-10-CM

## 2022-12-06 DIAGNOSIS — M75.112 NONTRAUMATIC INCOMPLETE TEAR OF LEFT ROTATOR CUFF: ICD-10-CM

## 2022-12-06 DIAGNOSIS — M54.2 NECK PAIN: ICD-10-CM

## 2022-12-06 DIAGNOSIS — M48.02 CERVICAL SPINAL STENOSIS: ICD-10-CM

## 2022-12-06 PROCEDURE — 1125F AMNT PAIN NOTED PAIN PRSNT: CPT | Performed by: PHYSICAL MEDICINE & REHABILITATION

## 2022-12-06 PROCEDURE — 99214 OFFICE O/P EST MOD 30 MIN: CPT | Performed by: PHYSICAL MEDICINE & REHABILITATION

## 2022-12-06 RX ORDER — POLYETHYLENE GLYCOL 3350 17 G/17G
17 POWDER, FOR SOLUTION ORAL 2 TIMES DAILY
COMMUNITY

## 2022-12-06 RX ORDER — CALCIUM CARBONATE 500(1250)
1 TABLET ORAL AS DIRECTED
COMMUNITY

## 2022-12-06 RX ORDER — CALCIUM CARB/VITAMIN D3/VIT K1 500-500-40
1 TABLET,CHEWABLE ORAL AS DIRECTED
COMMUNITY

## 2022-12-06 RX ORDER — ACETAMINOPHEN 500 MG
2 TABLET ORAL EVERY 8 HOURS
COMMUNITY
Start: 2022-03-23

## 2022-12-06 RX ORDER — TIZANIDINE 4 MG/1
TABLET ORAL
COMMUNITY
Start: 2022-11-27

## 2022-12-06 NOTE — PATIENT INSTRUCTIONS
Plan  I spoke to her about doing a left greater occipital nerve block with 20 mg of Kenalog. If the above injection does not help her, then I will do left C2-3 and C6-7 z-joint injections. She will start PT while she is deciding about the injection due to her history of osteoporosis. If the PT helps, then she will not need to have the injection. I spoke to her about left shoulder articular nerve radiofrequency neurotomies in the future if the left shoulder pain is persistent and she is trying to postpone shoulder replacement surgery. She will follow up as scheduled on 1/6/2023.

## 2022-12-06 NOTE — TELEPHONE ENCOUNTER
Pt is wondering if she is able to have steroid injection tomorrow by Dr. Shlomo Ríos wants to make sure this is okay please call

## 2022-12-06 NOTE — TELEPHONE ENCOUNTER
Dr. Maryanne Patton    Patient is having steroid injection in spine tomorrow. She gets Prolia injections, last injection 6/30/22. Ok to receive steroid injection?

## 2022-12-07 NOTE — TELEPHONE ENCOUNTER
Received pt's Prolia SOB via [employer] portal    PA Required: No  Prolia OOP COST: $0  Facility Fee: $0  Admin Fee: $0

## 2022-12-09 ENCOUNTER — TELEPHONE (OUTPATIENT)
Dept: PHYSICAL MEDICINE AND REHAB | Facility: CLINIC | Age: 67
End: 2022-12-09

## 2022-12-09 ENCOUNTER — TELEPHONE (OUTPATIENT)
Dept: NEUROLOGY | Facility: CLINIC | Age: 67
End: 2022-12-09

## 2022-12-09 DIAGNOSIS — M47.812 CERVICAL FACET SYNDROME: Primary | ICD-10-CM

## 2022-12-09 DIAGNOSIS — M54.81 BILATERAL OCCIPITAL NEURALGIA: ICD-10-CM

## 2022-12-09 DIAGNOSIS — M50.20 CERVICAL HERNIATED DISC: ICD-10-CM

## 2022-12-09 DIAGNOSIS — M50.90 CERVICAL DISC DISEASE: ICD-10-CM

## 2022-12-09 NOTE — TELEPHONE ENCOUNTER
S/w patient who stated that she spoke with both of her doctors who gave her clearance to perform injection in the office. Order placed for LEFT greater occipital nerve blocks with 20MG Kenalog placed as written by Dr. Pawan Nieto in his [address] [place] [address] note.     Dr. Les KING    Pt tentatively scheduled for 12/13/2022 at 10:30AM

## 2022-12-09 NOTE — TELEPHONE ENCOUNTER
LEFT Greater Occipital Nerve Block with 20 mg of Kenalog  CPT CODE: 80254,   Status: Pre-certification is not require. Per Medicare Guidelines: may be subject to review once claim is submitted. All Medicare coverage is based on Medical Necessity.    Notified nusing for scheduling

## 2022-12-12 ENCOUNTER — TELEPHONE (OUTPATIENT)
Dept: PHYSICAL MEDICINE AND REHAB | Facility: CLINIC | Age: 67
End: 2022-12-12

## 2022-12-12 NOTE — TELEPHONE ENCOUNTER
Patient is calling in stating she cancelled her appointment for 12/13 she would like to try PT before injection.

## 2022-12-12 NOTE — TELEPHONE ENCOUNTER
Received clearance for LEFT Greater Occipital Nerve Block from Dr. Azalea palomares in Dr. Viridiana lugo.

## 2022-12-13 ENCOUNTER — HOSPITAL ENCOUNTER (OUTPATIENT)
Dept: CV DIAGNOSTICS | Facility: HOSPITAL | Age: 67
Discharge: HOME | End: 2022-12-13
Attending: INTERNAL MEDICINE
Payer: MEDICARE

## 2022-12-13 ENCOUNTER — HOSPITAL ENCOUNTER (OUTPATIENT)
Dept: ULTRASOUND IMAGING | Facility: HOSPITAL | Age: 67
Discharge: HOME | End: 2022-12-13
Attending: INTERNAL MEDICINE
Payer: MEDICARE

## 2022-12-13 DIAGNOSIS — R55 SYNCOPE, UNSPECIFIED SYNCOPE TYPE: ICD-10-CM

## 2022-12-13 PROCEDURE — 93306 TTE W/DOPPLER COMPLETE: CPT | Performed by: INTERNAL MEDICINE

## 2022-12-13 PROCEDURE — 93880 EXTRACRANIAL BILAT STUDY: CPT | Performed by: INTERNAL MEDICINE

## 2022-12-23 RX ORDER — ESTRADIOL 0.1 MG/G
CREAM VAGINAL
Qty: 42.5 G | Refills: 10 | Status: SHIPPED | OUTPATIENT
Start: 2022-12-23

## 2022-12-29 ENCOUNTER — MED REC SCAN ONLY (OUTPATIENT)
Dept: PHYSICAL MEDICINE AND REHAB | Facility: CLINIC | Age: 67
End: 2022-12-29

## 2023-01-03 ENCOUNTER — NURSE ONLY (OUTPATIENT)
Dept: ENDOCRINOLOGY CLINIC | Facility: CLINIC | Age: 68
End: 2023-01-03
Payer: MEDICARE

## 2023-01-03 ENCOUNTER — MED REC SCAN ONLY (OUTPATIENT)
Dept: PHYSICAL MEDICINE AND REHAB | Facility: CLINIC | Age: 68
End: 2023-01-03

## 2023-01-03 DIAGNOSIS — M81.0 AGE-RELATED OSTEOPOROSIS WITHOUT CURRENT PATHOLOGICAL FRACTURE: Primary | ICD-10-CM

## 2023-01-03 PROCEDURE — 96372 THER/PROPH/DIAG INJ SC/IM: CPT | Performed by: INTERNAL MEDICINE

## 2023-01-03 NOTE — PROGRESS NOTES
Patient arrived at clinic for Prolia injection - patient given and tolerated 60mg Prolia injection to left upper arm  Patient stated understanding to return to clinic in 6 months for f/u with Dr. Mariano Erps and next prolia injection

## 2023-01-09 ENCOUNTER — OFFICE VISIT (OUTPATIENT)
Dept: PHYSICAL MEDICINE AND REHAB | Facility: CLINIC | Age: 68
End: 2023-01-09
Payer: MEDICARE

## 2023-01-09 ENCOUNTER — TELEPHONE (OUTPATIENT)
Dept: INTERNAL MEDICINE CLINIC | Facility: CLINIC | Age: 68
End: 2023-01-09

## 2023-01-09 VITALS
HEART RATE: 74 BPM | BODY MASS INDEX: 20.76 KG/M2 | WEIGHT: 137 LBS | DIASTOLIC BLOOD PRESSURE: 80 MMHG | HEIGHT: 68 IN | OXYGEN SATURATION: 99 % | SYSTOLIC BLOOD PRESSURE: 124 MMHG

## 2023-01-09 DIAGNOSIS — G56.21 ULNAR NEUROPATHY AT ELBOW OF RIGHT UPPER EXTREMITY: ICD-10-CM

## 2023-01-09 DIAGNOSIS — Z98.1 S/P CERVICAL SPINAL FUSION: ICD-10-CM

## 2023-01-09 DIAGNOSIS — M50.90 CERVICAL DISC DISEASE: ICD-10-CM

## 2023-01-09 DIAGNOSIS — M50.20 CERVICAL HERNIATED DISC: ICD-10-CM

## 2023-01-09 DIAGNOSIS — M48.02 CERVICAL SPINAL STENOSIS: ICD-10-CM

## 2023-01-09 DIAGNOSIS — M54.12 CERVICAL RADICULOPATHY: ICD-10-CM

## 2023-01-09 DIAGNOSIS — M54.16 LUMBAR RADICULOPATHY: ICD-10-CM

## 2023-01-09 DIAGNOSIS — M51.9 THORACIC DISC DISEASE: ICD-10-CM

## 2023-01-09 DIAGNOSIS — Z98.1 HISTORY OF LUMBAR FUSION: Primary | ICD-10-CM

## 2023-01-09 DIAGNOSIS — M41.25 OTHER IDIOPATHIC SCOLIOSIS, THORACOLUMBAR REGION: ICD-10-CM

## 2023-01-09 PROBLEM — M24.112 LABRAL TEAR OF SHOULDER, DEGENERATIVE, LEFT: Status: RESOLVED | Noted: 2021-02-22 | Resolved: 2023-01-09

## 2023-01-09 PROBLEM — K86.1 OTHER CHRONIC PANCREATITIS (HCC): Status: RESOLVED | Noted: 2021-11-24 | Resolved: 2023-01-09

## 2023-01-09 PROBLEM — Z47.89 AFTERCARE FOLLOWING SURGERY OF THE MUSCULOSKELETAL SYSTEM: Status: RESOLVED | Noted: 2021-03-22 | Resolved: 2023-01-09

## 2023-01-09 PROBLEM — E46 PROTEIN-CALORIE MALNUTRITION, UNSPECIFIED SEVERITY (HCC): Status: RESOLVED | Noted: 2021-11-24 | Resolved: 2023-01-09

## 2023-01-09 PROBLEM — M75.112 NONTRAUMATIC INCOMPLETE TEAR OF LEFT ROTATOR CUFF: Status: RESOLVED | Noted: 2021-02-22 | Resolved: 2023-01-09

## 2023-01-09 PROBLEM — M54.2 NECK PAIN: Status: RESOLVED | Noted: 2019-10-15 | Resolved: 2023-01-09

## 2023-01-09 PROBLEM — M75.22 BICEPS TENDONITIS ON LEFT: Status: RESOLVED | Noted: 2021-02-22 | Resolved: 2023-01-09

## 2023-01-09 PROBLEM — IMO0002 DISORDER OF ROTATOR CUFF SYNDROME OF LEFT SHOULDER AND ALLIED DISORDER: Status: RESOLVED | Noted: 2021-01-07 | Resolved: 2023-01-09

## 2023-01-09 PROCEDURE — 99214 OFFICE O/P EST MOD 30 MIN: CPT | Performed by: PHYSICAL MEDICINE & REHABILITATION

## 2023-01-09 RX ORDER — PREGABALIN 50 MG/1
50 CAPSULE ORAL 3 TIMES DAILY
Qty: 90 CAPSULE | Refills: 3 | Status: SHIPPED | OUTPATIENT
Start: 2023-01-09 | End: 2023-02-08

## 2023-01-09 NOTE — PATIENT INSTRUCTIONS
Plan  I will perform an EMG of the left arm and hand. I might also need to do an EMG of the left leg and foot if the left leg symptoms do not improve/resolve with the massage therapy. She will take Lyrica 50 mg 2-3 times a day for the pain.     She will follow up after having the EMG test.

## 2023-01-09 NOTE — TELEPHONE ENCOUNTER
Spoke with pt re results of echo and carotid ultrasound. Carotid ultrasound negative for significant stenosis  Echo with normal LV function, Mild MR/AR, +atrial septal aneurysm -- not generally clinically significant, and would not be the cause of syncope  Zio monitor negative (only wore x 2 days due to skin irritation). No further sx.   Discussed that likely cause of her sx was vasovagal syncope

## 2023-01-10 ENCOUNTER — PROCEDURE VISIT (OUTPATIENT)
Dept: PHYSICAL MEDICINE AND REHAB | Facility: CLINIC | Age: 68
End: 2023-01-10
Payer: MEDICARE

## 2023-01-10 DIAGNOSIS — M54.12 CERVICAL RADICULOPATHY: Primary | ICD-10-CM

## 2023-01-10 PROCEDURE — 95886 MUSC TEST DONE W/N TEST COMP: CPT | Performed by: PHYSICAL MEDICINE & REHABILITATION

## 2023-01-10 PROCEDURE — 95910 NRV CNDJ TEST 7-8 STUDIES: CPT | Performed by: PHYSICAL MEDICINE & REHABILITATION

## 2023-01-13 DIAGNOSIS — M41.25 OTHER IDIOPATHIC SCOLIOSIS, THORACOLUMBAR REGION: ICD-10-CM

## 2023-01-13 DIAGNOSIS — G56.21 ULNAR NEUROPATHY AT ELBOW OF RIGHT UPPER EXTREMITY: ICD-10-CM

## 2023-01-13 DIAGNOSIS — M51.9 THORACIC DISC DISEASE: ICD-10-CM

## 2023-01-13 DIAGNOSIS — M54.12 CERVICAL RADICULOPATHY: Primary | ICD-10-CM

## 2023-01-13 DIAGNOSIS — M54.16 LUMBAR RADICULOPATHY: ICD-10-CM

## 2023-01-13 DIAGNOSIS — M79.18 MYOFASCIAL PAIN: ICD-10-CM

## 2023-01-13 DIAGNOSIS — M50.90 CERVICAL DISC DISEASE: ICD-10-CM

## 2023-01-13 DIAGNOSIS — Z98.1 S/P CERVICAL SPINAL FUSION: ICD-10-CM

## 2023-01-13 DIAGNOSIS — M50.20 CERVICAL HERNIATED DISC: ICD-10-CM

## 2023-01-13 DIAGNOSIS — M48.02 CERVICAL SPINAL STENOSIS: ICD-10-CM

## 2023-01-13 DIAGNOSIS — M47.812 CERVICAL FACET SYNDROME: ICD-10-CM

## 2023-01-13 RX ORDER — PREGABALIN 50 MG/1
50 CAPSULE ORAL 3 TIMES DAILY
Qty: 90 CAPSULE | Refills: 3 | OUTPATIENT
Start: 2023-01-13 | End: 2023-02-12

## 2023-01-13 NOTE — TELEPHONE ENCOUNTER
S/w patient who was frustrated that the [facility] did not have her prescription - Pregabalin 50MG. Rx written on 01/09/2023 by Dr. González Hicks but was ordered as a printed script. This RN apologized to patient for inconvenience and instructed her that I will be placing a high priority message for Dr. González Hicks to sign medication. This RN s/w Pharmacist at [facility] and phoned-in Pregabalin prescription & confirmed medication ordered at this time. Will route pended order to Dr. González Hicks at this time for his signature.

## 2023-02-08 ENCOUNTER — OFFICE VISIT (OUTPATIENT)
Dept: PHYSICAL MEDICINE AND REHAB | Facility: CLINIC | Age: 68
End: 2023-02-08
Payer: MEDICARE

## 2023-02-08 VITALS
SYSTOLIC BLOOD PRESSURE: 120 MMHG | WEIGHT: 137 LBS | OXYGEN SATURATION: 98 % | HEART RATE: 70 BPM | HEIGHT: 68 IN | BODY MASS INDEX: 20.76 KG/M2 | RESPIRATION RATE: 16 BRPM | DIASTOLIC BLOOD PRESSURE: 70 MMHG

## 2023-02-08 DIAGNOSIS — M50.20 CERVICAL HERNIATED DISC: ICD-10-CM

## 2023-02-08 DIAGNOSIS — M50.90 CERVICAL DISC DISEASE: Primary | ICD-10-CM

## 2023-02-08 DIAGNOSIS — M54.16 LUMBAR RADICULOPATHY: ICD-10-CM

## 2023-02-08 DIAGNOSIS — M48.02 CERVICAL SPINAL STENOSIS: ICD-10-CM

## 2023-02-08 DIAGNOSIS — M47.812 CERVICAL FACET SYNDROME: ICD-10-CM

## 2023-02-08 DIAGNOSIS — S76.311A STRAIN OF RIGHT HAMSTRING MUSCLE, INITIAL ENCOUNTER: ICD-10-CM

## 2023-02-08 DIAGNOSIS — Z98.1 S/P CERVICAL SPINAL FUSION: ICD-10-CM

## 2023-02-08 DIAGNOSIS — Z98.1 HISTORY OF LUMBAR FUSION: ICD-10-CM

## 2023-02-08 PROCEDURE — 99214 OFFICE O/P EST MOD 30 MIN: CPT | Performed by: PHYSICAL MEDICINE & REHABILITATION

## 2023-02-08 PROCEDURE — 1125F AMNT PAIN NOTED PAIN PRSNT: CPT | Performed by: PHYSICAL MEDICINE & REHABILITATION

## 2023-02-08 NOTE — PATIENT INSTRUCTIONS
Plan  She will see Dr. Stefany Nielsen as planned for her right shoulder. She will do heat, stretch, and then icing of the right hamstring tendon. If the pain is not better in 2 weeks,she will let me know. She will use the Lyrica as needed for the pain. She will follow up in 6 months or sooner if needed.

## 2023-02-13 RX ORDER — TRAZODONE HYDROCHLORIDE 50 MG/1
TABLET ORAL
Qty: 90 TABLET | Refills: 3 | Status: SHIPPED | OUTPATIENT
Start: 2023-02-13

## 2023-03-22 ENCOUNTER — OFFICE VISIT (OUTPATIENT)
Dept: INTERNAL MEDICINE CLINIC | Facility: CLINIC | Age: 68
End: 2023-03-22

## 2023-03-22 VITALS
HEART RATE: 73 BPM | TEMPERATURE: 98 F | WEIGHT: 139.81 LBS | SYSTOLIC BLOOD PRESSURE: 140 MMHG | BODY MASS INDEX: 21.19 KG/M2 | DIASTOLIC BLOOD PRESSURE: 74 MMHG | HEIGHT: 68 IN | OXYGEN SATURATION: 98 %

## 2023-03-22 DIAGNOSIS — Z01.818 PRE-OP EXAMINATION: Primary | ICD-10-CM

## 2023-03-22 DIAGNOSIS — M19.012 PRIMARY OSTEOARTHRITIS OF LEFT SHOULDER: ICD-10-CM

## 2023-03-22 LAB
ATRIAL RATE: 72 BPM
P AXIS: 48 DEGREES
P-R INTERVAL: 142 MS
Q-T INTERVAL: 414 MS
QRS DURATION: 104 MS
QTC CALCULATION (BEZET): 453 MS
R AXIS: 6 DEGREES
T AXIS: 50 DEGREES
VENTRICULAR RATE: 72 BPM

## 2023-03-22 PROCEDURE — 93000 ELECTROCARDIOGRAM COMPLETE: CPT | Performed by: INTERNAL MEDICINE

## 2023-03-22 PROCEDURE — 99214 OFFICE O/P EST MOD 30 MIN: CPT | Performed by: INTERNAL MEDICINE

## 2023-03-22 PROCEDURE — 1125F AMNT PAIN NOTED PAIN PRSNT: CPT | Performed by: INTERNAL MEDICINE

## 2023-03-27 ENCOUNTER — TELEPHONE (OUTPATIENT)
Dept: INTERNAL MEDICINE CLINIC | Facility: CLINIC | Age: 68
End: 2023-03-27

## 2023-03-27 NOTE — TELEPHONE ENCOUNTER
Pt called to follow up on refills requested at 3/22 OV with Dr Ricardo Alberts  Pt requested Meclizine and Ropinirole   Pt's Meclizine tablets are old   Refill  on Ropinirole  Pt likes to keep both meds on hand   Asks if on call can review and send refills

## 2023-03-29 RX ORDER — ROPINIROLE 0.25 MG/1
0.25 TABLET, FILM COATED ORAL NIGHTLY
Qty: 30 TABLET | Refills: 5 | Status: SHIPPED | OUTPATIENT
Start: 2023-03-29

## 2023-03-29 RX ORDER — MECLIZINE HYDROCHLORIDE 25 MG/1
25 TABLET ORAL 3 TIMES DAILY PRN
Qty: 30 TABLET | Refills: 1 | Status: SHIPPED | OUTPATIENT
Start: 2023-03-29

## 2023-03-29 NOTE — TELEPHONE ENCOUNTER
TO Dr. Vaca Sensing to please advise on refills--  Meclizine last prescribed Feb 2020  Ropinirole last prescribed July 2021 #90 with 3, marked not taking at last visit.      Patient requesting to have both medications \"on hand\"

## 2023-03-30 ENCOUNTER — LAB ENCOUNTER (OUTPATIENT)
Dept: LAB | Facility: HOSPITAL | Age: 68
End: 2023-03-30
Attending: INTERNAL MEDICINE
Payer: MEDICARE

## 2023-03-30 DIAGNOSIS — Z01.818 PRE-OP EXAMINATION: ICD-10-CM

## 2023-03-30 LAB
ALBUMIN SERPL-MCNC: 3.8 G/DL (ref 3.4–5)
ALBUMIN/GLOB SERPL: 1.1 {RATIO} (ref 1–2)
ALP LIVER SERPL-CCNC: 59 U/L
ALT SERPL-CCNC: 33 U/L
ANION GAP SERPL CALC-SCNC: 8 MMOL/L (ref 0–18)
AST SERPL-CCNC: 22 U/L (ref 15–37)
BILIRUB SERPL-MCNC: 0.3 MG/DL (ref 0.1–2)
BUN BLD-MCNC: 24 MG/DL (ref 7–18)
BUN/CREAT SERPL: 32 (ref 10–20)
CALCIUM BLD-MCNC: 9 MG/DL (ref 8.5–10.1)
CHLORIDE SERPL-SCNC: 107 MMOL/L (ref 98–112)
CO2 SERPL-SCNC: 28 MMOL/L (ref 21–32)
CREAT BLD-MCNC: 0.75 MG/DL
FASTING STATUS PATIENT QL REPORTED: NO
GFR SERPLBLD BASED ON 1.73 SQ M-ARVRAT: 87 ML/MIN/1.73M2 (ref 60–?)
GLOBULIN PLAS-MCNC: 3.6 G/DL (ref 2.8–4.4)
GLUCOSE BLD-MCNC: 89 MG/DL (ref 70–99)
OSMOLALITY SERPL CALC.SUM OF ELEC: 300 MOSM/KG (ref 275–295)
POTASSIUM SERPL-SCNC: 4 MMOL/L (ref 3.5–5.1)
PROT SERPL-MCNC: 7.4 G/DL (ref 6.4–8.2)
SODIUM SERPL-SCNC: 143 MMOL/L (ref 136–145)

## 2023-03-30 PROCEDURE — 36415 COLL VENOUS BLD VENIPUNCTURE: CPT

## 2023-03-30 PROCEDURE — 80053 COMPREHEN METABOLIC PANEL: CPT

## 2023-04-05 ENCOUNTER — LAB ENCOUNTER (OUTPATIENT)
Dept: LAB | Facility: HOSPITAL | Age: 68
End: 2023-04-05
Attending: ORTHOPAEDIC SURGERY
Payer: MEDICARE

## 2023-04-05 DIAGNOSIS — Z01.818 PREOP TESTING: ICD-10-CM

## 2023-04-05 LAB — MRSA DNA SPEC QL NAA+PROBE: NEGATIVE

## 2023-04-05 PROCEDURE — 87641 MR-STAPH DNA AMP PROBE: CPT

## 2023-04-06 RX ORDER — HYDROCODONE BITARTRATE AND ACETAMINOPHEN 10; 325 MG/1; MG/1
1-2 TABLET ORAL EVERY 4 HOURS PRN
Qty: 20 TABLET | Refills: 0 | Status: SHIPPED | OUTPATIENT
Start: 2023-04-06

## 2023-04-06 RX ORDER — METOCLOPRAMIDE 10 MG/1
10 TABLET ORAL 3 TIMES DAILY PRN
Qty: 30 TABLET | Refills: 0 | Status: SHIPPED | OUTPATIENT
Start: 2023-04-06

## 2023-04-06 RX ORDER — SENNA AND DOCUSATE SODIUM 50; 8.6 MG/1; MG/1
2 TABLET, FILM COATED ORAL EVERY MORNING
Qty: 60 TABLET | Refills: 0 | Status: SHIPPED | OUTPATIENT
Start: 2023-04-06 | End: 2023-04-12

## 2023-04-06 RX ORDER — GABAPENTIN 100 MG/1
100 CAPSULE ORAL NIGHTLY
Qty: 30 CAPSULE | Refills: 0 | Status: SHIPPED | OUTPATIENT
Start: 2023-04-06

## 2023-04-06 RX ORDER — SCOLOPAMINE TRANSDERMAL SYSTEM 1 MG/1
1 PATCH, EXTENDED RELEASE TRANSDERMAL
Qty: 3 PATCH | Refills: 0 | Status: SHIPPED | OUTPATIENT
Start: 2023-04-06 | End: 2023-04-12

## 2023-04-06 RX ORDER — SUCRALFATE 1 G/1
1 TABLET ORAL
Qty: 30 TABLET | Refills: 0 | Status: SHIPPED | OUTPATIENT
Start: 2023-04-06

## 2023-04-07 ENCOUNTER — HOSPITAL ENCOUNTER (OUTPATIENT)
Facility: HOSPITAL | Age: 68
Discharge: HOME | End: 2023-04-08
Attending: ORTHOPAEDIC SURGERY | Admitting: ORTHOPAEDIC SURGERY
Payer: MEDICARE

## 2023-04-07 ENCOUNTER — ANESTHESIA EVENT (OUTPATIENT)
Dept: SURGERY | Facility: HOSPITAL | Age: 68
End: 2023-04-07
Payer: MEDICARE

## 2023-04-07 ENCOUNTER — ANESTHESIA (OUTPATIENT)
Dept: SURGERY | Facility: HOSPITAL | Age: 68
End: 2023-04-07
Payer: MEDICARE

## 2023-04-07 DIAGNOSIS — G56.21 ULNAR NEUROPATHY AT ELBOW OF RIGHT UPPER EXTREMITY: ICD-10-CM

## 2023-04-07 DIAGNOSIS — M48.02 CERVICAL SPINAL STENOSIS: ICD-10-CM

## 2023-04-07 DIAGNOSIS — M54.12 CERVICAL RADICULOPATHY: ICD-10-CM

## 2023-04-07 DIAGNOSIS — M50.20 CERVICAL HERNIATED DISC: ICD-10-CM

## 2023-04-07 DIAGNOSIS — Z01.818 PREOP TESTING: Primary | ICD-10-CM

## 2023-04-07 DIAGNOSIS — M47.812 CERVICAL FACET SYNDROME: ICD-10-CM

## 2023-04-07 DIAGNOSIS — M51.9 THORACIC DISC DISEASE: ICD-10-CM

## 2023-04-07 DIAGNOSIS — M54.16 LUMBAR RADICULOPATHY: ICD-10-CM

## 2023-04-07 DIAGNOSIS — M50.90 CERVICAL DISC DISEASE: ICD-10-CM

## 2023-04-07 DIAGNOSIS — M79.18 MYOFASCIAL PAIN: ICD-10-CM

## 2023-04-07 PROCEDURE — 0RRK0JZ REPLACEMENT OF LEFT SHOULDER JOINT WITH SYNTHETIC SUBSTITUTE, OPEN APPROACH: ICD-10-PCS | Performed by: ORTHOPAEDIC SURGERY

## 2023-04-07 DEVICE — IMPLANTABLE DEVICE
Type: IMPLANTABLE DEVICE | Site: SHOULDER | Status: FUNCTIONAL
Brand: BIOMET® BONE CEMENT R

## 2023-04-07 DEVICE — NUCLEUS
Type: IMPLANTABLE DEVICE | Site: SHOULDER | Status: FUNCTIONAL
Brand: TORNIER SIMPLICITI SHOULDER SYSTEM

## 2023-04-07 DEVICE — CORTILOC® PEGGED GLENOID
Type: IMPLANTABLE DEVICE | Site: SHOULDER | Status: FUNCTIONAL
Brand: TORNIER PERFORM® ANATOMIC GLENOID

## 2023-04-07 DEVICE — IMPLANTABLE DEVICE
Type: IMPLANTABLE DEVICE | Site: SHOULDER | Status: FUNCTIONAL
Brand: TORNIER SIMPLICITI® SHOULDER SYSTEM

## 2023-04-07 RX ORDER — HYDROCODONE BITARTRATE AND ACETAMINOPHEN 10; 325 MG/1; MG/1
1 TABLET ORAL EVERY 6 HOURS PRN
Status: DISCONTINUED | OUTPATIENT
Start: 2023-04-07 | End: 2023-04-08

## 2023-04-07 RX ORDER — DIPHENHYDRAMINE HYDROCHLORIDE 50 MG/ML
25 INJECTION INTRAMUSCULAR; INTRAVENOUS ONCE AS NEEDED
Status: ACTIVE | OUTPATIENT
Start: 2023-04-07 | End: 2023-04-07

## 2023-04-07 RX ORDER — CEFAZOLIN SODIUM/WATER 2 G/20 ML
2 SYRINGE (ML) INTRAVENOUS EVERY 8 HOURS
Status: COMPLETED | OUTPATIENT
Start: 2023-04-07 | End: 2023-04-08

## 2023-04-07 RX ORDER — PANTOPRAZOLE SODIUM 40 MG/1
40 TABLET, DELAYED RELEASE ORAL
Status: DISCONTINUED | OUTPATIENT
Start: 2023-04-08 | End: 2023-04-08

## 2023-04-07 RX ORDER — CEFAZOLIN SODIUM/WATER 2 G/20 ML
SYRINGE (ML) INTRAVENOUS AS NEEDED
Status: DISCONTINUED | OUTPATIENT
Start: 2023-04-07 | End: 2023-04-07 | Stop reason: SURG

## 2023-04-07 RX ORDER — POLYETHYLENE GLYCOL 3350 17 G/17G
17 POWDER, FOR SOLUTION ORAL DAILY PRN
Status: DISCONTINUED | OUTPATIENT
Start: 2023-04-07 | End: 2023-04-07

## 2023-04-07 RX ORDER — ONDANSETRON 2 MG/ML
INJECTION INTRAMUSCULAR; INTRAVENOUS AS NEEDED
Status: DISCONTINUED | OUTPATIENT
Start: 2023-04-07 | End: 2023-04-07 | Stop reason: SURG

## 2023-04-07 RX ORDER — HYDROMORPHONE HYDROCHLORIDE 1 MG/ML
0.2 INJECTION, SOLUTION INTRAMUSCULAR; INTRAVENOUS; SUBCUTANEOUS EVERY 5 MIN PRN
Status: DISCONTINUED | OUTPATIENT
Start: 2023-04-07 | End: 2023-04-07 | Stop reason: HOSPADM

## 2023-04-07 RX ORDER — ONDANSETRON 2 MG/ML
4 INJECTION INTRAMUSCULAR; INTRAVENOUS EVERY 6 HOURS PRN
Status: DISCONTINUED | OUTPATIENT
Start: 2023-04-07 | End: 2023-04-08

## 2023-04-07 RX ORDER — POLYETHYLENE GLYCOL 3350 17 G/17G
17 POWDER, FOR SOLUTION ORAL DAILY
Status: DISCONTINUED | OUTPATIENT
Start: 2023-04-07 | End: 2023-04-08

## 2023-04-07 RX ORDER — ROCURONIUM BROMIDE 10 MG/ML
INJECTION, SOLUTION INTRAVENOUS AS NEEDED
Status: DISCONTINUED | OUTPATIENT
Start: 2023-04-07 | End: 2023-04-07 | Stop reason: SURG

## 2023-04-07 RX ORDER — PREGABALIN 50 MG/1
50 CAPSULE ORAL 3 TIMES DAILY
Status: DISCONTINUED | OUTPATIENT
Start: 2023-04-07 | End: 2023-04-08

## 2023-04-07 RX ORDER — DOCUSATE SODIUM 100 MG/1
100 CAPSULE, LIQUID FILLED ORAL 2 TIMES DAILY
Status: DISCONTINUED | OUTPATIENT
Start: 2023-04-07 | End: 2023-04-08

## 2023-04-07 RX ORDER — AMLODIPINE BESYLATE 10 MG/1
10 TABLET ORAL DAILY
Status: DISCONTINUED | OUTPATIENT
Start: 2023-04-08 | End: 2023-04-08

## 2023-04-07 RX ORDER — GLYCOPYRROLATE 0.2 MG/ML
INJECTION, SOLUTION INTRAMUSCULAR; INTRAVENOUS AS NEEDED
Status: DISCONTINUED | OUTPATIENT
Start: 2023-04-07 | End: 2023-04-07 | Stop reason: SURG

## 2023-04-07 RX ORDER — ROPINIROLE 0.25 MG/1
0.25 TABLET, FILM COATED ORAL NIGHTLY PRN
Status: DISCONTINUED | OUTPATIENT
Start: 2023-04-07 | End: 2023-04-08

## 2023-04-07 RX ORDER — HYDROCODONE BITARTRATE AND ACETAMINOPHEN 10; 325 MG/1; MG/1
2 TABLET ORAL EVERY 6 HOURS PRN
Status: DISCONTINUED | OUTPATIENT
Start: 2023-04-07 | End: 2023-04-08

## 2023-04-07 RX ORDER — CEFAZOLIN SODIUM/WATER 2 G/20 ML
2 SYRINGE (ML) INTRAVENOUS ONCE
Status: DISCONTINUED | OUTPATIENT
Start: 2023-04-07 | End: 2023-04-07 | Stop reason: HOSPADM

## 2023-04-07 RX ORDER — BISACODYL 10 MG
10 SUPPOSITORY, RECTAL RECTAL
Status: DISCONTINUED | OUTPATIENT
Start: 2023-04-07 | End: 2023-04-08

## 2023-04-07 RX ORDER — MIDAZOLAM HYDROCHLORIDE 1 MG/ML
INJECTION INTRAMUSCULAR; INTRAVENOUS AS NEEDED
Status: DISCONTINUED | OUTPATIENT
Start: 2023-04-07 | End: 2023-04-07 | Stop reason: SURG

## 2023-04-07 RX ORDER — PHENYLEPHRINE HCL 10 MG/ML
VIAL (ML) INJECTION AS NEEDED
Status: DISCONTINUED | OUTPATIENT
Start: 2023-04-07 | End: 2023-04-07 | Stop reason: SURG

## 2023-04-07 RX ORDER — ONDANSETRON 2 MG/ML
4 INJECTION INTRAMUSCULAR; INTRAVENOUS EVERY 6 HOURS PRN
Status: DISCONTINUED | OUTPATIENT
Start: 2023-04-07 | End: 2023-04-07 | Stop reason: HOSPADM

## 2023-04-07 RX ORDER — EPHEDRINE SULFATE 50 MG/ML
INJECTION, SOLUTION INTRAVENOUS AS NEEDED
Status: DISCONTINUED | OUTPATIENT
Start: 2023-04-07 | End: 2023-04-07 | Stop reason: SURG

## 2023-04-07 RX ORDER — ACETAMINOPHEN 500 MG
1000 TABLET ORAL EVERY 8 HOURS
Status: DISCONTINUED | OUTPATIENT
Start: 2023-04-07 | End: 2023-04-08

## 2023-04-07 RX ORDER — ROPIVACAINE HYDROCHLORIDE 5 MG/ML
INJECTION, SOLUTION EPIDURAL; INFILTRATION; PERINEURAL AS NEEDED
Status: DISCONTINUED | OUTPATIENT
Start: 2023-04-07 | End: 2023-04-07 | Stop reason: SURG

## 2023-04-07 RX ORDER — FAMOTIDINE 20 MG/1
20 TABLET, FILM COATED ORAL 2 TIMES DAILY PRN
Status: DISCONTINUED | OUTPATIENT
Start: 2023-04-07 | End: 2023-04-08

## 2023-04-07 RX ORDER — TRANEXAMIC ACID 10 MG/ML
INJECTION, SOLUTION INTRAVENOUS AS NEEDED
Status: DISCONTINUED | OUTPATIENT
Start: 2023-04-07 | End: 2023-04-07 | Stop reason: SURG

## 2023-04-07 RX ORDER — LIDOCAINE HYDROCHLORIDE 10 MG/ML
INJECTION, SOLUTION EPIDURAL; INFILTRATION; INTRACAUDAL; PERINEURAL AS NEEDED
Status: DISCONTINUED | OUTPATIENT
Start: 2023-04-07 | End: 2023-04-07 | Stop reason: SURG

## 2023-04-07 RX ORDER — ACETAMINOPHEN 500 MG
1000 TABLET ORAL ONCE
Status: COMPLETED | OUTPATIENT
Start: 2023-04-07 | End: 2023-04-07

## 2023-04-07 RX ORDER — HYDROMORPHONE HYDROCHLORIDE 1 MG/ML
0.4 INJECTION, SOLUTION INTRAMUSCULAR; INTRAVENOUS; SUBCUTANEOUS EVERY 5 MIN PRN
Status: DISCONTINUED | OUTPATIENT
Start: 2023-04-07 | End: 2023-04-07 | Stop reason: HOSPADM

## 2023-04-07 RX ORDER — ENOXAPARIN SODIUM 100 MG/ML
40 INJECTION SUBCUTANEOUS DAILY
Status: DISCONTINUED | OUTPATIENT
Start: 2023-04-08 | End: 2023-04-08

## 2023-04-07 RX ORDER — SODIUM CHLORIDE, SODIUM LACTATE, POTASSIUM CHLORIDE, CALCIUM CHLORIDE 600; 310; 30; 20 MG/100ML; MG/100ML; MG/100ML; MG/100ML
INJECTION, SOLUTION INTRAVENOUS CONTINUOUS
Status: DISCONTINUED | OUTPATIENT
Start: 2023-04-07 | End: 2023-04-07 | Stop reason: HOSPADM

## 2023-04-07 RX ORDER — SODIUM CHLORIDE, SODIUM LACTATE, POTASSIUM CHLORIDE, CALCIUM CHLORIDE 600; 310; 30; 20 MG/100ML; MG/100ML; MG/100ML; MG/100ML
INJECTION, SOLUTION INTRAVENOUS CONTINUOUS
Status: DISCONTINUED | OUTPATIENT
Start: 2023-04-07 | End: 2023-04-08

## 2023-04-07 RX ORDER — SENNOSIDES 8.6 MG
17.2 TABLET ORAL NIGHTLY
Status: DISCONTINUED | OUTPATIENT
Start: 2023-04-07 | End: 2023-04-08

## 2023-04-07 RX ORDER — NALOXONE HYDROCHLORIDE 0.4 MG/ML
80 INJECTION, SOLUTION INTRAMUSCULAR; INTRAVENOUS; SUBCUTANEOUS AS NEEDED
Status: DISCONTINUED | OUTPATIENT
Start: 2023-04-07 | End: 2023-04-07 | Stop reason: HOSPADM

## 2023-04-07 RX ORDER — METOCLOPRAMIDE HYDROCHLORIDE 5 MG/ML
10 INJECTION INTRAMUSCULAR; INTRAVENOUS EVERY 8 HOURS PRN
Status: DISCONTINUED | OUTPATIENT
Start: 2023-04-07 | End: 2023-04-08

## 2023-04-07 RX ORDER — TRAZODONE HYDROCHLORIDE 50 MG/1
50 TABLET ORAL NIGHTLY
Status: DISCONTINUED | OUTPATIENT
Start: 2023-04-07 | End: 2023-04-08

## 2023-04-07 RX ORDER — METOCLOPRAMIDE HYDROCHLORIDE 5 MG/ML
10 INJECTION INTRAMUSCULAR; INTRAVENOUS EVERY 8 HOURS PRN
Status: DISCONTINUED | OUTPATIENT
Start: 2023-04-07 | End: 2023-04-07 | Stop reason: HOSPADM

## 2023-04-07 RX ORDER — DEXAMETHASONE SODIUM PHOSPHATE 10 MG/ML
INJECTION, SOLUTION INTRAMUSCULAR; INTRAVENOUS AS NEEDED
Status: DISCONTINUED | OUTPATIENT
Start: 2023-04-07 | End: 2023-04-07 | Stop reason: SURG

## 2023-04-07 RX ORDER — SODIUM PHOSPHATE, DIBASIC AND SODIUM PHOSPHATE, MONOBASIC 7; 19 G/133ML; G/133ML
1 ENEMA RECTAL ONCE AS NEEDED
Status: DISCONTINUED | OUTPATIENT
Start: 2023-04-07 | End: 2023-04-08

## 2023-04-07 RX ORDER — NEOSTIGMINE METHYLSULFATE 1 MG/ML
INJECTION, SOLUTION INTRAVENOUS AS NEEDED
Status: DISCONTINUED | OUTPATIENT
Start: 2023-04-07 | End: 2023-04-07 | Stop reason: SURG

## 2023-04-07 RX ORDER — HYDROMORPHONE HYDROCHLORIDE 1 MG/ML
0.6 INJECTION, SOLUTION INTRAMUSCULAR; INTRAVENOUS; SUBCUTANEOUS EVERY 5 MIN PRN
Status: DISCONTINUED | OUTPATIENT
Start: 2023-04-07 | End: 2023-04-07 | Stop reason: HOSPADM

## 2023-04-07 RX ADMIN — EPHEDRINE SULFATE 10 MG: 50 INJECTION, SOLUTION INTRAVENOUS at 14:48:00

## 2023-04-07 RX ADMIN — DEXAMETHASONE SODIUM PHOSPHATE 4 MG: 10 INJECTION, SOLUTION INTRAMUSCULAR; INTRAVENOUS at 14:19:00

## 2023-04-07 RX ADMIN — MIDAZOLAM HYDROCHLORIDE 2 MG: 1 INJECTION INTRAMUSCULAR; INTRAVENOUS at 14:16:00

## 2023-04-07 RX ADMIN — EPHEDRINE SULFATE 10 MG: 50 INJECTION, SOLUTION INTRAVENOUS at 15:01:00

## 2023-04-07 RX ADMIN — PHENYLEPHRINE HCL 100 MCG: 10 MG/ML VIAL (ML) INJECTION at 15:10:00

## 2023-04-07 RX ADMIN — PHENYLEPHRINE HCL 100 MCG: 10 MG/ML VIAL (ML) INJECTION at 15:33:00

## 2023-04-07 RX ADMIN — SODIUM CHLORIDE, SODIUM LACTATE, POTASSIUM CHLORIDE, CALCIUM CHLORIDE: 600; 310; 30; 20 INJECTION, SOLUTION INTRAVENOUS at 14:27:00

## 2023-04-07 RX ADMIN — EPHEDRINE SULFATE 10 MG: 50 INJECTION, SOLUTION INTRAVENOUS at 14:51:00

## 2023-04-07 RX ADMIN — GLYCOPYRROLATE 0.6 MG: 0.2 INJECTION, SOLUTION INTRAMUSCULAR; INTRAVENOUS at 15:49:00

## 2023-04-07 RX ADMIN — ROPIVACAINE HYDROCHLORIDE 30 ML: 5 INJECTION, SOLUTION EPIDURAL; INFILTRATION; PERINEURAL at 14:19:00

## 2023-04-07 RX ADMIN — LIDOCAINE HYDROCHLORIDE 40 MG: 10 INJECTION, SOLUTION EPIDURAL; INFILTRATION; INTRACAUDAL; PERINEURAL at 14:31:00

## 2023-04-07 RX ADMIN — CEFAZOLIN SODIUM/WATER 2 G: 2 G/20 ML SYRINGE (ML) INTRAVENOUS at 14:41:00

## 2023-04-07 RX ADMIN — TRANEXAMIC ACID 1000 MG: 10 INJECTION, SOLUTION INTRAVENOUS at 14:43:00

## 2023-04-07 RX ADMIN — LIDOCAINE HYDROCHLORIDE 5 ML: 10 INJECTION, SOLUTION EPIDURAL; INFILTRATION; INTRACAUDAL; PERINEURAL at 14:17:00

## 2023-04-07 RX ADMIN — NEOSTIGMINE METHYLSULFATE 3 MG: 1 INJECTION, SOLUTION INTRAVENOUS at 15:49:00

## 2023-04-07 RX ADMIN — PHENYLEPHRINE HCL 50 MCG: 10 MG/ML VIAL (ML) INJECTION at 15:57:00

## 2023-04-07 RX ADMIN — SODIUM CHLORIDE, SODIUM LACTATE, POTASSIUM CHLORIDE, CALCIUM CHLORIDE: 600; 310; 30; 20 INJECTION, SOLUTION INTRAVENOUS at 14:44:00

## 2023-04-07 RX ADMIN — ROCURONIUM BROMIDE 30 MG: 10 INJECTION, SOLUTION INTRAVENOUS at 14:31:00

## 2023-04-07 RX ADMIN — ONDANSETRON 4 MG: 2 INJECTION INTRAMUSCULAR; INTRAVENOUS at 15:33:00

## 2023-04-07 RX ADMIN — SODIUM CHLORIDE, SODIUM LACTATE, POTASSIUM CHLORIDE, CALCIUM CHLORIDE: 600; 310; 30; 20 INJECTION, SOLUTION INTRAVENOUS at 15:31:00

## 2023-04-07 NOTE — BRIEF OP NOTE
Pre-Operative Diagnosis: primary osteoarthritis of left shoulder, chronic left shouler pain     Post-Operative Diagnosis: primary osteoarthritis of left shoulder, chronic left shouler pain      Procedure Performed:   left total shoulder arthroplasty    Surgeon(s) and Role:     Shanwa Lo MD - Primary    Assistant(s):  PA: Pam Diaz PA-C     Surgical Findings: DJD, TSA reduced and stable     Specimen: humeral head     Estimated Blood Loss: Blood Output: 150 mL (4/7/2023  3:44 PM)      Dictation Number:  #[id number]    Saint Romp, MD  4/7/2023  4:02 PM

## 2023-04-07 NOTE — H&P
Onita Bering Day  85/1955  75year old   female  Amarilys Salazar MD    HPI:   Patient presents with:  Left Elbow - Pain    The patient complains of pain located left shoulder>left elbow. The pain is increased in her shoulder and decreased in her elbow. Motion and swelling are better in her elbow  The patient denies numbness and tingling. The patient denies skin laceration or breakdown. ALLERGIES:    Aleve NAUSEA AND VOMITING  Levaquin [Levofloxa* RASH, DIZZINESS  Augmentin, [Amoxici* OTHER (SEE COMMENTS)  Comment:Abdominal pain  Bactrim [Sulfametho* PAIN  Codeine NAUSEA AND VOMITING  Hydrocodone NAUSEA AND VOMITING  Morphine NAUSEA AND VOMITING  Nsaids NAUSEA AND VOMITING, PAIN  Comment:GASTRITIS  Oxycodone OTHER (SEE COMMENTS)  Comment:Itching, vomiting  Tramadol Hcl ITCHING   Current Outpatient Medications   Medication Sig Dispense Refill   pantoprazole 40 MG Oral Tab EC Take 1 tablet (40 mg total) by mouth every morning before breakfast. 90 tablet 2   ondansetron (ZOFRAN) 4 mg tablet Take 1 tablet (4 mg total) by mouth every 8 (eight) hours as needed for Nausea. (Patient not taking: Reported on 1/12/2023) 30 tablet 0   tiZANidine 4 MG Oral Tab Take 4 mg by mouth every 6 (six) hours as needed. acidophilus-pectin Oral Cap Take 1 capsule by mouth daily. Ascorbic Acid (VITAMIN C) 1000 MG Oral Tab Take 1,000 mg by mouth daily. sucralfate 1 g Oral Tab Take 1 g by mouth 4 (four) times daily before meals and nightly. Takes up to 3 times daily PRN   cyclobenzaprine 10 MG Oral Tab Take 1 tablet (10 mg total) by mouth every 8 (eight) hours as needed for Muscle spasms. (Patient not taking: Reported on 1/12/2023) 90 tablet 0   TRAZODONE 50 MG Oral Tab TAKE 1 TABLET BY MOUTH EVERY DAY AT NIGHT (Patient taking differently: nightly as needed.) 90 tablet 1   rOPINIRole HCl 0.25 MG Oral Tab Take 1 tablet (0.25 mg total) by mouth nightly. (Patient taking differently: Take 0.25 mg by mouth nightly.  Taking PRN) 90 tablet 3 amLODIPine Besylate 10 MG Oral Tab Take 1 tablet (10 mg total) by mouth daily. 90 tablet 3   Estradiol (ESTRACE) 0.1 MG/GM Vaginal Cream Place 0.5 g vaginally twice a week. PRN 1 Tube 11   Cholecalciferol 25 MCG (1000 UT) Oral Tab Take by mouth daily. Diclofenac Sodium 1 % Transdermal Gel Apply 4 g topically 4 (four) times daily as needed. 1 Tube 3   CALCIUM CITRATE OR Take 1,400 mg by mouth daily. Multiple Vitamins Oral Tab Take 1 tablet by mouth daily. Multiple Vitamins tablet     HISTORY:  Past Medical History:   Diagnosis Date   Anemia   Anxiety state, unspecified   Back problem   CERVICAL RADICULOPATHY, CERVICAL SPINAL STENOSIS   Broken foot 2012   RT - casting. Fracture 5th met. Cast removl/xray foot 12. Follow up fracture right foot 12.    Calculus of kidney    COVID 2021   Depression   Esophageal reflux   Essential hypertension   Gastric polyps 2018   Gastritis   High blood pressure   Idiopathic acute pancreatitis ,    Insomnia   Internal hemorrhoids without complication    Intestinal disorder   Left wrist fracture  and    Muscle weakness   Osteoporosis   Renal disorder   Rotator cuff tear, right   Traumatic arthritis   Vertigo     Past Surgical History:   Procedure Laterality Date   ARTHROSCOPY OF JOINT UNLISTED Right 2012   rotator cuff tear      x 3   CATARACT   CHOLECYSTECTOMY   COLONOSCOPY    COLONOSCOPY N/A 2018   Sack: normal   EGD    EGD 2021   [place]: gastric erosions   EGD 2018   [place]ack: unremarkable   FRACTURE SURGERY Left    WRIST FRACTURE ORIF   FRACTURE SURGERY Left    ORIF wrist fracture revision with plates and screws- Ortho Dr [facility] HISTORY 2017   Fussion L4-L5 - Dr. Solomon Rueda Right    Charlie Mendoza 2008   L1-L2 FUSION   SPINE SURGERY PROCEDURE UNLISTED 2017   Neck fusion   TONSILLECTOMY   with Adenoidectomy UPPER GI ENDOSCOPY,EXAM     Family History   Problem Relation Age of Onset   Pulmonary Disease Father   Heart Disease Mother   Colon Cancer Maternal Grandfather   Cancer Maternal Grandfather   Polyps Sister   colon polyps   Colon Cancer Sister   Other (gallbladder disease) Sister   Crohn's Disease Other   Heart Disease Other   Ovarian Cancer Maternal Aunt   71's 80's     Social History  Tobacco Use  Smoking status: Never  Smokeless tobacco: Never  Vaping Use  Vaping Use: Never used  Alcohol use: No  Alcohol/week: 0.0 standard drinks  Comment: quit 4 - 5 yrs ago due to pancreatitis  Drug use: No        EXAM:   There were no vitals taken for this visit. The patient is awake and oriented x 3 and overall well appearing. The patient has normal mood. The patient is non-tender and atraumatic with the exception of their left upper extremity. The patient's skin is intact and compartments are soft. The patient's upper extremities are warm and pink with brisk capillary refill and 2+ radial pulses. Sensation is intact to light touch in axillary, median, ulnar, and radial nerve distributions. The patient has 5/5 strength in finger flexion, finger extension, EPL, FPL, and interosseous muscle function. The patient has shoulder range of motion of 130 degrees of elevation, 40 degrees of external rotation, and internal rotation to L5 compared to 180 degrees of elevation, 80 degrees of external rotation, and internal rotation to T8 on the opposite side. The patient has 5/5 strength in elevation, external rotation, and internal rotation. She has joint line tenderness and crepitus. The patient's elbow rage of motion is from 0 degrees of extension to 160 degrees of flexion with 80 degrees of supination and 90 degrees of pronation compared to 0 degrees of extension to 160 degrees of flexion with 80 degrees of supination and 90 degrees of pronation on the opposite side.   The patient 5/5 strength in biceps, triceps, wrist flexion, and wrist extension. The patient is stable to varus stress testing and to pivot shift testing. She has mild laxity to valgus stress testing. The patient has no effusion. She has no joint line tenderness. IMAGING AND TESTING:  MRA of her left elbow independently reviewed by me today reveals mild post-traumatic arthritis with mild ulnohumeral laxity, ossification of her radial collateral ligament, and loose bodies. CT of her left shoulder independently reviewed by me today reveals joint space narrowing, osteophytes, A1 glenoid, and intact rotator cuff without atrophy or fatty infiltration. ASSESSMENT AND PLAN:   Primary osteoarthritis of left shoulder (primary encounter diagnosis)  Chronic left shoulder pain  Left elbow pain  Loose body in left elbow  Post-traumatic osteoarthritis of left elbow    The risks, indications, benefits, and procedures of both operative and non-operative treatment were discussed with the patient. The patient desired surgery. Surgery recommended was left total shoulder arthroplasty. Risks include bleeding, infection, neurovascular injury, failure of the procedure, stiffness, and potential need for additional surgery as well as anesthetic complications including death. The patient consented to the procedure. The patient desired surgery. Surgery recommended was left elbow arthroscopy, debridement, and removal of loose bodies. Risks include bleeding, infection, neurovascular injury, failure of the procedure, stiffness, and potential need for additional surgery as well as anesthetic complications including death. The patient consented to the procedure. She will delay elbow surgery until recovery from her total shoulder surgery. All of their questions were answered and they are in agreement with the treatment plan.

## 2023-04-07 NOTE — ANESTHESIA PROCEDURE NOTES
Peripheral Block    Date/Time: 4/7/2023 2:14 PM    Performed by: Elena Andrade DO  Authorized by: Elena Andrade DO      General Information and Staff    Start Time:  4/7/2023 2:14 PM  End Time:  4/7/2023 2:19 PM  Anesthesiologist:  Elena Andrade DO  Performed by: Anesthesiologist  Patient Location:  PACU      Site Identification: real time ultrasound guided and image stored and retrievable    Block site/laterality marked before start: site marked  Reason for Block: at surgeon's request and post-op pain management    Preanesthetic Checklist: 2 patient identifers, IV checked, risks and benefits discussed, monitors and equipment checked, pre-op evaluation, timeout performed, anesthesia consent and sterile technique used      Procedure Details    Patient Position:  Sitting  Prep: ChloraPrep    Monitoring:  Cardiac monitor  Block Type:   Interscalene  Laterality:  Left  Injection Technique:  Single-shot    Needle    Needle Type:  Short-bevel  Needle Gauge:  22 G  Needle Length:  50 mm  Needle Localization:  Ultrasound guidance and nerve stimulator  Reason for Ultrasound Use: appropriate spread of the medication was noted in real time and no ultrasound evidence of intravascular and/or intraneural injection      Muscle Twitch Response: distal twitch      Assessment    Injection Assessment:  Good spread noted, incremental injection, local visualized surrounding nerve on ultrasound, low pressure, negative aspiration for heme and no pain on injection  Heart Rate Change: No        Medications  4/7/2023 2:14 PM      Additional Comments

## 2023-04-07 NOTE — ANESTHESIA PROCEDURE NOTES
Airway  Date/Time: 4/7/2023 2:34 PM  Urgency: Elective      General Information and Staff    Patient location during procedure: OR  Anesthesiologist: Ronaldo Ballard DO  Performed: anesthesiologist   Performed by: Ronaldo Ballard DO  Authorized by: Ronaldo Ballard DO      Indications and Patient Condition  Indications for airway management: anesthesia  Sedation level: deep  Preoxygenated: yes  Patient position: sniffing  Mask difficulty assessment: 1 - vent by mask    Final Airway Details  Final airway type: endotracheal airway      Successful airway: ETT  Cuffed: yes   Successful intubation technique: direct laryngoscopy  Endotracheal tube insertion site: oral  Blade: Edvin  Blade size: #3  ETT size (mm): 7.0    Cormack-Lehane Classification: grade I - full view of glottis  Placement verified by: chest auscultation and capnometry   Measured from: lips  ETT to lips (cm): 21  Number of attempts at approach: 1  Number of other approaches attempted: 0

## 2023-04-08 ENCOUNTER — TELEPHONE (OUTPATIENT)
Dept: INTERNAL MEDICINE CLINIC | Facility: CLINIC | Age: 68
End: 2023-04-08

## 2023-04-08 VITALS
HEART RATE: 79 BPM | SYSTOLIC BLOOD PRESSURE: 112 MMHG | DIASTOLIC BLOOD PRESSURE: 60 MMHG | TEMPERATURE: 98 F | OXYGEN SATURATION: 100 % | HEIGHT: 68 IN | RESPIRATION RATE: 18 BRPM | BODY MASS INDEX: 20.76 KG/M2 | WEIGHT: 137 LBS

## 2023-04-08 LAB
ANION GAP SERPL CALC-SCNC: 7 MMOL/L (ref 0–18)
BUN BLD-MCNC: 16 MG/DL (ref 7–18)
BUN/CREAT SERPL: 24.2 (ref 10–20)
CALCIUM BLD-MCNC: 8.8 MG/DL (ref 8.5–10.1)
CHLORIDE SERPL-SCNC: 111 MMOL/L (ref 98–112)
CO2 SERPL-SCNC: 24 MMOL/L (ref 21–32)
CREAT BLD-MCNC: 0.66 MG/DL
GFR SERPLBLD BASED ON 1.73 SQ M-ARVRAT: 95 ML/MIN/1.73M2 (ref 60–?)
GLUCOSE BLD-MCNC: 111 MG/DL (ref 70–99)
HCT VFR BLD AUTO: 37.8 %
HGB BLD-MCNC: 12.5 G/DL
OSMOLALITY SERPL CALC.SUM OF ELEC: 296 MOSM/KG (ref 275–295)
POTASSIUM SERPL-SCNC: 4.3 MMOL/L (ref 3.5–5.1)
SODIUM SERPL-SCNC: 142 MMOL/L (ref 136–145)

## 2023-04-08 PROCEDURE — 99238 HOSP IP/OBS DSCHRG MGMT 30/<: CPT | Performed by: INTERNAL MEDICINE

## 2023-04-08 RX ORDER — SUCRALFATE 1 G/1
1 TABLET ORAL
Qty: 120 TABLET | Refills: 0 | Status: SHIPPED | OUTPATIENT
Start: 2023-04-08 | End: 2024-04-02

## 2023-04-08 RX ORDER — SUCRALFATE 1 G/1
1 TABLET ORAL
Status: DISCONTINUED | OUTPATIENT
Start: 2023-04-08 | End: 2023-04-08

## 2023-04-08 RX ORDER — ALPRAZOLAM 0.25 MG/1
0.25 TABLET ORAL 2 TIMES DAILY PRN
Status: DISCONTINUED | OUTPATIENT
Start: 2023-04-08 | End: 2023-04-08

## 2023-04-08 RX ORDER — PREGABALIN 50 MG/1
50 CAPSULE ORAL 3 TIMES DAILY
Qty: 90 CAPSULE | Refills: 3 | Status: SHIPPED | COMMUNITY
Start: 2023-04-08

## 2023-04-08 NOTE — PLAN OF CARE
Wes Sanabria is doing better, educated about slowing down and keep taking deep breaths, educated about all sign and symptoms of Vasovagal response and what to do to prevent at home. Educated about bland/GERD diet to prevent stomach issues. Cleared physical therapy and medical and surgical services, occupational therapy unable to clear. But pt still decided to go home with  against OT advice. All prescriptions are electronically filled. Multiple walks done in room to bathroom no complaint of  Dizziness. tolerated diet well. All discharge info reviewed.

## 2023-04-08 NOTE — ANESTHESIA POST-OP FOLLOW-UP NOTE
Becky Serum Day is POD#1 after   Surgical Procedures     Case IDs Date Procedure Surgeon Location Status    [id number] 4/7/23 left total shoulder arthroplasty Lianna Oliveira MD [facility] OR Jade Garcia A Day underwent interscalene nerve block for analgesia. Tolerated the procedure well. Today, denies residual UE weakness/numbness. Vasovagal event this morning in setting of Estelline administration and abdominal pain. Injection site examined, no erythema, hematoma, or tenderness to palpation. Pain score is 2/10. No further anesthesia management needed at this time. Doing well on oral pain meds. All anesthetic questions answered.      Johan Freed MD

## 2023-04-08 NOTE — PROGRESS NOTES
04/08/23 0730   Provider Notification   Reason for Communication Other (comment)   Provider Name Other (comment); Belkys Burks MD   Method of Communication Secure Messaging   Response Waiting for response   Notification Time Theodore Harrington RN called out for help in pt's room. Found pt on her knees. this writer went to grab gait belt to assist  Krunal Bryant and Layne Hendrix RN assisted pt back to bed before using gait belt. Positioned in bed comfortably, vitals checked and recorded.  notified. Will make rounds later today. @ 1549 Dr. Ferris Plate partner also paged to notify the situation.

## 2023-04-08 NOTE — OPERATIVE REPORT
[facility]    PATIENT'S NAME: Leeanna VILLELA   ATTENDING PHYSICIAN: Radha Aldrich MD   OPERATING PHYSICIAN: Radha Aldrich MD   PATIENT ACCOUNT#:   [id number]    LOCATION:   Room 1 [facility]  MEDICAL RECORD #:   [id number]       YOB: 1955  ADMISSION DATE:       04/07/2023      OPERATION DATE:  04/07/2023    OPERATIVE REPORT      PREOPERATIVE DIAGNOSIS:  Left shoulder primary degenerative arthritis. POSTOPERATIVE DIAGNOSIS:  Left shoulder primary degenerative arthritis. PROCEDURE:  Left total shoulder arthroplasty. ASSISTANT:  Rebel Wilkes PA-C. She was essential for the performance of the procedure and assisted with patient positioning, prepping and draping, retraction of vital structures, wound closure and sling placement. ANESTHESIA:  General and a block. BLOOD LOSS:  Approximately 150 mL. COMPLICATIONS:  None. CONDITION:  The patient was aroused from anesthesia and transferred to recovery room in stable condition. INSTRUMENTATION UTILIZED:  Tornier Simpliciti total shoulder arthroplasty with 43 mm head and size 2 nucleus and Perform pegged glenoid size small with 40-degree posterior curvature. INDICATIONS:  The patient is a 68-year-old female who, based on history, physical examination, and imaging studies, was diagnosed as having a left shoulder primary degenerative arthritis. The risks, indications, and benefits of procedures of both operative and nonoperative treatment were thoroughly described to the patient, and the patient desired surgery. The surgery recommended was a left  total shoulder arthroplasty. Risks include bleeding, infection, neurovascular injury, chronic pain, chronic instability, failure of the procedure, stiffness, recurrence, and potential need for additional surgery, as well as anesthetic complications including death. The patient consented to the procedure.   All of her questions were answered, and she was in agreement with the treatment plan. OPERATIVE TECHNIQUE:  On 04/07/2023, the patient was seen and evaluated preoperatively. Her left shoulder was identified as the correct operative site, my initials were placed. She was transferred to the operating room and transferred to the operating table in supine position. General anesthesia was induced. Preoperatively, she received a block. She was given Ancef as antibiotic prophylaxis within 1 hour of incision time. She was given tranexamic acid. She was placed in a beach chair position. Her left upper extremity was prepped and draped in a sterile fashion. She had a longitudinal incision from the coracoid process extending distally with blunt dissection to the deltopectoral interval with retraction of the cephalic vein laterally. The conjoined tendon was identified and released and retracted medially. The patient's 3 sisters at the inferior margin of the subscapularis was suture ligated. The subscapularis was tagged. Tenotomy was performed along the articular surface and it was retracted. Anterior, inferior, and posterior labrum was excised. The inferior capsule was released. The patient's humerus was dislocated. Osteophytes were excised and anatomic humeral head cut was performed. The remaining rotator cuff was intact. The patient previously had a long head of the biceps tenodesis. The patient's humerus was prepped for Simpliciti humeral stem for a size 2 nucleus and was punched with a #1 and cup protector was placed. The humeral head measured approximately 46 mm in diameter. It was downsize to 43 mm head which had good fit and stability. The head was removed. The humerus was retracted. The glenoid was exposed. Center aspect of the glenoid was defined. It was sized as a small with a 40-degree curvature. The center aspect of the glenoid was drilled.   It was reamed to a convex surface to accommodate the implant and then was prepared for the pegged component which was trialed and had a good fit and stability. The trial was removed. The wound was thoroughly and copiously irrigated and then Tornier Perform pegged glenoid size small with 40-degree curvature was cemented into place with a center press-fit stem. Following full curing of the cement, the humerus was dislocated. The 43 mm head was once again trialed and had good fit and stability with 50% posterior translation and spontaneous rebound. The trial was removed. Three #2 FiberWire sutures were placed at the lesser tuberosity and subscapularis for later repair. The wound was thoroughly and copiously irrigated. Size 2 nucleus was then press-fit into place with 43 mm head press-fit into place. The total shoulder arthroplasty was reduced and found to be stable. The subscapularis was repaired with a previously placed FiberWire suture with transtendinous, transosseous technique, and reinforced with a #2 FiberWire suture. The wound was thoroughly and copiously irrigated and closed in layers with a 0 Vicryl deep fascial stitch, 2-0 Vicryl subcutaneous stitch, and a 3-0 Monocryl skin suture in subcuticular fashion. A large sterile bulky soft dressing was placed. She was placed in a sling, aroused from anesthesia and transferred to the recovery room in stable condition. Blood loss approximately 150 mL. Complications were none. Condition, the patient was transferred to the recovery room in stable condition. DISPOSITION:  She was admitted for postoperative medical management, postoperative pain control, postoperative DVT prophylaxis, and postoperative antibiotic therapy. All of her questions were answered, and she was in agreement with the treatment plan.      Dictated By Raymond Coelho MD  d: 04/07/2023 16:06:24  t: 04/08/2023 02:18:43  [facility] [id number]  [facility]/

## 2023-04-11 ENCOUNTER — HOSPITAL ENCOUNTER (EMERGENCY)
Facility: HOSPITAL | Age: 68
Discharge: HOME | End: 2023-04-11
Attending: EMERGENCY MEDICINE
Payer: MEDICARE

## 2023-04-11 ENCOUNTER — TELEPHONE (OUTPATIENT)
Dept: INTERNAL MEDICINE CLINIC | Facility: CLINIC | Age: 68
End: 2023-04-11

## 2023-04-11 ENCOUNTER — APPOINTMENT (OUTPATIENT)
Dept: CT IMAGING | Facility: HOSPITAL | Age: 68
End: 2023-04-11
Attending: EMERGENCY MEDICINE
Payer: MEDICARE

## 2023-04-11 ENCOUNTER — APPOINTMENT (OUTPATIENT)
Dept: CT IMAGING | Facility: HOSPITAL | Age: 68
End: 2023-04-11
Payer: MEDICARE

## 2023-04-11 VITALS
HEART RATE: 81 BPM | HEIGHT: 68 IN | DIASTOLIC BLOOD PRESSURE: 86 MMHG | WEIGHT: 137 LBS | OXYGEN SATURATION: 96 % | RESPIRATION RATE: 16 BRPM | BODY MASS INDEX: 20.76 KG/M2 | TEMPERATURE: 98 F | SYSTOLIC BLOOD PRESSURE: 122 MMHG

## 2023-04-11 DIAGNOSIS — S09.90XA CLOSED HEAD INJURY, INITIAL ENCOUNTER: Primary | ICD-10-CM

## 2023-04-11 PROCEDURE — 99284 EMERGENCY DEPT VISIT MOD MDM: CPT

## 2023-04-11 PROCEDURE — 70450 CT HEAD/BRAIN W/O DYE: CPT | Performed by: EMERGENCY MEDICINE

## 2023-04-11 PROCEDURE — 72125 CT NECK SPINE W/O DYE: CPT | Performed by: EMERGENCY MEDICINE

## 2023-04-11 NOTE — TELEPHONE ENCOUNTER
On Thursday pt whacked her head on the branch of a tree (a good size branch)  Pt had scheduled shoulder surgery on Friday      She has been experiencing headaches, some dizziness   Pt has an appt tomorrow with Dr Abi Saul hoping to be seen today  No openings please advise [phone]

## 2023-04-11 NOTE — TELEPHONE ENCOUNTER
Pt hit left side of head very hard on tree branch last Thursday. Did not lose consciousness. Had shoulder surgery on Friday. Has has intermittent nausea since she hit head, no episodes of vomiting. Reporting intermittent dizziness and feeling 'woozy.' Fainted Saturday. Pt reporting constant severe left sided headaches. Advised ER. Pt agreeable and will report to [facility] ER. Nursing to f/up.

## 2023-04-11 NOTE — ED QUICK NOTES
Patient off floor to CT via cart, reports bilateral frontal headache since syncopal episode on Saturday (hit head on Thurs), concerned for concussion    Neuro intact, denies vision changes, no blood thinners

## 2023-04-11 NOTE — ED INITIAL ASSESSMENT (HPI)
Pt presents to ED via triage stating she hit her head on a tree last week Thursday, denies LOC. Reports she had left shoulder surgery Friday, had a near syncopal episode at this time while being evaluated but was discharged home. Pt denies blurred vision, dizziness, CP or JOELLE. Pt reports headache since Thursday after hitting her head.  Sent over for head CT today

## 2023-04-12 ENCOUNTER — OFFICE VISIT (OUTPATIENT)
Dept: INTERNAL MEDICINE CLINIC | Facility: CLINIC | Age: 68
End: 2023-04-12

## 2023-04-12 ENCOUNTER — PATIENT MESSAGE (OUTPATIENT)
Dept: INTERNAL MEDICINE CLINIC | Facility: CLINIC | Age: 68
End: 2023-04-12

## 2023-04-12 VITALS
HEIGHT: 68 IN | SYSTOLIC BLOOD PRESSURE: 124 MMHG | DIASTOLIC BLOOD PRESSURE: 70 MMHG | BODY MASS INDEX: 21.55 KG/M2 | WEIGHT: 142.19 LBS | TEMPERATURE: 98 F | OXYGEN SATURATION: 97 % | HEART RATE: 93 BPM

## 2023-04-12 DIAGNOSIS — R51.9 ACUTE INTRACTABLE HEADACHE, UNSPECIFIED HEADACHE TYPE: Primary | ICD-10-CM

## 2023-04-12 PROCEDURE — 1125F AMNT PAIN NOTED PAIN PRSNT: CPT | Performed by: INTERNAL MEDICINE

## 2023-04-12 PROCEDURE — 99214 OFFICE O/P EST MOD 30 MIN: CPT | Performed by: INTERNAL MEDICINE

## 2023-04-12 RX ORDER — B-COMPLEX WITH VITAMIN C
1 TABLET ORAL DAILY
COMMUNITY
Start: 2021-10-18

## 2023-04-12 RX ORDER — SOLIFENACIN SUCCINATE 5 MG/1
TABLET, FILM COATED ORAL DAILY
COMMUNITY
Start: 2021-10-18 | End: 2023-04-12 | Stop reason: ALTCHOICE

## 2023-04-24 ENCOUNTER — TELEPHONE (OUTPATIENT)
Dept: INTERNAL MEDICINE CLINIC | Facility: CLINIC | Age: 68
End: 2023-04-24

## 2023-04-24 ENCOUNTER — TELEPHONE (OUTPATIENT)
Dept: PHYSICAL MEDICINE AND REHAB | Facility: CLINIC | Age: 68
End: 2023-04-24

## 2023-04-24 NOTE — TELEPHONE ENCOUNTER
New symptoms: Yes (post-injury) See below  Location of symptoms: Neck (occipital area) and headaches  Date symptoms Began: 4/6/2023   Current treatment: Ice         Seen in ER/Urgent care: Yes: 4/11/2023    Denied red flag symptoms    Numeric Rating Scale:  Pain at Present:  3/10 (neck)                                                                                                            (No Pain) 0  to  10 (Worst Pain)                 Minimum Pain:   2  Maximum Pain  9      Description of Pain:   aching and tightness. Feels like it is her occipital muscle. Aggravating Factors: Other sleeping in certain head positions, must be careful with pillows    LOV: 2/8/2023 Davi Gallardo MD    NOV: 7/12/2023 Davi Gallardo MD  (RN made 5/3/2023 appt for patient w/ Dr. Jaleel Crane)    Summary of patient request:     Two weeks ago shoulder replacement, went through, had a big branch hit her head the day prior to the surgery. Stood up at full force and hit the big branch on top left of head and experienced instant pain. She severe had headaches for a week, the headaches are beginning to subside. When turns head, gets pain to trapezoids. Had CT of brain and cervical spine at [place] ED results ok, and spine XR at [facility] results ok, still having periodic headaches and neck pain. Starts PT this week on the shoulder, and she knows they can work on her neck a bit, but she would like to have Dr. Jaleel Crane examine her. No MRI has been taken. Surgery with Dr. Henry Geiger was on 4/7/2023 at [facility], the injury with the branch was on  4/6/2023. Ice and heat sometimes help. Stood up at full force and hit the big branch on top left of branch and instant pain. Did not pass out or vomit, had headache, went to pre-op testing, and stated she had headache, but they proceeded with the surgery since she had no vomiting and didn't lose consciousness.  After the surgery however, while inpatient, the patient on 4/8/2023 (Saturday) had vaso-vagal response and kept overnight and then sent home since  would be home with her, but kept having episodes, so went to ED on 4/11/2023and had headache for weeks. Wondering if coming from neck. Patient has Tizanidine from a [facility] physician for her back fusion, but it makes her very tired, willing try now because she will be at home and not driving. Would like to know if Dr. Jessica Escalante suggests she try it, or if he thinks something else may help her.      RN made 5/3/2023 appt for patient w/ Dr. Jessica Escalante

## 2023-04-24 NOTE — TELEPHONE ENCOUNTER
Pt. Called stating she has an appt. To see Dr. Dorothy Avendaño tomorrow for headaches after a head trauma. Pt. states she also made appt. s with Dr. Luiza Rae at [facility] and with Dr. Francisco Ace with [facility]. Pt.  Asking if she needs to be evaluated by Dr. Dorothy Avendaño, or should she start with the other 2 physicians first?

## 2023-04-25 NOTE — TELEPHONE ENCOUNTER
Noted. Reviewed pt's Mychart message -- her pain was neck and thoracic spine, not headaches -- started after hitting her head on a tree branch on 4/6.

## 2023-04-25 NOTE — TELEPHONE ENCOUNTER
Patient is calling she is cancelling her appointment today she will see the pain doctor next week  Any questions call the patient

## 2023-04-26 NOTE — TELEPHONE ENCOUNTER
Spoke with patient who stated she did try the Tizanidine and it helped a little on Monday, but did not help as much yesterday. She will continue to take this and will discuss condition further at her upcoming appointment. Nothing further needed at this time.

## 2023-05-03 ENCOUNTER — OFFICE VISIT (OUTPATIENT)
Dept: PHYSICAL MEDICINE AND REHAB | Facility: CLINIC | Age: 68
End: 2023-05-03
Payer: MEDICARE

## 2023-05-03 DIAGNOSIS — M47.812 CERVICAL FACET SYNDROME: ICD-10-CM

## 2023-05-03 DIAGNOSIS — M47.812 CERVICAL SPONDYLOSIS: ICD-10-CM

## 2023-05-03 DIAGNOSIS — M25.512 CHRONIC LEFT SHOULDER PAIN: ICD-10-CM

## 2023-05-03 DIAGNOSIS — G44.86 CERVICOGENIC HEADACHE: Primary | ICD-10-CM

## 2023-05-03 DIAGNOSIS — M50.20 CERVICAL HERNIATED DISC: ICD-10-CM

## 2023-05-03 DIAGNOSIS — M50.90 CERVICAL DISC DISEASE: ICD-10-CM

## 2023-05-03 DIAGNOSIS — G89.29 CHRONIC LEFT SHOULDER PAIN: ICD-10-CM

## 2023-05-03 DIAGNOSIS — M48.02 CERVICAL SPINAL STENOSIS: ICD-10-CM

## 2023-05-03 DIAGNOSIS — Z98.1 S/P CERVICAL SPINAL FUSION: ICD-10-CM

## 2023-05-03 PROCEDURE — 99214 OFFICE O/P EST MOD 30 MIN: CPT | Performed by: PHYSICAL MEDICINE & REHABILITATION

## 2023-05-03 NOTE — PATIENT INSTRUCTIONS
Plan  She will continue with the PT for the left shoulder and cervical spine. If the PT is not helping after she has done it for 2 months or so, then she might need to have left C3-4 and C6-7 z-joint injections. She will follow up in 2-3 months or sooner if needed. The patient will continue with their current pain medications.

## 2023-05-10 ENCOUNTER — HOSPITAL ENCOUNTER (OUTPATIENT)
Dept: GENERAL RADIOLOGY | Facility: HOSPITAL | Age: 68
Discharge: HOME | End: 2023-05-10
Attending: SPECIALIST
Payer: MEDICARE

## 2023-05-10 ENCOUNTER — TELEPHONE (OUTPATIENT)
Dept: INTERNAL MEDICINE CLINIC | Facility: CLINIC | Age: 68
End: 2023-05-10

## 2023-05-10 DIAGNOSIS — R13.14 PHARYNGOESOPHAGEAL DYSPHAGIA: ICD-10-CM

## 2023-05-10 NOTE — TELEPHONE ENCOUNTER
Pt inquiring Iron levels and B 12 levels.   No Iron level seen from previous blood draws and B 12 level 589  From 6/7/22

## 2023-05-10 NOTE — TELEPHONE ENCOUNTER
What? I don't know what she's asking. She can see her lab results on MyChart. Hemoglobin was normal a month ago (actually better than last year)    Happy to see her in the office.   Lots of things can cause fatigue in addition to iron deficiency

## 2023-05-10 NOTE — TELEPHONE ENCOUNTER
Patient wants past results of lab test. Very tired and thinks she needs iron.  Please call    #[phone]

## 2023-05-16 ENCOUNTER — TELEPHONE (OUTPATIENT)
Dept: OTOLARYNGOLOGY | Facility: CLINIC | Age: 68
End: 2023-05-16

## 2023-05-16 NOTE — TELEPHONE ENCOUNTER
Per pt asking if Dr. Wanda Chow can recommend an oral surgeon or provide a list of oral surgeons she would recommend. Please advise thank you.

## 2023-05-19 NOTE — TELEPHONE ENCOUNTER
Patient calling back to get a recommendation. Patient is \"frustrated and mad that no one has gotten back to her\".  Please advise

## 2023-05-19 NOTE — TELEPHONE ENCOUNTER
Sergey Mathur please see note below, advised pt you are out of the office. Per pt has spot  up high on gums , advised by dentist to see oral surgeon, gave pt the name of Abena Hanson as the other providers refer too.

## 2023-05-20 ENCOUNTER — PATIENT MESSAGE (OUTPATIENT)
Dept: OTOLARYNGOLOGY | Facility: CLINIC | Age: 68
End: 2023-05-20

## 2023-05-23 NOTE — TELEPHONE ENCOUNTER
Pt was able to get earlier appointment for 5/25/23. From: aCitie Francisco Day  Sent: 5/20/2023  8:51 PM CDT  To: Em Ent Clinical Staff  Subject: oral surgeon    I do have an appt with you for June 8 unless I can get in earlier. I would prefer to see you first rather than the oral surgeon. Let me know if something opens up before my scheduled appt.      Thank you  Betsy Stoner

## 2023-05-25 ENCOUNTER — OFFICE VISIT (OUTPATIENT)
Dept: OTOLARYNGOLOGY | Facility: CLINIC | Age: 68
End: 2023-05-25

## 2023-05-25 VITALS — BODY MASS INDEX: 21.52 KG/M2 | HEIGHT: 68 IN | WEIGHT: 142 LBS

## 2023-05-25 DIAGNOSIS — J34.89 SINUS PAIN: Primary | ICD-10-CM

## 2023-05-25 PROCEDURE — 99213 OFFICE O/P EST LOW 20 MIN: CPT | Performed by: SPECIALIST

## 2023-05-25 RX ORDER — AMOXICILLIN 875 MG/1
875 TABLET, COATED ORAL 2 TIMES DAILY
Qty: 20 TABLET | Refills: 0 | Status: SHIPPED | OUTPATIENT
Start: 2023-05-25

## 2023-05-25 NOTE — PATIENT INSTRUCTIONS
You were placed on a trial of amoxicillin for pain over the right maxillary sinus. Follow-up in 3 weeks time, sooner if problems. If symptoms do not resolve a CT of the sinus will be considered.

## 2023-06-05 ENCOUNTER — TELEPHONE (OUTPATIENT)
Dept: ENDOCRINOLOGY CLINIC | Facility: CLINIC | Age: 68
End: 2023-06-05

## 2023-06-05 NOTE — TELEPHONE ENCOUNTER
----- Message from Fly Powell RN sent at 1/3/2023 10:21 AM CST -----  Regarding: prolia  Prolia given 1/3/23

## 2023-06-09 ENCOUNTER — TELEPHONE (OUTPATIENT)
Dept: ENDOCRINOLOGY CLINIC | Facility: CLINIC | Age: 68
End: 2023-06-09

## 2023-06-09 NOTE — TELEPHONE ENCOUNTER
Pt has appt scheduled for 7/17/23 with Dr. Raheem Badillo for Prolia injection and is concerned because this is a month late based on her last injection date. Please call to let pt know if this is OK or does she need to be seen sooner? Please call.

## 2023-06-09 NOTE — TELEPHONE ENCOUNTER
Dr Hiro Devries-- see below. Pt scheduled with you on 7/17/2023. Ok to schedule for NV? Please advise.

## 2023-06-09 NOTE — TELEPHONE ENCOUNTER
Called pt to schedule NV. Scheduled NV on 7/6/2023. Pt asked if there are any openings prior to 7/17/2023. Looked through Dr Adele Doherty schedule, rescheduled pt on 7/10/2023. Provided pt office location and where to park. Pt verbalized understanding and has no further questions at this time. Dr Santos Books-- see above.

## 2023-06-14 ENCOUNTER — OFFICE VISIT (OUTPATIENT)
Dept: OTOLARYNGOLOGY | Facility: CLINIC | Age: 68
End: 2023-06-14

## 2023-06-14 ENCOUNTER — PATIENT MESSAGE (OUTPATIENT)
Dept: OTOLARYNGOLOGY | Facility: CLINIC | Age: 68
End: 2023-06-14

## 2023-06-14 DIAGNOSIS — J34.89 PAIN OF MAXILLARY SINUS: Primary | ICD-10-CM

## 2023-06-14 PROCEDURE — 99213 OFFICE O/P EST LOW 20 MIN: CPT | Performed by: SPECIALIST

## 2023-06-14 NOTE — PATIENT INSTRUCTIONS
As you have persistent pain over the floor of the right maxillary sinus, a CT scan of the sinuses was ordered. I will of course notify of the results.

## 2023-06-19 ENCOUNTER — OFFICE VISIT (OUTPATIENT)
Dept: INTERNAL MEDICINE CLINIC | Facility: CLINIC | Age: 68
End: 2023-06-19

## 2023-06-19 VITALS
WEIGHT: 142.38 LBS | HEIGHT: 68 IN | BODY MASS INDEX: 21.58 KG/M2 | DIASTOLIC BLOOD PRESSURE: 80 MMHG | SYSTOLIC BLOOD PRESSURE: 122 MMHG | HEART RATE: 72 BPM | OXYGEN SATURATION: 99 %

## 2023-06-19 DIAGNOSIS — L40.3 SAPHO SYNDROME (HCC): ICD-10-CM

## 2023-06-19 DIAGNOSIS — L70.9 SAPHO SYNDROME (HCC): ICD-10-CM

## 2023-06-19 DIAGNOSIS — M65.9 SAPHO SYNDROME (HCC): ICD-10-CM

## 2023-06-19 DIAGNOSIS — R73.02 IGT (IMPAIRED GLUCOSE TOLERANCE): ICD-10-CM

## 2023-06-19 DIAGNOSIS — Z12.31 ENCOUNTER FOR SCREENING MAMMOGRAM FOR MALIGNANT NEOPLASM OF BREAST: ICD-10-CM

## 2023-06-19 DIAGNOSIS — Z00.00 ROUTINE HEALTH MAINTENANCE: Primary | ICD-10-CM

## 2023-06-19 DIAGNOSIS — Z83.71 FAMILY HISTORY OF COLONIC POLYPS: ICD-10-CM

## 2023-06-19 DIAGNOSIS — M86.9 SAPHO SYNDROME (HCC): ICD-10-CM

## 2023-06-19 DIAGNOSIS — M81.0 AGE-RELATED OSTEOPOROSIS WITHOUT CURRENT PATHOLOGICAL FRACTURE: ICD-10-CM

## 2023-06-19 DIAGNOSIS — Z80.0 FAMILY HISTORY OF COLON CANCER: ICD-10-CM

## 2023-06-19 DIAGNOSIS — G25.81 RESTLESS LEGS SYNDROME (RLS): ICD-10-CM

## 2023-06-19 DIAGNOSIS — M85.80 SAPHO SYNDROME (HCC): ICD-10-CM

## 2023-06-19 DIAGNOSIS — M47.812 CERVICAL SPONDYLOSIS: ICD-10-CM

## 2023-06-19 DIAGNOSIS — I10 ESSENTIAL HYPERTENSION: ICD-10-CM

## 2023-06-19 DIAGNOSIS — Z87.19 HX OF GASTROESOPHAGEAL REFLUX (GERD): ICD-10-CM

## 2023-06-19 PROBLEM — E21.3 HYPERPARATHYROIDISM (HCC): Status: RESOLVED | Noted: 2021-11-24 | Resolved: 2023-06-19

## 2023-06-19 PROBLEM — M54.81 BILATERAL OCCIPITAL NEURALGIA: Status: RESOLVED | Noted: 2021-10-27 | Resolved: 2023-06-19

## 2023-06-19 PROBLEM — M65.90 SAPHO SYNDROME (HCC): Status: RESOLVED | Noted: 2017-05-30 | Resolved: 2023-06-19

## 2023-06-19 PROBLEM — Z01.818 PREOP TESTING: Status: RESOLVED | Noted: 2023-04-07 | Resolved: 2023-06-19

## 2023-06-19 PROBLEM — G44.86 CERVICOGENIC HEADACHE: Status: RESOLVED | Noted: 2021-10-27 | Resolved: 2023-06-19

## 2023-06-19 PROBLEM — K31.7 GASTRIC POLYPS: Status: RESOLVED | Noted: 2018-12-04 | Resolved: 2023-06-19

## 2023-06-19 PROBLEM — M79.18 MYOFASCIAL PAIN: Status: RESOLVED | Noted: 2017-05-23 | Resolved: 2023-06-19

## 2023-06-19 PROBLEM — S76.311A RIGHT HAMSTRING MUSCLE STRAIN: Status: RESOLVED | Noted: 2023-02-08 | Resolved: 2023-06-19

## 2023-06-19 PROBLEM — G57.81 NEUROPATHY OF RIGHT SURAL NERVE: Status: RESOLVED | Noted: 2020-07-12 | Resolved: 2023-06-19

## 2023-06-22 ENCOUNTER — HOSPITAL ENCOUNTER (OUTPATIENT)
Dept: CT IMAGING | Facility: HOSPITAL | Age: 68
Discharge: HOME | End: 2023-06-22
Attending: SPECIALIST
Payer: MEDICARE

## 2023-06-22 DIAGNOSIS — J34.89 PAIN OF MAXILLARY SINUS: ICD-10-CM

## 2023-06-22 PROCEDURE — 70486 CT MAXILLOFACIAL W/O DYE: CPT | Performed by: SPECIALIST

## 2023-06-23 NOTE — TELEPHONE ENCOUNTER
For you child's symptoms:    Keep close control of blood glucose and use the sliding scale amounts if needed.  Drink plenty of fluids  Children's Ibuprofen    Call or return to the clinic or your regular doctor if symptoms worsen, or new symptoms develop.     Per [address] to call Lyrica 75 mg TID #90 in to patient's pharmacy. LM on pharmacy VM calling in the above prescription. Patient informed of the above and was thankful.

## 2023-06-28 ENCOUNTER — LAB ENCOUNTER (OUTPATIENT)
Dept: LAB | Facility: HOSPITAL | Age: 68
End: 2023-06-28
Attending: INTERNAL MEDICINE
Payer: MEDICARE

## 2023-06-28 DIAGNOSIS — M81.0 AGE-RELATED OSTEOPOROSIS WITHOUT CURRENT PATHOLOGICAL FRACTURE: ICD-10-CM

## 2023-06-28 DIAGNOSIS — R73.02 IGT (IMPAIRED GLUCOSE TOLERANCE): ICD-10-CM

## 2023-06-28 DIAGNOSIS — I10 ESSENTIAL HYPERTENSION: ICD-10-CM

## 2023-06-28 LAB
ALBUMIN SERPL-MCNC: 3.7 G/DL (ref 3.4–5)
ALBUMIN/GLOB SERPL: 1 {RATIO} (ref 1–2)
ALP LIVER SERPL-CCNC: 56 U/L
ALT SERPL-CCNC: 29 U/L
ANION GAP SERPL CALC-SCNC: 4 MMOL/L (ref 0–18)
AST SERPL-CCNC: 21 U/L (ref 15–37)
BASOPHILS # BLD AUTO: 0.04 X10(3) UL (ref 0–0.2)
BASOPHILS NFR BLD AUTO: 0.8 %
BILIRUB SERPL-MCNC: 0.4 MG/DL (ref 0.1–2)
BUN BLD-MCNC: 19 MG/DL (ref 7–18)
BUN/CREAT SERPL: 22.4 (ref 10–20)
CALCIUM BLD-MCNC: 9.1 MG/DL (ref 8.5–10.1)
CHLORIDE SERPL-SCNC: 108 MMOL/L (ref 98–112)
CHOLEST SERPL-MCNC: 184 MG/DL (ref ?–200)
CO2 SERPL-SCNC: 30 MMOL/L (ref 21–32)
CREAT BLD-MCNC: 0.85 MG/DL
DEPRECATED RDW RBC AUTO: 45.1 FL (ref 35.1–46.3)
EOSINOPHIL # BLD AUTO: 0.23 X10(3) UL (ref 0–0.7)
EOSINOPHIL NFR BLD AUTO: 4.8 %
ERYTHROCYTE [DISTWIDTH] IN BLOOD BY AUTOMATED COUNT: 13.2 % (ref 11–15)
EST. AVERAGE GLUCOSE BLD GHB EST-MCNC: 108 MG/DL (ref 68–126)
FASTING PATIENT LIPID ANSWER: YES
FASTING STATUS PATIENT QL REPORTED: YES
GFR SERPLBLD BASED ON 1.73 SQ M-ARVRAT: 75 ML/MIN/1.73M2 (ref 60–?)
GLOBULIN PLAS-MCNC: 3.8 G/DL (ref 2.8–4.4)
GLUCOSE BLD-MCNC: 83 MG/DL (ref 70–99)
HBA1C MFR BLD: 5.4 % (ref ?–5.7)
HCT VFR BLD AUTO: 41.1 %
HDLC SERPL-MCNC: 84 MG/DL (ref 40–59)
HGB BLD-MCNC: 13.3 G/DL
IMM GRANULOCYTES # BLD AUTO: 0.01 X10(3) UL (ref 0–1)
IMM GRANULOCYTES NFR BLD: 0.2 %
LDLC SERPL CALC-MCNC: 90 MG/DL (ref ?–100)
LYMPHOCYTES # BLD AUTO: 1.83 X10(3) UL (ref 1–4)
LYMPHOCYTES NFR BLD AUTO: 38.2 %
MCH RBC QN AUTO: 30.2 PG (ref 26–34)
MCHC RBC AUTO-ENTMCNC: 32.4 G/DL (ref 31–37)
MCV RBC AUTO: 93.4 FL
MONOCYTES # BLD AUTO: 0.46 X10(3) UL (ref 0.1–1)
MONOCYTES NFR BLD AUTO: 9.6 %
NEUTROPHILS # BLD AUTO: 2.22 X10 (3) UL (ref 1.5–7.7)
NEUTROPHILS # BLD AUTO: 2.22 X10(3) UL (ref 1.5–7.7)
NEUTROPHILS NFR BLD AUTO: 46.4 %
NONHDLC SERPL-MCNC: 100 MG/DL (ref ?–130)
OSMOLALITY SERPL CALC.SUM OF ELEC: 295 MOSM/KG (ref 275–295)
PLATELET # BLD AUTO: 259 10(3)UL (ref 150–450)
POTASSIUM SERPL-SCNC: 4.2 MMOL/L (ref 3.5–5.1)
PROT SERPL-MCNC: 7.5 G/DL (ref 6.4–8.2)
RBC # BLD AUTO: 4.4 X10(6)UL
SODIUM SERPL-SCNC: 142 MMOL/L (ref 136–145)
TRIGL SERPL-MCNC: 48 MG/DL (ref 30–149)
TSI SER-ACNC: 2.62 MIU/ML (ref 0.36–3.74)
VIT D+METAB SERPL-MCNC: 49.7 NG/ML (ref 30–100)
VLDLC SERPL CALC-MCNC: 8 MG/DL (ref 0–30)
WBC # BLD AUTO: 4.8 X10(3) UL (ref 4–11)

## 2023-06-28 PROCEDURE — 83036 HEMOGLOBIN GLYCOSYLATED A1C: CPT | Performed by: INTERNAL MEDICINE

## 2023-06-28 PROCEDURE — 82306 VITAMIN D 25 HYDROXY: CPT

## 2023-06-28 PROCEDURE — 80061 LIPID PANEL: CPT

## 2023-06-28 PROCEDURE — 36415 COLL VENOUS BLD VENIPUNCTURE: CPT | Performed by: INTERNAL MEDICINE

## 2023-06-28 PROCEDURE — 85025 COMPLETE CBC W/AUTO DIFF WBC: CPT

## 2023-06-28 PROCEDURE — 80053 COMPREHEN METABOLIC PANEL: CPT

## 2023-06-28 PROCEDURE — 84443 ASSAY THYROID STIM HORMONE: CPT | Performed by: INTERNAL MEDICINE

## 2023-06-29 ENCOUNTER — OFFICE VISIT (OUTPATIENT)
Dept: OTOLARYNGOLOGY | Facility: CLINIC | Age: 68
End: 2023-06-29

## 2023-06-29 VITALS — BODY MASS INDEX: 21.52 KG/M2 | WEIGHT: 142 LBS | HEIGHT: 68 IN

## 2023-06-29 DIAGNOSIS — J34.2 NASAL SEPTAL DEVIATION: ICD-10-CM

## 2023-06-29 DIAGNOSIS — J34.1 MAXILLARY SINUS CYST: ICD-10-CM

## 2023-06-29 DIAGNOSIS — J34.89 PAIN OF MAXILLARY SINUS: Primary | ICD-10-CM

## 2023-06-29 PROCEDURE — 99213 OFFICE O/P EST LOW 20 MIN: CPT | Performed by: SPECIALIST

## 2023-07-06 ENCOUNTER — NURSE ONLY (OUTPATIENT)
Dept: ENDOCRINOLOGY CLINIC | Facility: CLINIC | Age: 68
End: 2023-07-06

## 2023-07-06 ENCOUNTER — TELEPHONE (OUTPATIENT)
Dept: ENDOCRINOLOGY CLINIC | Facility: CLINIC | Age: 68
End: 2023-07-06

## 2023-07-06 DIAGNOSIS — M81.0 AGE-RELATED OSTEOPOROSIS WITHOUT CURRENT PATHOLOGICAL FRACTURE: Primary | ICD-10-CM

## 2023-07-06 PROCEDURE — 96372 THER/PROPH/DIAG INJ SC/IM: CPT | Performed by: INTERNAL MEDICINE

## 2023-07-06 NOTE — TELEPHONE ENCOUNTER
Patient calling regards appointment for injection scheduled today at 11am asking if there is anything available sooner than that time, please call.

## 2023-07-06 NOTE — TELEPHONE ENCOUNTER
Dr. Radha Mcgarry    Patient came in today for Prolia. She has an appointment with you on 7/10. Patient has not completed prolia blood work since 2022. Patient stated that she was told to wait a few weeks while prolia is in system before getting blood work done. I did order the remaining labs per protocol and told patient I will confirm this with you. Please advise.     Endo staff: Patient requests [employer] message regarding blood work

## 2023-07-07 NOTE — TELEPHONE ENCOUNTER
Since she has now been on prolia for several injections she can go ahead with lab work right away before appt. Thanks.

## 2023-07-10 ENCOUNTER — OFFICE VISIT (OUTPATIENT)
Dept: ENDOCRINOLOGY CLINIC | Facility: CLINIC | Age: 68
End: 2023-07-10

## 2023-07-10 ENCOUNTER — LAB ENCOUNTER (OUTPATIENT)
Dept: LAB | Facility: HOSPITAL | Age: 68
End: 2023-07-10
Attending: INTERNAL MEDICINE
Payer: MEDICARE

## 2023-07-10 VITALS
BODY MASS INDEX: 22 KG/M2 | HEART RATE: 71 BPM | DIASTOLIC BLOOD PRESSURE: 81 MMHG | WEIGHT: 142 LBS | SYSTOLIC BLOOD PRESSURE: 135 MMHG

## 2023-07-10 DIAGNOSIS — M81.0 AGE-RELATED OSTEOPOROSIS WITHOUT CURRENT PATHOLOGICAL FRACTURE: ICD-10-CM

## 2023-07-10 DIAGNOSIS — M81.0 AGE-RELATED OSTEOPOROSIS WITHOUT CURRENT PATHOLOGICAL FRACTURE: Primary | ICD-10-CM

## 2023-07-10 LAB — PTH-INTACT SERPL-MCNC: 46 PG/ML (ref 18.5–88)

## 2023-07-10 PROCEDURE — 83970 ASSAY OF PARATHORMONE: CPT

## 2023-07-10 PROCEDURE — 1126F AMNT PAIN NOTED NONE PRSNT: CPT | Performed by: INTERNAL MEDICINE

## 2023-07-10 PROCEDURE — 99213 OFFICE O/P EST LOW 20 MIN: CPT | Performed by: INTERNAL MEDICINE

## 2023-07-10 PROCEDURE — 84080 ASSAY ALKALINE PHOSPHATASES: CPT

## 2023-07-10 PROCEDURE — 36415 COLL VENOUS BLD VENIPUNCTURE: CPT

## 2023-07-11 ENCOUNTER — TELEPHONE (OUTPATIENT)
Dept: PHYSICAL MEDICINE AND REHAB | Facility: CLINIC | Age: 68
End: 2023-07-11

## 2023-07-11 NOTE — TELEPHONE ENCOUNTER
Pt called looking to speak with clinical team to see about what she should do in relation to her sciatica.  Pt is in a lot of pain and doesn't think she would be able to wait till 7/31

## 2023-07-12 ENCOUNTER — HOSPITAL ENCOUNTER (OUTPATIENT)
Dept: MAMMOGRAPHY | Facility: HOSPITAL | Age: 68
Discharge: HOME | End: 2023-07-12
Attending: INTERNAL MEDICINE
Payer: MEDICARE

## 2023-07-12 DIAGNOSIS — Z12.31 ENCOUNTER FOR SCREENING MAMMOGRAM FOR MALIGNANT NEOPLASM OF BREAST: ICD-10-CM

## 2023-07-12 PROCEDURE — 77063 BREAST TOMOSYNTHESIS BI: CPT | Performed by: INTERNAL MEDICINE

## 2023-07-12 PROCEDURE — 77067 SCR MAMMO BI INCL CAD: CPT | Performed by: INTERNAL MEDICINE

## 2023-07-12 NOTE — TELEPHONE ENCOUNTER
Pt having tingling in toes and heels. Pt having bad neuropathy symptoms, and looking for sooner appt. Patient was offered two sooner appointments, but she declined them as they were later in the day. Patient decided to keep her 7/31/23 appt. Patient stated she began to take her Lyrica again. Patiented asked is patient ok to continue pregabalin 50 mg until she sees dr. Jam Ward on 7/31/2023? Patient stopped taking and began to take again when symptoms worsened recently    Rn advised since this was prescribed for her nerve pain, that it would be ok for her to resume it. Educated patient on taking as directed.

## 2023-07-15 LAB — ALKALINE PHOSPHATASE BONE SPECIFIC: 8.2 UG/L

## 2023-07-25 DIAGNOSIS — M51.9 THORACIC DISC DISEASE: ICD-10-CM

## 2023-07-25 DIAGNOSIS — M54.12 CERVICAL RADICULOPATHY: ICD-10-CM

## 2023-07-25 DIAGNOSIS — M50.20 CERVICAL HERNIATED DISC: ICD-10-CM

## 2023-07-25 DIAGNOSIS — M79.18 MYOFASCIAL PAIN: ICD-10-CM

## 2023-07-25 DIAGNOSIS — M54.16 LUMBAR RADICULOPATHY: ICD-10-CM

## 2023-07-25 DIAGNOSIS — M48.02 CERVICAL SPINAL STENOSIS: ICD-10-CM

## 2023-07-25 DIAGNOSIS — M47.812 CERVICAL FACET SYNDROME: ICD-10-CM

## 2023-07-25 DIAGNOSIS — M50.90 CERVICAL DISC DISEASE: ICD-10-CM

## 2023-07-25 DIAGNOSIS — G56.21 ULNAR NEUROPATHY AT ELBOW OF RIGHT UPPER EXTREMITY: ICD-10-CM

## 2023-07-25 RX ORDER — PREGABALIN 50 MG/1
50 CAPSULE ORAL 3 TIMES DAILY
Qty: 90 CAPSULE | Refills: 3 | Status: SHIPPED | OUTPATIENT
Start: 2023-07-25 | End: 2023-07-31

## 2023-07-25 NOTE — TELEPHONE ENCOUNTER
Refill Request    Medication request: pregabalin 50 MG Oral Cap   Take 1 capsule (50 mg total) by mouth 3 (three) times daily. Walter Rios MD   Due back to clinic per last office note:  2-3 months  NOV: 7/31/2023 Philip Willoughby MD      [place]: 01/13/23 #90 R-3    Urine drug screen (if applicable): n/a  Pain contract: n/a    LOV plan (if weaning or changing medications): no mention of change at [address] [place] [address] on 05/03/23. OV note from 02/08/23, \"She will use the Lyrica as needed for the pain.  \"

## 2023-07-25 NOTE — TELEPHONE ENCOUNTER
Pt called asking for a refill on her pregabalin 50 MG Oral Cap  to [facility]. Per pt she thinks she might have enough to get her to the weekend, but will not have enough to last her to her apt with  on 7/31.

## 2023-07-25 NOTE — MR AVS SNAPSHOT
Fredi Mandel [address]  [phone]               Thank you for choosing us for your health care visit with Rodrigo Michael PT.   We are glad to serve you and happy to provide you wit [facility] Visit By Therapist with Rohini Ashton PT, [facility] [address] [facility] ([address])    [address]   [phone]           Please arrive at your sche rolling walker

## 2023-07-31 ENCOUNTER — OFFICE VISIT (OUTPATIENT)
Dept: PHYSICAL MEDICINE AND REHAB | Facility: CLINIC | Age: 68
End: 2023-07-31
Payer: MEDICARE

## 2023-07-31 VITALS — BODY MASS INDEX: 21.22 KG/M2 | WEIGHT: 140 LBS | HEIGHT: 68 IN

## 2023-07-31 DIAGNOSIS — M79.18 MYOFASCIAL PAIN: ICD-10-CM

## 2023-07-31 DIAGNOSIS — M54.16 LUMBAR RADICULOPATHY: ICD-10-CM

## 2023-07-31 DIAGNOSIS — Z98.1 HISTORY OF LUMBAR FUSION: Primary | ICD-10-CM

## 2023-07-31 DIAGNOSIS — M50.20 CERVICAL HERNIATED DISC: ICD-10-CM

## 2023-07-31 DIAGNOSIS — M50.90 CERVICAL DISC DISEASE: ICD-10-CM

## 2023-07-31 DIAGNOSIS — M47.812 CERVICAL FACET SYNDROME: ICD-10-CM

## 2023-07-31 DIAGNOSIS — M48.02 CERVICAL SPINAL STENOSIS: ICD-10-CM

## 2023-07-31 DIAGNOSIS — G56.21 ULNAR NEUROPATHY AT ELBOW OF RIGHT UPPER EXTREMITY: ICD-10-CM

## 2023-07-31 DIAGNOSIS — M54.12 CERVICAL RADICULOPATHY: ICD-10-CM

## 2023-07-31 DIAGNOSIS — M51.9 THORACIC DISC DISEASE: ICD-10-CM

## 2023-07-31 PROCEDURE — 1125F AMNT PAIN NOTED PAIN PRSNT: CPT | Performed by: PHYSICAL MEDICINE & REHABILITATION

## 2023-07-31 PROCEDURE — 99214 OFFICE O/P EST MOD 30 MIN: CPT | Performed by: PHYSICAL MEDICINE & REHABILITATION

## 2023-07-31 RX ORDER — PREGABALIN 50 MG/1
50 CAPSULE ORAL 3 TIMES DAILY
Qty: 90 CAPSULE | Refills: 3 | Status: SHIPPED | OUTPATIENT
Start: 2023-07-31

## 2023-07-31 NOTE — PATIENT INSTRUCTIONS
Plan  I will perform an EMG of the bilateral legs and feet to assess for a peripheral neuropathy versus a S1 radiculopathy. If she has S1 radiculopathies, than this could be due to nerve recovery since her last surgery or the possibility of a spine issue. She will resume the Lyrica 50 mg 3 times a day. She will let me know if this is not helping her, then I will increase this dose.     She will follow up with me after having the EMG test.

## 2023-08-01 NOTE — TELEPHONE ENCOUNTER
Completed tier exception letter for Pregabalin. Enclosed LOV note. Placed on Dr. Jeremias Florentino desk for signature. Will Fax to given number, copy and send for scanning once signed. done

## 2023-08-07 ENCOUNTER — PATIENT MESSAGE (OUTPATIENT)
Dept: PHYSICAL MEDICINE AND REHAB | Facility: CLINIC | Age: 68
End: 2023-08-07

## 2023-08-07 NOTE — TELEPHONE ENCOUNTER
LMTCB. Per patient's [employer] message: \"Pls let me know if this will happen BEFORE it happens, if it does. Pls call me with answer. \"    Rx pended. Will call rx in to patient's [address] once she calls back to confirm prescription.

## 2023-08-08 RX ORDER — PREGABALIN 100 MG/1
100 CAPSULE ORAL 3 TIMES DAILY
Qty: 90 CAPSULE | Refills: 0 | OUTPATIENT
Start: 2023-08-08

## 2023-08-08 NOTE — TELEPHONE ENCOUNTER
SW patient and advised that Dr. Farhana Arriola has approved prescribing 100 mg of pregabalin TID. Patient asked about interactions with Trazadone and Melatonin, however then said she hasn't taken Trazadone in months and doesn't plan to take. This RN advised that Melatonin is ok to take as it works as a natural hormone in the body, not a sedative. Patient asked if it would be ok if she could take 250 mg instead of the 300 mg. This RN advised if she wanted to ramp up slowly, she could increase to take 100 mg when she is typically in more pain and keep taking 50 mg the other two times and then increase to 2 times a day the 100 mg  and see when she starts seeing improvement. Advised she can discuss dosage she feels comfortable at with Dr. Farhana Arriola at her next appointment when she sees him for her EMG on 9/5/2023 or to call prior if she has any issues or questions. Patient asked about insurance because she just had her 50 mg 90 day supply filled at [facility].  This RN advised that this is a new dosage, but to check with [facility], as different insurance companies are different with that. They may ask her to finish off / double up on her 50 mg or they may fill the 100 mg. Patient verbalized understanding and had no further questions. This RN phoned in the prescription the [facility]'s [address] after confirming the pharmacy and location with her. Nothing further needed at this time.

## 2023-09-01 ENCOUNTER — TELEPHONE (OUTPATIENT)
Dept: PHYSICAL MEDICINE AND REHAB | Facility: CLINIC | Age: 68
End: 2023-09-01

## 2023-09-25 NOTE — TELEPHONE ENCOUNTER
Respiratory infection triage:    Fever:  [x]  No fever  []  Fever>100.4    Cough:  [] Tight cough  [] Cough with exertion  [x] Dry cough  [] Sputum production, Color:     Breathing:  [] Mild shortness of breath interfering with activity  [] Wheezing  [] Pa ED MD

## 2023-10-10 ENCOUNTER — PROCEDURE VISIT (OUTPATIENT)
Dept: PHYSICAL MEDICINE AND REHAB | Facility: CLINIC | Age: 68
End: 2023-10-10
Payer: MEDICARE

## 2023-10-10 ENCOUNTER — TELEPHONE (OUTPATIENT)
Dept: PHYSICAL MEDICINE AND REHAB | Facility: CLINIC | Age: 68
End: 2023-10-10

## 2023-10-10 DIAGNOSIS — M54.16 LUMBAR RADICULOPATHY: Primary | ICD-10-CM

## 2023-10-10 PROCEDURE — 95911 NRV CNDJ TEST 9-10 STUDIES: CPT | Performed by: PHYSICAL MEDICINE & REHABILITATION

## 2023-10-10 PROCEDURE — 95886 MUSC TEST DONE W/N TEST COMP: CPT | Performed by: PHYSICAL MEDICINE & REHABILITATION

## 2023-10-10 NOTE — TELEPHONE ENCOUNTER
Pt called office asking if she should stop her Lyrica. Per pt she denies any side effects, however she states her libido is low and she wants to cut down on some medications. Asked Dr Nikita Roque and per Dr Nikita Roque pt can stop Lyrica and follow up in 2 weeks via phone on how she is doing. If pt wants to restart Lyrica, pt needs to schedule a follow up appointment with Dr Nikita Roque. Pt verbalized understanding. No further actions needed for now. Closing the encounter.

## 2023-11-06 ENCOUNTER — OFFICE VISIT (OUTPATIENT)
Dept: INTERNAL MEDICINE CLINIC | Facility: CLINIC | Age: 68
End: 2023-11-06

## 2023-11-06 ENCOUNTER — TELEPHONE (OUTPATIENT)
Dept: PHYSICAL MEDICINE AND REHAB | Facility: CLINIC | Age: 68
End: 2023-11-06

## 2023-11-06 VITALS
TEMPERATURE: 98 F | BODY MASS INDEX: 21.22 KG/M2 | OXYGEN SATURATION: 96 % | WEIGHT: 140 LBS | SYSTOLIC BLOOD PRESSURE: 110 MMHG | HEIGHT: 68 IN | HEART RATE: 50 BPM | DIASTOLIC BLOOD PRESSURE: 74 MMHG

## 2023-11-06 DIAGNOSIS — R68.82 DECREASED LIBIDO: ICD-10-CM

## 2023-11-06 DIAGNOSIS — G47.00 INSOMNIA, UNSPECIFIED TYPE: Primary | ICD-10-CM

## 2023-11-06 PROCEDURE — 1126F AMNT PAIN NOTED NONE PRSNT: CPT | Performed by: INTERNAL MEDICINE

## 2023-11-06 PROCEDURE — 99214 OFFICE O/P EST MOD 30 MIN: CPT | Performed by: INTERNAL MEDICINE

## 2023-11-06 RX ORDER — AMLODIPINE BESYLATE 10 MG/1
10 TABLET ORAL DAILY
Qty: 90 TABLET | Refills: 3 | Status: SHIPPED | OUTPATIENT
Start: 2023-11-06

## 2023-11-06 NOTE — TELEPHONE ENCOUNTER
Patient had back surgery about a year ago, but last two months she has had more pain. Patient has had a couple of X-rays with [facility] but she is upset as the surgeon stated he does not want to do another MRI/CT at this time. Patient is hoping to get an order for an MRI from Dr. Colleen Agarwal. This RN advised that Dr. Colleen Agarwal may be ok with ordering one, but often for insurance, it is best to have an exam that will justify this and she has appt to discuss EMG results with him on 11/15/2023. Patient stated she will discuss her back pain with him at that time too. She stated she is going to her PCP today and will see if they will order MRI, and if not she will ask Dr. Colleen Agarwal next week. Patient asked questions about Lyrica and Gabapentin- this RN answered patient's questions. Patient stated she has trouble w/ many medications due to stomach issues, she has had pancreatitis three times and is very sensitive to narcotic pain meds. Patient thanked this RN for the information and stated she will wait until next week to speak to Dr. Colleen Agarwal in person if needed. This RN advised she would bring up to Dr. Colleen Agarwal, and if he had anything he could recommend before seeing her, we will call her back, otherwise Dr. Colleen Agarwal would see her next week. Patient stated she is going for XR at [facility] on 11/07/2023, this RN suggested that the patient get her imaging uploaded on a disc so that she can bring to the appointment with Dr. Colleen Agarwal, so he could review. Patient verbalized understanding.

## 2023-11-15 ENCOUNTER — OFFICE VISIT (OUTPATIENT)
Dept: PHYSICAL MEDICINE AND REHAB | Facility: CLINIC | Age: 68
End: 2023-11-15
Payer: MEDICARE

## 2023-11-15 VITALS — BODY MASS INDEX: 21 KG/M2 | WEIGHT: 140 LBS

## 2023-11-15 DIAGNOSIS — M50.90 CERVICAL DISC DISEASE: ICD-10-CM

## 2023-11-15 DIAGNOSIS — M48.02 CERVICAL SPINAL STENOSIS: ICD-10-CM

## 2023-11-15 DIAGNOSIS — M47.894 THORACIC FACET SYNDROME: ICD-10-CM

## 2023-11-15 DIAGNOSIS — Z98.1 S/P CERVICAL SPINAL FUSION: ICD-10-CM

## 2023-11-15 DIAGNOSIS — Z98.1 HISTORY OF LUMBAR FUSION: Primary | ICD-10-CM

## 2023-11-15 DIAGNOSIS — M51.9 THORACIC DISC DISEASE: ICD-10-CM

## 2023-11-15 DIAGNOSIS — M54.16 LUMBAR RADICULOPATHY: ICD-10-CM

## 2023-11-15 PROCEDURE — 1125F AMNT PAIN NOTED PAIN PRSNT: CPT | Performed by: PHYSICAL MEDICINE & REHABILITATION

## 2023-11-15 PROCEDURE — 99214 OFFICE O/P EST MOD 30 MIN: CPT | Performed by: PHYSICAL MEDICINE & REHABILITATION

## 2023-11-15 NOTE — PROGRESS NOTES
Low Back Pain H & P    Chief Complaint:   Chief Complaint   Patient presents with    Follow - Up     Pt here for a follow up appointment. Pt reports middle upper back pain, constant however worst with bending. Pt reports pain started since  couple months ago, achy, sharp pulling pain. Tried PT and massage (little help), denies RX at the moment. Tried Tizanidine in the past (little help temporary). Pt had an [facility], doesn't have disc with her. Pain today 08/10. Denies Numbness/Tingling. Nursing note reviewed and verified. Patient was last seen on 7/31/2023. On 10/10/2023, I did the EMG of the bilateral legs. The leg pain resolved about 1-2 weeks after the EMG test.  Then about 2 months ago, she developed mid back pain. She will have this with flexion and extension. She is still doing her HEP. She was last in PT for this about 3 weeks ago, but this was for her shoulder. The pain is at the bra line which is above the level of her prior surgery. .     Past Medical History   Past Medical History:   Diagnosis Date    Anemia     Anxiety state, unspecified     Back problem     CERVICAL RADICULOPATHY, CERVICAL SPINAL STENOSIS    Bilateral kidney stones 09/23/2008    Broken foot 03/14/2012    RT - casting. Fracture 5th met. Cast removl/xray foot 4-20-12. Follow up fracture right foot 5-22-12.      Calculus of kidney 2008    Cataract     Depression     Esophageal reflux     Essential hypertension     Gastric polyps 12/04/2018    Gastritis     High blood pressure     Hx of motion sickness     hx of vertigo    Idiopathic acute pancreatitis 2002, 2010    Insomnia     Internal hemorrhoids without complication 82/75/5819    Intestinal disorder     Left wrist fracture 2011 and 2013    Muscle weakness     Osteoarthritis     Osteoporosis     Other chronic pancreatitis ([facility] [address] 11/24/2021    Renal disorder     Rotator cuff tear, right     Traumatic arthritis     Vertigo        Past Surgical History   Past Surgical History:   Procedure Laterality Date    ARTHROSCOPY OF JOINT UNLISTED Right 2012    rotator cuff tear          x 3    CATARACT      CHOLECYSTECTOMY      COLONOSCOPY  2009    COLONOSCOPY N/A 2018    Procedure: COLONOSCOPY;  Surgeon: Cory Montague MD;  Location: [facility]      EYE SURGERY      Lasik procedure    FRACTURE SURGERY Left     WRIST FRACTURE ORIF    FRACTURE SURGERY Left     ORIF wrist fracture revision with plates and screws- Ortho Dr Jessica Smith  2017    Fussion L4-L5 - Dr. Horace Haywood  03/15/2022    Back Surgery    OTHER SURGICAL HISTORY Left     torn biceops repair    SHOULDER SURG PROC UNLISTED Right     SPINE SURGERY PROCEDURE UNLISTED      L1-L2 FUSION    SPINE SURGERY PROCEDURE UNLISTED  2017    Neck fusion    TONSILLECTOMY      with Adenoidectomy    UPPER GI ENDOSCOPY,EXAM      EGD        Family History   Family History   Problem Relation Age of Onset    Pulmonary Disease Father     Dementia Father             Heart Disease Mother     Fibromyalgia Mother             Colon Cancer Maternal Grandfather     Cancer Maternal Grandfather     Polyps Sister         colon polyps    Colon Cancer Sister     Other (gallbladder disease) Sister     Crohn's Disease Other     Heart Disease Other     Ovarian Cancer Maternal Aunt         70's 80's    Breast Cancer Neg        Social History   Social History     Socioeconomic History    Marital status:      Spouse name: Not on file    Number of children: Not on file    Years of education: Not on file    Highest education level: Not on file   Occupational History    Not on file   Tobacco Use    Smoking status: Never    Smokeless tobacco: Never   Vaping Use    Vaping Use: Never used   Substance and Sexual Activity    Alcohol use: No     Comment: quit 4 - 5 yrs ago due to pancreatitis    Drug use:  No Sexual activity: Not on file   Other Topics Concern     Service Not Asked    Blood Transfusions Not Asked    Caffeine Concern No     Comment: Coffee 2 cups daily    Occupational Exposure Not Asked    Hobby Hazards Not Asked    Sleep Concern Not Asked    Stress Concern Not Asked    Weight Concern Not Asked    Special Diet Not Asked    Back Care Not Asked    Exercise No    Bike Helmet Not Asked    Seat Belt Not Asked    Self-Exams Not Asked   Social History Narrative    The patient does not use an assistive device. .      The patient does live in a home with stairs. Social Determinants of Health     Financial Resource Strain: Not on file   Food Insecurity: Not on file   Transportation Needs: Not on file   Physical Activity: Not on file   Stress: Not on file   Social Connections: Not on file   Housing Stability: Not on file       PE:  The patient does appear in her stated age in no distress. The patient is well groomed. Psychiatric:  The patient is alert and oriented x 3. The patient has a normal affect and mood. Respiratory:  No acute respiratory distress. Patient does not have a cough. HEENT:  Extraocular muscles are intact. There is no kern icterus. Pupils are equal, round, and reactive to light. No redness or discharge bilaterally. Skin:  There are no rashes or lesions. Surgical scars are healed. Vitals: There were no vitals filed for this visit. Cervical Spine:    Posture: mild-moderate chin forward superiorly rotated protracted shoulder posture. Shoulders: Level   Head: In neutral   Spinous Processes Palpations: Tender at T9   Z-Joints Palpations: Tender at  bilateral T9-10     Neurological Upper Extremity:    Light Touch: Intact in Bilateral thoracic dermatomes     Gait:    Gait: Normal gait   Sit to Stand: no difficulty          Assessment  1. History of lumbar fusion: T10-S1 with bilateral SIJ fusions on 3/15/2022    2.  left > right S1 radiculitis clinically, right L5 subacute-chronic radiculopathy on EMG    3. S/P cervical spinal fusion: C4-5 & C5-6 ACDF    4. T9-10 left mild-mod & right mild bulging disc    5. Thoracic facet syndrome: bilateral T9-10    6. C3-4 moderate central & left foraminal, C6-7 left mild-moderate foraminal, C7-T1 left mild-moderate foraminal stenosis    7. C2-3 mild-mod diffuse, C3-4 left mod foraminal & mild-moderate diffuse, C6-7 right mild-moderate & left moderate foraminal, C7-T1 left mild foraminal bulging discs         Plan  She will get into the PT for the thoracic spine. If this is not helping after she has done 1 month of the PT, then I will do bilateral T8 and T9 medial branch blocks to be followed by radiofrequency neurotomies if she gets good relief. She will use the Tizanidine as needed. She will follow up in 2-3 months or sooner if needed. The patient understands and agrees with the stated plan. Juan Ramon Baptiste MD  11/15/2023

## 2023-11-15 NOTE — PATIENT INSTRUCTIONS
Plan  She will get into the PT for the thoracic spine. If this is not helping after she has done 1 month of the PT, then I will do bilateral T8 and T9 medial branch blocks to be followed by radiofrequency neurotomies if she gets good relief. She will use the Tizanidine as needed. She will follow up in 2-3 months or sooner if needed.

## 2023-11-22 ENCOUNTER — TELEPHONE (OUTPATIENT)
Dept: INTERNAL MEDICINE CLINIC | Facility: CLINIC | Age: 68
End: 2023-11-22

## 2023-11-22 NOTE — TELEPHONE ENCOUNTER
Patient is calling and would like to know if she completed her mammogram and if so could a nurse call her back with the results.      Patient states she feels something under her right arm.    Please call and advise    Ok to leave message on her voicemail

## 2023-11-27 ENCOUNTER — TELEPHONE (OUTPATIENT)
Dept: OTOLARYNGOLOGY | Facility: CLINIC | Age: 68
End: 2023-11-27

## 2023-11-27 NOTE — TELEPHONE ENCOUNTER
Contacted patient and still having sinus symptoms after finishing medication course following IC visit. Has sinus pressure, headaches, eye discomfort and nasal burning, is requesting an earlier appointment. Earlier appointment made. Future Appointments   Date Time Provider Caroline Marah   11/28/2023  9:00 AM Leti Salinas MD [facility]   12/4/2023  7:40 AM Leti Salinas MD [address] [facility]   12/5/2023  3:00 PM MD MAURICIO Lance   12/20/2023  2:50 PM Isabella Rich MD [address] [facility]   1/22/2024  9:30 AM EC CLARIBEL ENDO IWONA SALAS   1/29/2024  9:30 AM EC CLARIBEL ENDO RN BRIGETTE [facility]   Patient wants to keep 12/04/2023 appointment if follow up needed, will decide tomorrow after seeing Dr. Angelina Neal.

## 2023-11-27 NOTE — TELEPHONE ENCOUNTER
Pt has sinus inf - went to ER 10 days ago - finished med - pt still has eye pain , pressure, headaches nose is burning - has appt for 12/4 - asking if she should be seen sooner

## 2023-11-28 ENCOUNTER — OFFICE VISIT (OUTPATIENT)
Dept: OTOLARYNGOLOGY | Facility: CLINIC | Age: 68
End: 2023-11-28

## 2023-11-28 ENCOUNTER — PATIENT MESSAGE (OUTPATIENT)
Dept: OTOLARYNGOLOGY | Facility: CLINIC | Age: 68
End: 2023-11-28

## 2023-11-28 DIAGNOSIS — R04.0 EPISTAXIS: ICD-10-CM

## 2023-11-28 DIAGNOSIS — J01.90 ACUTE SINUSITIS, RECURRENCE NOT SPECIFIED, UNSPECIFIED LOCATION: Primary | ICD-10-CM

## 2023-11-28 PROCEDURE — 99213 OFFICE O/P EST LOW 20 MIN: CPT | Performed by: SPECIALIST

## 2023-11-28 RX ORDER — PREDNISONE 20 MG/1
20 TABLET ORAL DAILY
Qty: 3 TABLET | Refills: 0 | Status: SHIPPED | OUTPATIENT
Start: 2023-11-28

## 2023-11-28 RX ORDER — AMOXICILLIN 875 MG/1
875 TABLET, COATED ORAL 2 TIMES DAILY
Qty: 28 TABLET | Refills: 0 | Status: SHIPPED | OUTPATIENT
Start: 2023-11-28

## 2023-11-28 NOTE — PROGRESS NOTES
Nirmal Stoner is a 70year old female. Chief Complaint   Patient presents with    Sinus Problem     Reports sinus pain x 2 weeks. Reports going to urgent care and taking an antibiotic, reports no improvement in symptoms. Reports sinus  pressure. HPI:   Patient here for sinus pressure. Tried doxycycline without resolution of her symptoms. Also has had some right epistaxis. Current Outpatient Medications   Medication Sig Dispense Refill    predniSONE 20 MG Oral Tab Take 1 tablet (20 mg total) by mouth daily. 3 tablet 0    amoxicillin 875 MG Oral Tab Take 1 tablet (875 mg total) by mouth 2 (two) times daily. 28 tablet 0    amLODIPine 10 MG Oral Tab Take 1 tablet (10 mg total) by mouth daily. 90 tablet 3    pregabalin 100 MG Oral Cap Take 1 capsule (100 mg total) by mouth in the morning, at noon, and at bedtime. 90 capsule 0    Calcium Carbonate-Vitamin D 600-5 MG-MCG Oral Tab Take 1 tablet by mouth daily. sucralfate (CARAFATE) 1 g Oral Tab Take 1 tablet (1 g total) by mouth 4 (four) times daily before meals and nightly. (Patient taking differently: Take 1 tablet (1 g total) by mouth 4 (four) times daily before meals and nightly. PRN) 120 tablet 0    metoclopramide 10 MG Oral Tab Take 1 tablet (10 mg total) by mouth 3 (three) times daily as needed. 30 tablet 0    meclizine 25 MG Oral Tab Take 1 tablet (25 mg total) by mouth 3 (three) times daily as needed. 30 tablet 1    rOPINIRole 0.25 MG Oral Tab Take 1 tablet (0.25 mg total) by mouth nightly. 30 tablet 5    TRAZODONE 50 MG Oral Tab TAKE 1 TABLET BY MOUTH EVERY DAY AT NIGHT 90 tablet 3    ESTRADIOL 0.1 MG/GM Vaginal Cream INSERT 1/2 GRAM VAGINALLY TWICE A WEEK 42.5 g 10    acetaminophen 500 MG Oral Tab Take 2 tablets (1,000 mg total) by mouth every 8 (eight) hours. tiZANidine 4 MG Oral Tab as needed.       pantoprazole 40 MG Oral Tab EC Take 1 tablet (40 mg total) by mouth every morning before breakfast.      Acidophilus/Pectin Oral Cap Take 1 capsule by mouth daily. Ascorbic Acid (VITAMIN C) 1000 MG Oral Tab Take 1 tablet (1,000 mg total) by mouth daily. Famotidine (PEPCID OR) Take 20 mg by mouth. Daily PRN       Cholecalciferol 25 MCG (1000 UT) Oral Tab Take by mouth daily. Diclofenac Sodium 1 % Transdermal Gel Apply 4 g topically 4 (four) times daily as needed. 1 Tube 3    CALCIUM CITRATE OR Take 1,400 mg by mouth daily. BID      Multiple Vitamins Oral Tab Take 1 tablet by mouth daily. Multiple Vitamins tablet        Past Medical History:   Diagnosis Date    Anemia     Anxiety state, unspecified     Back problem     CERVICAL RADICULOPATHY, CERVICAL SPINAL STENOSIS    Bilateral kidney stones 09/23/2008    Broken foot 03/14/2012    RT - casting. Fracture 5th met. Cast removl/xray foot 4-20-12. Follow up fracture right foot 5-22-12. Calculus of kidney 2008    Cataract     Depression     Esophageal reflux     Essential hypertension     Gastric polyps 12/04/2018    Gastritis     High blood pressure     Hx of motion sickness     hx of vertigo    Idiopathic acute pancreatitis 2002, 2010    Insomnia     Internal hemorrhoids without complication 85/12/0991    Intestinal disorder     Left wrist fracture 2011 and 2013    Muscle weakness     Osteoarthritis     Osteoporosis     [facility] 11/24/2021    Renal disorder     Rotator cuff tear, right     Traumatic arthritis     Vertigo       Social History:  Social History     Socioeconomic History    Marital status:    Tobacco Use    Smoking status: Never    Smokeless tobacco: Never   Vaping Use    Vaping Use: Never used   Substance and Sexual Activity    Alcohol use: No     Comment: quit 4 - 5 yrs ago due to pancreatitis    Drug use: No   Other Topics Concern    Caffeine Concern No     Comment: Coffee 2 cups daily    Exercise No   Social History Narrative    The patient does not use an assistive device. .      The patient does live in a home with stairs.         REVIEW OF SYSTEMS:   GENERAL HEALTH: feels well otherwise  GENERAL : denies fever, chills, sweats, weight loss, weight gain  SKIN: denies any unusual skin lesions or rashes  RESPIRATORY: denies shortness of breath with exertion  NEURO: denies headaches    EXAM:   There were no vitals taken for this visit. System Details   Skin Inspection - Normal.   Constitutional Overall appearance - Normal.   Head/Face Facial features - Normal. Eyebrows - Normal. Skull - Normal.   Eyes Conjunctiva - Right: Normal, Left: Normal. Pupil - Right: Normal, Left: Normal.    Ears Inspection - Right: Normal, Left: Normal.   Canal - Right: Normal, Left: Normal.   TM - Right: Normal, Left: Normal.   Nasal External nose - Normal.   Nasal septum - Normal.  Turbinates -turbinate congestion and purulence in the bilateral nares. Blood in the right middle meatus. Oral/Oropharynx Lips - Normal, Tonsils - Normal, Tongue - Normal    Neck Exam Inspection - Normal. Palpation - Normal. Parotid gland - Normal. Thyroid gland - Normal.   Lymph Detail Submental. Submandibular. Anterior cervical. Posterior cervical. Supraclavicular all without enlargement   Psychiatric Orientation - Oriented to time, place, person & situation. Appropriate mood and affect. Neurological Memory - Normal. Cranial nerves - Cranial nerves II through XII grossly intact. ASSESSMENT AND PLAN:   1. Acute sinusitis, recurrence not specified, unspecified location  Patient placed on trial of amoxicillin and 3 days of prednisone. 2. Epistaxis  Blood in the right middle meatus. Check on follow-up visit in 3 weeks time. The patient indicates understanding of these issues and agrees to the plan.       Palma Aden MD  11/28/2023  9:23 AM

## 2023-11-28 NOTE — PATIENT INSTRUCTIONS
You were placed on 3 days of prednisone and 2 weeks of amoxicillin for your acute bacterial sinusitis. Follow-up in 3 weeks time, sooner if problems.

## 2023-11-29 ENCOUNTER — OFFICE VISIT (OUTPATIENT)
Dept: INTERNAL MEDICINE CLINIC | Facility: CLINIC | Age: 68
End: 2023-11-29

## 2023-11-29 ENCOUNTER — TELEPHONE (OUTPATIENT)
Dept: OTOLARYNGOLOGY | Facility: CLINIC | Age: 68
End: 2023-11-29

## 2023-11-29 VITALS
DIASTOLIC BLOOD PRESSURE: 78 MMHG | TEMPERATURE: 98 F | OXYGEN SATURATION: 97 % | SYSTOLIC BLOOD PRESSURE: 142 MMHG | BODY MASS INDEX: 21.22 KG/M2 | WEIGHT: 140 LBS | HEIGHT: 68 IN | HEART RATE: 84 BPM

## 2023-11-29 DIAGNOSIS — R22.31 AXILLARY MASS, RIGHT: Primary | ICD-10-CM

## 2023-11-29 PROCEDURE — 99212 OFFICE O/P EST SF 10 MIN: CPT | Performed by: INTERNAL MEDICINE

## 2023-11-29 PROCEDURE — 1126F AMNT PAIN NOTED NONE PRSNT: CPT | Performed by: INTERNAL MEDICINE

## 2023-11-29 RX ORDER — AMOXICILLIN 400 MG/5ML
800 POWDER, FOR SUSPENSION ORAL 2 TIMES DAILY
Qty: 200 ML | Refills: 0 | Status: SHIPPED | OUTPATIENT
Start: 2023-11-29 | End: 2023-12-09

## 2023-11-29 NOTE — TELEPHONE ENCOUNTER
Per pt she cannot take the amoxicillin pill orally and asking if there is a liquid she can be prescribed for the same dosage.  Please advise

## 2023-11-29 NOTE — TELEPHONE ENCOUNTER
Dr. Rodriguez Estimable, please see telephone encounter from today 11/29. Patient would like her amoxicillin to be oral suspension.

## 2023-11-29 NOTE — TELEPHONE ENCOUNTER
Patient was prescribed rx amoxicillin yesterday and indicates pills are too large,needing to cut in half, difficult to swallow. Patient asking if the medication comes in liquid form with the same strength? Please call at [phone].

## 2023-11-30 NOTE — TELEPHONE ENCOUNTER
Contacted patient to let her know that Amoxicillin tablets were changed to Amoxicillin oral suspension. Patient aware and educated. Per patient though, her pharmacy told her that she could crush the tablets also and let them dissolve. I let her know that new medication order is at the pharmacy, but she will continue crushing the tablets and drinking them that way.

## 2023-12-15 ENCOUNTER — TELEPHONE (OUTPATIENT)
Dept: INTERNAL MEDICINE CLINIC | Facility: CLINIC | Age: 68
End: 2023-12-15

## 2023-12-15 NOTE — TELEPHONE ENCOUNTER
To Dr. Matthew Rosales to patient- stated she began noticing some vaginal itching- feels external she is able to scratch with relief. Denies discharge, odor, pain, frequency, burning on urination, frequent urination. Pt wondering if she should buy Monistat or if MD has other recommendation.

## 2023-12-15 NOTE — TELEPHONE ENCOUNTER
Pt. Called stating she developed vaginal itching through the night last night. She is looking for direction. Should she use an OTC product? Get something prescribed without being seen?

## 2023-12-21 ENCOUNTER — OFFICE VISIT (OUTPATIENT)
Dept: OTOLARYNGOLOGY | Facility: CLINIC | Age: 68
End: 2023-12-21
Payer: MEDICARE

## 2023-12-21 ENCOUNTER — TELEPHONE (OUTPATIENT)
Dept: OTOLARYNGOLOGY | Facility: CLINIC | Age: 68
End: 2023-12-21

## 2023-12-21 VITALS — HEIGHT: 68 IN | WEIGHT: 141 LBS | BODY MASS INDEX: 21.37 KG/M2

## 2023-12-21 DIAGNOSIS — J34.3 NASAL TURBINATE HYPERTROPHY: ICD-10-CM

## 2023-12-21 DIAGNOSIS — J01.90 ACUTE SINUSITIS, RECURRENCE NOT SPECIFIED, UNSPECIFIED LOCATION: Primary | ICD-10-CM

## 2023-12-21 DIAGNOSIS — R42 VERTIGO: ICD-10-CM

## 2023-12-21 PROCEDURE — 99213 OFFICE O/P EST LOW 20 MIN: CPT | Performed by: SPECIALIST

## 2023-12-21 NOTE — TELEPHONE ENCOUNTER
Spoke to patient, she has questions about the Zyrtec Dr. Clair Esparza recommended she take. Dr. Clair Esparza, confirmed take Zyrtec at bedtime, no D, and to continue to  take until her next appointment. Called and spoke to patient, instructed her on above instructions from Dr. Clair Esparza. Patient understanding. Advised to call if she is not feeling any better. Call complete.

## 2023-12-21 NOTE — PATIENT INSTRUCTIONS
Please start Zyrtec 1 pill at bedtime and add to the [facility] for your nasal congestion. No evidence of residual sinusitis. Okay to reinitiate physical therapy for the vertigo.

## 2023-12-21 NOTE — TELEPHONE ENCOUNTER
Patient was seen in office today and currently at pharmacy. Patient calling with questions about rx allergy medication/Zertec. Patient asking if this is the right medication and how long she should take.  Please

## 2023-12-29 ENCOUNTER — TELEPHONE (OUTPATIENT)
Dept: OTOLARYNGOLOGY | Facility: CLINIC | Age: 68
End: 2023-12-29

## 2023-12-29 RX ORDER — AZITHROMYCIN 250 MG/1
TABLET, FILM COATED ORAL
Qty: 6 TABLET | Refills: 0 | Status: SHIPPED | OUTPATIENT
Start: 2023-12-29

## 2023-12-29 NOTE — TELEPHONE ENCOUNTER
Contacted patient and per patient, is having a headache and discomfort above left eyebrow when pressed, is more congested in left nostril, when can blow nose has clear secretions mostly from left side, afebrile, has a rare nonproductive cough. Is using saline nasal spray and zyrtec as suggested at last appointment. Wondering if she needs an antibiotic or other medications. Does have an appointment 1/02/2024    Dr. Wanda Chow, please see message and advise. Thank you.

## 2024-01-02 ENCOUNTER — PATIENT MESSAGE (OUTPATIENT)
Dept: PHYSICAL MEDICINE AND REHAB | Facility: CLINIC | Age: 69
End: 2024-01-02

## 2024-01-02 ENCOUNTER — OFFICE VISIT (OUTPATIENT)
Dept: OTOLARYNGOLOGY | Facility: CLINIC | Age: 69
End: 2024-01-02
Payer: MEDICARE

## 2024-01-02 ENCOUNTER — MED REC SCAN ONLY (OUTPATIENT)
Dept: PHYSICAL MEDICINE AND REHAB | Facility: CLINIC | Age: 69
End: 2024-01-02

## 2024-01-02 VITALS — WEIGHT: 142 LBS | BODY MASS INDEX: 21.52 KG/M2 | HEIGHT: 68 IN

## 2024-01-02 DIAGNOSIS — J34.89 SINUS PRESSURE: Primary | ICD-10-CM

## 2024-01-02 DIAGNOSIS — Z98.1 S/P CERVICAL SPINAL FUSION: Primary | ICD-10-CM

## 2024-01-02 DIAGNOSIS — M48.02 CERVICAL SPINAL STENOSIS: ICD-10-CM

## 2024-01-02 DIAGNOSIS — M50.90 CERVICAL DISC DISEASE: ICD-10-CM

## 2024-01-02 DIAGNOSIS — M47.812 CERVICAL FACET SYNDROME: ICD-10-CM

## 2024-01-02 DIAGNOSIS — M54.12 CERVICAL RADICULOPATHY: ICD-10-CM

## 2024-01-02 DIAGNOSIS — M50.20 CERVICAL HERNIATED DISC: ICD-10-CM

## 2024-01-02 DIAGNOSIS — M54.2 NECK PAIN: ICD-10-CM

## 2024-01-02 DIAGNOSIS — M79.18 MYOFASCIAL PAIN: ICD-10-CM

## 2024-01-02 DIAGNOSIS — M54.2 CERVICALGIA: ICD-10-CM

## 2024-01-02 DIAGNOSIS — M47.812 CERVICAL SPONDYLOSIS: ICD-10-CM

## 2024-01-02 PROCEDURE — 31231 NASAL ENDOSCOPY DX: CPT | Performed by: SPECIALIST

## 2024-01-02 PROCEDURE — 99213 OFFICE O/P EST LOW 20 MIN: CPT | Performed by: SPECIALIST

## 2024-01-02 RX ORDER — PREDNISONE 20 MG/1
20 TABLET ORAL DAILY
Qty: 3 TABLET | Refills: 0 | Status: SHIPPED | OUTPATIENT
Start: 2024-01-02

## 2024-01-03 NOTE — TELEPHONE ENCOUNTER
Spoke with patient who stated she saw her PT a couple days ago who did a different vertigo exercise. Patient stated she instantly felt neck pain when exercise was performed. She also felt some numbness to the bottom of her feet yesterday and started experiencing a headache. Also c/o of muscle tightness to her neck.    States this is the same headache pain she had when her neck was bothering her in the past.     Patient states she is doing a little better today.     Patient is also going back to her PT today.    She also saw  yesterday who prescribed a 3 day course of prednisone. Patient also has pregabalin that she was previously on and tizanidine.     Educated patient that pregabalin is to be taken on a scheduled basis and not as needed. Informed patient since she is having muscle tightness, it would be better for her to try the Tizanidine first.     Informed patient to also keep our office updated on how she is doing once she completes the steroid.     Patient also wanted to see if  would order an MRI or CT of her neck due to her symptoms and since she has not had imaging done of her neck in a while.     Per Epic CT cervical spine last completed on 4/11/23 and cervical spine MRI last completed on 2/14/22.    Informed patient update will be relayed on to  for his recommendations. Patient understood and was appreciative.

## 2024-01-03 NOTE — PATIENT INSTRUCTIONS
Hyper optic exam did not show any sinusitis.  I placed you on 3 days of prednisone.  Should you continue to have neck and sinus pain, please call me and an antibiotic will be considered.

## 2024-01-04 ENCOUNTER — OFFICE VISIT (OUTPATIENT)
Dept: INTERNAL MEDICINE CLINIC | Facility: CLINIC | Age: 69
End: 2024-01-04

## 2024-01-04 ENCOUNTER — LAB ENCOUNTER (OUTPATIENT)
Dept: LAB | Age: 69
End: 2024-01-04
Attending: INTERNAL MEDICINE
Payer: MEDICARE

## 2024-01-04 VITALS
WEIGHT: 138.19 LBS | SYSTOLIC BLOOD PRESSURE: 118 MMHG | BODY MASS INDEX: 20.94 KG/M2 | DIASTOLIC BLOOD PRESSURE: 76 MMHG | HEART RATE: 90 BPM | TEMPERATURE: 98 F | OXYGEN SATURATION: 98 % | HEIGHT: 68 IN

## 2024-01-04 DIAGNOSIS — B37.31 YEAST VAGINITIS: ICD-10-CM

## 2024-01-04 DIAGNOSIS — R30.0 DYSURIA: Primary | ICD-10-CM

## 2024-01-04 LAB
BILIRUB UR QL: NEGATIVE
COLOR UR: YELLOW
GLUCOSE UR-MCNC: NORMAL MG/DL
HGB UR QL STRIP.AUTO: NEGATIVE
HYALINE CASTS #/AREA URNS AUTO: PRESENT /LPF
KETONES UR-MCNC: NEGATIVE MG/DL
LEUKOCYTE ESTERASE UR QL STRIP.AUTO: NEGATIVE
NITRITE UR QL STRIP.AUTO: NEGATIVE
PH UR: 5 [PH] (ref 5–8)
SP GR UR STRIP: 1.02 (ref 1–1.03)
UROBILINOGEN UR STRIP-ACNC: NORMAL

## 2024-01-04 PROCEDURE — 1126F AMNT PAIN NOTED NONE PRSNT: CPT | Performed by: INTERNAL MEDICINE

## 2024-01-04 PROCEDURE — 99213 OFFICE O/P EST LOW 20 MIN: CPT | Performed by: INTERNAL MEDICINE

## 2024-01-04 PROCEDURE — 81001 URINALYSIS AUTO W/SCOPE: CPT | Performed by: INTERNAL MEDICINE

## 2024-01-04 RX ORDER — FLUCONAZOLE 150 MG/1
150 TABLET ORAL ONCE
Qty: 1 TABLET | Refills: 0 | Status: SHIPPED | OUTPATIENT
Start: 2024-01-04 | End: 2024-01-04

## 2024-01-04 RX ORDER — TIZANIDINE 4 MG/1
4 TABLET ORAL 3 TIMES DAILY PRN
Qty: 90 TABLET | Refills: 0 | Status: SHIPPED | OUTPATIENT
Start: 2024-01-04 | End: 2024-02-03

## 2024-01-04 NOTE — TELEPHONE ENCOUNTER
Patient called to follow up on her Tizanidine prescription.  She is hoping to get this prescribed before we close early on Friday, as they leave for out of town on Saturday.    Per Patient her bottle reads:   Tizanidine HCL 4Mg TID PRN is what Dr. Fuchs had prescribed in past.      Patient is wondering if this is the dose that Dr. Cleaning feels is appropriate.      This RN spoke w/ Dr. Cleaning and he advised that it is ok to be prescribed with same dose and instructions as before.    Note:  only a historical record is in Epic and marked only \"as needed\"    This RN has signed off on this medication per Dr. Cleaning's approval.

## 2024-01-04 NOTE — TELEPHONE ENCOUNTER
Patient calling to request a script for Tizanidine HCl 4mg, patient states she is leaving on 1/6 for out of state vacation and is asking that be filled before she leaves. Please advise.

## 2024-01-04 NOTE — PROGRESS NOTES
Radha Stoner is a 68 year old female.  Chief Complaint   Patient presents with    Checkup     Pt here due to possible yeast infection, itchiness in the vaginal region off and on x 2 weeks, took otc monistat only the cream, stated this morning felt burning while releasing her bladder.      HPI:     Vaginal itching; used only the external monistat.  This am, she developed some mild burning with urination    Still with mild vertigo sx; taking meclizine. Seeing PT    Saw ENT Dr. Morris re sinus sx.  Took augmentin.  On prednisone x 3 days, but making her feel like her heart is racing, asking if she can skip the last day tomorrow    Going to [place] for 3 weeks on Saturday      Current Outpatient Medications   Medication Sig Dispense Refill    tiZANidine 4 MG Oral Tab Take 1 tablet (4 mg total) by mouth 3 (three) times daily as needed. 90 tablet 0    predniSONE 20 MG Oral Tab Take 1 tablet (20 mg total) by mouth daily. 3 tablet 0    predniSONE 20 MG Oral Tab Take 1 tablet (20 mg total) by mouth daily. 3 tablet 0    amLODIPine 10 MG Oral Tab Take 1 tablet (10 mg total) by mouth daily. 90 tablet 3    Calcium Carbonate-Vitamin D 600-5 MG-MCG Oral Tab Take 1 tablet by mouth daily.      sucralfate (CARAFATE) 1 g Oral Tab Take 1 tablet (1 g total) by mouth 4 (four) times daily before meals and nightly. (Patient taking differently: Take 1 tablet (1 g total) by mouth 4 (four) times daily before meals and nightly. PRN) 120 tablet 0    metoclopramide 10 MG Oral Tab Take 1 tablet (10 mg total) by mouth 3 (three) times daily as needed. 30 tablet 0    meclizine 25 MG Oral Tab Take 1 tablet (25 mg total) by mouth 3 (three) times daily as needed. 30 tablet 1    rOPINIRole 0.25 MG Oral Tab Take 1 tablet (0.25 mg total) by mouth nightly. 30 tablet 5    TRAZODONE 50 MG Oral Tab TAKE 1 TABLET BY MOUTH EVERY DAY AT NIGHT 90 tablet 3    ESTRADIOL 0.1 MG/GM Vaginal Cream INSERT 1/2 GRAM VAGINALLY TWICE A WEEK 42.5 g 10    acetaminophen 500 MG  Oral Tab Take 2 tablets (1,000 mg total) by mouth every 8 (eight) hours.      pantoprazole 40 MG Oral Tab EC Take 1 tablet (40 mg total) by mouth every morning before breakfast.      Acidophilus/Pectin Oral Cap Take 1 capsule by mouth daily.      Ascorbic Acid (VITAMIN C) 1000 MG Oral Tab Take 1 tablet (1,000 mg total) by mouth daily.      Famotidine (PEPCID OR) Take 20 mg by mouth. Daily PRN       Cholecalciferol 25 MCG (1000 UT) Oral Tab Take by mouth daily.      Diclofenac Sodium 1 % Transdermal Gel Apply 4 g topically 4 (four) times daily as needed. 1 Tube 3    CALCIUM CITRATE OR Take 1,400 mg by mouth daily. BID      Multiple Vitamins Oral Tab Take 1 tablet by mouth daily. Multiple Vitamins tablet        Past Medical History:   Diagnosis Date    Anemia     Anxiety state, unspecified     Arthritis     Back problem     CERVICAL RADICULOPATHY, CERVICAL SPINAL STENOSIS    Bilateral kidney stones 09/23/2008    Broken foot 03/14/2012    RT - casting. Fracture 5th met. Cast removl/xray foot 4-20-12. Follow up fracture right foot 5-22-12.     Calculus of kidney 2008    Cataract     Depression     Esophageal reflux     Essential hypertension     Gastric polyps 12/04/2018    Gastritis     High blood pressure     Hx of motion sickness     hx of vertigo    Idiopathic acute pancreatitis 2002, 2010    Insomnia     Internal hemorrhoids without complication 12/04/2018    Intestinal disorder     Left wrist fracture 2011 and 2013    Muscle weakness     Osteoarthritis     Osteoporosis     Other chronic pancreatitis (HCC) 11/24/2021    Renal disorder     Rotator cuff tear, right     Traumatic arthritis     Vertigo       Social History:  Social History     Socioeconomic History    Marital status:    Tobacco Use    Smoking status: Never    Smokeless tobacco: Never   Vaping Use    Vaping Use: Never used   Substance and Sexual Activity    Alcohol use: No     Comment: quit 4 - 5 yrs ago due to pancreatitis    Drug use: No    Other Topics Concern    Caffeine Concern No     Comment: Coffee 2 cups daily    Exercise No   Social History Narrative    The patient does not use an assistive device..      The patient does live in a home with stairs.        REVIEW OF SYSTEMS:   GENERAL HEALTH: feels well otherwise  RESPIRATORY: no SOB  CARDIOVASCULAR: no chest pain/pressure  GI: no nausea, vomiting, diarrhea    Wt Readings from Last 5 Encounters:   01/04/24 138 lb 3.2 oz (62.7 kg)   01/02/24 142 lb (64.4 kg)   12/21/23 141 lb (64 kg)   11/29/23 140 lb (63.5 kg)   11/15/23 140 lb (63.5 kg)     Body mass index is 21.01 kg/m².      EXAM:   /76   Pulse 90   Temp 97.9 °F (36.6 °C)   Ht 5' 8\" (1.727 m)   Wt 138 lb 3.2 oz (62.7 kg)   SpO2 98%   BMI 21.01 kg/m²   GENERAL: well developed, well nourished, in no apparent distress  GYNE: small amount of white cheesy discharge in vaginal vault    ASSESSMENT AND PLAN:     Yeast vaginitis  -mild; fluconazole 150mg po x 1    Dysuria  -may be due to yeast infection, but given that the yeast infection is so mild, will check UA    The patient indicates understanding of these issues and agrees to the plan.    Sherly Hirsch MD, 01/04/24, 12:49 PM

## 2024-01-04 NOTE — TELEPHONE ENCOUNTER
Refill Request    Medication request: TiZANidine HCl 4 MG Oral Tab Take 1 tablet (4 mg total) by mouth nightly.     LOV:11/15/2023 Luke Cleaning MD   Due back to clinic per last office note:  \"follow up in 2-3 months \"  NOV: 2/1/2024 Luke Cleaning MD      [place]: 9/22/2017 #30 R-2    Urine drug screen (if applicable): n/a  Pain contract: none    LOV plan (if weaning or changing medications): Per  at LOV: \"\"She will use the Tizanidine as needed. \"    Rx request forwarded to  for review/approval as last Rx was sent in to pharmacy in 2017.

## 2024-01-08 ENCOUNTER — HOSPITAL ENCOUNTER
Dept: HOSPITAL 101 - MAMMO | Age: 69
Discharge: HOME | End: 2024-01-08
Payer: MEDICARE

## 2024-01-08 DIAGNOSIS — Z12.31: Primary | ICD-10-CM

## 2024-01-08 DIAGNOSIS — Z80.0: ICD-10-CM

## 2024-01-08 DIAGNOSIS — Z80.52: ICD-10-CM

## 2024-01-08 PROCEDURE — 77067 SCR MAMMO BI INCL CAD: CPT

## 2024-01-08 PROCEDURE — 77063 BREAST TOMOSYNTHESIS BI: CPT

## 2024-01-08 NOTE — MM_ITS
Patient Name:      
RICHAR CARTAGENA 
      
MR#: [id number]      
: 1955 
Accession #: [id number] 
Exam Date: 2024  
Ordering Doctor: DR. CHRIS BOOTH . 
  
RADIOLOGY REPORT 
  
PROCEDURE:     MM TOMOSYNTHESIS SCREENING BI 
  
COMPARISON:     [facility] SCREEN 3D [facility] CAD, 2021.  [facility] SCREEN 3D [facility]  
CAD, 2023. 
  
INDICATIONS:     screening 
  
Calculator Name             NCI Breast Cancer Risk Assessment Tool  
5 Year Breast Cancer Risk     1.70% 
Lifetime Breast Cancer Risk     5.10% 
Personal Breast Cancer      No 
Personal Ovarian Cancer     No 
Treatments     None 
Family Cancers     Mother with colon cancer at age 80; Father with bladder  
cancer at age 80. 
  
LOCATION:       [facility]  
  
BREAST COMPOSITION:     Scattered areas fibroglandular density. 
  
FINDINGS:       
DIAGNOSTIC CATEGORY 2--BENIGN FINDING. NO CHANGE FROM COMPARISON.   
  
Scattered benign-appearing nodules are present.  Scattered benign-appearing  
calcifications are present.  Scattered benign-appearing lymph nodes are  
present.   
  
RIGHT BREAST:  No significant suspicious finding.   
  
LEFT BREAST:  No significant suspicious finding.   
  
RECOMMENDATIONS:      
ROUTINE MAMMOGRAM AND CLINICAL EVALUATION IN 12 MONTHS.   
  
PLEASE NOTE:  A NORMAL MAMMOGRAM DOES NOT EXCLUDE THE POSSIBILITY OF BREAST  
CANCER.  A CLINICALLY SUSPICIOUS PALPABLE LUMP SHOULD BE BIOPSIED.   
  
  
Dictated by: Renny Frankel MD on 2024 at 13:42      
Approved by: Renny Frankel MD on 2024 at 13:44

## 2024-01-09 ENCOUNTER — TELEPHONE (OUTPATIENT)
Dept: PHYSICAL MEDICINE AND REHAB | Facility: CLINIC | Age: 69
End: 2024-01-09

## 2024-01-09 DIAGNOSIS — M51.9 THORACIC DISC DISEASE: ICD-10-CM

## 2024-01-09 DIAGNOSIS — Z98.1 HISTORY OF LUMBAR FUSION: Primary | ICD-10-CM

## 2024-01-09 DIAGNOSIS — Z98.1 S/P CERVICAL SPINAL FUSION: ICD-10-CM

## 2024-01-09 DIAGNOSIS — M48.02 CERVICAL SPINAL STENOSIS: ICD-10-CM

## 2024-01-09 DIAGNOSIS — M47.894 THORACIC FACET SYNDROME: ICD-10-CM

## 2024-01-09 DIAGNOSIS — M50.90 CERVICAL DISC DISEASE: ICD-10-CM

## 2024-01-09 DIAGNOSIS — M54.16 LUMBAR RADICULOPATHY: ICD-10-CM

## 2024-01-09 NOTE — TELEPHONE ENCOUNTER
Pt stated that she needs send a new script for PT at [facility] [place] to continue therapy with PT Donna Zuniga.   Pt stated that she scheduled the first sessions then had to go out of [place] and then per her insurance she needs to have an updated new order for PT.     Order sent To fax #[phone] at [facility] at [address] [place] [address].A copy of the order was sent to the patient via [employer].

## 2024-01-26 ENCOUNTER — TELEPHONE (OUTPATIENT)
Dept: PHYSICAL MEDICINE AND REHAB | Facility: CLINIC | Age: 69
End: 2024-01-26

## 2024-01-29 ENCOUNTER — TELEPHONE (OUTPATIENT)
Dept: SURGERY | Facility: CLINIC | Age: 69
End: 2024-01-29

## 2024-01-29 ENCOUNTER — NURSE ONLY (OUTPATIENT)
Dept: ENDOCRINOLOGY CLINIC | Facility: CLINIC | Age: 69
End: 2024-01-29
Payer: MEDICARE

## 2024-01-29 ENCOUNTER — TELEPHONE (OUTPATIENT)
Dept: ENDOCRINOLOGY CLINIC | Facility: CLINIC | Age: 69
End: 2024-01-29

## 2024-01-29 DIAGNOSIS — M81.0 AGE-RELATED OSTEOPOROSIS WITHOUT CURRENT PATHOLOGICAL FRACTURE: Primary | ICD-10-CM

## 2024-01-29 PROCEDURE — 96372 THER/PROPH/DIAG INJ SC/IM: CPT | Performed by: INTERNAL MEDICINE

## 2024-01-29 NOTE — TELEPHONE ENCOUNTER
SOB submitted for prolia injection  No PA required.   Estimated $0 OOP cost.   SOB form sent to scanning.   Pt scheduled today for injection.

## 2024-01-29 NOTE — TELEPHONE ENCOUNTER
- s/w pt; pt identity verified with name and   - pt last here on 21 and at that time pt had a nonobstructing kidney stone, is concerned that she still might have it, and be out of town when/if she has another \"attack\", wondering if Dr. Georges would ever do a preemptive surgical procedure to remove.  I explained to her that the LOV note from 21 (last time patient was seen) mentioned conservative management.  I offered to make an appt with Dr. Georges so that she could discuss her concerns with him, and determine next steps.  Pt agreed, so scheduled pt for an appt on 24.  Pt denied any further questions.  - encounter complete    PLAN:  1.  Conservative management of bilateral nonobstructing kidney stones, left greater than right.     2.  Referral to pelvic floor rehab.     RTC follow-up in 1 year.     Nilsa Polo APRN  2021    PLAN:  1.  Conservative management of bilateral nonobstructing kidney stones, left greater than right.     2.  Trial of Vesicare 5 mg daily for overactive bladder symptoms.  Printed prescription provided.     RTC follow-up in 4 to 6 weeks.     Srinivasa Georges MD  10/07/21

## 2024-01-29 NOTE — PROGRESS NOTES
Patient seen today for prolia injection. Injection given to the right deltoid. Patient tolerated well.   used:   Written medication information sheet provided: yes    Discussed most common side effects of: bone and muscle pain, joint pain, dizziness, headache. Side effects typically only last up to 1 week.   Call office or be seen in ER with any of the following severe side effects: signs of allergic reaction (hives, difficulty breathing, redness or swelling at injection site, chest pain, shortness of breath), low blood calcium, osteonecrosis of the jaw, Should not be taken if pregnant or plan to become pregnant.     Today this is the sixth injection.  Last prolia injection on: 7/6/2023  Summary of benefits completed: 1/29/2024  PA required/completed: Not required  Last Prolia protocol labs: June and July of 2023  Last Dexa scan: 6/7/22       Patient advised to schedule 6 month follow up with Lyndsay Bender, on or after 7/30/24.   Next labs due: Prior to next appt  Future labs ordered: yes     Staff message placed to MA pool ([facility] ENDO Medical Assistant) to perform repeat prolia insurance check in 5 months.

## 2024-01-31 ENCOUNTER — MED REC SCAN ONLY (OUTPATIENT)
Dept: PHYSICAL MEDICINE AND REHAB | Facility: CLINIC | Age: 69
End: 2024-01-31

## 2024-01-31 ENCOUNTER — TELEPHONE (OUTPATIENT)
Dept: PHYSICAL MEDICINE AND REHAB | Facility: CLINIC | Age: 69
End: 2024-01-31

## 2024-01-31 DIAGNOSIS — M54.2 NECK PAIN: ICD-10-CM

## 2024-01-31 DIAGNOSIS — M48.02 CERVICAL SPINAL STENOSIS: ICD-10-CM

## 2024-01-31 DIAGNOSIS — M50.90 CERVICAL DISC DISEASE: ICD-10-CM

## 2024-01-31 DIAGNOSIS — M50.20 CERVICAL HERNIATED DISC: ICD-10-CM

## 2024-01-31 DIAGNOSIS — M47.812 CERVICAL SPONDYLOSIS: ICD-10-CM

## 2024-01-31 DIAGNOSIS — M79.18 MYOFASCIAL PAIN: ICD-10-CM

## 2024-01-31 DIAGNOSIS — M47.812 CERVICAL FACET SYNDROME: ICD-10-CM

## 2024-01-31 DIAGNOSIS — Z98.1 S/P CERVICAL SPINAL FUSION: ICD-10-CM

## 2024-01-31 DIAGNOSIS — M54.12 CERVICAL RADICULOPATHY: ICD-10-CM

## 2024-01-31 NOTE — TELEPHONE ENCOUNTER
Received fax from [facility] for 90 day prescription request for tizanidine 4mg tablet. Fax placed in rn folder

## 2024-02-01 ENCOUNTER — PATIENT MESSAGE (OUTPATIENT)
Dept: PHYSICAL MEDICINE AND REHAB | Facility: CLINIC | Age: 69
End: 2024-02-01

## 2024-02-01 ENCOUNTER — OFFICE VISIT (OUTPATIENT)
Dept: PHYSICAL MEDICINE AND REHAB | Facility: CLINIC | Age: 69
End: 2024-02-01
Payer: MEDICARE

## 2024-02-01 VITALS — BODY MASS INDEX: 20.92 KG/M2 | WEIGHT: 138 LBS | HEIGHT: 68 IN

## 2024-02-01 DIAGNOSIS — G44.86 CERVICOGENIC HEADACHE: ICD-10-CM

## 2024-02-01 DIAGNOSIS — M48.02 CERVICAL SPINAL STENOSIS: ICD-10-CM

## 2024-02-01 DIAGNOSIS — Z98.1 HISTORY OF LUMBAR FUSION: ICD-10-CM

## 2024-02-01 DIAGNOSIS — M47.812 ARTHROPATHY OF CERVICAL FACET JOINT: Primary | ICD-10-CM

## 2024-02-01 DIAGNOSIS — M47.812 ARTHROPATHY OF CERVICAL FACET JOINT: ICD-10-CM

## 2024-02-01 DIAGNOSIS — Z98.1 S/P CERVICAL SPINAL FUSION: ICD-10-CM

## 2024-02-01 DIAGNOSIS — M50.20 CERVICAL HERNIATED DISC: ICD-10-CM

## 2024-02-01 DIAGNOSIS — M50.90 CERVICAL DISC DISEASE: Primary | ICD-10-CM

## 2024-02-01 DIAGNOSIS — M47.812 CERVICAL FACET SYNDROME: ICD-10-CM

## 2024-02-01 PROCEDURE — 99214 OFFICE O/P EST MOD 30 MIN: CPT | Performed by: PHYSICAL MEDICINE & REHABILITATION

## 2024-02-02 RX ORDER — TIZANIDINE 4 MG/1
4 TABLET ORAL 3 TIMES DAILY PRN
Qty: 270 TABLET | Refills: 0 | Status: SHIPPED | OUTPATIENT
Start: 2024-02-02 | End: 2024-05-02

## 2024-02-02 NOTE — TELEPHONE ENCOUNTER
Refill Request    Medication request: tiZANidine 4 MG Oral Tab. Take 1 tablet (4 mg total) by mouth 3 (three) times daily as needed.     LOV:11/15/2023 Luke Cleaning MD   Due back to clinic per last office note: Per Dr. Cleaning: \"The patient will follow up in 2-3 months, but the patient will call me 2 weeks after having the injection to let me know how the injection worked.\"  NOV: 3/14/2024 Luke Cleaning MD      [facility]: [facility]    Urine drug screen (if applicable): n/a  Pain contract: n/a    LOV plan (if weaning or changing medications): Tizanidine changes not mentioned in LOV note.

## 2024-02-02 NOTE — PROGRESS NOTES
C[facility]ical [facility]    Chief Complaint:    Chief Complaint   Patient presents with    Neck Pain     LOV: 11/15/2023 Patient f/u on localized bilateral neck pain, denies N/T. Currently in PT.  Ibuprofen PRN. Rates pain 8/10.     Nursing note reviewed and verified.    Patient was last seen on 11/15/2023.  She states that 1-2 months ago, she developed bilateral sided neck pain with headaches that radiate to the frontal area.  She will have these depending on how she sleeps and moves her head.  She is going to PT for this at [facility].      Description of the Pain  The pain is located in the bilateral base of the skull.    The pain radiates to bilateral frontal area of the head.  The pain at its best is 4/10. The pain at its worst is 10/10. The pain is currently  9/10.  The pain is described as a(n) aching sensation.    The patient reports no numbness.  The patient reports no tingling.  There is not weakness in bilateral hands and arms.  The pain is worse looking to the right, looking to the left, in the morning, and at night .   The pain is better  with ice .  Aleve has helped.      Past Medical History   Past Medical History:   Diagnosis Date    Anemia     Anxiety state, unspecified     Arthritis     Back problem     CERVICAL RADICULOPATHY, CERVICAL SPINAL STENOSIS    Bilateral kidney stones 09/23/2008    Broken foot 03/14/2012    RT - casting. Fracture 5th met. Cast removl/xray foot 4-20-12. Follow up fracture right foot 5-22-12.     Calculus of kidney 2008    Cataract     Depression     Esophageal reflux     Essential hypertension     Gastric polyps 12/04/2018    Gastritis     High blood pressure     Hx of motion sickness     hx of vertigo    Idiopathic acute pancreatitis 2002, 2010    Insomnia     Internal hemorrhoids without complication 12/04/2018    Intestinal disorder     Left wrist fracture 2011 and 2013    Muscle weakness     Osteoarthritis     Osteoporosis     Other chronic pancreatitis (HCC) 11/24/2021    Renal  disorder     Rotator cuff tear, right     Traumatic arthritis     Vertigo        Past Surgical History   Past Surgical History:   Procedure Laterality Date    ARTHROSCOPY OF JOINT UNLISTED Right 2012    rotator cuff tear    BACK SURGERY  2008 & 3-15-22    2 procedures          x 3    CATARACT      CHOLECYSTECTOMY      COLONOSCOPY  2009    COLONOSCOPY N/A 2018    Procedure: COLONOSCOPY;  Surgeon: Virginie Ma MD;  Location: [facility] ENDO    EXCIS LUMBAR DISK,ONE LEVEL      EYE SURGERY      Lasik procedure    FRACTURE SURGERY Left     WRIST FRACTURE ORIF    FRACTURE SURGERY Left     ORIF wrist fracture revision with plates and screws- Ortho Dr Bran ETIENNE      OTHER SURGICAL HISTORY  2017    Fussion L4-L5 - Dr. Rothman    OTHER SURGICAL HISTORY  03/15/2022    Back Surgery    OTHER SURGICAL HISTORY Left     torn biceops repair    SHOULDER SURG PROC UNLISTED Right     SPINE SURGERY PROCEDURE UNLISTED      L1-L2 FUSION    SPINE SURGERY PROCEDURE UNLISTED  2017    Neck fusion    TONSILLECTOMY      with Adenoidectomy    TUBAL LIGATION  1982?    UPPER GI ENDOSCOPY,EXAM      EGD        Family History   Family History   Problem Relation Age of Onset    Pulmonary Disease Father     Dementia Father             Heart Disease Mother     Fibromyalgia Mother             Colon Cancer Maternal Grandfather     Cancer Maternal Grandfather         Colon    Polyps Sister         colon polyps    Colon Cancer Sister     Other (gallbladder disease) Sister     Cancer Sister         Colon    Crohn's Disease Other     Heart Disease Other     Ovarian Cancer Maternal Aunt         70's 80's    Breast Cancer Neg        Social History   Social History     Socioeconomic History    Marital status:      Spouse name: Not on file    Number of children: Not on file    Years of education: Not on file    Highest education level: Not on file   Occupational History    Not  on file   Tobacco Use    Smoking status: Never    Smokeless tobacco: Never   Vaping Use    Vaping Use: Never used   Substance and Sexual Activity    Alcohol use: No     Comment: quit 4 - 5 yrs ago due to pancreatitis    Drug use: No    Sexual activity: Not on file   Other Topics Concern     Service Not Asked    Blood Transfusions Not Asked    Caffeine Concern No     Comment: Coffee 2 cups daily    Occupational Exposure Not Asked    Hobby Hazards Not Asked    Sleep Concern Not Asked    Stress Concern Not Asked    Weight Concern Not Asked    Special Diet Not Asked    Back Care Not Asked    Exercise No    Bike Helmet Not Asked    Seat Belt Not Asked    Self-Exams Not Asked   Social History Narrative    The patient does not use an assistive device..      The patient does live in a home with stairs.     Social Determinants of Health     Financial Resource Strain: Not on file   Food Insecurity: Not on file   Transportation Needs: Not on file   Physical Activity: Not on file   Stress: Not on file   Social Connections: Not on file   Housing Stability: Not on file       PE:  The patient does appear in her stated age in moderate distress.  The patient is well groomed.    Psychiatric:  The patient is alert and oriented x 3.  The patient has a normal affect and mood.      Respiratory:  No acute respiratory distress. Patient does not have a cough.    HEENT:  Extraocular muscles are intact. There is no kern icterus. Pupils are equal, round, and reactive to light. No redness or discharge bilaterally.    Skin:  There are no rashes or lesions.    Lymph Nodes:  The patient has no palpable submandibular, supraclavicular, and cervical lymph nodes..    Vitals:  There were no vitals filed for this visit.    Cervical Spine:    Posture: moderate chin forward superiorly rotated protracted shoulder posture.   Shoulders: Level   Head: In neutral   Spinous Processes Palpations: Non-tender for all Spinous Processes   Z-Joints  Palpations: Tender at  right C2-3 and bilateral C3-4   Muscular Palpations: Non-tender to palpation.   Cervical Flexion: 40 degrees Painless   Cervical Extension: 30 degrees Gives the patient pain in the bilateral neck   RIGHT rotation: 45 degrees Gives the patient pain in the left neck   LEFT rotation: 45 degrees Gives the patient pain in the left neck     Vascular upper extremity:   Right radial pulses: 2+   Left radial pulses: 2+      Neurological Upper Extremity:    Light Touch: Intact in Bilateral upper extremities.   Pin Prick: Not tested.   UE Muscle Strength: All Upper Extremity strength measurements 5/5   Reflexes: 2+ In the bilateral upper extremities.   Montero's sign Right: Negative   Montero's sigh Left: Negative     Assessment  1. C2-3 mild-mod diffuse, C3-4 left mod foraminal & mild-moderate diffuse, C6-7 right mild-moderate & left moderate foraminal, C7-T1 left mild foraminal bulging discs    2. C3-4 mod central herniated disc    3. C3-4 moderate central & left foraminal, C6-7 left mild-moderate foraminal, C7-T1 left mild-moderate foraminal stenosis    4. History of lumbar fusion: T10-S1 with bilateral SIJ fusions on 3/15/2022    5. S/P cervical spinal fusion: C4-5 & C5-6 ACDF    6. Cervicogenic headache    7. Cervical facet syndrome: bilateral C2-3 and C3-4    8. Arthropathy of cervical facet joint      Plan  I will do bilateral C2-3 and C3-4 z-joint injections under IVCS.    The patient will continue with PT.    The patient will continue with her home exercise program.    The patient will follow up in 2-3 months, but the patient will call me 2 weeks after having the injection to let me know how the injection worked.    The patient understands and agrees with the stated plan.    Luke Cleaning MD  2/1/2024

## 2024-02-02 NOTE — TELEPHONE ENCOUNTER
Pt called office to see if we can schedule her injection. In addition patient wanted to know if this was a bilateral or unilateral injection.   Informed patient that the order is for Bilateral C2-C3 and C3-C4 zygapophyseal joint injections.     Per patient, Dr Cleaning told her that we can do this next week.     Pt does not take any blood thinners. Informed patient that we do have an available slot on 2/9/2024, however we cannot guarantee that slot unless we also receive prior authorization.     Pt verbalized understanding. Scheduled in Epic for 2/9/24 to save the slot. Pending the case request until authorization is received.

## 2024-02-02 NOTE — PATIENT INSTRUCTIONS
Plan  I will do bilateral C2-3 and C3-4 z-joint injections under IVCS.    The patient will continue with PT.    The patient will continue with her home exercise program.    The patient will follow up in 2-3 months, but the patient will call me 2 weeks after having the injection to let me know how the injection worked.

## 2024-02-05 NOTE — TELEPHONE ENCOUNTER
Per CMS Guidelines -no authorization is required for Bilateral C2-C3 and C3-C4 zygapophyseal joint injections CPT 02511-04, 64491x2  when being performed at [facility]    Status: Authorization is not required when being performed at an [facility] based on medical necessity however may be subject to review once claim is submitted-Covered Benefit   If this procedure is being performed at Outpatient Hospital[facility]-Authorization IS required, please notify this writer so authorization process can be initiated    Fyi, per CMS LCD limitations-Use of Moderate or Deep Sedation, General Anesthesia, or Monitored Anesthesia Care (MAC) is usually unnecessary or rarely indicated for these procedures and therefore, is not considered medically reasonable and necessary. Even in patients with a needle phobia and anxiety, typically oral anxiolytics suffice. In exceptional and unique cases, documentation must clearly establish the need for such sedation in the specific patient.    Per Dr. Black conroy/ IVCS

## 2024-02-05 NOTE — TELEPHONE ENCOUNTER
Patient called to acquire some clarification in regards to what medication needs to be held prior to her injection, please call back at [phone].

## 2024-02-05 NOTE — TELEPHONE ENCOUNTER
S/w patient went over pre-injection holding medications.     Informed pt that Calcium & Vitamin D are OK to continue. Vitamin E, Fish oil are not OK and must be held. Pt also informed of holding NSAIDs including Aleve.

## 2024-02-05 NOTE — TELEPHONE ENCOUNTER
Patient has been scheduled for Bilateral C2-C3 and C3-C4 zygapophyseal joint injections on 02/09/2024 at the [facility] with Dr. MELÉNDEZ.   -Anesthesia type: IVCS.  -If scheduling [facility] covid testing required for all procedures whether patient is vaccinated or not.  -Patient informed not to eat or drink anything after midnight the night prior to the procedure, if being sedated. (For afternoon injections: Patient to fast 6-8 hours prior to procedure with IVCS/MAC. )  -Patient was advised that if he/she does receive the covid vaccine it needs to be at least 2 weeks before or after the injection.  -Medications and allergies reviewed.  -Patient reminded to hold NSAIDs (Ibuprofen, ASA 81, Aleve, Naproxen, Mobic, Diclofenac, Etodolac, Celebrex etc.) for 3 days prior to LUMBAR FACET JOINT INJECTIONS OR MEDIAL BRANCH BLOCK INJECTIONS  if BMI is greater than 35. For Cervical injections only hold multivitamins, Vitamin E, Fish Oil, Phentermine (Lomaira) for 7 days prior to injection and NSAIDS.   mg to be held for 7 days prior to injections.  -If patient is receiving MAC/IVCS Phentermine (Lomaira), Adlyxin (Lixisenatide), Bydureon BCise (Exenatide-release), Byetta (Exenatide), Mounjaro (Tirezepatide), Ozempic (Semaglutide), Rybelsus (oral demaglutide), Saxenda (Liraglutide), Trulicity (Dulaglutide), Victoriza (Liraglutide), Wegovy (Semaglutide), Berberine (oral natures ozempic) will need to be held for 7 days prior to injection.  -If patient's BMI is greater than 50. Patient is unable to get IVCS/MAC at [facility]. (Options: Patient can go to [facility] under MAC or ENDO under IVCS-reminder physician's slots at ENDO are limited. OR Patient can have the procedure done under local anesthesia at [facility] with Valium ( per physician's preference.)    -If on blood thinner clearance has been received to hold this medication by provider.   -Patient informed he/she will need a  to and from procedure. EFFECTIVE 12/1/23- Per [facility]: \" Our PAT team  will notify the patient that their ride MUST remain onsite for the entirety of their visit if the ride is unable to do so the procedure will be canceled. \"    -[facility] is located in the [facility] 1st floor. Patient may park in the yellow or purple parking.    Follow up appointment has been scheduled for patient on: 03/14/2024    Patient verbalized understanding and agrees with plan.  -----> Scheduled in Epic: Yes  -----> Scheduled in [employer]/Surgical Case Request: Yes

## 2024-02-06 PROBLEM — Q21.12 PFO (PATENT FORAMEN OVALE): Status: ACTIVE | Noted: 2021-10-18

## 2024-02-06 PROBLEM — R42 VERTIGO: Status: ACTIVE | Noted: 2021-10-18

## 2024-02-06 PROBLEM — Q21.12 PFO (PATENT FORAMEN OVALE) (HCC): Status: ACTIVE | Noted: 2021-10-18

## 2024-02-06 PROBLEM — M19.122 POST-TRAUMATIC OSTEOARTHRITIS OF LEFT ELBOW: Status: ACTIVE | Noted: 2023-06-26

## 2024-02-06 PROBLEM — M70.21 OLECRANON BURSITIS OF RIGHT ELBOW: Status: ACTIVE | Noted: 2023-09-11

## 2024-02-06 PROBLEM — F41.9 ANXIETY: Status: ACTIVE | Noted: 2022-10-23

## 2024-02-06 PROBLEM — G43.109 OCULAR MIGRAINE: Status: ACTIVE | Noted: 2019-05-15

## 2024-02-07 ENCOUNTER — MED REC SCAN ONLY (OUTPATIENT)
Dept: PHYSICAL MEDICINE AND REHAB | Facility: CLINIC | Age: 69
End: 2024-02-07

## 2024-02-12 ENCOUNTER — PATIENT MESSAGE (OUTPATIENT)
Dept: PHYSICAL MEDICINE AND REHAB | Facility: CLINIC | Age: 69
End: 2024-02-12

## 2024-02-12 NOTE — TELEPHONE ENCOUNTER
Pt called office stating that she still has Headaches, however they are not severe and what patient described as headaches from the steroid as she has had them before after  injections.     Pt also stated that with PT she gets some pain in her eyebrows and temples which might be from the occipital muscles, however is also not new.     Pt had bilateral C2-3 and C3-4 z-joint injections  done  02/9/2024. Educated patient that it takes about 2 full weeks for the injection to take the full effect.   Educated patient to let us know if there is any worsening in condition.     Pt verbalized understanding. No further actions needed at this time. Closing the encounter.

## 2024-02-13 ENCOUNTER — TELEPHONE (OUTPATIENT)
Dept: SURGERY | Facility: CLINIC | Age: 69
End: 2024-02-13

## 2024-02-13 NOTE — TELEPHONE ENCOUNTER
Please advise.     Future Appointments   Date Time Provider Department [place]   2/22/2024  8:40 AM Kayce Morris MD [facility]   2/22/2024 11:15 AM Srinivasa Georges MD [facility]   3/14/2024  9:00 AM [facility]aning [facility]   8/1/2024 10:15 AM Summer Umana MD [facility]

## 2024-02-15 NOTE — TELEPHONE ENCOUNTER
Spoke with patient. I advised her we are still waiting to hear back from . She states she is not currently having an issue with her kidney stone. She states states she is worried that they are traveling to [place] this summer she does not want to have to go to an ER if she is traveling. She states she would like to discuss with  if he can blast the stone or what he would recommend.     Please advise.

## 2024-02-22 ENCOUNTER — TELEMEDICINE (OUTPATIENT)
Dept: SURGERY | Facility: CLINIC | Age: 69
End: 2024-02-22
Payer: MEDICARE

## 2024-02-22 DIAGNOSIS — N20.0 NEPHROLITHIASIS: Primary | ICD-10-CM

## 2024-02-22 PROCEDURE — 99213 OFFICE O/P EST LOW 20 MIN: CPT | Performed by: UROLOGY

## 2024-02-22 NOTE — PROGRESS NOTES
[facility]  Follow-Up Visit    Radha Stoner verbally consents to a telemedicine service with live, interactive video and audio on 2/22/2024.  Patient understands and accepts financial responsibility for any deductible, co-insurance and/or co-pays associated with this service.      HPI: Radha Stoner is a 68 year old female presents for a follow up visit. Patient was last seen on 11/22/21 by urology APN.    INTERVAL HISTORY: Patient following up regarding nephrolithiasis.    She previously passed a kidney stone in 2018.  Analysis reportedly revealed calcium oxalate composition.    Last imaging study was a CT abdomen and pelvis without contrast from February 2021 which revealed a 6 mm nonobstructing left kidney stone and punctate bilateral kidney stones.    She denies any symptomatic stone episodes since then.  She has an upcoming trip to [place] this summer and would like to check on the status of her nephrolithiasis and stone burden and potentially address any stones that need to be addressed before her trip as to prevent any potential emergencies while abroad.    She currently denies gross hematuria, dysuria, flank pain, fevers or chills.    Urinalysis 1/4/2024 negative without microscopic hematuria or signs of UTI.      Social history: Patient is .  No smoking or alcohol.  Retired and used to work in retail.    Reviewed past medical, surgical, family, and social history.  Reviewed med list and allergies.      REVIEW OF SYSTEMS:  Pertinent positives and negatives per HPI. A 10-point ROS was performed and is otherwise negative.       EXAM:  There were no vitals taken for this visit.    Physical Exam  Constitutional:       General: She is not in acute distress.     Appearance: Normal appearance.   HENT:      Head: Atraumatic.   Eyes:      General: No scleral icterus.  Pulmonary:      Effort: Pulmonary effort is normal. No respiratory distress.   Musculoskeletal:      Cervical back:  Normal range of motion.   Neurological:      Mental Status: She is oriented to person, place, and time.   Psychiatric:         Mood and Affect: Mood normal.         Behavior: Behavior normal.       PATHOLOGY:  No results found.      LABS:  See HPI for details.      IMAGING:    CT abdomen pelvis without contrast 2/2021: Stable 0.6 cm nonobstructing interpolar left renal calculus.  Additional punctate lower pole left renal calculus.  Punctate nonobstructing right lower pole renal calculus.  Other findings.      UROLOGY PROCEDURE:  None performed today      IMPRESSION:  68 year old female with known nephrolithiasis including a 6 mm nonobstructing left kidney stone on imaging from February 2021.    Currently asymptomatic.  Patient has an upcoming trip overseas and would like to get an update on her stone burden and discuss potentially addressing any kidney stones before her trip to hopefully prevent or reduce the risk of an emergency while traveling abroad.    Reviewed with patient at length.  Discussed that her imaging is pretty much outdated.  Recommend obtaining CT abdomen and pelvis without contrast for further evaluation of her nephrolithiasis and stone burden.    Further patient's to follow-up.  All questions answered.  She is agreeable.      PLAN:  1.  Obtain a CT abdomen and pelvis without contrast for an update regarding patient's nephrolithiasis.  She will be notified of the results.  Further discussion to follow based on results.      Srinivasa Georges MD  2/22/2024

## 2024-02-27 ENCOUNTER — TELEPHONE (OUTPATIENT)
Dept: OTOLARYNGOLOGY | Facility: CLINIC | Age: 69
End: 2024-02-27

## 2024-02-27 DIAGNOSIS — R13.14 PHARYNGOESOPHAGEAL DYSPHAGIA: Primary | ICD-10-CM

## 2024-02-27 NOTE — TELEPHONE ENCOUNTER
Pt advised by central scheduling a new order is needed for the scan of the esophagus.   Told old order was entered on 5-10-23.  Call pt after 11:30 am

## 2024-02-28 NOTE — TELEPHONE ENCOUNTER
Doctor patient would like order for XR of Esophagus. Unfortunately, order that was placed on 5/10/2023 is not active anymore and patient was advised to get another order. Doctor please advise.

## 2024-03-05 ENCOUNTER — HOSPITAL ENCOUNTER (OUTPATIENT)
Dept: CT IMAGING | Age: 69
Discharge: HOME | End: 2024-03-05
Attending: UROLOGY
Payer: MEDICARE

## 2024-03-05 ENCOUNTER — PATIENT MESSAGE (OUTPATIENT)
Dept: SURGERY | Facility: CLINIC | Age: 69
End: 2024-03-05

## 2024-03-05 DIAGNOSIS — N20.0 NEPHROLITHIASIS: ICD-10-CM

## 2024-03-05 PROCEDURE — 74176 CT ABD & PELVIS W/O CONTRAST: CPT | Performed by: UROLOGY

## 2024-03-08 ENCOUNTER — TELEPHONE (OUTPATIENT)
Dept: SURGERY | Facility: CLINIC | Age: 69
End: 2024-03-08

## 2024-03-08 DIAGNOSIS — N20.0 KIDNEY STONE ON LEFT SIDE: Primary | ICD-10-CM

## 2024-03-08 DIAGNOSIS — R39.89 OTHER SYMPTOMS AND SIGNS INVOLVING THE GENITOURINARY SYSTEM: ICD-10-CM

## 2024-03-08 DIAGNOSIS — Z01.818 PREOP EXAMINATION: ICD-10-CM

## 2024-03-08 DIAGNOSIS — Z51.81 MONITORING FOR ANTICOAGULANT USE: ICD-10-CM

## 2024-03-08 DIAGNOSIS — Z79.01 MONITORING FOR ANTICOAGULANT USE: ICD-10-CM

## 2024-03-08 NOTE — TELEPHONE ENCOUNTER
Called and spoke to pt. Pt is scheduled to see Dr. Olmos on 3/7/23 due to Dr. Charles being out of clinic this week.   Patient calling following up with her Better[employer] message

## 2024-03-11 NOTE — PROGRESS NOTES
I called patient and spoke to her over the phone.  Discussed results of her CT scan from 3/6/2024 which revealed a 6 mm nonobstructing left interpolar kidney stone.    Management options discussed including watchful waiting, ESWL (if stone is radiopaque), or ureteroscopy with laser lithotripsy.     Rationale and approach as well as benefits, risks, possible complications and reasonable alternatives of each treatment were discussed with the patient.  Stone clearance rates were discussed as well as the expected postoperative course of recovery.     We discussed the eventual need for stent removal which is usually performed in the office setting.    We discussed risks of surgery including but not limited to medical and anesthetic complications, bleeding, infection, damage to surrounding organs or structures, ureteral injury, stricture formation, and need for additional procedures.     Patient verbalized understanding. All questions were answered.    She wishes to proceed with left nephro uteroscopy with laser lithotripsy, stone removal, and stent insertion.    We will proceed with scheduling accordingly.

## 2024-03-11 NOTE — TELEPHONE ENCOUNTER
Urology Surgery Scheduling Request    Location: [facility] OR    Surgeon: KAVITA Georges MD    Asst. Surgeon: N/A    Diagnosis: Left kidney stone.    Procedure: Cystoscopy, left retrograde pyelography, flexible left ureteroscopy, laser lithotripsy, stone removal, stent insertion.    Procedure CPT Code (if known): 10088, 15593.    Anesthesia: General     Time Frame: next available.     Time needed:  75 minutes.     Special Equipment: Holmium Laser and fluoroscopy C arm.    On Call to OR: Ceftriaxone (Rocephin) 1 g IV and gentamicin 180 mg IV.    Admission: Day Surgery    Pre-op Testing: CBC, BMP, PT/INR, PTT, and Urine Culture     Need Pre-op Clearance:  N/A    Estimated Post Op/Follow Up Appt:  TBFAMILIA Georges MD  3/11/2024

## 2024-03-11 NOTE — TELEPHONE ENCOUNTER
I sent her a my chart message telling her the stone is 6 mm and not obstructing and that you may want to see her to discuss. Please advise.

## 2024-03-12 NOTE — TELEPHONE ENCOUNTER
Spoke with patient, scheduled Cystoscopy, left retrograde pyelography, flexible left ureteroscopy, laser lithotripsy, stone removal, stent insertion, Friday 04/05/2024, went over pre-op/lab instructions, informed to please have all labs done 7-10 days prior to scheduled surgery.

## 2024-03-14 ENCOUNTER — OFFICE VISIT (OUTPATIENT)
Dept: PHYSICAL MEDICINE AND REHAB | Facility: CLINIC | Age: 69
End: 2024-03-14
Payer: MEDICARE

## 2024-03-14 VITALS — BODY MASS INDEX: 20.92 KG/M2 | HEIGHT: 68 IN | WEIGHT: 138 LBS

## 2024-03-14 DIAGNOSIS — M47.812 CERVICAL FACET SYNDROME: ICD-10-CM

## 2024-03-14 DIAGNOSIS — M47.812 ARTHROPATHY OF CERVICAL FACET JOINT: Primary | ICD-10-CM

## 2024-03-14 DIAGNOSIS — M48.02 CERVICAL SPINAL STENOSIS: ICD-10-CM

## 2024-03-14 DIAGNOSIS — M50.90 CERVICAL DISC DISEASE: ICD-10-CM

## 2024-03-14 DIAGNOSIS — I10 ESSENTIAL HYPERTENSION: ICD-10-CM

## 2024-03-14 DIAGNOSIS — F41.9 ANXIETY: ICD-10-CM

## 2024-03-14 DIAGNOSIS — M54.12 CERVICAL RADICULOPATHY: ICD-10-CM

## 2024-03-14 DIAGNOSIS — M50.20 CERVICAL HERNIATED DISC: ICD-10-CM

## 2024-03-14 DIAGNOSIS — Z98.1 S/P CERVICAL SPINAL FUSION: ICD-10-CM

## 2024-03-14 PROCEDURE — 99214 OFFICE O/P EST MOD 30 MIN: CPT | Performed by: PHYSICAL MEDICINE & REHABILITATION

## 2024-03-14 NOTE — PROGRESS NOTES
C[facility]    Chief Complaint:    Chief Complaint   Patient presents with    Follow - Up     LOV 2/1/2024 Patient follows up on BL C2-C3 and C3-C4 zygapophyseal joint injections. Admits to 60% relief at most, however continues to have daily headaches in the front of her head down to her eyebrows. Has been taking lyrica with minimal relief. LOP 6/10     Nursing note reviewed and verified.    Patient was last seen on 2/1/2024.  On 2/9/2023, I did bilateral C2-3 and C3-4 z-joint injections which have helped her about 60%, but she still has the pain.  She has been taking the Lyrica and this has helped some as well.  The PT is helping her.  She has seen Dr. Trotter and he told her to continue to follow up with me.  He stated that he does not want to do any further surgery at this time.  She had repeat x-rays.  Dr. Trotter had her get a MRI of the brain that was normal.    Description of the Pain  The pain is located in the bilateral base of the neck, bilateral middle of the neck, and bilateral base of the skull.    The pain radiates to bilateral occiput area of the head  and bilateral frontal area of the head.  The pain at its best is 2/10. The pain at its worst is 9/10. The pain is currently  4/10.  The pain is described as a(n) aching sensation.    The patient reports no numbness.  The patient reports no tingling.  There is not weakness in bilateral hands and arms.  The pain is worse looking to the right, looking to the left, and in the morning and evening.   The pain is better  with the PT .       Past Medical History   Past Medical History:   Diagnosis Date    Anemia     Anxiety state, unspecified     Arthritis     Back problem     CERVICAL RADICULOPATHY, CERVICAL SPINAL STENOSIS    Bilateral kidney stones 09/23/2008    Broken foot 03/14/2012    RT - casting. Fracture 5th met. Cast removl/xray foot 4-20-12. Follow up fracture right foot 5-22-12.     Calculus of kidney 2008    Cataract     Depression      Esophageal reflux     Essential hypertension     Gastric polyps 2018    Gastritis     High blood pressure     Hx of motion sickness     hx of vertigo    Idiopathic acute pancreatitis (HCC) ,     Insomnia     Internal hemorrhoids without complication 2018    Intestinal disorder     Left wrist fracture  and     Muscle weakness     Osteoarthritis     Osteoporosis     Other chronic pancreatitis (HCC) 2021    Renal disorder     Rotator cuff tear, right     Traumatic arthritis     Vertigo        Past Surgical History   Past Surgical History:   Procedure Laterality Date    ARTHROSCOPY OF JOINT UNLISTED Right 2012    rotator cuff tear    BACK SURGERY  2008 & 3-15-22    2 procedures          x 3    CATARACT      CHOLECYSTECTOMY      COLONOSCOPY      COLONOSCOPY N/A 2018    Procedure: COLONOSCOPY;  Surgeon: Virginie Ma MD;  Location: [facility]    EXCIS LUMBAR DISK,ONE LEVEL      EYE SURGERY      Lasik procedure    FRACTURE SURGERY Left     WRIST FRACTURE ORIF    FRACTURE SURGERY Left     ORIF wrist fracture revision with plates and screws- Ortho Dr Bran ETIENNE      OTHER SURGICAL HISTORY  2017    Fussion L4-L5 - Dr. Rothman    OTHER SURGICAL HISTORY  03/15/2022    Back Surgery    OTHER SURGICAL HISTORY Left     torn biceops repair    SHOULDER SURG PROC UNLISTED Right     SPINE SURGERY PROCEDURE UNLISTED      L1-L2 FUSION    SPINE SURGERY PROCEDURE UNLISTED  2017    Neck fusion    TONSILLECTOMY      with Adenoidectomy    TUBAL LIGATION  1982?    UPPER GI ENDOSCOPY,EXAM      EGD        Family History   Family History   Problem Relation Age of Onset    Pulmonary Disease Father     Dementia Father             Heart Disease Mother     Fibromyalgia Mother             Colon Cancer Maternal Grandfather     Cancer Maternal Grandfather         Colon    Polyps Sister         colon polyps    Colon Cancer Sister      Other (gallbladder disease) Sister     Cancer Sister         Colon    Crohn's Disease Other     Heart Disease Other     Ovarian Cancer Maternal Aunt         70's 80's    Breast Cancer Neg        Social History   Social History     Socioeconomic History    Marital status:      Spouse name: Not on file    Number of children: Not on file    Years of education: Not on file    Highest education level: Not on file   Occupational History    Not on file   Tobacco Use    Smoking status: Never    Smokeless tobacco: Never   Vaping Use    Vaping Use: Never used   Substance and Sexual Activity    Alcohol use: No     Comment: quit 4 - 5 yrs ago due to pancreatitis    Drug use: No    Sexual activity: Not on file   Other Topics Concern     Service Not Asked    Blood Transfusions Not Asked    Caffeine Concern No     Comment: Coffee 2 cups daily    Occupational Exposure Not Asked    Hobby Hazards Not Asked    Sleep Concern Not Asked    Stress Concern Not Asked    Weight Concern Not Asked    Special Diet Not Asked    Back Care Not Asked    Exercise No    Bike Helmet Not Asked    Seat Belt Not Asked    Self-Exams Not Asked   Social History Narrative    The patient does not use an assistive device..      The patient does live in a home with stairs.     Social Determinants of Health     Financial Resource Strain: Not on file   Food Insecurity: Not on file   Transportation Needs: Not on file   Physical Activity: Not on file   Stress: Not on file   Social Connections: Not on file   Housing Stability: Not on file       PE:  The patient does appear in her stated age in no distress.  The patient is well groomed.    Psychiatric:  The patient is alert and oriented x 3.  The patient has a normal affect and mood.      Respiratory:  No acute respiratory distress. Patient does not have a cough.    HEENT:  Extraocular muscles are intact. There is no kern icterus. Pupils are equal, round, and reactive to light. No redness or discharge  bilaterally.    Skin:  There are no rashes or lesions.    Lymph Nodes:  The patient has no palpable submandibular, supraclavicular, and cervical lymph nodes..    Vitals:  There were no vitals filed for this visit.    Cervical Spine:    Posture: mild-moderate chin forward superiorly rotated protracted shoulder posture.   Shoulders: Level   Head: In neutral   Spinous Processes Palpations: Non-tender for all Spinous Processes   Z-Joints Palpations: Tender at  bilateral C2-3, right C3-4, right C4-5, bilateral C5-6, and right C6-7   Muscular Palpations: Non-tender to palpation.     Shoulder: The shoulders are stable.    Medial Border Scapular Winging: present in left shoulder   Right Scapula: laterally displaced   Left Scapula: normal alignment   Palpation: Non-tender shoulder palpations.   Right Internal Rotation: normal   Left Internal Rotation: normal   Right External Rotation: normal   Left External Rotation: normal   Shoulder Special Tests: Right and left Sheehan test negative.     Vascular upper extremity:   Right radial pulses: 2+   Left radial pulses: 2+      Neurological Upper Extremity:    Light Touch: Intact in Bilateral upper extremities.   Pin Prick: Not tested.   UE Muscle Strength: All Upper Extremity strength measurements 5/5 except:  Triceps Right: 4/5   Reflexes: 2+ In the bilateral upper extremities.   Montero's sign Right: Negative   Montero's sigh Left: Negative     Assessment  1. Arthropathy of cervical facet joint    2. C2-3 mild-mod diffuse, C3-4 left mod foraminal & mild-moderate diffuse, C6-7 right mild-moderate & left moderate foraminal, C7-T1 left mild foraminal bulging discs    3. C3-4 mod central herniated disc    4. C3-4 moderate central & left foraminal, C6-7 left mild-moderate foraminal, C7-T1 left mild-moderate foraminal stenosis    5. Cervical facet syndrome: bilateral C2-3 and C3-4    6. S/P cervical spinal fusion: C4-5 & C5-6 ACDF    7. Essential hypertension    8. Anxiety    9. right  chronic C8 radiculopathy and left C6 radiculitis        Plan  She will get a new MRI of the cervical spine.    She will follow up after having the MRI scan.    The patient will continue with PT.    The patient will continue with her home exercise program.    She will increase the Lyrica to 50 mg 3 times a day.    She will take the Tizanidine 4 mg 2 times a day.    The patient understands and agrees with the stated plan.    Luke Cleaning MD  3/14/2024

## 2024-03-14 NOTE — PATIENT INSTRUCTIONS
Plan  She will get a new MRI of the cervical spine.    She will follow up after having the MRI scan.    The patient will continue with PT.    The patient will continue with her home exercise program.    She will increase the Lyrica to 50 mg 3 times a day.    She will take the Tizanidine 4 mg 2 times a day.

## 2024-03-15 ENCOUNTER — PATIENT MESSAGE (OUTPATIENT)
Dept: PHYSICAL MEDICINE AND REHAB | Facility: CLINIC | Age: 69
End: 2024-03-15

## 2024-03-15 NOTE — TELEPHONE ENCOUNTER
From: Radha A Day  Sent: 3/11/2024 8:54 AM CDT  To: [facility] Clinical Staff  Subject: CT    Hi Jacey   Thanks for getting back to me. I was actually going to call you.   Like I said in my previous message, Dr Georges said in our video chat that “he could go in and get it if it’s 6mm or bigger” BEFORE it causes issues. Especially bc we’re thinking of going out of the country this summer possibly and certainly don’t want to have to deal with it IF it does decide to move.   Was having some pain in my side off and on yesterday and on left back area some. Just saying. Yanira freaked me out.   I’m going see my PT for unrelated issue and she sometimes massages that area, I’ll see what she has to say about that area.     Betsy

## 2024-03-18 ENCOUNTER — TELEPHONE (OUTPATIENT)
Dept: NEUROLOGY | Facility: CLINIC | Age: 69
End: 2024-03-18

## 2024-03-18 NOTE — TELEPHONE ENCOUNTER
From: Radha Stoner  To: Luke Cleaning  Sent: 3/15/2024 8:17 PM CDT  Subject: Nerves     Hi Dr Cleaning  I just today, Friday, started feeling tingling in my left foot. Could that be coming from my neck?   I can not get in for my MRI till April 25.  Is there anyway you can get me in earlier?     Betsy Stoner

## 2024-03-18 NOTE — TELEPHONE ENCOUNTER
Patient calling for the 2nd time as the initial phone call form this morning she was transfer to the  voicemail as she has questions regarding the schedule for , she states she left a voicemail for Raquel and so far no call back, informed that we will send this message hopping that she can get a call back and someone can respond to her questions.

## 2024-03-19 NOTE — TELEPHONE ENCOUNTER
Returned patient call- at [phone]- phone rang multiple times no option to leave voicemail.

## 2024-03-20 NOTE — TELEPHONE ENCOUNTER
Please find out if the foot tingling is still present or not?  There could be many reasons for the foot tingling.  She should be ok with dry needling into the suboccipital region.  Since she is getting the MRI on 4/2/2024, I will need to see her afterward.  She can continue to call to see if she can have this sooner.

## 2024-03-21 ENCOUNTER — HOSPITAL ENCOUNTER (OUTPATIENT)
Dept: HOSPITAL 101 - PT | Age: 69
LOS: 27 days | Discharge: HOME | End: 2024-04-17
Payer: MEDICARE

## 2024-03-21 DIAGNOSIS — G89.29: ICD-10-CM

## 2024-03-21 DIAGNOSIS — M25.561: Primary | ICD-10-CM

## 2024-03-21 PROCEDURE — 97140 MANUAL THERAPY 1/> REGIONS: CPT

## 2024-03-21 PROCEDURE — 97110 THERAPEUTIC EXERCISES: CPT

## 2024-03-21 PROCEDURE — 97035 APP MDLTY 1+ULTRASOUND EA 15: CPT

## 2024-03-21 PROCEDURE — 97161 PT EVAL LOW COMPLEX 20 MIN: CPT

## 2024-03-22 ENCOUNTER — TELEPHONE (OUTPATIENT)
Dept: INTERNAL MEDICINE CLINIC | Facility: CLINIC | Age: 69
End: 2024-03-22

## 2024-03-22 DIAGNOSIS — R53.83 OTHER FATIGUE: ICD-10-CM

## 2024-03-22 DIAGNOSIS — R73.9 HYPERGLYCEMIA: ICD-10-CM

## 2024-03-22 DIAGNOSIS — I10 ESSENTIAL HYPERTENSION: Primary | ICD-10-CM

## 2024-03-22 NOTE — TELEPHONE ENCOUNTER
Please advise  called patient who wanted to know her Mg and potassium Level  Mg 2.4  on 3/21/22 and potassium 6/13/23 4.2  Patient states she has  headaches for months and thought she might need electrolytes - she was drinking 8 ounces  kids Pedialyte on Monday and tuesday , Wednesday Thursday  her headache was gone - she wants to know if she should take any extra potassium in her multivitamin there is none , she does not like bananas - we can leave a VM , she is going to PT now and has her phone with her to

## 2024-03-22 NOTE — TELEPHONE ENCOUNTER
Patient is calling and would like to know her levels of her    Potassium and Magnesium from her last blood draw    Please call and advise

## 2024-03-22 NOTE — TELEPHONE ENCOUNTER
I placed orders for her annual fasting labs (she would be due in June anyway); there are labs in there from other doctors as well (not sure when these are due)  She can do the fasting labs so we can check potassium and magnesium    Would have her schedule with Dr. RANGEL next week about the DENNIS

## 2024-03-23 ENCOUNTER — HOSPITAL ENCOUNTER (OUTPATIENT)
Dept: MRI IMAGING | Facility: HOSPITAL | Age: 69
Discharge: HOME | End: 2024-03-23
Attending: PHYSICAL MEDICINE & REHABILITATION
Payer: MEDICARE

## 2024-03-23 DIAGNOSIS — M54.12 CERVICAL RADICULOPATHY: ICD-10-CM

## 2024-03-23 PROCEDURE — 72141 MRI NECK SPINE W/O DYE: CPT | Performed by: PHYSICAL MEDICINE & REHABILITATION

## 2024-03-27 ENCOUNTER — LAB ENCOUNTER (OUTPATIENT)
Dept: LAB | Facility: HOSPITAL | Age: 69
End: 2024-03-27
Attending: INTERNAL MEDICINE
Payer: MEDICARE

## 2024-03-27 DIAGNOSIS — M81.0 AGE-RELATED OSTEOPOROSIS WITHOUT CURRENT PATHOLOGICAL FRACTURE: ICD-10-CM

## 2024-03-27 DIAGNOSIS — R53.83 OTHER FATIGUE: ICD-10-CM

## 2024-03-27 DIAGNOSIS — R73.9 HYPERGLYCEMIA: ICD-10-CM

## 2024-03-27 DIAGNOSIS — Z51.81 MONITORING FOR ANTICOAGULANT USE: ICD-10-CM

## 2024-03-27 DIAGNOSIS — Z79.01 MONITORING FOR ANTICOAGULANT USE: ICD-10-CM

## 2024-03-27 DIAGNOSIS — I10 ESSENTIAL HYPERTENSION: ICD-10-CM

## 2024-03-27 LAB
ALBUMIN SERPL-MCNC: 4.5 G/DL (ref 3.2–4.8)
ALBUMIN/GLOB SERPL: 1.7 {RATIO} (ref 1–2)
ALP LIVER SERPL-CCNC: 54 U/L
ALT SERPL-CCNC: 18 U/L
ANION GAP SERPL CALC-SCNC: 5 MMOL/L (ref 0–18)
APTT PPP: 33.8 SECONDS (ref 23.3–35.6)
AST SERPL-CCNC: 19 U/L (ref ?–34)
BASOPHILS # BLD AUTO: 0.07 X10(3) UL (ref 0–0.2)
BASOPHILS NFR BLD AUTO: 1.2 %
BILIRUB SERPL-MCNC: 0.5 MG/DL (ref 0.2–1.1)
BUN BLD-MCNC: 16 MG/DL (ref 9–23)
BUN/CREAT SERPL: 21.1 (ref 10–20)
CALCIUM BLD-MCNC: 9.5 MG/DL (ref 8.7–10.4)
CHLORIDE SERPL-SCNC: 109 MMOL/L (ref 98–112)
CHOLEST SERPL-MCNC: 184 MG/DL (ref ?–200)
CO2 SERPL-SCNC: 28 MMOL/L (ref 21–32)
CREAT BLD-MCNC: 0.76 MG/DL
DEPRECATED RDW RBC AUTO: 48.4 FL (ref 35.1–46.3)
EGFRCR SERPLBLD CKD-EPI 2021: 85 ML/MIN/1.73M2 (ref 60–?)
EOSINOPHIL # BLD AUTO: 0.4 X10(3) UL (ref 0–0.7)
EOSINOPHIL NFR BLD AUTO: 6.9 %
ERYTHROCYTE [DISTWIDTH] IN BLOOD BY AUTOMATED COUNT: 13.9 % (ref 11–15)
EST. AVERAGE GLUCOSE BLD GHB EST-MCNC: 114 MG/DL (ref 68–126)
FASTING PATIENT LIPID ANSWER: YES
FASTING STATUS PATIENT QL REPORTED: YES
GLOBULIN PLAS-MCNC: 2.6 G/DL (ref 2.8–4.4)
GLUCOSE BLD-MCNC: 78 MG/DL (ref 70–99)
HBA1C MFR BLD: 5.6 % (ref ?–5.7)
HCT VFR BLD AUTO: 43.4 %
HDLC SERPL-MCNC: 77 MG/DL (ref 40–59)
HGB BLD-MCNC: 13.9 G/DL
IMM GRANULOCYTES # BLD AUTO: 0.02 X10(3) UL (ref 0–1)
IMM GRANULOCYTES NFR BLD: 0.3 %
INR BLD: 1.04 (ref 0.8–1.2)
LDLC SERPL CALC-MCNC: 97 MG/DL (ref ?–100)
LYMPHOCYTES # BLD AUTO: 1.67 X10(3) UL (ref 1–4)
LYMPHOCYTES NFR BLD AUTO: 28.8 %
MAGNESIUM SERPL-MCNC: 2 MG/DL (ref 1.6–2.6)
MCH RBC QN AUTO: 30.3 PG (ref 26–34)
MCHC RBC AUTO-ENTMCNC: 32 G/DL (ref 31–37)
MCV RBC AUTO: 94.8 FL
MONOCYTES # BLD AUTO: 0.55 X10(3) UL (ref 0.1–1)
MONOCYTES NFR BLD AUTO: 9.5 %
NEUTROPHILS # BLD AUTO: 3.08 X10 (3) UL (ref 1.5–7.7)
NEUTROPHILS # BLD AUTO: 3.08 X10(3) UL (ref 1.5–7.7)
NEUTROPHILS NFR BLD AUTO: 53.3 %
NONHDLC SERPL-MCNC: 107 MG/DL (ref ?–130)
OSMOLALITY SERPL CALC.SUM OF ELEC: 294 MOSM/KG (ref 275–295)
PLATELET # BLD AUTO: 295 10(3)UL (ref 150–450)
POTASSIUM SERPL-SCNC: 4 MMOL/L (ref 3.5–5.1)
PROT SERPL-MCNC: 7.1 G/DL (ref 5.7–8.2)
PROTHROMBIN TIME: 14.2 SECONDS (ref 11.6–14.8)
PTH-INTACT SERPL-MCNC: 42.6 PG/ML (ref 18.5–88)
RBC # BLD AUTO: 4.58 X10(6)UL
SODIUM SERPL-SCNC: 142 MMOL/L (ref 136–145)
TRIGL SERPL-MCNC: 51 MG/DL (ref 30–149)
TSI SER-ACNC: 1.76 MIU/ML (ref 0.55–4.78)
VIT D+METAB SERPL-MCNC: 49.4 NG/ML (ref 30–100)
VLDLC SERPL CALC-MCNC: 8 MG/DL (ref 0–30)
WBC # BLD AUTO: 5.8 X10(3) UL (ref 4–11)

## 2024-03-27 PROCEDURE — 83735 ASSAY OF MAGNESIUM: CPT

## 2024-03-27 PROCEDURE — 84080 ASSAY ALKALINE PHOSPHATASES: CPT

## 2024-03-27 PROCEDURE — 87086 URINE CULTURE/COLONY COUNT: CPT | Performed by: UROLOGY

## 2024-03-27 PROCEDURE — 84443 ASSAY THYROID STIM HORMONE: CPT

## 2024-03-27 PROCEDURE — 80053 COMPREHEN METABOLIC PANEL: CPT

## 2024-03-27 PROCEDURE — 85025 COMPLETE CBC W/AUTO DIFF WBC: CPT

## 2024-03-27 PROCEDURE — 83970 ASSAY OF PARATHORMONE: CPT

## 2024-03-27 PROCEDURE — 85610 PROTHROMBIN TIME: CPT

## 2024-03-27 PROCEDURE — 80061 LIPID PANEL: CPT

## 2024-03-27 PROCEDURE — 85730 THROMBOPLASTIN TIME PARTIAL: CPT

## 2024-03-27 PROCEDURE — 36415 COLL VENOUS BLD VENIPUNCTURE: CPT

## 2024-03-27 PROCEDURE — 83036 HEMOGLOBIN GLYCOSYLATED A1C: CPT

## 2024-03-27 PROCEDURE — 82306 VITAMIN D 25 HYDROXY: CPT

## 2024-03-28 ENCOUNTER — OFFICE VISIT (OUTPATIENT)
Dept: AUDIOLOGY | Facility: CLINIC | Age: 69
End: 2024-03-28

## 2024-03-28 ENCOUNTER — OFFICE VISIT (OUTPATIENT)
Dept: PHYSICAL MEDICINE AND REHAB | Facility: CLINIC | Age: 69
End: 2024-03-28
Payer: MEDICARE

## 2024-03-28 ENCOUNTER — TELEPHONE (OUTPATIENT)
Dept: PHYSICAL MEDICINE AND REHAB | Facility: CLINIC | Age: 69
End: 2024-03-28

## 2024-03-28 ENCOUNTER — OFFICE VISIT (OUTPATIENT)
Dept: OTOLARYNGOLOGY | Facility: CLINIC | Age: 69
End: 2024-03-28
Payer: MEDICARE

## 2024-03-28 VITALS — BODY MASS INDEX: 20.92 KG/M2 | WEIGHT: 138 LBS | HEIGHT: 68 IN

## 2024-03-28 DIAGNOSIS — M51.9 THORACIC DISC DISEASE: ICD-10-CM

## 2024-03-28 DIAGNOSIS — H93.11 TINNITUS, RIGHT: ICD-10-CM

## 2024-03-28 DIAGNOSIS — M54.16 LUMBAR RADICULOPATHY: ICD-10-CM

## 2024-03-28 DIAGNOSIS — Z98.1 HISTORY OF LUMBAR FUSION: ICD-10-CM

## 2024-03-28 DIAGNOSIS — H90.3 SENSORINEURAL HEARING LOSS, BILATERAL: Primary | ICD-10-CM

## 2024-03-28 DIAGNOSIS — M48.02 CERVICAL SPINAL STENOSIS: ICD-10-CM

## 2024-03-28 DIAGNOSIS — H93.11 RIGHT-SIDED TINNITUS: Primary | ICD-10-CM

## 2024-03-28 DIAGNOSIS — Z98.1 S/P CERVICAL SPINAL FUSION: ICD-10-CM

## 2024-03-28 DIAGNOSIS — G56.21 ULNAR NEUROPATHY AT ELBOW OF RIGHT UPPER EXTREMITY: ICD-10-CM

## 2024-03-28 DIAGNOSIS — M54.12 CERVICAL RADICULOPATHY: ICD-10-CM

## 2024-03-28 DIAGNOSIS — H69.91 DYSFUNCTION OF RIGHT EUSTACHIAN TUBE: ICD-10-CM

## 2024-03-28 DIAGNOSIS — M47.812 CERVICAL FACET SYNDROME: ICD-10-CM

## 2024-03-28 DIAGNOSIS — M50.20 CERVICAL HERNIATED DISC: ICD-10-CM

## 2024-03-28 DIAGNOSIS — M47.812 ARTHROPATHY OF CERVICAL FACET JOINT: Primary | ICD-10-CM

## 2024-03-28 DIAGNOSIS — M50.90 CERVICAL DISC DISEASE: ICD-10-CM

## 2024-03-28 DIAGNOSIS — F41.9 ANXIETY: ICD-10-CM

## 2024-03-28 PROCEDURE — 99214 OFFICE O/P EST MOD 30 MIN: CPT | Performed by: PHYSICAL MEDICINE & REHABILITATION

## 2024-03-28 PROCEDURE — 99213 OFFICE O/P EST LOW 20 MIN: CPT | Performed by: SPECIALIST

## 2024-03-28 PROCEDURE — 92567 TYMPANOMETRY: CPT | Performed by: AUDIOLOGIST

## 2024-03-28 PROCEDURE — 92557 COMPREHENSIVE HEARING TEST: CPT | Performed by: AUDIOLOGIST

## 2024-03-28 RX ORDER — DULOXETIN HYDROCHLORIDE 20 MG/1
20 CAPSULE, DELAYED RELEASE ORAL DAILY
COMMUNITY
Start: 2024-02-26 | End: 2024-04-01

## 2024-03-28 NOTE — PROGRESS NOTES
Low Back Pain H & P    Chief Complaint:   Chief Complaint   Patient presents with    Follow - Up     LOV 3/14/2024 Patient here to follow up on MRI done 3/23/2024. C/o continued N/T in BL toes. LOP 5/10     Nursing note reviewed and verified.    Patient was last seen on 3/14/2024.  She is following up after having the new MRI of the cervical spine.  She has been continuing with the PT and Lyrica 50 mg TID and Tizanidine 4 mg BID.      She still has the bilateral neck pain which is worse at the base of her skull bilaterally, but she has lower neck pain as well.    She has developed tingling in the toes of her feet.  It started in left big toe and then progressed to the right.  The tingling was intermittent and now it is more constant.      Past Medical History   Past Medical History:   Diagnosis Date    Anemia     Anxiety state, unspecified     Arthritis     Back problem     CERVICAL RADICULOPATHY, CERVICAL SPINAL STENOSIS    Bilateral kidney stones 2008    Broken foot 2012    RT - casting. Fracture 5th met. Cast removl/xray foot 12. Follow up fracture right foot 12.     Calculus of kidney     Cataract     Depression     Esophageal reflux     Essential hypertension     Gastric polyps 2018    Gastritis     High blood pressure     Hx of motion sickness     hx of vertigo    Idiopathic acute pancreatitis (HCC) ,     Insomnia     Internal hemorrhoids without complication 2018    Intestinal disorder     Left wrist fracture  and     Muscle weakness     Osteoarthritis     Osteoporosis     Other chronic pancreatitis (HCC) 2021    Renal disorder     Rotator cuff tear, right     Traumatic arthritis     Vertigo        Past Surgical History   Past Surgical History:   Procedure Laterality Date    ARTHROSCOPY OF JOINT UNLISTED Right 2012    rotator cuff tear    BACK SURGERY  2008 & 3-15-22    2 procedures          x 3    CATARACT      CHOLECYSTECTOMY       COLONOSCOPY      COLONOSCOPY N/A 2018    Procedure: COLONOSCOPY;  Surgeon: Virginie Ma MD;  Location: [facility] ENDO    EXCIS LUMBAR DISK,ONE LEVEL      EYE SURGERY      Lasik procedure    FRACTURE SURGERY Left 2010    WRIST FRACTURE ORIF    FRACTURE SURGERY Left     ORIF wrist fracture revision with plates and screws- Ortho Dr Bran ETIENNE      OTHER SURGICAL HISTORY  2017    Fussion L4-L5 - Dr. Rothman    OTHER SURGICAL HISTORY  03/15/2022    Back Surgery    OTHER SURGICAL HISTORY Left     torn biceops repair    SHOULDER SURG PROC UNLISTED Right     SPINE SURGERY PROCEDURE UNLISTED      L1-L2 FUSION    SPINE SURGERY PROCEDURE UNLISTED  2017    Neck fusion    TONSILLECTOMY      with Adenoidectomy    TUBAL LIGATION  1982?    UPPER GI ENDOSCOPY,EXAM      EGD        Family History   Family History   Problem Relation Age of Onset    Pulmonary Disease Father     Dementia Father             Heart Disease Mother     Fibromyalgia Mother             Colon Cancer Maternal Grandfather     Cancer Maternal Grandfather         Colon    Polyps Sister         colon polyps    Colon Cancer Sister     Other (gallbladder disease) Sister     Cancer Sister         Colon    Crohn's Disease Other     Heart Disease Other     Ovarian Cancer Maternal Aunt         70's 80's    Breast Cancer Neg        Social History   Social History     Socioeconomic History    Marital status:      Spouse name: Not on file    Number of children: Not on file    Years of education: Not on file    Highest education level: Not on file   Occupational History    Not on file   Tobacco Use    Smoking status: Never    Smokeless tobacco: Never   Vaping Use    Vaping Use: Never used   Substance and Sexual Activity    Alcohol use: No     Comment: quit 4 - 5 yrs ago due to pancreatitis    Drug use: No    Sexual activity: Not on file   Other Topics Concern     Service Not Asked    Blood  Transfusions Not Asked    Caffeine Concern No     Comment: Coffee 2 cups daily    Occupational Exposure Not Asked    Hobby Hazards Not Asked    Sleep Concern Not Asked    Stress Concern Not Asked    Weight Concern Not Asked    Special Diet Not Asked    Back Care Not Asked    Exercise No    Bike Helmet Not Asked    Seat Belt Not Asked    Self-Exams Not Asked   Social History Narrative    The patient does not use an assistive device..      The patient does live in a home with stairs.     Social Determinants of Health     Financial Resource Strain: Not on file   Food Insecurity: Not on file   Transportation Needs: Not on file   Physical Activity: Not on file   Stress: Not on file   Social Connections: Not on file   Housing Stability: Not on file       PE:  The patient does appear in her stated age in no distress.  The patient is well groomed.    Psychiatric:  The patient is alert and oriented x 3.  The patient has a normal affect and mood.      Respiratory:  No acute respiratory distress. Patient does not have a cough.    HEENT:  Extraocular muscles are intact. There is no kern icterus. Pupils are equal, round, and reactive to light. No redness or discharge bilaterally.    Skin:  There are no rashes or lesions.    Lymph Nodes:  The patient has no palpable submandibular, supraclavicular, and cervical lymph nodes..    Vitals:  There were no vitals filed for this visit.    Cervical Spine:    Posture: mild-moderate chin forward superiorly rotated protracted shoulder posture.   Shoulders: Level   Head: In neutral   Spinous Processes Palpations: Non-tender for all Spinous Processes   Z-Joints Palpations: Tender at  bilateral C2-3, bilateral C3-4, and bilateral C6-7   Muscular Palpations: Non-tender to palpation.     Vascular upper extremity:   Right radial pulses: 2+   Left radial pulses: 2+      Neurological Upper Extremity:    Light Touch: Intact in Bilateral upper extremities.   Pin Prick: Not tested.   UE Muscle  Strength: All Upper Extremity strength measurements 5/5 except:  FDI Right: 3+/5  ADM Right: 3+/5  Triceps Left: 4+/5  Shoulder external rotators Right: 4/5  Shoulder external rotators Left: 3+/5   Reflexes: 2+ In the bilateral upper extremities.   Montero's sign Right: Negative   Montero's sigh Left: Negative     Gait:    Gait: Normal gait   Sit to Stand: no difficulty      Vascular lower extremity:   Dorsalis pedis pulse-RIGHT 2+   Dorsalis pedis pulse-LEFT 2+   Tibialis posterior pulse-RIGHT 2+   Tibialis posterior pulse-LEFT 2+     Neurological Lower Extremity:    Light Touch Sensation: Intact in bilateral Lower Extremities   LE Muscle Strength: All LE strength measurements 5/5   RIGHT plantar reflexes: downward response   LEFT plantar reflexes: downward response   Reflexes: 2+ in bilateral lower extremities     Radiology Imaging:  I reviewed with the patient her MRI of the cervical spine from 3/23/2024.      Assessment  1. Arthropathy of cervical facet joint    2. C2-3 mild-mod diffuse, C3-4 left mod foraminal & mild-moderate diffuse, C6-7 right mild-moderate & left moderate foraminal, C7-T1 left mild foraminal bulging discs    3. C7-T1 mild central herniated disc    4. C3-4 moderate central & left foraminal, C6-7 left mild-moderate foraminal, C7-T1 left mild-moderate foraminal stenosis    5. Cervical facet syndrome: bilateral C6-7 and C3-4    6. History of lumbar fusion: T10-S1 with bilateral SIJ fusions on 3/15/2022    7. left > right S1 radiculitis clinically, right L5 subacute-chronic radiculopathy on EMG    8. S/P cervical spinal fusion: C4-5 & C5-6 ACDF    9. right chronic C8 radiculopathy and left C6 radiculitis    10. T9-10 left mild-mod & right mild bulging disc    11. Ulnar neuropathy at elbow of right upper extremity    12. Anxiety         Plan  I will perform bilateral C6-7 z-joint injections under IVCS.    If the above does not help, then I will do a C7-T1 ILESI under IVCS.    If the toe tingling  persists or worsens, then I will do an EMG/NCS of the bilateral feet and legs.    The patient will continue with PT.    The patient will continue with her home exercise program.    The patient will continue with their current pain medications.    The patient will follow up in 2-3 months, but the patient will call me 2 weeks after having the injection to let me know how the injection worked.    The patient understands and agrees with the stated plan.  Luke Cleaning MD  3/28/2024

## 2024-03-28 NOTE — TELEPHONE ENCOUNTER
Per CMS Guidelines- no authorization is required for Bilateral C6-7 facet joint injections under IVCS  CPT: 17160-83     Status: authorization is not required based on medical necessity however may by subject to review once claim is submitted.   If this procedure is being performed at Outpatient Hospital[facility]- authorization is required, please notify this writer.     Patient has been scheduled for Bilateral C6-7 facet joint injections on 4/23/24 at the [facility] with Dr. Cleaning.   -Anesthesia type:  IVCS  -Patient was advised that if he/she does receive the covid vaccine it needs to be at least 2 weeks before or after the injection.  -Medications and allergies reviewed.  -Patient reminded to hold NSAIDs (Ibuprofen, ASA 81, Aleve, Naproxen, Mobic, Diclofenac, Etodolac, Celebrex etc.) for 3 days prior to Lumbar MBB/Facet if BMI is greater than 35. For Cervical injections only hold multivitamins, Vitamin E, Fish Oil, Phentermine/Lomaira for 7 days prior to injection and NSAIDS.   mg to be held for 7 days prior to injections.  -If patient is receiving MAC/IVCS, weight loss oral/injectable medications will need to be held for 7 days prior to injection.  -Patient informed to fast 12 hours prior to procedure with IVCS/MAC.   -If on blood thinner, clearance has been received and approved to hold this medication by provider.   -Patient informed of [facility]'s  policy:  he/she will need a  to and from procedure and must be on site for their entirety of their visit, if their ride is unable to the procedure will be cancelled.   -[facility] is located in the [facility] 1st floor,  may park in the yellow/purple parking lot.  Patient verbalized understanding and agrees with plan.  Scheduled in Epic: Yes  Scheduled in Surgical Case: Yes  Follow up appointment made: NOV: 6/17/2024 Luke Cleaning MD

## 2024-03-28 NOTE — PATIENT INSTRUCTIONS
Plan  I will perform bilateral C6-7 z-joint injections under IVCS.    If the above does not help, then I will do a C7-T1 ILESI under IVCS.    If the toe tingling persists or worsens, then I will do an EMG/NCS of the bilateral feet and legs.    The patient will continue with PT.    The patient will continue with her home exercise program.    The patient will continue with their current pain medications.    The patient will follow up in 2-3 months, but the patient will call me 2 weeks after having the injection to let me know how the injection worked.

## 2024-03-29 LAB — ALKALINE PHOSPHATASE BONE SPECIFIC: 7.2 UG/L

## 2024-03-29 NOTE — PROGRESS NOTES
Radha Stoner is a 68 year old female.   Chief Complaint   Patient presents with    Ear Wax     Ear cleaning     HPI:   With a right-sided tinnitus and a blocked feeling in her right ear.    Current Outpatient Medications   Medication Sig Dispense Refill    DULoxetine 20 MG Oral Cap DR Particles Take 1 capsule (20 mg total) by mouth daily.      tiZANidine 4 MG Oral Tab Take 1 tablet (4 mg total) by mouth 3 (three) times daily as needed. 270 tablet 0    amLODIPine 10 MG Oral Tab Take 1 tablet (10 mg total) by mouth daily. 90 tablet 3    sucralfate (CARAFATE) 1 g Oral Tab Take 1 tablet (1 g total) by mouth 4 (four) times daily before meals and nightly. (Patient taking differently: Take 1 tablet (1 g total) by mouth 4 (four) times daily before meals and nightly. PRN) 120 tablet 0    metoclopramide 10 MG Oral Tab Take 1 tablet (10 mg total) by mouth 3 (three) times daily as needed. 30 tablet 0    meclizine 25 MG Oral Tab Take 1 tablet (25 mg total) by mouth 3 (three) times daily as needed. 30 tablet 1    rOPINIRole 0.25 MG Oral Tab Take 1 tablet (0.25 mg total) by mouth nightly. 30 tablet 5    TRAZODONE 50 MG Oral Tab TAKE 1 TABLET BY MOUTH EVERY DAY AT NIGHT (Patient taking differently: nightly as needed.) 90 tablet 3    ESTRADIOL 0.1 MG/GM Vaginal Cream INSERT 1/2 GRAM VAGINALLY TWICE A WEEK 42.5 g 10    acetaminophen 500 MG Oral Tab Take 2 tablets (1,000 mg total) by mouth every 8 (eight) hours.      pantoprazole 40 MG Oral Tab EC Take 1 tablet (40 mg total) by mouth every morning before breakfast.      Acidophilus/Pectin Oral Cap Take 1 capsule by mouth daily.      Ascorbic Acid (VITAMIN C) 1000 MG Oral Tab Take 1 tablet (1,000 mg total) by mouth daily.      Famotidine (PEPCID OR) Take 20 mg by mouth. Daily PRN       Cholecalciferol 25 MCG (1000 UT) Oral Tab Take by mouth daily.      Diclofenac Sodium 1 % Transdermal Gel Apply 4 g topically 4 (four) times daily as needed. 1 Tube 3    CALCIUM CITRATE OR Take 1,400 mg  by mouth daily. BID      Multiple Vitamins Oral Tab Take 1 tablet by mouth daily. Multiple Vitamins tablet      Calcium Carbonate-Vitamin D 600-5 MG-MCG Oral Tab Take 1 tablet by mouth daily. (Patient not taking: Reported on 3/28/2024)        Past Medical History:   Diagnosis Date    Anemia     Anxiety state, unspecified     Arthritis     Back problem     CERVICAL RADICULOPATHY, CERVICAL SPINAL STENOSIS    Bilateral kidney stones 09/23/2008    Broken foot 03/14/2012    RT - casting. Fracture 5th met. Cast removl/xray foot 4-20-12. Follow up fracture right foot 5-22-12.     Calculus of kidney 2008    Cataract     Depression     Esophageal reflux     Essential hypertension     Gastric polyps 12/04/2018    Gastritis     High blood pressure     Hx of motion sickness     hx of vertigo    Idiopathic acute pancreatitis ([facility]) 2002, 2010    Insomnia     Internal hemorrhoids without complication 12/04/2018    Intestinal disorder     Left wrist fracture 2011 and 2013    Muscle weakness     Osteoarthritis     Osteoporosis     Other chronic pancreatitis (HCC) 11/24/2021    Renal disorder     Rotator cuff tear, right     Traumatic arthritis     Vertigo       Social History:  Social History     Socioeconomic History    Marital status:    Tobacco Use    Smoking status: Never    Smokeless tobacco: Never   Vaping Use    Vaping Use: Never used   Substance and Sexual Activity    Alcohol use: No     Comment: quit 4 - 5 yrs ago due to pancreatitis    Drug use: No   Other Topics Concern    Caffeine Concern No     Comment: Coffee 2 cups daily    Exercise No   Social History Narrative    The patient does not use an assistive device..      The patient does live in a home with stairs.        REVIEW OF SYSTEMS:   GENERAL HEALTH: feels well otherwise  GENERAL : denies fever, chills, sweats, weight loss, weight gain  SKIN: denies any unusual skin lesions or rashes  RESPIRATORY: denies shortness of breath with exertion  NEURO: denies  headaches    EXAM:   Ht 5' 8\" (1.727 m)   Wt 138 lb (62.6 kg)   BMI 20.98 kg/m²   System Details   Skin Inspection - Normal.   Constitutional Overall appearance - Normal.   Head/Face Facial features - Normal. Eyebrows - Normal. Skull - Normal.   Eyes Conjunctiva - Right: Normal, Left: Normal. Pupil - Right: Normal, Left: Normal.    Ears Inspection - Right: Normal, Left: Normal.   Canal - Right: Normal, Left: Normal.   TM - Right: Normal, Left: Normal.  No middle ear fluid either ear   Nasal External nose - Normal.   Nasal septum - Normal.  Turbinates -congestion, no purulence or polyps noted   Oral/Oropharynx Lips - Normal, Tonsils - Normal, Tongue - Normal    Neck Exam Inspection - Normal. Palpation - Normal. Parotid gland - Normal. Thyroid gland - Normal.  No carotid bruits by auscultation   Lymph Detail Submental. Submandibular. Anterior cervical. Posterior cervical. Supraclavicular all without enlargement   Psychiatric Orientation - Oriented to time, place, person & situation. Appropriate mood and affect.   Neurological Memory - Normal. Cranial nerves - Cranial nerves II through XII grossly intact.     ASSESSMENT AND PLAN:   1. Right-sided tinnitus  Audiogram shows bilateral mid to high-frequency sensorineural hearing loss with excellent word discrimination score at 100% bilaterally at slightly elevated levels.  Audiogram reviewed with patient at the time of her visit.  - Audiology Referral - [place] ([facility])    2. Dysfunction of right eustachian tube  Trial of Flonase nasal spray.  If symptoms persist patient to notify me.      The patient indicates understanding of these issues and agrees to the plan.      Kayce Morris MD  3/28/2024  9:51 PM

## 2024-03-29 NOTE — PATIENT INSTRUCTIONS
Your audiogram shows bilateral mid to high-frequency moderate sensorineural hearing loss with excellent word discrimination score and negative pressure in the right ear.  I asked you to try Flonase nasal spray.  Follow-up with any additional questions or problems.

## 2024-04-01 ENCOUNTER — TELEPHONE (OUTPATIENT)
Dept: SURGERY | Facility: CLINIC | Age: 69
End: 2024-04-01

## 2024-04-01 RX ORDER — PREGABALIN 50 MG/1
50 CAPSULE ORAL 3 TIMES DAILY
COMMUNITY

## 2024-04-01 NOTE — TELEPHONE ENCOUNTER
Patient calling per pt she has a surgery for 4/5 at 1:00 pm but she would like to have it earlier than 1:00 pm.Please advise

## 2024-04-02 NOTE — TELEPHONE ENCOUNTER
Spoke  with patient, informed that I no control the operating room schedule, I apologized for the inconvenience. Patient verbalized understanding.

## 2024-04-03 NOTE — DISCHARGE INSTRUCTIONS
HOME INSTRUCTIONS  - Blood in the urine, burning with urination, and the urgency to urinate are normal and expected for 5-7 days.  - Make sure you drink enough water to keep the urine light pink to clear.  - Take the prescribed antibiotics as instructed for 5 days.  - Take the Pyridium (also called Azo and available over-the-counter) for burning with urination. This medication will make your urine orange in color.  - Dr. Georges's office will contact you within the next few days to schedule you for cystoscopy and stent removal in the office in about 2 weeks. If you do not receive a call within 5 business days, please call to schedule the appointment.   - Call or go to the ER for intractable pain, fevers >101 F, shaking chills, nausea and vomiting, chest pain or shortness of breath.  We wish you a safe, speedy, and uneventful recovery.     AMBSURG HOME CARE INSTRUCTIONS: POST-OP ANESTHESIA  The medication that you received for sedation or general anesthesia can last up to 24 hours. Your judgment and reflexes may be altered, even if you feel like your normal self.      We Recommend:   Do not drive any motor vehicle or bicycle   Avoid mowing the lawn, playing sports, or working with power tools/applicances (power saws, electric knives or mixers)   That you have someone stay with you on your first night home   Do not drink alcohol or take sleeping pills or tranquilizers   Do not sign legal documents within 24 hours of your procedure   If you had a nerve block for your surgery, take extra care not to put any pressure on your arm or hand for 24 hours    It is normal:  For you to have a sore throat if you had a breathing tube during surgery (while you were asleep!). The sore throat should get better within 48 hours. You can gargle with warm salt water (1/2 tsp in 4 oz warm water) or use a throat lozenge for comfort  To feel muscle aches or soreness especially in the abdomen, chest or neck. The achy feeling should go away  in the next 24 hours  To feel weak, sleepy or \"wiped out\". Your should start feeling better in the next 24 hours.   To experience mild discomforts such as sore lip or tongue, headache, cramps, gas pains or a bloated feeling in your abdomen.   To experience mild back pain or soreness for a day or two if you had spinal or epidural anesthesia.   If you had laparoscopic surgery, to feel shoulder pain or discomfort on the day of surgery.   For some patients to have nausea after surgery/anesthesia    If you feel nausea or experience vomiting:   Try to move around less.   Eat less than usual or drink only liquids until the next morning   Nausea should resolve in about 24 hours    If you have a problem when you are at home:    Call your surgeons office   Discharge Instructions: After Your Surgery  You’ve just had surgery. During surgery, you were given medicine called anesthesia to keep you relaxed and free of pain. After surgery, you may have some pain or nausea. This is common. Here are some tips for feeling better and getting well after surgery.   Going home  Your healthcare provider will show you how to take care of yourself when you go home. They'll also answer your questions. Have an adult family member or friend drive you home. For the first 24 hours after your surgery:   Don't drive or use heavy equipment.  Don't make important decisions or sign legal papers.  Take medicines as directed.  Don't drink alcohol.  Have someone stay with you, if needed. They can watch for problems and help keep you safe.  Be sure to go to all follow-up visits with your healthcare provider. And rest after your surgery for as long as your provider tells you to.   Coping with pain  If you have pain after surgery, pain medicine will help you feel better. Take it as directed, before pain becomes severe. Also, ask your healthcare provider or pharmacist about other ways to control pain. This might be with heat, ice, or relaxation. And follow  any other instructions your surgeon or nurse gives you.      Stay on schedule with your medicine.     Tips for taking pain medicine  To get the best relief possible, remember these points:   Pain medicines can upset your stomach. Taking them with a little food may help.  Most pain relievers taken by mouth need at least 20 to 30 minutes to start to work.  Don't wait till your pain becomes severe before you take your medicine. Try to time your medicine so that you can take it before starting an activity. This might be before you get dressed, go for a walk, or sit down for dinner.  Constipation is a common side effect of some pain medicines. Call your healthcare provider before taking any medicines such as laxatives or stool softeners to help ease constipation. Also ask if you should skip any foods. Drinking lots of fluids and eating foods such as fruits and vegetables that are high in fiber can also help. Remember, don't take laxatives unless your surgeon has prescribed them.  Drinking alcohol and taking pain medicine can cause dizziness and slow your breathing. It can even be deadly. Don't drink alcohol while taking pain medicine.  Pain medicine can make you react more slowly to things. Don't drive or run machinery while taking pain medicine.  Your healthcare provider may tell you to take acetaminophen to help ease your pain. Ask them how much you're supposed to take each day. Acetaminophen or other pain relievers may interact with your prescription medicines or other over-the-counter (OTC) medicines. Some prescription medicines have acetaminophen and other ingredients in them. Using both prescription and OTC acetaminophen for pain can cause you to accidentally overdose. Read the labels on your OTC medicines with care. This will help you to clearly know the list of ingredients, how much to take, and any warnings. It may also help you not take too much acetaminophen. If you have questions or don't understand the  information, ask your pharmacist or healthcare provider to explain it to you before you take the OTC medicine.   Managing nausea  Some people have an upset stomach (nausea) after surgery. This is often because of anesthesia, pain, or pain medicine, less movement of food in the stomach, or the stress of surgery. These tips will help you handle nausea and eat healthy foods as you get better. If you were on a special food plan before surgery, ask your healthcare provider if you should follow it while you get better. Check with your provider on how your eating should progress. It may depend on the surgery you had. These general tips may help:   Don't push yourself to eat. Your body will tell you when to eat and how much.  Start off with clear liquids and soup. They're easier to digest.  Next try semi-solid foods as you feel ready. These include mashed potatoes, applesauce, and gelatin.  Slowly move to solid foods. Don’t eat fatty, rich, or spicy foods at first.  Don't force yourself to have 3 large meals a day. Instead eat smaller amounts more often.  Take pain medicines with a small amount of solid food, such as crackers or toast. This helps prevent nausea.  When to call your healthcare provider  Call your healthcare provider right away if you have any of these:   You still have too much pain, or the pain gets worse, after taking the medicine. The medicine may not be strong enough. Or there may be a complication from the surgery.  You feel too sleepy, dizzy, or groggy. The medicine may be too strong.  Side effects such as nausea or vomiting. Your healthcare provider may advise taking other medicines to .  Skin changes such as rash, itching, or hives. This may mean you have an allergic reaction. Your provider may advise taking other medicines.  The incision looks different (for instance, part of it opens up).  Bleeding or fluid leaking from the incision site, and weren't told to expect that.  Fever of 100.4°F (38°C) or  higher, or as directed by your provider.  Call 911  Call 911 right away if you have:   Trouble breathing  Facial swelling    If you have obstructive sleep apnea   You were given anesthesia medicine during surgery to keep you comfortable and free of pain. After surgery, you may have more apnea spells because of this medicine and other medicines you were given. The spells may last longer than normal.    At home:  Keep using the continuous positive airway pressure (CPAP) device when you sleep. Unless your healthcare provider tells you not to, use it when you sleep, day or night. CPAP is a common device used to treat obstructive sleep apnea.  Talk with your provider before taking any pain medicine, muscle relaxants, or sedatives. Your provider will tell you about the possible dangers of taking these medicines.  Contact your provider if your sleeping changes a lot even when taking medicines as directed.  [employer] last reviewed this educational content on 10/1/2021  © 5534-7853 [employer]. All rights reserved. This information is not intended as a substitute for professional medical care. Always follow your healthcare professional's instructions.

## 2024-04-04 RX ORDER — SODIUM CHLORIDE, SODIUM LACTATE, POTASSIUM CHLORIDE, CALCIUM CHLORIDE 600; 310; 30; 20 MG/100ML; MG/100ML; MG/100ML; MG/100ML
INJECTION, SOLUTION INTRAVENOUS CONTINUOUS
Status: DISCONTINUED | OUTPATIENT
Start: 2024-04-04 | End: 2024-04-04

## 2024-04-05 ENCOUNTER — HOSPITAL ENCOUNTER (OUTPATIENT)
Facility: HOSPITAL | Age: 69
Setting detail: HOSPITAL OUTPATIENT SURGERY
Discharge: HOME | End: 2024-04-05
Attending: UROLOGY | Admitting: UROLOGY
Payer: MEDICARE

## 2024-04-05 ENCOUNTER — TELEPHONE (OUTPATIENT)
Dept: SURGERY | Facility: CLINIC | Age: 69
End: 2024-04-05

## 2024-04-05 ENCOUNTER — ANESTHESIA EVENT (OUTPATIENT)
Dept: SURGERY | Facility: HOSPITAL | Age: 69
End: 2024-04-05
Payer: MEDICARE

## 2024-04-05 ENCOUNTER — ANESTHESIA (OUTPATIENT)
Dept: SURGERY | Facility: HOSPITAL | Age: 69
End: 2024-04-05
Payer: MEDICARE

## 2024-04-05 ENCOUNTER — APPOINTMENT (OUTPATIENT)
Dept: GENERAL RADIOLOGY | Facility: HOSPITAL | Age: 69
End: 2024-04-05
Attending: UROLOGY
Payer: MEDICARE

## 2024-04-05 VITALS
RESPIRATION RATE: 16 BRPM | SYSTOLIC BLOOD PRESSURE: 128 MMHG | WEIGHT: 138 LBS | TEMPERATURE: 97 F | DIASTOLIC BLOOD PRESSURE: 74 MMHG | HEIGHT: 68 IN | BODY MASS INDEX: 20.92 KG/M2 | HEART RATE: 75 BPM | OXYGEN SATURATION: 100 %

## 2024-04-05 DIAGNOSIS — N20.0 KIDNEY STONE ON LEFT SIDE: ICD-10-CM

## 2024-04-05 DIAGNOSIS — N20.0 KIDNEY STONE ON LEFT SIDE: Primary | ICD-10-CM

## 2024-04-05 PROCEDURE — 0T778DZ DILATION OF LEFT URETER WITH INTRALUMINAL DEVICE, VIA NATURAL OR ARTIFICIAL OPENING ENDOSCOPIC: ICD-10-PCS | Performed by: UROLOGY

## 2024-04-05 PROCEDURE — 52356 CYSTO/URETERO W/LITHOTRIPSY: CPT | Performed by: UROLOGY

## 2024-04-05 PROCEDURE — 0TC18ZZ EXTIRPATION OF MATTER FROM LEFT KIDNEY, VIA NATURAL OR ARTIFICIAL OPENING ENDOSCOPIC: ICD-10-PCS | Performed by: UROLOGY

## 2024-04-05 PROCEDURE — 74420 UROGRAPHY RTRGR +-KUB: CPT | Performed by: UROLOGY

## 2024-04-05 DEVICE — URETERAL STENT
Type: IMPLANTABLE DEVICE | Site: URETER | Status: FUNCTIONAL
Brand: ASCERTA™

## 2024-04-05 RX ORDER — LIDOCAINE HYDROCHLORIDE 20 MG/ML
JELLY TOPICAL AS NEEDED
Status: DISCONTINUED | OUTPATIENT
Start: 2024-04-05 | End: 2024-04-05 | Stop reason: HOSPADM

## 2024-04-05 RX ORDER — HYDROMORPHONE HYDROCHLORIDE 1 MG/ML
0.4 INJECTION, SOLUTION INTRAMUSCULAR; INTRAVENOUS; SUBCUTANEOUS EVERY 5 MIN PRN
Status: DISCONTINUED | OUTPATIENT
Start: 2024-04-05 | End: 2024-04-05

## 2024-04-05 RX ORDER — PHENYLEPHRINE HCL 10 MG/ML
VIAL (ML) INJECTION AS NEEDED
Status: DISCONTINUED | OUTPATIENT
Start: 2024-04-05 | End: 2024-04-05 | Stop reason: SURG

## 2024-04-05 RX ORDER — SODIUM CHLORIDE 9 MG/ML
INJECTION, SOLUTION INTRAVENOUS ONCE
Status: COMPLETED | OUTPATIENT
Start: 2024-04-05 | End: 2024-04-05

## 2024-04-05 RX ORDER — DEXAMETHASONE SODIUM PHOSPHATE 4 MG/ML
VIAL (ML) INJECTION AS NEEDED
Status: DISCONTINUED | OUTPATIENT
Start: 2024-04-05 | End: 2024-04-05 | Stop reason: SURG

## 2024-04-05 RX ORDER — EPHEDRINE SULFATE 50 MG/ML
INJECTION INTRAVENOUS AS NEEDED
Status: DISCONTINUED | OUTPATIENT
Start: 2024-04-05 | End: 2024-04-05 | Stop reason: SURG

## 2024-04-05 RX ORDER — HYDROMORPHONE HYDROCHLORIDE 1 MG/ML
0.6 INJECTION, SOLUTION INTRAMUSCULAR; INTRAVENOUS; SUBCUTANEOUS EVERY 5 MIN PRN
Status: DISCONTINUED | OUTPATIENT
Start: 2024-04-05 | End: 2024-04-05

## 2024-04-05 RX ORDER — ONDANSETRON 2 MG/ML
INJECTION INTRAMUSCULAR; INTRAVENOUS AS NEEDED
Status: DISCONTINUED | OUTPATIENT
Start: 2024-04-05 | End: 2024-04-05 | Stop reason: SURG

## 2024-04-05 RX ORDER — ACETAMINOPHEN 500 MG
1000 TABLET ORAL ONCE
Status: COMPLETED | OUTPATIENT
Start: 2024-04-05 | End: 2024-04-05

## 2024-04-05 RX ORDER — SODIUM CHLORIDE, SODIUM LACTATE, POTASSIUM CHLORIDE, CALCIUM CHLORIDE 600; 310; 30; 20 MG/100ML; MG/100ML; MG/100ML; MG/100ML
INJECTION, SOLUTION INTRAVENOUS CONTINUOUS PRN
Status: DISCONTINUED | OUTPATIENT
Start: 2024-04-05 | End: 2024-04-05 | Stop reason: SURG

## 2024-04-05 RX ORDER — ACETAMINOPHEN 500 MG
1000 TABLET ORAL ONCE AS NEEDED
Status: DISCONTINUED | OUTPATIENT
Start: 2024-04-05 | End: 2024-04-05

## 2024-04-05 RX ORDER — CEFADROXIL 1000 MG/1
1 TABLET ORAL 2 TIMES DAILY
Qty: 10 TABLET | Refills: 0 | Status: SHIPPED | OUTPATIENT
Start: 2024-04-05 | End: 2024-04-10

## 2024-04-05 RX ORDER — PHENAZOPYRIDINE HYDROCHLORIDE 100 MG/1
100 TABLET, FILM COATED ORAL 3 TIMES DAILY PRN
Qty: 9 TABLET | Refills: 0 | Status: SHIPPED | OUTPATIENT
Start: 2024-04-05

## 2024-04-05 RX ORDER — SODIUM CHLORIDE, SODIUM LACTATE, POTASSIUM CHLORIDE, CALCIUM CHLORIDE 600; 310; 30; 20 MG/100ML; MG/100ML; MG/100ML; MG/100ML
INJECTION, SOLUTION INTRAVENOUS CONTINUOUS
Status: DISCONTINUED | OUTPATIENT
Start: 2024-04-05 | End: 2024-04-05

## 2024-04-05 RX ORDER — GLYCOPYRROLATE 0.2 MG/ML
INJECTION, SOLUTION INTRAMUSCULAR; INTRAVENOUS AS NEEDED
Status: DISCONTINUED | OUTPATIENT
Start: 2024-04-05 | End: 2024-04-05 | Stop reason: SURG

## 2024-04-05 RX ORDER — NALOXONE HYDROCHLORIDE 0.4 MG/ML
0.08 INJECTION, SOLUTION INTRAMUSCULAR; INTRAVENOUS; SUBCUTANEOUS AS NEEDED
Status: DISCONTINUED | OUTPATIENT
Start: 2024-04-05 | End: 2024-04-05

## 2024-04-05 RX ORDER — PHENAZOPYRIDINE HYDROCHLORIDE 200 MG/1
200 TABLET, FILM COATED ORAL ONCE AS NEEDED
Status: CANCELLED | OUTPATIENT
Start: 2024-04-05 | End: 2024-04-05

## 2024-04-05 RX ORDER — HYDROMORPHONE HYDROCHLORIDE 1 MG/ML
0.2 INJECTION, SOLUTION INTRAMUSCULAR; INTRAVENOUS; SUBCUTANEOUS EVERY 5 MIN PRN
Status: DISCONTINUED | OUTPATIENT
Start: 2024-04-05 | End: 2024-04-05

## 2024-04-05 RX ADMIN — DEXAMETHASONE SODIUM PHOSPHATE 4 MG: 4 MG/ML VIAL (ML) INJECTION at 16:11:00

## 2024-04-05 RX ADMIN — EPHEDRINE SULFATE 10 MG: 50 INJECTION INTRAVENOUS at 16:44:00

## 2024-04-05 RX ADMIN — GLYCOPYRROLATE 0.2 MG: 0.2 INJECTION, SOLUTION INTRAMUSCULAR; INTRAVENOUS at 16:11:00

## 2024-04-05 RX ADMIN — SODIUM CHLORIDE, SODIUM LACTATE, POTASSIUM CHLORIDE, CALCIUM CHLORIDE: 600; 310; 30; 20 INJECTION, SOLUTION INTRAVENOUS at 16:58:00

## 2024-04-05 RX ADMIN — ONDANSETRON 4 MG: 2 INJECTION INTRAMUSCULAR; INTRAVENOUS at 16:11:00

## 2024-04-05 RX ADMIN — SODIUM CHLORIDE, SODIUM LACTATE, POTASSIUM CHLORIDE, CALCIUM CHLORIDE: 600; 310; 30; 20 INJECTION, SOLUTION INTRAVENOUS at 16:08:00

## 2024-04-05 RX ADMIN — PHENYLEPHRINE HCL 40 MCG: 10 MG/ML VIAL (ML) INJECTION at 16:25:00

## 2024-04-05 NOTE — H&P
History & Physical Examination    Patient Name: Radha Stoner  MRN: [id number]  CSN: [id number]  YOB: 1955    Diagnosis: Left Kidney Stone    Present Illness: Patient is a pleasant 68-year-old female with history of nephrolithiasis.  Recent CT imaging revealed a 6 mm nonobstructing left interpolar kidney stone.  She is largely asymptomatic however is planning on going on an international trip soon and elected to proceed with definitive stone management.  Management options were discussed with the patient who elected to proceed with left ureteroscopy, lithotripsy, stone removal, and stent insertion.  The procedure was discussed in detail including rationale, approach, benefits, risks, possible complications, and reasonable alternatives of treatment.  We discussed surgical risks including but not limited to medical and anesthetic complications, bleeding, infection, damage to surrounding organs or structures, ureteral injury, stricture formation, and need for additional procedures.  We discussed the eventual need for stent removal once stone treatment is completed.  Patient verbalized understanding and wishes to proceed.    Allergies:   Allergies   Allergen Reactions    Aleve NAUSEA AND VOMITING    Levaquin [Levofloxacin] RASH and DIZZINESS    Bactrim [Sulfamethoxazole W/Trimethoprim] PAIN    Codeine NAUSEA AND VOMITING    Fentanyl NAUSEA ONLY and OTHER (SEE COMMENTS)     Headaches and nausea with higher dose of Fentanyl ( 150 mcg ) - pt. Requesting no more than 150mcg, does well with 100mcg    Hydrocodone NAUSEA AND VOMITING    Morphine NAUSEA AND VOMITING    Nsaids NAUSEA AND VOMITING and PAIN     GASTRITIS, stomach ache, burning sensation    Oxycodone ITCHING and NAUSEA AND VOMITING    Tramadol Hcl ITCHING    Augmentin, [Amoxicillin-Pot Clavulanate] OTHER (SEE COMMENTS)     Abdominal pain  No other reactions - no skin peeling/blisters/organ damage       Past Medical History:   Diagnosis Date    Anemia      Anxiety state, unspecified     Arthritis     Back problem     CERVICAL RADICULOPATHY, CERVICAL SPINAL STENOSIS    Bilateral kidney stones 2008    Broken foot 2012    RT - casting. Fracture 5th met. Cast removl/xray foot 12. Follow up fracture right foot 12.     Calculus of kidney     Cataract     Depression     Esophageal reflux     Essential hypertension     Gastric polyps 2018    Gastritis     High blood pressure     History of stomach ulcers     Hx of motion sickness     hx of vertigo    Idiopathic acute pancreatitis ([facility]) ,     Insomnia     Internal hemorrhoids without complication 2018    Intestinal disorder     Left wrist fracture  and     Muscle weakness     Osteoarthritis     Osteoporosis     Other chronic pancreatitis ([facility]) 2021    Renal disorder     Rotator cuff tear, right     Traumatic arthritis     Vertigo      Past Surgical History:   Procedure Laterality Date    ARTHROSCOPY OF JOINT UNLISTED Right 2012    rotator cuff tear    BACK SURGERY  2008 & 3-15-22    2 procedures          x 3    CATARACT      CHOLECYSTECTOMY      COLONOSCOPY      COLONOSCOPY N/A 2018    Procedure: COLONOSCOPY;  Surgeon: Virginie Ma MD;  Location: [facility] ENDO    EXCIS LUMBAR DISK,ONE LEVEL      EYE SURGERY      Lasik procedure    FRACTURE SURGERY Left     WRIST FRACTURE ORIF    FRACTURE SURGERY Left     ORIF wrist fracture revision with plates and screws- Ortho Dr Bran ETIENNE      OTHER SURGICAL HISTORY  2017    Fussion L4-L5 - Dr. Rothman    OTHER SURGICAL HISTORY  03/15/2022    Back Surgery    OTHER SURGICAL HISTORY Left     torn biceops repair    SHOULDER SURG PROC UNLISTED Right     SPINE SURGERY PROCEDURE UNLISTED      L1-L2 FUSION    SPINE SURGERY PROCEDURE UNLISTED  2017    Neck fusion    TONSILLECTOMY      with Adenoidectomy    TOTAL SHOULDER REPLACEMENT Left     TUBAL LIGATION  ?    UPPER  GI ENDOSCOPY,EXAM      EGD      Family History   Problem Relation Age of Onset    Pulmonary Disease Father     Dementia Father             Heart Disease Mother     Fibromyalgia Mother             Colon Cancer Maternal Grandfather     Cancer Maternal Grandfather         Colon    Polyps Sister         colon polyps    Colon Cancer Sister     Other (gallbladder disease) Sister     Cancer Sister         Colon    Crohn's Disease Other     Heart Disease Other     Ovarian Cancer Maternal Aunt         70's 80's    Breast Cancer Neg      Social History     Tobacco Use    Smoking status: Never    Smokeless tobacco: Never   Substance Use Topics    Alcohol use: No     Comment: quit 4 - 5 yrs ago due to pancreatitis       SYSTEM Check if Review is Normal Check if Physical Exam is Normal If not normal, please explain:   HEENT [X] [X]    NECK & BACK [X] [X]    HEART [X] [X]    LUNGS [X] [X]    ABDOMEN [X] [X]    UROGENITAL [X] [X]    EXTREMITIES [X] [X]    OTHER        [ x ] I have discussed the risks and benefits and alternatives with the patient/family.  They understand and agree to proceed with plan of care.  [ x ] I have reviewed the History and Physical done within the last 30 days.  Any changes noted above.    Srinivasa Georges MD  2024

## 2024-04-05 NOTE — OPERATIVE REPORT
[facility]  part of [facility]  Urology Operative Note         Radha A Day Location: OR   [facility] [id number] MRN [id number]   Admission Date 4/5/2024 Operation Date 4/5/2024   Attending Physician Srinivasa Georges MD       Patient Name: Radha VILLELA Day       Preoperative Diagnosis: Kidney stone on left side [N20.0]       Postoperative Diagnosis: Kidney stone on left side [N20.0]       Procedure(s): Cystoscopy, left retrograde pyelography, flexible left ureteroscopy, laser lithotripsy, stone removal, left ureteral stent insertion.      Primary Surgeon: Srinivasa Georges MD       Anesthesia: General     Specimen:   ID Type Source Tests Collected by Time Destination   1 : 1. Left kidney stone fragments Calculus Kidney stone (calculus) CALCULI, URINARY, SURGICAL PATHOLOGY TISSUE Srinivasa Georges MD 4/5/2024  4:48 PM       Estimated Blood Loss: Minimal.     Complications: None.        Indications for procedure: Patient is a pleasant 68-year-old female with history of nephrolithiasis.  Recent CT imaging revealed a 6 mm nonobstructing left interpolar kidney stone.  She is largely asymptomatic however is planning on going on an international trip soon and elected to proceed with definitive stone management.  Management options were discussed with the patient who elected to proceed with left ureteroscopy, lithotripsy, stone removal, and stent insertion.  The procedure was discussed in detail including rationale, approach, benefits, risks, possible complications, and reasonable alternatives of treatment.  We discussed surgical risks including but not limited to medical and anesthetic complications, bleeding, infection, damage to surrounding organs or structures, ureteral injury, stricture formation, and need for additional procedures.  We discussed the eventual need for stent removal once stone treatment is completed.  Patient verbalized understanding and wishes to proceed.        Surgical Findings: 6 mm left  kidney stone, submucosal. Overlying mucosa incised, stone fragmented using holmium laser and fragments were extracted. 6 Fr x 28 cm left ureteral JJ stent inserted. Normal left retrograde pyelograms.       Operative Summary:  The patient was brought to the operating room and identified by their wristband including their name, medical record number, and date of birth. They were transferred to the operating room table. Appropriate monitoring devices were connected to the patient. SCD's were applied to the bilateral lower extremities for DVT prophylaxis. Successful induction of general level LMA anesthesia was achieved. IV antibiotics were administered for surgical prophylaxis. The patient was then positioned in dorsal lithotomy with their legs in Nick stirrups and all pressure areas and bony prominences padded appropriately. The surgical site was prepped and draped in standard sterile fashion. A full surgical timeout was performed with agreement upon all of its components.    A 22 Citizen of the [place] cystoscope with a 30 degree lens was inserted per urethra and advanced to the bladder.  The bladder was entered atraumatically.  Pan cystoscopy revealed no evidence of bladder tumors, masses, stones, or lesions.  The bilateral UOs were identified in their orthotopic locations.  A 5 Citizen of the [place] open-ended ureteral catheter was inserted into the left ureteral orifice.  Left retrograde pyelography was performed by introducing contrast into the collecting system.  This revealed no evidence of hydroureteronephrosis, filling defects, or contrast extravasation.  A 0.035 sensor guidewire was introduced through the open-ended catheter and advanced to the level of the left kidney under fluoroscopic guidance.  The open-ended catheter and cystoscope were removed while maintaining the wire in place.    The left ureter was then dilated using a [employer] 8/10 Citizen of the [place] dilator set that was sequentially introduced over the guidewire.  A second 0.035  sensor guidewire was inserted through the 10 [place] sheath and advanced under fluoroscopic guidance to the level of the left kidney.    The 10 [place] sheath was then removed while maintaining both wires in place.  One of the wires was secured as a safety wire.  Next, a [employer] navigator 11/13 [place] by 36 cm ureteral access sheath was inserted over the guidewire and advanced under fluoroscopic guidance to the proximal left ureter.  No resistance was met.  The inner sheath and guidewire were removed.    Next, a [employer] disposable flexible digital ureteroscope was introduced and flexible left nephro ureteroscopy was performed.  No evidence of tumors or masses noted within the visualized collecting system.  The stone was noted in the middle pole and was submucosal.  The 200 µm [employer] holmium laser fiber was introduced and the mucosa overlying the stone was incised using the holmium laser.  The stone was then exposed and became free-floating.  Laser lithotripsy of the stone was performed achieving complete fragmentation.  A 1.9 [place] 0 tip nitinol basket was introduced and the stone fragments were engaged and extracted and sent for chemical analysis.    Completion retrograde pyelography through the ureteroscope revealed no evidence of contrast extravasation.  I then withdrew the ureteroscope and access sheath while maintaining the safety wire in place.  Exit ureteroscopy revealed no evidence of ureteral masses, stones, lesions, or injury.  The safety wire was backloaded onto the cystoscope which was reintroduced into the bladder.  Over the wire, I inserted a [employer] 6 [place] by 28 cm double-J ureteral stent and advanced it under fluoroscopic and visual guidance to the level of the left kidney.  The guidewire was removed.  Proximal distal coils were noted to be in good position and excellent drainage was noted through and around the stent.    The bladder was emptied and the cystoscope was  removed.  10 mL of 2% lidocaine jelly were instilled per urethra for postoperative analgesia.    This concluded our procedure.  Patient was repositioned supine, general anesthesia was reversed and she was successfully extubated and transported to the recovery room in stable condition.  She tolerated the procedure well without any immediate complications.       Implants:   Implant Name Type Inv. Item Serial No.  Lot No. LRB No. Used Action   STENT URET [id number] ASCERTA - SN/A  STENT URET [id number] ASCERTA N/A [employer] WD [id number] Left 1 Implanted        Drains: 6 x 28 left ureteral JJ stent.      Condition: Extubated and stable to PACU.      I was present, scrubbed, and performed the procedure in its entirety.  At patient's request, findings were discussed with her  following the procedure.      Srinivasa Georges MD

## 2024-04-05 NOTE — ANESTHESIA POSTPROCEDURE EVALUATION
Patient: Radha Stoner    Procedure Summary       Date: 04/05/24 Room / Location: [facility] MAIN OR  / [facility] MAIN OR    Anesthesia Start: 1608 Anesthesia Stop: 1714    Procedures:       Cystoscopy, left retrograde pyelography, flexible left ureteroscopy, laser lithotripsy, stone removal, left stent insertion (Left: Ureter)      CYSTOSCOPY URETEROSCOPY (Left: Ureter)      LASER HOLMIUM LITHOTRIPSY (Left)      CYSTOSCOPY STENT INSERTION (Left: Ureter) Diagnosis:       Kidney stone on left side      (Kidney stone on left side [N20.0])    Surgeons: Srinivasa Georges MD Anesthesiologist: Alfonso Mendoza MD    Anesthesia Type: general ASA Status: 3            Anesthesia Type: general    Vitals Value Taken Time   /82 04/05/24 1708   Temp 98.4 °F (36.9 °C) 04/05/24 1708   Pulse 88 04/05/24 1714   Resp 12 04/05/24 1714   SpO2 95 % 04/05/24 1714   Vitals shown include unfiled device data.    [facility] AN Post Evaluation:   Patient Evaluated in PACU  Patient Participation: complete - patient participated  Level of Consciousness: awake  Pain Management: adequate  Airway Patency:patent  Dental exam unchanged from preop  Yes    Cardiovascular Status: acceptable  Respiratory Status: acceptable  Postoperative Hydration acceptable      ALFONSO MENDOZA MD  4/5/2024 5:14 PM

## 2024-04-05 NOTE — TELEPHONE ENCOUNTER
Please contact patient and schedule for office cystoscopy and left ureteral stent removal the week of 4/15/2024.    Srinivasa Georges MD  4/5/2024

## 2024-04-05 NOTE — ANESTHESIA PROCEDURE NOTES
Airway  Date/Time: 4/5/2024 4:12 PM  Urgency: elective    Airway not difficult    General Information and Staff    Patient location during procedure: OR  Anesthesiologist: Juan A Granados MD  Performed: anesthesiologist   Performed by: Juan A Granados MD  Authorized by: Juan A Granados MD      Indications and Patient Condition  Indications for airway management: anesthesia  Spontaneous Ventilation: absent  Sedation level: deep  Preoxygenated: yes  Patient position: sniffing  Mask difficulty assessment: 1 - vent by mask  Planned trial extubation    Final Airway Details  Final airway type: supraglottic airway      Successful airway: classic  Size 3       Number of attempts at approach: 1

## 2024-04-05 NOTE — ANESTHESIA PREPROCEDURE EVALUATION
Anesthesia PreOp Note    HPI:     Radha Stoner is a 68 year old female who presents for preoperative consultation requested by: Srinivasa Georges MD    Date of Surgery: 4/5/2024    Procedure(s):  Cystoscopy, left retrograde pyelography, flexible left ureteroscopy, laser lithotripsy, stone removal, left stent insertion  CYSTOSCOPY URETEROSCOPY  LASER HOLMIUM LITHOTRIPSY  CYSTOSCOPY STENT INSERTION  Indication: Kidney stone on left side [N20.0]    Relevant Problems   No relevant active problems       NPO:  Last Liquid Consumption Date: 04/04/24  Last Liquid Consumption Time: 1800  Last Solid Consumption Date: 04/04/24  Last Solid Consumption Time: 1800  Last Liquid Consumption Date: 04/04/24          History Review:  Patient Active Problem List    Diagnosis Date Noted    Sensorineural hearing loss, bilateral 03/28/2024    Arthropathy of cervical facet joint 02/01/2024    Olecranon bursitis of right elbow 09/11/2023    Post-traumatic osteoarthritis of left elbow 06/26/2023    Chronic left shoulder pain and s/p shoulder replacement 05/03/2023    Anxiety 10/23/2022    T9-10 left mild-mod & right mild bulging disc 10/03/2022    Acute bilateral low back pain without sciatica 09/27/2022    Acute midline thoracic back pain 07/20/2022    Thoracic facet syndrome: bilateral T9-10 07/20/2022    Cervical facet syndrome: bilateral C6-7 and C3-4 07/20/2022    Cervicogenic headache 10/27/2021    S/P cervical spinal fusion: C4-5 & C5-6 ACDF 10/27/2021    PFO (patent foramen ovale) (HCC) 10/18/2021    Vertigo 10/18/2021    Age-related osteoporosis without current pathological fracture 09/29/2020    Neuropathy, sciatic: bilateral 07/15/2020    Asymptomatic microscopic hematuria 06/09/2020    C7-T1 mild central herniated disc 10/15/2019    Ocular migraine 05/15/2019    Hx of gastroesophageal reflux (GERD) 03/13/2018    Cervical radiculitis 09/26/2017    left > right S1 radiculitis clinically, right L5 subacute-chronic radiculopathy on  EMG 09/05/2017    History of lumbar fusion: T10-S1 with bilateral SIJ fusions on 3/15/2022 09/05/2017    Ulnar neuropathy at elbow of right upper extremity 07/17/2017    Other idiopathic scoliosis, thoracolumbar region 05/25/2017    C2-3 mild-mod diffuse, C3-4 left mod foraminal & mild-moderate diffuse, C6-7 right mild-moderate & left moderate foraminal, C7-T1 left mild foraminal bulging discs 04/12/2017    C3-4 moderate central & left foraminal, C6-7 left mild-moderate foraminal, C7-T1 left mild-moderate foraminal stenosis 04/12/2017    History of lumbar laminectomy 03/30/2017    right chronic C8 radiculopathy and left C6 radiculitis 03/30/2017    Essential hypertension 03/07/2017    Restless legs syndrome (RLS) 04/23/2015    Cervical spondylosis 04/24/2014    Routine health maintenance 04/16/2013    Family history of colon cancer 04/25/2012    Family history of colonic polyps 04/25/2012    Kidney stone on left side 09/23/2008    Hemorrhage of rectum and anus 01/24/2007    Symptomatic menopausal or female climacteric states 11/30/2005    Candidiasis of vulva and vagina 03/28/2005       Past Medical History:   Diagnosis Date    Anemia     Anxiety state, unspecified     Arthritis     Back problem     CERVICAL RADICULOPATHY, CERVICAL SPINAL STENOSIS    Bilateral kidney stones 09/23/2008    Broken foot 03/14/2012    RT - casting. Fracture 5th met. Cast removl/xray foot 4-20-12. Follow up fracture right foot 5-22-12.     Calculus of kidney 2008    Cataract     Depression     Esophageal reflux     Essential hypertension     Gastric polyps 12/04/2018    Gastritis     High blood pressure     History of stomach ulcers     Hx of motion sickness     hx of vertigo    Idiopathic acute pancreatitis (HCC) 2002, 2010    Insomnia     Internal hemorrhoids without complication 12/04/2018    Intestinal disorder     Left wrist fracture 2011 and 2013    Muscle weakness     Osteoarthritis     Osteoporosis     Other chronic pancreatitis  ([facility]) 2021    Renal disorder     Rotator cuff tear, right     Traumatic arthritis     Vertigo        Past Surgical History:   Procedure Laterality Date    ARTHROSCOPY OF JOINT UNLISTED Right 2012    rotator cuff tear    BACK SURGERY  2008 & 3-15-22    2 procedures          x 3    CATARACT      CHOLECYSTECTOMY      COLONOSCOPY  2009    COLONOSCOPY N/A 2018    Procedure: COLONOSCOPY;  Surgeon: Virginie Ma MD;  Location: [facility] ENDO    EXCIS LUMBAR DISK,ONE LEVEL      EYE SURGERY      Lasik procedure    FRACTURE SURGERY Left     WRIST FRACTURE ORIF    FRACTURE SURGERY Left     ORIF wrist fracture revision with plates and screws- Ortho Dr Bran ETIENNE      OTHER SURGICAL HISTORY  2017    Fussion L4-L5 - Dr. Rothman    OTHER SURGICAL HISTORY  03/15/2022    Back Surgery    OTHER SURGICAL HISTORY Left     torn biceops repair    SHOULDER SURG PROC UNLISTED Right     SPINE SURGERY PROCEDURE UNLISTED      L1-L2 FUSION    SPINE SURGERY PROCEDURE UNLISTED  2017    Neck fusion    TONSILLECTOMY      with Adenoidectomy    TOTAL SHOULDER REPLACEMENT Left     TUBAL LIGATION  1982?    UPPER GI ENDOSCOPY,EXAM      EGD        Facility-Administered Medications Prior to Admission   Medication Dose Route Frequency Provider Last Rate Last Admin    Denosumab (PROLIA) 60 MG/ML injection 60 mg  60 mg Subcutaneous Q6 Months Summer Umana MD   60 mg at 24 0930     Medications Prior to Admission   Medication Sig Dispense Refill Last Dose    pregabalin 50 MG Oral Cap Take 1 capsule (50 mg total) by mouth 3 (three) times daily.   2024 at 2100    tiZANidine 4 MG Oral Tab Take 1 tablet (4 mg total) by mouth 3 (three) times daily as needed. 270 tablet 0 Past Month    amLODIPine 10 MG Oral Tab Take 1 tablet (10 mg total) by mouth daily. 90 tablet 3 2024 at 0800    Calcium Carbonate-Vitamin D 600-5 MG-MCG Oral Tab Take 1 tablet by mouth daily.   2024 at 0800     ESTRADIOL 0.1 MG/GM Vaginal Cream INSERT 1/2 GRAM VAGINALLY TWICE A WEEK 42.5 g 10 Past Week    pantoprazole 40 MG Oral Tab EC Take 1 tablet (40 mg total) by mouth every morning before breakfast.   4/5/2024 at 0800    Acidophilus/Pectin Oral Cap Take 1 capsule by mouth daily.   Past Week    Ascorbic Acid (VITAMIN C) 1000 MG Oral Tab Take 1 tablet (1,000 mg total) by mouth daily.   Past Week    Cholecalciferol 25 MCG (1000 UT) Oral Tab Take by mouth daily.   Past Week    CALCIUM CITRATE OR Take 1,400 mg by mouth daily. BID       Multiple Vitamins Oral Tab Take 1 tablet by mouth daily. Multiple Vitamins tablet   Past Week    meclizine 25 MG Oral Tab Take 1 tablet (25 mg total) by mouth 3 (three) times daily as needed. 30 tablet 1 More than a month    TRAZODONE 50 MG Oral Tab TAKE 1 TABLET BY MOUTH EVERY DAY AT NIGHT (Patient taking differently: nightly as needed.) 90 tablet 3 More than a month    Famotidine (PEPCID OR) Take 20 mg by mouth. Daily PRN    More than a month    Diclofenac Sodium 1 % Transdermal Gel Apply 4 g topically 4 (four) times daily as needed. 1 Tube 3 More than a month     Current Facility-Administered Medications Ordered in Epic   Medication Dose Route Frequency Provider Last Rate Last Admin    cefTRIAXone (Rocephin) 1 g in D5W 100 mL IVPB-ADD  1 g Intravenous Once Srinivasa Georges MD        gentamicin (Garamycin) 180 mg in sodium chloride 0.9% 100 mL IVPB  180 mg Intravenous Once Srinivasa Georges MD         No current [facility]-ordered outpatient medications on file.       Allergies   Allergen Reactions    Aleve NAUSEA AND VOMITING    Levaquin [Levofloxacin] RASH and DIZZINESS    Bactrim [Sulfamethoxazole W/Trimethoprim] PAIN    Codeine NAUSEA AND VOMITING    Fentanyl NAUSEA ONLY and OTHER (SEE COMMENTS)     Headaches and nausea with higher dose of Fentanyl ( 150 mcg ) - pt. Requesting no more than 150mcg, does well with 100mcg    Hydrocodone NAUSEA AND VOMITING    Morphine NAUSEA AND VOMITING     Nsaids NAUSEA AND VOMITING and PAIN     GASTRITIS, stomach ache, burning sensation    Oxycodone ITCHING and NAUSEA AND VOMITING    Tramadol Hcl ITCHING    Augmentin, [Amoxicillin-Pot Clavulanate] OTHER (SEE COMMENTS)     Abdominal pain  No other reactions - no skin peeling/blisters/organ damage       Family History   Problem Relation Age of Onset    Pulmonary Disease Father     Dementia Father             Heart Disease Mother     Fibromyalgia Mother             Colon Cancer Maternal Grandfather     Cancer Maternal Grandfather         Colon    Polyps Sister         colon polyps    Colon Cancer Sister     Other (gallbladder disease) Sister     Cancer Sister         Colon    Crohn's Disease Other     Heart Disease Other     Ovarian Cancer Maternal Aunt         70's 80's    Breast Cancer Neg      Social History     Socioeconomic History    Marital status:    Tobacco Use    Smoking status: Never    Smokeless tobacco: Never   Vaping Use    Vaping Use: Never used   Substance and Sexual Activity    Alcohol use: No     Comment: quit 4 - 5 yrs ago due to pancreatitis    Drug use: No   Other Topics Concern    Caffeine Concern No     Comment: Coffee 2 cups daily    Exercise No       Available pre-op labs reviewed.  Lab Results   Component Value Date    WBC 5.8 2024    RBC 4.58 2024    HGB 13.9 2024    HCT 43.4 2024    MCV 94.8 2024    MCH 30.3 2024    MCHC 32.0 2024    RDW 13.9 2024    .0 2024     Lab Results   Component Value Date     2024    K 4.0 2024     2024    CO2 28.0 2024    BUN 16 2024    CREATSERUM 0.76 2024    GLU 78 2024    CA 9.5 2024     Lab Results   Component Value Date    INR 1.04 2024       Vital Signs:  Body mass index is 20.98 kg/m².   height is 1.727 m (5' 8\") and weight is 62.6 kg (138 lb). Her oral temperature is 97.8 °F (36.6 °C). Her blood pressure is  129/84 and her pulse is 60. Her respiration is 18 and oxygen saturation is 97%.   Vitals:    04/01/24 1457 04/05/24 1257   BP:  129/84   Pulse:  60   Resp:  18   Temp:  97.8 °F (36.6 °C)   TempSrc:  Oral   SpO2:  97%   Weight: 62.1 kg (137 lb) 62.6 kg (138 lb)   Height: 1.727 m (5' 8\") 1.727 m (5' 8\")        Anesthesia Evaluation     Patient summary reviewed and Nursing notes reviewed    Airway   Mallampati: II  Neck ROM: limited  Dental      Pulmonary - normal exam   Cardiovascular - normal exam  (+) hypertension    Neuro/Psych    (+)  neuromuscular disease, anxiety/panic attacks,  depression      GI/Hepatic/Renal    (+) GERD    Comments: Kidney stones    Endo/Other    Abdominal                  Anesthesia Plan:   ASA:  3  Plan:   General  Airway:  LMA  Post-op Pain Management: IV analgesics  Plan Comments: Allergies list reviewed  Informed Consent Plan and Risks Discussed With:  Patient  Discussed plan with:  Surgeon      I have informed Radha A Day and/or legal guardian or family member of the nature of the anesthetic plan, benefits, risks including possible dental damage if relevant, major complications, and any alternative forms of anesthetic management.   All of the patient's questions were answered to the best of my ability. The patient desires the anesthetic management as planned.  EDGARDO MORRISON MD  4/5/2024 3:16 PM  Present on Admission:  **None**

## 2024-04-08 ENCOUNTER — TELEPHONE (OUTPATIENT)
Dept: PHYSICAL MEDICINE AND REHAB | Facility: CLINIC | Age: 69
End: 2024-04-08

## 2024-04-08 ENCOUNTER — TELEPHONE (OUTPATIENT)
Dept: SURGERY | Facility: CLINIC | Age: 69
End: 2024-04-08

## 2024-04-08 ENCOUNTER — PATIENT MESSAGE (OUTPATIENT)
Dept: SURGERY | Facility: CLINIC | Age: 69
End: 2024-04-08

## 2024-04-08 DIAGNOSIS — Z98.890 HISTORY OF LUMBAR LAMINECTOMY: Primary | ICD-10-CM

## 2024-04-08 DIAGNOSIS — G57.01 NEUROPATHY OF RIGHT SCIATIC NERVE: ICD-10-CM

## 2024-04-08 DIAGNOSIS — M51.9 THORACIC DISC DISEASE: ICD-10-CM

## 2024-04-08 DIAGNOSIS — G56.21 ULNAR NEUROPATHY AT ELBOW OF RIGHT UPPER EXTREMITY: ICD-10-CM

## 2024-04-08 DIAGNOSIS — M79.18 MYOFASCIAL PAIN: ICD-10-CM

## 2024-04-08 DIAGNOSIS — M50.90 CERVICAL DISC DISEASE: ICD-10-CM

## 2024-04-08 DIAGNOSIS — M54.16 LUMBAR RADICULOPATHY: ICD-10-CM

## 2024-04-08 DIAGNOSIS — M54.12 CERVICAL RADICULOPATHY: ICD-10-CM

## 2024-04-08 DIAGNOSIS — M48.02 CERVICAL SPINAL STENOSIS: ICD-10-CM

## 2024-04-08 DIAGNOSIS — M47.812 CERVICAL FACET SYNDROME: ICD-10-CM

## 2024-04-08 DIAGNOSIS — M50.20 CERVICAL HERNIATED DISC: ICD-10-CM

## 2024-04-08 NOTE — TELEPHONE ENCOUNTER
- s/w pt; pt identity verified with name and   - pt scheduled for cysto stent removal on 24 @ 1:30 (pt instructed to arrive @ 1pm).    - pt then requests information about how she was positioned on the table for the procedure, as she had some numbness in her L foot prior to the surgery, but since the surgery, the numbness has worsened. Would like to know if she was positioned in way other than on her back that might have \"pinched a nerve or rodo something\".  She would like the information to be able to inform her pain specialist as she has hx of back pain, surgery and numbness.  Explained that I would forward question to Dr. Georges.  Pt acknowledged all and denied further questions.  - routed to [facility]

## 2024-04-08 NOTE — TELEPHONE ENCOUNTER
Per Dr. Cleaning - OK for BLE EMG order & Pregabalin refill.    LV for pharmacy for phone-in refill of pregabalin.      S/w patient regarding new orders. Patient verbalized understanding and was thankful. Patient has been scheduled for next available EMG slot - 05/07/2024 at 11:30AM. Will send Lyrica order for Dr. Cleaning's final signature.      [Note patient also requested to cancel upcoming cervical facet injection - 04/23/2024 as PT seems to be helping as of right now - see TE 03/28/2024 for further information regarding this if needed]

## 2024-04-08 NOTE — TELEPHONE ENCOUNTER
- s/w pt; pt identity verified with name and   - Informed pt of her positioning, pt acknowledged understanding and will be in touch with her neuroscience physician who treats pt for her back/nerve pain.  - encounter complete    Srinivasa Georges MD   to Me  [facility] Clinical Staff     24 12:11 PM  She was positioned in dorsal lithotomy.

## 2024-04-08 NOTE — TELEPHONE ENCOUNTER
See acute TE. Patient scheduled for cysto/stent removal on 4/18/24    Future Appointments   Date Time Provider Department Center   4/18/2024  1:30 PM Srinivasa Georges MD [facility]   4/23/2024  8:40 AM Luke [facility]   6/5/2024 10:30 AM Sherly Hirsch MD [facility]   6/17/2024 10:30 AM Luke Cleaning MD [facility]   8/1/2024 10:15 AM Summer Umana MD [facility]

## 2024-04-08 NOTE — TELEPHONE ENCOUNTER
Pt recently had kidney stone removed on Friday and states since her procedure, she has been having numbness and tingling at the bottom of her Left foot. Pt states she has experienced this sensation before procedure but it has worsened since. She would like to speak with a nurse to see if this is something she should be worried about. Please advise, thank you.

## 2024-04-08 NOTE — TELEPHONE ENCOUNTER
Incoming call from patient - pt stating she was instructed to call our office from Dr. Richard's office.      Patient reporting increased tingling/numbness LEFT foot starting Saturday 04/06/2024, pt reporting intermittent compliance with 50MG Lyrica TID. Patient had:     Procedure Laterality Anesthesia   Cystoscopy, left retrograde pyelography, flexible left ureteroscopy, laser lithotripsy, stone removal, left stent insertion Left General   CYSTOSCOPY URETEROSCOPY Left General   LASER HOLMIUM LITHOTRIPSY Left General   CYSTOSCOPY STENT INSERTION Left General        On 04/05/2024 with Dr. Richard.     Per Dr. Cleaning's LOV note (03/28/2024): \"If the toe tingling persists or worsens, then I will do an EMG/NCS of the bilateral feet and legs.\"    Informed patient that I will speak with Dr. Cleaning regarding her worsening N/T and that her worsening symptoms may be an unfortunate coincidence and/or procedure positioning, but there shouldn't be a connection the procedure itself performed on Friday but will verify this information with Dr. Cleaning. Once additional information has been received. Will reach back out to patient. Pt verbalizes understanding and was thankful.

## 2024-04-08 NOTE — TELEPHONE ENCOUNTER
Patient calling they had a procedure done on Friday 04.05.24. Patient states they were suppose to received a call over the weekend to check their progress but they never did. Patient states they are having lower left side pain as well as a funny feeling in their legs and back. Also patient needs a follow up appointment for week of 04.15.24. Please advise.

## 2024-04-09 ENCOUNTER — TELEPHONE (OUTPATIENT)
Dept: SURGERY | Facility: CLINIC | Age: 69
End: 2024-04-09

## 2024-04-09 NOTE — TELEPHONE ENCOUNTER
Spoke with patient, I advised her that unfortunately the soonest I see with  in April 18th. I advised her that per 's message he recommended keeping in the stent until the week of April 15th. I advised her they usually recommend keeping in the stent until that time to allow the kidney to heal and prevent swelling.     Patient states she is out of town until the 16th and will keep appointment as scheduled.

## 2024-04-12 ENCOUNTER — TELEPHONE (OUTPATIENT)
Dept: SURGERY | Facility: CLINIC | Age: 69
End: 2024-04-12

## 2024-04-12 ENCOUNTER — TELEPHONE (OUTPATIENT)
Dept: INTERNAL MEDICINE CLINIC | Facility: CLINIC | Age: 69
End: 2024-04-12

## 2024-04-12 RX ORDER — PREGABALIN 50 MG/1
50 CAPSULE ORAL 3 TIMES DAILY
Qty: 90 CAPSULE | Refills: 3 | OUTPATIENT
Start: 2024-04-12

## 2024-04-12 NOTE — TELEPHONE ENCOUNTER
I called the patient and confirmed her full name and . Pt states that she is concerned because of the pain. She described it as intermittent pain in the kidney area as times radiating to the front which depends on her position. She states the pain is not terrible and not all the time.    I explained that intermittent pain like this is normal with a stent. I recommended she use OTC pain meds as needed, drink lots of water, and avoid heavy lifting or exercise. I told her to go to the ER if she has sudden excruciating pain.    Pt verbalized her understanding.

## 2024-04-12 NOTE — TELEPHONE ENCOUNTER
Patient is requesting a call back in regards to their [employer] message and would like a call back. They have some questions regards the pain they are experiencing in different areas. Patient is also going to be going out of town so they would like a call as soon as possible. Please advise.

## 2024-04-12 NOTE — TELEPHONE ENCOUNTER
Per pt she is having pain and asking to speak to a nurse today.  Please see mychart from 4/8 and TE 4/9. Please advise

## 2024-04-13 LAB
CAOX DIHYDRATE: 10 %
CAOX MONOHYDRATE: 90 %
WEIGHT-STONE: 70 MG

## 2024-04-15 ENCOUNTER — MED REC SCAN ONLY (OUTPATIENT)
Dept: PHYSICAL MEDICINE AND REHAB | Facility: CLINIC | Age: 69
End: 2024-04-15

## 2024-04-17 ENCOUNTER — PATIENT MESSAGE (OUTPATIENT)
Dept: PHYSICAL MEDICINE AND REHAB | Facility: CLINIC | Age: 69
End: 2024-04-17

## 2024-04-18 ENCOUNTER — PROCEDURE (OUTPATIENT)
Dept: SURGERY | Facility: CLINIC | Age: 69
End: 2024-04-18
Payer: MEDICARE

## 2024-04-18 ENCOUNTER — TELEPHONE (OUTPATIENT)
Dept: ENDOCRINOLOGY CLINIC | Facility: CLINIC | Age: 69
End: 2024-04-18

## 2024-04-18 VITALS — DIASTOLIC BLOOD PRESSURE: 70 MMHG | SYSTOLIC BLOOD PRESSURE: 118 MMHG

## 2024-04-18 DIAGNOSIS — M81.0 AGE-RELATED OSTEOPOROSIS WITHOUT CURRENT PATHOLOGICAL FRACTURE: Primary | ICD-10-CM

## 2024-04-18 DIAGNOSIS — E55.9 VITAMIN D DEFICIENCY: ICD-10-CM

## 2024-04-18 DIAGNOSIS — N20.0 KIDNEY STONE ON LEFT SIDE: Primary | ICD-10-CM

## 2024-04-18 PROCEDURE — 52310 CYSTOSCOPY AND TREATMENT: CPT | Performed by: UROLOGY

## 2024-04-18 RX ORDER — SULFAMETHOXAZOLE AND TRIMETHOPRIM 800; 160 MG/1; MG/1
1 TABLET ORAL ONCE
Status: COMPLETED | OUTPATIENT
Start: 2024-04-18 | End: 2024-04-18

## 2024-04-18 RX ORDER — CIPROFLOXACIN 500 MG/1
500 TABLET, FILM COATED ORAL ONCE
Status: CANCELLED | OUTPATIENT
Start: 2024-04-18 | End: 2024-04-18

## 2024-04-18 RX ADMIN — SULFAMETHOXAZOLE AND TRIMETHOPRIM 1 TABLET: 800; 160 TABLET ORAL at 14:10:00

## 2024-04-18 NOTE — TELEPHONE ENCOUNTER
Patient sent [employer] message asking if she should increase her Lyrica as it does not seem to be working.    Patient currently on Lyrica 50 mg TID.   Patient has EMG scheduled for 5/7/24.  LOV:  3/28/24  NOV: 6/17/24    Message sent to  to advise on possible Lyrica dose adjustment.   Please refer to the attached ultrasound report for doctors evaluation, along with management recommendations.

## 2024-04-18 NOTE — PROGRESS NOTES
[facility] Urology  Follow-Up Visit    HPI: Radha Stoner is a 68 year old female presents for a follow up visit. Patient was last seen on 2/22/24.    INTERVAL HISTORY: Patient following up regarding nephrolithiasis.    She previously passed a kidney stone in 2018.  Analysis reportedly revealed calcium oxalate composition.    Recently underwent cystoscopy with left ureteroscopy, laser lithotripsy, stone removal, and stent insertion on 4/5/2024 for management of a 6 mm nonobstructing left kidney stone.    Stone analysis revealing calcium oxalate monohydrate and dihydrate composition.    Here for stent removal.    She reports intermittent gross hematuria, also some left flank discomfort.  No fevers or chills, no dysuria, nausea or vomiting.      Social history: Patient is .  No smoking or alcohol.  Retired and used to work in retail.    Reviewed past medical, surgical, family, and social history.  Reviewed med list and allergies.      REVIEW OF SYSTEMS:  Pertinent positives and negatives per HPI. A 10-point ROS was performed and is otherwise negative.       EXAM:  /70 (BP Location: Left arm, Patient Position: Sitting, Cuff Size: adult)     Physical Exam  Constitutional:       General: She is not in acute distress.     Appearance: Normal appearance.   HENT:      Head: Atraumatic.   Eyes:      General: No scleral icterus.  Pulmonary:      Effort: Pulmonary effort is normal. No respiratory distress.   Musculoskeletal:      Cervical back: Normal range of motion.   Neurological:      Mental Status: She is oriented to person, place, and time.   Psychiatric:         Mood and Affect: Mood normal.         Behavior: Behavior normal.       PATHOLOGY:  No results found.      LABS:  See HPI for details.      IMAGING:    CT ABDOMEN+PELVIS (3/6/2024): Stable 6 mm left interpolar nonobstructing renal stone.  No hydronephrosis.  Right adnexal cyst is slightly enlarged compared to the prior exam.   Recommend nonemergent evaluation with pelvic ultrasound.  Additional chronic or incidental findings are described in the body of this report.      UROLOGY PROCEDURE:    CYSTOURETHROSCOPY WITH STENT REMOVAL  Informed consent was obtained for the procedure.  A female chaperone was present.  Patient was prepped and draped in the usual sterile fashion.  An Ambu 16 Macanese flexible cystoscope was utilized for the procedure.    Anesthesia:  2% lidocaine gel.    Urethra: Normal.    Bladder: Normal.  No tumor, stone, diverticulum, or glomerulation.    U.O's: Normal with distal coil of ureteral stent emanating from the left UO.    Trabeculation: Not appreciated.    The distal coil of the left ureteral stent was visualized and grasped using a flexible cystoscopic grasper.  The stent and cystoscope were then removed through the urethral meatus under direct visualization.  The stent was grossly identified to be intact and disposed of.  Patient tolerated procedure well.    POST CYSTOSCOPY MEDICATIONS: sample one tablet Bactrim DS given to patient.    DIAGNOSIS: Cystoscopy with successful left ureteral stent removal.        IMPRESSION:  68 year old female with known nephrolithiasis including a 6 mm nonobstructing left kidney stone on imaging from March 2024.    Patient underwent left ureteroscopy laser lithotripsy, stone removal, and stent insertion for management of the 6 mm nonobstructing left kidney stone.    Stone analysis results reviewed with patient.  We discussed stone friendly dietary modifications to reduce future risk of stone formation.    Stent successfully removed in the office today without any issues.  She tolerated well.    Will obtain follow-up renal ultrasound in a few weeks to ensure the absence of hydronephrosis.  She will be notified of the results.    All questions answered.      PLAN:  1.  Stone friendly diet.    2.  Obtain follow-up renal ultrasound in a few weeks.  Patient will be notified of the  results.      Srinivasa Georges MD  4/18/2024

## 2024-04-18 NOTE — TELEPHONE ENCOUNTER
Dr. Umana, ([facility])  Patient stated understanding to keep f/u on 8/1/24.  Patient stated understanding to continue vitamin D 1000 units daily and multivitamin; patient will hold calcium citrate at this time  Patient stated understanding to repeat labs in 2 weeks - labs ordered   VV scheduled 5/15/24  Thanks

## 2024-04-18 NOTE — TELEPHONE ENCOUNTER
Patient is requesting to schedule a video visit or get a call back from Dr Summer Umana, as she just had kidney stone removed and had a STENT put in today, and patient was informed that she will need to watch intake of calcium and vitamin D. Patient also wants to schedule a nurse visit for prolia injection for 8/1/2024. Her 8/1/24 appointment needs to be cancelled as Dr Umana will not be in. Patient did not want me to reschedule her follow up with dr umana until she spoke to the nurse

## 2024-04-18 NOTE — TELEPHONE ENCOUNTER
I'm here 8/1 in clinic - that does not need to be cancelled.      Please continue Vitamin D 1000 units daily and Multivitamin.  STOP the calcium citrate supplement just for now.  Continue the protein drink.  Recheck BMP, VItamin D, PTH in 2 weeks.  Are there any Res24 or cancellation spots for VV? Thanks.

## 2024-04-18 NOTE — TELEPHONE ENCOUNTER
Stent removed by Dr. Georges for the kidney stone. Advised by Dr to watch calcium and vitamin D     Pt currently taking:  Vitamin D: 1000 IU    Multivitamin: 1000 IU  Vitamin D  300 mg calcium     Calcium citrate 800 mg   Protein drink 650 mg (takes this in place of 1 400 mg pill)    Please advise if pt should maintain taking this amount for vitamin D and calcium.   Pt is also asking to be seen sooner, possibly a VV. She is scheduled for 8/1.

## 2024-04-18 NOTE — TELEPHONE ENCOUNTER
From: Radha Stoner  To: Luke Cleaning  Sent: 4/17/2024 9:26 PM CDT  Subject: Tingling feet    Hi  The 50mg 3x a day doesn’t seem to be working.   Should I increase that or what? , till I see you for my EMG?     Betsy Stoner

## 2024-04-22 ENCOUNTER — PATIENT MESSAGE (OUTPATIENT)
Dept: INTERNAL MEDICINE CLINIC | Facility: CLINIC | Age: 69
End: 2024-04-22

## 2024-04-22 RX ORDER — FLUCONAZOLE 150 MG/1
150 TABLET ORAL ONCE
Qty: 1 TABLET | Refills: 0 | Status: SHIPPED | OUTPATIENT
Start: 2024-04-22 | End: 2024-04-22

## 2024-04-22 RX ORDER — MICONAZOLE NITRATE 200 MG/1
200 SUPPOSITORY VAGINAL NIGHTLY
Qty: 3 SUPPOSITORY | Refills: 0 | Status: SHIPPED | OUTPATIENT
Start: 2024-04-22 | End: 2024-04-22 | Stop reason: CLARIF

## 2024-04-22 NOTE — TELEPHONE ENCOUNTER
From: Radha A Day  To: Sherly Hirsch  Sent: 4/22/2024 6:48 AM CDT  Subject: Infection?    Hi Dr. Domínguez  I’m thinking I MAY have another yeast infection bc I’ve started to be itchy down there again.   Been using the cream, should I insert the OTC oval med, or get a script from you again?    I’m calling the office this morning to get an answer maybe sooner       Steff

## 2024-04-22 NOTE — TELEPHONE ENCOUNTER
Called patient, she confirmed she is not taking trazodone. Prefers fluconazole to vaginal suppository, rx sent.

## 2024-04-26 ENCOUNTER — TELEPHONE (OUTPATIENT)
Dept: ENDOCRINOLOGY CLINIC | Facility: CLINIC | Age: 69
End: 2024-04-26

## 2024-04-26 NOTE — TELEPHONE ENCOUNTER
Patient was advised appointment on August needs to be rescheduled per the office for prolia injection patient declined the next date after August please call it is urgent per patient

## 2024-04-29 ENCOUNTER — TELEPHONE (OUTPATIENT)
Dept: SURGERY | Facility: CLINIC | Age: 69
End: 2024-04-29

## 2024-04-29 NOTE — TELEPHONE ENCOUNTER
Patient calling requesting to speak to a nurse regarding an reimbursement form that her insurance company had sent to Dr Office.Please advise

## 2024-04-29 NOTE — TELEPHONE ENCOUNTER
I s/w pt and she states that the med below was not covered because of the larger dose chosen of 1 gm as opposed to [facility] choosing 500 mg 2 tabs twice daily and the [facility] ins faxed the office a form that has to be filled out by GREGORIA explaining why he chose the 1 gm dose so that she can get reimbursement for the out of pocket cost she had to pay.         Medication Quantity Refills Start End   Cefadroxil 1 g Oral Tab () 10 tablet 0 2024 4/10/2024   Sig:   Take 1 tablet by mouth 2 (two) times daily for 5 days.     Route:   Oral     Order #:   [id number]

## 2024-04-30 NOTE — TELEPHONE ENCOUNTER
I s/w IWONA Aragon to see if she could find this form that pt's ins sent for reimbursement for Cefadroxil and she found it and will have Abimbola METZ fill it out and fax back with supporting docs.

## 2024-05-01 NOTE — TELEPHONE ENCOUNTER
Forms fill out and signed by . Forms fax along with progress notes to [facility]. Fax confirmation received.

## 2024-05-02 ENCOUNTER — TELEPHONE (OUTPATIENT)
Dept: INTERNAL MEDICINE CLINIC | Facility: CLINIC | Age: 69
End: 2024-05-02

## 2024-05-02 ENCOUNTER — TELEPHONE (OUTPATIENT)
Dept: PHYSICAL MEDICINE AND REHAB | Facility: CLINIC | Age: 69
End: 2024-05-02

## 2024-05-02 NOTE — TELEPHONE ENCOUNTER
Patient is calling to speak to a nurse she needs to discuss a possible drug interaction    Trazodone  Pregabalin    Please call patient [phone]

## 2024-05-02 NOTE — TELEPHONE ENCOUNTER
Patient called again this afternoon to check on status of call back. She is wondering what to do and is requesting call back before office closes today.

## 2024-05-03 NOTE — TELEPHONE ENCOUNTER
Fax received from [employer] approved the request for cefadroxil tablet 1gm for 5/1/24, Approval quantity 10 per 2 days. Placed fax in scanning.

## 2024-05-07 ENCOUNTER — PROCEDURE VISIT (OUTPATIENT)
Dept: PHYSICAL MEDICINE AND REHAB | Facility: CLINIC | Age: 69
End: 2024-05-07
Payer: MEDICARE

## 2024-05-07 DIAGNOSIS — G62.9 SENSORY NEUROPATHY: Primary | ICD-10-CM

## 2024-05-07 DIAGNOSIS — M54.16 LUMBAR RADICULOPATHY: ICD-10-CM

## 2024-05-07 PROCEDURE — 95911 NRV CNDJ TEST 9-10 STUDIES: CPT | Performed by: PHYSICAL MEDICINE & REHABILITATION

## 2024-05-07 PROCEDURE — 95886 MUSC TEST DONE W/N TEST COMP: CPT | Performed by: PHYSICAL MEDICINE & REHABILITATION

## 2024-05-08 ENCOUNTER — TELEPHONE (OUTPATIENT)
Dept: PHYSICAL MEDICINE AND REHAB | Facility: CLINIC | Age: 69
End: 2024-05-08

## 2024-05-08 DIAGNOSIS — M47.812 ARTHROPATHY OF CERVICAL FACET JOINT: ICD-10-CM

## 2024-05-08 DIAGNOSIS — M48.02 CERVICAL SPINAL STENOSIS: ICD-10-CM

## 2024-05-08 DIAGNOSIS — M50.20 CERVICAL HERNIATED DISC: ICD-10-CM

## 2024-05-08 DIAGNOSIS — M47.812 CERVICAL FACET SYNDROME: ICD-10-CM

## 2024-05-08 DIAGNOSIS — M50.90 CERVICAL DISC DISEASE: Primary | ICD-10-CM

## 2024-05-08 DIAGNOSIS — M54.12 CERVICAL RADICULOPATHY: ICD-10-CM

## 2024-05-08 NOTE — TELEPHONE ENCOUNTER
Pt called to speak with member of the Clinical team to request a PT order. Pt requests a return call at [phone]

## 2024-05-08 NOTE — TELEPHONE ENCOUNTER
This RN messaged  and PT order was placed.  This RN faxed PT order to [facility]  at Fax [phone]

## 2024-05-08 NOTE — TELEPHONE ENCOUNTER
Needs PT order.     [facility] in [facility] on [facility].  Phone: [phone]

## 2024-05-09 PROBLEM — G62.9 SENSORY NEUROPATHY: Status: ACTIVE | Noted: 2024-05-09

## 2024-05-09 NOTE — PROCEDURES
[address]  [address] [place] [address]  Phone: [phone]  Fax: [phone]    ELECTRODIAGNOSTIC REPORT          Patient: ROSIE GONZALEZ Hand Dominance:  RIGHT   Patient ID: [id number] Referring Dr:  DR. MELÉNDEZ   Sex: Female Test Dr:  DR. MELÉNDEZ   YOB: 1955           Visit Date: 68 Years 7 Months Examining MD:     Age: 68 Years 7 Months Referred by:     Height:       Referred for:                 Summary    The motor conduction test was normal in all 4 of the tested nerves: R Peroneal - EDB, L Peroneal - EDB, R Tibial - AH, L Tibial - AH.    The sensory conduction test had results outside of the specified normal range in all 4 of the tested nerves:  In the R Sural - Ankle (Calf) study  the peak latency was increased for Calf stimulation  In the R Superficial peroneal - Ankle study  the peak-to-peak amplitude was reduced for Lat leg stimulation  In the L Sural - Ankle (Calf) study  the peak latency was increased for Calf stimulation  In the L Superficial peroneal - Ankle study  the peak latency was increased for Lat leg stimulation    The F wave study was normal in all 2 of the tested nerves: R Tibial - AH, L Tibial - AH.    The H reflex study had results outside of the specified normal range in all 2 of the tested nerves:  In the L Tibial - Soleus study  H latency was increased  In the R Tibial - Soleus study  H latency was increased    The needle EMG examination was performed in 10 muscles. It was normal in 5 muscle(s): L. Tibialis anterior, L. Vastus lateralis, R. Tibialis posterior, R. Tibialis anterior, R. Vastus lateralis. The study was abnormal in 5 muscle(s), with the following distribution:  The MUP waveform abnormality was found in L. Tibialis posterior, L. Gastrocnemius (Medial head), L. Peroneus longus, R. Gastrocnemius (Medial head), R. Peroneus longus.          Conclusion:  This is an abnormal study.  There is electrodiagnostic evidence of    Bilateral S1 chronic radiculopathies.  Mild sensory demyelinating peripheral/polyneuropathy.  There is no lumbosacral plexopathy.  There is no peripheral nerve entrapment neuropathy.  There is no myopathy.      Luke Cleaning M.D.  Diplomate, American Board of Physical Medicine and Rehabilitation           Motor NCS      Nerve / Sites Muscle Latency Amplitude Amp % Duration Segments Distance Lat Diff Velocity     ms mV % ms  cm ms m/s   R Peroneal - EDB      Ankle EDB 4.84 4.1 100 7.60 Ankle - EDB 8        Fib head EDB 12.60 3.9 93.9 8.18 Fib head - Ankle 32 7.76 41      Pop fossa EDB 14.32 3.7 89.3 8.39 Pop fossa - Fib head 8.5 1.72 49   L Peroneal - EDB      Ankle EDB 5.36 6.2 100 6.93 Ankle - EDB 8        Fib head EDB 13.28 5.8 92.4 7.45 Fib head - Ankle 31 7.92 39      Pop fossa EDB 15.42 5.6 89.2 7.29 Pop fossa - Fib head 9 2.14 42   R Tibial - AH      Ankle AH 3.75 8.9 100 6.46 Ankle - AH 8        Pop fossa AH 13.70 4.9 55.2 8.07 Pop fossa - Ankle 39 9.95 39   L Tibial - AH      Ankle AH 4.22 12.9 100 5.94 Ankle - AH 8        Pop fossa AH 13.70 8.1 62.7 7.81 Pop fossa - Ankle 40 9.48 42       F  Wave      Nerve Fmin    ms   R Tibial - AH 57.24   L Tibial - AH 55.99       Sensory NCS      Nerve / Sites Rec. Site Onset Lat Peak Lat NP Amp PP Amp Segments Distance Velocity     ms ms µV µV  cm m/s   R Sural - Ankle (Calf)      Calf Ankle 4.11 4.79 5.5  Calf - Ankle 10 24      Ref.   ?4.20 ?5.0 ?5.0 Ref.     R Superficial peroneal - Ankle      Lat leg Ankle 3.23 4.11 13.1 3.5 Lat leg - Ankle 14 43      Ref.   ?4.20 ?5.0 ?5.0 Ref.         Sensory NCS      Nerve / Sites Rec. Site Onset Lat Peak Lat NP Amp PP Amp Segments Distance Velocity     ms ms µV µV  cm m/s   L Sural - Ankle (Calf)      Calf Ankle 3.75 4.58 12.8 5.3 Calf - Ankle 14 37   L Superficial peroneal - Ankle      Lat leg Ankle 3.28 4.43 15.4  Lat leg - Ankle 14 43       H Reflex      Nerve H Lat    ms   L Tibial - Soleus 37.14   R Tibial - Soleus  36.98       EMG Summary Table     Spontaneous MUAP Recruitment   Muscle Nerve Roots IA Fib PSW Fasc H.F. Comments Amp Dur. PPP Pattern   L. Tibialis posterior Tibial L4-L5 N None None None None Normal 5-7 N N N   L. Gastrocnemius (Medial head) Tibial S1-S2 N None None None None Normal 5-7 N N N   L. Tibialis anterior Deep peroneal (Fibular) L4-L5 N None None None None Normal N N N N   L. Peroneus longus Peroneal L5-S1 N None None None None Normal 8-10 N N N   L. Vastus lateralis Femoral L2-L4 N None None None None Normal N N N N   R. Tibialis posterior Tibial L4-L5 N None None None None Normal N N N N   R. Gastrocnemius (Medial head) Tibial S1-S2 N None None None None Normal 8-10 N N N   R. Tibialis anterior Deep peroneal (Fibular) L4-L5 N None None None None Normal N N N N   R. Peroneus longus Peroneal L5-S1 N None None None None Normal 8-10 N N N   R. Vastus lateralis Femoral L2-L4 N None None None None Normal N N N N

## 2024-05-12 ENCOUNTER — PATIENT MESSAGE (OUTPATIENT)
Dept: PHYSICAL MEDICINE AND REHAB | Facility: CLINIC | Age: 69
End: 2024-05-12

## 2024-05-13 NOTE — TELEPHONE ENCOUNTER
Reviewed with  who stated the EMG results are in Epic and patient can come in sooner if she would like to review the results/plan.      Follow up appointment rescheduled. Patient was appreciative.     Nothing further needed at this time.

## 2024-05-13 NOTE — TELEPHONE ENCOUNTER
From: Radha Stoner  To: Luke Cleaning  Sent: 5/12/2024 4:23 PM CDT  Subject: Emg    Hi  Do you know when I will hear about my EMG results.? Had the EMG Tuesday of last week.     Thank you   Betsy Stoner

## 2024-05-13 NOTE — TELEPHONE ENCOUNTER
Spoke with patient who completed BLE-EMG on 5/7/24. Patient calling for results and next steps.     Patient also stated since the EMG she has been having pain like a bruised soreness to the top of her feet, mainly the right foot. Pain is across the top of the foot and runs horizontal across the base of the toes. Patient did miss a few doses of her Lyrica over the weekend. Patient will try to see if ice or heat helps with the soreness at all.    NOV: 6/17/24    Informed patient message will be forwarded on to  to advise.    Message forwarded on to  to advise.

## 2024-05-15 ENCOUNTER — OFFICE VISIT (OUTPATIENT)
Dept: PHYSICAL MEDICINE AND REHAB | Facility: CLINIC | Age: 69
End: 2024-05-15

## 2024-05-15 ENCOUNTER — TELEMEDICINE (OUTPATIENT)
Dept: ENDOCRINOLOGY CLINIC | Facility: CLINIC | Age: 69
End: 2024-05-15

## 2024-05-15 ENCOUNTER — LAB ENCOUNTER (OUTPATIENT)
Dept: LAB | Facility: HOSPITAL | Age: 69
End: 2024-05-15
Attending: PHYSICAL MEDICINE & REHABILITATION

## 2024-05-15 VITALS — BODY MASS INDEX: 20.92 KG/M2 | WEIGHT: 138 LBS | HEIGHT: 68 IN

## 2024-05-15 DIAGNOSIS — G62.9 SENSORY NEUROPATHY: ICD-10-CM

## 2024-05-15 DIAGNOSIS — M48.02 CERVICAL SPINAL STENOSIS: ICD-10-CM

## 2024-05-15 DIAGNOSIS — Z98.1 S/P CERVICAL SPINAL FUSION: ICD-10-CM

## 2024-05-15 DIAGNOSIS — M50.20 CERVICAL HERNIATED DISC: ICD-10-CM

## 2024-05-15 DIAGNOSIS — M47.812 CERVICAL FACET SYNDROME: ICD-10-CM

## 2024-05-15 DIAGNOSIS — M54.16 LUMBAR RADICULOPATHY: ICD-10-CM

## 2024-05-15 DIAGNOSIS — F41.9 ANXIETY: ICD-10-CM

## 2024-05-15 DIAGNOSIS — M50.90 CERVICAL DISC DISEASE: Primary | ICD-10-CM

## 2024-05-15 DIAGNOSIS — M51.9 THORACIC DISC DISEASE: ICD-10-CM

## 2024-05-15 DIAGNOSIS — Z98.1 HISTORY OF LUMBAR FUSION: ICD-10-CM

## 2024-05-15 DIAGNOSIS — M81.0 AGE-RELATED OSTEOPOROSIS WITHOUT CURRENT PATHOLOGICAL FRACTURE: Primary | ICD-10-CM

## 2024-05-15 DIAGNOSIS — M47.812 ARTHROPATHY OF CERVICAL FACET JOINT: ICD-10-CM

## 2024-05-15 LAB
ERYTHROCYTE [SEDIMENTATION RATE] IN BLOOD: 11 MM/HR
FOLATE SERPL-MCNC: >24 NG/ML (ref 5.4–?)
IGA SERPL-MCNC: 105.4 MG/DL (ref 40–350)
IGM SERPL-MCNC: 124.5 MG/DL (ref 50–300)
IMMUNOGLOBULIN PNL SER-MCNC: 1017 MG/DL (ref 650–1600)
VIT B12 SERPL-MCNC: 862 PG/ML (ref 211–911)

## 2024-05-15 PROCEDURE — 85652 RBC SED RATE AUTOMATED: CPT | Performed by: PHYSICAL MEDICINE & REHABILITATION

## 2024-05-15 PROCEDURE — 84165 PROTEIN E-PHORESIS SERUM: CPT

## 2024-05-15 PROCEDURE — 82784 ASSAY IGA/IGD/IGG/IGM EACH: CPT

## 2024-05-15 PROCEDURE — 82607 VITAMIN B-12: CPT | Performed by: PHYSICAL MEDICINE & REHABILITATION

## 2024-05-15 PROCEDURE — 36415 COLL VENOUS BLD VENIPUNCTURE: CPT | Performed by: PHYSICAL MEDICINE & REHABILITATION

## 2024-05-15 PROCEDURE — 86334 IMMUNOFIX E-PHORESIS SERUM: CPT

## 2024-05-15 PROCEDURE — 83521 IG LIGHT CHAINS FREE EACH: CPT

## 2024-05-15 PROCEDURE — 99214 OFFICE O/P EST MOD 30 MIN: CPT | Performed by: PHYSICAL MEDICINE & REHABILITATION

## 2024-05-15 PROCEDURE — 84207 ASSAY OF VITAMIN B-6: CPT

## 2024-05-15 PROCEDURE — 99213 OFFICE O/P EST LOW 20 MIN: CPT | Performed by: INTERNAL MEDICINE

## 2024-05-15 PROCEDURE — 82746 ASSAY OF FOLIC ACID SERUM: CPT | Performed by: PHYSICAL MEDICINE & REHABILITATION

## 2024-05-15 NOTE — PROGRESS NOTES
Name: Radha Stoner  Date: 5/15/2024    Referring Physician: No ref. provider found    This visit is conducted using Telemedicine with live, interactive video and audio.     HISTORY OF PRESENT ILLNESS   Radha Stoner is a 68 year old female who presents for osteoporosis.     She was initially diagnosed with bone loss over 5 years ago and started on oral bisphosphonate therapy for 2 years.  However she did not tolerate medication due to severe GERD.  Therefore transitioned to Reclast and received 2 infusions 5/2017 and 5/2018 however DEXA continues to decline.  She did not schedule repeat infusion due to root canal.     Of note, she had severe radius fracture require surgery x 2.  Spinal fusion due to DJD, spinal stenosis and scoliosis.      She was diagnosed with recurrent nephrolithiasis 2/2018 and recommended that she stop calcium supplementation.  Over the years she has experienced several episodes but this was first hospitalization.  She was concerned about the lack of calcium supplementation given severe bone loss therefore went back to urology this spring.  She saw the APN and was started on Calcium citrate just a few days ago.     She is now maintained on MVI with calcium carbonate 500mg PO daily and Calcium Citrate 400mg PO daily.     7/2019  Since last visit she underwent secondary work up for osteoporosis which demonstrated normal 24 hour calcium level.  She did have low Vitamin D level and PTH level was high.  Parathyroid scan was normal.  No falls or fractures since last visit.     6/2021  Since last visit she has finished 12 injections of Evenity therapy and transitioned prolia.  She received injection #1 in December 2020.  She is taking calcium and Vitamin D supplementation.  No side effects with prolia therapy.     5/2024   She has now been transitioned to prolia therapy.  She has received a total of 8 injections.  However since last visit she developed recurrent kidney stone.  No falls or fractures  since last visit.      She is concerned about calcium supplementation after kidney stone.     REVIEW OF SYSTEMS  Eyes: no change in vision  Neurologic: no headache, generalized or focal weakness or numbness.  Head: normaland disc  ENT: normal  Lungs: no shortness of breath, wheezing or VERDUZCO  Cardiovascular:  no chest pain or palpitations  Gastrointestinal:  no abdominal pain, bowel movement problems  Musculoskeletal: no muscle pain or arthralgia  /Gyne: no frequency or discomfort while urinating  Psychiatric:  no acute distress, anxiety  or depression  Skin: normal moisturized skin    Medications:     Current Outpatient Medications:     pregabalin 50 MG Oral Cap, Take 1 capsule (50 mg total) by mouth 3 (three) times daily., Disp: 90 capsule, Rfl: 3    phenazopyridine 100 MG Oral Tab, Take 1 tablet (100 mg total) by mouth 3 (three) times daily as needed for Pain (For urinary urgency and burning with urination)., Disp: 9 tablet, Rfl: 0    amLODIPine 10 MG Oral Tab, Take 1 tablet (10 mg total) by mouth daily., Disp: 90 tablet, Rfl: 3    meclizine 25 MG Oral Tab, Take 1 tablet (25 mg total) by mouth 3 (three) times daily as needed., Disp: 30 tablet, Rfl: 1    TRAZODONE 50 MG Oral Tab, TAKE 1 TABLET BY MOUTH EVERY DAY AT NIGHT (Patient taking differently: nightly as needed.), Disp: 90 tablet, Rfl: 3    ESTRADIOL 0.1 MG/GM Vaginal Cream, INSERT 1/2 GRAM VAGINALLY TWICE A WEEK, Disp: 42.5 g, Rfl: 10    pantoprazole 40 MG Oral Tab EC, Take 1 tablet (40 mg total) by mouth every morning before breakfast., Disp: , Rfl:     Acidophilus/Pectin Oral Cap, Take 1 capsule by mouth daily., Disp: , Rfl:     Famotidine (PEPCID OR), Take 20 mg by mouth. Daily PRN , Disp: , Rfl:     Cholecalciferol 25 MCG (1000 UT) Oral Tab, Take by mouth daily., Disp: , Rfl:     Diclofenac Sodium 1 % Transdermal Gel, Apply 4 g topically 4 (four) times daily as needed., Disp: 1 Tube, Rfl: 3    CALCIUM CITRATE OR, Take 1,400 mg by mouth daily. BID, Disp:  , Rfl:     Multiple Vitamins Oral Tab, Take 1 tablet by mouth daily. Multiple Vitamins tablet, Disp: , Rfl:      Allergies:   Allergies   Allergen Reactions    Aleve NAUSEA AND VOMITING    Levaquin [Levofloxacin] RASH and DIZZINESS    Bactrim [Sulfamethoxazole W/Trimethoprim] PAIN    Codeine NAUSEA AND VOMITING    Fentanyl NAUSEA ONLY and OTHER (SEE COMMENTS)     Headaches and nausea with higher dose of Fentanyl ( 150 mcg ) - pt. Requesting no more than 150mcg, does well with 100mcg    Hydrocodone NAUSEA AND VOMITING    Morphine NAUSEA AND VOMITING    Nsaids NAUSEA AND VOMITING and PAIN     GASTRITIS, stomach ache, burning sensation    Oxycodone ITCHING and NAUSEA AND VOMITING    Tramadol Hcl ITCHING    Augmentin, [Amoxicillin-Pot Clavulanate] OTHER (SEE COMMENTS)     Abdominal pain  No other reactions - no skin peeling/blisters/organ damage       Social History:   Social History     Socioeconomic History    Marital status:    Tobacco Use    Smoking status: Never    Smokeless tobacco: Never   Vaping Use    Vaping status: Never Used   Substance and Sexual Activity    Alcohol use: No     Comment: quit 4 - 5 yrs ago due to pancreatitis    Drug use: No   Other Topics Concern    Caffeine Concern Yes     Comment: Coffee 2 cups daily    Exercise No       Medical History:   Past Medical History:    Anemia    Anxiety state, unspecified    Arthritis    Back problem    CERVICAL RADICULOPATHY, CERVICAL SPINAL STENOSIS    Bilateral kidney stones    Broken foot    RT - casting. Fracture 5th met. Cast removl/xray foot 4-20-12. Follow up fracture right foot 5-22-12.     Calculus of kidney    Cataract    Depression    Esophageal reflux    Essential hypertension    Gastric polyps    Gastritis    High blood pressure    History of stomach ulcers    Hx of motion sickness    hx of vertigo    Idiopathic acute pancreatitis (HCC)    Insomnia    Internal hemorrhoids without complication    Intestinal disorder    Left wrist fracture     Muscle weakness    Osteoarthritis    Osteoporosis    Other chronic pancreatitis (HCC)    Renal disorder    Rotator cuff tear, right    Traumatic arthritis    Vertigo       Surgical history:   Past Surgical History:   Procedure Laterality Date    Arthroscopy of joint unlisted Right 2012    rotator cuff tear    Back surgery  2008 & 3-15-22    2 procedures          x 3    Cataract      Cholecystectomy      Colonoscopy      Colonoscopy N/A 2018    Procedure: COLONOSCOPY;  Surgeon: Virginie Ma MD;  Location: [facility]    Cysto/uretero w/lithotripsy Left 2024    Excis lumbar disk,one level      Eye surgery      Lasik procedure    Fracture surgery Left     WRIST FRACTURE ORIF    Fracture surgery Left     ORIF wrist fracture revision with plates and screws- Ortho Dr Bran Horan      Other surgical history  2017    Fussion L4-L5 - Dr. Rothman    Other surgical history  03/15/2022    Back Surgery    Other surgical history Left     torn biceops repair    Shoulder surg proc unlisted Right     Spine surgery procedure unlisted      L1-L2 FUSION    Spine surgery procedure unlisted  2017    Neck fusion    Tonsillectomy      with Adenoidectomy    Total shoulder replacement Left     Tubal ligation  ?    Upper gi endoscopy,exam      EGD      PHYSICAL EXAM  General Appearance:  alert, well developed, in no acute distress  Eyes:  normal conjunctivae, sclera., normal sclera and normal pupils  Throat/Neck: normal sound to voice.   Back: no kyphosis  Respiratory:  non-labored. no increased work of breathing.    Lymph Nodes:  No abnormal nodes noted  Skin:  normal moisture and skin texture  Hematologic:  no excessive bruising  Psychiatric:  oriented to time, self, and place      ASSESSMENT/PLAN:    1. Osteoporosis  - Discussed diagnosis with patient  - 24 hour urine was normal  - PTH elevated likely secondary due to vitamin D deficiency, now resolved   -  Change to Citracal petites - 2 per day (decrease from 4 per day)   - Continue MVI, ok for one serving of dairy   - Continue Vitamin D 2500 units per day   - She has now finished one year of Evenity therapy and transitioned to Prolia   - She is tolerating the prolia therapy well, received total 8 injections   - labs are at goal   - Order repeat DEXA     RTC 1 year     5/15/2024  Summer Umana MD

## 2024-05-15 NOTE — PATIENT INSTRUCTIONS
Plan  The patient will continue with her home exercise program.    She will monitor her neck symptoms.    She will increase the Lyrica to 50 mg 3 times a day.    She will get laboratory work for other issues that could cause a peripheral neuropathy.    She will follow up in 6 months or sooner if needed.

## 2024-05-15 NOTE — PROGRESS NOTES
Low Back Pain H & P    Chief Complaint:   Chief Complaint   Patient presents with    Follow - Up     LOV 3/28/24 pt is here for a follow up. Intermittent N/T in toes.Takes lyrica daily.  Currently not in physical therapy .Pain 2/10     Nursing note reviewed and verified.    Patient was last seen on 3/28/2024.  On 2024, I did the EMG of the bilateral legs which showed her chronic S1 radiculopathies bilaterally and a mild sensroy demyelinating neuropathy.    She is having lft base of the neck pain with radiation to the occipital region and the left shoulder.  She is holding on going to PT for this.  She is doing her HEP.    She is having numbness of the bilateral big toes.  This is constant but will fluctuate in intensity.  The Lyrica 50 mg BID is helping this.    Past Medical History   Past Medical History:    Anemia    Anxiety state, unspecified    Arthritis    Back problem    CERVICAL RADICULOPATHY, CERVICAL SPINAL STENOSIS    Bilateral kidney stones    Broken foot    RT - casting. Fracture 5th met. Cast removl/xray foot 12. Follow up fracture right foot 12.     Calculus of kidney    Cataract    Depression    Esophageal reflux    Essential hypertension    Gastric polyps    Gastritis    High blood pressure    History of stomach ulcers    Hx of motion sickness    hx of vertigo    Idiopathic acute pancreatitis (HCC)    Insomnia    Internal hemorrhoids without complication    Intestinal disorder    Left wrist fracture    Muscle weakness    Osteoarthritis    Osteoporosis    Other chronic pancreatitis (HCC)    Renal disorder    Rotator cuff tear, right    Traumatic arthritis    Vertigo       Past Surgical History   Past Surgical History:   Procedure Laterality Date    Arthroscopy of joint unlisted Right 2012    rotator cuff tear    Back surgery  2008 & 3-15-22    2 procedures          x 3    Cataract      Cholecystectomy      Colonoscopy      Colonoscopy N/A 2018    Procedure:  COLONOSCOPY;  Surgeon: Virginie Ma MD;  Location: [facility] ENDO    Cysto/uretero w/lithotripsy Left 2024    Excis lumbar disk,one level      Eye surgery      Lasik procedure    Fracture surgery Left     WRIST FRACTURE ORIF    Fracture surgery Left     ORIF wrist fracture revision with plates and screws- Ortho Dr Bran Horan      Other surgical history  2017    Fussion L4-L5 - Dr. Rothman    Other surgical history  03/15/2022    Back Surgery    Other surgical history Left     torn biceops repair    Shoulder surg proc unlisted Right     Spine surgery procedure unlisted      L1-L2 FUSION    Spine surgery procedure unlisted  2017    Neck fusion    Tonsillectomy      with Adenoidectomy    Total shoulder replacement Left     Tubal ligation  ?    Upper gi endoscopy,exam      EGD        Family History   Family History   Problem Relation Age of Onset    Pulmonary Disease Father     Dementia Father             Heart Disease Mother     Fibromyalgia Mother             Colon Cancer Maternal Grandfather     Cancer Maternal Grandfather         Colon    Polyps Sister         colon polyps    Colon Cancer Sister     Other (gallbladder disease) Sister     Cancer Sister         Colon    Crohn's Disease Other     Heart Disease Other     Ovarian Cancer Maternal Aunt         70's 80's    Breast Cancer Neg        Social History   Social History     Socioeconomic History    Marital status:      Spouse name: Not on file    Number of children: Not on file    Years of education: Not on file    Highest education level: Not on file   Occupational History    Not on file   Tobacco Use    Smoking status: Never    Smokeless tobacco: Never   Vaping Use    Vaping status: Never Used   Substance and Sexual Activity    Alcohol use: No     Comment: quit 4 - 5 yrs ago due to pancreatitis    Drug use: No    Sexual activity: Not on file   Other Topics Concern     Service Not  Asked    Blood Transfusions Not Asked    Caffeine Concern Yes     Comment: Coffee 2 cups daily    Occupational Exposure Not Asked    Hobby Hazards Not Asked    Sleep Concern Not Asked    Stress Concern Not Asked    Weight Concern Not Asked    Special Diet Not Asked    Back Care Not Asked    Exercise No    Bike Helmet Not Asked    Seat Belt Not Asked    Self-Exams Not Asked   Social History Narrative    The patient does not use an assistive device..      The patient does live in a home with stairs.     Social Determinants of Health     Financial Resource Strain: Not on file   Food Insecurity: Not on file   Transportation Needs: Not on file   Physical Activity: Not on file   Stress: Not on file   Social Connections: Unknown (3/9/2021)    [facility]    Social Connections     Conversations with friends/family/neighbors per week: Not on file   Housing Stability: Low Risk  (7/7/2021)    [facility]    Housing Stability     Mortgage Payment Concerns?: Not on file     Number of Places Lived in the Last Year: Not on file     Unstable Housing?: Not on file       PE:  The patient does appear in her stated age in no distress.  The patient is well groomed.    Psychiatric:  The patient is alert and oriented x 3.  The patient has a normal affect and mood.      Respiratory:  No acute respiratory distress. Patient does not have a cough.    HEENT:  Extraocular muscles are intact. There is no kern icterus. Pupils are equal, round, and reactive to light. No redness or discharge bilaterally.    Skin:  There are no rashes or lesions.    Vitals:  There were no vitals filed for this visit.    Gait:    Gait: Normal gait   Sit to Stand: no difficulty      Vascular lower extremity:   Dorsalis pedis pulse-RIGHT 2+   Dorsalis pedis pulse-LEFT 2+   Tibialis posterior pulse-RIGHT 2+   Tibialis posterior pulse-LEFT 2+      Neurological Lower Extremity:    Light Touch Sensation: Intact in bilateral Lower Extremities   LE Muscle Strength: All LE strength measurements 5/5 except:  Hamstring RIGHT:   5-/5  Hamstring LEFT:   4+/5  Extensor Hallucis Longus RIGHT:   5-/5  Extensor Hallucis Longus LEFT:   4+/5   RIGHT plantar reflexes: downward response   LEFT plantar reflexes: downward response   Reflexes: 2+ in bilateral lower extremities     Labs:  I reviewed with the patient her laboratory work over the last few months which was essentially normal for any reason for her to have a neuropathy.      Assessment  1. C2-3 mild-mod diffuse, C3-4 left mod foraminal & mild-moderate diffuse, C6-7 right mild-moderate & left moderate foraminal, C7-T1 left mild foraminal bulging discs    2. C3-4 moderate central & left foraminal, C6-7 left mild-moderate foraminal, C7-T1 left mild-moderate foraminal stenosis    3. C7-T1 mild central herniated disc    4. Cervical facet syndrome: bilateral C6-7 and C3-4    5. Arthropathy of cervical facet joint    6. bilateral S1 chronic radiculopathies    7. S/P cervical spinal fusion: C4-5 & C5-6 ACDF    8. Sensory neuropathy: mild demyelinating    9. History of lumbar fusion: T10-S1 with bilateral SIJ fusions on 3/15/2022    10. T9-10 left mild-mod & right mild bulging disc    11. Anxiety         Plan  The patient will continue with her home exercise program.    She will monitor her neck symptoms.    She will increase the Lyrica to 50 mg 3 times a day.    She will get laboratory work for other issues that could cause a peripheral neuropathy.    She will follow up in 6 months or sooner if needed.    The patient understands and agrees with the stated plan.  Luke Cleaning MD  5/15/2024

## 2024-05-16 LAB
ALBUMIN SERPL ELPH-MCNC: 4.25 G/DL (ref 3.75–5.21)
ALBUMIN/GLOB SERPL: 1.49 {RATIO} (ref 1–2)
ALPHA1 GLOB SERPL ELPH-MCNC: 0.32 G/DL (ref 0.19–0.46)
ALPHA2 GLOB SERPL ELPH-MCNC: 0.77 G/DL (ref 0.48–1.05)
B-GLOBULIN SERPL ELPH-MCNC: 0.72 G/DL (ref 0.68–1.23)
GAMMA GLOB SERPL ELPH-MCNC: 1.04 G/DL (ref 0.62–1.7)
KAPPA LC FREE SER-MCNC: 1.52 MG/DL (ref 0.33–1.94)
KAPPA LC FREE/LAMBDA FREE SER NEPH: 1.19 {RATIO} (ref 0.26–1.65)
LAMBDA LC FREE SERPL-MCNC: 1.27 MG/DL (ref 0.57–2.63)
PROT SERPL-MCNC: 7.1 G/DL (ref 5.7–8.2)

## 2024-05-20 LAB — VITAMIN B6: 78.5 UG/L

## 2024-06-05 ENCOUNTER — OFFICE VISIT (OUTPATIENT)
Dept: INTERNAL MEDICINE CLINIC | Facility: CLINIC | Age: 69
End: 2024-06-05

## 2024-06-05 VITALS
RESPIRATION RATE: 16 BRPM | TEMPERATURE: 99 F | OXYGEN SATURATION: 98 % | HEIGHT: 68 IN | DIASTOLIC BLOOD PRESSURE: 68 MMHG | SYSTOLIC BLOOD PRESSURE: 112 MMHG | BODY MASS INDEX: 20.92 KG/M2 | WEIGHT: 138 LBS | HEART RATE: 73 BPM

## 2024-06-05 DIAGNOSIS — Z12.31 ENCOUNTER FOR SCREENING MAMMOGRAM FOR MALIGNANT NEOPLASM OF BREAST: Primary | ICD-10-CM

## 2024-06-05 PROCEDURE — 99214 OFFICE O/P EST MOD 30 MIN: CPT | Performed by: INTERNAL MEDICINE

## 2024-06-05 PROCEDURE — G0439 PPPS, SUBSEQ VISIT: HCPCS | Performed by: INTERNAL MEDICINE

## 2024-06-05 RX ORDER — FLUCONAZOLE 150 MG/1
150 TABLET ORAL ONCE
COMMUNITY
Start: 2024-04-22 | End: 2024-06-05 | Stop reason: ALTCHOICE

## 2024-06-05 NOTE — PROGRESS NOTES
Radha Stoner is a 68 year old female.  Chief Complaint   Patient presents with    Physical     MEDICARE ANNUAL Mammogram 38872, Dexa Scan 6/7/22             HPI:   Radha Stoner is a 68 year old female who presents for a Medicare annual complete physical exam and follow of chronic    Had cysto, stone extraction, stent for left ureteral stone in 4/2024 -- calcium oxalate stones -- by Dr. Georges.  Pt trying to follow stone diet.  Working on getting enough dietary calcium and limiting calcium supplements.    Saw Dr. Cleaning and mentioned tingling in toes.  Had EMG  Had labs done which showed elevated vitamin B6    Eats low carb, low fat, high protein    Knows she needs to exercise more.  Does walk and do some arm band exercises      Wt Readings from Last 6 Encounters:   06/05/24 138 lb (62.6 kg)   05/15/24 138 lb (62.6 kg)   04/05/24 138 lb (62.6 kg)   03/28/24 138 lb (62.6 kg)   03/14/24 138 lb (62.6 kg)   02/06/24 138 lb (62.6 kg)     Body mass index is 20.98 kg/m².       Current Outpatient Medications   Medication Sig Dispense Refill    pregabalin 50 MG Oral Cap Take 1 capsule (50 mg total) by mouth 3 (three) times daily. 90 capsule 3    amLODIPine 10 MG Oral Tab Take 1 tablet (10 mg total) by mouth daily. 90 tablet 3    TRAZODONE 50 MG Oral Tab TAKE 1 TABLET BY MOUTH EVERY DAY AT NIGHT (Patient taking differently: nightly as needed.) 90 tablet 3    ESTRADIOL 0.1 MG/GM Vaginal Cream INSERT 1/2 GRAM VAGINALLY TWICE A WEEK 42.5 g 10    pantoprazole 40 MG Oral Tab EC Take 1 tablet (40 mg total) by mouth every morning before breakfast.      Acidophilus/Pectin Oral Cap Take 1 capsule by mouth daily.      Famotidine (PEPCID OR) Take 20 mg by mouth. Daily PRN       Cholecalciferol 25 MCG (1000 UT) Oral Tab Take by mouth daily.      Diclofenac Sodium 1 % Transdermal Gel Apply 4 g topically 4 (four) times daily as needed. 1 Tube 3    CALCIUM CITRATE OR Take 1,400 mg by mouth daily. BID      Multiple Vitamins Oral Tab Take 1  tablet by mouth daily. Multiple Vitamins tablet      meclizine 25 MG Oral Tab Take 1 tablet (25 mg total) by mouth 3 (three) times daily as needed. (Patient not taking: Reported on 2024) 30 tablet 1      Past Medical History:    Anemia    Anxiety state, unspecified    Arthritis    Back problem    CERVICAL RADICULOPATHY, CERVICAL SPINAL STENOSIS    Bilateral kidney stones    Broken foot    RT - casting. Fracture 5th met. Cast removl/xray foot 12. Follow up fracture right foot 12.     Calculus of kidney    Cataract    Depression    Esophageal reflux    Essential hypertension    Gastric polyps    Gastritis    High blood pressure    History of stomach ulcers    Hx of motion sickness    hx of vertigo    Idiopathic acute pancreatitis (HCC)    Insomnia    Internal hemorrhoids without complication    Intestinal disorder    Left wrist fracture    Muscle weakness    Osteoarthritis    Osteoporosis    Other chronic pancreatitis (HCC)    Renal disorder    Rotator cuff tear, right    Traumatic arthritis    Vertigo      Past Surgical History:   Procedure Laterality Date    Arthroscopy of joint unlisted Right 2012    rotator cuff tear    Back surgery  2008 & 3-15-22    2 procedures          x 3    Cataract      Cholecystectomy      Colonoscopy      Colonoscopy N/A 2018    Procedure: COLONOSCOPY;  Surgeon: Virginie Ma MD;  Location: [facility] ENDO    Cysto/uretero w/lithotripsy Left 2024    Excis lumbar disk,one level      Eye surgery      Lasik procedure    Fracture surgery Left     WRIST FRACTURE ORIF    Fracture surgery Left     ORIF wrist fracture revision with plates and screws- Ortho Dr Bran Horan      Other surgical history  2017    Fussion L4-L5 - Dr. Rothman    Other surgical history  03/15/2022    Back Surgery    Other surgical history Left     torn biceops repair    Shoulder surg proc unlisted Right     Spine surgery procedure unlisted       L1-L2 FUSION    Spine surgery procedure unlisted  2017    Neck fusion    Tonsillectomy      with Adenoidectomy    Total shoulder replacement Left     Tubal ligation  ?    Upper gi endoscopy,exam      EGD       Family History   Problem Relation Age of Onset    Pulmonary Disease Father     Dementia Father             Heart Disease Mother     Fibromyalgia Mother             Colon Cancer Maternal Grandfather     Cancer Maternal Grandfather         Colon    Polyps Sister         colon polyps    Colon Cancer Sister     Other (gallbladder disease) Sister     Cancer Sister         Colon    Crohn's Disease Other     Heart Disease Other     Ovarian Cancer Maternal Aunt         70's 80's    Breast Cancer Neg       Social History:   Social History     Socioeconomic History    Marital status:    Tobacco Use    Smoking status: Never    Smokeless tobacco: Never   Vaping Use    Vaping status: Never Used   Substance and Sexual Activity    Alcohol use: No     Comment: quit 4 - 5 yrs ago due to pancreatitis    Drug use: No   Other Topics Concern    Caffeine Concern Yes     Comment: Coffee 2 cups daily    Exercise No   Social History Narrative    The patient does not use an assistive device..      The patient does live in a home with stairs.     Social Determinants of Health      [facility]    Social Connections    [facility]    Housing Stability            General Health                                                   Functional Ability                                                        Functional Status                                     Fall/Risk Assessment                                                              Depression Screening (PHQ-2/PHQ-9): Over the LAST 2 WEEKS                   PHQ-9 Depression Screen     1. Little interest or pleasure in doing  things: Not at all  2. Feeling down, depressed, or hopeless: Not at all  3.    4.    5.    6.    7.    8.    9.                 Advance Directives     Do you have a healthcare power of ?: Yes    Do you have a living will?: Yes     Hearing Assessment (Required for AWV/SWV)      Hearing Screening    Time taken: 6/5/2024 10:32 AM  Screening Method: Whisper Test  Whisper Test Result: Pass         Visual Acuity     Right Eye Visual Acuity: Uncorrected Left Eye Visual Acuity: Corrected   Right Eye Chart Acuity: 20/40 Left Eye Chart Acuity: 20/20     Cognitive Assessment     What day of the week is this?: Correct    What month is it?: Correct    What year is it?: Correct    Recall \"Ball\": Correct    Recall \"Flag\": Correct    Recall \"Tree\": Correct        Radha A Day's SCREENING SCHEDULE   Tests on this list are recommended by your physician but may not be covered, or covered at this frequency, by your insurer. Please check with your insurance carrier before scheduling to verify coverage.   PREVENTATIVE SERVICES  INDICATIONS AND SCHEDULE Internal Lab or Procedure External Lab or Procedure   Diabetes Screening      HbgA1C   Annually HgbA1C (%)   Date Value   03/27/2024 5.6         No data to display                Fasting Blood Sugar (FSB)Annually Glucose (mg/dL)   Date Value   03/27/2024 78         No data to display               Cardiovascular Disease Screening     LDL Annually LDL Cholesterol (mg/dL)   Date Value   03/27/2024 97        EKG One Time y    Colorectal Cancer Screening      Colonoscopy Screen every 10 years Health Maintenance   Topic Date Due    Colorectal Cancer Screening  10/24/2028    Update Health Maintenance if applicable    Flex Sigmoidoscopy Screen every 5 years No results found. However, due to the size of the patient record, not all encounters were searched. Please check Results Review for a complete set of results.      No data to display                 Fecal Occult Blood Annually No  results found for: \"FOB\", \"OCCULTSTOOL\"      No data to display                Glaucoma Screening      Ophthalmology Visit Annually y    Bone Density Screening      Dexascan Every two years Last Dexa Scan:    XR DEXA BONE DENSITOMETRY (CPT=77080) 06/07/2022        No data to display               Pap and Pelvic      Pap: Every 3 yrs age 21-65 or Pap+HPV every 5 yrs age 30-65, age 65 and older at high risk   Health Maintenance   Topic Date Due    Pap Smear  03/06/2023    Update Health Maintenance if applicable    Chlamydia  Annually if high risk No results found for: \"CHLAMYDIA\"      No data to display                Screening Mammogram      Mammogram  Annually Health Maintenance   Topic Date Due    Mammogram  07/12/2024    Update Health Maintenance if applicable   Immunizations      Influenza No results found. However, due to the size of the patient record, not all encounters were searched. Please check Results Review for a complete set of results. Update Immunization Activity if applicable    Pneumococcal Orders placed or performed in visit on 04/14/21    PNEUMOCOCCAL IMM, 23     *Note: Due to a large number of results and/or encounters for the requested time period, some results have not been displayed. A complete set of results can be found in Results Review.    Update Immunization Activity if applicable    Hepatitis B No results found. However, due to the size of the patient record, not all encounters were searched. Please check Results Review for a complete set of results. Update Immunization Activity if applicable    Tetanus No results found. However, due to the size of the patient record, not all encounters were searched. Please check Results Review for a complete set of results. Update Immunization Activity if applicable    Zoster (Not covered by Medicare Part B) No results found. However, due to the size of the patient record, not all encounters were searched. Please check Results Review for a complete set  of results. Update Immunization Activity if applicable     SPECIFIC DISEASE MONITORING Internal Lab or Procedure External Lab or Procedure   Annual Monitoring of Persistent     Medications (ACE/ARB, digoxin, diuretics)    Potassium  Annually Potassium (mmol/L)   Date Value   03/27/2024 4.0     POTASSIUM (P) (mmol/L)   Date Value   05/11/2016 3.9         No data to display                Creatinine  Annually Creatinine (mg/dL)   Date Value   03/27/2024 0.76         No data to display                Digoxin Serum Conc  Annually No results found for: \"DIGOXIN\"      No data to display                Diabetes      HgbA1C  Annually HgbA1C (%)   Date Value   03/27/2024 5.6         No data to display                Creat/alb ratio  Annually      LDL  Annually LDL Cholesterol (mg/dL)   Date Value   03/27/2024 97         No data to display                 Dilated Eye exam  Annually      No data to display                   No data to display                COPD      Spirometry Testing Annually No results found. However, due to the size of the patient record, not all encounters were searched. Please check Results Review for a complete set of results.      No data to display                        REVIEW OF SYSTEMS:   GENERAL: feels well otherwise  SKIN: denies any unusual skin lesions  EYES:denies blurred vision or double vision  HEENT: denies nasal congestion, sinus pain or ST  LUNGS: denies shortness of breath with exertion or cough  CARDIOVASCULAR: denies chest pain, pressure, or palpitations  GI: denies abdominal pain, nausea, vomiting, diarrhea, constipation, hematochezia, or melena  NEURO: denies headaches or dizziness    EXAM:   /68   Pulse 73   Temp 98.6 °F (37 °C) (Oral)   Resp 16   Ht 5' 8\" (1.727 m)   Wt 138 lb (62.6 kg)   SpO2 98%   BMI 20.98 kg/m²     GENERAL: well developed, well nourished, in no apparent distress  HEENT: normal oropharynx, normal TM's  EYES: PERRLA, EOMI, conjunctivae are  pink  NECK: supple, no cervical or supraclavicular LAD, no carotid bruits  BREAST: no dominant or suspicious mass, no axillary LAD  LUNGS: clear to auscultation  CARDIO: RRR, normal S1S2, no gallops or murmurs  GI: soft, mild epigastric tenderness, ND, NABS, no HSM  EXTREMITIES: no cyanosis, clubbing or edema, +2 DP pulses        ASSESSMENT AND PLAN:     Routine health maintenance  Mammo ordered (last in 7/2023)  Shingrix shingles vaccines (2019)  Td 12/2014.  Rec Tdap -- to get at pharmacy  Prevnar 13 in 2/20/20.  PPSV23 4/14/21.  Declined covid vaccines; had covid infection in 11/2021  Encouraged exercise, eg gentle yoga    Family history of colonic cancer (sister)  Colonoscopy 12/4/18 (Dr. Fisher) -- no polyps.   Colonoscopy 10/24/23 (Dr. Quinonez) -- no polyps (+int hem, diverticulosis)  Next in 5 years     Esophageal reflux, gastritis  Chronic upper abd pains; prev saw GI Dr. Fisher.  EGD 12/4/2018 -- gastritis. MRI abdomen/MRCP in 9/2019.   Currently seeing GI Dr. Quinonez.  On Protonix with improvement in sx.  Vit B12 normal in 5/2024      Hypertension  Stable on amlodipine 10mg/day.      Osteopenia  - intolerant to Fosamax (gastritis).    -s/p Reclast IV in 5/2017 and 5/2018 -- repeat DEXA showed progression of bone loss.   -Seeing endocrine Dr. Umana.  PTHi elevated in 5/2019 then normal (10/2019).  Parathyroid scan neg for parathyroid adenoma in 5/2019 (Dr. Umana).   -s/p Evenity (romosozumab) SC [id number] in 1/2020; treated x 1 year.    -DEXA 6/5/20 (ordered by ortho Dr. Abdirizak Brown) -- osteoporosis (T-2.6) of left femoral neck (improved).   -now on Prolia q6 mos thru Dr. Umana endocrine.  -Vit D normal 49 (3/2024)  -scheduled for repeat DEXA     Atrophic vaginitis, postmenopausal  Uses Estrace cream about twice weekly    Hx of L1-L-2 fusion (2008 Dr. Rothman)  Hx of T10-pelvis PSIF (post spinal instrumentation/fusion) 3/15/22 (Dr. Sergo White)    Chronic neck pain  Sees Dr. Cleaning  Has had MATT    Paresthesias in  toes  Dr. Cleaning ordered EMG and did neuro lab testing in 5/2024 -- normal ANN/SPEP/light chains; normal B12, ESR, folate.  Elevated vitamin B6 (pt takes a MVI containing B6)  Pt instructed to stop her multivitamin.  Discussed that B6 excess can cause a neuropathy.     Bilateral knee OA  Still seeing Dr. Ramírez -- knows she will eventually knee TKR's.    Prediabetes/IGT  HgbA1c 5.6 in 3/2024    Hx of nephrolithiasis  -calcium oxalate stone in 2018 (passed)  -6 mm nonobstr left ureteral stone s/p left ureteroscopy, laser lithotripsy, stone removal on 4/5/24 by Dr. Georges (stent removed 4/18/24)  -renal ultrasound scheduled (Dr. Georges)      RTC 1 yr.    Sherly Hirsch MD, 06/05/24, 10:25 AM

## 2024-06-24 PROCEDURE — 93010 ELECTROCARDIOGRAM REPORT: CPT | Performed by: INTERNAL MEDICINE

## 2024-06-27 ENCOUNTER — HOSPITAL ENCOUNTER (OUTPATIENT)
Dept: BONE DENSITY | Facility: HOSPITAL | Age: 69
Discharge: HOME | End: 2024-06-27
Attending: INTERNAL MEDICINE
Payer: MEDICARE

## 2024-06-27 DIAGNOSIS — M81.0 AGE-RELATED OSTEOPOROSIS WITHOUT CURRENT PATHOLOGICAL FRACTURE: ICD-10-CM

## 2024-06-27 PROCEDURE — 77080 DXA BONE DENSITY AXIAL: CPT | Performed by: INTERNAL MEDICINE

## 2024-06-27 RX ORDER — ESTRADIOL 0.1 MG/G
CREAM VAGINAL
Qty: 42.5 G | Refills: 10 | OUTPATIENT
Start: 2024-06-27

## 2024-06-27 RX ORDER — ESTRADIOL 0.1 MG/G
0.5 CREAM VAGINAL
Qty: 42.5 G | Refills: 10 | Status: SHIPPED | OUTPATIENT
Start: 2024-06-27

## 2024-06-27 NOTE — TELEPHONE ENCOUNTER
Just sent     Current refill request refused due to refill is either a duplicate request or has active refills at the pharmacy.  Check previous templates.    Requested Prescriptions     Pending Prescriptions Disp Refills    ESTRADIOL 0.1 MG/GM Vaginal Cream [Pharmacy Med Name: ESTRADIOL 0.01% CREAM] 42.5 g 10     Sig: INSERT 1/2 GRAM VAGINALLY TWICE A WEEK

## 2024-06-27 NOTE — TELEPHONE ENCOUNTER
Refill request is for a maintenance medication and has met the criteria specified in the Ambulatory Medication Refill Standing Order for eligibility, visits, laboratory, alerts and was sent to the requested pharmacy.    Requested Prescriptions     Signed Prescriptions Disp Refills    estradiol 0.1 MG/GM Vaginal Cream 42.5 g 10     Sig: Place 0.5 g vaginally twice a week.     Authorizing Provider: MACK FELICIANO     Ordering User: DARLENE GAYTAN

## 2024-07-03 ENCOUNTER — MED REC SCAN ONLY (OUTPATIENT)
Dept: PHYSICAL MEDICINE AND REHAB | Facility: CLINIC | Age: 69
End: 2024-07-03

## 2024-07-03 RX ORDER — TRAZODONE HYDROCHLORIDE 50 MG/1
50 TABLET ORAL NIGHTLY
Qty: 90 TABLET | Refills: 1 | Status: SHIPPED | OUTPATIENT
Start: 2024-07-03

## 2024-07-03 NOTE — TELEPHONE ENCOUNTER
Refill request is for a maintenance medication and has met the criteria specified in the Ambulatory Medication Refill Standing Order for eligibility, visits, laboratory, alerts and was sent to the requested pharmacy.    Requested Prescriptions     Signed Prescriptions Disp Refills    traZODone 50 MG Oral Tab 90 tablet 1     Sig: Take 1 tablet (50 mg total) by mouth nightly.     Authorizing Provider: MACK FELICIANO     Ordering User: MIREILLE KATZ

## 2024-07-15 ENCOUNTER — TELEPHONE (OUTPATIENT)
Dept: PHYSICAL MEDICINE AND REHAB | Facility: CLINIC | Age: 69
End: 2024-07-15

## 2024-07-15 NOTE — TELEPHONE ENCOUNTER
Per : \"she will need to come into the office\"    Will need to review 's schedule for a possible sooner appointment.

## 2024-07-15 NOTE — TELEPHONE ENCOUNTER
Spoke with patient and scheduled sooner appointment for 7/22/24.    Nothing further needed at this time.

## 2024-07-15 NOTE — TELEPHONE ENCOUNTER
Spoke with patient who stated she was just working with her PT for her mid back pain and she wanted to know about her past thoracic spine imaging and what level she had bulging discs at.     Reviewed last thoracic MRI report from 9/30/2022 which stated:   \"CONCLUSION:    1. Extensive posterior fusion from T10-S1.  The visualized hardware appears grossly unremarkable.   2. Thoracic spine demonstrates no evidence of new disc herniation, nerve root compression, or central canal stenosis.   3. Postsurgical changes of extensive laminectomy changes and posterior fusion throughout the lumbar spine.  There is moderate left disc bulge extending into the neural foramen at L5-S1 with moderate left neural foraminal narrowing. \"    Patient stated she is still having mid back pain since she went to the driving range in April/May 2024. States she was very careful when she did this and also had the ok from surgeon- .Patient has been in PT and has had 4 sessions so far, but the pain is still persisting.      Patient stated she does have history of osteoporosis, so she has to be very care with steroids, especially to her spine. She also has had GI issues in the past with NSAIDS.     NOV: 11/18/24  Last thoracic/lumbar MRI was completed 9/30/2022.    Informed patient update will be forwarded on to  to advise on next steps.    Patient understood and was appreciative.

## 2024-07-17 ENCOUNTER — APPOINTMENT (OUTPATIENT)
Dept: GENERAL RADIOLOGY | Age: 69
End: 2024-07-17
Attending: NURSE PRACTITIONER
Payer: MEDICARE

## 2024-07-17 ENCOUNTER — HOSPITAL ENCOUNTER (OUTPATIENT)
Age: 69
Discharge: HOME | End: 2024-07-17
Payer: MEDICARE

## 2024-07-17 VITALS
TEMPERATURE: 99 F | DIASTOLIC BLOOD PRESSURE: 88 MMHG | HEART RATE: 84 BPM | RESPIRATION RATE: 18 BRPM | OXYGEN SATURATION: 100 % | SYSTOLIC BLOOD PRESSURE: 142 MMHG

## 2024-07-17 DIAGNOSIS — M54.6 ACUTE MIDLINE THORACIC BACK PAIN: Primary | ICD-10-CM

## 2024-07-17 PROCEDURE — 72072 X-RAY EXAM THORAC SPINE 3VWS: CPT | Performed by: NURSE PRACTITIONER

## 2024-07-17 PROCEDURE — 72100 X-RAY EXAM L-S SPINE 2/3 VWS: CPT | Performed by: NURSE PRACTITIONER

## 2024-07-17 PROCEDURE — 99213 OFFICE O/P EST LOW 20 MIN: CPT | Performed by: NURSE PRACTITIONER

## 2024-07-17 NOTE — ED PROVIDER NOTES
Patient Seen in: [facility]      History     Chief Complaint   Patient presents with    Back Pain     Stated Complaint: back pain    Subjective:   HPI    68-year-old female with extensive musculoskeletal history including cervical spinal stenosis cervical radiculopathy, lumbar radiculopathy, cervical disc disease, multiple lumbar fusion surgeries of the spine, shoulder surgery, presents with mid thoracic back pain for the last 6 weeks.  She states the pain began after swinging a golf club.  She has an appointment with her pain specialist on Monday Dr. Cleainng.  She has an appointment with her spinal specialist as well who advised x-rays to evaluate the hardware in her back.  No shortness of breath.  No chest pain.  History of chronic peripheral and demyelinating neuropathy.  She does take Lyrica.  No fever.  No saddle anesthesia symptoms.  Pain is worse with movement.  Objective:   Past Medical History:    Anemia    Anxiety state, unspecified    Arthritis    Back problem    CERVICAL RADICULOPATHY, CERVICAL SPINAL STENOSIS    Bilateral kidney stones    Broken foot    RT - casting. Fracture 5th met. Cast removl/xray foot 12. Follow up fracture right foot 12.     Calculus of kidney    Cataract    Depression    Esophageal reflux    Essential hypertension    Gastric polyps    Gastritis    High blood pressure    History of stomach ulcers    Hx of motion sickness    hx of vertigo    Idiopathic acute pancreatitis (HCC)    Insomnia    Internal hemorrhoids without complication    Intestinal disorder    Left wrist fracture    Muscle weakness    Osteoarthritis    Osteoporosis    Other chronic pancreatitis (HCC)    Renal disorder    Rotator cuff tear, right    Traumatic arthritis    Vertigo              Past Surgical History:   Procedure Laterality Date    Arthroscopy of joint unlisted Right 2012    rotator cuff tear    Back surgery  2008 & 3-15-22    2 procedures          x 3    Cataract       Cholecystectomy      Colonoscopy  2009    Colonoscopy N/A 12/04/2018    Procedure: COLONOSCOPY;  Surgeon: Virginie Ma MD;  Location: [facility] ENDO    Cysto/uretero w/lithotripsy Left 04/05/2024    Excis lumbar disk,one level      Eye surgery      Lasik procedure    Fracture surgery Left 2010    WRIST FRACTURE ORIF    Fracture surgery Left 2013    ORIF wrist fracture revision with plates and screws- Ortho Dr Bran Horan      Other surgical history  09/2017    Fussion L4-L5 - Dr. Rothman    Other surgical history  03/15/2022    Back Surgery    Other surgical history Left     torn biceops repair    Shoulder surg proc unlisted Right 2012    Spine surgery procedure unlisted  2008    L1-L2 FUSION    Spine surgery procedure unlisted  12/2017    Neck fusion    Tonsillectomy      with Adenoidectomy    Total shoulder replacement Left     Tubal ligation  1982?    Upper gi endoscopy,exam      EGD 2007                Social History     Socioeconomic History    Marital status:    Tobacco Use    Smoking status: Never    Smokeless tobacco: Never   Vaping Use    Vaping status: Never Used   Substance and Sexual Activity    Alcohol use: No     Comment: quit 4 - 5 yrs ago due to pancreatitis    Drug use: No   Other Topics Concern    Caffeine Concern Yes     Comment: Coffee 2 cups daily    Exercise No   Social History Narrative    The patient does not use an assistive device..      The patient does live in a home with stairs.     Social Determinants of Health      [facility]    Social Connections    [facility]    Housing Stability              Review of Systems    Positive for stated Chief Complaint: Back Pain    Other systems are as noted in HPI.  Constitutional and vital signs reviewed.      All other systems reviewed and negative except as noted above.    Physical Exam     ED  Triage Vitals [07/17/24 1008]   /88   Pulse 84   Resp 18   Temp 98.7 °F (37.1 °C)   Temp src Temporal   SpO2 100 %   O2 Device None (Room air)       Current Vitals:   Vital Signs  BP: 142/88  Pulse: 84  Resp: 18  Temp: 98.7 °F (37.1 °C)  Temp src: Temporal    Oxygen Therapy  SpO2: 100 %  O2 Device: None (Room air)            Physical Exam  Vitals and nursing note reviewed.   Constitutional:       Appearance: Normal appearance.   Cardiovascular:      Rate and Rhythm: Normal rate.      Pulses: Normal pulses.   Pulmonary:      Effort: Pulmonary effort is normal.   Musculoskeletal:      Cervical back: Normal.      Thoracic back: Tenderness and bony tenderness present. No swelling, edema or deformity. Decreased range of motion.      Lumbar back: No edema. Decreased range of motion.        Back:       Comments: Mid right-sided paraspinal distal thoracic point tenderness worse with twisting motions surgical incision scar noted healed  No abscess no redness no warmth   Skin:     General: Skin is warm and dry.      Capillary Refill: Capillary refill takes less than 2 seconds.   Neurological:      Mental Status: She is alert and oriented to person, place, and time.               ED Course   Labs Reviewed - No data to display                   MDM          Differential diagnosis included for causes of back pain including but not limited to muscular pain, herniated disc, spine fracture, intra-abdominal causes and urinary tract infection   All vital signs are stable no fever no respiratory or cardiac complaints  X-rays with degenerative changes scoliosis fusion surgeries but no new fracture or malalignment  Patient has multiple allergies has muscle relaxer at home has Lidoderm patches has Voltaren  She has follow-up with her pain specialist on Monday and is currently in physical therapy.  For any new or worsening symptoms patient will go to the ER                               Medical Decision Making  Problems  Addressed:  Acute midline thoracic back pain: acute illness or injury with systemic symptoms    Amount and/or Complexity of Data Reviewed  Radiology: ordered and independent interpretation performed. Decision-making details documented in ED Course.    Risk  OTC drugs.        Disposition and Plan     Clinical Impression:  1. Acute midline thoracic back pain         Disposition:  Discharge  7/17/2024 11:14 am    Follow-up:  Sherly Hirsch MD  [address] [place] [id number]  [phone]    Schedule an appointment as soon as possible for a visit   As needed          Medications Prescribed:  Current Discharge Medication List

## 2024-07-17 NOTE — ED INITIAL ASSESSMENT (HPI)
Pt c/o R mid back pain x1-1.5 mos after swing a golf club. States hx of back surgery 2 yrs ago.  States continues to get physical therapy.

## 2024-07-19 ENCOUNTER — TELEPHONE (OUTPATIENT)
Dept: PHYSICAL MEDICINE AND REHAB | Facility: CLINIC | Age: 69
End: 2024-07-19

## 2024-07-19 NOTE — TELEPHONE ENCOUNTER
Patient called to find out where in place of appointments her 0900 appt with Dr. Cleaning is on 7/22/2024.  This RN advised that her appointment would be Dr. Cleaning's 3rd appointment that day.  Patient reports that she is hoping to be done by about 11:00 or so, and is hoping that she does not have to leave the appointment before being seen.  This RN recommended patient let the PSR know and even call to advise in the morning so that staff could alert Dr. Cleaning that morning about the patient's time limitations that morning.  Next available mrangelito appointment not until 8/8/2024 and patient responded that she didn't want to hold off being seen by that long, so she will keep the 7/22 appt and hope for the best.

## 2024-07-22 ENCOUNTER — OFFICE VISIT (OUTPATIENT)
Dept: PHYSICAL MEDICINE AND REHAB | Facility: CLINIC | Age: 69
End: 2024-07-22
Payer: MEDICARE

## 2024-07-22 ENCOUNTER — PATIENT MESSAGE (OUTPATIENT)
Dept: PHYSICAL MEDICINE AND REHAB | Facility: CLINIC | Age: 69
End: 2024-07-22

## 2024-07-22 VITALS — BODY MASS INDEX: 20.92 KG/M2 | WEIGHT: 138 LBS | HEIGHT: 68 IN

## 2024-07-22 DIAGNOSIS — M47.814 ARTHROPATHY OF THORACIC FACET JOINT: ICD-10-CM

## 2024-07-22 DIAGNOSIS — Z98.1 S/P CERVICAL SPINAL FUSION: ICD-10-CM

## 2024-07-22 DIAGNOSIS — M50.90 CERVICAL DISC DISEASE: Primary | ICD-10-CM

## 2024-07-22 DIAGNOSIS — M47.894 THORACIC FACET SYNDROME: ICD-10-CM

## 2024-07-22 DIAGNOSIS — G62.9 SENSORY NEUROPATHY: ICD-10-CM

## 2024-07-22 DIAGNOSIS — Z98.1 HISTORY OF LUMBAR FUSION: ICD-10-CM

## 2024-07-22 DIAGNOSIS — M50.20 CERVICAL HERNIATED DISC: ICD-10-CM

## 2024-07-22 DIAGNOSIS — M54.6 ACUTE MIDLINE THORACIC BACK PAIN: ICD-10-CM

## 2024-07-22 DIAGNOSIS — M51.9 THORACIC DISC DISEASE: ICD-10-CM

## 2024-07-22 DIAGNOSIS — M54.12 CERVICAL RADICULOPATHY: ICD-10-CM

## 2024-07-22 DIAGNOSIS — M47.812 CERVICAL FACET SYNDROME: ICD-10-CM

## 2024-07-22 DIAGNOSIS — G62.9 SENSORY NEUROPATHY: Primary | ICD-10-CM

## 2024-07-22 DIAGNOSIS — F41.9 ANXIETY: ICD-10-CM

## 2024-07-22 NOTE — PATIENT INSTRUCTIONS
Plan  I will perform bilateral T9-10 z-joint injections under IVCS.    The patient will continue with PT.    The patient will continue with her home exercise program.    The patient will continue with their current pain medications.    The patient will follow up in 2-3 months, but the patient will call me 2 weeks after having the injection to let me know how the injection worked.

## 2024-07-22 NOTE — PROGRESS NOTES
Low Back [facility]    Chief Complaint:   Chief Complaint   Patient presents with    Back Pain     LOV 5/15/24 pt is here with complaints of worsening back pain.Reports her mid back to be really tight and sore. No N/T. Currently in physical therapy. XR of thoracic spine and lumbar spine were completed 24. Takes pregabalin daily. Pain depends on activity.      Nursing note reviewed and verified.    Patient was last seen on 5/15/2024.  She saw a new PT last Thursday and he increased her pain in the right mid back region.  She saw the massage therapist who was able to help her.  After she last saw me, she went to the driving range and developed bilateral mid back pain which has been present ever since.  The PT has been working on this area. She is not sure if the Tizanidine is helping her.  She spoke to Dr. Trotter' nurse who stated that she could have injections of needed.    Past Medical History   Past Medical History:    Anemia    Anxiety state, unspecified    Arthritis    Back problem    CERVICAL RADICULOPATHY, CERVICAL SPINAL STENOSIS    Bilateral kidney stones    Broken foot    RT - casting. Fracture 5th met. Cast removl/xray foot 12. Follow up fracture right foot 12.     Calculus of kidney    Cataract    Depression    Esophageal reflux    Essential hypertension    Gastric polyps    Gastritis    High blood pressure    History of stomach ulcers    Hx of motion sickness    hx of vertigo    Idiopathic acute pancreatitis (HCC)    Insomnia    Internal hemorrhoids without complication    Intestinal disorder    Left wrist fracture    Muscle weakness    Osteoarthritis    Osteoporosis    Other chronic pancreatitis (HCC)    Renal disorder    Rotator cuff tear, right    Traumatic arthritis    Vertigo       Past Surgical History   Past Surgical History:   Procedure Laterality Date    Arthroscopy of joint unlisted Right 2012    rotator cuff tear    Back surgery  2008 & 3-15-22    2 procedures           x 3    Cataract      Cholecystectomy      Colonoscopy  2009    Colonoscopy N/A 2018    Procedure: COLONOSCOPY;  Surgeon: Virginie Ma MD;  Location: [facility] ENDO    Cysto/uretero w/lithotripsy Left 2024    Excis lumbar disk,one level      Eye surgery      Lasik procedure    Fracture surgery Left     WRIST FRACTURE ORIF    Fracture surgery Left     ORIF wrist fracture revision with plates and screws- Ortho Dr Bran Horan      Other surgical history  2017    Fussion L4-L5 - Dr. Rothman    Other surgical history  03/15/2022    Back Surgery    Other surgical history Left     torn biceops repair    Shoulder surg proc unlisted Right 2012    Spine surgery procedure unlisted      L1-L2 FUSION    Spine surgery procedure unlisted  2017    Neck fusion    Tonsillectomy      with Adenoidectomy    Total shoulder replacement Left     Tubal ligation  ?    Upper gi endoscopy,exam      EGD        Family History   Family History   Problem Relation Age of Onset    Pulmonary Disease Father     Dementia Father             Heart Disease Mother     Fibromyalgia Mother             Colon Cancer Maternal Grandfather     Cancer Maternal Grandfather         Colon    Polyps Sister         colon polyps    Colon Cancer Sister     Other (gallbladder disease) Sister     Cancer Sister         Colon    Crohn's Disease Other     Heart Disease Other     Ovarian Cancer Maternal Aunt         70's 80's    Breast Cancer Neg        Social History   Social History     Socioeconomic History    Marital status:      Spouse name: Not on file    Number of children: Not on file    Years of education: Not on file    Highest education level: Not on file   Occupational History    Not on file   Tobacco Use    Smoking status: Never    Smokeless tobacco: Never   Vaping Use    Vaping status: Never Used   Substance and Sexual Activity    Alcohol use: No     Comment: quit 4 - 5 yrs ago due to  pancreatitis    Drug use: No    Sexual activity: Not on file   Other Topics Concern     Service Not Asked    Blood Transfusions Not Asked    Caffeine Concern Yes     Comment: Coffee 2 cups daily    Occupational Exposure Not Asked    Hobby Hazards Not Asked    Sleep Concern Not Asked    Stress Concern Not Asked    Weight Concern Not Asked    Special Diet Not Asked    Back Care Not Asked    Exercise No    Bike Helmet Not Asked    Seat Belt Not Asked    Self-Exams Not Asked   Social History Narrative    The patient does not use an assistive device..      The patient does live in a home with stairs.     Social Determinants of Health     Financial Resource Strain: Not on file   Food Insecurity: Not on file   Transportation Needs: Not on file   Physical Activity: Not on file   Stress: Not on file   Social Connections: Unknown (3/9/2021)    [facility]    Social Connections     Conversations with friends/family/neighbors per week: Not on file   Housing Stability: Low Risk  (7/7/2021)    Received from [facility], [facility]    Housing Stability     Mortgage Payment Concerns?: Not on file     Number of Places Lived in the Last Year: Not on file     Unstable Housing?: Not on file       PE:  The patient does appear in her stated age in no distress.  The patient is well groomed.    Psychiatric:  The patient is alert and oriented x 3.  The patient has a normal affect and mood.      Respiratory:  No acute respiratory distress. Patient does not have a cough.    HEENT:  Extraocular muscles are intact. There is no kern icterus. Pupils are equal, round, and reactive to light. No redness or discharge bilaterally.    Skin:  There are no rashes or lesions.    Lymph Nodes:  The patient has no palpable submandibular, supraclavicular, and cervical lymph nodes..    Vitals:  There were no vitals filed for this visit.    Cervical  and Thoracic Spine:    Posture: normal chin forward superiorly rotated protracted shoulder posture.   Shoulders: Level   Head: In neutral   Spinous Processes Palpations: Tender at T9   Z-Joints Palpations: Tender at  bilateral T9-10     Gait:    Gait: Normal gait   Sit to Stand: no difficulty      Lumbar Spine:    Scoliosis: No scoliosis present     Neurological Lower Extremity:    Light Touch Sensation: Intact in bilateral thoracic dermatomes     Radiology Imaging:  I reviewed with the patient her X-ray of the thoracic spine from 7/17/2024.      Assessment  1. C2-3 mild-mod diffuse, C3-4 left mod foraminal & mild-moderate diffuse, C6-7 right mild-moderate & left moderate foraminal, C7-T1 left mild foraminal bulging discs    2. C7-T1 mild central herniated disc    3. Cervical facet syndrome: bilateral C6-7 and C3-4    4. History of lumbar fusion: T10-S1 with bilateral SIJ fusions on 3/15/2022    5. S/P cervical spinal fusion: C4-5 & C5-6 ACDF    6. right chronic C8 radiculopathy and left C6 radiculitis    7. Sensory neuropathy: mild demyelinating    8. T9-10 left mild-mod & right mild bulging disc    9. Thoracic facet syndrome: bilateral T9-10    10. Anxiety    11. Acute midline thoracic back pain         Plan  I will perform bilateral T9-10 z-joint injections under IVCS.    The patient will continue with PT.    The patient will continue with her home exercise program.    The patient will continue with their current pain medications.    The patient will follow up in 2-3 months, but the patient will call me 2 weeks after having the injection to let me know how the injection worked.    The patient understands and agrees with the stated plan.  Luke Cleaning MD  7/22/2024

## 2024-07-23 NOTE — TELEPHONE ENCOUNTER
Messaged Dr. Cleaning to check if updated B6 & B12 labs are needed at this time.        From: Radha Stoner  To: Luke Cleaning  Sent: 7/22/2024  5:27 PM CDT  Subject: This is for maybe his nurses     I mentioned before I left today that Dr Cleaning wanted to check my Vit B6 and B12 levels bc they were high in my last blood work.  I have in my calendar to have them rechecked in three months I thought?    Should I do that now?  I have stopped my multi Vit and the protein shakes I was taking, so maybe my levels have come down.      I’m getting some blood work for Dr Umana in a few days and it would be great if I didn’t have to get stuck twice.    Could you check with him pls ? I’ll be calling in the morning to double check this request     Betsy Stoner

## 2024-07-24 ENCOUNTER — HOSPITAL ENCOUNTER (OUTPATIENT)
Dept: MAMMOGRAPHY | Facility: HOSPITAL | Age: 69
Discharge: HOME | End: 2024-07-24
Attending: INTERNAL MEDICINE
Payer: MEDICARE

## 2024-07-24 ENCOUNTER — TELEPHONE (OUTPATIENT)
Dept: PHYSICAL MEDICINE AND REHAB | Facility: CLINIC | Age: 69
End: 2024-07-24

## 2024-07-24 DIAGNOSIS — M47.894 THORACIC FACET SYNDROME: Primary | ICD-10-CM

## 2024-07-24 DIAGNOSIS — Z12.31 ENCOUNTER FOR SCREENING MAMMOGRAM FOR MALIGNANT NEOPLASM OF BREAST: ICD-10-CM

## 2024-07-24 PROCEDURE — 77067 SCR MAMMO BI INCL CAD: CPT | Performed by: INTERNAL MEDICINE

## 2024-07-24 PROCEDURE — 77063 BREAST TOMOSYNTHESIS BI: CPT | Performed by: INTERNAL MEDICINE

## 2024-07-24 NOTE — TELEPHONE ENCOUNTER
Per CMS Guidelines -no authorization is required for Bilateral T9-10 z-joint injections CPT 43869-08, when being performed at [facility]     Status: Authorization is not required when being performed at an [facility] based on medical necessity however may be subject to review once claim is submitted-Covered Benefit      CMS LCD limitations-Use of Moderate or Deep Sedation, General Anesthesia, or Monitored Anesthesia Care (MAC) is usually unnecessary or rarely indicated for these procedures and therefore, is not considered medically reasonable and necessary. Even in patients with a needle phobia and anxiety, typically oral anxiolytics suffice. In exceptional and unique cases, documentation must clearly establish the need for such sedation in the specific patient.

## 2024-07-24 NOTE — TELEPHONE ENCOUNTER
Patient has been scheduled for Bilateral T9-10 zygapophyseal joint injections on 8/2/24 at the [facility] with Dr. Cleaning.   Anesthesia type:  IVCS  Please note: [facility] will call the business day prior to discuss the exact time/arrival and additional instructions for your appointment.  Patient was advised that if he/she does receive the covid vaccine it needs to be at least 2 weeks before or after the injection.  Medications and allergies reviewed.  Educated to hold NSAIDS (Aleve, Ibuprofen, Motrin, Advil) and anti-inflammatories (Meloxicam, Naproxen, Diclofenac, Celebrex) and for cervical injections must hold Multi-Vitamins, Vitamin E, Fish Oil/Omega-3.  If patient is receiving MAC/IVCS, weight loss oral/injectable medications will need to be held for 7 days prior to injection.  Patient informed to fast 8 hours prior to procedure and 10-12 hours prior to procedure with IVCS/MAC if patient is on a weight loss medication.   If on blood thinner, clearance has been received and approved to hold this medication by provider.   Patient informed of [facility]'s  policy:  he/she will need a  to and from procedure and must be on site for their entirety of their visit, if their ride is unable to the procedure will be cancelled.   [facility] is located in the [facility] [address] [address].   may park in the yellow/purple parking lot.  Patient verbalized understanding and agrees with plan.  Scheduled in Epic: Yes  Scheduled in Surgical Case: Yes  Follow up appointment made: NOV: 11/18/2024 Luke Cleaning MD

## 2024-07-26 NOTE — TELEPHONE ENCOUNTER
Incoming call from patient who wanted to verify the levels her upcoming injection will be done with. Patient wanting to include the L1 based on her most recent PT visit. Informed pt that this information should be brought to Dr. Cleaning's attention at the time of her procedure and that he may examine her and determine if the correct levels are being injected. Pt verbalized understanding and was thankful. Nothing further needed at this time.

## 2024-07-31 ENCOUNTER — LAB ENCOUNTER (OUTPATIENT)
Dept: LAB | Facility: HOSPITAL | Age: 69
End: 2024-07-31
Attending: INTERNAL MEDICINE
Payer: MEDICARE

## 2024-07-31 DIAGNOSIS — E55.9 VITAMIN D DEFICIENCY: ICD-10-CM

## 2024-07-31 DIAGNOSIS — M81.0 AGE-RELATED OSTEOPOROSIS WITHOUT CURRENT PATHOLOGICAL FRACTURE: ICD-10-CM

## 2024-07-31 LAB
ANION GAP SERPL CALC-SCNC: 7 MMOL/L (ref 0–18)
BUN BLD-MCNC: 19 MG/DL (ref 9–23)
BUN/CREAT SERPL: 20.9 (ref 10–20)
CALCIUM BLD-MCNC: 10.1 MG/DL (ref 8.7–10.4)
CHLORIDE SERPL-SCNC: 106 MMOL/L (ref 98–112)
CO2 SERPL-SCNC: 29 MMOL/L (ref 21–32)
CREAT BLD-MCNC: 0.91 MG/DL
EGFRCR SERPLBLD CKD-EPI 2021: 68 ML/MIN/1.73M2 (ref 60–?)
FASTING STATUS PATIENT QL REPORTED: YES
GLUCOSE BLD-MCNC: 80 MG/DL (ref 70–99)
OSMOLALITY SERPL CALC.SUM OF ELEC: 295 MOSM/KG (ref 275–295)
POTASSIUM SERPL-SCNC: 4 MMOL/L (ref 3.5–5.1)
PTH-INTACT SERPL-MCNC: 28.5 PG/ML (ref 18.5–88)
SODIUM SERPL-SCNC: 142 MMOL/L (ref 136–145)
VIT B12 SERPL-MCNC: 711 PG/ML (ref 211–911)
VIT D+METAB SERPL-MCNC: 49.7 NG/ML (ref 30–100)

## 2024-07-31 PROCEDURE — 83970 ASSAY OF PARATHORMONE: CPT

## 2024-07-31 PROCEDURE — 36415 COLL VENOUS BLD VENIPUNCTURE: CPT | Performed by: PHYSICAL MEDICINE & REHABILITATION

## 2024-07-31 PROCEDURE — 82306 VITAMIN D 25 HYDROXY: CPT

## 2024-07-31 PROCEDURE — 84207 ASSAY OF VITAMIN B-6: CPT | Performed by: PHYSICAL MEDICINE & REHABILITATION

## 2024-07-31 PROCEDURE — 82607 VITAMIN B-12: CPT | Performed by: PHYSICAL MEDICINE & REHABILITATION

## 2024-07-31 PROCEDURE — 80048 BASIC METABOLIC PNL TOTAL CA: CPT

## 2024-08-01 ENCOUNTER — OFFICE VISIT (OUTPATIENT)
Dept: ENDOCRINOLOGY CLINIC | Facility: CLINIC | Age: 69
End: 2024-08-01
Payer: MEDICARE

## 2024-08-01 ENCOUNTER — APPOINTMENT (OUTPATIENT)
Dept: PHYSICAL MEDICINE AND REHAB | Age: 69
End: 2024-08-01

## 2024-08-01 VITALS
DIASTOLIC BLOOD PRESSURE: 76 MMHG | WEIGHT: 142 LBS | HEART RATE: 60 BPM | BODY MASS INDEX: 21.52 KG/M2 | SYSTOLIC BLOOD PRESSURE: 122 MMHG | HEIGHT: 67.99 IN

## 2024-08-01 DIAGNOSIS — M81.0 AGE-RELATED OSTEOPOROSIS WITHOUT CURRENT PATHOLOGICAL FRACTURE: Primary | ICD-10-CM

## 2024-08-01 PROCEDURE — 99213 OFFICE O/P EST LOW 20 MIN: CPT | Performed by: INTERNAL MEDICINE

## 2024-08-01 PROCEDURE — 96372 THER/PROPH/DIAG INJ SC/IM: CPT | Performed by: INTERNAL MEDICINE

## 2024-08-01 NOTE — PROGRESS NOTES
Name: Radha Stoner  Date: 8/1/2024    Referring Physician: No ref. provider found      HISTORY OF PRESENT ILLNESS   Radha Stoner is a 68 year old female who presents for osteoporosis.     She was initially diagnosed with bone loss over 5 years ago and started on oral bisphosphonate therapy for 2 years.  However she did not tolerate medication due to severe GERD.  Therefore transitioned to Reclast and received 2 infusions 5/2017 and 5/2018 however DEXA continues to decline.  She did not schedule repeat infusion due to root canal.     Of note, she had severe radius fracture require surgery x 2.  Spinal fusion due to DJD, spinal stenosis and scoliosis.      She was diagnosed with recurrent nephrolithiasis 2/2018 and recommended that she stop calcium supplementation.  Over the years she has experienced several episodes but this was first hospitalization.  She was concerned about the lack of calcium supplementation given severe bone loss therefore went back to urology this spring.  She saw the APN and was started on Calcium citrate just a few days ago.     She is now maintained on MVI with calcium carbonate 500mg PO daily and Calcium Citrate 400mg PO daily.     7/2019  Since last visit she underwent secondary work up for osteoporosis which demonstrated normal 24 hour calcium level.  She did have low Vitamin D level and PTH level was high.  Parathyroid scan was normal.  No falls or fractures since last visit.     6/2021  Since last visit she has finished 12 injections of Evenity therapy and transitioned prolia.  She received injection #1 in December 2020.  She is taking calcium and Vitamin D supplementation.  No side effects with prolia therapy.     8/2024   She has now been transitioned to prolia therapy.  She has received a total of 8 injections.  She did develop recurrent kidney stone in 4/2024.  No falls or fractures since last visit.      She is taking calcium and Vitamin D.     REVIEW OF SYSTEMS  Eyes: no change  in vision  Neurologic: no headache, generalized or focal weakness or numbness.  Head: normaland disc  ENT: normal  Lungs: no shortness of breath, wheezing or VERDUZCO  Cardiovascular:  no chest pain or palpitations  Gastrointestinal:  no abdominal pain, bowel movement problems  Musculoskeletal: no muscle pain or arthralgia  /Gyne: no frequency or discomfort while urinating  Psychiatric:  no acute distress, anxiety  or depression  Skin: normal moisturized skin    Medications:     Current Outpatient Medications:     pregabalin 50 MG Oral Cap, Take 1 capsule (50 mg total) by mouth 3 (three) times daily., Disp: 90 capsule, Rfl: 3    traZODone 50 MG Oral Tab, Take 1 tablet (50 mg total) by mouth nightly., Disp: 90 tablet, Rfl: 1    estradiol 0.1 MG/GM Vaginal Cream, Place 0.5 g vaginally twice a week., Disp: 42.5 g, Rfl: 10    amLODIPine 10 MG Oral Tab, Take 1 tablet (10 mg total) by mouth daily., Disp: 90 tablet, Rfl: 3    meclizine 25 MG Oral Tab, Take 1 tablet (25 mg total) by mouth 3 (three) times daily as needed. (Patient not taking: Reported on 6/5/2024), Disp: 30 tablet, Rfl: 1    pantoprazole 40 MG Oral Tab EC, Take 1 tablet (40 mg total) by mouth every morning before breakfast., Disp: , Rfl:     Acidophilus/Pectin Oral Cap, Take 1 capsule by mouth daily. (Patient not taking: Reported on 7/17/2024), Disp: , Rfl:     Famotidine (PEPCID OR), Take 20 mg by mouth. Daily PRN , Disp: , Rfl:     Cholecalciferol 25 MCG (1000 UT) Oral Tab, Take by mouth daily., Disp: , Rfl:     Diclofenac Sodium 1 % Transdermal Gel, Apply 4 g topically 4 (four) times daily as needed., Disp: 1 Tube, Rfl: 3    CALCIUM CITRATE OR, Take 1,400 mg by mouth daily. BID, Disp: , Rfl:     Multiple Vitamins Oral Tab, Take 1 tablet by mouth daily. Multiple Vitamins tablet, Disp: , Rfl:      Allergies:   Allergies   Allergen Reactions    Aleve NAUSEA AND VOMITING    Levaquin [Levofloxacin] RASH and DIZZINESS    Bactrim [Sulfamethoxazole W/Trimethoprim]  PAIN    Codeine NAUSEA AND VOMITING    Fentanyl NAUSEA ONLY and OTHER (SEE COMMENTS)     Headaches and nausea with higher dose of Fentanyl ( 150 mcg ) - pt. Requesting no more than 150mcg, does well with 100mcg    Hydrocodone NAUSEA AND VOMITING    Morphine NAUSEA AND VOMITING    Nsaids NAUSEA AND VOMITING and PAIN     GASTRITIS, stomach ache, burning sensation    Oxycodone ITCHING and NAUSEA AND VOMITING    Tramadol Hcl ITCHING    Augmentin, [Amoxicillin-Pot Clavulanate] OTHER (SEE COMMENTS)     Abdominal pain  No other reactions - no skin peeling/blisters/organ damage       Social History:   Social History     Socioeconomic History    Marital status:    Tobacco Use    Smoking status: Never    Smokeless tobacco: Never   Vaping Use    Vaping status: Never Used   Substance and Sexual Activity    Alcohol use: No     Comment: quit 4 - 5 yrs ago due to pancreatitis    Drug use: No   Other Topics Concern    Caffeine Concern Yes     Comment: Coffee 2 cups daily    Exercise No       Medical History:   Past Medical History:    Anemia    Anxiety state, unspecified    Arthritis    Back problem    CERVICAL RADICULOPATHY, CERVICAL SPINAL STENOSIS    Bilateral kidney stones    Broken foot    RT - casting. Fracture 5th met. Cast removl/xray foot 4-20-12. Follow up fracture right foot 5-22-12.     Calculus of kidney    Cataract    Depression    Esophageal reflux    Essential hypertension    Gastric polyps    Gastritis    High blood pressure    History of stomach ulcers    Hx of motion sickness    hx of vertigo    Idiopathic acute pancreatitis (HCC)    Insomnia    Internal hemorrhoids without complication    Intestinal disorder    Left wrist fracture    Muscle weakness    Osteoarthritis    Osteoporosis    Other chronic pancreatitis (HCC)    Renal disorder    Rotator cuff tear, right    Traumatic arthritis    Vertigo       Surgical history:   Past Surgical History:   Procedure Laterality Date    Arthroscopy of joint unlisted  Right 2012    rotator cuff tear    Back surgery  2008 & 3-15-22    2 procedures          x 3    Cataract      Cholecystectomy      Colonoscopy      Colonoscopy N/A 2018    Procedure: COLONOSCOPY;  Surgeon: Virginie Ma MD;  Location: [facility] ENDO    Cysto/uretero w/lithotripsy Left 2024    Excis lumbar disk,one level      Eye surgery      Lasik procedure    Fracture surgery Left     WRIST FRACTURE ORIF    Fracture surgery Left     ORIF wrist fracture revision with plates and screws- Ortho Dr Bran Horan      Other surgical history  2017    Fussion L4-L5 - Dr. Rothman    Other surgical history  03/15/2022    Back Surgery    Other surgical history Left     torn biceops repair    Shoulder surg proc unlisted Right     Spine surgery procedure unlisted      L1-L2 FUSION    Spine surgery procedure unlisted  2017    Neck fusion    Tonsillectomy      with Adenoidectomy    Total shoulder replacement Left     Tubal ligation  1982?    Upper gi endoscopy,exam      EGD      PHYSICAL EXAM  /76   Pulse 60   Ht 5' 7.99\" (1.727 m)   Wt 142 lb (64.4 kg)   BMI 21.60 kg/m²     General Appearance:  alert, well developed, in no acute distress  Eyes:  normal conjunctivae, sclera., normal sclera and normal pupils  Throat/Neck: normal sound to voice.   Back: no kyphosis  Respiratory:  non-labored. no increased work of breathing.    Lymph Nodes:  No abnormal nodes noted  Skin:  normal moisture and skin texture  Hematologic:  no excessive bruising  Psychiatric:  oriented to time, self, and place      ASSESSMENT/PLAN:    1. Osteoporosis  - Discussed diagnosis with patient  - 24 hour urine was normal  - PTH elevated likely secondary due to vitamin D deficiency, now resolved   - Change to Citracal petites - 2 per day (decrease from 4 per day)   - Continue MVI, ok for one serving of dairy   - Continue Vitamin D 2500 units per day   - She has now finished one year  of Evenity therapy and transitioned to Prolia   - She is tolerating the prolia therapy well, received total 8 injections; #9 today    - labs are at goal   - DEXA demonstrates some mild decline after Evenity     RTC 1 year     8/1/2024  Summer Umana MD

## 2024-08-04 LAB — VITAMIN B6: 16.2 UG/L

## 2024-08-05 ENCOUNTER — PATIENT MESSAGE (OUTPATIENT)
Dept: PHYSICAL MEDICINE AND REHAB | Facility: CLINIC | Age: 69
End: 2024-08-05

## 2024-08-05 ENCOUNTER — HOSPITAL ENCOUNTER (OUTPATIENT)
Dept: GENERAL RADIOLOGY | Facility: HOSPITAL | Age: 69
Discharge: HOME | End: 2024-08-05
Attending: SPECIALIST
Payer: MEDICARE

## 2024-08-05 DIAGNOSIS — R13.14 PHARYNGOESOPHAGEAL DYSPHAGIA: ICD-10-CM

## 2024-08-05 PROCEDURE — 74246 X-RAY XM UPR GI TRC 2CNTRST: CPT | Performed by: SPECIALIST

## 2024-08-06 NOTE — TELEPHONE ENCOUNTER
Patient calling to see if  had the opportunity of review the MSG, she is requesting a call from the clinical team for advise.

## 2024-08-07 ENCOUNTER — TELEPHONE (OUTPATIENT)
Dept: ENDOCRINOLOGY CLINIC | Facility: CLINIC | Age: 69
End: 2024-08-07

## 2024-08-07 NOTE — TELEPHONE ENCOUNTER
Received pt's Prolia SOB via [employer] **    PA Required:  NO  Prolia OOP COST: 0%  Facility Fee: NA  Admin Fee: 0%

## 2024-08-08 PROBLEM — M19.011 ARTHRITIS OF RIGHT SHOULDER REGION: Status: ACTIVE | Noted: 2024-05-22

## 2024-08-08 RX ORDER — FLUOROMETHOLONE 0.1 %
1 SUSPENSION, DROPS(FINAL DOSAGE FORM)(ML) OPHTHALMIC (EYE) 2 TIMES DAILY
COMMUNITY
Start: 2024-08-05

## 2024-08-08 NOTE — TELEPHONE ENCOUNTER
S/w patient, answered any questions within RN scope. Due to specificity of patient's questions regarding her anatomy and her plan - patient has been scheduled for an appointment with Dr. Cleaning on: 08/22/2024. Nothing further needed at this time.

## 2024-08-09 ENCOUNTER — MED REC SCAN ONLY (OUTPATIENT)
Dept: PHYSICAL MEDICINE AND REHAB | Facility: CLINIC | Age: 69
End: 2024-08-09

## 2024-08-22 ENCOUNTER — OFFICE VISIT (OUTPATIENT)
Dept: PHYSICAL MEDICINE AND REHAB | Facility: CLINIC | Age: 69
End: 2024-08-22
Payer: MEDICARE

## 2024-08-22 ENCOUNTER — TELEPHONE (OUTPATIENT)
Dept: PHYSICAL MEDICINE AND REHAB | Facility: CLINIC | Age: 69
End: 2024-08-22

## 2024-08-22 VITALS — HEIGHT: 68 IN | BODY MASS INDEX: 20.92 KG/M2 | WEIGHT: 138 LBS

## 2024-08-22 DIAGNOSIS — M51.9 THORACIC DISC DISEASE: ICD-10-CM

## 2024-08-22 DIAGNOSIS — Z98.1 HISTORY OF LUMBAR FUSION: Primary | ICD-10-CM

## 2024-08-22 DIAGNOSIS — M47.814 ARTHROPATHY OF THORACIC FACET JOINT: ICD-10-CM

## 2024-08-22 DIAGNOSIS — M47.814 THORACIC SPONDYLOSIS: Primary | ICD-10-CM

## 2024-08-22 DIAGNOSIS — Z98.1 S/P CERVICAL SPINAL FUSION: ICD-10-CM

## 2024-08-22 DIAGNOSIS — M47.894 THORACIC FACET SYNDROME: ICD-10-CM

## 2024-08-22 PROCEDURE — 99214 OFFICE O/P EST MOD 30 MIN: CPT | Performed by: PHYSICAL MEDICINE & REHABILITATION

## 2024-08-22 NOTE — TELEPHONE ENCOUNTER
Per CMS Guidelines as of 8/11/24 -no authorization is required for bilateral T7-8 & T8-9 z-joint injections under IVCS  CPT code: 67671-32, 11085-25        Status: Authorization is not required based on medical necessity however is not a guarantee of payment and may be subject to review once claim is submitted-Covered Benefit.  Facet Joint Interventions are considered medically reasonable and necessary for the diagnosis and treatment of chronic pain in patients who meet ALL of the following criteria:  Moderate to severe chronic neck or low back pain, predominantly axial, that causes functional deficit measured on pain or disability scale6; AND*  Pain that has been present for a minimum of 3 months with documented failure to respond to noninvasive conservative care management (as tolerated)4,7, AND  Absence of untreated radiculopathy or neurogenic claudication (except for radiculopathy caused by facet joint synovial cyst)4,8 AND  There is no non-facet pathology per clinical assessment or radiology studies that could explain the source of the patient’s pain, including but not limited to fracture, tumor, infection, or significant deformity. 6  *Pain assessment must be performed and documented at baseline, after each diagnostic procedure using the same pain scale for each assessment. A disability scale must also be obtained at baseline to be used for functional assessment (if patient qualifies for treatment).    A. Diagnostic Facet Joint Injection Procedures (IA or MBB):  The primary indication of a diagnostic facet joint procedure is to diagnose whether the patient has facet syndrome.1,4,7-9 Intraarticular (IA) facet block(s) are considered medically reasonable and necessary as a diagnostic test only if MBB cannot be performed due to specific documented anatomic restrictions or there is an indication to proceed with therapeutic intraarticular injections. These restrictions must be clearly documented in the medical  record and made available upon request.    Diagnostic procedures should be performed with the intent that if successful, RFA procedure would be considered the primary treatment goal at the diagnosed level(s).6    A second diagnostic facet procedure is considered medically reasonable and necessary to confirm validity of the initial diagnostic facet procedure when administered at the same level. The second diagnostic procedure may only be performed a minimum of 2 weeks after the initial diagnostic procedure.8 Clinical circumstances that necessitate an exception to the 2 week duration may be considered on an individual basis and must be clearly documented in the medical record.    For the first diagnostic facet joint injection to be considered medically reasonable and necessary, the patient must meet the criteria outlined under indications for facet joint interventions.  A second confirmatory diagnostic facet joint injection is considered medically reasonable and necessary in patients who meet ALL the following criteria:  The patient meets the criteria for the first diagnostic injection; AND  After the first diagnostic facet joint injection, there must be a consistent positive response of at least 80% relief of primary (index) pain (with the duration of relief being consistent with the agent used).6  Frequency limitation: For each covered spinal region no more than 4 diagnostic joint sessions will be considered medically reasonable and necessary per rolling 12 months, in recognition that the pain generator cannot always be identified with the initial and confirmatory diagnostic procedure.    B. Therapeutic Facet Joint Injection Procedures (IA or MBB):  Therapeutic facet joint injections are considered medically reasonable and necessary for patients who meet ALL the following criteria:  The patient has had 2 medically reasonable and necessary diagnostic facet joint procedures with each one providing a consistent minimum  of 80% relief of primary (index) pain (with the duration of relief being consistent with the agent used); AND  Subsequent therapeutic facet joint procedures at the same anatomic site results in at least consistent 50% pain relief for at least 3 months from the prior therapeutic procedure or at least 50% consistent improvement in the ability to perform previously painful movements and activities of daily living (ADLs) as compared to baseline measurement using the same scale 6; AND  Documentation of why the patient is not a candidate for radiofrequency ablation (such as established spinal pseudarthrosis, implanted electrical device).5, 10-12  Frequency limitation: For each covered spinal region no more than 4 therapeutic facet joint injection sessions will be reimbursed per rolling 12 months.

## 2024-08-22 NOTE — TELEPHONE ENCOUNTER
Incoming call from patient inquiring on when she can have her injection under sedation (I.e: will it be a Tuesday or a Friday?) so that she may tell her  who is her . Informed pt that I will send a message to our  to ensure we are scheduling her for the soonest possible appt. Pt verbalized understanding and was thankful.

## 2024-08-22 NOTE — PROGRESS NOTES
Low Back Pain H & P    Chief Complaint:   Chief Complaint   Patient presents with    Follow - Up     LOV  pt is here for a follow up after injection. Was given an Bilateral T9-10 zygapophyseal joint injection  and received 0% relief. No N/T. Pain depends on activity. Currently in physical therapy.      Nursing note reviewed and verified.    Patient was last seen on 2024.  On 2024, I did the bilateral T9-10 z-joint injections which did not help.  She is still having the right > left mid back pain just under the level of the scapula.  This is a tightness and pulling.  This is worse with bending to the left and with extension.  The pain is less with right flexion.  Right twist is worse than left twist.  She denies numbness and tingling and weakness.    Past Medical History   Past Medical History:    Anemia    Anxiety state, unspecified    Arthritis    Back problem    CERVICAL RADICULOPATHY, CERVICAL SPINAL STENOSIS    Bilateral kidney stones    Broken foot    RT - casting. Fracture 5th met. Cast removl/xray foot 12. Follow up fracture right foot 12.     Calculus of kidney    Cataract    Depression    Esophageal reflux    Essential hypertension    Gastric polyps    Gastritis    High blood pressure    History of stomach ulcers    Hx of motion sickness    hx of vertigo    Idiopathic acute pancreatitis (HCC)    Insomnia    Internal hemorrhoids without complication    Intestinal disorder    Left wrist fracture    Muscle weakness    Osteoarthritis    Osteoporosis    Other chronic pancreatitis (HCC)    Renal disorder    Rotator cuff tear, right    Traumatic arthritis    Vertigo       Past Surgical History   Past Surgical History:   Procedure Laterality Date    Arthroscopy of joint unlisted Right 2012    rotator cuff tear    Back surgery  2008 & 3-15-22    2 procedures          x 3    Cataract      Cholecystectomy      Colonoscopy      Colonoscopy N/A 2018    Procedure:  COLONOSCOPY;  Surgeon: Virginie Ma MD;  Location: [facility] ENDO    Cysto/uretero w/lithotripsy Left 2024    Excis lumbar disk,one level      Eye surgery      Lasik procedure    Fracture surgery Left     WRIST FRACTURE ORIF    Fracture surgery Left     ORIF wrist fracture revision with plates and screws- Ortho Dr Bran Horan      Other surgical history  2017    Fussion L4-L5 - Dr. Rothman    Other surgical history  03/15/2022    Back Surgery    Other surgical history Left     torn biceops repair    Shoulder surg proc unlisted Right     Spine surgery procedure unlisted      L1-L2 FUSION    Spine surgery procedure unlisted  2017    Neck fusion    Tonsillectomy      with Adenoidectomy    Total shoulder replacement Left     Tubal ligation  ?    Upper gi endoscopy,exam      EGD        Family History   Family History   Problem Relation Age of Onset    Pulmonary Disease Father     Dementia Father             Heart Disease Mother     Fibromyalgia Mother             Colon Cancer Maternal Grandfather     Cancer Maternal Grandfather         Colon    Polyps Sister         colon polyps    Colon Cancer Sister     Other (gallbladder disease) Sister     Cancer Sister         Colon    Crohn's Disease Other     Heart Disease Other     Ovarian Cancer Maternal Aunt         70's 80's    Breast Cancer Neg        Social History   Social History     Socioeconomic History    Marital status:      Spouse name: Not on file    Number of children: Not on file    Years of education: Not on file    Highest education level: Not on file   Occupational History    Not on file   Tobacco Use    Smoking status: Never    Smokeless tobacco: Never   Vaping Use    Vaping status: Never Used   Substance and Sexual Activity    Alcohol use: No     Comment: quit 4 - 5 yrs ago due to pancreatitis    Drug use: No    Sexual activity: Not on file   Other Topics Concern     Service Not  Asked    Blood Transfusions Not Asked    Caffeine Concern Yes     Comment: Coffee 2 cups daily    Occupational Exposure Not Asked    Hobby Hazards Not Asked    Sleep Concern Not Asked    Stress Concern Not Asked    Weight Concern Not Asked    Special Diet Not Asked    Back Care Not Asked    Exercise No    Bike Helmet Not Asked    Seat Belt Not Asked    Self-Exams Not Asked   Social History Narrative    The patient does not use an assistive device..      The patient does live in a home with stairs.     Social Determinants of Health     Financial Resource Strain: Not on file   Food Insecurity: Not on file   Transportation Needs: Not on file   Physical Activity: Not on file   Stress: Not on file   Social Connections: Unknown (3/9/2021)    Received from [facility], [facility]    Social Connections     Conversations with friends/family/neighbors per week: Not on file   Housing Stability: Low Risk  (7/7/2021)    Received from [facility], [facility]    Housing Stability     Mortgage Payment Concerns?: Not on file     Number of Places Lived in the Last Year: Not on file     Unstable Housing?: Not on file       PE:  The patient does appear in her stated age in no distress.  The patient is well groomed.    Psychiatric:  The patient is alert and oriented x 3.  The patient has a normal affect and mood.      Respiratory:  No acute respiratory distress. Patient does not have a cough.    HEENT:  Extraocular muscles are intact. There is no kern icterus. Pupils are equal, round, and reactive to light. No redness or discharge bilaterally.    Skin:  There are no rashes or lesions.    Vitals:  There were no vitals filed for this visit.    Gait:    Gait: Normal gait   Sit to Stand: no difficulty      Thoracic Spine:    Scoliosis: Thoracic dextroscoliosis that is mild-moderate     Thoracic Spine Palpation:    Spinous Processes: Non-tender for all Spinous  Processes   Z-joints: Tender at  right > left T7-8 and right > left T8-9       Assessment  1. History of lumbar fusion: T10-S1 with bilateral SIJ fusions on 3/15/2022    2. T9-10 left mild-mod & right mild bulging disc    3. S/P cervical spinal fusion: C4-5 & C5-6 ACDF    4. Arthropathy of thoracic facet joint    5. Thoracic facet syndrome: right > left T7-8 & T8-9        Plan  I will perform bilateral T7-8 and T8-9 z-joint injections under IVCS.    The patient will continue with her home exercise program.    The patient will continue with PT.    She will try the Tizanidine again.    The patient will follow up in 2-3 months, but the patient will call me 2 weeks after having the injection to let me know how the injection worked.    The patient understands and agrees with the stated plan.  Luke Cleaning MD  8/22/2024

## 2024-08-22 NOTE — PATIENT INSTRUCTIONS
Plan  I will perform bilateral T7-8 and T8-9 z-joint injections under IVCS.    The patient will continue with her home exercise program.    The patient will continue with PT.    She will try the Tizanidine again.    The patient will follow up in 2-3 months, but the patient will call me 2 weeks after having the injection to let me know how the injection worked.

## 2024-08-22 NOTE — TELEPHONE ENCOUNTER
Patient has been scheduled for bilateral T7-8 & T8-9 Zygapophyseal Joint Injections under IVCS on 08/27/2024 at the [facility] with Dr. MELÉNDEZ.   -Anesthesia type: IVCS.  -If scheduling [facility] covid testing required for all procedures whether patient is vaccinated or not.  - Is patient in a nursing home or assisted living? If so injection needs to be scheduled at the hospital. (Per [facility] policy.)  -Patient informed not to eat or drink anything after midnight the night prior to the procedure, if being sedated. (For afternoon injections: Patient to fast minimum of 8 hours prior to procedure with IVCS/MAC. Patient's on weight loss medications/injectables will need to fast 10-12 hours prior to.)   -Patient was advised that if he/she does receive the covid vaccine it needs to be at least 2 weeks before or after the injection.  -Medications and allergies reviewed.  -Patient reminded to hold NSAIDs (Ibuprofen, ASA 81, Aleve, Naproxen, Mobic, Diclofenac, Etodolac, Celebrex etc.) for 3 days prior to LUMBAR FACET JOINT INJECTIONS OR MEDIAL BRANCH BLOCK INJECTIONS  if BMI is greater than 35. For Cervical injections only hold multivitamins, Vitamin E, Fish Oil, Phentermine (Lomaira) for 7 days prior to injection and NSAIDS.   mg to be held for 7 days prior to injections.  -If patient is receiving MAC/IVCS Phentermine (Lomaira), Adlyxin (Lixisenatide), Bydureon BCise (Exenatide-release), Byetta (Exenatide), Mounjaro (Tirezepatide), Ozempic (Semaglutide), Rybelsus (oral demaglutide), Saxenda (Liraglutide), Trulicity (Dulaglutide), Victoriza (Liraglutide), Wegovy (Semaglutide), Berberine (oral natures ozempic) will need to be held for 7 days prior to injection.  -If patient's BMI is greater than 45. Patient is unable to get IVCS/MAC at [facility]. (Options: Patient can go to [facility] under MAC or ENDO under IVCS-reminder physician's slots at ENDO are limited. OR Patient can have the procedure done under local anesthesia at [facility] with Valium ( per  physician's preference.)    -If on blood thinner clearance has been received to hold this medication by provider.   -Patient informed he/she will need a  to and from procedure. EFFECTIVE 12/1/23- Per [facility]: \" Our PAT team will notify the patient that their ride MUST remain onsite for the entirety of their visit if the ride is unable to do so the procedure will be canceled. \"    -[facility] is located in the [facility] 1st floor. Patient may park in the yellow or purple parking.    Follow up appointment has been scheduled for patient on: 11/18/2024    Patient verbalized understanding and agrees with plan.  -----> Scheduled in Epic: Yes  -----> Scheduled in [employer]/Surgical Case Request: Yes

## 2024-08-29 ENCOUNTER — HOSPITAL ENCOUNTER (OUTPATIENT)
Dept: GENERAL RADIOLOGY | Facility: HOSPITAL | Age: 69
Discharge: HOME | End: 2024-08-29
Attending: INTERNAL MEDICINE
Payer: MEDICARE

## 2024-08-29 ENCOUNTER — PATIENT MESSAGE (OUTPATIENT)
Dept: FAMILY MEDICINE CLINIC | Facility: CLINIC | Age: 69
End: 2024-08-29

## 2024-08-29 DIAGNOSIS — R13.12 OROPHARYNGEAL DYSPHAGIA: Primary | ICD-10-CM

## 2024-08-29 DIAGNOSIS — R13.13 PHARYNGEAL DYSPHAGIA: ICD-10-CM

## 2024-08-29 PROCEDURE — 92611 MOTION FLUOROSCOPY/SWALLOW: CPT

## 2024-08-29 PROCEDURE — 74230 X-RAY XM SWLNG FUNCJ C+: CPT | Performed by: INTERNAL MEDICINE

## 2024-08-29 NOTE — PATIENT INSTRUCTIONS
VIDEO SWALLOW STUDY    Diet Recommendations:  Solids: Regular, IDDSI7  Liquids: Thin, IDDSI 0    Recommended compensatory strategies:   Sit upright  Small sips  Eat slowly  Alternate liquids and solids  Remain upright for 90 minutes after eating    Medication Administration:  No restrictions    Further Follow-up:  Recommend 4-6 session of dysphagia therapy.  Please call number below to schedule.    Virginie Lopez MA/CCC-SLP  Speech Language Pathologist  [facility]  [phone]

## 2024-08-29 NOTE — PROGRESS NOTES
ADULT VIDEOFLUOROSCOPIC SWALLOWING STUDY       ADULT VIDEOFLUOROSCOPIC SWALLOWING STUDY:   Referring Physician: Cristiano      Radiologist: Dr. Huerta  Diagnosis: dysphagia    Date of Service: 8/29/2024     PATIENT SUMMARY   Chief Complaint:  Pt c/o food and pills sticking in her esophagus and frequent burping after eating and drinking.  In addition she has choking sensations almost daily with food and pills, which is quite worrisome for the patient.  She needs to eat small bites, eat slowly, chew her food well, \"until it turns to mush\" and alternate food and liquids during meals.  Pt denies unintentional weight loss and shortness of breath.  She has occasional pain when swallowing when she feels the food sticking.  Esophagram was normal.  She had cervical fusion in 2012 and also has had extensive lumbar fusion.  She had a previous VFSS in 2017 which was essentially normal.  See results below.        VFSS 2017: Pharyngeal phase: Pt presented with a functional pharyngeal phase of swallow with adequate base of tongue retraction, epiglottic inversion, laryngeal elevation and anterior excursion. Pt had a timely swallow response with one trial of cracker, one barium tablet and several trials of thin liquids by tsp, cup and straw. Pt exhibited an ~1 second swallow delay to the valleculae with one trial of pureed only. Minimal valleculae residue also noted with pureed, which cleared with a spontaneous dry swallow. No penetration or aspiration was seen with any of the consistencies tested during the study.  Overall Impression: Pt's oral and pharyngeal phase of swallow found to be within functional limits. No penetration or aspiration was seen with consistencies tested during the study. Recommend pt continue general solids and thin liquids. Straws okay. Recommend to take one pill at a time, and allow extra time to swallow between pills. Pt was educated on general swallow and reflux precautions including: Upright position  during and ~30 min after meal, small bites/sips, slow rate and avoid acidic food/drinks. Speech therapy is not warranted at this time. Pt verbalized understanding and agreement.     Current Diet: regular foods and liquids      Problem List  Active Problems:  Active Problems:    * No active hospital problems. *      Past Medical History  Past Medical History:    Anemia    Anxiety state, unspecified    Arthritis    Back problem    CERVICAL RADICULOPATHY, CERVICAL SPINAL STENOSIS    Bilateral kidney stones    Broken foot    RT - casting. Fracture 5th met. Cast removl/xray foot 4-20-12. Follow up fracture right foot 5-22-12.     Calculus of kidney    Cataract    Depression    Esophageal reflux    Essential hypertension    Gastric polyps    Gastritis    High blood pressure    History of stomach ulcers    Hx of motion sickness    hx of vertigo    Idiopathic acute pancreatitis (HCC)    Insomnia    Internal hemorrhoids without complication    Intestinal disorder    Left wrist fracture    Muscle weakness    Osteoarthritis    Osteoporosis    Other chronic pancreatitis (HCC)    Renal disorder    Rotator cuff tear, right    Traumatic arthritis    Vertigo        Imaging results: 8/5/24 esophagram:  1. Normal cervical esophagus.  No laryngeal penetration or aspiration.   2. Normal thoracic esophagus.  No esophageal stricture or mucosal abnormalities.    3. Normal gastric lumen without mucosal changes are outlet obstruction.    4. Normal appearing duodenum proximal jejunum     No recent CXR    ASSESSMENT   DYSPHAGIA ASSESSMENT  Test completed in conjunction with Radiologist.   Food/Liquid Types Presented: puree, solid, thin liquids, and barium tablet.    Study Position and View:  Patient was seated upright and viewed laterally and A-P.    Pain Assessment: The patient reports pain at a level of 0/10.    Oral phase:  Bilabial seal was intact with no anterior food or liquid loss.  Reduced bolus formation resulted in piecemeal  posterior propulsion of the puree and solid bolus into the pharynx.  Mastication and oral transit were timely.  Minimal to no oral residue remained.    Pharyngeal phase:  The pharynx was narrow due to large cervical osteophytes and cervical fusion hardware.  The pharyngeal response triggered at the valleculae for puree and solids and at the tongue base to pyriform sinuses for thin liquids.  Slightly delayed epiglottic inversion resulted in intermittent, shallow, transient laryngeal penetration with thin liquids by cup.  No penetration was observed with thin liquids by straw or any other consistency.  No material remained in the upper airway and no aspiration was observed.  Minimal vallecular and pyriform sinus retention remained intermittently with thin liquids only.  Base of tongue retraction and hyolaryngeal excursion appeared adequate.  Barium tablet passed without delay through the pharynx.      Esophageal phase:   Mild retention of puree and thin liquid at the aortic arch.  The barium tablet passed without delay.    Penetration Aspiration Scale: 2/8.  Material entered the airway, remained above the vocal cords and was ejected from the airway.    Overall Impression: Minimal-mild pharyngeal dysphagia characterized by slightly delayed epiglottic inversion resulting in intermittent transient laryngeal penetration with thin liquids.  No material remained in the upper airway and no aspiration was observed.  Delayed epiglottic inversion may be due to slight impingement by the osteophytes on the anterior cervical spine which slow the epiglottic movement.  No significant pharyngeal retention remained.  Mild retention of puree and liquids remained in the esophagus at the aortic arch.  Pt with significant amount of belching after presentations of food and liquid.  Recommend general diet with thin liquids.  Dysphagia therapy is recommended due to frequency of symptoms.      FCM category and level: Swallowing, 5  PLAN    Potential: Good    Diet Recommendations:  Solids: Regular, IDDSI7  Liquids: Thin, IDDSI 0    Recommended compensatory strategies:   Sit upright  Small sips  Eat slowly  Alternate liquids and solids  Remain upright for 90 minutes after eating    Medication Administration:  No restrictions    Further Follow-up:  Recommend 4-6 session of dysphagia therapy    GOALS (to be met 4-6 visits)  The patient will tolerate general diet consistency and thin liquids without overt signs or symptoms of aspiration with 100 % accuracy  The patient/family/caregiver will demonstrate understanding and implementation of aspiration precautions and swallow strategies independently   Sit upright  Small sips  Eat slowly  Alternate liquids and solids  Remain upright for 90 minutes after eating  Patient will reduce risk of aspiration by completing dysphagia exercises to 90% accuracy     EDUCATION/INSTRUCTION  Reviewed results and recommendations with patient.  Written instructions were provided.  Agreement/Understanding verbalized and all questions answered to their apparent satisfaction.      INTERDISCIPLINARY COMMUNICATION  Reviewed results with Radiologist; agreement verbalized.        Patient was advised of these findings, precautions, recommendations and treatment options and has agreed to actively participate in planning and for this course of care.    Thank you for your referral. Please co-sign or sign and return this letter via fax as soon as possible. If you have any questions, please contact me at [phone].    Virginie Lopez MA/[facility]-SLP  Speech Language Pathologist  [facility]  [phone]    Electronically signed by therapist: Virginie Lopez, SLP  Physician's certification required: Yes  I certify the need for these services furnished under this plan of treatment and while under my care.    X___________________________________________________ Date____________________    Certification From: 8/29/2024   To:11/27/2024

## 2024-09-13 ENCOUNTER — MED REC SCAN ONLY (OUTPATIENT)
Dept: PHYSICAL MEDICINE AND REHAB | Facility: CLINIC | Age: 69
End: 2024-09-13

## 2024-09-14 ENCOUNTER — PATIENT MESSAGE (OUTPATIENT)
Dept: PHYSICAL MEDICINE AND REHAB | Facility: CLINIC | Age: 69
End: 2024-09-14

## 2024-09-14 DIAGNOSIS — M47.814 THORACIC SPONDYLOSIS: ICD-10-CM

## 2024-09-14 DIAGNOSIS — M47.894 THORACIC FACET SYNDROME: ICD-10-CM

## 2024-09-14 DIAGNOSIS — M51.9 THORACIC DISC DISEASE: Primary | ICD-10-CM

## 2024-09-16 NOTE — TELEPHONE ENCOUNTER
Condition update after Procedure.    - Patient had bilateral T7-8 & T8-9 Zygapophyseal Joint Injections on 08/27/2024 with Dr. Cleaning.  - \"Maybe 50%\" relief.      - Current pain level:  8/10    - Pain location: \"Both sides of the thoracic, up and down\". \"My bra strap line, right across the back.\"  - Pain description: aching; soreness; constant  - Current pain treatment: OTC medications and see below  - Current prescription pain medications/dosing: Ice/heat, Theracane, Voltaren gel, Aleve  - Outside massages in between PT    - LOV: 8/22/2024 Luke Cleaning MD    - NOV: 11/18/2024 Luke Cleaning MD   - Plan from LOV: Per Dr. Cleaning: \"I will perform bilateral T7-8 and T8-9 z-joint injections under IVCS.     The patient will continue with her home exercise program.     The patient will continue with PT.     She will try the Tizanidine again.     The patient will follow up in 2-3 months, but the patient will call me 2 weeks after having the injection to let me know how the injection worked.\"        - Patient requesting Thoracic MRI.       RN to speak with Dr. Cleaning. Once we have received his response, will reach back out to patient.

## 2024-10-01 ENCOUNTER — PATIENT MESSAGE (OUTPATIENT)
Dept: INTERNAL MEDICINE CLINIC | Facility: CLINIC | Age: 69
End: 2024-10-01

## 2024-10-01 ENCOUNTER — APPOINTMENT (OUTPATIENT)
Dept: SPEECH THERAPY | Age: 69
End: 2024-10-01
Attending: SPECIALIST
Payer: MEDICARE

## 2024-10-01 NOTE — TELEPHONE ENCOUNTER
From: Radha A Day  To: Sherly Hirsch  Sent: 10/1/2024 8:26 AM CDT  Subject: TIRED     Hi  I don’t see you for quite some time, next physical, so I was wondering if I could get some blood work bc I’m so ridiculously tired all the time.   That’s all. Just to see how things are looking.     Thanks  Betsy

## 2024-10-04 ENCOUNTER — PATIENT MESSAGE (OUTPATIENT)
Dept: PHYSICAL MEDICINE AND REHAB | Facility: CLINIC | Age: 69
End: 2024-10-04

## 2024-10-04 DIAGNOSIS — M47.812 CERVICAL FACET SYNDROME: ICD-10-CM

## 2024-10-04 DIAGNOSIS — M25.512 CHRONIC LEFT SHOULDER PAIN: ICD-10-CM

## 2024-10-04 DIAGNOSIS — G89.29 CHRONIC LEFT SHOULDER PAIN: ICD-10-CM

## 2024-10-04 DIAGNOSIS — M50.20 CERVICAL HERNIATED DISC: ICD-10-CM

## 2024-10-04 DIAGNOSIS — M50.90 CERVICAL DISC DISEASE: ICD-10-CM

## 2024-10-04 DIAGNOSIS — G44.86 CERVICOGENIC HEADACHE: ICD-10-CM

## 2024-10-04 DIAGNOSIS — M48.02 CERVICAL SPINAL STENOSIS: ICD-10-CM

## 2024-10-04 DIAGNOSIS — M47.812 ARTHROPATHY OF CERVICAL FACET JOINT: Primary | ICD-10-CM

## 2024-10-07 NOTE — TELEPHONE ENCOUNTER
From: Radha Stoner  To: Luke Cleaning  Sent: 10/4/2024 7:19 PM CDT  Subject: Neck pain/headaches    Could you submit a PT order for just dry needling? Is that possible?   Been having headaches regularly and I think the dry needling will help with releasing the muscles on my shoulders/traps etc.   Maybe that will help.    I will call on Monday to see if you got this message     Betsy Stoner

## 2024-10-07 NOTE — TELEPHONE ENCOUNTER
Spoke with patient who wanted to follow up on her [employer] message that was sent on 10/4/24. Patient was wanting a PT order to try dry needling to see if this would help with her neck pain/headaches.     Once order is placed, patient would like order sent to her [employer].     Message sent to  for PT order for dry needling.

## 2024-10-08 ENCOUNTER — APPOINTMENT (OUTPATIENT)
Dept: SPEECH THERAPY | Age: 69
End: 2024-10-08
Attending: SPECIALIST
Payer: MEDICARE

## 2024-10-08 ENCOUNTER — TELEPHONE (OUTPATIENT)
Dept: INTERNAL MEDICINE CLINIC | Facility: CLINIC | Age: 69
End: 2024-10-08

## 2024-10-08 NOTE — TELEPHONE ENCOUNTER
See 10/1/24 MyChart encounter. Patient sent a [employer] message regarding this on 10/1/24.    Patient last saw  6/5/24.     please advise if you would like to order anything or if patient should come in to see you

## 2024-10-08 NOTE — TELEPHONE ENCOUNTER
Patient had called last week. She has extreme fatigue. She never received a response. Patient wants doctor to order blood work to see what is going on. Please call and advise

## 2024-10-09 ENCOUNTER — OFFICE VISIT (OUTPATIENT)
Dept: SPEECH THERAPY | Facility: HOSPITAL | Age: 69
End: 2024-10-09
Attending: INTERNAL MEDICINE
Payer: MEDICARE

## 2024-10-09 PROCEDURE — 92526 ORAL FUNCTION THERAPY: CPT

## 2024-10-09 NOTE — PROGRESS NOTES
Diagnosis: dysphagia  Authorized # of Visits:  medicare        Precautions: aspiration        Subjective: Pt saw Dr. Quinonez GI and they discussed the VFSS. She sometimes pushes her throat on the left side when she feels food sticking. Hasn't been gagging and choking like she was before VFSS.  Frequent burping persists.  She is not sure when the burping started.  It is possible it started after her cervical fusion.  She sees her spine doctor on 10/21/24 and will discuss VFSS results with him.   Pt was seen for a video swallow study on 8/29/24. See results below.  8/29/24 VFSS:   Minimal-mild pharyngeal dysphagia characterized by slightly delayed epiglottic inversion resulting in intermittent transient laryngeal penetration with thin liquids.  No material remained in the upper airway and no aspiration was observed.  Delayed epiglottic inversion may be due to slight impingement by the osteophytes on the anterior cervical spine which slow the epiglottic movement.  No significant pharyngeal retention remained.  Mild retention of puree and liquids remained in the esophagus at the aortic arch.  Pt with significant amount of belching after presentations of food and liquid.  Recommend general diet with thin liquids.  Dysphagia therapy is recommended due to frequency of symptoms.    Objective:      Date: 10/9/2024  Tx#: 1 Date:   Tx#: 2 Date:   Tx#: 3 Date:   Tx#: 4   Effortful swallow Trained and completed x40      Mendelsohn Trained and completed x10      Use of strategies Reviewed      Tolerance of PO diet No clinical signs of aspiration.          Assessment: Reviewed results and recommendations of VFSS.  Reviewed strategies.  Trained and practiced dysphagia exercises.  Pt c/o tightness in her neck muscles with effortful swallow.  Encouraged her to take rests between reps of exercises.  Discussed need to take smaller bites and alternate food with liquid and not over masticate her food.  This may reduce burping.      Goals:  (to be met 4-6 visits)  The patient will tolerate general diet consistency and thin liquids without overt signs or symptoms of aspiration with 100 % accuracy  The patient/family/caregiver will demonstrate understanding and implementation of aspiration precautions and swallow strategies independently   Sit upright  Small sips  Eat slowly  Alternate liquids and solids  Remain upright for 90 minutes after eating  Patient will reduce risk of aspiration by completing dysphagia exercises to 90% accuracy     Plan: Continue therapy per specified goals.  HEP x3/day.    Skilled Services: dysphagia therapy    Charges: 29704     Total Treatment Time: 45 min    Virginie Lopez MA/SADAF-SLP  Speech Language Pathologist  [facility]  [phone]

## 2024-10-14 ENCOUNTER — TELEPHONE (OUTPATIENT)
Dept: PHYSICAL MEDICINE AND REHAB | Facility: CLINIC | Age: 69
End: 2024-10-14

## 2024-10-14 NOTE — TELEPHONE ENCOUNTER
S/W patient to advise that Dr. Cleaning signed off on her PT order over the weekend.     Patient asked if dry needling is considered PT by insurance or not.  This RN advised it is a rehabilitative treatment, but it is recommended she check with her insurance company to be sure.     Patient verbalized understanding and confirmed receipt of the letter on [employer] with the PT order pasted.

## 2024-10-14 NOTE — TELEPHONE ENCOUNTER
Pt would like an order put in for dry needling and PT \"TODAY\" Pt states that she is quite a bit of pain and called over a week ago .

## 2024-10-15 ENCOUNTER — APPOINTMENT (OUTPATIENT)
Dept: SPEECH THERAPY | Age: 69
End: 2024-10-15
Attending: SPECIALIST
Payer: MEDICARE

## 2024-10-16 ENCOUNTER — OFFICE VISIT (OUTPATIENT)
Dept: SPEECH THERAPY | Facility: HOSPITAL | Age: 69
End: 2024-10-16
Attending: INTERNAL MEDICINE
Payer: MEDICARE

## 2024-10-16 ENCOUNTER — HOSPITAL ENCOUNTER (OUTPATIENT)
Dept: MRI IMAGING | Age: 69
Discharge: HOME | End: 2024-10-16
Attending: PHYSICAL MEDICINE & REHABILITATION
Payer: MEDICARE

## 2024-10-16 DIAGNOSIS — M47.894 THORACIC FACET SYNDROME: ICD-10-CM

## 2024-10-16 DIAGNOSIS — M51.9 THORACIC DISC DISEASE: ICD-10-CM

## 2024-10-16 DIAGNOSIS — M47.814 THORACIC SPONDYLOSIS: ICD-10-CM

## 2024-10-16 PROBLEM — F51.01 PRIMARY INSOMNIA: Status: ACTIVE | Noted: 2024-10-16

## 2024-10-16 PROCEDURE — 72157 MRI CHEST SPINE W/O & W/DYE: CPT | Performed by: PHYSICAL MEDICINE & REHABILITATION

## 2024-10-16 PROCEDURE — 92526 ORAL FUNCTION THERAPY: CPT

## 2024-10-16 PROCEDURE — A9575 INJ GADOTERATE MEGLUMI 0.1ML: HCPCS | Performed by: PHYSICAL MEDICINE & REHABILITATION

## 2024-10-16 RX ORDER — GADOTERATE MEGLUMINE 376.9 MG/ML
15 INJECTION INTRAVENOUS
Status: COMPLETED | OUTPATIENT
Start: 2024-10-16 | End: 2024-10-16

## 2024-10-16 RX ADMIN — GADOTERATE MEGLUMINE 13 ML: 376.9 INJECTION INTRAVENOUS at 09:33:00

## 2024-10-16 NOTE — PROGRESS NOTES
Diagnosis: dysphagia  Authorized # of Visits:  medicare        Precautions: aspiration        Subjective: Pt had MRI of her thoracic spine this morning.   Frequent burping persists.  She sees her spine doctor on 10/21/24.    8/29/24 VFSS:   Minimal-mild pharyngeal dysphagia characterized by slightly delayed epiglottic inversion resulting in intermittent transient laryngeal penetration with thin liquids.  No material remained in the upper airway and no aspiration was observed.  Delayed epiglottic inversion may be due to slight impingement by the osteophytes on the anterior cervical spine which slow the epiglottic movement.  No significant pharyngeal retention remained.  Mild retention of puree and liquids remained in the esophagus at the aortic arch.  Pt with significant amount of belching after presentations of food and liquid.  Recommend general diet with thin liquids.  Dysphagia therapy is recommended due to frequency of symptoms.    Objective:      Date: 10/9/2024  Tx#: 1 Date: 10/16/24  Tx#: 2 Date:   Tx#: 3 Date:   Tx#: 4   Effortful swallow Trained and completed x40 Reviewed and completed   x40     Mendelsohn Trained and completed x10 Reviewed.  Completed x5.  Pt unable to do more because she developed a headache.  Pain: 7/10     Use of strategies Reviewed Reviewed.       Tolerance of PO diet No clinical signs of aspiration. No clinical signs of aspiration.           Assessment: Reviewed strategies dysphagia exercises.  Pt c/o tightness in her neck muscles and headache with Mendelsohn.  Encouraged her to take rests between reps of exercises.  Reviewed need to take smaller bites and alternate food with liquid and not over masticate her food.  This may reduce burping.  Will cancel next weeks session.  F/u in 2 weeks.      Goals: (to be met 4-6 visits)  The patient will tolerate general diet consistency and thin liquids without overt signs or symptoms of aspiration with 100 % accuracy  The  patient/family/caregiver will demonstrate understanding and implementation of aspiration precautions and swallow strategies independently   Sit upright  Small sips  Eat slowly  Alternate liquids and solids  Remain upright for 90 minutes after eating  Patient will reduce risk of aspiration by completing dysphagia exercises to 90% accuracy     Plan: Continue therapy per specified goals.  HEP x3/day.    Skilled Services: dysphagia therapy    Charges: [id number]     Total Treatment Time: 45 min    Virginie Lopez MA/SADAF-SLP  Speech Language Pathologist  [facility]  [phone]

## 2024-10-21 ASSESSMENT — ENCOUNTER SYMPTOMS
ALLEVIATING FACTORS: REST
QUALITY: ACHE

## 2024-10-22 ENCOUNTER — APPOINTMENT (OUTPATIENT)
Dept: SPEECH THERAPY | Age: 69
End: 2024-10-22
Attending: SPECIALIST
Payer: MEDICARE

## 2024-10-23 ENCOUNTER — HOSPITAL ENCOUNTER (OUTPATIENT)
Dept: PHYSICAL MEDICINE AND REHAB | Age: 69
Discharge: STILL ADMITTED OR ADMINISTRATIVE | End: 2024-10-23

## 2024-10-23 DIAGNOSIS — M41.25 OTHER IDIOPATHIC SCOLIOSIS, THORACOLUMBAR REGION: Primary | ICD-10-CM

## 2024-10-24 ENCOUNTER — APPOINTMENT (OUTPATIENT)
Dept: SPEECH THERAPY | Facility: HOSPITAL | Age: 69
End: 2024-10-24
Attending: INTERNAL MEDICINE
Payer: MEDICARE

## 2024-10-28 ENCOUNTER — OFFICE VISIT (OUTPATIENT)
Dept: OTOLARYNGOLOGY | Facility: CLINIC | Age: 69
End: 2024-10-28
Payer: MEDICARE

## 2024-10-28 VITALS — WEIGHT: 136 LBS | BODY MASS INDEX: 20.61 KG/M2 | HEIGHT: 68 IN

## 2024-10-28 DIAGNOSIS — J34.89 SINUS PRESSURE: Primary | ICD-10-CM

## 2024-10-28 DIAGNOSIS — J34.3 NASAL TURBINATE HYPERTROPHY: ICD-10-CM

## 2024-10-28 PROCEDURE — 99213 OFFICE O/P EST LOW 20 MIN: CPT | Performed by: SPECIALIST

## 2024-10-28 RX ORDER — LATANOPROST 50 UG/ML
1 SOLUTION/ DROPS OPHTHALMIC
COMMUNITY
Start: 2024-10-23

## 2024-10-28 RX ORDER — DOXYCYCLINE 100 MG/1
100 CAPSULE ORAL 2 TIMES DAILY
Qty: 20 CAPSULE | Refills: 0 | Status: SHIPPED | OUTPATIENT
Start: 2024-10-28

## 2024-10-28 NOTE — PROGRESS NOTES
Radha Stoner is a 69 year old female.   Chief Complaint   Patient presents with    Nose Problem     Nose feels clogged    Ear Problem     Ears feel plugged up     HPI:   Patient here with severe left sinus pressure and pain    Current Outpatient Medications   Medication Sig Dispense Refill    latanoprost 0.005 % Ophthalmic Solution 1 drop Before Dinner.      doxycycline 100 MG Oral Cap Take 1 capsule (100 mg total) by mouth 2 (two) times daily. 20 capsule 0    traZODone 50 MG Oral Tab Take 1 tablet (50 mg total) by mouth nightly. 90 tablet 3    estradiol 0.1 MG/GM Vaginal Cream Place 0.5 g vaginally twice a week. 42.5 g 10    amLODIPine 10 MG Oral Tab Take 1 tablet (10 mg total) by mouth daily. 90 tablet 3    meclizine 25 MG Oral Tab Take 1 tablet (25 mg total) by mouth 3 (three) times daily as needed. 30 tablet 1    pantoprazole 40 MG Oral Tab EC Take 1 tablet (40 mg total) by mouth every morning before breakfast.      Acidophilus/Pectin Oral Cap Take 1 capsule by mouth daily.      Famotidine (PEPCID OR) Take 20 mg by mouth. Daily PRN       Cholecalciferol 25 MCG (1000 UT) Oral Tab Take by mouth daily.      Diclofenac Sodium 1 % Transdermal Gel Apply 4 g topically 4 (four) times daily as needed. 1 Tube 3    CALCIUM CITRATE OR Take 1,400 mg by mouth daily. BID      pregabalin 50 MG Oral Cap Take 1 capsule (50 mg total) by mouth 3 (three) times daily. (Patient taking differently: Take 1 capsule (50 mg total) by mouth 3 (three) times daily as needed.) 90 capsule 3      Past Medical History:    Anemia    Anxiety state, unspecified    Arthritis    Back problem    CERVICAL RADICULOPATHY, CERVICAL SPINAL STENOSIS    Bilateral kidney stones    Broken foot    RT - casting. Fracture 5th met. Cast removl/xray foot 4-20-12. Follow up fracture right foot 5-22-12.     Calculus of kidney    Cataract    Depression    Esophageal reflux    Essential hypertension    Gastric polyps    Gastritis    High blood pressure    History of  stomach ulcers    Hx of motion sickness    hx of vertigo    Idiopathic acute pancreatitis (HCC)    Insomnia    Internal hemorrhoids without complication    Intestinal disorder    Left wrist fracture    Muscle weakness    Osteoarthritis    Osteoporosis    Other chronic pancreatitis (HCC)    Renal disorder    Rotator cuff tear, right    Traumatic arthritis    Vertigo      Social History:  Social History     Socioeconomic History    Marital status:    Tobacco Use    Smoking status: Never    Smokeless tobacco: Never   Vaping Use    Vaping status: Never Used   Substance and Sexual Activity    Alcohol use: No     Comment: quit 4 - 5 yrs ago due to pancreatitis    Drug use: No   Other Topics Concern    Caffeine Concern Yes     Comment: Coffee 2 cups daily    Exercise No   Social History Narrative    The patient does not use an assistive device..      The patient does live in a home with stairs.     Social Drivers of Health     Food Insecurity: No Food Insecurity (10/21/2024)    Received from [facility]    Food Insecurity     Currently or in the past 3 months, have you worried your food would run out before you had money to buy more?: No     In the past 12 months, have you run out of food or been unable to get more?: No   Transportation Needs: No Transportation Needs (10/21/2024)    Received from [facility]    Transportation Needs     Medical Transportation Needs?: No    [facility]    Social Connections    [facility], [facility]    Housing Stability        REVIEW OF SYSTEMS:   GENERAL HEALTH: feels well otherwise  GENERAL : denies fever, chills, sweats, weight loss, weight gain  SKIN: denies any unusual skin lesions or rashes  RESPIRATORY: denies shortness of breath with exertion  NEURO: denies headaches    EXAM:   Ht 5' 8\" (1.727 m)   Wt 136 lb (61.7 kg)    BMI 20.68 kg/m²   System Details   Skin Inspection - Normal.   Constitutional Overall appearance - Normal.   Head/Face Facial features - Normal. Eyebrows - Normal. Skull - Normal.   Eyes Conjunctiva - Right: Normal, Left: Normal. Pupil - Right: Normal, Left: Normal.    Ears Inspection - Right: Normal, Left: Normal.   Canal - Right: Normal, Left: Normal.   TM - Right: Normal, Left: Normal.   Nasal External nose - Normal.   Nasal septum - Normal.  Turbinates -congestion, no purulence or polyps by speculum exam   Oral/Oropharynx Lips - Normal, Tonsils - Normal, Tongue - Normal    Neck Exam Inspection - Normal. Palpation - Normal. Parotid gland - Normal. Thyroid gland - Normal.   Lymph Detail Submental. Submandibular. Anterior cervical. Posterior cervical. Supraclavicular all without enlargement   Psychiatric Orientation - Oriented to time, place, person & situation. Appropriate mood and affect.   Neurological Memory - Normal. Cranial nerves - Cranial nerves II through XII grossly intact.     ASSESSMENT AND PLAN:   1. Sinus pressure  On the left especially the frontal area.  Patient placed on a trial of doxycycline.  She should call me if her symptoms are not improving by 3 days.    2. Nasal turbinate hypertrophy  Patient can use nasal saline.  Can no longer use a steroid spray, as she has glaucoma.      The patient indicates understanding of these issues and agrees to the plan.      Kayce Morris MD  10/28/2024  8:14 AM

## 2024-10-28 NOTE — PATIENT INSTRUCTIONS
You were placed on a trial of doxycycline for your sinus pressure and pain.  Continue nasal saline.  Please call me if your symptoms do not resolve.

## 2024-10-29 ENCOUNTER — APPOINTMENT (OUTPATIENT)
Dept: SPEECH THERAPY | Age: 69
End: 2024-10-29
Attending: SPECIALIST
Payer: MEDICARE

## 2024-10-31 ENCOUNTER — APPOINTMENT (OUTPATIENT)
Dept: SPEECH THERAPY | Facility: HOSPITAL | Age: 69
End: 2024-10-31
Attending: INTERNAL MEDICINE
Payer: MEDICARE

## 2024-11-04 ENCOUNTER — TELEPHONE (OUTPATIENT)
Dept: INTERNAL MEDICINE CLINIC | Facility: CLINIC | Age: 69
End: 2024-11-04

## 2024-11-04 ENCOUNTER — APPOINTMENT (OUTPATIENT)
Dept: SPEECH THERAPY | Age: 69
End: 2024-11-04
Attending: SPECIALIST
Payer: MEDICARE

## 2024-11-04 RX ORDER — AMLODIPINE BESYLATE 10 MG/1
10 TABLET ORAL DAILY
Qty: 90 TABLET | Refills: 3 | Status: SHIPPED | OUTPATIENT
Start: 2024-11-04

## 2024-11-04 RX ORDER — AZELASTINE 1 MG/ML
1 SPRAY, METERED NASAL 2 TIMES DAILY
Qty: 1 EACH | Refills: 2 | Status: SHIPPED | OUTPATIENT
Start: 2024-11-04

## 2024-11-04 NOTE — TELEPHONE ENCOUNTER
Pt. Called stating she saw Dr. Kayce Morris for a sinus infection.  She was prescribed Doxycycline.  Pt. States she has a lot of sinus congestion and needs a decongestant.  Pt. Is on BP meds and she needs a decongestant that is steroid free.  She prefers a spray so that is less going through her GI tract.  Pt. Also mentions that she uses Latanoprost drops in her eye.  Pt. Would like a call as soon as possible with a suggestion for a decongestant so she can get started on it right away.

## 2024-11-04 NOTE — TELEPHONE ENCOUNTER
Refill request is for a maintenance medication and has met the criteria specified in the Ambulatory Medication Refill Standing Order for eligibility, visits, laboratory, alerts and was sent to the requested pharmacy.    Requested Prescriptions     Signed Prescriptions Disp Refills    amLODIPine 10 MG Oral Tab 90 tablet 3     Sig: Take 1 tablet (10 mg total) by mouth daily.     Authorizing Provider: MACK FELICIANO     Ordering User: DARLENE GAYTAN

## 2024-11-04 NOTE — TELEPHONE ENCOUNTER
To Dr Chaudhary,      Pt asking if she can take Afrin?    Called pt and relayed your message pt stated she cannot take Flonase since it has steroids in it and she is being worked up for glaucoma,

## 2024-11-04 NOTE — TELEPHONE ENCOUNTER
No she can't take an oral decongestant b/c of BP elevation  No she can't take Afrin for more than 3 days b/c it's addictive  Can try azelastine nasal spray which is more of an antihistamine -- Rx pended.  Can send if she wants to try

## 2024-11-04 NOTE — TELEPHONE ENCOUNTER
Called and spoke with patient. Relayed MD's message. Patient states her whole message was not given to MD. She states she asked if she can use any decongestant since she has high blood pressure. She states all decongestants state to ask your doctor if you can use them if you have high BP. She states she did not ask Dr. Morris this when she saw her because she is not her doctor, Dr. Hirsch is. Patient states she was not only asking about Afrin and Dr. Hirsch may know of one that she can use longer than 3 days.     To Dr. Hirsch--

## 2024-11-05 ENCOUNTER — APPOINTMENT (OUTPATIENT)
Dept: SPEECH THERAPY | Facility: HOSPITAL | Age: 69
End: 2024-11-05
Attending: INTERNAL MEDICINE
Payer: MEDICARE

## 2024-11-05 ENCOUNTER — TELEPHONE (OUTPATIENT)
Dept: PHYSICAL THERAPY | Facility: HOSPITAL | Age: 69
End: 2024-11-05

## 2024-11-06 ENCOUNTER — TELEPHONE (OUTPATIENT)
Dept: OTOLARYNGOLOGY | Facility: CLINIC | Age: 69
End: 2024-11-06

## 2024-11-06 NOTE — TELEPHONE ENCOUNTER
Per Dr. Morris, She can use an afrin or neosynephrine nasal spray but only for 3 days.  Patient verbalized understanding.    Dr. Morris, Also would like to know if she could use Astepro nasal spray in future

## 2024-11-06 NOTE — TELEPHONE ENCOUNTER
Per patient, is still having some mild congestion Right > left, has a little bloody yellow drainage once a couple days ago, will try humidifier, feels most of the symptoms of the sinus infection are improved but still intermittently congested. Was told not to use any steroids due to glaucoma,  would like to know if she can use something other than azelastine she was prescribed by her PCP.    Dr. Morris, please see message. Thank you.

## 2024-11-06 NOTE — TELEPHONE ENCOUNTER
Per patient she is still congested after taking doxycycline and called her pcp for suggestions and states she was prescribed azelastine nasal spray, but wants to speak to Dr. Morris's nurse before picking it up. Per patient she cannot be on a decongestant or steroid due to high blood pressure. Per patient asking if there is a non steroid decongestant. Please advise

## 2024-11-06 NOTE — TELEPHONE ENCOUNTER
Patient called and notified okay to use Astepro nasal spray in future for allergies per Dr. Morris

## 2024-11-12 ENCOUNTER — TELEPHONE (OUTPATIENT)
Dept: ENDOCRINOLOGY CLINIC | Facility: CLINIC | Age: 69
End: 2024-11-12

## 2024-11-12 NOTE — TELEPHONE ENCOUNTER
Patient is requesting to speak with an RN.   Patient states she needs to know when the next Prolia is due and if labs are needed.  She will also need to know if labs need to be fasting.   Please call    Refill request for: minocycline 100 mg capsule    Last dispensed: 3/7/23      Last office visit: 12/29/23  Future office visit: 1/22/2025    Please advise if approved.

## 2024-11-14 NOTE — TELEPHONE ENCOUNTER
Spoke to patient to advise: due for next prolia after 2/2/25 - patient scheduled for OV with Dr. Umana on 2/3/25 - canceled and patient scheduled for NV 2/4/25  Patient stated understanding that she will be due for labs at August visit

## 2024-11-15 ENCOUNTER — APPOINTMENT (OUTPATIENT)
Dept: SPEECH THERAPY | Facility: HOSPITAL | Age: 69
End: 2024-11-15
Attending: INTERNAL MEDICINE
Payer: MEDICARE

## 2024-11-16 ENCOUNTER — PATIENT MESSAGE (OUTPATIENT)
Dept: INTERNAL MEDICINE CLINIC | Facility: CLINIC | Age: 69
End: 2024-11-16

## 2024-11-18 ENCOUNTER — TELEPHONE (OUTPATIENT)
Dept: SURGERY | Facility: CLINIC | Age: 69
End: 2024-11-18

## 2024-11-18 ENCOUNTER — OFFICE VISIT (OUTPATIENT)
Dept: PHYSICAL MEDICINE AND REHAB | Facility: CLINIC | Age: 69
End: 2024-11-18
Payer: MEDICARE

## 2024-11-18 VITALS — BODY MASS INDEX: 20.61 KG/M2 | WEIGHT: 136 LBS | HEIGHT: 68 IN

## 2024-11-18 DIAGNOSIS — Z87.19 HX OF GASTROESOPHAGEAL REFLUX (GERD): ICD-10-CM

## 2024-11-18 DIAGNOSIS — M54.16 LUMBAR RADICULOPATHY: ICD-10-CM

## 2024-11-18 DIAGNOSIS — Z98.1 HISTORY OF LUMBAR FUSION: ICD-10-CM

## 2024-11-18 DIAGNOSIS — G62.9 SENSORY NEUROPATHY: Primary | ICD-10-CM

## 2024-11-18 DIAGNOSIS — M81.0 AGE-RELATED OSTEOPOROSIS WITHOUT CURRENT PATHOLOGICAL FRACTURE: ICD-10-CM

## 2024-11-18 DIAGNOSIS — F51.01 PRIMARY INSOMNIA: ICD-10-CM

## 2024-11-18 PROBLEM — M51.24 HERNIATED THORACIC DISC WITHOUT MYELOPATHY: Status: ACTIVE | Noted: 2024-11-18

## 2024-11-18 PROCEDURE — 99214 OFFICE O/P EST MOD 30 MIN: CPT | Performed by: PHYSICAL MEDICINE & REHABILITATION

## 2024-11-18 RX ORDER — PREGABALIN 50 MG/1
50 CAPSULE ORAL 2 TIMES DAILY
Qty: 180 CAPSULE | Refills: 3 | Status: SHIPPED | OUTPATIENT
Start: 2024-11-18 | End: 2025-11-13

## 2024-11-18 NOTE — PROGRESS NOTES
Low [facility]    Chief Complaint:   Chief Complaint   Patient presents with    Follow - Up     LOV 8/22/24 pt is here for a follow up. MRI of spine thoracic was completed 10/16/24. Takes lyrica prn to ease pain. No current physical therapy. Reports intermittent n/t in both feet. Pain 5/10     Nursing note reviewed and verified.    Patient was last seen on 822/2024.  She states that the Lyrica was helping the tingling in the feet, but she was having difficulty staying asleep, even though the Lyrica would help her to fall asleep.  She was placed on Trazadone which has helped her to stay asleep.  She stopped the Lyrica after awhile due the tingling being resolved and she read that there was an interaction between the 2 medications.  The tingling has returned and now she is up at night again.  She will rarely have restless legs.    She is getting ischial tuberosity pain at times.  She has had this for about 2 weeks.  She has not been doing her HEP.  She has recently resumed her HEP.    Past Medical History   Past Medical History:    Anemia    Anxiety state, unspecified    Arthritis    Back problem    CERVICAL RADICULOPATHY, CERVICAL SPINAL STENOSIS    Bilateral kidney stones    Broken foot    RT - casting. Fracture 5th met. Cast removl/xray foot 4-20-12. Follow up fracture right foot 5-22-12.     Calculus of kidney    Cataract    Depression    Esophageal reflux    Essential hypertension    Gastric polyps    Gastritis    High blood pressure    History of stomach ulcers    Hx of motion sickness    hx of vertigo    Idiopathic acute pancreatitis (HCC)    Insomnia    Internal hemorrhoids without complication    Intestinal disorder    Left wrist fracture    Muscle weakness    Osteoarthritis    Osteoporosis    Other chronic pancreatitis (HCC)    Renal disorder    Rotator cuff tear, right    Traumatic arthritis    Vertigo       Past Surgical History   Past Surgical History:   Procedure Laterality Date    Arthroscopy of  joint unlisted Right 2012    rotator cuff tear    Back surgery  2008 & 3-15-22    2 procedures          x 3    Cataract      Cholecystectomy      Colonoscopy      Colonoscopy N/A 2018    Procedure: COLONOSCOPY;  Surgeon: Virginie Ma MD;  Location: [facility] ENDO    Cysto/uretero w/lithotripsy Left 2024    Excis lumbar disk,one level      Eye surgery      Lasik procedure    Fracture surgery Left     WRIST FRACTURE ORIF    Fracture surgery Left     ORIF wrist fracture revision with plates and screws- Ortho Dr Bran Horan      Other surgical history  2017    Fussion L4-L5 - Dr. Rothman    Other surgical history  03/15/2022    Back Surgery    Other surgical history Left     torn biceops repair    Shoulder surg proc unlisted Right     Spine surgery procedure unlisted      L1-L2 FUSION    Spine surgery procedure unlisted  2017    Neck fusion    Tonsillectomy      with Adenoidectomy    Total shoulder replacement Left     Tubal ligation  1982?    Upper gi endoscopy,exam      EGD        Family History   Family History   Problem Relation Age of Onset    Pulmonary Disease Father     Dementia Father             Heart Disease Mother     Fibromyalgia Mother             Colon Cancer Maternal Grandfather     Cancer Maternal Grandfather         Colon    Polyps Sister         colon polyps    Colon Cancer Sister     Other (gallbladder disease) Sister     Cancer Sister         Colon    Crohn's Disease Other     Heart Disease Other     Ovarian Cancer Maternal Aunt         70's 80's    Breast Cancer Neg        Social History   Social History     Socioeconomic History    Marital status:      Spouse name: Not on file    Number of children: Not on file    Years of education: Not on file    Highest education level: Not on file   Occupational History    Not on file   Tobacco Use    Smoking status: Never    Smokeless tobacco: Never   Vaping Use     Vaping status: Never Used   Substance and Sexual Activity    Alcohol use: No     Comment: quit 4 - 5 yrs ago due to pancreatitis    Drug use: No    Sexual activity: Not on file   Other Topics Concern     Service Not Asked    Blood Transfusions Not Asked    Caffeine Concern Yes     Comment: Coffee 2 cups daily    Occupational Exposure Not Asked    Hobby Hazards Not Asked    Sleep Concern Not Asked    Stress Concern Not Asked    Weight Concern Not Asked    Special Diet Not Asked    Back Care Not Asked    Exercise No    Bike Helmet Not Asked    Seat Belt Not Asked    Self-Exams Not Asked   Social History Narrative    The patient does not use an assistive device..      The patient does live in a home with stairs.     Social Drivers of Health     Financial Resource Strain: Not on file   Food Insecurity: No Food Insecurity (10/21/2024)    Received from [facility]    Food Insecurity     Currently or in the past 3 months, have you worried your food would run out before you had money to buy more?: No     In the past 12 months, have you run out of food or been unable to get more?: No   Transportation Needs: No Transportation Needs (10/21/2024)    Received from [facility]    Transportation Needs     Currently or in the past 3 months, has lack of transportation kept you from medical appointments, getting food or medicine, or providing care to a family member?: Not on file     : Not on file     Medical Transportation Needs?: No     Daily Living Transportation Needs? [Peds Only] : Not on file   Physical Activity: Not on file   Stress: Not on file   Social Connections: Unknown (3/9/2021)    [facility]    Social Connections     Conversations with friends/family/neighbors per week: Not on file   Housing Stability: Low Risk  (7/7/2021)    [facility]     Housing Stability     Mortgage Payment Concerns?: Not on file     Number of Places Lived in the Last Year: Not on file     Unstable Housing?: Not on file       PE:  The patient does appear in her stated age in no distress.  The patient is well groomed.    Psychiatric:  The patient is alert and oriented x 3.  The patient has a normal affect and mood.      Respiratory:  No acute respiratory distress. Patient does not have a cough.    HEENT:  Extraocular muscles are intact. There is no kern icterus. Pupils are equal, round, and reactive to light. No redness or discharge bilaterally.    Skin:  There are no rashes or lesions.    Vitals:  There were no vitals filed for this visit.    Gait:    Gait: Normal gait   Sit to Stand: no difficulty      Vascular lower extremity:   Dorsalis pedis pulse-RIGHT 2+   Dorsalis pedis pulse-LEFT 2+   Tibialis posterior pulse-RIGHT 2+   Tibialis posterior pulse-LEFT 2+     Neurological Lower Extremity:    Light Touch Sensation: Intact in bilateral Lower Extremities   LE Muscle Strength: All LE strength measurements 5/5 except:  Gluteus medius RIGHT:   4+/5  Gluteus medius LEFT:   4+/5  Hamstring RIGHT:   4+/5  Hamstring LEFT:   4+/5   RIGHT plantar reflexes: downward response   LEFT plantar reflexes: downward response   Reflexes: 2+ in bilateral lower extremities     Assessment  1. Sensory neuropathy: mild demyelinating    2. History of lumbar fusion: T10-S1 with bilateral SIJ fusions on 3/15/2022    3. Primary insomnia    4. Hx of gastroesophageal reflux (GERD)    5. bilateral S1 chronic radiculopathies       Plan  She will resume the Lyrica 50 mg 2 times a day.    She will continue with the Trazodone 50 mg at night time.    She will resume her home exercise program for the lumbar spine and will monitor her ischial tuberosity pain.    She will follow up in 3-4 months or sooner if needed.    She will get blood work to assess her vitamin B6, B12, and D levels as well as a CBC and  CMP.    The patient understands and agrees with the stated plan.  Luke Cleaning MD  11/18/2024

## 2024-11-18 NOTE — TELEPHONE ENCOUNTER
Patient requesting to speak with nurse regarding history of kidney stones/calcium supplements. Please call at [phone], thanks.   *Patient would like to get contacted today.

## 2024-11-18 NOTE — TELEPHONE ENCOUNTER
Called patient, verified name and . Patient says she received the [employer] message from Dr. Georges and that answered her question. I let patient know if she has any questions/concerns to give us a call.   Patient agreed, verbalized understandings and has no further questions.
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
## 2024-11-18 NOTE — PATIENT INSTRUCTIONS
Plan  She will resume the Lyrica 50 mg 2 times a day.    She will continue with the Trazodone 50 mg at night time.    She will resume her home exercise program for the lumbar spine and will monitor her ischial tuberosity pain.    She will follow up in 3-4 months or sooner if needed.

## 2024-11-21 ENCOUNTER — OFFICE VISIT (OUTPATIENT)
Dept: INTERNAL MEDICINE CLINIC | Facility: CLINIC | Age: 69
End: 2024-11-21
Payer: MEDICARE

## 2024-11-21 VITALS
SYSTOLIC BLOOD PRESSURE: 130 MMHG | WEIGHT: 140 LBS | HEART RATE: 96 BPM | HEIGHT: 68 IN | DIASTOLIC BLOOD PRESSURE: 82 MMHG | OXYGEN SATURATION: 96 % | TEMPERATURE: 99 F | BODY MASS INDEX: 21.22 KG/M2

## 2024-11-21 DIAGNOSIS — F51.01 PRIMARY INSOMNIA: ICD-10-CM

## 2024-11-21 DIAGNOSIS — R26.9 GAIT ABNORMALITY: Primary | ICD-10-CM

## 2024-11-21 PROCEDURE — 99214 OFFICE O/P EST MOD 30 MIN: CPT | Performed by: INTERNAL MEDICINE

## 2024-11-21 RX ORDER — MULTIVIT-MIN/IRON FUM/FOLIC AC 7.5 MG-4
1 TABLET ORAL DAILY
COMMUNITY

## 2024-11-22 ENCOUNTER — LAB ENCOUNTER (OUTPATIENT)
Dept: LAB | Facility: HOSPITAL | Age: 69
End: 2024-11-22
Attending: PHYSICAL MEDICINE & REHABILITATION
Payer: MEDICARE

## 2024-11-22 DIAGNOSIS — G62.9 SENSORY NEUROPATHY: ICD-10-CM

## 2024-11-22 DIAGNOSIS — M81.0 AGE-RELATED OSTEOPOROSIS WITHOUT CURRENT PATHOLOGICAL FRACTURE: ICD-10-CM

## 2024-11-22 LAB
ALBUMIN SERPL-MCNC: 4.5 G/DL (ref 3.2–4.8)
ALBUMIN/GLOB SERPL: 1.7 {RATIO} (ref 1–2)
ALP LIVER SERPL-CCNC: 46 U/L
ALT SERPL-CCNC: 14 U/L
ANION GAP SERPL CALC-SCNC: 8 MMOL/L (ref 0–18)
AST SERPL-CCNC: 15 U/L (ref ?–34)
BASOPHILS # BLD AUTO: 0.05 X10(3) UL (ref 0–0.2)
BASOPHILS NFR BLD AUTO: 1 %
BILIRUB SERPL-MCNC: 0.4 MG/DL (ref 0.2–1.1)
BUN BLD-MCNC: 17 MG/DL (ref 9–23)
BUN/CREAT SERPL: 21.5 (ref 10–20)
CALCIUM BLD-MCNC: 9.8 MG/DL (ref 8.7–10.4)
CHLORIDE SERPL-SCNC: 107 MMOL/L (ref 98–112)
CO2 SERPL-SCNC: 29 MMOL/L (ref 21–32)
CREAT BLD-MCNC: 0.79 MG/DL
DEPRECATED RDW RBC AUTO: 49 FL (ref 35.1–46.3)
EGFRCR SERPLBLD CKD-EPI 2021: 80 ML/MIN/1.73M2 (ref 60–?)
EOSINOPHIL # BLD AUTO: 0.36 X10(3) UL (ref 0–0.7)
EOSINOPHIL NFR BLD AUTO: 6.9 %
ERYTHROCYTE [DISTWIDTH] IN BLOOD BY AUTOMATED COUNT: 13.5 % (ref 11–15)
FASTING STATUS PATIENT QL REPORTED: YES
GLOBULIN PLAS-MCNC: 2.6 G/DL (ref 2–3.5)
GLUCOSE BLD-MCNC: 79 MG/DL (ref 70–99)
HCT VFR BLD AUTO: 42.6 %
HGB BLD-MCNC: 14 G/DL
IMM GRANULOCYTES # BLD AUTO: 0.01 X10(3) UL (ref 0–1)
IMM GRANULOCYTES NFR BLD: 0.2 %
LYMPHOCYTES # BLD AUTO: 1.69 X10(3) UL (ref 1–4)
LYMPHOCYTES NFR BLD AUTO: 32.6 %
MCH RBC QN AUTO: 32 PG (ref 26–34)
MCHC RBC AUTO-ENTMCNC: 32.9 G/DL (ref 31–37)
MCV RBC AUTO: 97.3 FL
MONOCYTES # BLD AUTO: 0.47 X10(3) UL (ref 0.1–1)
MONOCYTES NFR BLD AUTO: 9.1 %
NEUTROPHILS # BLD AUTO: 2.6 X10 (3) UL (ref 1.5–7.7)
NEUTROPHILS # BLD AUTO: 2.6 X10(3) UL (ref 1.5–7.7)
NEUTROPHILS NFR BLD AUTO: 50.2 %
OSMOLALITY SERPL CALC.SUM OF ELEC: 298 MOSM/KG (ref 275–295)
PLATELET # BLD AUTO: 269 10(3)UL (ref 150–450)
POTASSIUM SERPL-SCNC: 4.1 MMOL/L (ref 3.5–5.1)
PROT SERPL-MCNC: 7.1 G/DL (ref 5.7–8.2)
RBC # BLD AUTO: 4.38 X10(6)UL
SODIUM SERPL-SCNC: 144 MMOL/L (ref 136–145)
VIT B12 SERPL-MCNC: 760 PG/ML (ref 211–911)
VIT D+METAB SERPL-MCNC: 51.8 NG/ML (ref 30–100)
WBC # BLD AUTO: 5.2 X10(3) UL (ref 4–11)

## 2024-11-22 PROCEDURE — 84207 ASSAY OF VITAMIN B-6: CPT

## 2024-11-22 PROCEDURE — 80053 COMPREHEN METABOLIC PANEL: CPT | Performed by: PHYSICAL MEDICINE & REHABILITATION

## 2024-11-22 PROCEDURE — 82306 VITAMIN D 25 HYDROXY: CPT

## 2024-11-22 PROCEDURE — 85025 COMPLETE CBC W/AUTO DIFF WBC: CPT | Performed by: PHYSICAL MEDICINE & REHABILITATION

## 2024-11-22 PROCEDURE — 36415 COLL VENOUS BLD VENIPUNCTURE: CPT | Performed by: PHYSICAL MEDICINE & REHABILITATION

## 2024-11-22 PROCEDURE — 82607 VITAMIN B-12: CPT | Performed by: PHYSICAL MEDICINE & REHABILITATION

## 2024-11-25 ENCOUNTER — OFFICE VISIT (OUTPATIENT)
Dept: OTOLARYNGOLOGY | Facility: CLINIC | Age: 69
End: 2024-11-25
Payer: MEDICARE

## 2024-11-25 VITALS — WEIGHT: 140 LBS | BODY MASS INDEX: 21.22 KG/M2 | HEIGHT: 68 IN

## 2024-11-25 DIAGNOSIS — J34.89 SINUS PRESSURE: ICD-10-CM

## 2024-11-25 DIAGNOSIS — J34.3 NASAL TURBINATE HYPERTROPHY: Primary | ICD-10-CM

## 2024-11-25 DIAGNOSIS — J34.2 NASAL SEPTAL DEVIATION: ICD-10-CM

## 2024-11-26 ENCOUNTER — PATIENT MESSAGE (OUTPATIENT)
Dept: PHYSICAL MEDICINE AND REHAB | Facility: CLINIC | Age: 69
End: 2024-11-26

## 2024-11-26 NOTE — PROGRESS NOTES
Radha Stoner is a 69 year old female.   Chief Complaint   Patient presents with    Follow - Up     Sinus pressure     HPI:   Patient here has persistent sinus pressure worse on the left    Current Outpatient Medications   Medication Sig Dispense Refill    Multiple Vitamins-Minerals (MULTI-VITAMIN/MINERALS) Oral Tab Take 1 tablet by mouth daily.      amLODIPine 10 MG Oral Tab Take 1 tablet (10 mg total) by mouth daily. 90 tablet 3    latanoprost 0.005 % Ophthalmic Solution 1 drop Before Dinner.      traZODone 50 MG Oral Tab Take 1 tablet (50 mg total) by mouth nightly. 90 tablet 3    estradiol 0.1 MG/GM Vaginal Cream Place 0.5 g vaginally twice a week. 42.5 g 10    pregabalin 50 MG Oral Cap Take 1 capsule (50 mg total) by mouth 3 (three) times daily. (Patient taking differently: Take 1 capsule (50 mg total) by mouth 2 (two) times daily.) 90 capsule 3    meclizine 25 MG Oral Tab Take 1 tablet (25 mg total) by mouth 3 (three) times daily as needed. 30 tablet 1    pantoprazole 40 MG Oral Tab EC Take 1 tablet (40 mg total) by mouth every morning before breakfast.      Acidophilus/Pectin Oral Cap Take 1 capsule by mouth daily.      Famotidine (PEPCID OR) Take 20 mg by mouth. Daily PRN       Cholecalciferol 25 MCG (1000 UT) Oral Tab Take by mouth daily.      Diclofenac Sodium 1 % Transdermal Gel Apply 4 g topically 4 (four) times daily as needed. 1 Tube 3    CALCIUM CITRATE OR Take 1,400 mg by mouth daily. BID        Past Medical History:    Anemia    Anxiety state, unspecified    Arthritis    Back problem    CERVICAL RADICULOPATHY, CERVICAL SPINAL STENOSIS    Bilateral kidney stones    Broken foot    RT - casting. Fracture 5th met. Cast removl/xray foot 4-20-12. Follow up fracture right foot 5-22-12.     Calculus of kidney    Cataract    Depression    Esophageal reflux    Essential hypertension    Gastric polyps    Gastritis    High blood pressure    History of stomach ulcers    Hx of motion sickness    hx of vertigo     Idiopathic acute pancreatitis (HCC)    Insomnia    Internal hemorrhoids without complication    Intestinal disorder    Left wrist fracture    Muscle weakness    Osteoarthritis    Osteoporosis    Other chronic pancreatitis (HCC)    Renal disorder    Rotator cuff tear, right    Traumatic arthritis    Vertigo      Social History:  Social History     Socioeconomic History    Marital status:    Tobacco Use    Smoking status: Never    Smokeless tobacco: Never   Vaping Use    Vaping status: Never Used   Substance and Sexual Activity    Alcohol use: No     Comment: quit 4 - 5 yrs ago due to pancreatitis    Drug use: No   Other Topics Concern    Caffeine Concern Yes     Comment: Coffee 2 cups daily    Exercise No   Social History Narrative    The patient does not use an assistive device..      The patient does live in a home with stairs.     Social Drivers of Health     Food Insecurity: No Food Insecurity (10/21/2024)    Received from [facility]    Food Insecurity     Currently or in the past 3 months, have you worried your food would run out before you had money to buy more?: No     In the past 12 months, have you run out of food or been unable to get more?: No   Transportation Needs: No Transportation Needs (10/21/2024)    Received from [facility]    Transportation Needs     Medical Transportation Needs?: No    Received from [facility], [facility]    Social Connections    Received from [facility], [facility]    Housing Stability        REVIEW OF SYSTEMS:   GENERAL HEALTH: feels well otherwise  GENERAL : denies fever, chills, sweats, weight loss, weight gain  SKIN: denies any unusual skin lesions or rashes  RESPIRATORY: denies shortness of breath with exertion  NEURO: denies headaches    EXAM:   Ht 5' 8\" (1.727 m)   Wt 140 lb (63.5 kg)   BMI 21.29 kg/m²   System Details   Skin Inspection - Normal.    Constitutional Overall appearance - Normal.   Head/Face Facial features - Normal. Eyebrows - Normal. Skull - Normal.   Eyes Conjunctiva - Right: Normal, Left: Normal. Pupil - Right: Normal, Left: Normal.    Ears Inspection - Right: Normal, Left: Normal.   Canal - Right: Normal, Left: Normal.    TM - Right: Normal, Left: Normal.   Nasal External nose - Normal.   Consent was obtained.  The nasal cavity was anesthetized with 1% neosynephrine and 4% lidocaine.  The bilateral nares were examined from the nasal vestibule to the nasopharynx.  Areas examined include the nasal floor, turbinates, superior, middle, and inferior meatus, sphenoethmoid recess, the nasal septum, eustation tube and nasopharynx.  All abnormalities are listed in the exam section.  Left septal deviation.  Nasal congestion and pale turbinates.  Mucoid postnasal drip.  No polyps noted.   Oral/Oropharynx Lips - Normal, Tonsils - Normal, Tongue - Normal    Neck Exam Inspection - Normal. Palpation - Normal. Parotid gland - Normal. Thyroid gland - Normal.   Lymph Detail Submental. Submandibular. Anterior cervical. Posterior cervical. Supraclavicular.  All without enlargement   Psychiatric Orientation - Oriented to time, place, person & situation. Appropriate mood and affect.   Neurological Memory - Normal. Cranial nerves - Cranial nerves II through XII grossly intact.   Nasopharynx Normal by fiberoptic exam     ASSESSMENT AND PLAN:   1. Nasal turbinate hypertrophy  Trial of Astelin nasal spray 1 to 2 sprays to each nostril up to twice daily    2. Nasal septal deviation  To the left    3. Sinus pressure  No evidence of sinusitis by fiberoptic exam.  Patient to call or follow-up if symptoms are persistent.      The patient indicates understanding of these issues and agrees to the plan.      Kayce Morris MD  11/25/2024  7:38 PM

## 2024-11-26 NOTE — PATIENT INSTRUCTIONS
Your fiberoptic exam did not show any evidence of sinusitis.  You can use the Astelin nasal spray 1 to 2 sprays to each nostril up to twice daily.  Call or follow-up if your symptoms are persistent.

## 2024-11-27 LAB — VITAMIN B6: 30.5 UG/L

## 2024-12-03 NOTE — TELEPHONE ENCOUNTER
Patient currently taking pregabalin 50 mg twice a day. Patient wondering if she should increase this to 3 times a day.    Per  at last office visit 11/18/24: \"She will resume the Lyrica 50 mg 2 times a day. \"    Message forwarded on to  to advise.

## 2024-12-04 ENCOUNTER — TELEPHONE (OUTPATIENT)
Dept: INTERNAL MEDICINE CLINIC | Facility: CLINIC | Age: 69
End: 2024-12-04

## 2024-12-04 DIAGNOSIS — R25.1 TREMOR: Primary | ICD-10-CM

## 2024-12-04 NOTE — TELEPHONE ENCOUNTER
November 28, 2024  Radha A Day to [facility] Clinical Staff (supporting Sherly Hirsch MD)         11/28/24  9:59 AM  We talked about this when I saw you a week ago. Still so wobbly, shakey. Feel like I’ve had many cups of coffee.   Don’t like what’s going on.  Nervous about it. So what’s next?  Don’t feel like I need an OV, just suggestion as to what’s next I guess.      Hope you had a good Thanksgiving.      Steff

## 2024-12-04 NOTE — TELEPHONE ENCOUNTER
FYI to Dr Hirsch, patient went back to physical therapy who is helping her with her with the dizzy spells and balance. She will make an appointment with Neuro for when she get back in town in January.

## 2024-12-04 NOTE — TELEPHONE ENCOUNTER
did not respond to her my chart message. She would like to speak to a nurse to explain what is going om with her unsteadiness

## 2024-12-05 ENCOUNTER — PATIENT MESSAGE (OUTPATIENT)
Dept: OTOLARYNGOLOGY | Facility: CLINIC | Age: 69
End: 2024-12-05

## 2024-12-05 ENCOUNTER — TELEPHONE (OUTPATIENT)
Dept: OTOLARYNGOLOGY | Facility: CLINIC | Age: 69
End: 2024-12-05

## 2024-12-05 NOTE — TELEPHONE ENCOUNTER
Probably spray related in terms of burning.  You can try OTC claritin, clarinex, or allegra which have the least side effects.  Saline is also good.  You can even try ayr nasal gel with aloe vera which will soothe the nose.  Message sent to the patient.

## 2024-12-05 NOTE — TELEPHONE ENCOUNTER
Astelin is an antihistamine.  You can take an oral antihistamine which is usually taken daily.  The ones with the least side effects are claritin, clarinex, and allegra.

## 2024-12-05 NOTE — TELEPHONE ENCOUNTER
Per pt was using Astelin twice daily than decreased to 1 daily as it started to irritate her nose, causing burning sensation and at times when blowing blood tinged mucus at times. Please see [employer] message below . Please advise       Dr Morris  I seem to be having some itching and burning in my nose after using the Asterpro spray , which I have now stopped!!   Also having a headache similar to a sinus one where the pain is over my eyebrows and forehead, and itchy sensation of my nose.  As a result, don’t know?  Will it go away and when.  Will a saline spray help moisten my nasal passages, or drinking more water to flush it out?    I hate always having some kind of side effect when taking meds.  Help mateo Stoner

## 2024-12-09 ENCOUNTER — MED REC SCAN ONLY (OUTPATIENT)
Dept: PHYSICAL MEDICINE AND REHAB | Facility: CLINIC | Age: 69
End: 2024-12-09

## 2024-12-24 ENCOUNTER — APPOINTMENT (OUTPATIENT)
Dept: CT IMAGING | Facility: HOSPITAL | Age: 69
End: 2024-12-24
Attending: EMERGENCY MEDICINE
Payer: MEDICARE

## 2024-12-24 ENCOUNTER — HOSPITAL ENCOUNTER (EMERGENCY)
Facility: HOSPITAL | Age: 69
Discharge: HOME | End: 2024-12-24
Attending: EMERGENCY MEDICINE
Payer: MEDICARE

## 2024-12-24 ENCOUNTER — APPOINTMENT (OUTPATIENT)
Dept: GENERAL RADIOLOGY | Facility: HOSPITAL | Age: 69
End: 2024-12-24
Attending: EMERGENCY MEDICINE
Payer: MEDICARE

## 2024-12-24 ENCOUNTER — APPOINTMENT (OUTPATIENT)
Dept: ULTRASOUND IMAGING | Facility: HOSPITAL | Age: 69
End: 2024-12-24
Attending: EMERGENCY MEDICINE
Payer: MEDICARE

## 2024-12-24 VITALS
RESPIRATION RATE: 14 BRPM | HEART RATE: 83 BPM | SYSTOLIC BLOOD PRESSURE: 105 MMHG | DIASTOLIC BLOOD PRESSURE: 73 MMHG | BODY MASS INDEX: 21 KG/M2 | WEIGHT: 138 LBS | TEMPERATURE: 98 F | OXYGEN SATURATION: 99 %

## 2024-12-24 DIAGNOSIS — K52.9 ENTEROCOLITIS: Primary | ICD-10-CM

## 2024-12-24 DIAGNOSIS — R55 SYNCOPE, VASOVAGAL: ICD-10-CM

## 2024-12-24 LAB
ALBUMIN SERPL-MCNC: 4.3 G/DL (ref 3.2–4.8)
ALP LIVER SERPL-CCNC: 42 U/L
ALT SERPL-CCNC: 17 U/L
ANION GAP SERPL CALC-SCNC: 4 MMOL/L (ref 0–18)
AST SERPL-CCNC: 28 U/L (ref ?–34)
ATRIAL RATE: 74 BPM
BASOPHILS # BLD AUTO: 0.02 X10(3) UL (ref 0–0.2)
BASOPHILS NFR BLD AUTO: 0.2 %
BILIRUB DIRECT SERPL-MCNC: 0.1 MG/DL (ref ?–0.3)
BILIRUB SERPL-MCNC: 0.4 MG/DL (ref 0.2–1.1)
BUN BLD-MCNC: 16 MG/DL (ref 9–23)
BUN/CREAT SERPL: 21.1 (ref 10–20)
CALCIUM BLD-MCNC: 9 MG/DL (ref 8.7–10.4)
CHLORIDE SERPL-SCNC: 113 MMOL/L (ref 98–112)
CO2 SERPL-SCNC: 24 MMOL/L (ref 21–32)
CREAT BLD-MCNC: 0.76 MG/DL
DEPRECATED RDW RBC AUTO: 45.1 FL (ref 35.1–46.3)
EGFRCR SERPLBLD CKD-EPI 2021: 84 ML/MIN/1.73M2 (ref 60–?)
EOSINOPHIL # BLD AUTO: 0.11 X10(3) UL (ref 0–0.7)
EOSINOPHIL NFR BLD AUTO: 1.1 %
ERYTHROCYTE [DISTWIDTH] IN BLOOD BY AUTOMATED COUNT: 13 % (ref 11–15)
GLUCOSE BLD-MCNC: 104 MG/DL (ref 70–99)
HCT VFR BLD AUTO: 40.6 %
HGB BLD-MCNC: 13.4 G/DL
IMM GRANULOCYTES # BLD AUTO: 0.03 X10(3) UL (ref 0–1)
IMM GRANULOCYTES NFR BLD: 0.3 %
LIPASE SERPL-CCNC: 37 U/L (ref 12–53)
LYMPHOCYTES # BLD AUTO: 0.56 X10(3) UL (ref 1–4)
LYMPHOCYTES NFR BLD AUTO: 5.5 %
MCH RBC QN AUTO: 31 PG (ref 26–34)
MCHC RBC AUTO-ENTMCNC: 33 G/DL (ref 31–37)
MCV RBC AUTO: 94 FL
MONOCYTES # BLD AUTO: 0.52 X10(3) UL (ref 0.1–1)
MONOCYTES NFR BLD AUTO: 5.1 %
NEUTROPHILS # BLD AUTO: 8.97 X10 (3) UL (ref 1.5–7.7)
NEUTROPHILS # BLD AUTO: 8.97 X10(3) UL (ref 1.5–7.7)
NEUTROPHILS NFR BLD AUTO: 87.8 %
OSMOLALITY SERPL CALC.SUM OF ELEC: 293 MOSM/KG (ref 275–295)
P AXIS: 63 DEGREES
P-R INTERVAL: 152 MS
PLATELET # BLD AUTO: 243 10(3)UL (ref 150–450)
POTASSIUM SERPL-SCNC: 4.4 MMOL/L (ref 3.5–5.1)
PROT SERPL-MCNC: 6.6 G/DL (ref 5.7–8.2)
Q-T INTERVAL: 418 MS
QRS DURATION: 108 MS
QTC CALCULATION (BEZET): 463 MS
R AXIS: 42 DEGREES
RBC # BLD AUTO: 4.32 X10(6)UL
SODIUM SERPL-SCNC: 141 MMOL/L (ref 136–145)
T AXIS: 56 DEGREES
VENTRICULAR RATE: 74 BPM
WBC # BLD AUTO: 10.2 X10(3) UL (ref 4–11)

## 2024-12-24 PROCEDURE — 80048 BASIC METABOLIC PNL TOTAL CA: CPT | Performed by: EMERGENCY MEDICINE

## 2024-12-24 PROCEDURE — 83690 ASSAY OF LIPASE: CPT | Performed by: EMERGENCY MEDICINE

## 2024-12-24 PROCEDURE — 99285 EMERGENCY DEPT VISIT HI MDM: CPT

## 2024-12-24 PROCEDURE — 80076 HEPATIC FUNCTION PANEL: CPT | Performed by: EMERGENCY MEDICINE

## 2024-12-24 PROCEDURE — 99284 EMERGENCY DEPT VISIT MOD MDM: CPT

## 2024-12-24 PROCEDURE — 93010 ELECTROCARDIOGRAM REPORT: CPT

## 2024-12-24 PROCEDURE — 96372 THER/PROPH/DIAG INJ SC/IM: CPT

## 2024-12-24 PROCEDURE — 96374 THER/PROPH/DIAG INJ IV PUSH: CPT

## 2024-12-24 PROCEDURE — 96361 HYDRATE IV INFUSION ADD-ON: CPT

## 2024-12-24 PROCEDURE — 70450 CT HEAD/BRAIN W/O DYE: CPT | Performed by: EMERGENCY MEDICINE

## 2024-12-24 PROCEDURE — S0028 INJECTION, FAMOTIDINE, 20 MG: HCPCS | Performed by: EMERGENCY MEDICINE

## 2024-12-24 PROCEDURE — 74177 CT ABD & PELVIS W/CONTRAST: CPT | Performed by: EMERGENCY MEDICINE

## 2024-12-24 PROCEDURE — 96375 TX/PRO/DX INJ NEW DRUG ADDON: CPT

## 2024-12-24 PROCEDURE — 85025 COMPLETE CBC W/AUTO DIFF WBC: CPT | Performed by: EMERGENCY MEDICINE

## 2024-12-24 PROCEDURE — 93005 ELECTROCARDIOGRAM TRACING: CPT

## 2024-12-24 RX ORDER — MAGNESIUM HYDROXIDE/ALUMINUM HYDROXICE/SIMETHICONE 120; 1200; 1200 MG/30ML; MG/30ML; MG/30ML
30 SUSPENSION ORAL ONCE
Status: COMPLETED | OUTPATIENT
Start: 2024-12-24 | End: 2024-12-24

## 2024-12-24 RX ORDER — FAMOTIDINE 10 MG/ML
20 INJECTION, SOLUTION INTRAVENOUS ONCE
Status: COMPLETED | OUTPATIENT
Start: 2024-12-24 | End: 2024-12-24

## 2024-12-24 RX ORDER — DICYCLOMINE HYDROCHLORIDE 10 MG/ML
20 INJECTION INTRAMUSCULAR ONCE
Status: COMPLETED | OUTPATIENT
Start: 2024-12-24 | End: 2024-12-24

## 2024-12-24 NOTE — ED INITIAL ASSESSMENT (HPI)
Patient arrived to ED with main c.o. epigastric abd pain with N/V/D - worsened last week - states she vomited 3 x today and \"passed out\" - denies CP, SOB @ this time - 20 L AC by EMS - 4 zofran IV en route - states it resolved nausea - was seen recently for same problem - was told to schedule an US. Patient breathing nonlabored on RA + pt hemodynamically stable.     Hx of pancreatitis

## 2024-12-24 NOTE — ED PROVIDER NOTES
Patient Seen in: [facility] Emergency Department    History     Chief Complaint   Patient presents with    Abdomen/Flank Pain       HPI    69-year-old female with several weeks of intermittent abdominal pain mostly in the epigastrium, associated with nausea and had another episode of this earlier today and during the episode after she had finished urinating and began feeling lightheaded,  warm sensation all over proceeded to lose consciousness, unsure if she struck her head or not.  She denies any anticoagulation or any focal weakness or numbness or paresthesias or vision changes or diplopia.  She had recently seen her GI physician for the GI symptoms      History reviewed.   Past Medical History:    Anemia    Anxiety state, unspecified    Arthritis    Back problem    CERVICAL RADICULOPATHY, CERVICAL SPINAL STENOSIS    Bilateral kidney stones    Broken foot    RT - casting. Fracture 5th met. Cast removl/xray foot 12. Follow up fracture right foot 12.     Calculus of kidney    Cataract    Depression    Esophageal reflux    Essential hypertension    Gastric polyps    Gastritis    High blood pressure    History of stomach ulcers    Hx of motion sickness    hx of vertigo    Idiopathic acute pancreatitis (HCC)    Insomnia    Internal hemorrhoids without complication    Intestinal disorder    Left wrist fracture    Muscle weakness    Osteoarthritis    Osteoporosis    Other chronic pancreatitis (HCC)    Renal disorder    Rotator cuff tear, right    Traumatic arthritis    Vertigo       History reviewed.   Past Surgical History:   Procedure Laterality Date    Arthroscopy of joint unlisted Right 2012    rotator cuff tear    Back surgery  2008 & 3-15-22    2 procedures          x 3    Cataract      Cholecystectomy      Colonoscopy      Colonoscopy N/A 2018    Procedure: COLONOSCOPY;  Surgeon: Virginie Ma MD;  Location: [facility] ENDO    Cysto/uretero w/lithotripsy Left  2024    Excis lumbar disk,one level      Eye surgery      Lasik procedure    Fracture surgery Left 2010    WRIST FRACTURE ORIF    Fracture surgery Left     ORIF wrist fracture revision with plates and screws- Ortho Dr Bran Horan      Other surgical history  2017    Fussion L4-L5 - Dr. Rothman    Other surgical history  03/15/2022    Back Surgery    Other surgical history Left     torn biceops repair    Shoulder surg proc unlisted Right     Spine surgery procedure unlisted      L1-L2 FUSION    Spine surgery procedure unlisted  2017    Neck fusion    Tonsillectomy      with Adenoidectomy    Total shoulder replacement Left     Tubal ligation  ?    Upper gi endoscopy,exam      EGD          Medications :  Prescriptions Prior to Admission[1]     Family History   Problem Relation Age of Onset    Pulmonary Disease Father     Dementia Father             Heart Disease Mother     Fibromyalgia Mother             Colon Cancer Maternal Grandfather     Cancer Maternal Grandfather         Colon    Polyps Sister         colon polyps    Colon Cancer Sister     Other (gallbladder disease) Sister     Cancer Sister         Colon    Crohn's Disease Other     Heart Disease Other     Ovarian Cancer Maternal Aunt         70's 80's    Breast Cancer Neg        Smoking Status:   Social History     Socioeconomic History    Marital status:    Tobacco Use    Smoking status: Never    Smokeless tobacco: Never   Vaping Use    Vaping status: Never Used   Substance and Sexual Activity    Alcohol use: No     Comment: quit 4 - 5 yrs ago due to pancreatitis    Drug use: No   Other Topics Concern    Caffeine Concern Yes     Comment: Coffee 2 cups daily    Exercise No       Constitutional and vital signs reviewed.      Social History and Family History elements reviewed from today, pertinent positives to the presenting problem noted.    Physical Exam     ED Triage Vitals [24 0657]   BP  127/83   Pulse 83   Resp 16   Temp 98.4 °F (36.9 °C)   Temp src Oral   SpO2 100 %   O2 Device None (Room air)       All measures to prevent infection transmission during my interaction with the patient were taken. The patient was already wearing a droplet mask on my arrival to the room. Personal protective equipment was worn throughout the duration of the exam.  Handwashing was performed prior to and after the exam.  Stethoscope and any equipment used during my examination was cleaned with super sani-cloth germicidal wipes following the exam.     Physical Exam    General: NAD  Head: Normocephalic and atraumatic.  Mouth/Throat/Ears/Nose: Oropharynx is clear    Eyes: Conjunctivae and EOM are normal.   Neck: Normal range of motion. Supple.   Cardiovascular: Normal rate, regular rhythm, normal heart sounds.  Respiratory/Chest: Clear and equal bilaterally. Exhibits no tenderness.  Gastrointestinal: Soft, epig ttp, non-distended. Bowel sounds are normal.   Musculoskeletal:No swelling or deformity.   Neurological: Alert and appropriate. No focal deficits.   Skin: Skin is warm and dry. No pallor.      ED Course        Labs Reviewed   BASIC METABOLIC PANEL (8) - Abnormal; Notable for the following components:       Result Value    Glucose 104 (*)     Chloride 113 (*)     BUN/CREA Ratio 21.1 (*)     All other components within normal limits   HEPATIC FUNCTION PANEL (7) - Abnormal; Notable for the following components:    Alkaline Phosphatase 42 (*)     All other components within normal limits   CBC WITH DIFFERENTIAL WITH PLATELET - Abnormal; Notable for the following components:    Neutrophil Absolute Prelim 8.97 (*)     Neutrophil Absolute 8.97 (*)     Lymphocyte Absolute 0.56 (*)     All other components within normal limits   LIPASE - Normal     EKG    Rate, intervals and axes as noted on EKG Report.  Rate: 74  Rhythm: Sinus Rhythm  Reading: No STEMI. This is my interpretation            As Interpreted by me    Imaging  Results Available and Reviewed while in ED: No results found.  ED Medications Administered:   Medications   sodium chloride 0.9 % IV bolus 1,000 mL (0 mL Intravenous Stopped 12/24/24 0955)   famotidine (Pepcid) 20 mg/2mL injection 20 mg (20 mg Intravenous Given 12/24/24 0918)   alum-mag hydroxide-simethicone (Maalox) 200-200-20 MG/5ML oral suspension 30 mL (30 mL Oral Given 12/24/24 0918)   dicyclomine (Bentyl) 10 MG/ML injection 20 mg (20 mg Intramuscular Given 12/24/24 0918)   iopamidol 76% (ISOVUE-370) injection for power injector (80 mL Intravenous Given 12/24/24 0846)         MDM     Vitals:    12/24/24 0657 12/24/24 0915 12/24/24 0945   BP: 127/83 132/83 105/73   Pulse: 83 78 83   Resp: 16 13 14   Temp: 98.4 °F (36.9 °C)     TempSrc: Oral     SpO2: 100% 96% 99%   Weight: 62.6 kg       *I personally reviewed and interpreted all ED vitals.    Pulse Ox: 99%, Room air, Normal     Monitor Interpretation:   normal sinus rhythm as interpreted by me.  The cardiac monitor was ordered given epigastric pain.      Medical Decision Making      Differential Diagnosis/ Diagnostic Considerations: gastritis, pancreatitis, vasovagal syncope, arrhyhtmia     Complicating Factors: The patient already has chronic abdominal pain to contribute to the complexity of this ED evaluation.    I reviewed prior chart records including GI office note from 12/23/24. Labs normal.  CT head without intracranial hemorrhage on my interpretation.  Patient most likely had a vasovagal reaction with her GI symptoms.  CT abdomen pelvis with enteric colitis and possible esophagitis as expected.  She feels improved after supportive care.  She understands to continue follow-up plan with GI.    Dc In stable condition.  Patient and family are comfortable with the plan.    Prescriptions: Continue with Bentyl      Disposition and Plan     Clinical Impression:  1. Enterocolitis    2. Syncope, vasovagal        Disposition:  Discharge    Follow-up:  Joycelyn  MD Sherly  [address] [place] [id number]  [phone]    Schedule an appointment as soon as possible for a visit in 2 day(s)      Nam Quionnez MD  [address] [facility] [address]  [facility] [address]  [phone]    Schedule an appointment as soon as possible for a visit in 2 day(s)        Medications Prescribed:  Discharge Medication List as of 12/24/2024  9:55 AM                           [1] (Not in a hospital admission)

## 2024-12-26 ENCOUNTER — OFFICE VISIT (OUTPATIENT)
Dept: INTERNAL MEDICINE CLINIC | Facility: CLINIC | Age: 69
End: 2024-12-26

## 2024-12-26 VITALS
HEART RATE: 75 BPM | WEIGHT: 139.63 LBS | OXYGEN SATURATION: 98 % | TEMPERATURE: 98 F | BODY MASS INDEX: 21.16 KG/M2 | HEIGHT: 68 IN | SYSTOLIC BLOOD PRESSURE: 108 MMHG | DIASTOLIC BLOOD PRESSURE: 64 MMHG

## 2024-12-26 DIAGNOSIS — R55 VASOVAGAL SYNCOPE: ICD-10-CM

## 2024-12-26 DIAGNOSIS — K52.9 GASTROENTERITIS: Primary | ICD-10-CM

## 2024-12-26 PROCEDURE — 99214 OFFICE O/P EST MOD 30 MIN: CPT | Performed by: INTERNAL MEDICINE

## 2024-12-26 NOTE — PROGRESS NOTES
Radha Stoner is a 69 year old female.  Chief Complaint   Patient presents with    ER F/U     12/24/24 enterocolitis, syncope, vasovagal. Discomfort tenderness to abdomen. Denies fainting post ER.      HPI:     She saw GI Dr. Quinonez on 12/23 for the epigastric pain (which she had in the past w/her pancreatitis)  Was sent for H.pylori test (not yet done) and was started on dicyclomine    Here for follow up from ED visit 12/24/24 for recurrence of her epigastric abdominal pain, nausea.    On 12/24 in the early morning hours, she awoke with severe abdominal pain, then felt dizzy/warm and passed out in the bathroom.   called EMS and brought her to [facility]  CT brain neg  CT a/p showed fluid-filled loops of nondilated SB and prox colon with associated increased mural enhancement c/w a mild enterocolitis.  Also mod concentric mural thickening of the gastric antrum and distal esophagus c/w gastritis/reflux esophagitis.  No SBO or ischemia.  CMP, CBC, lipase normal    Pt received a liter of saline, IV pepcid, maalox, bentyl in the ED w/improvement in sx  Advised to cont bentyl.    Vomited several times later 12/24 when she got home.  Had diarrhea x 1 yesterday.  She is usually constipated at baseline (takes Miralax)    Pt's grandkids had gastroenteritis on 12/22 and 12/23    Feeling better today.  Last BM yesterday.  Has been keeping down fluids, apple sauce, toast              Current Outpatient Medications   Medication Sig Dispense Refill    Multiple Vitamins-Minerals (MULTI-VITAMIN/MINERALS) Oral Tab Take 1 tablet by mouth daily.      amLODIPine 10 MG Oral Tab Take 1 tablet (10 mg total) by mouth daily. 90 tablet 3    latanoprost 0.005 % Ophthalmic Solution 1 drop Before Dinner.      traZODone 50 MG Oral Tab Take 1 tablet (50 mg total) by mouth nightly. 90 tablet 3    estradiol 0.1 MG/GM Vaginal Cream Place 0.5 g vaginally twice a week. 42.5 g 10    pregabalin 50 MG Oral Cap Take 1 capsule (50 mg total) by mouth 3  (three) times daily. (Patient taking differently: Take 1 capsule (50 mg total) by mouth 2 (two) times daily.) 90 capsule 3    meclizine 25 MG Oral Tab Take 1 tablet (25 mg total) by mouth 3 (three) times daily as needed. 30 tablet 1    pantoprazole 40 MG Oral Tab EC Take 1 tablet (40 mg total) by mouth every morning before breakfast.      Acidophilus/Pectin Oral Cap Take 1 capsule by mouth daily.      Famotidine (PEPCID OR) Take 20 mg by mouth. Daily PRN       Cholecalciferol 25 MCG (1000 UT) Oral Tab Take by mouth daily.      Diclofenac Sodium 1 % Transdermal Gel Apply 4 g topically 4 (four) times daily as needed. 1 Tube 3    CALCIUM CITRATE OR Take 1,400 mg by mouth daily. BID        Past Medical History:    Anemia    Anxiety state, unspecified    Arthritis    Back problem    CERVICAL RADICULOPATHY, CERVICAL SPINAL STENOSIS    Bilateral kidney stones    Broken foot    RT - casting. Fracture 5th met. Cast removl/xray foot 4-20-12. Follow up fracture right foot 5-22-12.     Calculus of kidney    Cataract    Depression    Esophageal reflux    Essential hypertension    Gastric polyps    Gastritis    High blood pressure    History of stomach ulcers    Hx of motion sickness    hx of vertigo    Idiopathic acute pancreatitis (HCC)    Insomnia    Internal hemorrhoids without complication    Intestinal disorder    Left wrist fracture    Muscle weakness    Osteoarthritis    Osteoporosis    Other chronic pancreatitis (HCC)    Renal disorder    Rotator cuff tear, right    Traumatic arthritis    Vertigo      Social History:  Social History     Socioeconomic History    Marital status:    Tobacco Use    Smoking status: Never    Smokeless tobacco: Never   Vaping Use    Vaping status: Never Used   Substance and Sexual Activity    Alcohol use: No     Comment: quit 4 - 5 yrs ago due to pancreatitis    Drug use: No   Other Topics Concern    Caffeine Concern Yes     Comment: Coffee 2 cups daily    Exercise No   Social History  Narrative    The patient does not use an assistive device..      The patient does live in a home with stairs.     Social Drivers of Health     Food Insecurity: No Food Insecurity (10/21/2024)    Received from [facility]    Food Insecurity     Currently or in the past 3 months, have you worried your food would run out before you had money to buy more?: No     In the past 12 months, have you run out of food or been unable to get more?: No   Transportation Needs: No Transportation Needs (10/21/2024)    Received from [facility]    Transportation Needs     Currently or in the past 3 months, has lack of transportation kept you from medical appointments, getting food or medicine, or providing care to a family member?: Unrecognized value     Medical Transportation Needs?: No    Received from [facility], [facility]    Social Connections    Received from [facility], [facility]    Housing Stability        REVIEW OF SYSTEMS:   GENERAL HEALTH: feels well otherwise  RESPIRATORY: no SOB  CARDIOVASCULAR: no chest pain/pressure  GI: no nausea, vomiting, diarrhea    Wt Readings from Last 5 Encounters:   12/26/24 139 lb 9.6 oz (63.3 kg)   12/24/24 138 lb (62.6 kg)   11/25/24 140 lb (63.5 kg)   11/21/24 140 lb (63.5 kg)   11/18/24 136 lb (61.7 kg)     Body mass index is 21.23 kg/m².      EXAM:   /64   Pulse 75   Temp 98.2 °F (36.8 °C)   Ht 5' 8\" (1.727 m)   Wt 139 lb 9.6 oz (63.3 kg)   SpO2 98%   BMI 21.23 kg/m²   GENERAL: well developed, well nourished, in no apparent distress    CARDIO: RRR, normal S1S2, without murmur or gallop  GI: soft, minimal epigastric tenderness w/o guarding, ND, NABS      ASSESSMENT AND PLAN:     Acute gastroenteritis  -2 grandsons with same sx a few days prior  -CT c/w gastroenteritis  -improving; tolerating po    Syncope  -vasovagal, due to acute epigastric pain  -CT brain  neg  -now with just mild residual left frontal HA    The patient indicates understanding of these issues and agrees to the plan.    Sherly Hirsch MD, 12/26/24, 11:43 AM

## 2024-12-27 ENCOUNTER — PATIENT MESSAGE (OUTPATIENT)
Dept: INTERNAL MEDICINE CLINIC | Facility: CLINIC | Age: 69
End: 2024-12-27

## 2024-12-30 ENCOUNTER — TELEPHONE (OUTPATIENT)
Dept: INTERNAL MEDICINE CLINIC | Facility: CLINIC | Age: 69
End: 2024-12-30

## 2024-12-30 DIAGNOSIS — R42 VERTIGO: Primary | ICD-10-CM

## 2024-12-30 NOTE — TELEPHONE ENCOUNTER
Spoke with patient and relayed MD's message. Verbalized understanding.   Pt informed that our office will call her back once message is addressed by PCP.   In the meantime, advised 911 for new/worsening symptoms. Pt verbalized understanding and agreement with plan.   ER precautions reviewed.   Reviewed business hours and the after-hour service for the on-call physician, I.e, concerns/questions.    To Dr. Chaudhary--please refer to message below from Kelsey BUSTILLO re: approval for flight.

## 2024-12-30 NOTE — TELEPHONE ENCOUNTER
Patient is calling she saw Dr Hirsch on 12/26 for an ER follow up.    Patient entered a [employer] message on 12/29, on Saturday patient will be flying.  Patient is not sure if she should fly due to the head injury and possible concussion.    Patient is asking to speak to a doctor to discuss if she should be flying or cancel the flight.    Please call patient as soon as possible [phone]

## 2024-12-30 NOTE — TELEPHONE ENCOUNTER
LMTCB. PROCEDURE NOTE  Date: 12/30/2024   Name: Syed Almazan  YOB: 1961    Procedures        Cardiac Catheterization Procedure Note   Patient: Syed Almazan  MRN: [id number]  SSN: [id number]   YOB: 1961 Age: 63 y.o.  Sex: male    Date of Procedure: 12/30/2024   Pre-procedure Diagnosis: Abnormal stress, CP  Post-procedure Diagnosis: Abnormal stress, CP  Procedure: LHC, Cors, moderate sedation  :  Dr. Chau Lynch MD    Assistant(s):  None  Anesthesia: Moderate Sedation   Estimated Blood Loss: Less than 10 mL   Specimens Removed: None  Findings: Mild-moderate non-obstructive disease, elevated LVEDP  Complications: None   Implants:  None  Signed by:  Chau Lynch MD  12/30/2024  11:04 AM

## 2024-12-30 NOTE — TELEPHONE ENCOUNTER
Evaluated by Dr. Hirsch 12/26, can wait for her return tomorrow 12/31 - noted Flight is on Saturday.    MAUROI Dr. RANGEL

## 2024-12-30 NOTE — TELEPHONE ENCOUNTER
To Dr PEPPER, ( pt of Dr Chaudhary)    Please advise.    I called and spoke with patient who had an ED visit 12-24 after a fall in which she hit head forehead and back of left head. Per ED note, CT head without intracranial hemorrhage per Dr peyton.    Patient stated that she is still having \" wobbliness and unsteady feeling with c/o daily headaches specifically to areas where she hit her head\"    Denied visual changes, vomiting but stated \" my memory is not the best right now\"    Stated original abdominal pain symptoms are \" better\"    Patient asking if it is okay for her to fly to [place] in 5 days    I advised above symptoms warranted a repeat ED visit for repeat CT head but patient declined    I advised if not feeling well she should not be flying to [place]. Patient stated that a physician would know if it is safe for her to fly or not.    If you advise a CT head patient wants an out patient order

## 2024-12-31 ENCOUNTER — HOSPITAL ENCOUNTER (OUTPATIENT)
Dept: CT IMAGING | Age: 69
Discharge: HOME | End: 2024-12-31
Attending: INTERNAL MEDICINE
Payer: MEDICARE

## 2024-12-31 DIAGNOSIS — R26.9 GAIT ABNORMALITY: ICD-10-CM

## 2024-12-31 DIAGNOSIS — S09.90XD INJURY OF HEAD, SUBSEQUENT ENCOUNTER: ICD-10-CM

## 2024-12-31 PROCEDURE — 70450 CT HEAD/BRAIN W/O DYE: CPT | Performed by: INTERNAL MEDICINE

## 2025-01-02 ENCOUNTER — TELEPHONE (OUTPATIENT)
Dept: ENDOCRINOLOGY CLINIC | Facility: CLINIC | Age: 70
End: 2025-01-02

## 2025-01-02 NOTE — TELEPHONE ENCOUNTER
----- Message from Meenakshi PEPPER sent at 8/1/2024 10:50 AM CDT -----  Regarding: prolia IV  Please start Prolia IV Last injection was 08/1/24 with SH

## 2025-01-02 NOTE — TELEPHONE ENCOUNTER
Pt's last Prolia injection was on 8/1/24. Pt will be due for next injection on or after 2/1/25.     Pt has an upcoming appt on 2/4/25 for Prolia Injection with RN    ---  ]Submitted Prolia IV via [employer] portal  Awaiting SOB, 3-5 business days

## 2025-01-03 NOTE — TELEPHONE ENCOUNTER
Received pt's Prolia SOB via [employer] portal    [place] Required: No  Prolia OOP COST: 0%  Facility Fee: NA*  Admin Fee: 0%*

## 2025-01-06 ENCOUNTER — MED REC SCAN ONLY (OUTPATIENT)
Dept: PHYSICAL MEDICINE AND REHAB | Facility: CLINIC | Age: 70
End: 2025-01-06

## 2025-01-07 NOTE — PROGRESS NOTES
Low Back Pain H & P    Chief Complaint: Patient presents with:  Numbness: LOV 5/3/2023 Patient c/o N/T in BL feet, primarily in her toes. Taking lyrica with slight relief, denies EMG for lower extremities. Currently taking 50 mg TID. LOP 1/10    Nursing note reviewed and verified. Patient was last seen on 5/3/2023. She states that she is not having any issues with headaches or neck pain. She is having bilateral sole of the foot and toe tingling. This started in May. The plantar surface of the big toes are the most affected sites. She denies any leg weakness or numbness. She contacted Dr. Abhay Lentz' office and she was told to follow up with me. She denies nay low back or leg pain. She has been taking Lyrica 50 TID, but over the last 2-3 weeks, she has been taking 1-2 per day due to running out of it. She thought that the Lyrica was helping when she was taking more consistently. It would at least take the edge off. Past Medical History   Past Medical History:   Diagnosis Date    Anemia     Anxiety state, unspecified     Back problem     CERVICAL RADICULOPATHY, CERVICAL SPINAL STENOSIS    Bilateral kidney stones 9/23/2008    Broken foot 03/14/2012    RT - casting. Fracture 5th met. Cast removl/xray foot 4-20-12. Follow up fracture right foot 5-22-12.      Calculus of kidney 2008    Cataract     Depression     Esophageal reflux     Essential hypertension     Gastric polyps 12/04/2018    Gastritis     High blood pressure     Hx of motion sickness     hx of vertigo    Idiopathic acute pancreatitis 2002, 2010    Insomnia     Internal hemorrhoids without complication 47/79/5642    Intestinal disorder     Left wrist fracture 2011 and 2013    Muscle weakness     Osteoarthritis     Osteoporosis     Other chronic pancreatitis ([facility] [address] 11/24/2021    Renal disorder     Rotator cuff tear, right     Traumatic arthritis     Vertigo        Past Surgical History   Past Surgical History:   Procedure Laterality Date ARTHROSCOPY OF JOINT UNLISTED Right 2012    rotator cuff tear          x 3    CATARACT      CHOLECYSTECTOMY      COLONOSCOPY  2009    COLONOSCOPY N/A 2018    Procedure: COLONOSCOPY;  Surgeon: Amanda Morel MD;  Location: [facility]      EYE SURGERY      Lasik procedure    FRACTURE SURGERY Left     WRIST FRACTURE ORIF    FRACTURE SURGERY Left     ORIF wrist fracture revision with plates and screws- Ortho Dr Xiao Miramontes  2017    Fussion L4-L5 - Dr. Vladimir Lundberg  03/15/2022    Back Surgery    OTHER SURGICAL HISTORY Left     torn biceops repair    SHOULDER SURG PROC UNLISTED Right     SPINE SURGERY PROCEDURE UNLISTED      L1-L2 FUSION    SPINE SURGERY PROCEDURE UNLISTED  2017    Neck fusion    TONSILLECTOMY      with Adenoidectomy    UPPER GI ENDOSCOPY,EXAM      EGD        Family History   Family History   Problem Relation Age of Onset    Pulmonary Disease Father     Heart Disease Mother     Colon Cancer Maternal Grandfather     Cancer Maternal Grandfather     Polyps Sister         colon polyps    Colon Cancer Sister     Other (gallbladder disease) Sister     Crohn's Disease Other     Heart Disease Other     Ovarian Cancer Maternal Aunt         70's 80's    Breast Cancer Neg        Social History   Social History    Socioeconomic History      Marital status:       Spouse name: Not on file      Number of children: Not on file      Years of education: Not on file      Highest education level: Not on file    Occupational History      Not on file    Tobacco Use      Smoking status: Never      Smokeless tobacco: Never    Vaping Use      Vaping Use: Never used    Substance and Sexual Activity      Alcohol use: No        Alcohol/week: 0.0 standard drinks of alcohol        Comment: quit 4 - 5 yrs ago due to pancreatitis      Drug use: No      Sexual activity: Not on file    Other Topics      Concerns:         Service: Not Asked        Blood Transfusions: Not Asked        Caffeine Concern: No          Coffee 2 cups daily        Occupational Exposure: Not Asked        Hobby Hazards: Not Asked        Sleep Concern: Not Asked        Stress Concern: Not Asked        Weight Concern: Not Asked        Special Diet: Not Asked        Back Care: Not Asked        Exercise: No        Bike Helmet: Not Asked        Seat Belt: Not Asked        Self-Exams: Not Asked    Social History Narrative      The patient does not use an assistive device. .        The patient does live in a home with stairs. Social Determinants of Health  Financial Resource Strain: Not on file  Food Insecurity: Not on file  Transportation Needs: Not on file  Physical Activity: Not on file  Stress: Not on file  Social Connections: Not on file  Housing Stability: Not on file    PE:  The patient does appear in her stated age in no distress. The patient is well groomed. Psychiatric:  The patient is alert and oriented x 3. The patient has a normal affect and mood. Respiratory:  No acute respiratory distress. Patient does not have a cough. HEENT:  Extraocular muscles are intact. There is no kern icterus. Pupils are equal, round, and reactive to light. No redness or discharge bilaterally. Skin:  There are no rashes or lesions. Vitals: There were no vitals filed for this visit. Gait:    Gait: Normal gait   Sit to Stand: no difficulty      Extremity:   Dorsalis pedis pulse-RIGHT 2+   Dorsalis pedis pulse-LEFT 2+   Tibialis posterior pulse-RIGHT 2+   Tibialis posterior pulse-LEFT 2+     Neurological Lower Extremity:    Light Touch Sensation: Intact in bilateral Lower Extremities   LE Muscle Strength:  All LE strength measurements 5/5 except:  Hamstring RIGHT:   4+/5  Hamstring LEFT:   4+/5   RIGHT plantar reflexes: downward response   LEFT plantar reflexes: downward response   Reflexes: 2+ in bilateral lower extremities     Neural Tension Tests Lumbar Spine:  Sitting straight leg raise-RIGHT Positive for right posterior leg pain   Sitting straight leg raise-LEFT Positive for left posterior leg pain   Slump test-RIGHT Negative for pain   Slump test-LEFT Negative for pain     Assessment  1. History of lumbar fusion: T10-S1 with bilateral SIJ fusions on 3/15/2022    2. T9-10 left mild-mod & right mild bulging disc    3. left > right S1 radiculitis         Plan  I will perform an EMG of the bilateral legs and feet to assess for a peripheral neuropathy versus a S1 radiculopathy. If she has S1 radiculopathies, than this could be due to nerve recovery since her last surgery or the possibility of a spine issue. She will resume the Lyrica 50 mg 3 times a day. She will let me know if this is not helping her, then I will increase this dose. She will follow up with me after having the EMG test.    The patient understands and agrees with the stated plan. August Mcconnell MD  7/31/2023 unk

## 2025-01-14 ENCOUNTER — HOSPITAL ENCOUNTER
Dept: HOSPITAL 101 - MAMMO | Age: 70
Discharge: HOME | End: 2025-01-14
Payer: MEDICARE

## 2025-01-14 DIAGNOSIS — Z80.0: ICD-10-CM

## 2025-01-14 DIAGNOSIS — Z80.52: ICD-10-CM

## 2025-01-14 DIAGNOSIS — Z12.31: Primary | ICD-10-CM

## 2025-01-14 PROCEDURE — 77063 BREAST TOMOSYNTHESIS BI: CPT

## 2025-01-14 PROCEDURE — 77067 SCR MAMMO BI INCL CAD: CPT

## 2025-01-14 NOTE — MM_ITS
Patient Name:      
RICHAR CARTAGENA 
      
MR#: [id number]      
: 1955 
Accession #: [id number] 
Exam Date: 2025  
Ordering Doctor: DR. CHRIS BOOTH . 
  
RADIOLOGY REPORT 
  
PROCEDURE:     MM TOMOSYNTHESIS SCREENING BI 
  
COMPARISON:     MM TOMOSYNTHESIS SCREENING BI, 2024.  MG MAMM SCREEN 3D  
DAYANA CAD, 2023.  MG MAMM SCREEN 3D DAYANA CAD, 2021.  MG MAMM DAYANA SCRN  
W CAD DIG, 2013. 
  
INDICATIONS:     Screening 
  
Calculator Name             NCI Breast Cancer Risk Assessment Tool  
5 Year Breast Cancer Risk     1.70% 
Lifetime Breast Cancer Risk     4.90% 
Personal Breast Cancer      No 
Personal Ovarian Cancer     No 
Treatments     None 
Family Cancers     Mother with colon cancer at age 80; Father with bladder  
cancer at age 80. 
  
LOCATION:       [facility]  
  
BREAST COMPOSITION:     There are scattered areas of fibroglandular density. 
  
FINDINGS:       
DIAGNOSTIC CATEGORY 2--BENIGN FINDING:   
  
RIGHT BREAST:  No significant suspicious finding.  Scattered benign-appearing  
calcifications are present.  Scattered benign-appearing nodules are present.   
No significant change has occurred.   
  
LEFT BREAST:  No significant suspicious finding.  Scattered benign-appearing  
calcifications are present.  Scattered benign-appearing nodules are present.   
No significant change has occurred.   
  
RECOMMENDATIONS:      
ROUTINE MAMMOGRAM AND CLINICAL EVALUATION IN 12 MONTHS.   
  
PLEASE NOTE:  A NORMAL MAMMOGRAM DOES NOT EXCLUDE THE POSSIBILITY OF BREAST  
CANCER.  A CLINICALLY SUSPICIOUS PALPABLE LUMP SHOULD BE BIOPSIED.   
  
  
Dictated by: Melvin Ferraro M.D. on 2025 at 16:31      
Approved by: Melvin Ferraro M.D. on 2025 at 16:34

## 2025-01-24 ENCOUNTER — TELEPHONE (OUTPATIENT)
Dept: INTERNAL MEDICINE CLINIC | Facility: CLINIC | Age: 70
End: 2025-01-24

## 2025-01-24 NOTE — TELEPHONE ENCOUNTER
Patient is calling Dr Hirsch prescribed Trazadone for sleeping a long time ago.    Dr Cleaning prescribed Pregabalin in April, so at that time she stopped Trazadone.    Dr Hirsch told patient to restart Trazadone    Patient read there could be a reaction between Trazadone & Pregabalin.    Patient would like to clarify if there is a reaction between the medications?  Patient would like a call back today  Phone [phone]

## 2025-02-04 ENCOUNTER — PATIENT MESSAGE (OUTPATIENT)
Dept: AUDIOLOGY | Facility: CLINIC | Age: 70
End: 2025-02-04

## 2025-02-04 ENCOUNTER — TELEPHONE (OUTPATIENT)
Dept: OTOLARYNGOLOGY | Facility: CLINIC | Age: 70
End: 2025-02-04

## 2025-02-04 ENCOUNTER — PATIENT MESSAGE (OUTPATIENT)
Dept: OTOLARYNGOLOGY | Facility: CLINIC | Age: 70
End: 2025-02-04

## 2025-02-04 ENCOUNTER — NURSE ONLY (OUTPATIENT)
Dept: ENDOCRINOLOGY CLINIC | Facility: CLINIC | Age: 70
End: 2025-02-04

## 2025-02-04 DIAGNOSIS — M81.0 AGE-RELATED OSTEOPOROSIS WITHOUT CURRENT PATHOLOGICAL FRACTURE: Primary | ICD-10-CM

## 2025-02-04 DIAGNOSIS — R42 RECURRENT VERTIGO: Primary | ICD-10-CM

## 2025-02-04 PROCEDURE — 96372 THER/PROPH/DIAG INJ SC/IM: CPT | Performed by: INTERNAL MEDICINE

## 2025-02-04 NOTE — TELEPHONE ENCOUNTER
Patient scheduled her vng and asking to send the handout with instruction via [employer] ([employer]).  Thank you

## 2025-02-04 NOTE — PROGRESS NOTES
Patient seen today for prolia injection. Injection given to the right upper arm. Patient tolerated well.   used: No  Written medication information sheet provided: Yes     Discussed most common side effects of: bone and muscle pain, joint pain, dizziness, headache. Side effects typically only last up to 1 week.      Call office or be seen in ER with any of the following severe side effects: signs of allergic reaction (hives, difficulty breathing, redness or swelling at injection site, chest pain, shortness of breath), low blood calcium, osteonecrosis of the jaw.     Discussed with the patient if he/she plans on having any major dental work done to make sure their dentist and endocrinology provider is aware that they are taking prolia prior to treatment. This is due to increased risk of osteonecrosis or infection of the jaw while on this medication.      Today this is the 9th injection.  Last prolia injection on: 8/1/2024  Summary of benefits completed: Yes  PA required/completed: PA approved. Per TE 1/2/2025  Last Prolia protocol labs: 7/31/2024  Last Dexa scan: 6/27/2024  Patient taking vitamin D supplementation: Yes  Patient taking calcium supplementation: Yes     Patient scheduled 6 month follow up with MD, on or after 8/6/2025   Next labs due: Prior to MD visit   Future labs ordered: Yes. Pt reminded to complete prior to visit with MD. Appointment scheduled.       Staff message placed to MA pool ([facility] Medical Assistant) to perform repeat prolia insurance check in 4 months.

## 2025-02-06 NOTE — TELEPHONE ENCOUNTER
Interpretation:  Abnormal VNG demonstrated by  right-beating gaze nystagmus to the right with vision denied  right-beating positional nystagmus for head turned and to the right side with vision denied  left-beating positional nystagmus for head to the left and for left side with vision denied.     Nystagmus was suppressed with vision.  No central findings were noted.    Responses to caloric stimulation were symmetric.      Positional testing can be peripheral or central but no central findings were noted on today's VNG.  Responses were suppressed with vision present and central portions of the test were within normal limits.     Recommendations:  Follow-up with Kayce Morris M.D. for results.  Vestibular Rehab may be helpful.     Rebekah Allenmaximino, both ears  Last VNG was 2014 so a little more than 10 years ago.  I am sorry the wait is longer than I would hope.  We are actively trying to see if there are any openings.  Message sent to the patient.

## 2025-02-06 NOTE — TELEPHONE ENCOUNTER
please see pt Reedsyt message, per Audiology that is the soonest.    [Feeling Tired] : feeling tired [Chest Pain] : chest pain [Anxiety] : anxiety [As Noted in HPI] : as noted in HPI [Negative] : Heme/Lymph [Feeling Poorly] : not feeling poorly [FreeTextEntry2] : wt stabilized now [FreeTextEntry3] : minimal dry eyes [FreeTextEntry4] : dry mouth, topical agents tolerable; multiple dental carries.. nothing acute - no mucositis  [FreeTextEntry5] : mild intermittent -nothing for more than 6 m [FreeTextEntry7] : dysphagia- chronic stable, minimal appetite but improved - appetite back to nl  [FreeTextEntry9] : neck and shoulder pain following injury at work- greatly improved [de-identified] : no further confusion/ memory problems.. etiology never clear  [de-identified] : about world events - still and now worried that sh has Behcets [de-identified] : osteoporosis, low vit D in past now excellent

## 2025-02-11 ENCOUNTER — OFFICE VISIT (OUTPATIENT)
Dept: OTOLARYNGOLOGY | Facility: CLINIC | Age: 70
End: 2025-02-11
Payer: MEDICARE

## 2025-02-11 VITALS — BODY MASS INDEX: 20.92 KG/M2 | HEIGHT: 68 IN | WEIGHT: 138 LBS

## 2025-02-11 DIAGNOSIS — R42 VERTIGO: Primary | ICD-10-CM

## 2025-02-11 PROCEDURE — 99214 OFFICE O/P EST MOD 30 MIN: CPT | Performed by: SPECIALIST

## 2025-02-11 RX ORDER — SUCRALFATE 1 G/1
TABLET ORAL
COMMUNITY
Start: 2025-01-15

## 2025-02-11 RX ORDER — MECLIZINE HCL 12.5 MG 12.5 MG/1
12.5 TABLET ORAL 3 TIMES DAILY PRN
Qty: 30 TABLET | Refills: 0 | Status: SHIPPED | OUTPATIENT
Start: 2025-02-11

## 2025-02-11 NOTE — PATIENT INSTRUCTIONS
There was no evidence of middle ear fluid on the day to your visit.  You were given a prescription for meclizine.  Please call and make sure that all of the medications we discussed do not have to be stopped prior to the procedure.  Pending the results, we may also recommend vestibular rehab

## 2025-02-11 NOTE — PROGRESS NOTES
Radha Stoner is a 69 year old female.   Chief Complaint   Patient presents with    Ear Problem     Fluid in ears    Dizziness     Vertigo symptoms     HPI:   Patient here has had vertigo for several weeks.  He had a repositioning exercise which did help somewhat but not resolve her symptoms.  Seems worse when she turns to the left.  There was a VNG done 2014 which showed a right beating nystagmus with the head to the right and a left beating nystagmus with the head to the left.  This was felt to be peripheral.    Current Outpatient Medications   Medication Sig Dispense Refill    sucralfate 1 g Oral Tab       meclizine 12.5 MG Oral Tab Take 1 tablet (12.5 mg total) by mouth 3 (three) times daily as needed. 1 to 2 tablets. 30 tablet 0    Multiple Vitamins-Minerals (MULTI-VITAMIN/MINERALS) Oral Tab Take 1 tablet by mouth daily.      amLODIPine 10 MG Oral Tab Take 1 tablet (10 mg total) by mouth daily. 90 tablet 3    latanoprost 0.005 % Ophthalmic Solution 1 drop Before Dinner.      traZODone 50 MG Oral Tab Take 1 tablet (50 mg total) by mouth nightly. 90 tablet 3    estradiol 0.1 MG/GM Vaginal Cream Place 0.5 g vaginally twice a week. 42.5 g 10    pregabalin 50 MG Oral Cap Take 1 capsule (50 mg total) by mouth 3 (three) times daily. (Patient taking differently: Take 1 capsule (50 mg total) by mouth 2 (two) times daily.) 90 capsule 3    meclizine 25 MG Oral Tab Take 1 tablet (25 mg total) by mouth 3 (three) times daily as needed. 30 tablet 1    pantoprazole 40 MG Oral Tab EC Take 1 tablet (40 mg total) by mouth every morning before breakfast.      Acidophilus/Pectin Oral Cap Take 1 capsule by mouth daily.      Famotidine (PEPCID OR) Take 20 mg by mouth. Daily PRN       Cholecalciferol 25 MCG (1000 UT) Oral Tab Take by mouth daily.      Diclofenac Sodium 1 % Transdermal Gel Apply 4 g topically 4 (four) times daily as needed. 1 Tube 3    CALCIUM CITRATE OR Take 1,400 mg by mouth daily. BID        Past Medical History:     Anemia    Anxiety state, unspecified    Arthritis    Back problem    CERVICAL RADICULOPATHY, CERVICAL SPINAL STENOSIS    Bilateral kidney stones    Broken foot    RT - casting. Fracture 5th met. Cast removl/xray foot 4-20-12. Follow up fracture right foot 5-22-12.     Calculus of kidney    Cataract    Depression    Esophageal reflux    Essential hypertension    Gastric polyps    Gastritis    High blood pressure    History of stomach ulcers    Hx of motion sickness    hx of vertigo    Idiopathic acute pancreatitis (HCC)    Insomnia    Internal hemorrhoids without complication    Intestinal disorder    Left wrist fracture    Muscle weakness    Osteoarthritis    Osteoporosis    Other chronic pancreatitis (HCC)    Renal disorder    Rotator cuff tear, right    Traumatic arthritis    Vertigo      Social History:  Social History     Socioeconomic History    Marital status:    Tobacco Use    Smoking status: Never    Smokeless tobacco: Never   Vaping Use    Vaping status: Never Used   Substance and Sexual Activity    Alcohol use: No     Comment: quit 4 - 5 yrs ago due to pancreatitis    Drug use: No   Other Topics Concern    Caffeine Concern Yes     Comment: Coffee 2 cups daily    Exercise No   Social History Narrative    The patient does not use an assistive device..      The patient does live in a home with stairs.     Social Drivers of Health     Food Insecurity: No Food Insecurity (10/21/2024)    Received from [facility]    Food Insecurity     Currently or in the past 3 months, have you worried your food would run out before you had money to buy more?: No     In the past 12 months, have you run out of food or been unable to get more?: No   Transportation Needs: No Transportation Needs (10/21/2024)    Received from [facility]    Transportation Needs     Currently or in the past 3 months, has lack of transportation kept you from medical appointments, getting food or medicine,  or providing care to a family member?: Unrecognized value     Medical Transportation Needs?: No    [facility]    Housing Stability        REVIEW OF SYSTEMS:   GENERAL HEALTH: feels well otherwise  GENERAL : denies fever, chills, sweats, weight loss, weight gain  SKIN: denies any unusual skin lesions or rashes  RESPIRATORY: denies shortness of breath with exertion  NEURO: denies headaches    EXAM:   Ht 5' 8\" (1.727 m)   Wt 138 lb (62.6 kg)   BMI 20.98 kg/m²   System Details   Skin Inspection - Normal.   Constitutional Overall appearance - Normal.   Head/Face Facial features - Normal. Eyebrows - Normal. Skull - Normal.   Eyes Conjunctiva - Right: Normal, Left: Normal. Pupil - Right: Normal, Left: Normal.    Ears Inspection - Right: Normal, Left: Normal.   Canal - Right: Normal, Left: Normal.   TM - Right: Normal, Left: Normal.  Dizziness with turning head to the left but no nystagmus.   Nasal External nose - Normal.   Nasal septum - Normal.  Turbinates - Normal.   Oral/Oropharynx Lips - Normal, Tonsils - Normal, Tongue - Normal    Neck Exam Inspection - Normal. Palpation - Normal. Parotid gland - Normal. Thyroid gland - Normal.  No carotid bruits by auscultation   Lymph Detail Submental. Submandibular. Anterior cervical. Posterior cervical. Supraclavicular all without enlargement   Psychiatric Orientation - Oriented to time, place, person & situation. Appropriate mood and affect.   Neurological Memory - Normal. Cranial nerves - Cranial nerves II through XII grossly intact.  Wide-based gait     ASSESSMENT AND PLAN:   1. Vertigo  Has a history of peripheral vestibular dysfunction.  Current symptoms not classic for benign positional vertigo.  Patient to get a VNG on 2/26/2025.  She is on a waiting list prior to that.  I did ask her to call and make sure to stop all the medications that might cause a problem with her results these would include the  trazodone, pregabalin, meclizine.      The patient indicates understanding of these issues and agrees to the plan.      Kayce Morris MD  2/11/2025  2:56 PM

## 2025-02-17 ENCOUNTER — PATIENT MESSAGE (OUTPATIENT)
Dept: INTERNAL MEDICINE CLINIC | Facility: CLINIC | Age: 70
End: 2025-02-17

## 2025-02-26 ENCOUNTER — OFFICE VISIT (OUTPATIENT)
Dept: AUDIOLOGY | Facility: CLINIC | Age: 70
End: 2025-02-26

## 2025-02-26 ENCOUNTER — TELEPHONE (OUTPATIENT)
Dept: OTOLARYNGOLOGY | Facility: CLINIC | Age: 70
End: 2025-02-26

## 2025-02-26 DIAGNOSIS — R42 DIZZINESS: Primary | ICD-10-CM

## 2025-02-26 PROCEDURE — 92537 CALORIC VSTBLR TEST W/REC: CPT | Performed by: AUDIOLOGIST

## 2025-02-26 PROCEDURE — 92540 BASIC VESTIBULAR EVALUATION: CPT | Performed by: AUDIOLOGIST

## 2025-02-26 NOTE — PROGRESS NOTES
Audiometrics:  VNG    Radha VILLELA Day  10/5/1955  [id number]    Radha A Day was referred for testing by Dr. Kayce Morris    History:  Dr. Morris history from 2/11/2025    Patient here has had vertigo for several weeks.  He had a repositioning exercise which did help somewhat but not resolve her symptoms.  Seems worse when she turns to the left.  There was a VNG done 2014 which showed a right beating nystagmus with the head to the right and a left beating nystagmus with the head to the left.  This was felt to be peripheral.    Today- 2/26/2025  Patient is here for testing today.  She notes having followed all VNG instructions prior to today's testing  Of note, patient is in PT for vestibular rehab.  She has been diagnosed for BPPV.   She has undergone Epley maneuvers in the past.  Yesterday, at home she was feeling slightly off, so she performed the Epley on herself with success.      Procedures completed today:  Saccade Test: Poor accuracies for horizontal saccades rightward and leftward.    Gaze Nystagmus Test: No gaze nystagmus noted.    Tracking Test: Normal horizontal tracking in both directions.    Optokinetic Test:   Symmetrical optokinetic testing in both directions at 20deg/sec  Defective, yet symmetrical optokinetic testing in both directions at the increased intensity of 40/sec.    Head Shaking Test: No nystagmus was present during horizontal head shaking test.    Mike-Hallpike Maneuver: DNT  DNT Hallpike today due to patient many spinal/back surgeries and fusions as well as the fact she performed an Epley yesterday    Positioning Test: No positioning or positional nystagmus was observed.    Bithermal Caloric Test:   Left cool caloric: direction -right, 12 deg/sec  Right cool caloric: direction -left, 8 deg/sec  Left warm caloric: direction -left, 23 deg/sec  Right warm caloric: direction -right, 26 deg/sec    Interpretation:  Defective pursuit is suggestive of a CNS lesion. However, all other oculomotor test  were WNL.    Caloric testing was normal and approximately equal.  The caloric test shows no abnormality.    Anecdotal history as well as physical therapy notes is suggestive of left sided BPPV.     Recommendations:  Follow-up with Kayce Morris M.D. for results.  Vestibular Rehab    2/26/2025  Mynor Forte

## 2025-02-27 NOTE — TELEPHONE ENCOUNTER
"Subjective   History of Present Illness    Gustabo Gil is a 33 y.o. female who presents for annual exam.  She got  over the summer.   She complains of new lesions on her left nipple noted a few months ago.     Obstetric History:  OB History      Para Term  AB Living    1       1      SAB TAB Ectopic Molar Multiple Live Births    1                   Menstrual History:     Patient's last menstrual period was 10/07/2019 (approximate).       Current contraception: none  History of abnormal Pap smear: yes - LEEP , last pap negative  Received Gardasil immunization: no  Family history of uterine or ovarian cancer: paternal aunt may have had ovarian cancer  Family History of colon cancer/colon polyps: no  Regular self breast exam: yes  Family history of breast cancer: no      The following portions of the patient's history were reviewed and updated as appropriate: allergies, current medications, past family history, past medical history, past social history, past surgical history and problem list.    Review of Systems    Constitutional: negative  Respiratory: negative  Cardiovascular: negative  Gastrointestinal: negative for abdominal pain and change in bowel habits  Genitourinary:negative for abnormal bleeding  Integument/breast: positive for lesions on nipple left breast  Neurological: negative for dizziness and headaches  Behavioral/Psych: negative     Objective   Physical Exam    /70   Ht 160 cm (63\")   Wt 73.7 kg (162 lb 6.4 oz)   LMP 10/07/2019 (Approximate)   Breastfeeding? No   BMI 28.77 kg/m²     General:   alert, appears stated age, cooperative and no distress   Heart: regular rate and rhythm   Lungs: clear to auscultation bilaterally   Abdomen: soft, non-tender, without masses or organomegaly   Breast: Inspection performed. Left breast with 2  lesions on surface of nipple. Each approx 3 mm in size. Right breast negative with inspection. Both breasts examined and no other " called masses, retractions, nipple discharge or axillary adenopathy noted.   Vulva: normal, Bartholin's, Urethra, [place]'s normal   Vagina: normal mucosa, normal discharge   Cervix: no lesions   Uterus: normal size, mobile, non-tender, normal shape and consistency   Adnexa: normal adnexa and no mass, fullness, tenderness     Assessment/Plan   Gustabo was seen today for gynecologic exam.    Diagnoses and all orders for this visit:    History of cervical dysplasia  -     IGP, Apt HPV,rfx 16 / 18,45    Encounter for gynecological examination  -     IGP, Apt HPV,rfx 16 / 18,45    Encounter for screening for human papillomavirus (HPV)  -     IGP, Apt HPV,rfx 16 / 18,45    Immunity to rubella determined by serologic test  -     Rubella Antibody, IgG    Nipple lesion  -     Mammo Diagnostic Bilateral With CAD; Future  -     Ambulatory Referral to Breast Surgery    Other orders  -     Prenat w/o [id number] (CITRANATAL ASSURE) 35-1 & 300 MG tablet; Take 1 tablet by mouth Daily.      Pap done due to history, will call with results  Recommend regular exercise and SBE    New breast lesions on left nipple. Pt notes these were noted within the last year. Plan check imaging for baseline and referred to breast surgery. She is interested in pregnancy and lesions could interfere with lactation. She agrees with plan. Recommend f/u appt here 4 weeks to review recommendations and imaging.     Considering pregnancy: pt has hx varicella, recommend rubella titer and start PNV daily. Discussed folic acid supplementation and prevention of spina bifida.

## 2025-03-07 ENCOUNTER — HOSPITAL ENCOUNTER (OUTPATIENT)
Dept: MRI IMAGING | Facility: HOSPITAL | Age: 70
Discharge: HOME | End: 2025-03-07
Attending: SPECIALIST
Payer: MEDICARE

## 2025-03-07 DIAGNOSIS — R42 RECURRENT VERTIGO: ICD-10-CM

## 2025-03-07 PROCEDURE — A9575 INJ GADOTERATE MEGLUMI 0.1ML: HCPCS | Performed by: SPECIALIST

## 2025-03-07 PROCEDURE — 70553 MRI BRAIN STEM W/O & W/DYE: CPT | Performed by: SPECIALIST

## 2025-03-07 RX ORDER — GADOTERATE MEGLUMINE 376.9 MG/ML
15 INJECTION INTRAVENOUS
Status: COMPLETED | OUTPATIENT
Start: 2025-03-07 | End: 2025-03-07

## 2025-03-07 RX ADMIN — GADOTERATE MEGLUMINE 15 ML: 376.9 INJECTION INTRAVENOUS at 08:27:00

## 2025-03-11 ENCOUNTER — PATIENT MESSAGE (OUTPATIENT)
Dept: OTOLARYNGOLOGY | Facility: CLINIC | Age: 70
End: 2025-03-11

## 2025-03-13 ENCOUNTER — OFFICE VISIT (OUTPATIENT)
Dept: PHYSICAL MEDICINE AND REHAB | Facility: CLINIC | Age: 70
End: 2025-03-13
Payer: MEDICARE

## 2025-03-13 VITALS — BODY MASS INDEX: 20.92 KG/M2 | WEIGHT: 138 LBS | HEIGHT: 68 IN

## 2025-03-13 DIAGNOSIS — M25.512 CHRONIC LEFT SHOULDER PAIN: ICD-10-CM

## 2025-03-13 DIAGNOSIS — M50.90 CERVICAL DISC DISEASE: ICD-10-CM

## 2025-03-13 DIAGNOSIS — R42 VERTIGO: ICD-10-CM

## 2025-03-13 DIAGNOSIS — I10 ESSENTIAL HYPERTENSION: ICD-10-CM

## 2025-03-13 DIAGNOSIS — M50.20 CERVICAL HERNIATED DISC: ICD-10-CM

## 2025-03-13 DIAGNOSIS — M54.16 LUMBAR RADICULOPATHY: ICD-10-CM

## 2025-03-13 DIAGNOSIS — Z98.1 HISTORY OF LUMBAR FUSION: ICD-10-CM

## 2025-03-13 DIAGNOSIS — G89.29 CHRONIC LEFT SHOULDER PAIN: ICD-10-CM

## 2025-03-13 DIAGNOSIS — M48.02 CERVICAL SPINAL STENOSIS: ICD-10-CM

## 2025-03-13 DIAGNOSIS — Z98.1 S/P CERVICAL SPINAL FUSION: ICD-10-CM

## 2025-03-13 DIAGNOSIS — M51.9 THORACIC DISC DISEASE: ICD-10-CM

## 2025-03-13 DIAGNOSIS — M47.894 THORACIC FACET SYNDROME: Primary | ICD-10-CM

## 2025-03-13 PROCEDURE — 99214 OFFICE O/P EST MOD 30 MIN: CPT | Performed by: PHYSICAL MEDICINE & REHABILITATION

## 2025-03-13 NOTE — PATIENT INSTRUCTIONS
Plan  She will increase the Lyrica to 50 mg 3 times a day.    She will do the PT on the thoracic spine.    If the PT is not helping the thoracic spine, then I will do right thoracic medial branch blocks at T5 and T6 medial branch nerves for the right T6-7 z-joint.    She will follow up in 3-4 months or sooner if needed.

## 2025-03-13 NOTE — PROGRESS NOTES
Negative MRI of the IAC's including no stroke, tumor, or multiple sclerosis.  Can consider vestibular rehab.  I reviewed the VNG done on 2/26/25.  This didn't have a peripheral vestibular abnormality.  Other possibilities of vertigo can include cardiac or metabolic issues..

## 2025-03-13 NOTE — PROGRESS NOTES
Low Back Pain H & P    Chief Complaint:   Chief Complaint   Patient presents with    Follow - Up     LOV 11/18/24 pt is here for a follow up . Intermittent n/t in toes. Takes lyrica daily. Reports aching pain in mid back. State she's scheduled for physical therapy. Pain 5/10     Nursing note reviewed and verified.    Patient was last seen on 11/18/2024.  She states that she passed out 2 times on [place] due to having Norovirus.  She has seen Dr. Trotter and repeat x-rays were normal.  She has been having issues with recurrent vertigo.  This can get better with the PT for the vertigo and the HEP.  She does not have any pain when she has the vertigo.  She had skin cancer removed from the right medial knee.      She still has the tingling in the bilateral big toes.  The Lyrica will resolve these symptoms.  She will tingling in the morning and the evening which is right before she takes the Lyrica.  She is feeling the tingling now to some extent.    She is having some mid to upper back pain an she will be starting PT for this tomorrow.    Past Medical History   Past Medical History:    Anemia    Anxiety state, unspecified    Arthritis    Back problem    CERVICAL RADICULOPATHY, CERVICAL SPINAL STENOSIS    Bilateral kidney stones    Broken foot    RT - casting. Fracture 5th met. Cast removl/xray foot 4-20-12. Follow up fracture right foot 5-22-12.     Calculus of kidney    Cataract    Depression    Esophageal reflux    Essential hypertension    Gastric polyps    Gastritis    High blood pressure    History of stomach ulcers    Hx of motion sickness    hx of vertigo    Idiopathic acute pancreatitis (HCC)    Insomnia    Internal hemorrhoids without complication    Intestinal disorder    Left wrist fracture    Muscle weakness    Osteoarthritis    Osteoporosis    Other chronic pancreatitis (HCC)    Renal disorder    Rotator cuff tear, right    Traumatic arthritis    Vertigo       Past Surgical History   Past Surgical  History:   Procedure Laterality Date    Arthroscopy of joint unlisted Right 2012    rotator cuff tear    Back surgery  2008 & 3-15-22    2 procedures          x 3    Cataract      Cholecystectomy      Colonoscopy      Colonoscopy N/A 2018    Procedure: COLONOSCOPY;  Surgeon: Virginie Ma MD;  [facility]    Cysto/uretero w/lithotripsy Left 2024    Excis lumbar disk,one level      Eye surgery      Lasik procedure    Fracture surgery Left     WRIST FRACTURE ORIF    Fracture surgery Left     ORIF wrist fracture revision with plates and screws- Ortho Dr Bran Horan      Other surgical history  2017    Fussion L4-L5 - Dr. Rothman    Other surgical history  03/15/2022    Back Surgery    Other surgical history Left     torn biceops repair    Shoulder surg proc unlisted Right     Spine surgery procedure unlisted      L1-L2 FUSION    Spine surgery procedure unlisted  2017    Neck fusion    Tonsillectomy      with Adenoidectomy    Total shoulder replacement Left     Tubal ligation  1982?    Upper gi endoscopy,exam      EGD        Family History   Family History   Problem Relation Age of Onset    Pulmonary Disease Father     Dementia Father             Heart Disease Mother     Fibromyalgia Mother             Colon Cancer Maternal Grandfather     Cancer Maternal Grandfather         Colon    Polyps Sister         colon polyps    Colon Cancer Sister     Other (gallbladder disease) Sister     Cancer Sister         Colon    Crohn's Disease Other     Heart Disease Other     Ovarian Cancer Maternal Aunt         70's 80's    Breast Cancer Neg        Social History   Social History     Socioeconomic History    Marital status:      Spouse name: Not on file    Number of children: Not on file    Years of education: Not on file    Highest education level: Not on file   Occupational History    Not on file   Tobacco Use    Smoking  status: Never    Smokeless tobacco: Never   Vaping Use    Vaping status: Never Used   Substance and Sexual Activity    Alcohol use: No     Comment: quit 4 - 5 yrs ago due to pancreatitis    Drug use: No    Sexual activity: Not on file   Other Topics Concern     Service Not Asked    Blood Transfusions Not Asked    Caffeine Concern Yes     Comment: Coffee 2 cups daily    Occupational Exposure Not Asked    Hobby Hazards Not Asked    Sleep Concern Not Asked    Stress Concern Not Asked    Weight Concern Not Asked    Special Diet Not Asked    Back Care Not Asked    Exercise No    Bike Helmet Not Asked    Seat Belt Not Asked    Self-Exams Not Asked   Social History Narrative    The patient does not use an assistive device..      The patient does live in a home with stairs.     Social Drivers of Health     Food Insecurity: No Food Insecurity (3/10/2025)    Received from [facility]    Food Insecurity     Currently or in the past 3 months, have you worried your food would run out before you had money to buy more?: No     In the past 12 months, have you run out of food or been unable to get more?: No   Transportation Needs: No Transportation Needs (3/10/2025)    Received from [facility]    Transportation Needs     Currently or in the past 3 months, has lack of transportation kept you from medical appointments, getting food or medicine, or providing care to a family member?: Unrecognized value     : Not on file     Medical Transportation Needs?: No     Daily Living Transportation Needs? [Peds Only] : Not on file   Stress: Not on file   Housing Stability: Low Risk  (7/7/2021)    [facility], [facility]    Housing Stability     Mortgage Payment Concerns?: Not on file     Number of Places Lived in the Last Year: Not on file     Unstable Housing?: Not on file       PE:  The patient does appear in her stated age in no distress.  The  patient is well groomed.    Psychiatric:  The patient is alert and oriented x 3.  The patient has a normal affect and mood.      Respiratory:  No acute respiratory distress. Patient does not have a cough.    HEENT:  Extraocular muscles are intact. There is no kern icterus. Pupils are equal, round, and reactive to light. No redness or discharge bilaterally.    Skin:  There are no rashes or lesions.    Vitals:  There were no vitals filed for this visit.    Gait:    Gait: Normal gait   Sit to Stand: no difficulty      Thoracic and Lumbar Spine:    Scoliosis: No scoliosis present     Thoracic and Lumbar Spine Palpation:    Spinous Processes: Tender at T7   Z-joints: Tender at  right T6-7 and right T7-8     Vascular lower extremity:   Dorsalis pedis pulse-RIGHT 2+   Dorsalis pedis pulse-LEFT 2+   Tibialis posterior pulse-RIGHT 2+   Tibialis posterior pulse-LEFT 2+     Neurological Lower Extremity:    Light Touch Sensation: Intact in bilateral Lower Extremities   LE Muscle Strength: All LE strength measurements 5/5 except:  Hamstring RIGHT:   4/5  Extensor Hallucis Longus RIGHT:   4/5   RIGHT plantar reflexes: downward response   LEFT plantar reflexes: downward response   Reflexes: 2+ in bilateral lower extremities     Assessment  1. bilateral S1 chronic radiculopathies    2. History of lumbar fusion: T10-S1 with bilateral SIJ fusions on 3/15/2022    3. T9-10 left mild-mod & right mild bulging disc    4. S/P cervical spinal fusion: C4-5 & C5-6 ACDF    5. C7-T1 mild central herniated disc    6. C3-4 moderate central & left foraminal, C6-7 left mild-moderate foraminal, C7-T1 left mild-moderate foraminal stenosis    7. C2-3 mild-mod diffuse, C3-4 left mod foraminal & mild-moderate diffuse, C6-7 right mild-moderate & left moderate foraminal, C7-T1 left mild foraminal bulging discs    8. Essential hypertension    9. Chronic left shoulder pain and s/p shoulder replacement    10. Vertigo    11. Thoracic facet syndrome: right  T6-7         Plan  She will increase the Lyrica to 50 mg 3 times a day.    She will do the PT on the thoracic spine.    If the PT is not helping the thoracic spine, then I will do right thoracic medial branch blocks at T5 and T6 medial branch nerves for the right T6-7 z-joint.    She will follow up in 3-4 months or sooner if needed.    The patient understands and agrees with the stated plan.  Luke Cleaning MD  3/13/2025

## 2025-03-17 ENCOUNTER — TELEPHONE (OUTPATIENT)
Dept: PHYSICAL THERAPY | Age: 70
End: 2025-03-17

## 2025-03-18 ENCOUNTER — PATIENT MESSAGE (OUTPATIENT)
Dept: PHYSICAL MEDICINE AND REHAB | Facility: CLINIC | Age: 70
End: 2025-03-18

## 2025-03-18 DIAGNOSIS — M47.894 THORACIC FACET SYNDROME: ICD-10-CM

## 2025-03-19 NOTE — TELEPHONE ENCOUNTER
Per patient has completed some sessions of PT.  Patient would like to move forward with right thoracic medial branch blocks at T5 and T6 medial branch nerves for the right T6-7 z-joint.    Per MD: She will do the PT on the thoracic spine.     If the PT is not helping the thoracic spine, then I will do right thoracic medial branch blocks at T5 and T6 medial branch nerves for the right T6-7 z-joint.    Order has been pended for MD: right T5 and T6 medial branch blocks

## 2025-03-25 NOTE — TELEPHONE ENCOUNTER
Patient has been scheduled for Right T5 and T6 medial branch blocks on 4/4/2025 at the [facility] with Dr. Cleaning.   Anesthesia type:  Local  Please note: The [facility] will call the business day prior to discuss the exact time/arrival and additional instructions for your appointment.  Patient was advised that if he/she does receive the covid vaccine it needs to be at least 2 weeks before or after the injection.  Medications and allergies reviewed.  Educated to hold NSAIDS (Aleve, Ibuprofen, Motrin, Advil) and anti-inflammatories (Meloxicam, Naproxen, Diclofenac, Celebrex) and for cervical injections must hold Multi-Vitamins, Vitamin E, Fish Oil/Omega-3.  Patient informed of [facility]'s  policy:  The patient will require transportation arrangements to and from the procedure, with the  present on site for the entire visit.  Without a , the appointment is subject to cancellation.    [facility] is located in the [facility] [address] [address].   may park in the yellow/purple parking lot.  Patient verbalized understanding and agrees with plan.  Scheduled in Epic: Yes  Scheduled in Surgical Case: Yes  Follow up appointment made: NOV: 6/30/2025 Luke Cleaning MD  Authorization dates: N/A

## 2025-03-25 NOTE — TELEPHONE ENCOUNTER
Per CMS Guidelines -no authorization is required for Right T5 and T6 medial branch blocks.  CPT codes: 71270, 71308        Status: Authorization is not required based on medical necessity however is not a guarantee of payment and may be subject to review once claim is submitted.  If this procedure is being performed at Outpatient Hospital/[facility]- authorization is required, please notify this writer.

## 2025-04-10 ENCOUNTER — TELEPHONE (OUTPATIENT)
Dept: SURGERY | Facility: CLINIC | Age: 70
End: 2025-04-10

## 2025-04-10 NOTE — TELEPHONE ENCOUNTER
Pt called.  History of kidney stones.   Advised not to take calcium supplements.    Calcium carbonate in pt's multivitamin.  Please call

## 2025-04-10 NOTE — TELEPHONE ENCOUNTER
-S/w pt; identity verified with name & .  -Pt question posed to Dr. Georges; recommends pt continue taking Calcium supplements.  -Encounter complete.

## 2025-04-21 ENCOUNTER — TELEPHONE (OUTPATIENT)
Dept: PHYSICAL MEDICINE AND REHAB | Facility: CLINIC | Age: 70
End: 2025-04-21

## 2025-04-21 NOTE — TELEPHONE ENCOUNTER
Patient wanting to be seen sooner due to worsening neuropathy in her hands and feet.    Appt scheduled for 5/12/25.    Nothing further needed at this time.

## 2025-04-21 NOTE — TELEPHONE ENCOUNTER
Spoke with patient who has only been taking Lyrica 50 mg twice a day. Patient will increase to three times a day as was discussed with  at last office visit.     Patient will continue to keep our office updated on her condition.

## 2025-05-12 ENCOUNTER — OFFICE VISIT (OUTPATIENT)
Dept: PHYSICAL MEDICINE AND REHAB | Facility: CLINIC | Age: 70
End: 2025-05-12
Payer: MEDICARE

## 2025-05-12 VITALS — BODY MASS INDEX: 21.72 KG/M2 | WEIGHT: 140 LBS | HEIGHT: 67.5 IN

## 2025-05-12 DIAGNOSIS — M51.24 HERNIATED THORACIC DISC WITHOUT MYELOPATHY: ICD-10-CM

## 2025-05-12 DIAGNOSIS — M47.814 ARTHROPATHY OF THORACIC FACET JOINT: ICD-10-CM

## 2025-05-12 DIAGNOSIS — M47.812 CERVICAL FACET SYNDROME: ICD-10-CM

## 2025-05-12 DIAGNOSIS — Z87.19 HX OF GASTROESOPHAGEAL REFLUX (GERD): ICD-10-CM

## 2025-05-12 DIAGNOSIS — Z98.1 S/P CERVICAL SPINAL FUSION: ICD-10-CM

## 2025-05-12 DIAGNOSIS — F41.9 ANXIETY: ICD-10-CM

## 2025-05-12 DIAGNOSIS — M50.20 CERVICAL HERNIATED DISC: ICD-10-CM

## 2025-05-12 DIAGNOSIS — G62.9 SENSORY NEUROPATHY: ICD-10-CM

## 2025-05-12 DIAGNOSIS — M48.02 CERVICAL SPINAL STENOSIS: ICD-10-CM

## 2025-05-12 DIAGNOSIS — M50.90 CERVICAL DISC DISEASE: ICD-10-CM

## 2025-05-12 DIAGNOSIS — I10 ESSENTIAL HYPERTENSION: ICD-10-CM

## 2025-05-12 DIAGNOSIS — Z98.1 HISTORY OF LUMBAR FUSION: ICD-10-CM

## 2025-05-12 DIAGNOSIS — M51.9 THORACIC DISC DISEASE: Primary | ICD-10-CM

## 2025-05-12 DIAGNOSIS — M47.812 ARTHROPATHY OF CERVICAL FACET JOINT: ICD-10-CM

## 2025-05-12 PROCEDURE — 99214 OFFICE O/P EST MOD 30 MIN: CPT | Performed by: PHYSICAL MEDICINE & REHABILITATION

## 2025-05-12 NOTE — PATIENT INSTRUCTIONS
Plan  She will see neurology to make sure that she does not have a treatable cause for her peripheral neuropathy.  I reviewed through her chart and did a search through the medical literature during this office visit and could not discern which other testing would be appropriate for her.    If there is no other medical reason for the peripheral neuropathy, then I will continue to monitor her cervical spine as a possible cause for the tingling.    The patient will continue with her home exercise program.    She will let me know if the tightness of the muscles returns and she would be in need of future trigger point injections.    She sill follow up as scheduled.    The patient will continue with their current pain medications.    Patient should call prior to next visit if new or worsening symptoms.

## 2025-05-12 NOTE — PROGRESS NOTES
[facility]    Chief Complaint:    Chief Complaint   Patient presents with    Follow - Up     LOV 03/13/25 Patient is here to follow up on back pain. Patient states the pain is pins and needles that comes and goes, Pain 3/10. Admits N/T. Denies weakness. Denies PT. Denies pain medication and muscle relaxer's.      Nursing note reviewed and verified.    Patient was last seen on 3/13/2025.  She has completed the PT on the cervical and thoracic spine.  She feels that she is about 50% better.  She is still stiff and sore.  On 4/4/2025, I did the right T5 and T6 MBB's which did not help.  She feels that dry needling in this area did help.  She has a HEP which she is doing.      She still has the tingling in the tips of the toes and fingers.  She has been taking the Lyrica 50 mg TID which will take the edge off.  This tingling just started.  She did not have any of this prior to or after any of her prior surgeries.  The tingling is mostly in the left 2nd and 3rd fingers.  The 2 big toes are the most affected in the feet.    Description of the Pain  The pain is located in the bilateral base of the skull.    The pain radiates to bilateral occiput area of the head , bilateral frontal area of the head, bilateral thoracic spine, and bilateral scapula.  The pain is currently  5/10.  The pain is described as a(n) tightness sensation.         Past Medical History   Past Medical History[1]    Past Surgical History   Past Surgical History[2]    Family History   Family History[3]    Social History   Social History     Socioeconomic History    Marital status:      Spouse name: Not on file    Number of children: Not on file    Years of education: Not on file    Highest education level: Not on file   Occupational History    Not on file   Tobacco Use    Smoking status: Never    Smokeless tobacco: Never   Vaping Use    Vaping status: Never Used   Substance and Sexual Activity    Alcohol use: No     Comment: quit 4 - 5 yrs  ago due to pancreatitis    Drug use: No    Sexual activity: Not on file   Other Topics Concern     Service Not Asked    Blood Transfusions Not Asked    Caffeine Concern Yes     Comment: Coffee 2 cups daily    Occupational Exposure Not Asked    Hobby Hazards Not Asked    Sleep Concern Not Asked    Stress Concern Not Asked    Weight Concern Not Asked    Special Diet Not Asked    Back Care Not Asked    Exercise No    Bike Helmet Not Asked    Seat Belt Not Asked    Self-Exams Not Asked   Social History Narrative    The patient does not use an assistive device..      The patient does live in a home with stairs.     Social Drivers of Health     Food Insecurity: No Food Insecurity (3/10/2025)    Received from [facility]    Food Insecurity     Currently or in the past 3 months, have you worried your food would run out before you had money to buy more?: No     In the past 12 months, have you run out of food or been unable to get more?: No   Transportation Needs: No Transportation Needs (3/10/2025)    Received from [facility]    Transportation Needs     Currently or in the past 3 months, has lack of transportation kept you from medical appointments, getting food or medicine, or providing care to a family member?: No     Non-Medical Transportation Needs?: Not on file     Medical Transportation Needs?: No     Daily Living Transportation Needs? [Peds Only] : Not on file   Stress: Not on file   Housing Stability: Low Risk  (7/7/2021)    Received from [facility]    Housing Stability     Mortgage Payment Concerns?: Not on file     Number of Places Lived in the Last Year: Not on file     Unstable Housing?: Not on file       PE:  The patient does appear in her stated age in no distress.  The patient is well groomed.    Psychiatric:  The patient is alert and oriented x 3.  The patient has a normal affect and mood.      Respiratory:  No acute respiratory distress.  Patient does not have a cough.    HEENT:  Extraocular muscles are intact. There is no kern icterus. Pupils are equal, round, and reactive to light. No redness or discharge bilaterally.    Skin:  There are no rashes or lesions. Surgical scars are healed.    Lymph Nodes:  The patient has no palpable submandibular, supraclavicular, and cervical lymph nodes..    Vitals:  There were no vitals filed for this visit.    Cervical Spine:    Posture: moderate chin forward superiorly rotated protracted shoulder posture.   Shoulders: Level   Head: In neutral   Spinous Processes Palpations: Non-tender for all Spinous Processes   Z-Joints Palpations: Tender at  right C2-3 and left C6-7   Muscular Palpations: Non-tender to palpation.     Vascular upper extremity:   Right radial pulses: 2+   Left radial pulses: 2+      Neurological Upper Extremity:    Light Touch: Intact in Bilateral upper extremities.   Pin Prick: Not tested.   UE Muscle Strength: All Upper Extremity strength measurements 5/5 except:  FDI Right: 3+/5  FDI Left: 4-/5  ADM Right: 3+/5  ADM Left: 4-/5  Shoulder external rotators Right: 4/5  Shoulder external rotators Left: 4/5  Serratus anterior Right: 4/5  Serratus anterior Left: 4-/5   Reflexes: 2+ In the bilateral upper extremities.   Montero's sign Right: Negative   Montero's sigh Left: Negative     Assessment  1. T9-10 left mild-mod & right mild bulging disc    2. T2-3 mild central HNP    3. S/P cervical spinal fusion: C4-5 & C5-6 ACDF    4. History of lumbar fusion: T10-S1 with bilateral SIJ fusions on 3/15/2022    5. Essential hypertension    6. Cervical facet syndrome: bilateral C6-7 and C3-4    7. C7-T1 mild central herniated disc    8. C2-3 mild-mod diffuse, C3-4 left mod foraminal & mild-moderate diffuse, C6-7 right mild-moderate & left moderate foraminal, C7-T1 left mild foraminal bulging discs    9. C3-4 moderate central & left foraminal, C6-7 left mild-moderate foraminal, C7-T1 left mild-moderate  foraminal stenosis    10. Arthropathy of thoracic facet joint    11. Arthropathy of cervical facet joint    12. Anxiety    13. Hx of gastroesophageal reflux (GERD)       Plan  She will see neurology to make sure that she does not have a treatable cause for her peripheral neuropathy.  I reviewed through her chart and did a search through the medical literature during this office visit and could not discern which other testing would be appropriate for her.    If there is no other medical reason for the peripheral neuropathy, then I will continue to monitor her cervical spine as a possible cause for the tingling.    The patient will continue with her home exercise program.    She will let me know if the tightness of the muscles returns and she would be in need of future trigger point injections.    She sill follow up as scheduled.    The patient will continue with their current pain medications.    Patient should call prior to next visit if new or worsening symptoms.     The patient understands and agrees with the stated plan.    Luke Cleaning MD  5/12/2025             [1]   Past Medical History:   Anemia    Anxiety state, unspecified    Arthritis    Back problem    CERVICAL RADICULOPATHY, CERVICAL SPINAL STENOSIS    Bilateral kidney stones    Broken foot    RT - casting. Fracture 5th met. Cast removl/xray foot 4-20-12. Follow up fracture right foot 5-22-12.     Calculus of kidney    Cataract    Depression    Esophageal reflux    Essential hypertension    Gastric polyps    Gastritis    High blood pressure    History of stomach ulcers    Hx of motion sickness    hx of vertigo    Idiopathic acute pancreatitis (HCC)    Insomnia    Internal hemorrhoids without complication    Intestinal disorder    Left wrist fracture    Muscle weakness    Osteoarthritis    Osteoporosis    Other chronic pancreatitis (HCC)    Renal disorder    Rotator cuff tear, right    Traumatic arthritis    Vertigo   [2]   Past Surgical  History:  Procedure Laterality Date    Arthroscopy of joint unlisted Right 2012    rotator cuff tear    Back surgery  2008 & 3-15-22    2 procedures          x 3    Cataract      Cholecystectomy      Colonoscopy      Colonoscopy N/A 2018    Procedure: COLONOSCOPY;  Surgeon: Virginie Ma MD;  Location: [facility]    Cysto/uretero w/lithotripsy Left 2024    Excis lumbar disk,one level      Eye surgery      Lasik procedure    Fracture surgery Left     WRIST FRACTURE ORIF    Fracture surgery Left     ORIF wrist fracture revision with plates and screws- Ortho Dr rBan Horan      Other surgical history  2017    Fussion L4-L5 - Dr. Rothman    Other surgical history  03/15/2022    Back Surgery    Other surgical history Left     torn biceops repair    Shoulder surg proc unlisted Right     Spine surgery procedure unlisted      L1-L2 FUSION    Spine surgery procedure unlisted  2017    Neck fusion    Tonsillectomy      with Adenoidectomy    Total shoulder replacement Left     Tubal ligation  ?    Upper gi endoscopy,exam      EGD    [3]   Family History  Problem Relation Age of Onset    Pulmonary Disease Father     Dementia Father             Heart Disease Mother     Fibromyalgia Mother             Colon Cancer Maternal Grandfather     Cancer Maternal Grandfather         Colon    Polyps Sister         colon polyps    Colon Cancer Sister     Other (gallbladder disease) Sister     Cancer Sister         Colon    Crohn's Disease Other     Heart Disease Other     Ovarian Cancer Maternal Aunt         70's 80's    Breast Cancer Neg

## 2025-06-02 ENCOUNTER — PATIENT MESSAGE (OUTPATIENT)
Dept: SURGERY | Facility: CLINIC | Age: 70
End: 2025-06-02

## 2025-06-03 ENCOUNTER — OFFICE VISIT (OUTPATIENT)
Dept: NEUROLOGY | Facility: CLINIC | Age: 70
End: 2025-06-03
Payer: MEDICARE

## 2025-06-03 ENCOUNTER — PATIENT MESSAGE (OUTPATIENT)
Dept: NEUROLOGY | Facility: CLINIC | Age: 70
End: 2025-06-03

## 2025-06-03 ENCOUNTER — LAB ENCOUNTER (OUTPATIENT)
Dept: LAB | Facility: HOSPITAL | Age: 70
End: 2025-06-03
Attending: Other
Payer: MEDICARE

## 2025-06-03 DIAGNOSIS — G56.21 ULNAR NEUROPATHY AT ELBOW OF RIGHT UPPER EXTREMITY: ICD-10-CM

## 2025-06-03 DIAGNOSIS — G57.01 NEUROPATHY OF RIGHT SCIATIC NERVE: ICD-10-CM

## 2025-06-03 DIAGNOSIS — M54.12 CERVICAL RADICULOPATHY: ICD-10-CM

## 2025-06-03 DIAGNOSIS — M51.9 THORACIC DISC DISEASE: ICD-10-CM

## 2025-06-03 DIAGNOSIS — M47.812 CERVICAL FACET SYNDROME: ICD-10-CM

## 2025-06-03 DIAGNOSIS — M50.20 CERVICAL HERNIATED DISC: ICD-10-CM

## 2025-06-03 DIAGNOSIS — M50.90 CERVICAL DISC DISEASE: ICD-10-CM

## 2025-06-03 DIAGNOSIS — G62.9 POLYNEUROPATHY: Primary | ICD-10-CM

## 2025-06-03 DIAGNOSIS — G62.9 POLYNEUROPATHY: ICD-10-CM

## 2025-06-03 DIAGNOSIS — M54.16 LUMBAR RADICULOPATHY: ICD-10-CM

## 2025-06-03 DIAGNOSIS — M79.18 MYOFASCIAL PAIN: ICD-10-CM

## 2025-06-03 DIAGNOSIS — M48.02 CERVICAL SPINAL STENOSIS: ICD-10-CM

## 2025-06-03 PROBLEM — M17.12 PRIMARY OSTEOARTHRITIS OF LEFT KNEE: Status: ACTIVE | Noted: 2025-06-03

## 2025-06-03 PROBLEM — M17.11 PRIMARY OSTEOARTHRITIS OF RIGHT KNEE: Status: ACTIVE | Noted: 2025-06-03

## 2025-06-03 PROBLEM — I10 HTN (HYPERTENSION): Status: ACTIVE | Noted: 2021-10-18

## 2025-06-03 LAB
EST. AVERAGE GLUCOSE BLD GHB EST-MCNC: 114 MG/DL (ref 68–126)
FOLATE SERPL-MCNC: 46.2 NG/ML (ref 5.4–?)
HBA1C MFR BLD: 5.6 % (ref ?–5.7)
VIT B12 SERPL-MCNC: 886 PG/ML (ref 211–911)

## 2025-06-03 PROCEDURE — 83921 ORGANIC ACID SINGLE QUANT: CPT

## 2025-06-03 PROCEDURE — G2211 COMPLEX E/M VISIT ADD ON: HCPCS | Performed by: OTHER

## 2025-06-03 PROCEDURE — 36415 COLL VENOUS BLD VENIPUNCTURE: CPT

## 2025-06-03 PROCEDURE — 82607 VITAMIN B-12: CPT | Performed by: OTHER

## 2025-06-03 PROCEDURE — 99204 OFFICE O/P NEW MOD 45 MIN: CPT | Performed by: OTHER

## 2025-06-03 PROCEDURE — 82746 ASSAY OF FOLIC ACID SERUM: CPT

## 2025-06-03 PROCEDURE — 83516 IMMUNOASSAY NONANTIBODY: CPT

## 2025-06-03 PROCEDURE — 83036 HEMOGLOBIN GLYCOSYLATED A1C: CPT

## 2025-06-03 PROCEDURE — 86037 ANCA TITER EACH ANTIBODY: CPT

## 2025-06-03 PROCEDURE — 86334 IMMUNOFIX E-PHORESIS SERUM: CPT

## 2025-06-03 RX ORDER — PREGABALIN 75 MG/1
75 CAPSULE ORAL 2 TIMES DAILY
Qty: 180 CAPSULE | Refills: 3 | Status: SHIPPED | OUTPATIENT
Start: 2025-06-03

## 2025-06-03 RX ORDER — PERPHENAZINE 16 MG
TABLET ORAL
Qty: 90 CAPSULE | Refills: 3 | Status: SHIPPED | OUTPATIENT
Start: 2025-06-03 | End: 2025-06-05

## 2025-06-03 NOTE — PROGRESS NOTES
[facility] [address], [address]  [facility] [address]  [phone]            Neurology Initial Visit     Referred By: Dr. Valdivia ref. provider found    Chief Complaint:   Chief Complaint   Patient presents with    Neurologic Problem     Patient presents here today to establish care, patient was referred by Sherly Hirsch MD to evaluate and treat Tremor/Never issues. Patient c/o numbness on toes with burning and tingling, since 6 months.   Patient gives verbal consent to use [facility].          HPI:        Radha Stoner is a 69 year old female with a history of spinal fusion who presents with symptoms of peripheral neuropathy. She was referred by Dr. Chris for evaluation of her peripheral neuropathy.    She experiences intermittent burning, tingling, and buzzing sensations in her toes, which have become more frequent and now include her big toe and occasionally her fingers. The burning sensation is more pronounced at night and sometimes disrupts her sleep.    She has a history of spinal fusion from T10 to S1 with elizabeth and screws and receives cortisone injections for back pain. She also has periodic neck issues that are monitored.    An EMG confirmed peripheral neuropathy. She is prescribed Lyrica, initially twice a day, with the option to increase to three times a day. Lyrica sometimes alleviates her symptoms, but at other times it seems ineffective.    She has tried various remedies, including capsaicin cream, but is cautious about supplements due to a past diagnosis of pancreatitis. She avoids alcohol and supplements to prevent aggravating her pancreas.    Her B12 and magnesium levels are normal. She experiences soreness in her feet, which is relieved by removing her shoes. Wearing sandals can cause sharp pain.    She has a history of restless legs, previously managed with ropinirole, but currently controlled through exercise and massage.    She is concerned about the  progression of her symptoms and the impact on her daily life, noting that she has friends her age who do not experience similar issues.      Past Medical History[1]    Past Surgical History[2]    Social history:  History   Smoking Status    Never   Smokeless Tobacco    Never     History   Alcohol Use No     Comment: quit 4 - 5 yrs ago due to pancreatitis     History   Drug Use No       Family History[3]    Medications - Current[4]    Allergies[5]    ROS:   As in HPI, the rest of the 14 system review was done and was negative      Physical Exam:  There were no vitals filed for this visit.    General: No apparent distress, well nourished, well groomed.  Head- Normocephalic, atraumatic  Eyes- No redness or swelling  ENT- Hearing intake, normal glutition  Neck- No masses or adenopathy  Cv: pulses were palpable and normal, no cyanosis or edema     Neurological:     Mental Status- Alert and oriented x3.  Normal attention span and concentration  Thought process intact  Memory intact- recent and remote  Mood intact  Fund of knowledge appropriate for education and age    Language intact including: comprehension, naming, repetition, vocabulary    Cranial Nerves:    VII. Face symmetric, no facial weakness  VIII. Hearing intact to whisper.  IX. Pallet elevates symmetrically.  XI. Shoulder shrug is intact  XII. Tongue is midline    Motor Exam:  Muscle tone normal  No atrophy or fasciculations  Strength- upper extremities 5/5 proximally and distally                  - lower  extremities 5/5 proximally and distally    Sensory Exam:  Light touch sensation- intact in all 4 extremities    Deep Tendon Reflexes:  Biceps 2+ bilateral symmetric  Triceps 2+ bilateral symmetric  Brachioradialis 2 + bilateral symmetric  Patellar 2+ bilateral symmetric  Ankle jerk 1 bilateral symmetric    No clonus  No Babinski sign    Coordination:  Finger to nose intact  Rapid alternating movements intact    Gait:  Normal posture  Normal physiologic       Labs:    Lab Results   Component Value Date    TSH 1.756 03/27/2024     Lab Results   Component Value Date    HDL 77 (H) 03/27/2024    LDL 97 03/27/2024    TRIG 51 03/27/2024     Lab Results   Component Value Date    HGB 13.4 12/24/2024    HCT 40.6 12/24/2024    MCV 94.0 12/24/2024    WBC 10.2 12/24/2024    .0 12/24/2024      Lab Results   Component Value Date    BUN 16 12/24/2024    CA 9.0 12/24/2024    ALT 17 12/24/2024    AST 28 12/24/2024    ALKPHOS 41 05/27/2016    ALB 4.3 12/24/2024     12/24/2024    K 4.4 12/24/2024     (H) 12/24/2024    CO2 24.0 12/24/2024      I have reviewed labs.    Imaging Studies:  I have independently reviewed imaging.  EMG tracings were independently reviewed, slightly diminished sensory responses of sural nerve on the right side noted.        Assessment   1. Polyneuropathy    - ANCA Panel Vasculitis; Future  - Immunofixation (ANN); Future  - Vitamin B12  - Hemoglobin A1C; Future  - Folic Acid Serum (Folate); Future  - Methylmalonic Acid, Serum; Future         Peripheral neuropathy  Peripheral neuropathy with burning, tingling, and buzzing sensations, primarily in toes and occasionally fingers, more pronounced at night. Previous EMG indicated mild neuropathy. Likely idiopathic. Focus on symptom management.  - Increase Lyrica to 75 mg twice daily or adjust to 50 mg in the morning and 100 mg at night if symptoms are worse at night.  - Order blood work to check vitamin B12 and folic acid levels.  Additional blood will be done as well.  - Consider alpha lipoic acid supplement if comfortable, but be cautious due to pancreatitis.  - Use capsaicin cream for topical relief, applying more to increase effect.  - Encourage regular exercise, especially walking.          Education and counseling provided to patient. Instructed patient to call my office or seek medical attention immediately if symptoms worsen.  Patient verbalized understanding of information given. All  questions were answered. All side effects of drugs were discussed.       Return to clinic in: No follow-ups on file.    Heriberto Funes MD           [1]   Past Medical History:   Anemia    Anxiety state, unspecified    Arthritis    Back problem    CERVICAL RADICULOPATHY, CERVICAL SPINAL STENOSIS    Bilateral kidney stones    Broken foot    RT - casting. Fracture 5th met. Cast removl/xray foot 12. Follow up fracture right foot 12.     Calculus of kidney    Cataract    Depression    Esophageal reflux    Essential hypertension    Gastric polyps    Gastritis    High blood pressure    History of stomach ulcers    Hx of motion sickness    hx of vertigo    Idiopathic acute pancreatitis (HCC)    Insomnia    Internal hemorrhoids without complication    Intestinal disorder    Left wrist fracture    Muscle weakness    Osteoarthritis    Osteoporosis    Other chronic pancreatitis (HCC)    Renal disorder    Rotator cuff tear, right    Traumatic arthritis    Vertigo   [2]   Past Surgical History:  Procedure Laterality Date    Arthroscopy of joint unlisted Right 2012    rotator cuff tear    Back surgery  2008 & 3-15-22    2 procedures          x 3    Cataract      Cholecystectomy      Colonoscopy      Colonoscopy N/A 2018    Procedure: COLONOSCOPY;  Surgeon: Virginie Ma MD;  Location: [facility] ENDO    Cysto/uretero w/lithotripsy Left 2024    Excis lumbar disk,one level      Eye surgery      Lasik procedure    Fracture surgery Left     WRIST FRACTURE ORIF    Fracture surgery Left     ORIF wrist fracture revision with plates and screws- Ortho Dr Bran Horan      Other surgical history  2017    Fussion L4-L5 - Dr. Rothman    Other surgical history  03/15/2022    Back Surgery    Other surgical history Left     torn biceops repair    Shoulder surg proc unlisted Right     Spine surgery procedure unlisted      L1-L2 FUSION    Spine surgery procedure unlisted   2017    Neck fusion    Tonsillectomy      with Adenoidectomy    Total shoulder replacement Left     Tubal ligation  ?    Upper gi endoscopy,exam      EGD    [3]   Family History  Problem Relation Age of Onset    Pulmonary Disease Father     Dementia Father             Heart Disease Mother     Fibromyalgia Mother             Colon Cancer Maternal Grandfather     Cancer Maternal Grandfather         Colon    Polyps Sister         colon polyps    Colon Cancer Sister     Other (gallbladder disease) Sister     Cancer Sister         Colon    Crohn's Disease Other     Heart Disease Other     Ovarian Cancer Maternal Aunt         70's 80's    Breast Cancer Neg    [4]   Current Outpatient Medications:     pregabalin 75 MG Oral Cap, Take 1 capsule (75 mg total) by mouth 2 (two) times daily., Disp: 180 capsule, Rfl: 3    Alpha-Lipoic Acid 600 MG Oral Cap, 1 pill daily, Disp: 90 capsule, Rfl: 3    sucralfate 1 g Oral Tab, , Disp: , Rfl:     meclizine 12.5 MG Oral Tab, Take 1 tablet (12.5 mg total) by mouth 3 (three) times daily as needed. 1 to 2 tablets., Disp: 30 tablet, Rfl: 0    Multiple Vitamins-Minerals (MULTI-VITAMIN/MINERALS) Oral Tab, Take 1 tablet by mouth daily., Disp: , Rfl:     amLODIPine 10 MG Oral Tab, Take 1 tablet (10 mg total) by mouth daily., Disp: 90 tablet, Rfl: 3    latanoprost 0.005 % Ophthalmic Solution, 1 drop Before Dinner., Disp: , Rfl:     estradiol 0.1 MG/GM Vaginal Cream, Place 0.5 g vaginally twice a week., Disp: 42.5 g, Rfl: 10    meclizine 25 MG Oral Tab, Take 1 tablet (25 mg total) by mouth 3 (three) times daily as needed., Disp: 30 tablet, Rfl: 1    pantoprazole 40 MG Oral Tab EC, Take 1 tablet (40 mg total) by mouth every morning before breakfast., Disp: , Rfl:     Acidophilus/Pectin Oral Cap, Take 1 capsule by mouth daily., Disp: , Rfl:     Famotidine (PEPCID OR), Take 20 mg by mouth. Daily PRN , Disp: , Rfl:     Cholecalciferol 25 MCG (1000 UT) Oral Tab, Take by  mouth daily., Disp: , Rfl:     Diclofenac Sodium 1 % Transdermal Gel, Apply 4 g topically 4 (four) times daily as needed., Disp: 1 Tube, Rfl: 3    CALCIUM CITRATE OR, Take 1,400 mg by mouth daily. BID, Disp: , Rfl:   [5]   Allergies  Allergen Reactions    Aleve NAUSEA AND VOMITING    Levaquin [Levofloxacin] RASH and DIZZINESS    Bactrim [Sulfamethoxazole W/Trimethoprim] PAIN    Codeine NAUSEA AND VOMITING    Fentanyl NAUSEA ONLY and OTHER (SEE COMMENTS)     Headaches and nausea with higher dose of Fentanyl ( 150 mcg ) - pt. Requesting no more than 150mcg, does well with 100mcg    Hydrocodone NAUSEA AND VOMITING    Morphine NAUSEA AND VOMITING    Nsaids NAUSEA AND VOMITING and PAIN     GASTRITIS, stomach ache, burning sensation    Oxycodone ITCHING and NAUSEA AND VOMITING    Tramadol Hcl ITCHING    Augmentin, [Amoxicillin-Pot Clavulanate] OTHER (SEE COMMENTS)     Abdominal pain  No other reactions - no skin peeling/blisters/organ damage

## 2025-06-04 PROBLEM — S43.422A SPRAIN OF LEFT ROTATOR CUFF CAPSULE: Status: ACTIVE | Noted: 2025-03-06

## 2025-06-04 RX ORDER — NEOMYCIN SULFATE, POLYMYXIN B SULFATE, AND DEXAMETHASONE 3.5; 10000; 1 MG/G; [USP'U]/G; MG/G
OINTMENT OPHTHALMIC
COMMUNITY
Start: 2024-12-20 | End: 2025-06-05

## 2025-06-04 RX ORDER — MUPIROCIN 20 MG/G
OINTMENT TOPICAL
COMMUNITY
Start: 2025-03-04 | End: 2025-06-05

## 2025-06-04 RX ORDER — CYCLOSPORINE 0.5 MG/ML
1 EMULSION OPHTHALMIC EVERY 12 HOURS
COMMUNITY
Start: 2025-02-28 | End: 2025-06-05

## 2025-06-04 RX ORDER — ERYTHROMYCIN 5 MG/G
OINTMENT OPHTHALMIC
COMMUNITY
Start: 2024-09-17 | End: 2025-06-05

## 2025-06-04 RX ORDER — DICYCLOMINE HCL 20 MG
1 TABLET ORAL 4 TIMES DAILY
COMMUNITY
Start: 2024-12-23

## 2025-06-05 ENCOUNTER — OFFICE VISIT (OUTPATIENT)
Dept: INTERNAL MEDICINE CLINIC | Facility: CLINIC | Age: 70
End: 2025-06-05
Payer: MEDICARE

## 2025-06-05 VITALS
HEART RATE: 86 BPM | OXYGEN SATURATION: 97 % | HEIGHT: 67.5 IN | WEIGHT: 142.38 LBS | DIASTOLIC BLOOD PRESSURE: 74 MMHG | BODY MASS INDEX: 22.09 KG/M2 | SYSTOLIC BLOOD PRESSURE: 122 MMHG | TEMPERATURE: 97 F

## 2025-06-05 DIAGNOSIS — Z00.00 ROUTINE HEALTH MAINTENANCE: ICD-10-CM

## 2025-06-05 DIAGNOSIS — Z80.0 FAMILY HISTORY OF COLON CANCER: ICD-10-CM

## 2025-06-05 DIAGNOSIS — Z12.31 ENCOUNTER FOR SCREENING MAMMOGRAM FOR MALIGNANT NEOPLASM OF BREAST: Primary | ICD-10-CM

## 2025-06-05 DIAGNOSIS — M81.0 AGE-RELATED OSTEOPOROSIS WITHOUT CURRENT PATHOLOGICAL FRACTURE: ICD-10-CM

## 2025-06-05 DIAGNOSIS — I10 PRIMARY HYPERTENSION: ICD-10-CM

## 2025-06-05 DIAGNOSIS — G25.81 RESTLESS LEGS SYNDROME (RLS): ICD-10-CM

## 2025-06-05 DIAGNOSIS — Z83.719 FAMILY HISTORY OF COLONIC POLYPS: ICD-10-CM

## 2025-06-05 DIAGNOSIS — I10 ESSENTIAL HYPERTENSION: ICD-10-CM

## 2025-06-05 LAB
ANTI-MPO ANTIBODIES: <0.2 UNITS
ANTI-PR3 ANTIBODIES: <0.2 UNITS

## 2025-06-05 RX ORDER — AMLODIPINE BESYLATE 10 MG/1
10 TABLET ORAL DAILY
Qty: 90 TABLET | Refills: 3 | Status: SHIPPED | OUTPATIENT
Start: 2025-06-05

## 2025-06-05 NOTE — PROGRESS NOTES
Radha Stoner is a 69 year old female.  Chief Complaint   Patient presents with    Well Adult     Medicare Physical        HPI:   Radha Stoner is a 69 year old female who presents for a Medicare annual complete physical exam and follow of chronic dx.    Seeing Dr. Cleaning PM&R  Mentioned tingling in toes  Had EMG - confirmed peripheral neuropathy  B12, Mg was normal  Started on Lyrica which she uses prn  Referred to neuro Dr. Funes    Walking for exercise.    Two of her grandkids are getting  6 days apart in August.      Wt Readings from Last 6 Encounters:   06/05/25 142 lb 6.4 oz (64.6 kg)   05/12/25 140 lb (63.5 kg)   03/26/25 140 lb (63.5 kg)   03/13/25 138 lb (62.6 kg)   02/11/25 138 lb (62.6 kg)   12/26/24 139 lb 9.6 oz (63.3 kg)     Body mass index is 21.97 kg/m².       Current Outpatient Medications   Medication Sig Dispense Refill    cephalexin 250 MG Oral Cap Take 1 capsule (250 mg total) by mouth every 8 (eight) hours.      dicyclomine 20 MG Oral Tab Take 1 tablet (20 mg total) by mouth 4 (four) times daily.      pregabalin 75 MG Oral Cap Take 1 capsule (75 mg total) by mouth 2 (two) times daily. 180 capsule 3    sucralfate 1 g Oral Tab       meclizine 12.5 MG Oral Tab Take 1 tablet (12.5 mg total) by mouth 3 (three) times daily as needed. 1 to 2 tablets. 30 tablet 0    Multiple Vitamins-Minerals (MULTI-VITAMIN/MINERALS) Oral Tab Take 1 tablet by mouth daily.      amLODIPine 10 MG Oral Tab Take 1 tablet (10 mg total) by mouth daily. 90 tablet 3    latanoprost 0.005 % Ophthalmic Solution 1 drop Before Dinner.      estradiol 0.1 MG/GM Vaginal Cream Place 0.5 g vaginally twice a week. 42.5 g 10    pantoprazole 40 MG Oral Tab EC Take 1 tablet (40 mg total) by mouth every morning before breakfast.      Acidophilus/Pectin Oral Cap Take 1 capsule by mouth daily.      Famotidine (PEPCID OR) Take 20 mg by mouth. Daily PRN       Cholecalciferol 25 MCG (1000 UT) Oral Tab Take by mouth daily.      Diclofenac  Sodium 1 % Transdermal Gel Apply 4 g topically 4 (four) times daily as needed. 1 Tube 3    CALCIUM CITRATE OR Take 1,400 mg by mouth daily. BID      cycloSPORINE 0.05 % Ophthalmic Emulsion Place 1 drop into both eyes in the morning and 1 drop in the evening. (Patient not taking: Reported on 6/5/2025)      erythromycin 5 MG/GM Ophthalmic Ointment Apply small amount inside lower eyelids QHS PRN (Patient not taking: Reported on 6/5/2025)      mupirocin 2 % External Ointment APPLY TOPICALLY TO LEG WOUND TWICE DAILY FOR ANTIBIOTIC (Patient not taking: Reported on 6/5/2025)      neomycin-polymyxin-dexamethasone 3.5-42782-9.1 Ophthalmic Ointment  (Patient not taking: Reported on 6/5/2025)      Alpha-Lipoic Acid 600 MG Oral Cap 1 pill daily (Patient not taking: Reported on 6/5/2025) 90 capsule 3    meclizine 25 MG Oral Tab Take 1 tablet (25 mg total) by mouth 3 (three) times daily as needed. (Patient not taking: Reported on 6/5/2025) 30 tablet 1      Past Medical History:    Anemia    Anxiety state, unspecified    Arthritis    Back problem    CERVICAL RADICULOPATHY, CERVICAL SPINAL STENOSIS    Bilateral kidney stones    Broken foot    RT - casting. Fracture 5th met. Cast removl/xray foot 4-20-12. Follow up fracture right foot 5-22-12.     Calculus of kidney    Cataract    Depression    Esophageal reflux    Essential hypertension    Gastric polyps    Gastritis    High blood pressure    History of stomach ulcers    Hx of motion sickness    hx of vertigo    Idiopathic acute pancreatitis (HCC)    Insomnia    Internal hemorrhoids without complication    Intestinal disorder    Left wrist fracture    Muscle weakness    Osteoarthritis    Osteoporosis    Other chronic pancreatitis (HCC)    Renal disorder    Rotator cuff tear, right    Traumatic arthritis    Vertigo      Past Surgical History:   Procedure Laterality Date    Arthroscopy of joint unlisted Right 06/2012    rotator cuff tear    Back surgery  9/2008 & 3-15-22    2  procedures          x 3    Cataract      Cholecystectomy      Colonoscopy      Colonoscopy N/A 2018    Procedure: COLONOSCOPY;  Surgeon: Virginie Ma MD;  Location: [facility]    Cysto/uretero w/lithotripsy Left 2024    Excis lumbar disk,one level      Eye surgery      Lasik procedure    Fracture surgery Left     WRIST FRACTURE ORIF    Fracture surgery Left     ORIF wrist fracture revision with plates and screws- Ortho Dr Bran Horan      Other surgical history  2017    Fussion L4-L5 - Dr. Rothman    Other surgical history  03/15/2022    Back Surgery    Other surgical history Left     torn biceops repair    Shoulder surg proc unlisted Right     Spine surgery procedure unlisted      L1-L2 FUSION    Spine surgery procedure unlisted  2017    Neck fusion    Tonsillectomy      with Adenoidectomy    Total shoulder replacement Left     Tubal ligation  ?    Upper gi endoscopy,exam      EGD       Family History   Problem Relation Age of Onset    Pulmonary Disease Father     Dementia Father             Heart Disease Mother     Fibromyalgia Mother             Colon Cancer Maternal Grandfather     Cancer Maternal Grandfather         Colon    Polyps Sister         colon polyps    Colon Cancer Sister     Other (gallbladder disease) Sister     Cancer Sister         Colon    Crohn's Disease Other     Heart Disease Other     Ovarian Cancer Maternal Aunt         70's 80's    Breast Cancer Neg       Social History:   Social History     Socioeconomic History    Marital status:    Tobacco Use    Smoking status: Never    Smokeless tobacco: Never   Vaping Use    Vaping status: Never Used   Substance and Sexual Activity    Alcohol use: No     Comment: quit 4 - 5 yrs ago due to pancreatitis    Drug use: No   Other Topics Concern    Caffeine Concern Yes     Comment: Coffee 2 cups daily    Exercise No   Social History Narrative    The patient  does not use an assistive device..      The patient does live in a home with stairs.     Social Drivers of Health     Food Insecurity: No Food Insecurity (3/10/2025)    Received from [facility]    Food Insecurity     Currently or in the past 3 months, have you worried your food would run out before you had money to buy more?: No     In the past 12 months, have you run out of food or been unable to get more?: No   Transportation Needs: No Transportation Needs (3/10/2025)    Received from [facility]    Transportation Needs     Currently or in the past 3 months, has lack of transportation kept you from medical appointments, getting food or medicine, or providing care to a family member?: No    Received from [facility]    Housing Stability            General Health     In the past six months, have you lost more than 10 pounds without trying?: (Patient-Rptd) 2 - No    Has your appetite been poor?: (Patient-Rptd) No    Type of Diet: (Patient-Rptd) Balanced, Low Salt, Low Carb    How does the patient maintain a good energy level?: (Patient-Rptd) Daily Walks, Stretching    How would you describe your daily physical activity?: (Patient-Rptd) Light    How would you describe your current health state?: (Patient-Rptd) Fair    How do you maintain positive mental well-being?: (Patient-Rptd) Visiting Friends, Visiting Family         Have you had any immunizations at another office such as Influenza, Hepatitis B, Tetanus, or Pneumococcal?: (Patient-Rptd) No     Functional Ability     Bathing or Showering: (Patient-Rptd) Able without help    Toileting: (Patient-Rptd) Able without help    Dressing: (Patient-Rptd) Able without help    Eating: (Patient-Rptd) Able without help    Driving: (Patient-Rptd) Able without help    Preparing your meals: (Patient-Rptd) Able without help    Managing money/bills: (Patient-Rptd) Able without help    Taking medications as prescribed:  (Patient-Rptd) Able without help    Are you able to afford your medications?: (Patient-Rptd) Yes    Hearing Problems?: (Patient-Rptd) No     Functional Status     Hearing Problems?: (Patient-Rptd) No    Vision Problems? : (Patient-Rptd) Yes    Difficulty walking?: (Patient-Rptd) Yes    Difficulty dressing or bathing?: (Patient-Rptd) No    Problems with daily activities? : (Patient-Rptd) Yes    Memory Problems?: (Patient-Rptd) Yes      Fall/Risk Assessment                                                              Depression Screening (PHQ-2/PHQ-9): Over the LAST 2 WEEKS                   PHQ-9 Depression Screen     1. Little interest or pleasure in doing things: Not at all  2. Feeling down, depressed, or hopeless: Several days  3.    4.    5.    6.    7.    8.    9.                 Advance Directives     Do you have a healthcare power of ?: (Patient-Rptd) Yes    Do you have a living will?: (Patient-Rptd) Yes     Hearing Assessment (Required for AWV/SWV)      Hearing Screening    Screening Method: Questionnaire  I have a problem hearing over the telephone: No I have trouble following the conversations when two or more people are talking at the same time: No   I have trouble understanding things on the TV: No I have to strain to understand conversations: No   I have to worry about missing the telephone ring or doorbell: No I have trouble hearing conversations in a noisy background such as a crowded room or restaurant: No   I get confused about where sounds come from: No I misunderstand some words in a sentence and need to ask people to repeat themselves: No   I especially have trouble understanding the speech of women and children: No I have trouble understanding the speaker in a large room such as at a meeting or place of Oriental orthodox: No   Many people I talk to seem to mumble (or don't speak clearly): Sometimes People get annoyed because I misunderstand what they say: No   I misunderstand what others are saying  and make inappropriate responses: No I avoid social activities because I cannot hear well and fear I will reply improperly: No   Family members and friends have told me they think I may have hearing loss: No             Visual Acuity                   Cognitive Assessment     What day of the week is this?: Correct    What month is it?: Correct    What year is it?: Correct    Recall \"Ball\": Correct    Recall \"Flag\": Correct    Recall \"Tree\": Correct        Radha A Day's SCREENING SCHEDULE   Tests on this list are recommended by your physician but may not be covered, or covered at this frequency, by your insurer. Please check with your insurance carrier before scheduling to verify coverage.   PREVENTATIVE SERVICES  INDICATIONS AND SCHEDULE Internal Lab or Procedure External Lab or Procedure   Diabetes Screening      HbgA1C   Annually HgbA1C (%)   Date Value   06/03/2025 5.6         No data to display                Fasting Blood Sugar (FSB)Annually Glucose (mg/dL)   Date Value   12/24/2024 104 (H)         No data to display               Cardiovascular Disease Screening     LDL Annually LDL Cholesterol (mg/dL)   Date Value   03/27/2024 97        EKG One Time y    Colorectal Cancer Screening      Colonoscopy Screen every 10 years Health Maintenance   Topic Date Due    Colorectal Cancer Screening  10/24/2028    Update Health Maintenance if applicable    Flex Sigmoidoscopy Screen every 5 years No results found. However, due to the size of the patient record, not all encounters were searched. Please check Results Review for a complete set of results.      No data to display                 Fecal Occult Blood Annually No results found for: \"FOB\", \"OCCULTSTOOL\"      No data to display                Glaucoma Screening      Ophthalmology Visit Annually y    Bone Density Screening      Dexascan Every two years Last Dexa Scan:    XR DEXA BONE DENSITOMETRY (CPT=77080) 06/27/2024        No data to display               Pap and  Pelvic      Pap: Every 3 yrs age 21-65 or Pap+HPV every 5 yrs age 30-65, age 65 and older at high risk   Health Maintenance   Topic Date Due    Pap Smear  03/06/2023    Update Health Maintenance if applicable    Chlamydia  Annually if high risk No results found for: \"CHLAMYDIA\"      No data to display                Screening Mammogram      Mammogram  Annually Health Maintenance   Topic Date Due    Mammogram  07/24/2025    Update Health Maintenance if applicable   Immunizations      Influenza No results found. However, due to the size of the patient record, not all encounters were searched. Please check Results Review for a complete set of results. Update Immunization Activity if applicable    Pneumococcal Orders placed or performed in visit on 04/14/21    PNEUMOCOCCAL IMM, 23     *Note: Due to a large number of results and/or encounters for the requested time period, some results have not been displayed. A complete set of results can be found in Results Review.    Update Immunization Activity if applicable    Hepatitis B No results found. However, due to the size of the patient record, not all encounters were searched. Please check Results Review for a complete set of results. Update Immunization Activity if applicable    Tetanus No results found. However, due to the size of the patient record, not all encounters were searched. Please check Results Review for a complete set of results. Update Immunization Activity if applicable    Zoster (Not covered by Medicare Part B) No results found. However, due to the size of the patient record, not all encounters were searched. Please check Results Review for a complete set of results. Update Immunization Activity if applicable     SPECIFIC DISEASE MONITORING Internal Lab or Procedure External Lab or Procedure   Annual Monitoring of Persistent     Medications (ACE/ARB, digoxin, diuretics)    Potassium  Annually Potassium (mmol/L)   Date Value   12/24/2024 4.4     POTASSIUM  (P) (mmol/L)   Date Value   05/11/2016 3.9         No data to display                Creatinine  Annually Creatinine (mg/dL)   Date Value   12/24/2024 0.76         No data to display                Digoxin Serum Conc  Annually No results found for: \"DIGOXIN\"      No data to display                Diabetes      HgbA1C  Annually HgbA1C (%)   Date Value   06/03/2025 5.6         No data to display                Creat/alb ratio  Annually      LDL  Annually LDL Cholesterol (mg/dL)   Date Value   03/27/2024 97         No data to display                 Dilated Eye exam  Annually      No data to display                   No data to display                COPD      Spirometry Testing Annually No results found. However, due to the size of the patient record, not all encounters were searched. Please check Results Review for a complete set of results.      No data to display                        REVIEW OF SYSTEMS:   GENERAL: feels well otherwise  SKIN: denies any unusual skin lesions  EYES:denies blurred vision or double vision  HEENT: denies nasal congestion, sinus pain or ST  LUNGS: denies shortness of breath with exertion or cough  CARDIOVASCULAR: denies chest pain, pressure, or palpitations  GI: denies abdominal pain, nausea, vomiting, diarrhea, constipation, hematochezia, or melena  NEURO: denies headaches or dizziness    EXAM:   /74   Pulse 86   Temp 97.2 °F (36.2 °C) (Temporal)   Ht 5' 7.5\" (1.715 m)   Wt 142 lb 6.4 oz (64.6 kg)   SpO2 97%   BMI 21.97 kg/m²     GENERAL: well developed, well nourished, in no apparent distress  HEENT: normal oropharynx, normal TM's  EYES: PERRLA, EOMI, conjunctivae are pink  NECK: supple, no cervical or supraclavicular LAD, no carotid bruits  BREAST: no dominant or suspicious mass, no axillary LAD  LUNGS: clear to auscultation  CARDIO: RRR, normal S1S2, no gallops or murmurs  GI: soft, mild epigastric tenderness, ND, NABS, no HSM  EXTREMITIES: no cyanosis, clubbing or edema,  +2 DP pulses        ASSESSMENT AND PLAN:     Routine health maintenance  Mammo ordered (last in 7/24/24)  Shingrix shingles vaccines (2019)  Td 12/2014.  Rec Tdap -- to get at pharmacy  Prevnar 13 in 2/20/20.  PPSV23 4/14/21.    Family history of colonic cancer (sister)  Colonoscopy 12/4/18 (Dr. Fisher) -- no polyps.   Colonoscopy 10/24/23 (Dr. Quinonez) -- no polyps (+int hem, diverticulosis)  Next in 5 years (10/2028)     Esophageal reflux, gastritis  Chronic upper abd pains; prev saw GI Dr. Fisher.  EGD 12/4/2018 -- gastritis. MRI abdomen/MRCP in 9/2019.   -seeing GI Dr. Quinonez.    -on Protonix with improvement in sx.     Hypertension  Stable on amlodipine 10mg/day.      Osteopenia  - intolerant to Fosamax (gastritis).    -s/p Reclast IV in 5/2017 and 5/2018 -- repeat DEXA showed progression of bone loss.   -Seeing endocrine Dr. Umana.  PTHi elevated in 5/2019 then normal (10/2019).  Parathyroid scan neg for parathyroid adenoma in 5/2019 (Dr. Umana).   -s/p Evenity (romosozumab) SC [id number] in 1/2020; treated x 1 year.    -DEXA 6/5/20 (ordered by ortho Dr. Abdirizak Brown) -- osteoporosis (T-2.6) of left femoral neck (improved).   -now on Prolia q6 mos thru Dr. Umana endocrine.  -last DEXA 6/2024     Atrophic vaginitis, postmenopausal  Uses Estrace cream about twice weekly    Hx of L1-L-2 fusion (2008 Dr. Rothman)  Hx of T10-pelvis PSIF (post spinal instrumentation/fusion) 3/15/22 ([place], Dr. Trotter)    Chronic neck pain  Sees Dr. Cleaning  Has had MATT    Peripheral neuropathy  -saw Dr. Cleaning  -EMG -- confirmed periph neuropathy  -5/2024 -- normal ANN/SPEP/light chains; normal B12, ESR, folate.  -saw neuro Dr. Funes 6/2025  -takes PRN Lyrica     Bilateral knee OA  Still seeing Dr. Ramírez -- knows she will eventually knee TKR's.    Prediabetes/IGT  HgbA1c 5.6 in 6/2025    Hx of nephrolithiasis  -calcium oxalate stone in 2018 (passed)  -6 mm nonobstr left ureteral stone s/p left ureteroscopy, laser lithotripsy, stone removal  on 4/5/24 by Dr. Georges (stent removed 4/18/24)    Insomnia, primary  -longstanding sx  -exacerbated by chronic back pain, anxiety  -minimal improvement with melatonin, tart cherry juice  -good response to trazodone      RTC 1 yr.    Sherly Hirsch MD, 06/05/25, 2:54 PM

## 2025-06-08 LAB — METHYLMALONIC ACID: 148 NMOL/L

## 2025-06-09 ENCOUNTER — OFFICE VISIT (OUTPATIENT)
Dept: OTOLARYNGOLOGY | Facility: CLINIC | Age: 70
End: 2025-06-09

## 2025-06-09 VITALS — WEIGHT: 142 LBS | BODY MASS INDEX: 22.03 KG/M2 | HEIGHT: 67.5 IN

## 2025-06-09 DIAGNOSIS — R05.1 ACUTE COUGH: ICD-10-CM

## 2025-06-09 DIAGNOSIS — J01.90 ACUTE SINUSITIS, RECURRENCE NOT SPECIFIED, UNSPECIFIED LOCATION: Primary | ICD-10-CM

## 2025-06-09 PROCEDURE — 99213 OFFICE O/P EST LOW 20 MIN: CPT | Performed by: SPECIALIST

## 2025-06-09 RX ORDER — DOXYCYCLINE 100 MG/1
100 CAPSULE ORAL 2 TIMES DAILY
Qty: 20 CAPSULE | Refills: 0 | Status: SHIPPED | OUTPATIENT
Start: 2025-06-09

## 2025-06-09 NOTE — TELEPHONE ENCOUNTER
Peripheral polyneuropathy is a condition that affects the peripheral nerves, which are the nerves outside of the brain and spinal cord. These nerves help control things like sensation and muscle movement. When someone has polyneuropathy, it means that many of these peripheral nerves are not working properly.    The term \"idiopathic\" means that the exact cause of the condition is unknown. Despite thorough testing and evaluation, sometimes we cannot determine why the nerves are affected. This can be frustrating, but it is not uncommon. There are many potential causes of peripheral neuropathy, such as diabetes, infections, or exposure to toxins, but in idiopathic cases, none of these causes are identified.    Even though we might not know the exact cause, we can still manage the symptoms and work on improving your quality of life. This might include medications to help with nerve pain, physical therapy to maintain muscle strength, and other supportive measures.    It's understandable to be concerned about how different positions might affect your nerves, especially if you're experiencing discomfort or pain. However, it's important to know that there isn't a specific position that places undue stress on your nerve endings in a way that would cause damage or worsen a condition like peripheral neuropathy.    Our bodies are designed to move and adapt to various positions throughout the day. While certain positions might temporarily increase discomfort due to pressure or reduced blood flow, they don't typically cause harm to the nerves themselves. It's always a good idea to change positions regularly and find what feels most comfortable for you.    If you notice that certain positions consistently cause discomfort, it might be helpful to adjust your posture or use supportive cushions or pillows.     If you have any questions or need more information, feel free to ask. I'm here to help you understand and manage your  condition as best as possible.

## 2025-06-10 NOTE — PATIENT INSTRUCTIONS
You were placed on doxycycline for your purulent postnasal drainage and cough.  Please call or follow-up if your symptoms do not resolve.

## 2025-06-10 NOTE — PROGRESS NOTES
Radha Stoner is a 69 year old female.   Chief Complaint   Patient presents with    Sinus Problem     Sinus issues     HPI:   Patient here for severe left frontal sinus pressure as well as hoarseness and cough    Current Medications[1]   Past Medical History[2]   Social History:  Short Social Hx on File[3]     REVIEW OF SYSTEMS:   GENERAL HEALTH: feels well otherwise  GENERAL : denies fever, chills, sweats, weight loss, weight gain  SKIN: denies any unusual skin lesions or rashes  RESPIRATORY: denies shortness of breath with exertion  NEURO: denies headaches    EXAM:   Ht 5' 7.5\" (1.715 m)   Wt 142 lb (64.4 kg)   BMI 21.91 kg/m²   System Details   Skin Inspection - Normal.   Constitutional Overall appearance - Normal.   Head/Face Facial features - Normal. Eyebrows - Normal. Skull - Normal.   Eyes Conjunctiva - Right: Normal, Left: Normal. Pupil - Right: Normal, Left: Normal.    Ears Inspection - Right: Normal, Left: Normal.   Canal - Right: Normal, Left: Normal.   TM - Right: Normal, Left: Normal.   Nasal External nose - Normal.   Nasal septum - Normal.  Turbinates -inflamed turbinates.  Purulent postnasal drainage   Oral/Oropharynx Lips - Normal, Tonsils -absent, tongue - Normal    Neck Exam Inspection - Normal. Palpation - Normal. Parotid gland - Normal. Thyroid gland - Normal.   Lymph Detail Submental. Submandibular. Anterior cervical. Posterior cervical. Supraclavicular all without enlargement   Lungs Clear to auscultation   Psychiatric Orientation - Oriented to time, place, person & situation. Appropriate mood and affect.   Neurological Memory - Normal. Cranial nerves - Cranial nerves II through XII grossly intact.     ASSESSMENT AND PLAN:   1. Acute sinusitis, recurrence not specified, unspecified location  Patient placed on doxycycline.  She should call or follow-up with any additional questions or problems.    2. Acute cough  Lungs clear to auscultation.      The patient indicates understanding of these  issues and agrees to the plan.      Kayce Morris MD  6/9/2025  8:21 PM       [1]   Current Outpatient Medications   Medication Sig Dispense Refill    doxycycline 100 MG Oral Cap Take 1 capsule (100 mg total) by mouth 2 (two) times daily. 20 capsule 0    amLODIPine 10 MG Oral Tab Take 1 tablet (10 mg total) by mouth daily. 90 tablet 3    cephalexin 250 MG Oral Cap Take 1 capsule (250 mg total) by mouth every 8 (eight) hours.      dicyclomine 20 MG Oral Tab Take 1 tablet (20 mg total) by mouth 4 (four) times daily.      pregabalin 75 MG Oral Cap Take 1 capsule (75 mg total) by mouth 2 (two) times daily. 180 capsule 3    sucralfate 1 g Oral Tab       meclizine 12.5 MG Oral Tab Take 1 tablet (12.5 mg total) by mouth 3 (three) times daily as needed. 1 to 2 tablets. 30 tablet 0    Multiple Vitamins-Minerals (MULTI-VITAMIN/MINERALS) Oral Tab Take 1 tablet by mouth daily.      latanoprost 0.005 % Ophthalmic Solution 1 drop Before Dinner.      estradiol 0.1 MG/GM Vaginal Cream Place 0.5 g vaginally twice a week. 42.5 g 10    pantoprazole 40 MG Oral Tab EC Take 1 tablet (40 mg total) by mouth every morning before breakfast.      Acidophilus/Pectin Oral Cap Take 1 capsule by mouth daily.      Famotidine (PEPCID OR) Take 20 mg by mouth. Daily PRN       Cholecalciferol 25 MCG (1000 UT) Oral Tab Take by mouth daily.      Diclofenac Sodium 1 % Transdermal Gel Apply 4 g topically 4 (four) times daily as needed. 1 Tube 3    CALCIUM CITRATE OR Take 1,400 mg by mouth daily. BID     [2]   Past Medical History:   Anemia    Anxiety state, unspecified    Arthritis    Back problem    CERVICAL RADICULOPATHY, CERVICAL SPINAL STENOSIS    Bilateral kidney stones    Broken foot    RT - casting. Fracture 5th met. Cast removl/xray foot 4-20-12. Follow up fracture right foot 5-22-12.     Calculus of kidney    Cataract    Depression    Esophageal reflux    Essential hypertension    Gastric polyps    Gastritis    High blood pressure    History of  stomach ulcers    Hx of motion sickness    hx of vertigo    Idiopathic acute pancreatitis (HCC)    Insomnia    Internal hemorrhoids without complication    Intestinal disorder    Left wrist fracture    Muscle weakness    Osteoarthritis    Osteoporosis    Other chronic pancreatitis (HCC)    Renal disorder    Rotator cuff tear, right    Traumatic arthritis    Vertigo   [3]   Social History  Socioeconomic History    Marital status:    Tobacco Use    Smoking status: Never    Smokeless tobacco: Never   Vaping Use    Vaping status: Never Used   Substance and Sexual Activity    Alcohol use: No     Comment: quit 4 - 5 yrs ago due to pancreatitis    Drug use: No   Other Topics Concern    Caffeine Concern Yes     Comment: Coffee 2 cups daily    Exercise No   Social History Narrative    The patient does not use an assistive device..      The patient does live in a home with stairs.     Social Drivers of Health     Food Insecurity: No Food Insecurity (3/10/2025)    Received from [facility]    Food Insecurity     Currently or in the past 3 months, have you worried your food would run out before you had money to buy more?: No     In the past 12 months, have you run out of food or been unable to get more?: No   Transportation Needs: No Transportation Needs (3/10/2025)    Received from [facility]    Transportation Needs     Currently or in the past 3 months, has lack of transportation kept you from medical appointments, getting food or medicine, or providing care to a family member?: No    Received from [facility]    Housing Stability

## 2025-06-19 ENCOUNTER — TELEPHONE (OUTPATIENT)
Dept: INTERNAL MEDICINE CLINIC | Facility: CLINIC | Age: 70
End: 2025-06-19

## 2025-06-19 NOTE — TELEPHONE ENCOUNTER
Patient called to advise Dr. Hirsch that she is planning on seeing Dr. Godfrey on Tuesday 6/24 after seeing Dr. Morris if her existing cough is not related to her sinus issue.     Patient is currently on Doxycycline, 1 day left, prescribed by Dr. Kayce Morris.     Patient will cancel appointment if Dr. Morris states cough is due to sinus issue.     Patient is open to Dr. Hirsch's input.     Patient's #[phone]

## 2025-06-21 ENCOUNTER — LAB ENCOUNTER (OUTPATIENT)
Dept: LAB | Facility: HOSPITAL | Age: 70
End: 2025-06-21
Attending: INTERNAL MEDICINE
Payer: MEDICARE

## 2025-06-21 DIAGNOSIS — I10 ESSENTIAL HYPERTENSION: ICD-10-CM

## 2025-06-21 LAB
ALBUMIN SERPL-MCNC: 4.7 G/DL (ref 3.2–4.8)
ALBUMIN/GLOB SERPL: 2 {RATIO} (ref 1–2)
ALP LIVER SERPL-CCNC: 41 U/L (ref 55–142)
ALT SERPL-CCNC: 18 U/L (ref 10–49)
ANION GAP SERPL CALC-SCNC: 5 MMOL/L (ref 0–18)
AST SERPL-CCNC: 23 U/L (ref ?–34)
BASOPHILS # BLD AUTO: 0.05 X10(3) UL (ref 0–0.2)
BASOPHILS NFR BLD AUTO: 1 %
BILIRUB SERPL-MCNC: 0.5 MG/DL (ref 0.2–1.1)
BUN BLD-MCNC: 16 MG/DL (ref 9–23)
BUN/CREAT SERPL: 18.6 (ref 10–20)
CALCIUM BLD-MCNC: 9.4 MG/DL (ref 8.7–10.4)
CHLORIDE SERPL-SCNC: 108 MMOL/L (ref 98–112)
CHOLEST SERPL-MCNC: 198 MG/DL (ref ?–200)
CO2 SERPL-SCNC: 29 MMOL/L (ref 21–32)
CREAT BLD-MCNC: 0.86 MG/DL (ref 0.55–1.02)
DEPRECATED RDW RBC AUTO: 46.6 FL (ref 35.1–46.3)
EGFRCR SERPLBLD CKD-EPI 2021: 73 ML/MIN/1.73M2 (ref 60–?)
EOSINOPHIL # BLD AUTO: 0.25 X10(3) UL (ref 0–0.7)
EOSINOPHIL NFR BLD AUTO: 4.9 %
ERYTHROCYTE [DISTWIDTH] IN BLOOD BY AUTOMATED COUNT: 13.5 % (ref 11–15)
FASTING PATIENT LIPID ANSWER: YES
FASTING STATUS PATIENT QL REPORTED: YES
GLOBULIN PLAS-MCNC: 2.3 G/DL (ref 2–3.5)
GLUCOSE BLD-MCNC: 83 MG/DL (ref 70–99)
HCT VFR BLD AUTO: 42.6 % (ref 35–48)
HDLC SERPL-MCNC: 78 MG/DL (ref 40–59)
HGB BLD-MCNC: 13.8 G/DL (ref 12–16)
IMM GRANULOCYTES # BLD AUTO: 0.01 X10(3) UL (ref 0–1)
IMM GRANULOCYTES NFR BLD: 0.2 %
LDLC SERPL CALC-MCNC: 106 MG/DL (ref ?–100)
LYMPHOCYTES # BLD AUTO: 1.95 X10(3) UL (ref 1–4)
LYMPHOCYTES NFR BLD AUTO: 38.3 %
MCH RBC QN AUTO: 30.5 PG (ref 26–34)
MCHC RBC AUTO-ENTMCNC: 32.4 G/DL (ref 31–37)
MCV RBC AUTO: 94 FL (ref 80–100)
MONOCYTES # BLD AUTO: 0.56 X10(3) UL (ref 0.1–1)
MONOCYTES NFR BLD AUTO: 11 %
NEUTROPHILS # BLD AUTO: 2.27 X10 (3) UL (ref 1.5–7.7)
NEUTROPHILS # BLD AUTO: 2.27 X10(3) UL (ref 1.5–7.7)
NEUTROPHILS NFR BLD AUTO: 44.6 %
NONHDLC SERPL-MCNC: 120 MG/DL (ref ?–130)
OSMOLALITY SERPL CALC.SUM OF ELEC: 294 MOSM/KG (ref 275–295)
PLATELET # BLD AUTO: 282 10(3)UL (ref 150–450)
POTASSIUM SERPL-SCNC: 4.8 MMOL/L (ref 3.5–5.1)
PROT SERPL-MCNC: 7 G/DL (ref 5.7–8.2)
RBC # BLD AUTO: 4.53 X10(6)UL (ref 3.8–5.3)
SODIUM SERPL-SCNC: 142 MMOL/L (ref 136–145)
TRIGL SERPL-MCNC: 76 MG/DL (ref 30–149)
TSI SER-ACNC: 1.46 UIU/ML (ref 0.55–4.78)
VLDLC SERPL CALC-MCNC: 13 MG/DL (ref 0–30)
WBC # BLD AUTO: 5.1 X10(3) UL (ref 4–11)

## 2025-06-21 PROCEDURE — 80061 LIPID PANEL: CPT

## 2025-06-21 PROCEDURE — 80053 COMPREHEN METABOLIC PANEL: CPT

## 2025-06-21 PROCEDURE — 84443 ASSAY THYROID STIM HORMONE: CPT | Performed by: INTERNAL MEDICINE

## 2025-06-21 PROCEDURE — 85025 COMPLETE CBC W/AUTO DIFF WBC: CPT

## 2025-06-21 PROCEDURE — 36415 COLL VENOUS BLD VENIPUNCTURE: CPT

## 2025-06-23 ENCOUNTER — OFFICE VISIT (OUTPATIENT)
Dept: OTOLARYNGOLOGY | Facility: CLINIC | Age: 70
End: 2025-06-23
Payer: MEDICARE

## 2025-06-23 DIAGNOSIS — J01.90 ACUTE SINUSITIS, RECURRENCE NOT SPECIFIED, UNSPECIFIED LOCATION: Primary | ICD-10-CM

## 2025-06-23 DIAGNOSIS — R05.1 ACUTE COUGH: ICD-10-CM

## 2025-06-23 PROCEDURE — 99213 OFFICE O/P EST LOW 20 MIN: CPT | Performed by: SPECIALIST

## 2025-06-24 ENCOUNTER — OFFICE VISIT (OUTPATIENT)
Dept: INTERNAL MEDICINE CLINIC | Facility: CLINIC | Age: 70
End: 2025-06-24
Payer: MEDICARE

## 2025-06-24 VITALS
OXYGEN SATURATION: 98 % | TEMPERATURE: 98 F | WEIGHT: 140 LBS | DIASTOLIC BLOOD PRESSURE: 76 MMHG | SYSTOLIC BLOOD PRESSURE: 124 MMHG | BODY MASS INDEX: 21.72 KG/M2 | HEIGHT: 67.5 IN | HEART RATE: 74 BPM

## 2025-06-24 DIAGNOSIS — R05.2 SUBACUTE COUGH: Primary | ICD-10-CM

## 2025-06-24 DIAGNOSIS — E78.00 ELEVATED CHOLESTEROL: ICD-10-CM

## 2025-06-24 PROCEDURE — 99213 OFFICE O/P EST LOW 20 MIN: CPT | Performed by: INTERNAL MEDICINE

## 2025-06-24 NOTE — PATIENT INSTRUCTIONS
Your sinusitis was markedly improved.  Lungs were clear to auscultation.  Please follow-up with your primary care physician about your cough if it is persistent.

## 2025-06-24 NOTE — PROGRESS NOTES
Radha Stoner is a 69 year old female.  Chief Complaint   Patient presents with    Follow - Up       HPI:   Radha Stoner is a 69 year old female who presents for:    Saw ENT Dr. Morris yesterday for follow up  Cough is overall better but still occasionally there    Also wants to review labs      Wt Readings from Last 6 Encounters:   06/24/25 140 lb (63.5 kg)   06/09/25 142 lb (64.4 kg)   06/05/25 142 lb 6.4 oz (64.6 kg)   05/12/25 140 lb (63.5 kg)   03/26/25 140 lb (63.5 kg)   03/13/25 138 lb (62.6 kg)     Body mass index is 21.6 kg/m².       Current Medications[1]   Past Medical History[2]   Past Surgical History[3]   Family History[4]   Social History:   Short Social Hx on File[5]       REVIEW OF SYSTEMS:   GENERAL: feels well otherwise    LUNGS: denies shortness of breath with exertion, wheezing, or sputum production. +cough        EXAM:   /76   Pulse 74   Temp 98.2 °F (36.8 °C)   Ht 5' 7.5\" (1.715 m)   Wt 140 lb (63.5 kg)   SpO2 98%   BMI 21.60 kg/m²     GENERAL: well developed, well nourished, in no apparent distress  LUNGS: clear to auscultation bilaterally, no wheezing or rhonchi        ASSESSMENT AND PLAN:     Cough  2. Elevated cholesterol    Likely residual from recent illness  For cholesterol-increase fiber in diet    Khloe Godfrey DO  6/24/2025  9:49 AM         [1]   Current Outpatient Medications   Medication Sig Dispense Refill    tiZANidine 4 MG Oral Tab Take 1 tablet (4 mg total) by mouth 3 (three) times daily.      amLODIPine 10 MG Oral Tab Take 1 tablet (10 mg total) by mouth daily. 90 tablet 3    sucralfate 1 g Oral Tab       meclizine 12.5 MG Oral Tab Take 1 tablet (12.5 mg total) by mouth 3 (three) times daily as needed. 1 to 2 tablets. 30 tablet 0    Multiple Vitamins-Minerals (MULTI-VITAMIN/MINERALS) Oral Tab Take 1 tablet by mouth daily.      latanoprost 0.005 % Ophthalmic Solution 1 drop Before Dinner.      estradiol 0.1 MG/GM Vaginal Cream Place 0.5 g vaginally twice a week. 42.5  g 10    pantoprazole 40 MG Oral Tab EC Take 1 tablet (40 mg total) by mouth every morning before breakfast.      Acidophilus/Pectin Oral Cap Take 1 capsule by mouth daily.      Famotidine (PEPCID OR) Take 20 mg by mouth. Daily PRN       Cholecalciferol 25 MCG (1000 UT) Oral Tab Take by mouth daily.      Diclofenac Sodium 1 % Transdermal Gel Apply 4 g topically 4 (four) times daily as needed. 1 Tube 3    CALCIUM CITRATE OR Take 1,400 mg by mouth daily. BID      pregabalin 75 MG Oral Cap Take 1 capsule (75 mg total) by mouth 2 (two) times daily. (Patient not taking: Reported on 2025) 180 capsule 3   [2]   Past Medical History:   Anemia    Anxiety state, unspecified    Arthritis    Back problem    CERVICAL RADICULOPATHY, CERVICAL SPINAL STENOSIS    Bilateral kidney stones    Broken foot    RT - casting. Fracture 5th met. Cast removl/xray foot 12. Follow up fracture right foot 12.     Calculus of kidney    Cataract    Depression    Esophageal reflux    Essential hypertension    Gastric polyps    Gastritis    High blood pressure    History of stomach ulcers    Hx of motion sickness    hx of vertigo    Idiopathic acute pancreatitis (HCC)    Insomnia    Internal hemorrhoids without complication    Intestinal disorder    Left wrist fracture    Muscle weakness    Osteoarthritis    Osteoporosis    Other chronic pancreatitis (HCC)    Peripheral neuropathy    Renal disorder    Rotator cuff tear, right    Traumatic arthritis    Vertigo   [3]   Past Surgical History:  Procedure Laterality Date    Arthroscopy of joint unlisted Right 2012    rotator cuff tear    Back surgery  2008 & 3-15-22    2 procedures          x 3    Cataract      Cholecystectomy      Colonoscopy      Colonoscopy N/A 2018    Procedure: COLONOSCOPY;  Surgeon: Virginie Ma MD;  Location: [facility] ENDO    Cysto/uretero w/lithotripsy Left 2024    Excis lumbar disk,one level      Eye surgery      Lasik  procedure    Fracture surgery Left     WRIST FRACTURE ORIF    Fracture surgery Left     ORIF wrist fracture revision with plates and screws- Ortho Dr Bran Horan      Other surgical history  2017    Fussion L4-L5 - Dr. Rothman    Other surgical history  03/15/2022    Back Surgery    Other surgical history Left     torn biceops repair    Shoulder surg proc unlisted Right     Spine surgery procedure unlisted      L1-L2 FUSION    Spine surgery procedure unlisted  2017    Neck fusion    Tonsillectomy      with Adenoidectomy    Total shoulder replacement Left     Tubal ligation  ?    Upper gi endoscopy,exam      EGD    [4]   Family History  Problem Relation Age of Onset    Pulmonary Disease Father     Dementia Father             Heart Disease Mother     Fibromyalgia Mother             Colon Cancer Maternal Grandfather     Cancer Maternal Grandfather         Colon    Polyps Sister         colon polyps    Colon Cancer Sister     Other (gallbladder disease) Sister     Cancer Sister         Colon    Crohn's Disease Other     Heart Disease Other     Ovarian Cancer Maternal Aunt         70's 80's    Breast Cancer Neg    [5]   Social History  Socioeconomic History    Marital status:    Tobacco Use    Smoking status: Never    Smokeless tobacco: Never   Vaping Use    Vaping status: Never Used   Substance and Sexual Activity    Alcohol use: No     Comment: quit 4 - 5 yrs ago due to pancreatitis    Drug use: No   Other Topics Concern    Caffeine Concern Yes     Comment: Coffee 2 cups daily    Exercise No   Social History Narrative    The patient does not use an assistive device..      The patient does live in a home with stairs.     Social Drivers of Health     Food Insecurity: No Food Insecurity (3/10/2025)    Received from [facility]    Food Insecurity     Currently or in the past 3 months, have you worried your food would run out before you had money  to buy more?: No     In the past 12 months, have you run out of food or been unable to get more?: No   Transportation Needs: No Transportation Needs (3/10/2025)    Received from [facility]    Transportation Needs     Currently or in the past 3 months, has lack of transportation kept you from medical appointments, getting food or medicine, or providing care to a family member?: No    Received from [facility]    Housing Stability

## 2025-06-24 NOTE — PROGRESS NOTES
Radha Stoner is a 69 year old female.   Chief Complaint   Patient presents with    Follow - Up     Patient is here due to sinus infection follow up, reports improvement in symptoms      HPI:   Patient here took doxycycline for her sinusitis and cough.  Sinusitis feels better cough markedly improved but not resolved    Current Medications[1]   Past Medical History[2]   Social History:  Short Social Hx on File[3]     REVIEW OF SYSTEMS:   GENERAL HEALTH: feels well otherwise  GENERAL : denies fever, chills, sweats, weight loss, weight gain  SKIN: denies any unusual skin lesions or rashes  RESPIRATORY: denies shortness of breath with exertion  NEURO: denies headaches    EXAM:   There were no vitals taken for this visit.  System Details   Skin Inspection - Normal.   Constitutional Overall appearance - Normal.   Head/Face Facial features - Normal. Eyebrows - Normal. Skull - Normal.   Eyes Conjunctiva - Right: Normal, Left: Normal. Pupil - Right: Normal, Left: Normal.    Ears Inspection - Right: Normal, Left: Normal.   Canal - Right: Normal, Left: Normal.   TM - Right: Normal, Left: Normal.   Nasal External nose - Normal.   Nasal septum - Normal.  Turbinates - Normal.  No further purulence either side   Oral/Oropharynx Lips - Normal, Tonsils - Normal, Tongue - Normal    Neck Exam Inspection - Normal. Palpation - Normal. Parotid gland - Normal. Thyroid gland - Normal.   Lymph Detail Submental. Submandibular. Anterior cervical. Posterior cervical. Supraclavicular all without enlargement   Lungs Clear to auscultation   Psychiatric Orientation - Oriented to time, place, person & situation. Appropriate mood and affect.   Neurological Memory - Normal. Cranial nerves - Cranial nerves II through XII grossly intact.     ASSESSMENT AND PLAN:   1. Acute sinusitis, recurrence not specified, unspecified location  Solved with doxycycline     2. Acute cough  Lungs clear to auscultation.  Cough still intermittent.  Patient to follow-up  with primary care physician for further workup.      The patient indicates understanding of these issues and agrees to the plan.      Kayce Morris MD  6/23/2025  8:46 PM       [1]   Current Outpatient Medications   Medication Sig Dispense Refill    doxycycline 100 MG Oral Cap Take 1 capsule (100 mg total) by mouth 2 (two) times daily. 20 capsule 0    amLODIPine 10 MG Oral Tab Take 1 tablet (10 mg total) by mouth daily. 90 tablet 3    cephalexin 250 MG Oral Cap Take 1 capsule (250 mg total) by mouth every 8 (eight) hours.      dicyclomine 20 MG Oral Tab Take 1 tablet (20 mg total) by mouth 4 (four) times daily.      pregabalin 75 MG Oral Cap Take 1 capsule (75 mg total) by mouth 2 (two) times daily. 180 capsule 3    sucralfate 1 g Oral Tab       meclizine 12.5 MG Oral Tab Take 1 tablet (12.5 mg total) by mouth 3 (three) times daily as needed. 1 to 2 tablets. 30 tablet 0    Multiple Vitamins-Minerals (MULTI-VITAMIN/MINERALS) Oral Tab Take 1 tablet by mouth daily.      latanoprost 0.005 % Ophthalmic Solution 1 drop Before Dinner.      estradiol 0.1 MG/GM Vaginal Cream Place 0.5 g vaginally twice a week. 42.5 g 10    pantoprazole 40 MG Oral Tab EC Take 1 tablet (40 mg total) by mouth every morning before breakfast.      Acidophilus/Pectin Oral Cap Take 1 capsule by mouth daily.      Famotidine (PEPCID OR) Take 20 mg by mouth. Daily PRN       Cholecalciferol 25 MCG (1000 UT) Oral Tab Take by mouth daily.      Diclofenac Sodium 1 % Transdermal Gel Apply 4 g topically 4 (four) times daily as needed. 1 Tube 3    CALCIUM CITRATE OR Take 1,400 mg by mouth daily. BID     [2]   Past Medical History:   Anemia    Anxiety state, unspecified    Arthritis    Back problem    CERVICAL RADICULOPATHY, CERVICAL SPINAL STENOSIS    Bilateral kidney stones    Broken foot    RT - casting. Fracture 5th met. Cast removl/xray foot 4-20-12. Follow up fracture right foot 5-22-12.     Calculus of kidney    Cataract    Depression    Esophageal  reflux    Essential hypertension    Gastric polyps    Gastritis    High blood pressure    History of stomach ulcers    Hx of motion sickness    hx of vertigo    Idiopathic acute pancreatitis (HCC)    Insomnia    Internal hemorrhoids without complication    Intestinal disorder    Left wrist fracture    Muscle weakness    Osteoarthritis    Osteoporosis    Other chronic pancreatitis (HCC)    Renal disorder    Rotator cuff tear, right    Traumatic arthritis    Vertigo   [3]   Social History  Socioeconomic History    Marital status:    Tobacco Use    Smoking status: Never    Smokeless tobacco: Never   Vaping Use    Vaping status: Never Used   Substance and Sexual Activity    Alcohol use: No     Comment: quit 4 - 5 yrs ago due to pancreatitis    Drug use: No   Other Topics Concern    Caffeine Concern Yes     Comment: Coffee 2 cups daily    Exercise No   Social History Narrative    The patient does not use an assistive device..      The patient does live in a home with stairs.     Social Drivers of Health     Food Insecurity: No Food Insecurity (3/10/2025)    Received from [facility]    Food Insecurity     Currently or in the past 3 months, have you worried your food would run out before you had money to buy more?: No     In the past 12 months, have you run out of food or been unable to get more?: No   Transportation Needs: No Transportation Needs (3/10/2025)    Received from [facility]    Transportation Needs     Currently or in the past 3 months, has lack of transportation kept you from medical appointments, getting food or medicine, or providing care to a family member?: No    Received from [facility]    Housing Stability

## 2025-06-30 ENCOUNTER — OFFICE VISIT (OUTPATIENT)
Dept: PHYSICAL MEDICINE AND REHAB | Facility: CLINIC | Age: 70
End: 2025-06-30
Payer: MEDICARE

## 2025-06-30 VITALS — WEIGHT: 140 LBS | HEIGHT: 67.5 IN | BODY MASS INDEX: 21.72 KG/M2

## 2025-06-30 DIAGNOSIS — Z98.1 HISTORY OF LUMBAR FUSION: ICD-10-CM

## 2025-06-30 DIAGNOSIS — M54.16 LUMBAR RADICULOPATHY: ICD-10-CM

## 2025-06-30 DIAGNOSIS — M50.90 CERVICAL DISC DISEASE: ICD-10-CM

## 2025-06-30 DIAGNOSIS — G56.21 ULNAR NEUROPATHY AT ELBOW OF RIGHT UPPER EXTREMITY: ICD-10-CM

## 2025-06-30 DIAGNOSIS — M25.512 CHRONIC LEFT SHOULDER PAIN: ICD-10-CM

## 2025-06-30 DIAGNOSIS — M54.12 CERVICAL RADICULOPATHY: ICD-10-CM

## 2025-06-30 DIAGNOSIS — G89.29 CHRONIC LEFT SHOULDER PAIN: ICD-10-CM

## 2025-06-30 DIAGNOSIS — M48.02 CERVICAL SPINAL STENOSIS: ICD-10-CM

## 2025-06-30 DIAGNOSIS — Z98.1 S/P CERVICAL SPINAL FUSION: Primary | ICD-10-CM

## 2025-06-30 DIAGNOSIS — M51.9 THORACIC DISC DISEASE: ICD-10-CM

## 2025-06-30 DIAGNOSIS — M47.894 THORACIC FACET SYNDROME: ICD-10-CM

## 2025-06-30 DIAGNOSIS — M50.20 CERVICAL HERNIATED DISC: ICD-10-CM

## 2025-06-30 NOTE — PROGRESS NOTES
Low Back Pain H & P    Chief Complaint:   Chief Complaint   Patient presents with    Follow - Up     LOV 5/12/25 pt is here for a follow up . No n/t. No pain meds or muscle relaxer's. Currently in physical therapy. Inge 5/10     Nursing note reviewed and verified.  I last saw her on 5/12/2025.  She has seen Dr. Funes for the neuropathy and he ordered blood work.    History of Present Illness  Radha Stoner is a 69 year old female with neuropathy and low back pain who presents for management of her symptoms.    She experiences ongoing neuropathy, primarily affecting her toes. She is currently taking Lyrica 50 mg twice daily, which provides some relief. Alternative treatments have been tried with minimal relief, leading her to continue with Lyrica. A comprehensive blood workup on Nadine 3, 2025, including tests for vitamin B12, ANCA, immunofixation, hemoglobin A1c, folic acid, and methylmalonic acid, returned normal results. Despite these findings, she continues to experience neuropathy symptoms, which she finds discouraging.    She also reports low back pain, which she attributes to neglecting exercises for her lower back due to having rods and screws from T10 to S1. Her low back pain is more pronounced when sitting, and she describes numbness in her left buttock. She is starting physical therapy today at [facility] in [place].  She has seen Dr. Fernandez NORTH for this and was told to do the PT which she is starting today.  She was told that if the PT did not help, that she would need to have CT scan of the lumbar spine.    Her social history includes being active in her community and Catholic. She mentions the recent passing of a close friend, which has been emotionally challenging.      Past Medical History   Past Medical History[1]    Past Surgical History   Past Surgical History[2]    Family History   Family History[3]    Social History   Social History     Socioeconomic History    Marital status:      Spouse  name: Not on file    Number of children: Not on file    Years of education: Not on file    Highest education level: Not on file   Occupational History    Not on file   Tobacco Use    Smoking status: Never    Smokeless tobacco: Never   Vaping Use    Vaping status: Never Used   Substance and Sexual Activity    Alcohol use: No     Comment: quit 4 - 5 yrs ago due to pancreatitis    Drug use: No    Sexual activity: Not on file   Other Topics Concern     Service Not Asked    Blood Transfusions Not Asked    Caffeine Concern Yes     Comment: Coffee 2 cups daily    Occupational Exposure Not Asked    Hobby Hazards Not Asked    Sleep Concern Not Asked    Stress Concern Not Asked    Weight Concern Not Asked    Special Diet Not Asked    Back Care Not Asked    Exercise No    Bike Helmet Not Asked    Seat Belt Not Asked    Self-Exams Not Asked   Social History Narrative    The patient does not use an assistive device..      The patient does live in a home with stairs.     Social Drivers of Health     Food Insecurity: No Food Insecurity (6/23/2025)    Received from [facility]    Food Insecurity     Currently or in the past 3 months, have you worried your food would run out before you had money to buy more?: No     In the past 12 months, have you run out of food or been unable to get more?: No   Transportation Needs: No Transportation Needs (6/23/2025)    Received from [facility]    Transportation Needs     Currently or in the past 3 months, has lack of transportation kept you from medical appointments, getting food or medicine, or providing care to a family member?: No     Non-Medical Transportation Needs?: Not on file   Stress: Not on file   Housing Stability: Low Risk  (7/7/2021)    Received from [facility]    Housing Stability     Mortgage Payment Concerns?: Not on file     Number of Places Lived in the Last Year: Not on file     Unstable Housing?: Not on file        PE:  The patient does appear in her stated age in no distress.  The patient is well groomed.    Psychiatric:  The patient is alert and oriented x 3.  The patient has a normal affect and mood.      Respiratory:  No acute respiratory distress. Patient does not have a cough.    HEENT:  Extraocular muscles are intact. There is no kern icterus. Pupils are equal, round, and reactive to light. No redness or discharge bilaterally.    Skin:  There are no rashes or lesions. Surgical scars are healed.    Vitals:  There were no vitals filed for this visit.    Gait:    Gait: Normal gait   Sit to Stand: no difficulty      Lumbar Spine:    Scoliosis: No scoliosis present     Lumbar Spine Palpation:    Spinous Processes: Tender at L5 and S1   Z-joints: Tender at  right L5-S1   SIJ: Tender at right SIJ   Piriformis Muscle: Non-tender bilateral Piriformis muscles   Greater Trochanteric Bursa: Non-tender for bilateral Greater Trochanteric Bursa     Vascular lower extremity:   Dorsalis pedis pulse-RIGHT 2+   Dorsalis pedis pulse-LEFT 2+   Tibialis posterior pulse-RIGHT 2+   Tibialis posterior pulse-LEFT 2+     Neurological Lower Extremity:    Light Touch Sensation: Intact in bilateral Lower Extremities   LE Muscle Strength: All LE strength measurements 5/5 except:  Hamstring RIGHT:   4+/5  Hamstring LEFT:   4+/5  Hip Flexion RIGHT:  4+/5  Extensor Hallucis Longus RIGHT:   4+/5  Extensor Hallucis Longus LEFT:   4+/5   RIGHT plantar reflexes: downward response   LEFT plantar reflexes: downward response   Reflexes: 2+ in bilateral lower extremities except:  Left patellar tendon which are 1+     Assessment  1. S/P cervical spinal fusion: C4-5 & C5-6 ACDF    2. right chronic C8 radiculopathy and left C6 radiculitis    3. C2-3 mild-mod diffuse, C3-4 left mod foraminal & mild-moderate diffuse, C6-7 right mild-moderate & left moderate foraminal, C7-T1 left mild foraminal bulging discs    4. C3-4 moderate central & left foraminal, C6-7  left mild-moderate foraminal, C7-T1 left mild-moderate foraminal stenosis    5. C7-T1 mild central herniated disc    6. History of lumbar fusion: T10-S1 with bilateral SIJ fusions on 3/15/2022    7. T9-10 left mild-mod & right mild bulging disc    8. Thoracic facet syndrome: right T6-7    9. Ulnar neuropathy at elbow of right upper extremity    10. Chronic left shoulder pain and s/p shoulder replacement    11. bilateral S1 chronic radiculopathies and right L3 radiculopathy clinically         Plan  Assessment & Plan  Low Back Pain  Chronic low back pain exacerbated by sitting, with history of lumbar and SIJ fusions. Pain is midline and right-sided.  - Initiated physical therapy at [facility] in [place] for glute, core, and hip flexor strengthening.  - Consider CT scan if no improvement after one month of physical therapy.  - Engage in independent water therapy exercises.    Numbness in Buttocks  Numbness in left buttock likely related to screw placement from previous fusion surgery, with referred pain to SI joint area.  - Continue physical therapy for muscle-related issues.  - Monitor symptoms and report changes.    Neuropathy  Chronic neuropathy with unclear etiology. Normal blood work. No identifiable cause, common in 70% of cases. Symptoms include mild sensation in toes.  - Continue Lyrica 50 mg twice daily. Consider increasing to 75 mg twice daily if symptoms persist.  - Maintain a food log to identify potential dietary triggers.  - Consider environmental factors contributing to symptoms.    She will try the Lyrica at 75 mg BID to see if this helps the neuropathy better than the 50 mg BID.    Return in about 3 months (around 9/30/2025) for assessment after therapy.     Patient should call prior to next visit if new or worsening symptoms.     The patient understands and agrees with the stated plan.  Luke Cleaning MD  6/30/2025           [1]   Past Medical History:   Anemia    Anxiety state, unspecified     Arthritis    Back problem    CERVICAL RADICULOPATHY, CERVICAL SPINAL STENOSIS    Bilateral kidney stones    Broken foot    RT - casting. Fracture 5th met. Cast removl/xray foot 12. Follow up fracture right foot 12.     Calculus of kidney    Cataract    Depression    Esophageal reflux    Essential hypertension    Gastric polyps    Gastritis    High blood pressure    History of stomach ulcers    Hx of motion sickness    hx of vertigo    Idiopathic acute pancreatitis (HCC)    Insomnia    Internal hemorrhoids without complication    Intestinal disorder    Left wrist fracture    Muscle weakness    Osteoarthritis    Osteoporosis    Other chronic pancreatitis (HCC)    Peripheral neuropathy    Renal disorder    Rotator cuff tear, right    Traumatic arthritis    Vertigo   [2]   Past Surgical History:  Procedure Laterality Date    Arthroscopy of joint unlisted Right 2012    rotator cuff tear    Back surgery  2008 & 3-15-22    2 procedures          x 3    Cataract      Cholecystectomy      Colonoscopy      Colonoscopy N/A 2018    Procedure: COLONOSCOPY;  Surgeon: Virginie Ma MD;  Location: [facility] ENDO    Cysto/uretero w/lithotripsy Left 2024    Excis lumbar disk,one level      Eye surgery      Lasik procedure    Fracture surgery Left     WRIST FRACTURE ORIF    Fracture surgery Left     ORIF wrist fracture revision with plates and screws- Ortho Dr Bran Horan      Other surgical history  2017    Fussion L4-L5 - Dr. Rothman    Other surgical history  03/15/2022    Back Surgery    Other surgical history Left     torn biceops repair    Shoulder surg proc unlisted Right     Spine surgery procedure unlisted      L1-L2 FUSION    Spine surgery procedure unlisted  2017    Neck fusion    Tonsillectomy      with Adenoidectomy    Total shoulder replacement Left     Tubal ligation  ?    Upper gi endoscopy,exam      EGD    [3]   Family History  Problem  Relation Age of Onset    Pulmonary Disease Father     Dementia Father             Heart Disease Mother     Fibromyalgia Mother             Colon Cancer Maternal Grandfather     Cancer Maternal Grandfather         Colon    Polyps Sister         colon polyps    Colon Cancer Sister     Other (gallbladder disease) Sister     Cancer Sister         Colon    Crohn's Disease Other     Heart Disease Other     Ovarian Cancer Maternal Aunt         70's 80's    Breast Cancer Neg

## 2025-07-07 ENCOUNTER — APPOINTMENT (OUTPATIENT)
Age: 70
End: 2025-07-07

## 2025-07-07 VITALS
RESPIRATION RATE: 16 BRPM | HEIGHT: 68 IN | TEMPERATURE: 98.1 F | OXYGEN SATURATION: 96 % | BODY MASS INDEX: 21.22 KG/M2 | DIASTOLIC BLOOD PRESSURE: 84 MMHG | WEIGHT: 140 LBS | SYSTOLIC BLOOD PRESSURE: 128 MMHG | HEART RATE: 87 BPM

## 2025-07-07 DIAGNOSIS — L72.0 MILIAL CYST: Primary | ICD-10-CM

## 2025-07-07 RX ORDER — AMLODIPINE BESYLATE 10 MG/1
10 TABLET ORAL DAILY
COMMUNITY

## 2025-07-07 RX ORDER — CYCLOBENZAPRINE HCL 10 MG
10 TABLET ORAL 3 TIMES DAILY PRN
COMMUNITY

## 2025-07-07 RX ORDER — TRAZODONE HYDROCHLORIDE 50 MG/1
25 TABLET ORAL NIGHTLY
COMMUNITY

## 2025-07-07 RX ORDER — CLOBETASOL PROPIONATE 0.5 MG/G
CREAM TOPICAL
COMMUNITY
End: 2025-07-07 | Stop reason: CLARIF

## 2025-07-07 RX ORDER — CYCLOSPORINE 0.5 MG/ML
1 EMULSION OPHTHALMIC 2 TIMES DAILY
COMMUNITY
Start: 2024-06-26

## 2025-07-07 RX ORDER — PANTOPRAZOLE SODIUM 40 MG/1
40 TABLET, DELAYED RELEASE ORAL DAILY
COMMUNITY
Start: 2025-06-17

## 2025-07-07 RX ORDER — ESTRADIOL 0.1 MG/G
0.5 CREAM VAGINAL
COMMUNITY
Start: 2024-06-27

## 2025-07-07 RX ORDER — PREGABALIN 75 MG/1
50 CAPSULE ORAL 3 TIMES DAILY
COMMUNITY
Start: 2025-06-03

## 2025-07-07 ASSESSMENT — PATIENT HEALTH QUESTIONNAIRE - PHQ9
SUM OF ALL RESPONSES TO PHQ9 QUESTIONS 1 AND 2: 0
2. FEELING DOWN, DEPRESSED OR HOPELESS: NOT AT ALL
1. LITTLE INTEREST OR PLEASURE IN DOING THINGS: NOT AT ALL
SUM OF ALL RESPONSES TO PHQ9 QUESTIONS 1 AND 2: 0
CLINICAL INTERPRETATION OF PHQ2 SCORE: NO FURTHER SCREENING NEEDED

## 2025-07-09 ENCOUNTER — TELEPHONE (OUTPATIENT)
Dept: ENDOCRINOLOGY CLINIC | Facility: CLINIC | Age: 70
End: 2025-07-09

## 2025-07-09 NOTE — TELEPHONE ENCOUNTER
Pt's last Prolia injection was on 2/4/25. Pt will be due for next injection on or after 8/4/25.     Pt has an upcoming appt on 8/6/25 for Prolia Injection with MD  ---  Submitted Prolia IV via [employer] portal  Awaiting SOB, 3-5 business days

## 2025-07-09 NOTE — TELEPHONE ENCOUNTER
----- Message from Pooja VAZQUEZ sent at 2/4/2025 10:09 AM CST -----  Regarding: Prolia  Patient received Prolia injection 2/4 and is due back on 8/6/25.

## 2025-07-10 NOTE — TELEPHONE ENCOUNTER
RECEIVED SOB VIA FAX DATED ON 7/9/25     NO PA REQUIRED    OOPCOST;0%    FACILITY FEE;NA    ADMIN FEE;0%

## 2025-07-14 ENCOUNTER — TELEPHONE (OUTPATIENT)
Dept: NEUROLOGY | Facility: CLINIC | Age: 70
End: 2025-07-14

## 2025-07-14 ENCOUNTER — PATIENT MESSAGE (OUTPATIENT)
Dept: NEUROLOGY | Facility: CLINIC | Age: 70
End: 2025-07-14

## 2025-07-14 DIAGNOSIS — M47.812 CERVICAL FACET SYNDROME: ICD-10-CM

## 2025-07-14 DIAGNOSIS — M50.90 CERVICAL DISC DISEASE: ICD-10-CM

## 2025-07-14 DIAGNOSIS — M54.16 LUMBAR RADICULOPATHY: ICD-10-CM

## 2025-07-14 DIAGNOSIS — M48.02 CERVICAL SPINAL STENOSIS: ICD-10-CM

## 2025-07-14 DIAGNOSIS — M79.18 MYOFASCIAL PAIN: ICD-10-CM

## 2025-07-14 DIAGNOSIS — M50.20 CERVICAL HERNIATED DISC: ICD-10-CM

## 2025-07-14 DIAGNOSIS — G56.21 ULNAR NEUROPATHY AT ELBOW OF RIGHT UPPER EXTREMITY: ICD-10-CM

## 2025-07-14 DIAGNOSIS — M54.12 CERVICAL RADICULOPATHY: ICD-10-CM

## 2025-07-14 DIAGNOSIS — M51.9 THORACIC DISC DISEASE: ICD-10-CM

## 2025-07-14 DIAGNOSIS — G57.01 NEUROPATHY OF RIGHT SCIATIC NERVE: ICD-10-CM

## 2025-07-14 RX ORDER — PREGABALIN 75 MG/1
75 CAPSULE ORAL 2 TIMES DAILY
Qty: 180 CAPSULE | Refills: 3 | Status: SHIPPED | OUTPATIENT
Start: 2025-07-14

## 2025-07-14 RX ORDER — PREGABALIN 75 MG/1
75 CAPSULE ORAL 2 TIMES DAILY
Qty: 180 CAPSULE | Refills: 3 | Status: SHIPPED | OUTPATIENT
Start: 2025-07-14 | End: 2025-07-14

## 2025-07-14 NOTE — TELEPHONE ENCOUNTER
Pt prefers to obtain Pregabalin to [facility] located at [address] [place] [address]. Pt said medication cost is less for her at this location.   Pt did not want to list this new pharmacy as preferred pharmacy at this time.   Sending requested prescription per pt request.

## 2025-07-14 NOTE — TELEPHONE ENCOUNTER
Rn unable to e-scribe medication requested to pharmacy since it is considered a controlled substance. Routing to provider.

## 2025-07-14 NOTE — TELEPHONE ENCOUNTER
Pt called asking if the Pregabalin 75 MG Oral Capsule (Lyrica) Could be resent to [place] at [address] in [place].     Per pt it is cheaper at [facility].

## 2025-07-29 ENCOUNTER — HOSPITAL ENCOUNTER (OUTPATIENT)
Dept: MAMMOGRAPHY | Age: 70
Discharge: HOME | End: 2025-07-29
Attending: INTERNAL MEDICINE

## 2025-07-29 ENCOUNTER — E-ADVICE (OUTPATIENT)
Age: 70
End: 2025-07-29

## 2025-07-29 ENCOUNTER — RESULTS FOLLOW-UP (OUTPATIENT)
Dept: INTERNAL MEDICINE CLINIC | Facility: CLINIC | Age: 70
End: 2025-07-29

## 2025-07-29 DIAGNOSIS — R92.30 DENSE BREASTS: Primary | ICD-10-CM

## 2025-07-29 DIAGNOSIS — R92.343 EXTREMELY DENSE TISSUE OF BOTH BREASTS ON MAMMOGRAPHY: ICD-10-CM

## 2025-07-29 DIAGNOSIS — Z91.89 AT HIGH RISK FOR BREAST CANCER: ICD-10-CM

## 2025-07-29 DIAGNOSIS — Z12.31 ENCOUNTER FOR SCREENING MAMMOGRAM FOR MALIGNANT NEOPLASM OF BREAST: ICD-10-CM

## 2025-07-29 PROCEDURE — 77067 SCR MAMMO BI INCL CAD: CPT | Performed by: INTERNAL MEDICINE

## 2025-07-29 PROCEDURE — 77063 BREAST TOMOSYNTHESIS BI: CPT | Performed by: INTERNAL MEDICINE

## 2025-07-31 ENCOUNTER — LAB ENCOUNTER (OUTPATIENT)
Dept: LAB | Facility: HOSPITAL | Age: 70
End: 2025-07-31
Attending: INTERNAL MEDICINE

## 2025-07-31 DIAGNOSIS — M81.0 AGE-RELATED OSTEOPOROSIS WITHOUT CURRENT PATHOLOGICAL FRACTURE: ICD-10-CM

## 2025-07-31 LAB
ANION GAP SERPL CALC-SCNC: 7 MMOL/L (ref 0–18)
BUN BLD-MCNC: 16 MG/DL (ref 9–23)
BUN/CREAT SERPL: 18.4 (ref 10–20)
CALCIUM BLD-MCNC: 9.4 MG/DL (ref 8.7–10.4)
CHLORIDE SERPL-SCNC: 107 MMOL/L (ref 98–112)
CO2 SERPL-SCNC: 28 MMOL/L (ref 21–32)
CREAT BLD-MCNC: 0.87 MG/DL (ref 0.55–1.02)
EGFRCR SERPLBLD CKD-EPI 2021: 72 ML/MIN/1.73M2 (ref 60–?)
FASTING STATUS PATIENT QL REPORTED: YES
GLUCOSE BLD-MCNC: 86 MG/DL (ref 70–99)
OSMOLALITY SERPL CALC.SUM OF ELEC: 294 MOSM/KG (ref 275–295)
POTASSIUM SERPL-SCNC: 4.2 MMOL/L (ref 3.5–5.1)
PTH-INTACT SERPL-MCNC: 39.8 PG/ML (ref 18.5–88)
SODIUM SERPL-SCNC: 142 MMOL/L (ref 136–145)
VIT D+METAB SERPL-MCNC: 52.9 NG/ML (ref 30–100)

## 2025-07-31 PROCEDURE — 83970 ASSAY OF PARATHORMONE: CPT

## 2025-07-31 PROCEDURE — 36415 COLL VENOUS BLD VENIPUNCTURE: CPT

## 2025-07-31 PROCEDURE — 84080 ASSAY ALKALINE PHOSPHATASES: CPT

## 2025-07-31 PROCEDURE — 80048 BASIC METABOLIC PNL TOTAL CA: CPT

## 2025-07-31 PROCEDURE — 82306 VITAMIN D 25 HYDROXY: CPT

## 2025-08-05 LAB — ALKALINE PHOSPHATASE BONE SPECIFIC: 7.1 UG/L

## 2025-08-06 ENCOUNTER — OFFICE VISIT (OUTPATIENT)
Dept: ENDOCRINOLOGY CLINIC | Facility: CLINIC | Age: 70
End: 2025-08-06

## 2025-08-06 ENCOUNTER — TELEPHONE (OUTPATIENT)
Dept: ENDOCRINOLOGY CLINIC | Facility: CLINIC | Age: 70
End: 2025-08-06

## 2025-08-06 VITALS
DIASTOLIC BLOOD PRESSURE: 76 MMHG | HEART RATE: 71 BPM | SYSTOLIC BLOOD PRESSURE: 129 MMHG | BODY MASS INDEX: 21.72 KG/M2 | HEIGHT: 67.5 IN | WEIGHT: 140 LBS

## 2025-08-06 DIAGNOSIS — M81.0 AGE-RELATED OSTEOPOROSIS WITHOUT CURRENT PATHOLOGICAL FRACTURE: Primary | ICD-10-CM

## 2025-08-06 PROCEDURE — 96372 THER/PROPH/DIAG INJ SC/IM: CPT | Performed by: INTERNAL MEDICINE

## 2025-08-06 PROCEDURE — 99213 OFFICE O/P EST LOW 20 MIN: CPT | Performed by: INTERNAL MEDICINE

## 2025-08-12 DIAGNOSIS — M54.16 LUMBAR RADICULOPATHY: ICD-10-CM

## 2025-08-12 DIAGNOSIS — G62.9 SENSORY NEUROPATHY: ICD-10-CM

## 2025-08-12 RX ORDER — PREGABALIN 50 MG/1
50 CAPSULE ORAL 2 TIMES DAILY
Qty: 180 CAPSULE | Refills: 0 | OUTPATIENT
Start: 2025-08-12

## 2025-08-13 RX ORDER — PREGABALIN 50 MG/1
50 CAPSULE ORAL 2 TIMES DAILY
Qty: 60 CAPSULE | Refills: 0 | Status: SHIPPED | OUTPATIENT
Start: 2025-08-13 | End: 2026-08-08

## 2025-08-14 ENCOUNTER — TELEPHONE (OUTPATIENT)
Age: 70
End: 2025-08-14

## 2025-08-14 RX ORDER — TRAZODONE HYDROCHLORIDE 50 MG/1
25 TABLET ORAL NIGHTLY
Qty: 45 TABLET | Refills: 3 | Status: SHIPPED | OUTPATIENT
Start: 2025-08-14

## 2025-08-20 ENCOUNTER — TELEPHONE (OUTPATIENT)
Age: 70
End: 2025-08-20

## 2025-08-22 ENCOUNTER — OFFICE VISIT (OUTPATIENT)
Age: 70
End: 2025-08-22

## 2025-08-22 VITALS
TEMPERATURE: 98.4 F | SYSTOLIC BLOOD PRESSURE: 123 MMHG | OXYGEN SATURATION: 99 % | HEART RATE: 75 BPM | WEIGHT: 141.54 LBS | BODY MASS INDEX: 21.45 KG/M2 | HEIGHT: 68 IN | DIASTOLIC BLOOD PRESSURE: 83 MMHG

## 2025-08-22 DIAGNOSIS — S29.011A INTERCOSTAL MUSCLE STRAIN, INITIAL ENCOUNTER: Primary | ICD-10-CM

## 2025-08-22 RX ORDER — POLYETHYLENE GLYCOL 3350 17 G/17G
17 POWDER, FOR SOLUTION ORAL DAILY
COMMUNITY

## 2025-08-22 RX ORDER — CALCIUM CARBONATE 500(1250)
500 TABLET ORAL EVERY 12 HOURS
COMMUNITY

## 2025-08-22 RX ORDER — METHYLDOPA/HYDROCHLOROTHIAZIDE 250MG-15MG
TABLET ORAL DAILY
COMMUNITY

## 2025-08-22 RX ORDER — MECLIZINE HYDROCHLORIDE 25 MG/1
25 TABLET ORAL 3 TIMES DAILY PRN
COMMUNITY

## 2025-08-22 RX ORDER — LATANOPROST 50 UG/ML
2 SOLUTION/ DROPS OPHTHALMIC NIGHTLY
COMMUNITY

## 2025-08-22 SDOH — HEALTH STABILITY: MENTAL HEALTH: HOW OFTEN DO YOU HAVE A DRINK CONTAINING ALCOHOL?: MONTHLY OR LESS

## 2025-08-22 SDOH — HEALTH STABILITY: MENTAL HEALTH: AUDIT TOTAL SCORE: 1

## 2025-08-22 SDOH — HEALTH STABILITY: MENTAL HEALTH: HOW MANY STANDARD DRINKS CONTAINING ALCOHOL DO YOU HAVE ON A TYPICAL DAY?: 0,1 OR 2

## 2025-08-22 SDOH — HEALTH STABILITY: MENTAL HEALTH: HOW OFTEN DO YOU HAVE 6 OR MORE DRINKS ON ONE OCCASION?: NEVER

## 2025-08-22 ASSESSMENT — PATIENT HEALTH QUESTIONNAIRE - PHQ9
1. LITTLE INTEREST OR PLEASURE IN DOING THINGS: NOT AT ALL
SUM OF ALL RESPONSES TO PHQ9 QUESTIONS 1 AND 2: 0
SUM OF ALL RESPONSES TO PHQ9 QUESTIONS 1 AND 2: 0
2. FEELING DOWN, DEPRESSED OR HOPELESS: NOT AT ALL
CLINICAL INTERPRETATION OF PHQ2 SCORE: NO FURTHER SCREENING NEEDED

## 2025-08-26 ENCOUNTER — OFFICE VISIT (OUTPATIENT)
Dept: NEUROLOGY | Facility: CLINIC | Age: 70
End: 2025-08-26

## 2025-08-26 DIAGNOSIS — G62.9 POLYNEUROPATHY: Primary | ICD-10-CM

## 2025-08-26 PROCEDURE — 99213 OFFICE O/P EST LOW 20 MIN: CPT | Performed by: OTHER

## 2025-08-26 RX ORDER — POLYETHYLENE GLYCOL 3350 17 G/17G
17 POWDER, FOR SOLUTION ORAL DAILY
COMMUNITY

## 2025-08-26 RX ORDER — TRAZODONE HYDROCHLORIDE 50 MG/1
25 TABLET ORAL NIGHTLY
COMMUNITY
Start: 2025-08-14

## (undated) DEVICE — GAMMEX® NON-LATEX PI ORTHO SIZE 7, STERILE POLYISOPRENE POWDER-FREE SURGICAL GLOVE: Brand: GAMMEX

## (undated) DEVICE — UNDYED BRAIDED (POLYGLACTIN 910), SYNTHETIC ABSORBABLE SUTURE: Brand: COATED VICRYL

## (undated) DEVICE — DRAPE SHEET LG

## (undated) DEVICE — GUIDEWIRE ENDOSCP L150CM DIA0.035IN TIP 3CM

## (undated) DEVICE — ISOVUE-300. 61% INFUS BTL

## (undated) DEVICE — VIOLET BRAIDED (POLYGLACTIN 910), SYNTHETIC ABSORBABLE SUTURE: Brand: COATED VICRYL

## (undated) DEVICE — ENDOSCOPIC VALVE WITH ADAPTER.: Brand: SURSEAL® II

## (undated) DEVICE — SLEEVE COMPR MD KNEE LEN SGL USE KENDALL SCD

## (undated) DEVICE — 3M™ STERI-STRIP™ COMPOUND BENZOIN TINCTURE 40 BAGS/CARTON 4 CARTONS/CASE C1544: Brand: 3M™ STERI-STRIP™

## (undated) DEVICE — SPLINT PLASTER 4

## (undated) DEVICE — SOLUTION IRR BTL 250CC NACL

## (undated) DEVICE — SOL H2O 3000ML IRRIG

## (undated) DEVICE — STERILE LATEX POWDER-FREE SURGICAL GLOVESWITH NITRILE COATING: Brand: PROTEXIS

## (undated) DEVICE — ZIMMER® STERILE DISPOSABLE TOURNIQUET CUFF WITH PLC, DUAL PORT, SINGLE BLADDER, 18 IN. (46 CM)

## (undated) DEVICE — DISTRACTION SCREW: Brand: MAYFIELD®

## (undated) DEVICE — GOWN SURG AERO BLUE PERF XLG

## (undated) DEVICE — UPPER EXTREMITY: Brand: MEDLINE INDUSTRIES, INC.

## (undated) DEVICE — DRAIN PENROSE 12X1/4

## (undated) DEVICE — MEDI-VAC NON-CONDUCTIVE SUCTION TUBING: Brand: CARDINAL HEALTH

## (undated) DEVICE — SOLUTION IRRIG 1000ML ST H2O AQUALITE PLAS

## (undated) DEVICE — FRAZIER SUCTION INSTRUMENT 12 FR W/CONTROL VENT & OBTURATOR: Brand: FRAZIER

## (undated) DEVICE — 35 ML SYRINGE REGULAR TIP: Brand: MONOJECT

## (undated) DEVICE — DRAPE SRG 70X60IN SPLT U IMPRV

## (undated) DEVICE — URETERAL ACCESS SHEATH SET: Brand: NAVIGATOR HD

## (undated) DEVICE — SOL  .9 1000ML BTL

## (undated) DEVICE — CASED DISP BIPOLAR CORD

## (undated) DEVICE — SUT VICRYL 2-0 FS-1 J443H

## (undated) DEVICE — BATTERY

## (undated) DEVICE — 5.0MM FINE DIAMOND

## (undated) DEVICE — GLOVE SUR 7.5 SENSICARE PI PIP CRM PWD F

## (undated) DEVICE — FIBER LSR 200UM 2J 80HZ 60W DL FOR LITHO

## (undated) DEVICE — DILATOR/SHEATH SET: Brand: 8/10 DILATOR/SHEATH SET

## (undated) DEVICE — FORCEP RADIAL JAW 4

## (undated) DEVICE — SUTURE PLAIN GUT 5-0 PC-1

## (undated) DEVICE — HYDROGEN PEROXIDE 4 OZ

## (undated) DEVICE — ENDOSCOPY PACK UPPER: Brand: MEDLINE INDUSTRIES, INC.

## (undated) DEVICE — SOLUTION IRRIG 1000ML 0.9% NACL USP BTL

## (undated) DEVICE — SUT MONOCRYL 3-0 PS-1 Y936H

## (undated) DEVICE — MIS PRECISION NEURO (MATCH HEAD)

## (undated) DEVICE — 3M™ MEDITPORE™ SOFT CLOTH TAPE 6 IN X 10 YD 12 ROLLS/CASE 2966: Brand: 3M™ MEDIPORE™

## (undated) DEVICE — CAUTERY: TIP CLEANER XR 100/CS: Brand: MEDICAL ACTION INDUSTRIES

## (undated) DEVICE — Device

## (undated) DEVICE — SUTURE VICRYL 0 CP-1

## (undated) DEVICE — Device: Brand: DEFENDO AIR/WATER/SUCTION AND BIOPSY VALVE

## (undated) DEVICE — CHLORAPREP 26ML APPLICATOR

## (undated) DEVICE — BNDG COHESIVE W4INXL5YD TAN E

## (undated) DEVICE — LINE MNTR ADLT SET O2 INTMD

## (undated) DEVICE — SUT FIBERWIRE 2 T-5 AR-7200

## (undated) DEVICE — CERVICAL CDS: Brand: MEDLINE INDUSTRIES, INC.

## (undated) DEVICE — TECH ONLY - ADDITION

## (undated) DEVICE — CYSTO PACK: Brand: MEDLINE INDUSTRIES, INC.

## (undated) DEVICE — SINGLE-USE DIGITAL FLEXIBLE URETEROSCOPE: Brand: LITHOVUE

## (undated) DEVICE — 3M™ TEGADERM™ TRANSPARENT FILM DRESSING, 1626W, 4 IN X 4-3/4 IN (10 CM X 12 CM), 50 EACH/CARTON, 4 CARTON/CASE: Brand: 3M™ TEGADERM™

## (undated) DEVICE — Device: Brand: CUSTOM PROCEDURE KIT

## (undated) DEVICE — SUT ETHIBOND 1 CT-1 X425H

## (undated) DEVICE — ENCORE® LATEX ACCLAIM SIZE 6, STERILE LATEX POWDER-FREE SURGICAL GLOVE: Brand: ENCORE

## (undated) DEVICE — ENDOSCOPY PACK - LOWER: Brand: MEDLINE INDUSTRIES, INC.

## (undated) DEVICE — 6 ML SYRINGE LUER-LOCK TIP: Brand: MONOJECT

## (undated) DEVICE — 20 ML SYRINGE LUER-LOCK TIP: Brand: MONOJECT

## (undated) DEVICE — 1010 S-DRAPE TOWEL DRAPE 10/BX: Brand: STERI-DRAPE™

## (undated) DEVICE — SOL H2O 1000ML BTL

## (undated) DEVICE — SUTURE VICRYL 4-0 J494G

## (undated) DEVICE — 4.0MM BARREL

## (undated) DEVICE — SOLUTION  .9 3000ML

## (undated) DEVICE — FORCEP CUSH BAY BP DISP 7.5IN

## (undated) DEVICE — UROLOGY DRAIN BAG

## (undated) DEVICE — BANDAGE COBAN 5YDX1 TAN LTX

## (undated) DEVICE — CONMED SCOPE SAVER BITE BLOCK, 20X27 MM: Brand: SCOPE SAVER

## (undated) DEVICE — DRAPE SRG 90X60IN BCK TBL CVR

## (undated) DEVICE — SUTURE PROLENE 4-0 PS-2

## (undated) DEVICE — 3 ML SYRINGE LUER-LOCK TIP: Brand: MONOJECT

## (undated) DEVICE — SHOULDER: Brand: MEDLINE INDUSTRIES, INC.

## (undated) DEVICE — SNARE CAPTI HEX STIFF MEDIUM

## (undated) DEVICE — 3M™ STERI-STRIP™ REINFORCED ADHESIVE SKIN CLOSURES, R1547, 1/2 IN X 4 IN (12 MM X 100 MM), 6 STRIPS/ENVELOPE: Brand: 3M™ STERI-STRIP™

## (undated) DEVICE — GAMMEX® PI HYBRID SIZE 6.5, STERILE POWDER-FREE SURGICAL GLOVE, POLYISOPRENE AND NEOPRENE BLEND: Brand: GAMMEX

## (undated) DEVICE — STERILE POLYISOPRENE POWDER-FREE SURGICAL GLOVES: Brand: PROTEXIS

## (undated) DEVICE — CATHETER URET 5FR L70CM FLX OPN TIP NONPORTED

## (undated) DEVICE — MEGADYNE 2.75IN NEEDLE MONO

## (undated) DEVICE — SUT VICRYL 0 CP-1 J267H

## (undated) DEVICE — 3M™ TEGADERM™ HP TRANSPARENT FILM DRESSING FRAME STYLE, 9534HP, 2-3/8 X 2-3/4 IN (6 CM X 7 CM), 100/CT 4CT/CASE: Brand: 3M™ TEGADERM™

## (undated) DEVICE — BOWL CEMENT MIX QUICK-VAC

## (undated) DEVICE — Device: Brand: STABLECUT®

## (undated) DEVICE — SPONGE PREMIERPRO 7X18X18

## (undated) DEVICE — OPEN-END URETERAL CATHETER SOF-FLEX: Brand: SOF-FLEX

## (undated) DEVICE — NITINOL STONE RETRIEVAL BASKET: Brand: ZERO TIP

## (undated) DEVICE — Device: Brand: XPERIENCE

## (undated) DEVICE — CONTAINER,SPECIMEN,OR STERILE,4OZ: Brand: MEDLINE

## (undated) DEVICE — PAD,EYE,LARGE,2 1/8"X2 5/8",STERILE,LF: Brand: MEDLINE

## (undated) DEVICE — PLASTC TOOMEY SYRNG DISP

## (undated) DEVICE — SNAP KOVER: Brand: UNBRANDED

## (undated) DEVICE — MEDI-VAC NON-CONDUCTIVE SUCTION TUBING 6MM X 1.8M (6FT.) L: Brand: CARDINAL HEALTH

## (undated) DEVICE — OR TOWEL, 17" X 26" STERILE, BLUE: Brand: PREMIERPRO

## (undated) DEVICE — SOLUTION IRRIG 3000ML 0.9% NACL FLX CONT

## (undated) DEVICE — HEAD & NECK: Brand: MEDLINE INDUSTRIES, INC.

## (undated) DEVICE — FRAZIER SUCTION INSTRUMENT 10 FR W/CONTROL VENT & OBTURATOR: Brand: FRAZIER

## (undated) DEVICE — NEEDLE SPINAL 18X3-1/2 PINK.

## (undated) DEVICE — PTFE COATED BLADE 4': Brand: MEDLINE

## (undated) DEVICE — SOL NACL IRRIG 0.9% 1000ML BTL

## (undated) DEVICE — GLOVE SURG PROTEXIS SIZE 7

## (undated) DEVICE — PIN GUID 3MM 75MM STRL

## (undated) DEVICE — SERVICE RENTAL LSR TECH ONLY

## (undated) NOTE — LETTER
12/5/2017      Lena Padilla MD  Physical Medicine and Rehabilitation  [address] [address]  Dept: [phone]  Dept Fax: [phone]        RE: Consultation for Romel Palmer        Dear Alice Proctor MD,    Thank you

## (undated) NOTE — MR AVS SNAPSHOT
Fredi Mandel 12 [address] [facility] [address] [facility] [address]  [phone]  [phone]               Thank you for choosing us for your health care visit with Ankur Mann PT.   We are glad to serve you and happy to provide you wit [place] [address]   [phone]           If you are more than 27years of age and have not had a mammogram within the last 90 days, you must get an order from your doctor to have a diagnostic mammogram before ultrasound.  If previous mammogram was not per For medical emergencies, dial 911.

## (undated) NOTE — LETTER
3/13/2025      Luke Cleaning MD  Physical Medicine and Rehabilitation  [address] [place] [address], [address]  [facility] [address]  Dept: [phone]  Dept Fax: [phone]        RE: Consultation for Radha Stoner        Dear Sherly Hirsch MD,    Thank you very much for the opportunity to see your patient.  Attached please find a summary from your patient's recent visit.     I appreciate the chance to take care of your patient with you.  Please feel free to call me with any questions or concerns.    Sincerely,        Luke Cleaning MD  Electronically Signed on 3/13/2025

## (undated) NOTE — MR AVS SNAPSHOT
Bryan Underwood Day   2017 1:30 PM   Office Visit   MRN:  [id number]    Description:  Female : 10/5/1955   Department:  [address] [place] [address] - [facility]              Visit Summary      Primary Visit Diagnosis     SAPHO syndrome Fluticasone Propionate (FLONASE) 50 MCG/ACT Nasal Suspension by Nasal route daily as needed. spray 2 spray by intranasal route  every day in each nostril     Multiple Vitamins Oral Tab Take 1 tablet by mouth daily.  Multiple Vitamins tablet       Patient I Appointment with Vidhi Mann at [address] [place] [address] ([phone])   [address] [facility], [address]  [address] [address]       Thursday July 20, 2017 10:15 AM     Appointment with Dunia Hutton; [facility] RM 8 at [place]

## (undated) NOTE — LETTER
[facility] SURGERY SCHEDULING FORM   [address] [facility]   [phone] (scheduling phone) [phone] (scheduling fax)     PATIENT INFORMATION   Patient Name:    Jayena Music Day   :    10/5/1955   Andrews Max [Sulfamethoxazole W/Trimethoprim]; Codeine; Hydrocodone;  Hydromorphone; Morphine; Nsaids; Tramadol Hcl       Completed by:    Milan Tovar      Date:    11/9/2017    [facility] will follow its Pre-Admission Assessment and Screening Policy for pre-admission test

## (undated) NOTE — MR AVS SNAPSHOT
[place]  [address] [place] [address]  [phone]               Thank you for choosing us for your health care visit with Juanpablo Torres MD.  We are glad to serve you and happy to provide you with this summary of your visit.   [address] [facility] ([address] [place] [address])    [address] [address]   [phone]           Please arrive at your scheduled appointment time. Wear comfortable, loose fitting clothing.             May 22, 201 Take 1 tablet by mouth 2 (two) times daily. Commonly known as:  cyclobenzaprine           * Diclofenac Sodium 1 % Gel   Apply 4 g topically 4 (four) times daily.    Commonly known as:  VOLTAREN           * Diclofenac Sodium 1 % Gel   Apply 2 g topically 4 tested below 5 for improved cervical support   3. Pt to demonstrate improved cervical neck flexion endurance to at least 25 seconds for improved deep neck flexor strength.    4. Pt to improve FOTO score to less than 35% impaired        [facility]     Visit [address] [facility]

## (undated) NOTE — LETTER
[facility] SURGERY SCHEDULING FORM   [address]  [address] [facility]   [phone] (scheduling phone) [phone] (scheduling fax)     PATIENT INFORMATION   Last Name:      Day      First Name:    Kevin Tom Allergies: Aleve; Levaquin [Levofloxacin]; Codeine; Hydrocodone; Morphine; Nsaids; Augmentin, [Amoxicillin-Pot Clavulanate]; Bactrim [Sulfamethoxazole W/Trimethoprim];  Tramadol Hcl         Completed by:    Apoorva Sol      Date:    10/16/2019

## (undated) NOTE — LETTER
5/3/2023      Audie Mcgarry MD  Physical Medicine and Rehabilitation  [address] [facility], [address] [place] [address]  Dept: [phone]  Dept Fax: [phone]        RE: Consultation for Comfort Nolasco        Dear Jaquan Esparza MD,    Thank you very much for the opportunity to see your patient. Attached please find a summary from your patient's recent visit. I appreciate the chance to take care of your patient with you. Please feel free to call me with any questions or concerns. Sincerely,        Kai Rubio.  Savana Mcgarry MD  Electronically Signed on 5/3/2023

## (undated) NOTE — ED AVS SNAPSHOT
Urdu Northern Day   MRN: [id number]    Department:  [facility] Emergency Department   Date of Visit:  4/4/2019           Disclosure     Insurance plans vary and the physician(s) referred by the ER may not be covered by your plan.  Please contact you CARE PHYSICIAN AT ONCE OR RETURN IMMEDIATELY TO THE EMERGENCY DEPARTMENT. If you have been prescribed any medication(s), please fill your prescription right away and begin taking the medication(s) as directed.   If you believe that any of the medications

## (undated) NOTE — LETTER
[facility]  [address] [facility] [address]  [facility] [address]  [phone]             REFERRAL ORDER         Patient Name:   Radha Stoner, ([id number])   Sex: female  : 10/5/1955       Order Date:  2024  Authorizing Provider:   WALLACE MELÉNDEZ     Procedure:  PHYSICAL THERAPY EXTERNAL [[id number]]         Order #:   [id number]  Qty:  1     Priority:  Routine                   Class:   Ext - RFL     Standing Interval:          Standing Occurrences:          Expires on:            Expected by:    Associated DX:  History of lumbar fusion (Z98.1)  Lumbar radiculopathy (M54.16)  S/P cervical spinal fusion (Z98.1)  Thoracic disc disease (M51.9)  Thoracic facet syndrome (M47.894)  Cervical spinal stenosis (M48.02)  Cervical disc disease (M50.90)     Order summary:  Physical Therapy Area of Concentration: Ortho Physical Therapy Ortho: Spine Comments: 2-3 times per week for 4-6 weeks   Improve thoracic and cervical segmental mobilization and then advance to stabilization.  Scapular mobilization and stabilization with  taping as needed.  Improve pelvic and hip mobility and stability.  Rotator cuff strengthening as needed.  HEP, Ext - RFL, Routine, 1 visit            Comments:  Physical Therapy Area of Concentration: Ortho  Physical Therapy Ortho: Spine  Comments: 2-3 times per week for 4-6 weeks     Improve thoracic and cervical segmental mobilization and then advance to stabilization.  Scapular mobilization and stabilization with taping as needed.  Improve pelvic and hip mobility and stability.  Rotator cuff strengthening as needed.  HEP           Scheduling Instructions:  **REFERRAL REQUEST**     Your physician has referred you to a specialist.  Your physician or the clinic staff will provide you with the phone number you should call to schedule your appointment.      If you are confident that your benefit plan will not require a referral or authorization, such as [place]  Medicaid, please feel free to schedule your appointment immediately. However, if you are unsure about the requirements for authorization, please wait 5-7 days and then contact your physician's office. At that time, you will be provided with any authorization numbers or be assured that none are required. You can then schedule your appointment. Failure to obtain required authorization numbers can create reimbursement difficulties for you.     Ordering: Mindy Encinas RN  Encounter Provider: Luke Cleaning MD  Order Authorizing Provider: Luke Cleaning MD [[id number]]  Order Date: Jan 9, 2024 at 2:33 PM  Ordering Department: [facility] PM&R SIL[place]A

## (undated) NOTE — LETTER
[facility]  Administration of Anesthesia  1. I, Liz Drought Day, or _________________________________ acting on her behalf, (Patient) (Dependent/Representative) request to receive anesthesia for my pending procedure/operation/treatment.   TIKA hoffman bleeding, seizure, cardiac arrest and death. 7. AWARENESS: I understand that it is possible (but unlikely) to have explicit memory of events from the operating room while under general anesthesia.   8. ELECTROCONVULSIVE THERAPY PATIENTS: This consent serve below affirms that prior to the time of the procedure, I have explained to the patient and/or his/her guardian, the risks and benefits of undergoing anesthesia, as well as any reasonable alternatives.     ___________________________________________________

## (undated) NOTE — LETTER
2/1/2024      Luke Cleaning MD  Physical Medicine and Rehabilitation  [address] [place] [address], [address]  [facility] [address]  Dept: [phone]  Dept Fax: [phone]        RE: Consultation for Radha Stoner        Dear Sherly Hirsch MD,    Thank you very much for the opportunity to see your patient.  Attached please find a summary from your patient's recent visit.     I appreciate the chance to take care of your patient with you.  Please feel free to call me with any questions or concerns.    Sincerely,        Luke Cleaning MD  Electronically Signed on 2/1/2024

## (undated) NOTE — MR AVS SNAPSHOT
[address] [facility]  [address], [address] [facility]  [address] [facility] [phone]               Thank you for choosing us for your health care visit with Alisa Kang MD.  We are glad to serve you and happy to provide you with this summary of you [address] [facility] ([address] [place] [address])    [address] [address]   [phone]           Please arrive at your scheduled appointment time. Wear comfortable, loose fitting clothing.             Jun 08, 201 Current Medications          This list is accurate as of: 5/25/17  4:43 PM.  Always use your most recent med list.                Amitriptyline HCl 10 MG Tabs   Take 1 tablet (10 mg total) by mouth nightly.    Commonly known as:  ELAVIL           AmLO medications prescribed for you. Read the directions carefully, and ask your doctor or other care provider to review them with you.             Health Goals discussed Today        Last Edited       Therapy Goals 4/24/2017  5:06 PM by Juancarlos Aragon PT

## (undated) NOTE — Clinical Note
06/16/2017 Valerie Drought Day  [address] [address]      Dear Alaina Hunter records indicate that you have outstanding lab work and or testing that was ordered for you and has not yet been completed: Lipase    To provide you with the

## (undated) NOTE — LETTER
11/15/2023      [place] Emma Miller MD  Physical Medicine and Rehabilitation  [address] [facility], [address] [place] [address]  [address] [address]  Dept: [phone]  Dept Fax: [phone]        RE: Consultation for Rahul Breech        Dear Breanna Shelton MD,    Thank you very much for the opportunity to see your patient. Attached please find a summary from your patient's recent visit. I appreciate the chance to take care of your patient with you. Please feel free to call me with any questions or concerns. Sincerely,        Cindy Tang.  MD Black  Electronically Signed on 11/15/2023

## (undated) NOTE — LETTER
3/28/2024      Luke Cleaning MD  Physical Medicine and Rehabilitation  [address] [place] [address], [address]  [facility] [address]  Dept: [phone]  Dept Fax: [phone]        RE: Consultation for Radha Stoner        Dear Sherly Hirsch MD,    Thank you very much for the opportunity to see your patient.  Attached please find a summary from your patient's recent visit.     I appreciate the chance to take care of your patient with you.  Please feel free to call me with any questions or concerns.    Sincerely,        Luke Cleaning MD  Electronically Signed on 3/28/2024

## (undated) NOTE — LETTER
10/27/2021      Patrica Deng MD  Physical Medicine and Rehabilitation  2010 [facility], [address] [place] [address]  Dept: [phone]  Dept Fax: [phone]        RE: Consultation for Cece Mercedes        Dear Joelle Rodriguez MD,    Thank yo

## (undated) NOTE — LETTER
[facility] SURGERY SCHEDULING FORM   [address]  [address] [place] [address] [facility]   [phone] (scheduling phone) [phone] (scheduling fax)     PATIENT INFORMATION   Last Name:      Day      First Name:    Antwan Najera Allergies: Levaquin [Levofloxacin]; Codeine; Hydrocodone-Acetaminophen; Morphine; Augmentin, [Amoxicillin-Pot Clavulanate];  Bactrim [Sulfamethoxazole W/Trimethoprim]; Nsaids; Tramadol Hcl         Completed by:   Lizet Headings      Date:   11/12/2018

## (undated) NOTE — LETTER
11/15/2023      Seble Hicks MD  Physical Medicine and Rehabilitation  2010 [facility], [address] [place] [address]  Dept: [phone]  Dept Fax: [phone]        RE: Consultation for Lesa Lopes        Dear Antoni Lopes MD,    Thank you very much for the opportunity to see your patient. Attached please find a summary from your patient's recent visit. I appreciate the chance to take care of your patient with you. Please feel free to call me with any questions or concerns. Sincerely,        Florentin Medina.  MD Balck  Electronically Signed on 11/15/2023

## (undated) NOTE — LETTER
AUTHORIZATION FOR SURGICAL OPERATION OR OTHER PROCEDURE    1.  I hereby authorize Dr. Deepali Peralta Phys:[id number]} and the [facility] Office staff assigned to my case to perform the following operation and/or procedure at the [facility]:    ________________________________ Patient Name:  ______________________________________________________  (please print)       Patient signature:  ___________________________________________________             Relationship to Patient:           []  Parent    Responsible person

## (undated) NOTE — LETTER
[facility]  Administration of Anesthesia  1. IAfrica Galucía Day, or _________________________________ acting on her behalf, (Patient) (Dependent/Representative) request to receive anesthesia for my pending procedure/operation/treatment.   TIKA hoffman infections, high spinal block, spinal bleeding, seizure, cardiac arrest and death. 7. AWARENESS: I understand that it is possible (but unlikely) to have explicit memory of events from the operating room while under general anesthesia.   8. ELECTROCONVULSIV unconscious pt /Relationship    My signature below affirms that prior to the time of the procedure, I have explained to the patient and/or his/her guardian, the risks and benefits of undergoing anesthesia, as well as any reasonable alternatives.     _______

## (undated) NOTE — LETTER
3/14/2024      Luke Cleaning MD  Physical Medicine and Rehabilitation  [address] [place] [address], [address]  [facility] [address]  Dept: [phone]  Dept Fax: [phone]        RE: Consultation for Radha Stoner        Dear Sherly Hirsch MD,    Thank you very much for the opportunity to see your patient.  Attached please find a summary from your patient's recent visit.     I appreciate the chance to take care of your patient with you.  Please feel free to call me with any questions or concerns.    Sincerely,        Luke Cleaning MD  Electronically Signed on 3/14/2024

## (undated) NOTE — LETTER
22        To Whom It May Concern:      Radha TIKA Stoner  :  10/5/1955      Dr. Kenia Nation is requesting clearance from Dr. Юлия Mireles for Left greater occipital nerve block due to Pt. just receiving Right shoulder steroid injection on 22. []  Patient has been cleared  for the above procedure. []  Patient has not been cleared  for  the above procedure. If this office may be of further assistance, please do not hesitate to contact us. Sincerely,    Huyen Eden.  Kenia Nation MD    Phone # [phone]  Fax #      [phone]

## (undated) NOTE — MR AVS SNAPSHOT
[facility]  [address], [address] [place] [address]  [address] [facility] [phone]               Thank you for choosing us for your health care visit with Alisa Kang MD.  We are glad to serve you and happy to provide you with this summary of you To schedule a test at any [facility] Scheduling at   [phone].          Reason for Today's Visit     Back Pain           Medical Issues Discussed Today     History of lumbar laminectomy    Cervical radiculopathy physicians rotate between multiple offices and procedure days in the hospitals. · Patient must present photo ID at time of .  If a designated family member will be picking up prescription, office must be given name of individual in advance and they Place 2 g onto the skin 4 (four) times daily as needed. What changed:  Another medication with the same name was added. Make sure you understand how and when to take each.    Commonly known as:  VOLTAREN           * Diclofenac Sodium 1 % Gel   Apply 4 g t view more details from this visit by going to [email or web address]. If you've recently had a stay at the Hospital you can access your discharge instructions in [employer] by going to Visits < Admission Summaries.  If you've been to the Emergency Depar

## (undated) NOTE — MR AVS SNAPSHOT
[facility]ON [facility]  [address] [place] [address]  [phone]               Thank you for choosing us for your health care visit with Manuel Wilkins MD.  We are glad to serve you and happy to provide you with this summary of your visit.   [facility] Dx:  Osteopenia, unspecified location [M85.80]                 Scheduling Instructions     Tuesday March 07, 2017     Imaging:  JESÚS SCREENING BILAT (HYA=[id number])    Instructions:   To schedule a test at any Critical access hospital, call C Other reaction(s): severe nausea/vomiting    Hydromorphone Nausea only    Morphine Unknown    Oxycodone     Other reaction(s): constipation    Tramadol Hcl Itching                Today's Vital Signs     BP Pulse Temp Height Weight BMI    118/72 mmHg 72 98 Take 1 tablet (150 mg total) by mouth 2 (two) times daily.  As needed for breakthrough symptoms   Commonly known as:  ZANTAC                Where to Get Your Medications      These medications were sent to [address] Magdaleno Rendon

## (undated) NOTE — LETTER
5/18/2022      Janae Agarwal MD  Physical Medicine and Rehabilitation  2010 [facility], [address] [place] [address]  Dept: [phone]  Dept Fax: [phone]        RE: Consultation for Gabriel Howard        Dear Randee Andrew MD,    Thank you very much for the opportunity to see your patient. Attached please find a summary from your patient's recent visit. I appreciate the chance to take care of your patient with you. Please feel free to call me with any questions or concerns. Sincerely,        Zoey Grullon.  Colleen Agarwal MD  Electronically Signed on 5/18/2022

## (undated) NOTE — LETTER
[facility]  [address] [facility] [address]  [facility] [address]  PH: [phone]  FAX: [phone]          Patient Information:  Patient Name:   Address: Radha Stoner, ([id number])  [address] [facility] [id number] [phone] (home)  Sex: female          : 10/5/1955   ________________________________________________________________  Referral Information:  Order Date: Oct 13, 2024       Epic Order #: [id number]   Referral Type: PHYSICAL THERAPY - INTERNAL Dx: Arthropathy of cervical facet joint (M47.812)  Cervical disc disease (M50.90)  Cervical spinal stenosis (M48.02)  Cervical herniated disc (M50.20)  Cervical facet syndrome (M47.812)  Cervicogenic headache (G44.86)  Chronic left shoulder pain (M25.512,G89.29)   Signed Referral Summay:     Physical Therapy Area of Concentration: Ortho  Physical Therapy Ortho: Spine  Who should the patient be scheduled with? [facility] PT  Comments: Arthropathy of cervical facet joint  (primary encounter diagnosis)  C2-3 mild-mod diffuse, C3-4 left mod foraminal & mild-moderate diffuse, C6-7 right mild-moderate & left moderate foraminal, C7-T1 left mild foraminal bulging discs  C3-4 moderate central & left foraminal, C6-7 left mild-moderate foraminal, C7-T1 left mild-moderate foraminal stenosis  C7-T1 mild central herniated disc  Cervical facet syndrome: bilateral C6-7 and C3-4  Cervicogenic headache  Chronic left shoulder pain and s/p shoulder replacement     2-3 times per week for 4-6 weeks  Improve cervical and thoracic segmental mobility and then progress to stability.  Scapular mobilization and stabilization with taping as needed.  May trial dry needling.  HEP        Unless otherwise indicated by your provider or insurance, feel free to schedule your appointment with the first available provider in the specialist group you were referred to that meets your scheduling needs if the referred to provider is not available.            ______________________________________________________________  Scheduling Information:  Note to Managed Care Patients: This is the physician order form only and not an authorization for services.     Your physician has recommended you to have physical therapy done at [facility] however, your insurance company may require you to have these services done at another facility or to obtain an approved referral. Services ordered by your physician may not be covered unless prior authorization is obtained in accordance with your insurance company's guidelines. Unauthorized care may be your financial responsibility. If you have questions, please call your [employer] customer service number located on your ID card.     To schedule Physical Therapy at any of the [facility] facilities, please call   [phone].     [facility]  [address] [place], [place] [address] [place]  [address] [place]  [address]  [facility] [place]  [address] [place], [place] [address] [place]  [address] [place] [address]  [facility] [place]  [address] [place] [address]               _______________________________________________________        Coverage Information:      Active Insurance as of 10/4/2024             Primary Coverage      Payor Plan Insurance Group Employer/Plan Group     MEDICARE MEDICARE PART B ONLY [id number]       Payor Plan Address Payor Plan Phone Number Payor Plan Fax Number Effective Dates     [address]     10/1/2019 - None Entered     [place] [id number]           Subscriber Name Subscriber Birth Date Member ID          CARLOS VELEZ 10/5/1955 [id number]       Guarantor Name (ID) Guarantor Birth Date Guarantor Address Guarantor Type     CARLOS VELEZ TIKA ([id number]) 10/5/1955 [address] Personal/Family         [facility] [address]                     Secondary  Coverage      Payor Plan Insurance Group Employer/Plan Group     [employer] [place] [id number]       Payor Plan Address Payor Plan Phone Number Payor Plan Fax Number Effective Dates     [employer] [place] [place] [phone]   10/1/2019 - None Entered     [place] [id number]           Subscriber Name Subscriber Birth Date Member ID          CARLOS VELEZ 10/5/1955 [id number]       Guarantor Name (ID) Guarantor Birth Date Guarantor Address Guarantor Type     CARLOS VELEZ ([id number]) 10/5/1955 [address] [facility] Personal/Family         [facility] [address]                   Electronically Signed By: Luke Cleaning MD [NPI: [id number]]  Order Date: Oct 13, 2024 at 7:22 PM

## (undated) NOTE — MR AVS SNAPSHOT
[facility] [address]  [phone]  [phone]               Thank you for choosing us for your health care visit with Nash Franco PT.   We are glad to serve you and happy to provide you wit Exam - Established Patient with Cordell Koch MD   [facility], [address] [facility])    [address], [address] [place] [address] [phone]            May 24, 2017  2:15 PM   Martha Price

## (undated) NOTE — MR AVS SNAPSHOT
After Visit Summary   6/9/2020    Matt Stoner    MRN: [id number]           Visit Information     Date & Time  6/9/2020 11:00 AM Provider  Nguyễn Avilez MD [address] [place] [address], [address] [place] [address],[address], [employer]  Phone  [phone] Multiple Vitamins Oral Tab Take 1 tablet by mouth daily.  Multiple Vitamins tablet       Diagnoses for This Visit    Nephrolithiasis   [651595]  -  Primary  Asymptomatic microscopic hematuria   [4973458]    Lesion of urinary bladder   [9946863] headache and pink eye. The cost for a Video Visit is currently $35.         If you receive a survey from [employer], please take a few minutes to complete it and provide feedback.  We strive to deliver the best patient experience and are looking for ways Also available by appointment Average cost  $120*     EMERGENCY ROOM Life-threatening emergencies needing immediate intervention at a hospital emergency room.  Average cost  $2,300*   *Cost varies based on your insurance coverage  For more information about

## (undated) NOTE — LETTER
7/20/2022      Hermelindo Tim Vazquez MD  Physical Medicine and Rehabilitation  2010 [facility], [address] [place] [address]  Delores [place]gu[place] [address]  Dept: [phone]  Dept Fax: [phone]        RE: Consultation for Brenda Johnson        Dear Phan Durán MD,    Thank you very much for the opportunity to see your patient. Attached please find a summary from your patient's recent visit. I appreciate the chance to take care of your patient with you. Please feel free to call me with any questions or concerns. Sincerely,        Juan Posada.  Jeffrey Vazquez MD  Electronically Signed on 7/20/2022

## (undated) NOTE — Clinical Note
Please call and schedule patient for office cystoscopy for further evaluation of microscopic hematuria. CT-urogram ordered and to be completed prior to cystoscopy.

## (undated) NOTE — LETTER
[facility] SURGERY SCHEDULING FORM   [address]  [address] [facility]   [phone] (scheduling phone) [phone] (scheduling fax)     PATIENT INFORMATION   Last Name:      Day      First Name:    Elsa Howell Allergies: Levaquin [Levofloxacin]; Codeine; Hydrocodone-Acetaminophen; Morphine; Augmentin, [Amoxicillin-Pot Clavulanate];  Bactrim [Sulfamethoxazole W/Trimethoprim]; Nsaids; Tramadol Hcl         Completed by:    Edvin Mcbride      Date:    7/13/2018

## (undated) NOTE — LETTER
AUTHORIZATION FOR SURGICAL OPERATION OR OTHER PROCEDURE    1.  I hereby authorize Dr. Brianne Brewster and the [facility] staff assigned to my case to perform the following operation and/or procedure at the [facility]:    Bilateral greater occipital nerve blocks     2 signature below affirms that prior to the time of the procedure, I have explained to the patient and/or his/her guardian, the risks and benefits involved in the proposed treatment and any reasonable alternative to the proposed treatment.   I have also expla

## (undated) NOTE — LETTER
EDGARDO Notifier: [email or web address]   MARYJANE. Patient Name: Neil SIERRA Identification Number: [id number]      Advance Beneficiary Notice of Noncoverage (ABN)  NOTE:  If Medicare doesn’t pay for D. Item/service(s) below, you may have to pay.   Medicare does deductibles. ? OPTION 2. I want the D. Item/service(s) listed above, but do not bill Medicare. You may ask to be paid now as I am responsible for payment. I cannot appeal if Medicare is not billed. ? OPTION 3. I don’t want the D.  Item/service(s) listed

## (undated) NOTE — MR AVS SNAPSHOT
Fredi Mandel [address]  [phone]  [phone]               Thank you for choosing us for your health care visit with Kodi Reddy PT.   We are glad to serve you and happy to provide you wit to prevent a delay in reading and receiving results of this mammogram.             May 31, 2017  1:30 PM   [place] Physical Therapy Visit By Therapist with Nataliia Goodwin PT, [facility] OF THE [facility]  [address] [facility] [facility] office, you can view your past visit information in [employer] by going to Visits < Visit Summaries. [employer] questions? Call [phone] for help. [employer] is NOT to be used for urgent needs. For medical emergencies, dial 911.

## (undated) NOTE — LETTER
11/8/2017      Daisy Martinez MD  Physical Medicine and Rehabilitation  [address] [place] [address]  Dept: [phone]  Dept Fax: [phone]        RE: Consultation for Jean Fletcher        Dear Juan Manuel Pérez MD,    Thank you

## (undated) NOTE — LETTER
11/18/2024      Luke Cleaning MD  Physical Medicine and Rehabilitation  [address] [place] [address], [address]  [facility] [address]  Dept: [phone]  Dept Fax: [phone]        RE: Consultation for Radha Stoner        Dear Sherly Hirsch MD,    Thank you very much for the opportunity to see your patient.  Attached please find a summary from your patient's recent visit.     I appreciate the chance to take care of your patient with you.  Please feel free to call me with any questions or concerns.    Sincerely,        Luke Cleaning MD  Electronically Signed on 11/18/2024

## (undated) NOTE — Clinical Note
AUTHORIZATION FOR SURGICAL OPERATION OR OTHER PROCEDURE    1.  I hereby authorize Dr. Maikel Aleman and the [facility] staff assigned to my case to perform the following operation and/or procedure at the [facility]:    _______________Left shoulder trigger point i Time:  ________ A. M.  P.M.        Patient Name:  ______________________________________________________  (please print)       Patient signature:  ___________________________________________________             Relationship to Patient:

## (undated) NOTE — LETTER
A. Notifier: Rene/Kekanto   B. Patient Name: Jerzy Santana Day C. Identification Number: [id number]      Advance Beneficiary Notice of Noncoverage (ABN)  NOTE:  If Medicare doesn’t pay for D. Item/service(s) below, you may have to pay.   Medicare does deductibles. ? OPTION 2. I want the D. Item/service(s) listed above, but do not bill Medicare. You may ask to be paid now as I am responsible for payment. I cannot appeal if Medicare is not billed. ? OPTION 3. I don’t want the D.  Item/service(s) listed

## (undated) NOTE — LETTER
Dear Dr. Kiki Walter  This letter is to inform you that Kevin Negro A Day has been attending Physical Therapy with me. See below for my most recent plan of care.        Patient Name: Pelon Mata, : 10/5/1955, MRN: [id number]   Date:  2018  Referring Mid trap: R: 4/5; L: 4/5  Low trap: R: 4-/5; L: 4-/5     Segmental Mobility:  Pubis: aligned  Sacrum: aligned  Ilium: aligned     Special Tests:    Alejandro's Test: (+) for L lumbar scoliosis     Palpation: mild soft tissue restrictions at L thoracic paraspina

## (undated) NOTE — LETTER
5/15/2024      Luke Cleaning MD  Physical Medicine and Rehabilitation  [address] [place] [address], [address]  [facility] [address]  Dept: [phone]  Dept Fax: [phone]        RE: Consultation for Radha Stoner        Dear Sherly Hirsch MD,    Thank you very much for the opportunity to see your patient.  Attached please find a summary from your patient's recent visit.     I appreciate the chance to take care of your patient with you.  Please feel free to call me with any questions or concerns.    Sincerely,        Luke Cleaning MD  Electronically Signed on 5/15/2024

## (undated) NOTE — MR AVS SNAPSHOT
Fredi Mandel [address]  [phone]  [phone]               Thank you for choosing us for your health care visit with Miles Acosta PT.   We are glad to serve you and happy to provide you wit [place] Physical Therapy Visit By Therapist with Orquidea Shanks PT, [facility] OF THE [facility] [address] ([address])    [address]   [phone]           Please arrive at your sche

## (undated) NOTE — LETTER
AUTHORIZATION FOR SURGICAL OPERATION OR OTHER PROCEDURE    1.  I hereby authorize Dr. Evan Johnson and the [facility] staff assigned to my case to perform the following operation and/or procedure at the [facility]:    Bilateral greater occipital nerve blocks Patient:           []  Parent    Responsible person                          []  Spouse  In case of minor or                    [] Other  _____________   Incompetent name:  __________________________________________________                               (p

## (undated) NOTE — MR AVS SNAPSHOT
Fredi Mandel [address]  [phone]  [phone]               Thank you for choosing us for your health care visit with Apoorva Cross PT.   We are glad to serve you and happy to provide you wit [place] Physical Therapy Visit By Therapist with Misha Hoang PT, [facility] OF THE [facility] PELVIC  S [facility])    [address]  [address]   [phone]           Please arrive at your sche

## (undated) NOTE — LETTER
9/5/2017      Stanton Coburn MD  Physical Medicine and Rehabilitation  [address] [facility], [address] [place] [address]  Dept: [phone]  Dept Fax: [phone]        RE: Consultation for Elder Mock        Dear Nikki Vazquez MD,    Thank you

## (undated) NOTE — LETTER
[facility]  [address] [facility] [address]  [facility] [address]  [phone]             REFERRAL ORDER         Patient Name:   Radha Stoner, ([id number])   Sex: female  : 10/5/1955       Order Date:  2024  Authorizing Provider:   WALLACE MELÉNDEZ     Procedure:  PHYSICAL THERAPY EXTERNAL [290180]         Order #:   [id number]  Qty:  1     Priority:  Routine                   Class:   Ext - RFL     Standing Interval:          Standing Occurrences:          Expires on:            Expected by:    Associated DX:  History of lumbar fusion (Z98.1)  Lumbar radiculopathy (M54.16)  S/P cervical spinal fusion (Z98.1)  Thoracic disc disease (M51.9)  Thoracic facet syndrome (M47.894)  Cervical spinal stenosis (M48.02)  Cervical disc disease (M50.90)     Order summary:  Physical Therapy Area of Concentration: Ortho Physical Therapy Ortho: Spine Comments: 2-3 times per week for 4-6 weeks   Improve thoracic and cervical segmental mobilization and then advance to stabilization.  Scapular mobilization and stabilization with  taping as needed.  Improve pelvic and hip mobility and stability.  Rotator cuff strengthening as needed.  HEP, Ext - RFL, Routine, 1 visit            Comments:  Physical Therapy Area of Concentration: Ortho  Physical Therapy Ortho: Spine  Comments: 2-3 times per week for 4-6 weeks     Improve thoracic and cervical segmental mobilization and then advance to stabilization.  Scapular mobilization and stabilization with taping as needed.  Improve pelvic and hip mobility and stability.  Rotator cuff strengthening as needed.  HEP           Scheduling Instructions:  **REFERRAL REQUEST**     Your physician has referred you to a specialist.  Your physician or the clinic staff will provide you with the phone number you should call to schedule your appointment.      If you are confident that your benefit plan will not require a referral or authorization, such as [place]  Medicaid, please feel free to schedule your appointment immediately. However, if you are unsure about the requirements for authorization, please wait 5-7 days and then contact your physician's office. At that time, you will be provided with any authorization numbers or be assured that none are required. You can then schedule your appointment. Failure to obtain required authorization numbers can create reimbursement difficulties for you.     Ordering: Mindy Encinas RN  Encounter Provider: Luke Cleaning MD  Order Authorizing Provider: Luke Cleaning MD [NPI:[id number]]  Order Date: Jan 9, 2024 at 2:33 PM  Ordering Department: [facility] PM&R SILVIANOAKIRA

## (undated) NOTE — LETTER
6/30/2025      Luke Cleaning MD  Physical Medicine and Rehabilitation  [address] [place] [address], [address]  [facility] [address]  Dept: [phone]  Dept Fax: [phone]        RE: Consultation for Radha Stoner        Dear Sherly Hirsch MD,    Thank you very much for the opportunity to see your patient.  Attached please find a summary from your patient's recent visit.     I appreciate the chance to take care of your patient with you.  Please feel free to call me with any questions or concerns.    Sincerely,        Luke Cleaning MD  Electronically Signed on 6/30/2025

## (undated) NOTE — Clinical Note
[facility] SURGERY SCHEDULING FORM   [address]  [address] [facility]   [phone] (scheduling phone) [phone] (scheduling fax)     PATIENT INFORMATION   Patient Name:    Alejo Moder Day   :    10/5/1955   [facility] Completed by:    Toi Hair      Date:    4/12/2017    [facility] will follow its Pre-Admission Assessment and Screening Policy for pre-admission testing.   Physicians that require   additional testing must order this directly and have results faxed to [facility] at

## (undated) NOTE — LETTER
[facility]  [address] [facility] [address]  [facility] [address]  [phone]           Patient Information:  Patient Name:   Address: Radha Stoner, ([id number])  [place] [address] [phone] (home)  Sex: female          : 10/5/1955   ________________________________________________________________  Referral Information:  Order Date: May 8, 2024       Epic Order #: [id number]   Referral Type: PHYSICAL THERAPY - INTERNAL Dx: Cervical disc disease (M50.90)  Arthropathy of cervical facet joint (M47.812)  Cervical spinal stenosis (M48.02)  Cervical herniated disc (M50.20)  Cervical facet syndrome (M47.812)  Cervical radiculopathy (M54.12)   Signed Referral Summay:     Physical Therapy Area of Concentration: Ortho  Physical Therapy Ortho: Spine  Comments: C2-3 mild-mod diffuse, C3-4 left mod foraminal & mild-moderate diffuse, C6-7 right mild-moderate & left moderate foraminal, C7-T1 left mild foraminal bulging discs  (primary encounter diagnosis)  Arthropathy of cervical facet joint  C3-4 moderate central & left foraminal, C6-7 left mild-moderate foraminal, C7-T1 left mild-moderate foraminal stenosis  C7-T1 mild central herniated disc  Cervical facet syndrome: bilateral C6-7 and C3-4  right chronic C8 radiculopathy and left C6 radiculitis     2-3 times per week for 4-6 weeks  Improve cervical and thoracic segmental mobility and then progress to stability.  Scapular mobilization and stabilization with taping as needed.  Neural mobilization and dry needling as needed.  HEP        Unless otherwise indicated by your provider or insurance, feel free to schedule your appointment with the first available provider in the specialist group you were referred to that meets your scheduling needs if the referred to provider is not available.           ______________________________________________________________  Scheduling Information:  Note to Managed Care  Patients: This is the physician order form only and not an authorization for services.     Your physician has recommended you to have physical therapy done at [facility] however, your insurance company may require you to have these services done at another facility or to obtain an approved referral. Services ordered by your physician may not be covered unless prior authorization is obtained in accordance with your insurance company's guidelines. Unauthorized care may be your financial responsibility. If you have questions, please call your Help[employer] customer service number located on your ID card.     To schedule Physical Therapy at any of the [facility] facilities, please call   [phone].     [facility]  [address], [place] [address] [place]  [address] [place]  [address]  [facility] [place]  [address] [place], [place] [address] [place]  [address] [place] [address]  [facility] [place]  [address] [place] [address]               _______________________________________________________        Coverage Information:      Active Insurance as of 5/8/2024              Primary Coverage               Payor Plan Insurance Group Employer/Plan Group      MEDICARE MEDICARE PART B ONLY [id number]                Payor Plan Address Payor Plan Phone Number Payor Plan Fax Number Effective Dates      [address]     10/1/2019 - None Entered      [facility] [id number]                    Subscriber Name Subscriber Birth Date Member ID           CARLOS VELEZ 10/5/1955 [id number]                Guarantor Name (ID) Guarantor Birth Date Guarantor Address Guarantor Type      CARLOS VELEZ ([id number]) 10/5/1955 [address] Personal/Family          [facility] [address]                       Secondary Coverage               Payor Plan Insurance Group Employer/Plan  Group      [employer] [place] [employer] [place] [id number]                Payor Plan Address Payor Plan Phone Number Payor Plan Fax Number Effective Dates      [employer] [phone]   10/1/2019 - None Entered      [place] [id number]                    Subscriber Name Subscriber Birth Date Member ID           CARLOS VELEZ 10/5/1955 [id number]                Guarantor Name (ID) Guarantor Birth Date Guarantor Address Guarantor Type      CALROS VELEZ ([id number]) 10/5/1955 [address]  Personal/Family          [facility] [address]                    Electronically Signed By: Luke Cleaning MD [NPI: [id number]]  Order Date: May 8, 2024 at 9:23 AM

## (undated) NOTE — LETTER
[facility], [place]  Authorization for Invasive Procedures  1.  I hereby authorize Dr. Hiro Ya , my physician and whomever may be designated as the doctor's assistant, to perform the following operation and/or procedure:  Colonoscopy performed for the purposes of advancing medicine, science, and/or education, provided my identity is not revealed. If the procedure has been videotaped, the physician/surgeon will obtain the original videotape.  The hospital will not be responsible for stor My signature below affirms that prior to the time of the procedure, I have explained to the patient and/or her legal representative, the risks and benefits involved in the proposed treatment and any reasonable alternative to the proposed treatment.  I have

## (undated) NOTE — MR AVS SNAPSHOT
Fredi Mandel [address]  [phone]  [phone]               Thank you for choosing us for your health care visit with Josemanuel Thompson PT.   We are glad to serve you and happy to provide you wit Martha Physical Therapy Visit By Therapist with Martha Nassar PT, [facility] OF THE [facility] 0[address] [facility] ([address])    [address]   [phone]           Please arrive at your sche

## (undated) NOTE — ED AVS SNAPSHOT
Samantha Man Stonre   MRN: [id number]    Department:  [facility] Emergency Department   Date of Visit:  3/29/2019           Disclosure     Insurance plans vary and the physician(s) referred by the ER may not be covered by your plan.  Please contact yo CARE PHYSICIAN AT ONCE OR RETURN IMMEDIATELY TO THE EMERGENCY DEPARTMENT. If you have been prescribed any medication(s), please fill your prescription right away and begin taking the medication(s) as directed.   If you believe that any of the medications

## (undated) NOTE — LETTER
7/31/2023      Hetal Simmons MD  Physical Medicine and Rehabilitation  [address] [facility], [address] [place] [address]  Dept: [phone]  Dept Fax: [phone]        RE: Consultation for Jonny Nguyễn        Dear Isi Monge MD,    Thank you very much for the opportunity to see your patient. Attached please find a summary from your patient's recent visit. I appreciate the chance to take care of your patient with you. Please feel free to call me with any questions or concerns. Sincerely,        Jose Simmons MD  Electronically Signed on 7/31/2023

## (undated) NOTE — MR AVS SNAPSHOT
Fredi Mandel [address]  [phone]  [phone]               Thank you for choosing us for your health care visit with Roseann Teixeira PT.   We are glad to serve you and happy to provide you wit no eating or activity restrictions for this exam.            May 11, 2017  2:15 PM   [place] Physical Therapy Visit By Therapist with Flip Ellis PT, [facility] OF THE [address] ([facility])    [facility]

## (undated) NOTE — MR AVS SNAPSHOT
Fredi Mandel [address]  [phone]  [phone]               Thank you for choosing us for your health care visit with Hermelindo Barrios PT.   We are glad to serve you and happy to provide you wit [place] [address]   [phone]           Please arrive at your scheduled appointment time. Wear comfortable, loose fitting clothing.             Jun 29, 2017  1:45 PM   [place] Physical Therapy Visit By Therapist with Swetha Zhu PT, [facility] OF THE [facility]

## (undated) NOTE — MR AVS SNAPSHOT
[facility]  [address], [address] [place] [address]  [address] [facility] [place] [phone]               Thank you for choosing us for your health care visit with Mat Martinez.  Tommy Guillaume MD.  We are glad to serve you and happy to provide you with this summary of you [address] ([facility])    [address] [address]   [phone]           Please arrive at your scheduled appointment time. Wear comfortable, loose fitting clothing.             Jun 02, 201 the maximum allowed. ? If your prescription is due for a refill, you may be due for a follow up appointment. ? To best provide you care, patients receiving routine medications need to be seen at least once a year.      protocol for controlled subst understanding in the matter.            Allergies as of May 23, 2017     Acetaminophen Nausea and vomiting    constipation      Augmentin, [Amoxicillin-Pot Clavulanate] Other (See Comments)    Abdominal pain    Bactrim [Sulfamethoxazole W/Trimethoprim] methocarbamol 750 MG Tabs   Take 1 tablet by mouth as needed. Commonly known as:  ROBAXIN           Multiple Vitamins Tabs   Take 1 tablet by mouth daily.  Multiple Vitamins tablet           Pantoprazole Sodium 40 MG Tbec   Take 1 tablet (40 mg total) by [employer] questions? Call [phone] for help. [employer] is NOT to be used for urgent needs. For medical emergencies, dial 911.            Visit [facility] online at  [email or web address]

## (undated) NOTE — LETTER
8/22/2024      Luke Cleaning MD  Physical Medicine and Rehabilitation  [address] [place] [address], [address]  [facility] [address]  Dept: [phone]  Dept Fax: [phone]        RE: Consultation for Radha Stoner        Dear Sherly Hirsch MD,    Thank you very much for the opportunity to see your patient.  Attached please find a summary from your patient's recent visit.     I appreciate the chance to take care of your patient with you.  Please feel free to call me with any questions or concerns.    Sincerely,        Luke Cleaning MD  Electronically Signed on 8/22/2024

## (undated) NOTE — LETTER
7/22/2024      Luke Cleaning MD  Physical Medicine and Rehabilitation  [address] [place] [address], [address]  [facility] [address]  Dept: [phone]  Dept Fax: [phone]        RE: Consultation for Radha Stoner        Dear Sherly Hirsch MD,    Thank you very much for the opportunity to see your patient.  Attached please find a summary from your patient's recent visit.     I appreciate the chance to take care of your patient with you.  Please feel free to call me with any questions or concerns.    Sincerely,        Luke Cleaning MD  Electronically Signed on 7/22/2024

## (undated) NOTE — LETTER
[facility] SURGERY SCHEDULING FORM   [address]  [address] [facility]   [phone] (scheduling phone) [phone] (scheduling fax)     PATIENT INFORMATION   Patient Name:    Matt Lugoys Day   :    10/5/1955   Alejandro Brink Allergies: Codeine; Hydrocodone-Acetaminophen; Morphine; Augmentin, [Amoxicillin-Pot Clavulanate];  Bactrim [Sulfamethoxazole W/Trimethoprim]; Nsaids; Tramadol Hcl       Completed by:    Chayito Ann      Date:    3/8/2018    [facility] will follow its Pre-Admi

## (undated) NOTE — LETTER
[facility] SURGERY SCHEDULING FORM   [address]  [address] [place] [address]   [phone] (scheduling phone) [phone] (scheduling fax)     PATIENT INFORMATION   Last Name:      Day      First Name:    Esther Martell Allergies: Aleve; Levaquin [Levofloxacin]; Codeine; Hydrocodone; Morphine; Nsaids; Augmentin, [Amoxicillin-Pot Clavulanate]; Bactrim [Sulfamethoxazole W/Trimethoprim];  Tramadol Hcl    Request first appt of the day       Completed by:    Jennifer Thompson

## (undated) NOTE — MR AVS SNAPSHOT
Fredi Mandel [address]  [phone]  [phone]               Thank you for choosing us for your health care visit with Salazar Del Valle PT.   We are glad to serve you and happy to provide you wit Exam - Established Patient with Fer Maxwell MD   [facility], [address] [facility])    [address], [address] [place] [id number]            Jul 20, 2017 10:15 AM   [facility]artha [facility] [place]

## (undated) NOTE — LETTER
Patient Name: Dariana Stoner  YOB: 1955          MRN number:  [id number]  Date:  3/9/2021  Referring Physician: Merilyn Opitz    ADULT VIDEOFLUOROSCOPIC SWALLOWING STUDY:    Referring Physician: Yomi Worrell      Radiologist: Dr. Wanda Zamora  Diagnosis: reports pain at a level of 0/10. Oral phase:  Mildly reduced bolus formation and control resulted in piecemeal posterior propulsion of the bolus and partial spillage of liquids via straw prematurely into the pharynx x1.     Pharyngeal phase:  Cervical ha Administration:  Take whole in pureed    Further Follow-up:  No direct therapy is warranted at this time.   If throat clearing persists or worsens, short term therapy may be beneficial.        EDUCATION/INSTRUCTION  Reviewed results and recommendations with

## (undated) NOTE — LETTER
[facility] SURGERY SCHEDULING FORM   [address]  [address] [place] [address]   [phone] (scheduling phone) [phone] (scheduling fax)     PATIENT INFORMATION   Last Name:      Day      First Name:    Pat Party Allergies: Aleve; Levaquin [Levofloxacin]; Codeine; Hydrocodone; Morphine; Nsaids; Augmentin, [Amoxicillin-Pot Clavulanate]; Bactrim [Sulfamethoxazole W/Trimethoprim]; Tramadol Hcl         Completed by:     Smitha WESLEY      Date:    4/24/2019

## (undated) NOTE — LETTER
5/9/2024      Luke Cleaning MD  Physical Medicine and Rehabilitation  [address] [place] [address], [address]  [facility] [address]  Dept: [phone]  Dept Fax: [phone]        RE: Consultation for Radha Stoner        Dear Sherly Hirsch MD,    Thank you very much for the opportunity to see your patient.  Attached please find a summary from your patient's recent visit.     I appreciate the chance to take care of your patient with you.  Please feel free to call me with any questions or concerns.    Sincerely,        Luke Cleaning MD  Electronically Signed on 5/9/2024

## (undated) NOTE — LETTER
10/11/2023      Hermelindo Tim Vazquez MD  Physical Medicine and Rehabilitation  2010 [facility], [address] [place] [address]  Dept: [phone]  Dept Fax: [phone]        RE: Consultation for Brenda Johnson        Dear Phan Durán MD,    Thank you very much for the opportunity to see your patient. Attached please find a summary from your patient's recent visit. I appreciate the chance to take care of your patient with you. Please feel free to call me with any questions or concerns. Sincerely,        Juan Posada.  Jeffrey Vazquez MD  Electronically Signed on 10/11/2023

## (undated) NOTE — LETTER
[facility], [place]  Authorization for Invasive Procedures  1.  I hereby authorize  *** , my physician and whomever may be designated as the doctor's assistant, to perform the following operation and/or procedure:  *ISELA occur: fever and allergic reactions, hemolytic reactions, transmission of disease such as hepatitis, AIDS, cytomegalovirus (CMV), and flluid overload.  In the event that I wish to have autologous transfusions of my own blood, or a directed donor transfusion Signature of Patient:  ________________________________________________ Date: _________Time: _________    Responsible person in case of minor or unconscious: _____________________________Relationship: ____________     Witness Signature: _______________

## (undated) NOTE — Clinical Note
3/30/2017      Adri Cardenas MD  Physical Medicine and Rehabilitation  [address] [place] [address]  Dept: [phone]  Dept Fax: [phone]        RE: Consultation for Ailyn Grande        Dear Juliette [place] MD,    Thank you

## (undated) NOTE — MR AVS SNAPSHOT
Fredi Mandel [address] 2000 [facility] [address]  [phone]  [phone]               Thank you for choosing us for your health care visit with Rayo Nicholson PT.   We are glad to serve you and happy to provide you wit [place] Physical Therapy Visit By Therapist with Isak Antonio PT, [facility] OF THE [facility] PELVIC  [facility] ([facility])    [address]   [phone]           Please arrive at your sche

## (undated) NOTE — ED AVS SNAPSHOT
Liz Drought Day   MRN: [id number]    Department:  [facility] Emergency Department   Date of Visit:  10/4/2019           Disclosure     Insurance plans vary and the physician(s) referred by the ER may not be covered by your plan.  Please contact yo within the next three months to obtain basic health screening including reassessment of your blood pressure.     IF THERE IS ANY CHANGE OR WORSENING OF YOUR CONDITION, CALL YOUR PRIMARY CARE PHYSICIAN AT ONCE OR RETURN IMMEDIATELY TO THE EMERGENCY DEPARTMEN

## (undated) NOTE — LETTER
01/14/22        Jose Cruz [address] Day  [address] [address]      Dear Chris Hand records indicate that you have outstanding lab work and or testing that was ordered for you and has not yet been completed:  MRI MRCP (W+WO) (UOU=[id number]) (